# Patient Record
Sex: MALE | Race: WHITE | Employment: FULL TIME | URBAN - METROPOLITAN AREA
[De-identification: names, ages, dates, MRNs, and addresses within clinical notes are randomized per-mention and may not be internally consistent; named-entity substitution may affect disease eponyms.]

---

## 2017-07-24 PROCEDURE — 81003 URINALYSIS AUTO W/O SCOPE: CPT | Performed by: FAMILY MEDICINE

## 2018-05-14 PROBLEM — F41.1 GENERALIZED ANXIETY DISORDER: Status: ACTIVE | Noted: 2018-05-14

## 2020-02-05 ENCOUNTER — HOSPITAL ENCOUNTER (EMERGENCY)
Age: 55
Discharge: HOME | End: 2020-02-05
Payer: COMMERCIAL

## 2020-02-05 ENCOUNTER — APPOINTMENT (OUTPATIENT)
Dept: GENERAL RADIOLOGY | Age: 55
End: 2020-02-05
Payer: COMMERCIAL

## 2020-02-05 VITALS
BODY MASS INDEX: 36 KG/M2 | DIASTOLIC BLOOD PRESSURE: 43 MMHG | HEART RATE: 78 BPM | WEIGHT: 282.19 LBS | SYSTOLIC BLOOD PRESSURE: 143 MMHG | RESPIRATION RATE: 18 BRPM | TEMPERATURE: 98 F | OXYGEN SATURATION: 99 %

## 2020-02-05 DIAGNOSIS — R05.3 CHRONIC COUGH: ICD-10-CM

## 2020-02-05 DIAGNOSIS — R07.89 CHEST PAIN, MUSCULOSKELETAL: Primary | ICD-10-CM

## 2020-02-05 LAB
ALBUMIN SERPL-MCNC: 3.3 G/DL (ref 3.4–5)
ALBUMIN/GLOB SERPL: 0.8 {RATIO} (ref 1–2)
ALP LIVER SERPL-CCNC: 71 U/L (ref 45–117)
ALT SERPL-CCNC: 23 U/L (ref 16–61)
ANION GAP SERPL CALC-SCNC: 7 MMOL/L (ref 0–18)
AST SERPL-CCNC: 12 U/L (ref 15–37)
BASOPHILS # BLD AUTO: 0.04 X10(3) UL (ref 0–0.2)
BASOPHILS NFR BLD AUTO: 0.3 %
BILIRUB SERPL-MCNC: 0.5 MG/DL (ref 0.1–2)
BUN BLD-MCNC: 20 MG/DL (ref 7–18)
BUN/CREAT SERPL: 21.7 (ref 10–20)
CALCIUM BLD-MCNC: 8.7 MG/DL (ref 8.5–10.1)
CHLORIDE SERPL-SCNC: 102 MMOL/L (ref 98–112)
CO2 SERPL-SCNC: 27 MMOL/L (ref 21–32)
CREAT BLD-MCNC: 0.92 MG/DL (ref 0.7–1.3)
D-DIMER: <0.27 UG/ML FEU (ref ?–0.53)
DEPRECATED RDW RBC AUTO: 42.9 FL (ref 35.1–46.3)
EOSINOPHIL # BLD AUTO: 0.18 X10(3) UL (ref 0–0.7)
EOSINOPHIL NFR BLD AUTO: 1.4 %
ERYTHROCYTE [DISTWIDTH] IN BLOOD BY AUTOMATED COUNT: 13.9 % (ref 11–15)
GLOBULIN PLAS-MCNC: 4.1 G/DL (ref 2.8–4.4)
GLUCOSE BLD-MCNC: 100 MG/DL (ref 70–99)
HAV IGM SER QL: 1.9 MG/DL (ref 1.6–2.6)
HCT VFR BLD AUTO: 41.5 % (ref 39–53)
HGB BLD-MCNC: 13.1 G/DL (ref 13–17.5)
IMM GRANULOCYTES # BLD AUTO: 0.05 X10(3) UL (ref 0–1)
IMM GRANULOCYTES NFR BLD: 0.4 %
LIPASE SERPL-CCNC: 141 U/L (ref 73–393)
LYMPHOCYTES # BLD AUTO: 2.63 X10(3) UL (ref 1–4)
LYMPHOCYTES NFR BLD AUTO: 20 %
M PROTEIN MFR SERPL ELPH: 7.4 G/DL (ref 6.4–8.2)
MCH RBC QN AUTO: 26.7 PG (ref 26–34)
MCHC RBC AUTO-ENTMCNC: 31.6 G/DL (ref 31–37)
MCV RBC AUTO: 84.7 FL (ref 80–100)
MONOCYTES # BLD AUTO: 0.88 X10(3) UL (ref 0.1–1)
MONOCYTES NFR BLD AUTO: 6.7 %
NEUTROPHILS # BLD AUTO: 9.35 X10 (3) UL (ref 1.5–7.7)
NEUTROPHILS # BLD AUTO: 9.35 X10(3) UL (ref 1.5–7.7)
NEUTROPHILS NFR BLD AUTO: 71.2 %
OSMOLALITY SERPL CALC.SUM OF ELEC: 285 MOSM/KG (ref 275–295)
PLATELET # BLD AUTO: 454 10(3)UL (ref 150–450)
POTASSIUM SERPL-SCNC: 3.6 MMOL/L (ref 3.5–5.1)
RBC # BLD AUTO: 4.9 X10(6)UL (ref 4.3–5.7)
SODIUM SERPL-SCNC: 136 MMOL/L (ref 136–145)
TROPONIN I SERPL-MCNC: <0.045 NG/ML (ref ?–0.04)
WBC # BLD AUTO: 13.1 X10(3) UL (ref 4–11)

## 2020-02-05 PROCEDURE — 83690 ASSAY OF LIPASE: CPT | Performed by: EMERGENCY MEDICINE

## 2020-02-05 PROCEDURE — 83735 ASSAY OF MAGNESIUM: CPT | Performed by: EMERGENCY MEDICINE

## 2020-02-05 PROCEDURE — 96374 THER/PROPH/DIAG INJ IV PUSH: CPT

## 2020-02-05 PROCEDURE — 93005 ELECTROCARDIOGRAM TRACING: CPT

## 2020-02-05 PROCEDURE — 85379 FIBRIN DEGRADATION QUANT: CPT | Performed by: EMERGENCY MEDICINE

## 2020-02-05 PROCEDURE — 85025 COMPLETE CBC W/AUTO DIFF WBC: CPT | Performed by: EMERGENCY MEDICINE

## 2020-02-05 PROCEDURE — 84484 ASSAY OF TROPONIN QUANT: CPT | Performed by: EMERGENCY MEDICINE

## 2020-02-05 PROCEDURE — 80053 COMPREHEN METABOLIC PANEL: CPT | Performed by: EMERGENCY MEDICINE

## 2020-02-05 PROCEDURE — 99285 EMERGENCY DEPT VISIT HI MDM: CPT

## 2020-02-05 PROCEDURE — 96361 HYDRATE IV INFUSION ADD-ON: CPT

## 2020-02-05 PROCEDURE — 93010 ELECTROCARDIOGRAM REPORT: CPT

## 2020-02-05 PROCEDURE — 71101 X-RAY EXAM UNILAT RIBS/CHEST: CPT

## 2020-02-05 RX ORDER — KETOROLAC TROMETHAMINE 30 MG/ML
30 INJECTION, SOLUTION INTRAMUSCULAR; INTRAVENOUS ONCE
Status: COMPLETED | OUTPATIENT
Start: 2020-02-05 | End: 2020-02-05

## 2020-02-05 RX ORDER — BENZONATATE 100 MG/1
100 CAPSULE ORAL 3 TIMES DAILY PRN
Qty: 30 CAPSULE | Refills: 0 | Status: SHIPPED | OUTPATIENT
Start: 2020-02-05 | End: 2020-03-06

## 2020-02-05 NOTE — ED INITIAL ASSESSMENT (HPI)
Recent bronchitis with cough. States he has right lower rib pain.  States he is concerned he may have broken rib from cough

## 2020-02-06 LAB
ATRIAL RATE: 86 BPM
P AXIS: 87 DEGREES
P-R INTERVAL: 212 MS
Q-T INTERVAL: 400 MS
QRS DURATION: 104 MS
QTC CALCULATION (BEZET): 478 MS
R AXIS: -58 DEGREES
T AXIS: 72 DEGREES
VENTRICULAR RATE: 86 BPM

## 2020-02-06 NOTE — ED PROVIDER NOTES
Patient Seen in: [facility] In [place]      History   Patient presents with:  Trauma    Stated Complaint: right rib pain recent bronchitis    HPI    The patient is a 79-year-old male who is battling upper respiratory infection for abo Used    Alcohol use: No      Alcohol/week: 0.0 standard drinks    Drug use: No             Review of Systems    Positive for stated complaint: right rib pain recent bronchitis  Other systems are as noted in HPI. Constitutional and vital signs reviewed. asymmetry, tenderness or cords. Skin: No masses or nodules or abnormalities.   Psych: Normal interaction, cooperative with exam       ED Course     Labs Reviewed   COMP METABOLIC PANEL (14) - Abnormal; Notable for the following components:       Result Bree no evidence of acute ischemia. On arrival of the patient an EKG was obtained. The patient was placed on continuous pulse oximetry and cardiac telemetry. Blood was obtained and peripheral IV access was established. Chest x-ray was obtained. condition.               Disposition and Plan     Clinical Impression:  Chest pain, musculoskeletal  (primary encounter diagnosis)  Chronic cough    Disposition:  Discharge  2/5/2020  8:10 pm    Follow-up:  Cora Huynh, [address]

## 2021-08-06 NOTE — ED PROVIDER NOTES
Patient Seen in: [facility] In [place]      History   Patient presents with:   Anxiety/Panic attack  Eval-P    Stated Complaint: needs psych meds refilled until he can see his dr    MIRTA/Subjective:   MIRTA    Patient is a 27-year-old gen All other systems reviewed and negative except as noted above.     Physical Exam     ED Triage Vitals [08/06/21 1250]   /55   Pulse 63   Resp 20   Temp (!) 96 °F (35.6 °C)   Temp src Temporal   SpO2 99 %   O2 Device None (Room air)       Current:B medications    LORazepam (ATIVAN) 1 MG Oral Tab  Take 1 tablet (1 mg total) by mouth every 4 (four) hours as needed for Anxiety.   Qty: 10 tablet Refills: 0

## 2021-08-06 NOTE — ED INITIAL ASSESSMENT (HPI)
has been in constant panic attack for last several days. States meds are not working now. Went to [facility] and was sent here for \"med check\" denies SI but  is having \"negative thoughts\" but denies SI or any plans.  Patient is anxious, cannot sit

## 2022-05-12 NOTE — LETTER
81 y/o male with APS on coumadin, hx PE, MAC pneumonia, HLD with spontaneous C3-C6 epidural hematoma compressing the spinal cord    S/p  C3-C6 laminectomy and fusion for evacuation of epidural hematoma 4/28     Stepped down to NPU    -- CTH 4/29 no acute intracranial process   -- Post op MRI C spine satisfactory decompression with continued cord signal edema   --postop XR C spine hardware in good position  -- Acute encephalopathy:    --CTH 5/3 ordered per medicine team for some confusion: without evidence of acute hemorrhage.    --Hypernatremia: Na WNL. continue to monitor daily  --Agitation:    -Minimize use of restraints    -Scheduled 25 mg Seroquel qhs. Monitor QTc. Daily EKG. PRN Seroquel. Avoid depakote for elevated LFTs   -MRI brain w/wo contrast: no acute infarct or hemorrhage.    -Agitation most likely 2/2 delirium and steroids, steroid taper in to be completed 5/13, agitation and sleep schedule improving   -- Bilateral hydroureteronephrosis: Catheter in place. Keep in place until Urology follow up, 2 week follow up with Urology scheduled   -- C-collar to be worn when OOB  -- HV drains removed 4/30  -- History of antiphospholipid syndrome and PE: hold warfarin. Heme/onc signed off.    -Plan for outpatient follow up 5/27  -- SLP recommending regular diet with nectar thick liquids.  -- Dex taper to off. PPI while on steroids  -- Constipation: Bowel movement s/p enema on 5/3  -- Activity: PT/OT recs for SNF  -- DVT prophylaxis: TEDs/SCDs/SQH. - BLE US 5/11/22 negative for DVT  --Concern for hematoma vs abscess. Final path returned as blood clot. Daptomycin discontinued per ID recs.    - Specimen sent for cultures 5/6. CTM.    Dispo: Denied IPR, pending SNF   Date: 10/14/2023    Patient Name: Mariposa Merida          To Whom it may concern: The above patient was seen at the [facility] for treatment of a medical condition. This patient has multiple health concerns currently that require close monitoring. I recommend he not leave the country at this time, to remain close to healthcare providers.       Sincerely,    Niraj Gonzales MD

## 2022-07-23 ENCOUNTER — HOSPITAL ENCOUNTER (EMERGENCY)
Age: 57
Discharge: HOME | End: 2022-07-23
Attending: EMERGENCY MEDICINE
Payer: COMMERCIAL

## 2022-07-23 ENCOUNTER — APPOINTMENT (OUTPATIENT)
Dept: CT IMAGING | Age: 57
End: 2022-07-23
Attending: EMERGENCY MEDICINE
Payer: COMMERCIAL

## 2022-07-23 VITALS
SYSTOLIC BLOOD PRESSURE: 151 MMHG | DIASTOLIC BLOOD PRESSURE: 70 MMHG | HEART RATE: 88 BPM | TEMPERATURE: 97 F | OXYGEN SATURATION: 99 % | BODY MASS INDEX: 28.35 KG/M2 | RESPIRATION RATE: 18 BRPM | HEIGHT: 75 IN | WEIGHT: 228 LBS

## 2022-07-23 DIAGNOSIS — R10.30 LOWER ABDOMINAL PAIN: Primary | ICD-10-CM

## 2022-07-23 LAB
ALBUMIN SERPL-MCNC: 3.9 G/DL (ref 3.4–5)
ALBUMIN/GLOB SERPL: 1.1 {RATIO} (ref 1–2)
ALP LIVER SERPL-CCNC: 74 U/L
ALT SERPL-CCNC: 30 U/L
ANION GAP SERPL CALC-SCNC: 5 MMOL/L (ref 0–18)
AST SERPL-CCNC: 13 U/L (ref 15–37)
BASOPHILS # BLD AUTO: 0.04 X10(3) UL (ref 0–0.2)
BASOPHILS NFR BLD AUTO: 0.5 %
BILIRUB SERPL-MCNC: 1.2 MG/DL (ref 0.1–2)
BILIRUB UR QL STRIP.AUTO: NEGATIVE
BUN BLD-MCNC: 17 MG/DL (ref 7–18)
CALCIUM BLD-MCNC: 9.6 MG/DL (ref 8.5–10.1)
CHLORIDE SERPL-SCNC: 101 MMOL/L (ref 98–112)
CLARITY UR REFRACT.AUTO: CLEAR
CO2 SERPL-SCNC: 28 MMOL/L (ref 21–32)
COLOR UR AUTO: YELLOW
CREAT BLD-MCNC: 1.05 MG/DL
EOSINOPHIL # BLD AUTO: 0.21 X10(3) UL (ref 0–0.7)
EOSINOPHIL NFR BLD AUTO: 2.5 %
ERYTHROCYTE [DISTWIDTH] IN BLOOD BY AUTOMATED COUNT: 12.7 %
GLOBULIN PLAS-MCNC: 3.6 G/DL (ref 2.8–4.4)
GLUCOSE BLD-MCNC: 111 MG/DL (ref 70–99)
GLUCOSE UR STRIP.AUTO-MCNC: NEGATIVE MG/DL
HCT VFR BLD AUTO: 44.4 %
HGB BLD-MCNC: 14.7 G/DL
IMM GRANULOCYTES # BLD AUTO: 0.02 X10(3) UL (ref 0–1)
IMM GRANULOCYTES NFR BLD: 0.2 %
KETONES UR STRIP.AUTO-MCNC: NEGATIVE MG/DL
LEUKOCYTE ESTERASE UR QL STRIP.AUTO: NEGATIVE
LIPASE SERPL-CCNC: 342 U/L (ref 73–393)
LYMPHOCYTES # BLD AUTO: 2.42 X10(3) UL (ref 1–4)
LYMPHOCYTES NFR BLD AUTO: 28.5 %
MCH RBC QN AUTO: 28.8 PG (ref 26–34)
MCHC RBC AUTO-ENTMCNC: 33.1 G/DL (ref 31–37)
MCV RBC AUTO: 86.9 FL
MONOCYTES # BLD AUTO: 0.66 X10(3) UL (ref 0.1–1)
MONOCYTES NFR BLD AUTO: 7.8 %
NEUTROPHILS # BLD AUTO: 5.15 X10 (3) UL (ref 1.5–7.7)
NEUTROPHILS # BLD AUTO: 5.15 X10(3) UL (ref 1.5–7.7)
NEUTROPHILS NFR BLD AUTO: 60.5 %
NITRITE UR QL STRIP.AUTO: NEGATIVE
OSMOLALITY SERPL CALC.SUM OF ELEC: 280 MOSM/KG (ref 275–295)
PH UR STRIP.AUTO: 6.5 [PH] (ref 5–8)
PLATELET # BLD AUTO: 373 10(3)UL (ref 150–450)
POTASSIUM SERPL-SCNC: 3.5 MMOL/L (ref 3.5–5.1)
PROT SERPL-MCNC: 7.5 G/DL (ref 6.4–8.2)
PROT UR STRIP.AUTO-MCNC: NEGATIVE MG/DL
RBC # BLD AUTO: 5.11 X10(6)UL
RBC UR QL AUTO: NEGATIVE
SODIUM SERPL-SCNC: 134 MMOL/L (ref 136–145)
SP GR UR STRIP.AUTO: 1.02 (ref 1–1.03)
UROBILINOGEN UR STRIP.AUTO-MCNC: 2 MG/DL
WBC # BLD AUTO: 8.5 X10(3) UL (ref 4–11)

## 2022-07-23 PROCEDURE — 85025 COMPLETE CBC W/AUTO DIFF WBC: CPT | Performed by: EMERGENCY MEDICINE

## 2022-07-23 PROCEDURE — 99284 EMERGENCY DEPT VISIT MOD MDM: CPT

## 2022-07-23 PROCEDURE — 83690 ASSAY OF LIPASE: CPT | Performed by: EMERGENCY MEDICINE

## 2022-07-23 PROCEDURE — 80053 COMPREHEN METABOLIC PANEL: CPT | Performed by: EMERGENCY MEDICINE

## 2022-07-23 PROCEDURE — 96361 HYDRATE IV INFUSION ADD-ON: CPT

## 2022-07-23 PROCEDURE — 96375 TX/PRO/DX INJ NEW DRUG ADDON: CPT

## 2022-07-23 PROCEDURE — 96374 THER/PROPH/DIAG INJ IV PUSH: CPT

## 2022-07-23 PROCEDURE — 74177 CT ABD & PELVIS W/CONTRAST: CPT | Performed by: EMERGENCY MEDICINE

## 2022-07-23 PROCEDURE — 81003 URINALYSIS AUTO W/O SCOPE: CPT

## 2022-07-23 PROCEDURE — 81003 URINALYSIS AUTO W/O SCOPE: CPT | Performed by: EMERGENCY MEDICINE

## 2022-07-23 RX ORDER — ONDANSETRON 2 MG/ML
4 INJECTION INTRAMUSCULAR; INTRAVENOUS
Status: DISCONTINUED | OUTPATIENT
Start: 2022-07-23 | End: 2022-07-23

## 2022-07-23 RX ORDER — ONDANSETRON 8 MG/1
8 TABLET, ORALLY DISINTEGRATING ORAL EVERY 6 HOURS PRN
Qty: 10 TABLET | Refills: 0 | Status: SHIPPED | OUTPATIENT
Start: 2022-07-23

## 2022-07-23 RX ORDER — KETOROLAC TROMETHAMINE 30 MG/ML
30 INJECTION, SOLUTION INTRAMUSCULAR; INTRAVENOUS ONCE
Status: COMPLETED | OUTPATIENT
Start: 2022-07-23 | End: 2022-07-23

## 2023-01-01 ENCOUNTER — HOSPITAL ENCOUNTER (EMERGENCY)
Age: 58
Discharge: HOME | End: 2023-01-01
Attending: EMERGENCY MEDICINE
Payer: COMMERCIAL

## 2023-01-01 ENCOUNTER — APPOINTMENT (OUTPATIENT)
Dept: CT IMAGING | Age: 58
End: 2023-01-01
Attending: EMERGENCY MEDICINE
Payer: COMMERCIAL

## 2023-01-01 VITALS
TEMPERATURE: 97 F | RESPIRATION RATE: 14 BRPM | HEART RATE: 77 BPM | DIASTOLIC BLOOD PRESSURE: 63 MMHG | OXYGEN SATURATION: 99 % | SYSTOLIC BLOOD PRESSURE: 142 MMHG

## 2023-01-01 DIAGNOSIS — R10.10 UPPER ABDOMINAL PAIN: Primary | ICD-10-CM

## 2023-01-01 LAB
ALBUMIN SERPL-MCNC: 3.6 G/DL (ref 3.4–5)
ALBUMIN/GLOB SERPL: 1 {RATIO} (ref 1–2)
ALP LIVER SERPL-CCNC: 63 U/L
ALT SERPL-CCNC: 32 U/L
ANION GAP SERPL CALC-SCNC: 5 MMOL/L (ref 0–18)
AST SERPL-CCNC: 13 U/L (ref 15–37)
BASOPHILS # BLD AUTO: 0.03 X10(3) UL (ref 0–0.2)
BASOPHILS NFR BLD AUTO: 0.3 %
BILIRUB SERPL-MCNC: 0.6 MG/DL (ref 0.1–2)
BILIRUB UR QL STRIP.AUTO: NEGATIVE
BUN BLD-MCNC: 17 MG/DL (ref 7–18)
CALCIUM BLD-MCNC: 9.3 MG/DL (ref 8.5–10.1)
CHLORIDE SERPL-SCNC: 105 MMOL/L (ref 98–112)
CLARITY UR REFRACT.AUTO: CLEAR
CO2 SERPL-SCNC: 29 MMOL/L (ref 21–32)
COLOR UR AUTO: YELLOW
CREAT BLD-MCNC: 0.9 MG/DL
EOSINOPHIL # BLD AUTO: 0.07 X10(3) UL (ref 0–0.7)
EOSINOPHIL NFR BLD AUTO: 0.7 %
ERYTHROCYTE [DISTWIDTH] IN BLOOD BY AUTOMATED COUNT: 13.1 %
GFR SERPLBLD BASED ON 1.73 SQ M-ARVRAT: 100 ML/MIN/1.73M2 (ref 60–?)
GLOBULIN PLAS-MCNC: 3.5 G/DL (ref 2.8–4.4)
GLUCOSE BLD-MCNC: 123 MG/DL (ref 70–99)
GLUCOSE UR STRIP.AUTO-MCNC: NEGATIVE MG/DL
HCT VFR BLD AUTO: 42.1 %
HGB BLD-MCNC: 14 G/DL
IMM GRANULOCYTES # BLD AUTO: 0.03 X10(3) UL (ref 0–1)
IMM GRANULOCYTES NFR BLD: 0.3 %
KETONES UR STRIP.AUTO-MCNC: NEGATIVE MG/DL
LEUKOCYTE ESTERASE UR QL STRIP.AUTO: NEGATIVE
LIPASE SERPL-CCNC: 177 U/L (ref 73–393)
LYMPHOCYTES # BLD AUTO: 1.62 X10(3) UL (ref 1–4)
LYMPHOCYTES NFR BLD AUTO: 16.8 %
MCH RBC QN AUTO: 28.7 PG (ref 26–34)
MCHC RBC AUTO-ENTMCNC: 33.3 G/DL (ref 31–37)
MCV RBC AUTO: 86.3 FL
MONOCYTES # BLD AUTO: 0.41 X10(3) UL (ref 0.1–1)
MONOCYTES NFR BLD AUTO: 4.2 %
NEUTROPHILS # BLD AUTO: 7.5 X10 (3) UL (ref 1.5–7.7)
NEUTROPHILS # BLD AUTO: 7.5 X10(3) UL (ref 1.5–7.7)
NEUTROPHILS NFR BLD AUTO: 77.7 %
NITRITE UR QL STRIP.AUTO: NEGATIVE
OSMOLALITY SERPL CALC.SUM OF ELEC: 291 MOSM/KG (ref 275–295)
PH UR STRIP.AUTO: 8.5 [PH] (ref 5–8)
PLATELET # BLD AUTO: 395 10(3)UL (ref 150–450)
POTASSIUM SERPL-SCNC: 3.8 MMOL/L (ref 3.5–5.1)
PROT SERPL-MCNC: 7.1 G/DL (ref 6.4–8.2)
PROT UR STRIP.AUTO-MCNC: NEGATIVE MG/DL
RBC # BLD AUTO: 4.88 X10(6)UL
RBC UR QL AUTO: NEGATIVE
SODIUM SERPL-SCNC: 139 MMOL/L (ref 136–145)
SP GR UR STRIP.AUTO: 1.02 (ref 1–1.03)
UROBILINOGEN UR STRIP.AUTO-MCNC: 0.2 MG/DL
WBC # BLD AUTO: 9.7 X10(3) UL (ref 4–11)

## 2023-01-01 PROCEDURE — 85025 COMPLETE CBC W/AUTO DIFF WBC: CPT

## 2023-01-01 PROCEDURE — 85025 COMPLETE CBC W/AUTO DIFF WBC: CPT | Performed by: EMERGENCY MEDICINE

## 2023-01-01 PROCEDURE — 74176 CT ABD & PELVIS W/O CONTRAST: CPT | Performed by: EMERGENCY MEDICINE

## 2023-01-01 PROCEDURE — 96374 THER/PROPH/DIAG INJ IV PUSH: CPT

## 2023-01-01 PROCEDURE — 96375 TX/PRO/DX INJ NEW DRUG ADDON: CPT

## 2023-01-01 PROCEDURE — C9113 INJ PANTOPRAZOLE SODIUM, VIA: HCPCS | Performed by: EMERGENCY MEDICINE

## 2023-01-01 PROCEDURE — 96361 HYDRATE IV INFUSION ADD-ON: CPT

## 2023-01-01 PROCEDURE — 80053 COMPREHEN METABOLIC PANEL: CPT | Performed by: EMERGENCY MEDICINE

## 2023-01-01 PROCEDURE — 99284 EMERGENCY DEPT VISIT MOD MDM: CPT

## 2023-01-01 PROCEDURE — 83690 ASSAY OF LIPASE: CPT

## 2023-01-01 PROCEDURE — 83690 ASSAY OF LIPASE: CPT | Performed by: EMERGENCY MEDICINE

## 2023-01-01 PROCEDURE — 80053 COMPREHEN METABOLIC PANEL: CPT

## 2023-01-01 PROCEDURE — 81003 URINALYSIS AUTO W/O SCOPE: CPT | Performed by: EMERGENCY MEDICINE

## 2023-01-01 RX ORDER — KETOROLAC TROMETHAMINE 15 MG/ML
15 INJECTION, SOLUTION INTRAMUSCULAR; INTRAVENOUS ONCE
Status: COMPLETED | OUTPATIENT
Start: 2023-01-01 | End: 2023-01-01

## 2023-01-01 RX ORDER — ONDANSETRON 2 MG/ML
4 INJECTION INTRAMUSCULAR; INTRAVENOUS ONCE
Status: COMPLETED | OUTPATIENT
Start: 2023-01-01 | End: 2023-01-01

## 2023-01-01 RX ORDER — SODIUM CHLORIDE 9 MG/ML
INJECTION, SOLUTION INTRAVENOUS CONTINUOUS
Status: DISCONTINUED | OUTPATIENT
Start: 2023-01-01 | End: 2023-01-01

## 2023-01-01 RX ORDER — HYDROMORPHONE HYDROCHLORIDE 1 MG/ML
1 INJECTION, SOLUTION INTRAMUSCULAR; INTRAVENOUS; SUBCUTANEOUS EVERY 30 MIN PRN
Status: DISCONTINUED | OUTPATIENT
Start: 2023-01-01 | End: 2023-01-01

## 2023-01-01 RX ORDER — PANTOPRAZOLE SODIUM 40 MG/1
40 TABLET, DELAYED RELEASE ORAL DAILY
Qty: 30 TABLET | Refills: 0 | Status: SHIPPED | OUTPATIENT
Start: 2023-01-01 | End: 2023-01-31

## 2023-01-01 NOTE — ED INITIAL ASSESSMENT (HPI)
Woke up at 0230 with abdominal pain that he thought was gas pains, took gas ex without relief , took pepto and no relief.  Nausea but no vomiting

## 2023-02-09 ENCOUNTER — APPOINTMENT (OUTPATIENT)
Dept: ULTRASOUND IMAGING | Age: 58
End: 2023-02-09
Attending: EMERGENCY MEDICINE
Payer: COMMERCIAL

## 2023-02-09 ENCOUNTER — HOSPITAL ENCOUNTER (EMERGENCY)
Age: 58
Discharge: HOME | End: 2023-02-10
Attending: EMERGENCY MEDICINE
Payer: COMMERCIAL

## 2023-02-09 VITALS
SYSTOLIC BLOOD PRESSURE: 118 MMHG | WEIGHT: 250 LBS | HEART RATE: 74 BPM | HEIGHT: 75 IN | DIASTOLIC BLOOD PRESSURE: 72 MMHG | OXYGEN SATURATION: 96 % | BODY MASS INDEX: 31.08 KG/M2 | TEMPERATURE: 98 F | RESPIRATION RATE: 20 BRPM

## 2023-02-09 DIAGNOSIS — K80.20 GALLSTONES: ICD-10-CM

## 2023-02-09 DIAGNOSIS — R10.13 EPIGASTRIC PAIN: Primary | ICD-10-CM

## 2023-02-09 LAB
ALBUMIN SERPL-MCNC: 3.4 G/DL (ref 3.4–5)
ALBUMIN/GLOB SERPL: 0.9 {RATIO} (ref 1–2)
ALP LIVER SERPL-CCNC: 61 U/L
ALT SERPL-CCNC: 27 U/L
ANION GAP SERPL CALC-SCNC: 5 MMOL/L (ref 0–18)
AST SERPL-CCNC: 12 U/L (ref 15–37)
BASOPHILS # BLD AUTO: 0.06 X10(3) UL (ref 0–0.2)
BASOPHILS NFR BLD AUTO: 0.6 %
BILIRUB SERPL-MCNC: 0.4 MG/DL (ref 0.1–2)
BILIRUB UR QL STRIP.AUTO: NEGATIVE
BUN BLD-MCNC: 18 MG/DL (ref 7–18)
CALCIUM BLD-MCNC: 8.9 MG/DL (ref 8.5–10.1)
CHLORIDE SERPL-SCNC: 100 MMOL/L (ref 98–112)
CLARITY UR REFRACT.AUTO: CLEAR
CO2 SERPL-SCNC: 31 MMOL/L (ref 21–32)
COLOR UR AUTO: YELLOW
CREAT BLD-MCNC: 0.9 MG/DL
EOSINOPHIL # BLD AUTO: 0.26 X10(3) UL (ref 0–0.7)
EOSINOPHIL NFR BLD AUTO: 2.7 %
ERYTHROCYTE [DISTWIDTH] IN BLOOD BY AUTOMATED COUNT: 13.3 %
GFR SERPLBLD BASED ON 1.73 SQ M-ARVRAT: 100 ML/MIN/1.73M2 (ref 60–?)
GLOBULIN PLAS-MCNC: 3.6 G/DL (ref 2.8–4.4)
GLUCOSE BLD-MCNC: 121 MG/DL (ref 70–99)
GLUCOSE UR STRIP.AUTO-MCNC: NEGATIVE MG/DL
HCT VFR BLD AUTO: 42.9 %
HGB BLD-MCNC: 14.2 G/DL
IMM GRANULOCYTES # BLD AUTO: 0.03 X10(3) UL (ref 0–1)
IMM GRANULOCYTES NFR BLD: 0.3 %
KETONES UR STRIP.AUTO-MCNC: NEGATIVE MG/DL
LEUKOCYTE ESTERASE UR QL STRIP.AUTO: NEGATIVE
LIPASE SERPL-CCNC: 253 U/L (ref 73–393)
LIPASE SERPL-CCNC: 65 U/L (ref 13–75)
LYMPHOCYTES # BLD AUTO: 2.6 X10(3) UL (ref 1–4)
LYMPHOCYTES NFR BLD AUTO: 26.6 %
MCH RBC QN AUTO: 28.4 PG (ref 26–34)
MCHC RBC AUTO-ENTMCNC: 33.1 G/DL (ref 31–37)
MCV RBC AUTO: 85.8 FL
MONOCYTES # BLD AUTO: 0.86 X10(3) UL (ref 0.1–1)
MONOCYTES NFR BLD AUTO: 8.8 %
NEUTROPHILS # BLD AUTO: 5.96 X10 (3) UL (ref 1.5–7.7)
NEUTROPHILS # BLD AUTO: 5.96 X10(3) UL (ref 1.5–7.7)
NEUTROPHILS NFR BLD AUTO: 61 %
NITRITE UR QL STRIP.AUTO: NEGATIVE
OSMOLALITY SERPL CALC.SUM OF ELEC: 285 MOSM/KG (ref 275–295)
PH UR STRIP.AUTO: 7.5 [PH] (ref 5–8)
PLATELET # BLD AUTO: 360 10(3)UL (ref 150–450)
POTASSIUM SERPL-SCNC: 3.8 MMOL/L (ref 3.5–5.1)
PROT SERPL-MCNC: 7 G/DL (ref 6.4–8.2)
PROT UR STRIP.AUTO-MCNC: NEGATIVE MG/DL
RBC # BLD AUTO: 5 X10(6)UL
RBC UR QL AUTO: NEGATIVE
SODIUM SERPL-SCNC: 136 MMOL/L (ref 136–145)
SP GR UR STRIP.AUTO: 1.02 (ref 1–1.03)
UROBILINOGEN UR STRIP.AUTO-MCNC: 1 MG/DL
WBC # BLD AUTO: 9.8 X10(3) UL (ref 4–11)

## 2023-02-09 PROCEDURE — 99285 EMERGENCY DEPT VISIT HI MDM: CPT

## 2023-02-09 PROCEDURE — 76700 US EXAM ABDOM COMPLETE: CPT | Performed by: EMERGENCY MEDICINE

## 2023-02-09 PROCEDURE — 83690 ASSAY OF LIPASE: CPT | Performed by: EMERGENCY MEDICINE

## 2023-02-09 PROCEDURE — 81003 URINALYSIS AUTO W/O SCOPE: CPT | Performed by: EMERGENCY MEDICINE

## 2023-02-09 PROCEDURE — C9113 INJ PANTOPRAZOLE SODIUM, VIA: HCPCS | Performed by: EMERGENCY MEDICINE

## 2023-02-09 PROCEDURE — 85025 COMPLETE CBC W/AUTO DIFF WBC: CPT | Performed by: EMERGENCY MEDICINE

## 2023-02-09 PROCEDURE — 96375 TX/PRO/DX INJ NEW DRUG ADDON: CPT

## 2023-02-09 PROCEDURE — 96376 TX/PRO/DX INJ SAME DRUG ADON: CPT

## 2023-02-09 PROCEDURE — 96361 HYDRATE IV INFUSION ADD-ON: CPT

## 2023-02-09 PROCEDURE — 96374 THER/PROPH/DIAG INJ IV PUSH: CPT

## 2023-02-09 PROCEDURE — 80053 COMPREHEN METABOLIC PANEL: CPT | Performed by: EMERGENCY MEDICINE

## 2023-02-09 RX ORDER — MAGNESIUM HYDROXIDE/ALUMINUM HYDROXICE/SIMETHICONE 120; 1200; 1200 MG/30ML; MG/30ML; MG/30ML
30 SUSPENSION ORAL ONCE
Status: COMPLETED | OUTPATIENT
Start: 2023-02-09 | End: 2023-02-09

## 2023-02-09 RX ORDER — ONDANSETRON 2 MG/ML
4 INJECTION INTRAMUSCULAR; INTRAVENOUS ONCE
Status: COMPLETED | OUTPATIENT
Start: 2023-02-09 | End: 2023-02-09

## 2023-02-09 RX ORDER — HYDROMORPHONE HYDROCHLORIDE 1 MG/ML
1 INJECTION, SOLUTION INTRAMUSCULAR; INTRAVENOUS; SUBCUTANEOUS EVERY 30 MIN PRN
Status: COMPLETED | OUTPATIENT
Start: 2023-02-09 | End: 2023-02-09

## 2023-02-09 RX ORDER — LIDOCAINE HYDROCHLORIDE 20 MG/ML
10 SOLUTION OROPHARYNGEAL ONCE
Status: COMPLETED | OUTPATIENT
Start: 2023-02-09 | End: 2023-02-09

## 2023-02-09 RX ORDER — PANTOPRAZOLE SODIUM 40 MG/1
40 TABLET, DELAYED RELEASE ORAL DAILY
Qty: 30 TABLET | Refills: 0 | Status: SHIPPED | OUTPATIENT
Start: 2023-02-09 | End: 2023-03-11

## 2023-02-10 NOTE — ED QUICK NOTES
Patient departed ER in stable condition. Patient verbalized understanding to all discharge instructions.

## 2023-02-10 NOTE — ED INITIAL ASSESSMENT (HPI)
PT to the ED for evaluation of upper abdominal pain that goes from left to right. Denies n/v/d. Pt reports he was here on the 1st for the same. Treated with protonix and all symptoms had resolved. Pain returned 2 nights ago and got worse tonight.

## 2023-02-11 ENCOUNTER — HOSPITAL ENCOUNTER (EMERGENCY)
Facility: HOSPITAL | Age: 58
Discharge: LEFT BEFORE DISCHARGE | End: 2023-02-12
Payer: COMMERCIAL

## 2023-02-11 VITALS
SYSTOLIC BLOOD PRESSURE: 127 MMHG | RESPIRATION RATE: 16 BRPM | HEART RATE: 76 BPM | TEMPERATURE: 98 F | DIASTOLIC BLOOD PRESSURE: 80 MMHG | OXYGEN SATURATION: 99 %

## 2023-02-11 LAB
BILIRUB UR QL STRIP.AUTO: NEGATIVE
CLARITY UR REFRACT.AUTO: CLEAR
COLOR UR AUTO: YELLOW
GLUCOSE UR STRIP.AUTO-MCNC: NEGATIVE MG/DL
KETONES UR STRIP.AUTO-MCNC: NEGATIVE MG/DL
LEUKOCYTE ESTERASE UR QL STRIP.AUTO: NEGATIVE
NITRITE UR QL STRIP.AUTO: NEGATIVE
PH UR STRIP.AUTO: 6 [PH] (ref 5–8)
PROT UR STRIP.AUTO-MCNC: NEGATIVE MG/DL
RBC UR QL AUTO: NEGATIVE
SP GR UR STRIP.AUTO: 1.02 (ref 1–1.03)
UROBILINOGEN UR STRIP.AUTO-MCNC: 2 MG/DL

## 2023-02-11 PROCEDURE — 81003 URINALYSIS AUTO W/O SCOPE: CPT

## 2023-02-12 ENCOUNTER — APPOINTMENT (OUTPATIENT)
Dept: GENERAL RADIOLOGY | Facility: HOSPITAL | Age: 58
End: 2023-02-12
Attending: SURGERY
Payer: COMMERCIAL

## 2023-02-12 ENCOUNTER — HOSPITAL ENCOUNTER (INPATIENT)
Facility: HOSPITAL | Age: 58
LOS: 1 days | Discharge: HOME | End: 2023-02-13
Attending: EMERGENCY MEDICINE | Admitting: HOSPITALIST
Payer: COMMERCIAL

## 2023-02-12 ENCOUNTER — ANESTHESIA EVENT (OUTPATIENT)
Dept: SURGERY | Facility: HOSPITAL | Age: 58
End: 2023-02-12
Payer: COMMERCIAL

## 2023-02-12 ENCOUNTER — APPOINTMENT (OUTPATIENT)
Dept: ULTRASOUND IMAGING | Age: 58
End: 2023-02-12
Attending: EMERGENCY MEDICINE
Payer: COMMERCIAL

## 2023-02-12 ENCOUNTER — ANESTHESIA (OUTPATIENT)
Dept: SURGERY | Facility: HOSPITAL | Age: 58
End: 2023-02-12
Payer: COMMERCIAL

## 2023-02-12 DIAGNOSIS — R10.9 ABDOMINAL PAIN OF UNKNOWN ETIOLOGY: Primary | ICD-10-CM

## 2023-02-12 DIAGNOSIS — K81.0 ACUTE CHOLECYSTITIS: ICD-10-CM

## 2023-02-12 LAB
ALBUMIN SERPL-MCNC: 3.2 G/DL (ref 3.4–5)
ALBUMIN/GLOB SERPL: 0.9 {RATIO} (ref 1–2)
ALP LIVER SERPL-CCNC: 60 U/L
ALT SERPL-CCNC: 25 U/L
ANION GAP SERPL CALC-SCNC: 2 MMOL/L (ref 0–18)
AST SERPL-CCNC: 12 U/L (ref 15–37)
BASOPHILS # BLD AUTO: 0.05 X10(3) UL (ref 0–0.2)
BASOPHILS NFR BLD AUTO: 0.4 %
BILIRUB SERPL-MCNC: 0.4 MG/DL (ref 0.1–2)
BUN BLD-MCNC: 23 MG/DL (ref 7–18)
CALCIUM BLD-MCNC: 8.8 MG/DL (ref 8.5–10.1)
CHLORIDE SERPL-SCNC: 102 MMOL/L (ref 98–112)
CO2 SERPL-SCNC: 31 MMOL/L (ref 21–32)
CREAT BLD-MCNC: 0.94 MG/DL
EOSINOPHIL # BLD AUTO: 0.15 X10(3) UL (ref 0–0.7)
EOSINOPHIL NFR BLD AUTO: 1.2 %
ERYTHROCYTE [DISTWIDTH] IN BLOOD BY AUTOMATED COUNT: 13.2 %
GFR SERPLBLD BASED ON 1.73 SQ M-ARVRAT: 95 ML/MIN/1.73M2 (ref 60–?)
GLOBULIN PLAS-MCNC: 3.7 G/DL (ref 2.8–4.4)
GLUCOSE BLD-MCNC: 111 MG/DL (ref 70–99)
HCT VFR BLD AUTO: 40.3 %
HGB BLD-MCNC: 13.5 G/DL
IMM GRANULOCYTES # BLD AUTO: 0.03 X10(3) UL (ref 0–1)
IMM GRANULOCYTES NFR BLD: 0.2 %
LIPASE SERPL-CCNC: 207 U/L (ref 73–393)
LIPASE SERPL-CCNC: 54 U/L (ref 13–75)
LYMPHOCYTES # BLD AUTO: 1.59 X10(3) UL (ref 1–4)
LYMPHOCYTES NFR BLD AUTO: 12.5 %
MCH RBC QN AUTO: 29.1 PG (ref 26–34)
MCHC RBC AUTO-ENTMCNC: 33.5 G/DL (ref 31–37)
MCV RBC AUTO: 86.9 FL
MONOCYTES # BLD AUTO: 0.84 X10(3) UL (ref 0.1–1)
MONOCYTES NFR BLD AUTO: 6.6 %
NEUTROPHILS # BLD AUTO: 10.08 X10 (3) UL (ref 1.5–7.7)
NEUTROPHILS # BLD AUTO: 10.08 X10(3) UL (ref 1.5–7.7)
NEUTROPHILS NFR BLD AUTO: 79.1 %
OSMOLALITY SERPL CALC.SUM OF ELEC: 284 MOSM/KG (ref 275–295)
PLATELET # BLD AUTO: 349 10(3)UL (ref 150–450)
POTASSIUM SERPL-SCNC: 4.2 MMOL/L (ref 3.5–5.1)
PROT SERPL-MCNC: 6.9 G/DL (ref 6.4–8.2)
RBC # BLD AUTO: 4.64 X10(6)UL
SARS-COV-2 RNA RESP QL NAA+PROBE: NOT DETECTED
SODIUM SERPL-SCNC: 135 MMOL/L (ref 136–145)
TROPONIN I HIGH SENSITIVITY: 7 NG/L
WBC # BLD AUTO: 12.7 X10(3) UL (ref 4–11)

## 2023-02-12 PROCEDURE — 0FT44ZZ RESECTION OF GALLBLADDER, PERCUTANEOUS ENDOSCOPIC APPROACH: ICD-10-PCS | Performed by: SURGERY

## 2023-02-12 PROCEDURE — 76705 ECHO EXAM OF ABDOMEN: CPT | Performed by: EMERGENCY MEDICINE

## 2023-02-12 PROCEDURE — 3008F BODY MASS INDEX DOCD: CPT | Performed by: SURGERY

## 2023-02-12 PROCEDURE — BF101ZZ FLUOROSCOPY OF BILE DUCTS USING LOW OSMOLAR CONTRAST: ICD-10-PCS | Performed by: SURGERY

## 2023-02-12 PROCEDURE — 3078F DIAST BP <80 MM HG: CPT | Performed by: SURGERY

## 2023-02-12 PROCEDURE — 3075F SYST BP GE 130 - 139MM HG: CPT | Performed by: SURGERY

## 2023-02-12 PROCEDURE — 99223 1ST HOSP IP/OBS HIGH 75: CPT | Performed by: HOSPITALIST

## 2023-02-12 PROCEDURE — 99253 IP/OBS CNSLTJ NEW/EST LOW 45: CPT | Performed by: SURGERY

## 2023-02-12 PROCEDURE — 74300 X-RAY BILE DUCTS/PANCREAS: CPT | Performed by: SURGERY

## 2023-02-12 RX ORDER — LABETALOL HYDROCHLORIDE 5 MG/ML
5 INJECTION, SOLUTION INTRAVENOUS EVERY 5 MIN PRN
Status: DISCONTINUED | OUTPATIENT
Start: 2023-02-12 | End: 2023-02-12 | Stop reason: HOSPADM

## 2023-02-12 RX ORDER — NEOSTIGMINE METHYLSULFATE 1 MG/ML
INJECTION, SOLUTION INTRAVENOUS AS NEEDED
Status: DISCONTINUED | OUTPATIENT
Start: 2023-02-12 | End: 2023-02-12 | Stop reason: SURG

## 2023-02-12 RX ORDER — CLONAZEPAM 1 MG/1
0.5 TABLET ORAL 4 TIMES DAILY PRN
Status: DISCONTINUED | OUTPATIENT
Start: 2023-02-12 | End: 2023-02-13

## 2023-02-12 RX ORDER — HYDROMORPHONE HYDROCHLORIDE 1 MG/ML
0.2 INJECTION, SOLUTION INTRAMUSCULAR; INTRAVENOUS; SUBCUTANEOUS EVERY 2 HOUR PRN
Status: DISCONTINUED | OUTPATIENT
Start: 2023-02-12 | End: 2023-02-13

## 2023-02-12 RX ORDER — SODIUM CHLORIDE, SODIUM LACTATE, POTASSIUM CHLORIDE, CALCIUM CHLORIDE 600; 310; 30; 20 MG/100ML; MG/100ML; MG/100ML; MG/100ML
INJECTION, SOLUTION INTRAVENOUS CONTINUOUS
Status: DISCONTINUED | OUTPATIENT
Start: 2023-02-12 | End: 2023-02-12 | Stop reason: HOSPADM

## 2023-02-12 RX ORDER — ONDANSETRON 2 MG/ML
4 INJECTION INTRAMUSCULAR; INTRAVENOUS EVERY 6 HOURS PRN
Status: DISCONTINUED | OUTPATIENT
Start: 2023-02-12 | End: 2023-02-13

## 2023-02-12 RX ORDER — HYDROCODONE BITARTRATE AND ACETAMINOPHEN 5; 325 MG/1; MG/1
1 TABLET ORAL EVERY 6 HOURS PRN
Qty: 20 TABLET | Refills: 0 | Status: SHIPPED | OUTPATIENT
Start: 2023-02-12

## 2023-02-12 RX ORDER — HYDROCHLOROTHIAZIDE 25 MG/1
25 TABLET ORAL DAILY
Status: DISCONTINUED | OUTPATIENT
Start: 2023-02-12 | End: 2023-02-12

## 2023-02-12 RX ORDER — LIDOCAINE HYDROCHLORIDE 10 MG/ML
INJECTION, SOLUTION EPIDURAL; INFILTRATION; INTRACAUDAL; PERINEURAL AS NEEDED
Status: DISCONTINUED | OUTPATIENT
Start: 2023-02-12 | End: 2023-02-12 | Stop reason: SURG

## 2023-02-12 RX ORDER — ONDANSETRON 2 MG/ML
4 INJECTION INTRAMUSCULAR; INTRAVENOUS EVERY 6 HOURS PRN
Status: DISCONTINUED | OUTPATIENT
Start: 2023-02-12 | End: 2023-02-12 | Stop reason: HOSPADM

## 2023-02-12 RX ORDER — CLONAZEPAM 1 MG/1
0.5 TABLET ORAL 3 TIMES DAILY PRN
Status: DISCONTINUED | OUTPATIENT
Start: 2023-02-12 | End: 2023-02-12

## 2023-02-12 RX ORDER — HYDROCODONE BITARTRATE AND ACETAMINOPHEN 5; 325 MG/1; MG/1
1 TABLET ORAL ONCE AS NEEDED
Status: DISCONTINUED | OUTPATIENT
Start: 2023-02-12 | End: 2023-02-12 | Stop reason: HOSPADM

## 2023-02-12 RX ORDER — ALBUTEROL SULFATE 90 UG/1
2 AEROSOL, METERED RESPIRATORY (INHALATION) EVERY 4 HOURS PRN
Status: DISCONTINUED | OUTPATIENT
Start: 2023-02-12 | End: 2023-02-13

## 2023-02-12 RX ORDER — ONDANSETRON 2 MG/ML
INJECTION INTRAMUSCULAR; INTRAVENOUS
Status: COMPLETED
Start: 2023-02-12 | End: 2023-02-12

## 2023-02-12 RX ORDER — ONDANSETRON 2 MG/ML
4 INJECTION INTRAMUSCULAR; INTRAVENOUS EVERY 6 HOURS PRN
Status: DISCONTINUED | OUTPATIENT
Start: 2023-02-12 | End: 2023-02-12

## 2023-02-12 RX ORDER — KETOROLAC TROMETHAMINE 15 MG/ML
15 INJECTION, SOLUTION INTRAMUSCULAR; INTRAVENOUS ONCE
Status: COMPLETED | OUTPATIENT
Start: 2023-02-12 | End: 2023-02-12

## 2023-02-12 RX ORDER — FAMOTIDINE 10 MG/ML
20 INJECTION, SOLUTION INTRAVENOUS DAILY
Status: DISCONTINUED | OUTPATIENT
Start: 2023-02-12 | End: 2023-02-13

## 2023-02-12 RX ORDER — OLANZAPINE 2.5 MG/1
2.5 TABLET ORAL NIGHTLY
Status: DISCONTINUED | OUTPATIENT
Start: 2023-02-12 | End: 2023-02-13

## 2023-02-12 RX ORDER — ONDANSETRON 2 MG/ML
INJECTION INTRAMUSCULAR; INTRAVENOUS AS NEEDED
Status: DISCONTINUED | OUTPATIENT
Start: 2023-02-12 | End: 2023-02-12 | Stop reason: SURG

## 2023-02-12 RX ORDER — ENOXAPARIN SODIUM 100 MG/ML
40 INJECTION SUBCUTANEOUS DAILY
Status: DISCONTINUED | OUTPATIENT
Start: 2023-02-13 | End: 2023-02-13

## 2023-02-12 RX ORDER — LISINOPRIL AND HYDROCHLOROTHIAZIDE 20; 12.5 MG/1; MG/1
2 TABLET ORAL DAILY
Status: DISCONTINUED | OUTPATIENT
Start: 2023-02-12 | End: 2023-02-12 | Stop reason: SDUPTHER

## 2023-02-12 RX ORDER — KETOROLAC TROMETHAMINE 30 MG/ML
INJECTION, SOLUTION INTRAMUSCULAR; INTRAVENOUS AS NEEDED
Status: DISCONTINUED | OUTPATIENT
Start: 2023-02-12 | End: 2023-02-12 | Stop reason: SURG

## 2023-02-12 RX ORDER — SENNOSIDES 8.6 MG
17.2 TABLET ORAL NIGHTLY PRN
Status: DISCONTINUED | OUTPATIENT
Start: 2023-02-12 | End: 2023-02-12

## 2023-02-12 RX ORDER — ACETAMINOPHEN 325 MG/1
650 TABLET ORAL ONCE
Status: DISCONTINUED | OUTPATIENT
Start: 2023-02-12 | End: 2023-02-12 | Stop reason: HOSPADM

## 2023-02-12 RX ORDER — GLYCOPYRROLATE 0.2 MG/ML
INJECTION, SOLUTION INTRAMUSCULAR; INTRAVENOUS AS NEEDED
Status: DISCONTINUED | OUTPATIENT
Start: 2023-02-12 | End: 2023-02-12 | Stop reason: SURG

## 2023-02-12 RX ORDER — OXYCODONE HYDROCHLORIDE 5 MG/1
5 TABLET ORAL EVERY 4 HOURS PRN
Status: DISCONTINUED | OUTPATIENT
Start: 2023-02-12 | End: 2023-02-13

## 2023-02-12 RX ORDER — BUPIVACAINE HYDROCHLORIDE AND EPINEPHRINE 5; 5 MG/ML; UG/ML
INJECTION, SOLUTION EPIDURAL; INTRACAUDAL; PERINEURAL AS NEEDED
Status: DISCONTINUED | OUTPATIENT
Start: 2023-02-12 | End: 2023-02-12 | Stop reason: HOSPADM

## 2023-02-12 RX ORDER — HYDROMORPHONE HYDROCHLORIDE 1 MG/ML
0.4 INJECTION, SOLUTION INTRAMUSCULAR; INTRAVENOUS; SUBCUTANEOUS EVERY 5 MIN PRN
Status: DISCONTINUED | OUTPATIENT
Start: 2023-02-12 | End: 2023-02-12 | Stop reason: HOSPADM

## 2023-02-12 RX ORDER — DEXAMETHASONE SODIUM PHOSPHATE 4 MG/ML
VIAL (ML) INJECTION AS NEEDED
Status: DISCONTINUED | OUTPATIENT
Start: 2023-02-12 | End: 2023-02-12 | Stop reason: SURG

## 2023-02-12 RX ORDER — POLYETHYLENE GLYCOL 3350 17 G/17G
17 POWDER, FOR SOLUTION ORAL DAILY PRN
Status: DISCONTINUED | OUTPATIENT
Start: 2023-02-12 | End: 2023-02-12

## 2023-02-12 RX ORDER — HEPARIN SODIUM 5000 [USP'U]/ML
5000 INJECTION, SOLUTION INTRAVENOUS; SUBCUTANEOUS EVERY 12 HOURS SCHEDULED
Status: DISCONTINUED | OUTPATIENT
Start: 2023-02-12 | End: 2023-02-12

## 2023-02-12 RX ORDER — MORPHINE SULFATE 2 MG/ML
1 INJECTION, SOLUTION INTRAMUSCULAR; INTRAVENOUS EVERY 2 HOUR PRN
Status: DISCONTINUED | OUTPATIENT
Start: 2023-02-12 | End: 2023-02-12

## 2023-02-12 RX ORDER — MORPHINE SULFATE 4 MG/ML
4 INJECTION, SOLUTION INTRAMUSCULAR; INTRAVENOUS EVERY 30 MIN PRN
Status: DISCONTINUED | OUTPATIENT
Start: 2023-02-12 | End: 2023-02-12

## 2023-02-12 RX ORDER — EPHEDRINE SULFATE 50 MG/ML
INJECTION INTRAVENOUS AS NEEDED
Status: DISCONTINUED | OUTPATIENT
Start: 2023-02-12 | End: 2023-02-12 | Stop reason: SURG

## 2023-02-12 RX ORDER — SODIUM CHLORIDE, SODIUM LACTATE, POTASSIUM CHLORIDE, CALCIUM CHLORIDE 600; 310; 30; 20 MG/100ML; MG/100ML; MG/100ML; MG/100ML
INJECTION, SOLUTION INTRAVENOUS CONTINUOUS
Status: DISCONTINUED | OUTPATIENT
Start: 2023-02-12 | End: 2023-02-13

## 2023-02-12 RX ORDER — PROCHLORPERAZINE EDISYLATE 5 MG/ML
5 INJECTION INTRAMUSCULAR; INTRAVENOUS EVERY 8 HOURS PRN
Status: DISCONTINUED | OUTPATIENT
Start: 2023-02-12 | End: 2023-02-12

## 2023-02-12 RX ORDER — NALOXONE HYDROCHLORIDE 0.4 MG/ML
80 INJECTION, SOLUTION INTRAMUSCULAR; INTRAVENOUS; SUBCUTANEOUS AS NEEDED
Status: DISCONTINUED | OUTPATIENT
Start: 2023-02-12 | End: 2023-02-12 | Stop reason: HOSPADM

## 2023-02-12 RX ORDER — PROCHLORPERAZINE EDISYLATE 5 MG/ML
5 INJECTION INTRAMUSCULAR; INTRAVENOUS EVERY 8 HOURS PRN
Status: DISCONTINUED | OUTPATIENT
Start: 2023-02-12 | End: 2023-02-12 | Stop reason: HOSPADM

## 2023-02-12 RX ORDER — PROCHLORPERAZINE EDISYLATE 5 MG/ML
5 INJECTION INTRAMUSCULAR; INTRAVENOUS EVERY 8 HOURS PRN
Status: DISCONTINUED | OUTPATIENT
Start: 2023-02-12 | End: 2023-02-13

## 2023-02-12 RX ORDER — HYDROCODONE BITARTRATE AND ACETAMINOPHEN 5; 325 MG/1; MG/1
2 TABLET ORAL ONCE AS NEEDED
Status: DISCONTINUED | OUTPATIENT
Start: 2023-02-12 | End: 2023-02-12 | Stop reason: HOSPADM

## 2023-02-12 RX ORDER — HYDROMORPHONE HYDROCHLORIDE 1 MG/ML
0.6 INJECTION, SOLUTION INTRAMUSCULAR; INTRAVENOUS; SUBCUTANEOUS EVERY 5 MIN PRN
Status: DISCONTINUED | OUTPATIENT
Start: 2023-02-12 | End: 2023-02-12 | Stop reason: HOSPADM

## 2023-02-12 RX ORDER — LISINOPRIL 20 MG/1
40 TABLET ORAL DAILY
Status: DISCONTINUED | OUTPATIENT
Start: 2023-02-12 | End: 2023-02-13

## 2023-02-12 RX ORDER — ACETAMINOPHEN 500 MG
1000 TABLET ORAL EVERY 8 HOURS SCHEDULED
Status: DISCONTINUED | OUTPATIENT
Start: 2023-02-12 | End: 2023-02-13

## 2023-02-12 RX ORDER — OLANZAPINE 2.5 MG/1
2.5 TABLET ORAL
Status: DISCONTINUED | OUTPATIENT
Start: 2023-02-12 | End: 2023-02-12

## 2023-02-12 RX ORDER — BISACODYL 10 MG
10 SUPPOSITORY, RECTAL RECTAL
Status: DISCONTINUED | OUTPATIENT
Start: 2023-02-12 | End: 2023-02-12

## 2023-02-12 RX ORDER — HYDROXYZINE HYDROCHLORIDE 25 MG/1
50 TABLET, FILM COATED ORAL 3 TIMES DAILY PRN
Status: DISCONTINUED | OUTPATIENT
Start: 2023-02-12 | End: 2023-02-13

## 2023-02-12 RX ORDER — GABAPENTIN 600 MG/1
600 TABLET ORAL 3 TIMES DAILY
Status: DISCONTINUED | OUTPATIENT
Start: 2023-02-12 | End: 2023-02-13

## 2023-02-12 RX ORDER — ONDANSETRON 2 MG/ML
4 INJECTION INTRAMUSCULAR; INTRAVENOUS ONCE
Status: COMPLETED | OUTPATIENT
Start: 2023-02-12 | End: 2023-02-12

## 2023-02-12 RX ORDER — PANTOPRAZOLE SODIUM 40 MG/1
40 TABLET, DELAYED RELEASE ORAL
Status: DISCONTINUED | OUTPATIENT
Start: 2023-02-12 | End: 2023-02-12

## 2023-02-12 RX ORDER — FAMOTIDINE 20 MG/1
20 TABLET, FILM COATED ORAL DAILY
Status: DISCONTINUED | OUTPATIENT
Start: 2023-02-12 | End: 2023-02-13

## 2023-02-12 RX ORDER — ROCURONIUM BROMIDE 10 MG/ML
INJECTION, SOLUTION INTRAVENOUS AS NEEDED
Status: DISCONTINUED | OUTPATIENT
Start: 2023-02-12 | End: 2023-02-12 | Stop reason: SURG

## 2023-02-12 RX ORDER — SODIUM PHOSPHATE, DIBASIC AND SODIUM PHOSPHATE, MONOBASIC 7; 19 G/133ML; G/133ML
1 ENEMA RECTAL ONCE AS NEEDED
Status: DISCONTINUED | OUTPATIENT
Start: 2023-02-12 | End: 2023-02-12

## 2023-02-12 RX ORDER — MORPHINE SULFATE 2 MG/ML
2 INJECTION, SOLUTION INTRAMUSCULAR; INTRAVENOUS EVERY 2 HOUR PRN
Status: DISCONTINUED | OUTPATIENT
Start: 2023-02-12 | End: 2023-02-12

## 2023-02-12 RX ORDER — MELATONIN
3 NIGHTLY PRN
Status: DISCONTINUED | OUTPATIENT
Start: 2023-02-12 | End: 2023-02-12

## 2023-02-12 RX ORDER — MORPHINE SULFATE 4 MG/ML
4 INJECTION, SOLUTION INTRAMUSCULAR; INTRAVENOUS EVERY 2 HOUR PRN
Status: DISCONTINUED | OUTPATIENT
Start: 2023-02-12 | End: 2023-02-12

## 2023-02-12 RX ORDER — ESCITALOPRAM OXALATE 10 MG/1
10 TABLET ORAL EVERY MORNING
Status: DISCONTINUED | OUTPATIENT
Start: 2023-02-12 | End: 2023-02-12

## 2023-02-12 RX ORDER — OXYCODONE HYDROCHLORIDE 5 MG/1
2.5 TABLET ORAL EVERY 4 HOURS PRN
Status: DISCONTINUED | OUTPATIENT
Start: 2023-02-12 | End: 2023-02-13

## 2023-02-12 RX ORDER — MELATONIN
3 NIGHTLY PRN
Status: DISCONTINUED | OUTPATIENT
Start: 2023-02-12 | End: 2023-02-13

## 2023-02-12 RX ORDER — HYDROMORPHONE HYDROCHLORIDE 1 MG/ML
0.2 INJECTION, SOLUTION INTRAMUSCULAR; INTRAVENOUS; SUBCUTANEOUS EVERY 5 MIN PRN
Status: DISCONTINUED | OUTPATIENT
Start: 2023-02-12 | End: 2023-02-12 | Stop reason: HOSPADM

## 2023-02-12 RX ORDER — SODIUM CHLORIDE 9 MG/ML
INJECTION, SOLUTION INTRAVENOUS CONTINUOUS
Status: DISCONTINUED | OUTPATIENT
Start: 2023-02-12 | End: 2023-02-12

## 2023-02-12 RX ORDER — ACETAMINOPHEN 500 MG
1000 TABLET ORAL ONCE AS NEEDED
Status: DISCONTINUED | OUTPATIENT
Start: 2023-02-12 | End: 2023-02-12 | Stop reason: HOSPADM

## 2023-02-12 RX ORDER — ACETAMINOPHEN 500 MG
500 TABLET ORAL EVERY 4 HOURS PRN
Status: DISCONTINUED | OUTPATIENT
Start: 2023-02-12 | End: 2023-02-12

## 2023-02-12 RX ORDER — HYDROMORPHONE HYDROCHLORIDE 1 MG/ML
0.4 INJECTION, SOLUTION INTRAMUSCULAR; INTRAVENOUS; SUBCUTANEOUS EVERY 2 HOUR PRN
Status: DISCONTINUED | OUTPATIENT
Start: 2023-02-12 | End: 2023-02-13

## 2023-02-12 RX ADMIN — EPHEDRINE SULFATE 10 MG: 50 INJECTION INTRAVENOUS at 17:11:00

## 2023-02-12 RX ADMIN — LIDOCAINE HYDROCHLORIDE 50 MG: 10 INJECTION, SOLUTION EPIDURAL; INFILTRATION; INTRACAUDAL; PERINEURAL at 16:53:00

## 2023-02-12 RX ADMIN — GLYCOPYRROLATE 0.8 MG: 0.2 INJECTION, SOLUTION INTRAMUSCULAR; INTRAVENOUS at 17:53:00

## 2023-02-12 RX ADMIN — SODIUM CHLORIDE: 9 INJECTION, SOLUTION INTRAVENOUS at 18:15:00

## 2023-02-12 RX ADMIN — NEOSTIGMINE METHYLSULFATE 4 MG: 1 INJECTION, SOLUTION INTRAVENOUS at 17:53:00

## 2023-02-12 RX ADMIN — DEXAMETHASONE SODIUM PHOSPHATE 8 MG: 4 MG/ML VIAL (ML) INJECTION at 17:05:00

## 2023-02-12 RX ADMIN — KETOROLAC TROMETHAMINE 30 MG: 30 INJECTION, SOLUTION INTRAMUSCULAR; INTRAVENOUS at 17:05:00

## 2023-02-12 RX ADMIN — ONDANSETRON 4 MG: 2 INJECTION INTRAMUSCULAR; INTRAVENOUS at 17:05:00

## 2023-02-12 RX ADMIN — ROCURONIUM BROMIDE 50 MG: 10 INJECTION, SOLUTION INTRAVENOUS at 16:53:00

## 2023-02-12 NOTE — ANESTHESIA PROCEDURE NOTES
Airway  Date/Time: 2/12/2023 4:53 PM  Urgency: elective    Airway not difficult    General Information and Staff    Patient location during procedure: OR  Anesthesiologist: Dorene Vora DO  Performed: anesthesiologist     Indications and Patient Condition  Indications for airway management: anesthesia  Spontaneous ventilation: present  Sedation level: deep  Preoxygenated: yes  Patient position: sniffing  Mask difficulty assessment: 1 - vent by mask    Final Airway Details  Final airway type: endotracheal airway      Successful airway: ETT  Cuffed: yes   Successful intubation technique: direct laryngoscopy  Endotracheal tube insertion site: oral  Blade: GlideScope  Blade size: #4  ETT size (mm): 7.5    Cormack-Lehane Classification: grade I - full view of glottis  Placement verified by: chest auscultation and capnometry   Measured from: lips  ETT to lips (cm): 23  Number of attempts at approach: 1  Ventilation between attempts: none  Number of other approaches attempted: 0

## 2023-02-12 NOTE — ED INITIAL ASSESSMENT (HPI)
Patient to the ER c/o mid/right upper quadrant pain. Patient reports pain began around 1600. No n/v/d  Patient also has had dark stool but has been taking Pepto.  No dizziness

## 2023-02-12 NOTE — PLAN OF CARE
Pt c/o of mild abd pain at the moment. Abd soft and tender,active bs,reports flatus. Voiding. Pre op teaching done,consent obtained. Poc updated, pt verbalized understanding. Problem: RISK FOR INFECTION - ADULT  Goal: Absence of fever/infection during anticipated neutropenic period  Description: INTERVENTIONS  - Monitor WBC  - Administer growth factors as ordered  - Implement neutropenic guidelines  Outcome: Progressing     Problem: SAFETY ADULT - FALL  Goal: Free from fall injury  Description: INTERVENTIONS:  - Assess pt frequently for physical needs  - Identify cognitive and physical deficits and behaviors that affect risk of falls.   - [place] fall precautions as indicated by assessment.  - Educate pt/family on patient safety including physical limitations  - Instruct pt to call for assistance with activity based on assessment  - Modify environment to reduce risk of injury  - Provide assistive devices as appropriate  - Consider OT/PT consult to assist with strengthening/mobility  - Encourage toileting schedule  Outcome: Progressing     Problem: DISCHARGE PLANNING  Goal: Discharge to home or other facility with appropriate resources  Description: INTERVENTIONS:  - Identify barriers to discharge w/pt and caregiver  - Include patient/family/discharge partner in discharge planning  - Arrange for needed discharge resources and transportation as appropriate  - Identify discharge learning needs (meds, wound care, etc)  - Arrange for interpreters to assist at discharge as needed  - Consider post-discharge preferences of patient/family/discharge partner  - Complete POLST form as appropriate  - Assess patient's ability to be responsible for managing their own health  - Refer to Case Management Department for coordinating discharge planning if the patient needs post-hospital services based on physician/LIP order or complex needs related to functional status, cognitive ability or social support system  Outcome: Progressing

## 2023-02-12 NOTE — PROGRESS NOTES
NURSING ADMISSION NOTE      Patient admitted via ambulance. Oriented to room. Safety precautions initiated. Bed in low position. Call light in reach.

## 2023-02-12 NOTE — ED INITIAL ASSESSMENT (HPI)
Return visit to ER for upper abd pain since 4pm. He was seen here two days ago and had an ultrasound for similar pain.

## 2023-02-13 VITALS
HEIGHT: 75 IN | RESPIRATION RATE: 16 BRPM | TEMPERATURE: 98 F | WEIGHT: 245 LBS | BODY MASS INDEX: 30.46 KG/M2 | SYSTOLIC BLOOD PRESSURE: 138 MMHG | DIASTOLIC BLOOD PRESSURE: 64 MMHG | HEART RATE: 69 BPM | OXYGEN SATURATION: 96 %

## 2023-02-13 PROCEDURE — 99239 HOSP IP/OBS DSCHRG MGMT >30: CPT | Performed by: HOSPITALIST

## 2023-02-13 RX ORDER — ESCITALOPRAM OXALATE 10 MG/1
10 TABLET ORAL EVERY MORNING
Status: DISCONTINUED | OUTPATIENT
Start: 2023-02-13 | End: 2023-02-13

## 2023-02-13 RX ORDER — CLONAZEPAM 1 MG/1
0.5 TABLET ORAL 3 TIMES DAILY PRN
Status: DISCONTINUED | OUTPATIENT
Start: 2023-02-13 | End: 2023-02-13

## 2023-02-13 NOTE — PROGRESS NOTES
NURSING DISCHARGE NOTE    Discharged Home via Ambulatory. Accompanied by Family member  Belongings Taken by patient/family. VSS, tolerating diet, voiding adequately, pain controlled, tolerating ambulation. Discharge education provided. Reviewed medications and follow up appts. All questions answered and concerns addressed, pt verbalized understanding. IV removed. Pt dc in stable condition.  Patient refused wheelchair and left unit at 1337

## 2023-02-13 NOTE — OPERATIVE REPORT
[facility]  Op Note    Artem Chen Location: OR   [facility] [id number] MRN [id number]   Admission Date 2/12/2023 Operation Date 2/12/2023   Attending Physician Yaakov Marino DO Operating Physician Nadeem Ramachandran MD   DATE OF OPERATION:  2/12/2023   PREOPERATIVE DIAGNOSIS: Acute cholecystitis with cholelithiasis  POSTOPERATIVE DIAGNOSIS: Acute cholecystitis with cholelithiasis  PROCEDURE PERFORMED: Laparoscopic cholecystectomy with intraoperative cholangiogram.   SURGEON:  Nadeem Ramachandran MD  ASSISTANT: Javier Joseph MD and Miguel Angel Gupta PA-C (Their assistance was essential to the performance and conduct of this case, especially in the performance of the cholangiogram and the careful dissection needed in and around the triangle of Calot and gallbladder hilum.)  ANESTHESIA: General.   SPECIMEN: Gallbladder to pathology. BLOOD LOSS:  10 cc   COMPLICATIONS: None. INDICATIONS FOR PROCEDURE: The patient is a 81-year-old female who presented to the emergency room with persistent recurrent postprandial right upper quadrant abdominal pain. Ultrasound revealed gallstones with gallbladder wall thickening. He had persistent pain, so he was offered urgent laparoscopic cholecystectomy intraoperative cholangiogram.  The risks, benefits, and alternatives were discussed in detail with the patient. Risks include but are not limited to bleeding, wound infection, right shoulder pain, injury to common bile duct, injury to the liver and injury to other intraabdominal contents. The patient was agreeable to proceed with the operation. FINDINGS: The gallbladder was distended and edematous. Cholangiography was normal, with good flow into the duodenum and no filling defects toward the liver. OPERATIVE TECHNIQUE: After informed consent was obtained, the patient was taken to the operating room and placed in the supine position. General anesthesia was induced.  The abdomen was then prepped and draped in the usual sterile fashion. The umbilicus was inverted, and a curvilinear incision was made superior to it with an 11-blade scalpel. The Veress needle was passed into the abdomen, and pneumoperitoneum was instituted without difficulty. The 11 mm trocar was passed through this incision site. The abdomen was inspected and found to be grossly normal. Under direct vision, a 5 mm subxiphoid port and two right-sided lateral 5 mm ports were placed. The patient was placed head-up, right side up. The gallbladder was grasped and retracted cranially. Dissection commenced around the cystic duct and cystic artery, which were isolated. Dissection was kept above the line of Rouviere. The critical view was obtained, demonstrating 2 structures directly entering the gallbladder. One clip was placed towards the gallbladder on the cystic duct, and the duct was partially transected. The cystic duct was cannulated. A cholangiogram was obtained showing good flow in the duodenum with no filling defects and good flow towards the liver. The cholangiocath was withdrawn, and three clips were placed opposite the partial transection. The duct was then completely transected. The cystic artery was isolated, one clip was placed towards the gallbladder, two away, and the artery was transected. The gallbladder was then dissected free from the liver bed using cautery. The gallbladder was then withdrawn through the umbilical port site after being placed in an Endo Catch bag. Inspection of the liver bed revealed some bleeding that was easily controlled with cautery. The abdomen was copiously irrigated with normal saline. Once hemostasis was assured and the irrigant returned as clear and colorless, Surgicel was placed over the liver bed as a precaution. The right-sided ports were withdrawn under direct vision. The pneumoperitoneum was aspirated, and the remaining ports were withdrawn.  The fascia at the umbilical port site was reapproximated with Maxon suture, and the skin at all four incisions was reapproximated with subcuticular Vicryl suture. 0.5% Marcaine with epinephrine was injected around the incisions to help with postoperative pain control. Steri-Strips and Mastisol were placed across the incisions. The patient tolerated the procedure well, was extubated in the OR, and went to the PACU in good condition.     Armando Dubose MD

## 2023-02-13 NOTE — PLAN OF CARE
Pt VSS, Aox4. Tolerates clear liquid diet, no nausea, will advance diet for the morning. Pt on room air; denies МАРИНА/SOB/Lightheadedness. NELLIE; 91-94% spo2 while asleep. Tele continued; NSR. Saline locked. Up ad james, steady gait. Voiding w/o difficulty. Denies pain at this time. Lap sites remain covered with gauze and tegaderm. Fall & safety precautions in place. POC discussed. CARE PLAN:   Problem: RISK FOR INFECTION - ADULT  Goal: Absence of fever/infection during anticipated neutropenic period  Description: INTERVENTIONS  - Monitor WBC  - Administer growth factors as ordered  - Implement neutropenic guidelines  Outcome: Progressing     Problem: SAFETY ADULT - FALL  Goal: Free from fall injury  Description: INTERVENTIONS:  - Assess pt frequently for physical needs  - Identify cognitive and physical deficits and behaviors that affect risk of falls.   - [place] fall precautions as indicated by assessment.  - Educate pt/family on patient safety including physical limitations  - Instruct pt to call for assistance with activity based on assessment  - Modify environment to reduce risk of injury  - Provide assistive devices as appropriate  - Consider OT/PT consult to assist with strengthening/mobility  - Encourage toileting schedule  Outcome: Progressing     Problem: DISCHARGE PLANNING  Goal: Discharge to home or other facility with appropriate resources  Description: INTERVENTIONS:  - Identify barriers to discharge w/pt and caregiver  - Include patient/family/discharge partner in discharge planning  - Arrange for needed discharge resources and transportation as appropriate  - Identify discharge learning needs (meds, wound care, etc)  - Arrange for interpreters to assist at discharge as needed  - Consider post-discharge preferences of patient/family/discharge partner  - Complete POLST form as appropriate  - Assess patient's ability to be responsible for managing their own health  - Refer to Case Management Department for coordinating discharge planning if the patient needs post-hospital services based on physician/LIP order or complex needs related to functional status, cognitive ability or social support system  Outcome: Progressing

## 2023-02-28 ENCOUNTER — OFFICE VISIT (OUTPATIENT)
Facility: LOCATION | Age: 58
End: 2023-02-28

## 2023-02-28 ENCOUNTER — TELEPHONE (OUTPATIENT)
Facility: LOCATION | Age: 58
End: 2023-02-28

## 2023-02-28 VITALS — HEART RATE: 94 BPM | TEMPERATURE: 97 F

## 2023-02-28 DIAGNOSIS — Z90.49 STATUS POST LAPAROSCOPIC CHOLECYSTECTOMY: Primary | ICD-10-CM

## 2023-02-28 DIAGNOSIS — Z98.890 POST-OPERATIVE STATE: ICD-10-CM

## 2023-02-28 PROCEDURE — 99024 POSTOP FOLLOW-UP VISIT: CPT

## 2023-02-28 NOTE — TELEPHONE ENCOUNTER
Pt was seen for PO this morning with Xiomara  Was wondering does he need to continue the medication he was given for the procedure I believe he said the Pantoprazole? ? And can he take a bath?   #[phone]

## 2023-03-02 ENCOUNTER — HOSPITAL ENCOUNTER (EMERGENCY)
Age: 58
Discharge: HOME | End: 2023-03-02
Attending: EMERGENCY MEDICINE
Payer: COMMERCIAL

## 2023-03-02 ENCOUNTER — TELEPHONE (OUTPATIENT)
Facility: LOCATION | Age: 58
End: 2023-03-02

## 2023-03-02 ENCOUNTER — APPOINTMENT (OUTPATIENT)
Dept: CT IMAGING | Age: 58
End: 2023-03-02
Attending: EMERGENCY MEDICINE
Payer: COMMERCIAL

## 2023-03-02 VITALS
TEMPERATURE: 99 F | WEIGHT: 247 LBS | OXYGEN SATURATION: 99 % | BODY MASS INDEX: 31 KG/M2 | DIASTOLIC BLOOD PRESSURE: 61 MMHG | SYSTOLIC BLOOD PRESSURE: 118 MMHG | RESPIRATION RATE: 16 BRPM | HEART RATE: 76 BPM

## 2023-03-02 DIAGNOSIS — K57.32 SIGMOID DIVERTICULITIS: Primary | ICD-10-CM

## 2023-03-02 LAB
ALBUMIN SERPL-MCNC: 2.8 G/DL (ref 3.4–5)
ALBUMIN/GLOB SERPL: 0.7 {RATIO} (ref 1–2)
ALP LIVER SERPL-CCNC: 68 U/L
ALT SERPL-CCNC: 24 U/L
ANION GAP SERPL CALC-SCNC: 7 MMOL/L (ref 0–18)
AST SERPL-CCNC: 11 U/L (ref 15–37)
BASOPHILS # BLD AUTO: 0.04 X10(3) UL (ref 0–0.2)
BASOPHILS NFR BLD AUTO: 0.3 %
BILIRUB SERPL-MCNC: 0.5 MG/DL (ref 0.1–2)
BILIRUB UR QL STRIP.AUTO: NEGATIVE
BUN BLD-MCNC: 17 MG/DL (ref 7–18)
CALCIUM BLD-MCNC: 8.4 MG/DL (ref 8.5–10.1)
CHLORIDE SERPL-SCNC: 100 MMOL/L (ref 98–112)
CLARITY UR REFRACT.AUTO: CLEAR
CO2 SERPL-SCNC: 27 MMOL/L (ref 21–32)
COLOR UR AUTO: YELLOW
CREAT BLD-MCNC: 0.88 MG/DL
EOSINOPHIL # BLD AUTO: 0.23 X10(3) UL (ref 0–0.7)
EOSINOPHIL NFR BLD AUTO: 1.9 %
ERYTHROCYTE [DISTWIDTH] IN BLOOD BY AUTOMATED COUNT: 12.7 %
GFR SERPLBLD BASED ON 1.73 SQ M-ARVRAT: 101 ML/MIN/1.73M2 (ref 60–?)
GLOBULIN PLAS-MCNC: 4 G/DL (ref 2.8–4.4)
GLUCOSE BLD-MCNC: 105 MG/DL (ref 70–99)
GLUCOSE UR STRIP.AUTO-MCNC: NEGATIVE MG/DL
HCT VFR BLD AUTO: 37.8 %
HGB BLD-MCNC: 12.4 G/DL
IMM GRANULOCYTES # BLD AUTO: 0.04 X10(3) UL (ref 0–1)
IMM GRANULOCYTES NFR BLD: 0.3 %
KETONES UR STRIP.AUTO-MCNC: NEGATIVE MG/DL
LEUKOCYTE ESTERASE UR QL STRIP.AUTO: NEGATIVE
LIPASE SERPL-CCNC: 160 U/L (ref 73–393)
LIPASE SERPL-CCNC: 45 U/L (ref 13–75)
LYMPHOCYTES # BLD AUTO: 2.23 X10(3) UL (ref 1–4)
LYMPHOCYTES NFR BLD AUTO: 18.6 %
MCH RBC QN AUTO: 28 PG (ref 26–34)
MCHC RBC AUTO-ENTMCNC: 32.8 G/DL (ref 31–37)
MCV RBC AUTO: 85.3 FL
MONOCYTES # BLD AUTO: 0.97 X10(3) UL (ref 0.1–1)
MONOCYTES NFR BLD AUTO: 8.1 %
NEUTROPHILS # BLD AUTO: 8.51 X10 (3) UL (ref 1.5–7.7)
NEUTROPHILS # BLD AUTO: 8.51 X10(3) UL (ref 1.5–7.7)
NEUTROPHILS NFR BLD AUTO: 70.8 %
NITRITE UR QL STRIP.AUTO: NEGATIVE
OSMOLALITY SERPL CALC.SUM OF ELEC: 280 MOSM/KG (ref 275–295)
PH UR STRIP.AUTO: 7 [PH] (ref 5–8)
PLATELET # BLD AUTO: 507 10(3)UL (ref 150–450)
POTASSIUM SERPL-SCNC: 3.5 MMOL/L (ref 3.5–5.1)
PROT SERPL-MCNC: 6.8 G/DL (ref 6.4–8.2)
PROT UR STRIP.AUTO-MCNC: NEGATIVE MG/DL
RBC # BLD AUTO: 4.43 X10(6)UL
RBC UR QL AUTO: NEGATIVE
SODIUM SERPL-SCNC: 134 MMOL/L (ref 136–145)
SP GR UR STRIP.AUTO: 1.02 (ref 1–1.03)
UROBILINOGEN UR STRIP.AUTO-MCNC: 0.2 MG/DL
WBC # BLD AUTO: 12 X10(3) UL (ref 4–11)

## 2023-03-02 PROCEDURE — 96361 HYDRATE IV INFUSION ADD-ON: CPT

## 2023-03-02 PROCEDURE — 99284 EMERGENCY DEPT VISIT MOD MDM: CPT

## 2023-03-02 PROCEDURE — 81003 URINALYSIS AUTO W/O SCOPE: CPT | Performed by: EMERGENCY MEDICINE

## 2023-03-02 PROCEDURE — 74177 CT ABD & PELVIS W/CONTRAST: CPT | Performed by: EMERGENCY MEDICINE

## 2023-03-02 PROCEDURE — 99285 EMERGENCY DEPT VISIT HI MDM: CPT

## 2023-03-02 PROCEDURE — 83690 ASSAY OF LIPASE: CPT | Performed by: EMERGENCY MEDICINE

## 2023-03-02 PROCEDURE — 96374 THER/PROPH/DIAG INJ IV PUSH: CPT

## 2023-03-02 PROCEDURE — 85025 COMPLETE CBC W/AUTO DIFF WBC: CPT | Performed by: EMERGENCY MEDICINE

## 2023-03-02 PROCEDURE — 80053 COMPREHEN METABOLIC PANEL: CPT | Performed by: EMERGENCY MEDICINE

## 2023-03-02 RX ORDER — LEVOFLOXACIN 500 MG/1
500 TABLET, FILM COATED ORAL DAILY
Qty: 10 TABLET | Refills: 0 | Status: SHIPPED | OUTPATIENT
Start: 2023-03-02 | End: 2023-03-12

## 2023-03-02 RX ORDER — LEVOFLOXACIN 500 MG/1
500 TABLET, FILM COATED ORAL ONCE
Status: COMPLETED | OUTPATIENT
Start: 2023-03-02 | End: 2023-03-02

## 2023-03-02 RX ORDER — METRONIDAZOLE 500 MG/1
500 TABLET ORAL 3 TIMES DAILY
Qty: 30 TABLET | Refills: 0 | Status: SHIPPED | OUTPATIENT
Start: 2023-03-02 | End: 2023-03-12

## 2023-03-02 RX ORDER — KETOROLAC TROMETHAMINE 15 MG/ML
15 INJECTION, SOLUTION INTRAMUSCULAR; INTRAVENOUS ONCE
Status: COMPLETED | OUTPATIENT
Start: 2023-03-02 | End: 2023-03-02

## 2023-03-02 RX ORDER — METRONIDAZOLE 500 MG/1
500 TABLET ORAL ONCE
Status: COMPLETED | OUTPATIENT
Start: 2023-03-02 | End: 2023-03-02

## 2023-03-02 RX ORDER — HYDROCODONE BITARTRATE AND ACETAMINOPHEN 5; 325 MG/1; MG/1
1 TABLET ORAL EVERY 6 HOURS PRN
Qty: 6 TABLET | Refills: 0 | Status: SHIPPED | OUTPATIENT
Start: 2023-03-02

## 2023-03-02 NOTE — TELEPHONE ENCOUNTER
Pt states that when he jars the area like driving over train tracks, it's a jarring pain 7/10. Has cleaned himself out the past couple of days, and doesn't think that it's constipation. Says that there's a spot to the right of the belly button incision that increased in pain as well.      Please advise  Best callback number is [phone]

## 2023-03-03 NOTE — ED NOTES
Patient returned to the ED this morning. [facility] pharmacy had flagged the flagyl and the levaquinn as being an interaction with his meds of lexapro and lansipine. Spoke with Dr. Sudeep Whiteside regarding the possible interactions and he changed the RX to Augmentin 875mg 1 tablet BID for 10 days. RX called in to [address] [place] [address] in [place]. Patient instructed to continue clear liquids, return if worse and follow up with PCP next week.

## 2023-03-03 NOTE — DISCHARGE INSTRUCTIONS
Advance from full liquid diet to a soft diet  Ibuprofen 400 mg 3 times a day with food for pain for 3 to 5 days  Norco for severe pain  Return if worse, increased pain, fever or vomiting  Recheck with your surgeon, call the office tomorrow for follow-up appointment

## 2023-03-05 ENCOUNTER — APPOINTMENT (OUTPATIENT)
Dept: CT IMAGING | Facility: HOSPITAL | Age: 58
End: 2023-03-05
Attending: EMERGENCY MEDICINE
Payer: COMMERCIAL

## 2023-03-05 ENCOUNTER — HOSPITAL ENCOUNTER (EMERGENCY)
Facility: HOSPITAL | Age: 58
Discharge: HOME | End: 2023-03-05
Attending: EMERGENCY MEDICINE
Payer: COMMERCIAL

## 2023-03-05 VITALS
OXYGEN SATURATION: 98 % | BODY MASS INDEX: 30.7 KG/M2 | WEIGHT: 246.94 LBS | TEMPERATURE: 98 F | DIASTOLIC BLOOD PRESSURE: 76 MMHG | SYSTOLIC BLOOD PRESSURE: 124 MMHG | RESPIRATION RATE: 20 BRPM | HEIGHT: 75 IN | HEART RATE: 88 BPM

## 2023-03-05 DIAGNOSIS — K57.92 ACUTE DIVERTICULITIS: Primary | ICD-10-CM

## 2023-03-05 LAB
ALBUMIN SERPL-MCNC: 3 G/DL (ref 3.4–5)
ALBUMIN/GLOB SERPL: 0.8 {RATIO} (ref 1–2)
ALP LIVER SERPL-CCNC: 73 U/L
ALT SERPL-CCNC: 25 U/L
ANION GAP SERPL CALC-SCNC: 2 MMOL/L (ref 0–18)
AST SERPL-CCNC: 19 U/L (ref 15–37)
BASOPHILS # BLD AUTO: 0.04 X10(3) UL (ref 0–0.2)
BASOPHILS NFR BLD AUTO: 0.4 %
BILIRUB SERPL-MCNC: 0.9 MG/DL (ref 0.1–2)
BILIRUB UR QL STRIP.AUTO: NEGATIVE
BUN BLD-MCNC: 10 MG/DL (ref 7–18)
CALCIUM BLD-MCNC: 9.1 MG/DL (ref 8.5–10.1)
CHLORIDE SERPL-SCNC: 100 MMOL/L (ref 98–112)
CLARITY UR REFRACT.AUTO: CLEAR
CO2 SERPL-SCNC: 31 MMOL/L (ref 21–32)
COLOR UR AUTO: YELLOW
CREAT BLD-MCNC: 0.81 MG/DL
EOSINOPHIL # BLD AUTO: 0.2 X10(3) UL (ref 0–0.7)
EOSINOPHIL NFR BLD AUTO: 2 %
ERYTHROCYTE [DISTWIDTH] IN BLOOD BY AUTOMATED COUNT: 12.3 %
GFR SERPLBLD BASED ON 1.73 SQ M-ARVRAT: 103 ML/MIN/1.73M2 (ref 60–?)
GLOBULIN PLAS-MCNC: 3.9 G/DL (ref 2.8–4.4)
GLUCOSE BLD-MCNC: 102 MG/DL (ref 70–99)
GLUCOSE UR STRIP.AUTO-MCNC: NEGATIVE MG/DL
HCT VFR BLD AUTO: 39.3 %
HGB BLD-MCNC: 13.3 G/DL
IMM GRANULOCYTES # BLD AUTO: 0.04 X10(3) UL (ref 0–1)
IMM GRANULOCYTES NFR BLD: 0.4 %
LEUKOCYTE ESTERASE UR QL STRIP.AUTO: NEGATIVE
LIPASE SERPL-CCNC: 109 U/L (ref 73–393)
LIPASE SERPL-CCNC: 28 U/L (ref 13–75)
LYMPHOCYTES # BLD AUTO: 1.43 X10(3) UL (ref 1–4)
LYMPHOCYTES NFR BLD AUTO: 14.6 %
MCH RBC QN AUTO: 28.6 PG (ref 26–34)
MCHC RBC AUTO-ENTMCNC: 33.8 G/DL (ref 31–37)
MCV RBC AUTO: 84.5 FL
MONOCYTES # BLD AUTO: 0.79 X10(3) UL (ref 0.1–1)
MONOCYTES NFR BLD AUTO: 8 %
NEUTROPHILS # BLD AUTO: 7.32 X10 (3) UL (ref 1.5–7.7)
NEUTROPHILS # BLD AUTO: 7.32 X10(3) UL (ref 1.5–7.7)
NEUTROPHILS NFR BLD AUTO: 74.6 %
NITRITE UR QL STRIP.AUTO: NEGATIVE
OSMOLALITY SERPL CALC.SUM OF ELEC: 275 MOSM/KG (ref 275–295)
PH UR STRIP.AUTO: 7 [PH] (ref 5–8)
PLATELET # BLD AUTO: 539 10(3)UL (ref 150–450)
POTASSIUM SERPL-SCNC: 4.1 MMOL/L (ref 3.5–5.1)
PROT SERPL-MCNC: 6.9 G/DL (ref 6.4–8.2)
PROT UR STRIP.AUTO-MCNC: NEGATIVE MG/DL
RBC # BLD AUTO: 4.65 X10(6)UL
RBC UR QL AUTO: NEGATIVE
SODIUM SERPL-SCNC: 133 MMOL/L (ref 136–145)
SP GR UR STRIP.AUTO: 1.01 (ref 1–1.03)
UROBILINOGEN UR STRIP.AUTO-MCNC: <2 MG/DL
WBC # BLD AUTO: 9.8 X10(3) UL (ref 4–11)

## 2023-03-05 PROCEDURE — 99285 EMERGENCY DEPT VISIT HI MDM: CPT

## 2023-03-05 PROCEDURE — 99284 EMERGENCY DEPT VISIT MOD MDM: CPT

## 2023-03-05 PROCEDURE — 96374 THER/PROPH/DIAG INJ IV PUSH: CPT

## 2023-03-05 PROCEDURE — 96375 TX/PRO/DX INJ NEW DRUG ADDON: CPT

## 2023-03-05 PROCEDURE — 96361 HYDRATE IV INFUSION ADD-ON: CPT

## 2023-03-05 PROCEDURE — 83690 ASSAY OF LIPASE: CPT | Performed by: EMERGENCY MEDICINE

## 2023-03-05 PROCEDURE — 85025 COMPLETE CBC W/AUTO DIFF WBC: CPT | Performed by: EMERGENCY MEDICINE

## 2023-03-05 PROCEDURE — 80053 COMPREHEN METABOLIC PANEL: CPT | Performed by: EMERGENCY MEDICINE

## 2023-03-05 PROCEDURE — 81003 URINALYSIS AUTO W/O SCOPE: CPT | Performed by: EMERGENCY MEDICINE

## 2023-03-05 PROCEDURE — 74177 CT ABD & PELVIS W/CONTRAST: CPT | Performed by: EMERGENCY MEDICINE

## 2023-03-05 RX ORDER — SODIUM CHLORIDE 9 MG/ML
125 INJECTION, SOLUTION INTRAVENOUS CONTINUOUS
Status: DISCONTINUED | OUTPATIENT
Start: 2023-03-05 | End: 2023-03-05

## 2023-03-05 RX ORDER — ONDANSETRON 2 MG/ML
4 INJECTION INTRAMUSCULAR; INTRAVENOUS ONCE
Status: COMPLETED | OUTPATIENT
Start: 2023-03-05 | End: 2023-03-05

## 2023-03-05 RX ORDER — HYDROMORPHONE HYDROCHLORIDE 1 MG/ML
0.5 INJECTION, SOLUTION INTRAMUSCULAR; INTRAVENOUS; SUBCUTANEOUS EVERY 30 MIN PRN
Status: DISCONTINUED | OUTPATIENT
Start: 2023-03-05 | End: 2023-03-05

## 2023-03-05 RX ORDER — HYDROCODONE BITARTRATE AND ACETAMINOPHEN 5; 325 MG/1; MG/1
1-2 TABLET ORAL EVERY 6 HOURS PRN
Qty: 10 TABLET | Refills: 0 | Status: SHIPPED | OUTPATIENT
Start: 2023-03-05 | End: 2023-03-12

## 2023-03-05 NOTE — ED INITIAL ASSESSMENT (HPI)
abd pain since morning, has been on liquid diet diverticulitis since Friday. Pain umbilical lower abd half of norco about an hour ago and now feels better. Denies vomiting, nausea d/t pain.

## 2023-04-14 ENCOUNTER — LAB ENCOUNTER (OUTPATIENT)
Dept: LAB | Facility: HOSPITAL | Age: 58
End: 2023-04-14
Attending: Other
Payer: COMMERCIAL

## 2023-04-14 DIAGNOSIS — F41.0 PANIC DISORDER (EPISODIC PAROXYSMAL ANXIETY): ICD-10-CM

## 2023-04-14 LAB — TSI SER-ACNC: 1.42 MIU/ML (ref 0.36–3.74)

## 2023-04-14 PROCEDURE — 36415 COLL VENOUS BLD VENIPUNCTURE: CPT

## 2023-04-14 PROCEDURE — 84443 ASSAY THYROID STIM HORMONE: CPT

## 2023-06-21 ENCOUNTER — APPOINTMENT (OUTPATIENT)
Dept: GENERAL RADIOLOGY | Facility: HOSPITAL | Age: 58
End: 2023-06-21
Payer: COMMERCIAL

## 2023-06-21 ENCOUNTER — HOSPITAL ENCOUNTER (EMERGENCY)
Facility: HOSPITAL | Age: 58
Discharge: HOME | End: 2023-06-21
Attending: STUDENT IN AN ORGANIZED HEALTH CARE EDUCATION/TRAINING PROGRAM
Payer: COMMERCIAL

## 2023-06-21 ENCOUNTER — APPOINTMENT (OUTPATIENT)
Dept: GENERAL RADIOLOGY | Facility: HOSPITAL | Age: 58
End: 2023-06-21
Attending: STUDENT IN AN ORGANIZED HEALTH CARE EDUCATION/TRAINING PROGRAM
Payer: COMMERCIAL

## 2023-06-21 VITALS
DIASTOLIC BLOOD PRESSURE: 64 MMHG | RESPIRATION RATE: 18 BRPM | OXYGEN SATURATION: 94 % | SYSTOLIC BLOOD PRESSURE: 107 MMHG | HEART RATE: 92 BPM | WEIGHT: 250 LBS | BODY MASS INDEX: 31 KG/M2 | TEMPERATURE: 99 F

## 2023-06-21 DIAGNOSIS — J18.9 COMMUNITY ACQUIRED PNEUMONIA OF LEFT LOWER LOBE OF LUNG: Primary | ICD-10-CM

## 2023-06-21 LAB
ALBUMIN SERPL-MCNC: 2.7 G/DL (ref 3.4–5)
ALBUMIN/GLOB SERPL: 0.6 {RATIO} (ref 1–2)
ALP LIVER SERPL-CCNC: 106 U/L
ALT SERPL-CCNC: 31 U/L
ANION GAP SERPL CALC-SCNC: 9 MMOL/L (ref 0–18)
AST SERPL-CCNC: 21 U/L (ref 15–37)
BASOPHILS # BLD AUTO: 0.01 X10(3) UL (ref 0–0.2)
BASOPHILS NFR BLD AUTO: 0.1 %
BILIRUB SERPL-MCNC: 0.9 MG/DL (ref 0.1–2)
BUN BLD-MCNC: 28 MG/DL (ref 7–18)
CALCIUM BLD-MCNC: 8.9 MG/DL (ref 8.5–10.1)
CHLORIDE SERPL-SCNC: 101 MMOL/L (ref 98–112)
CO2 SERPL-SCNC: 23 MMOL/L (ref 21–32)
CREAT BLD-MCNC: 1.14 MG/DL
EOSINOPHIL # BLD AUTO: 0.01 X10(3) UL (ref 0–0.7)
EOSINOPHIL NFR BLD AUTO: 0.1 %
ERYTHROCYTE [DISTWIDTH] IN BLOOD BY AUTOMATED COUNT: 15 %
GFR SERPLBLD BASED ON 1.73 SQ M-ARVRAT: 75 ML/MIN/1.73M2 (ref 60–?)
GLOBULIN PLAS-MCNC: 4.6 G/DL (ref 2.8–4.4)
GLUCOSE BLD-MCNC: 110 MG/DL (ref 70–99)
HCT VFR BLD AUTO: 38.7 %
HGB BLD-MCNC: 13.1 G/DL
IMM GRANULOCYTES # BLD AUTO: 0.07 X10(3) UL (ref 0–1)
IMM GRANULOCYTES NFR BLD: 0.5 %
LYMPHOCYTES # BLD AUTO: 0.56 X10(3) UL (ref 1–4)
LYMPHOCYTES NFR BLD AUTO: 3.9 %
MCH RBC QN AUTO: 28.1 PG (ref 26–34)
MCHC RBC AUTO-ENTMCNC: 33.9 G/DL (ref 31–37)
MCV RBC AUTO: 83 FL
MONOCYTES # BLD AUTO: 1.22 X10(3) UL (ref 0.1–1)
MONOCYTES NFR BLD AUTO: 8.5 %
NEUTROPHILS # BLD AUTO: 12.56 X10 (3) UL (ref 1.5–7.7)
NEUTROPHILS # BLD AUTO: 12.56 X10(3) UL (ref 1.5–7.7)
NEUTROPHILS NFR BLD AUTO: 86.9 %
OSMOLALITY SERPL CALC.SUM OF ELEC: 282 MOSM/KG (ref 275–295)
PLATELET # BLD AUTO: 334 10(3)UL (ref 150–450)
POTASSIUM SERPL-SCNC: 3.7 MMOL/L (ref 3.5–5.1)
PROT SERPL-MCNC: 7.3 G/DL (ref 6.4–8.2)
RBC # BLD AUTO: 4.66 X10(6)UL
SODIUM SERPL-SCNC: 133 MMOL/L (ref 136–145)
WBC # BLD AUTO: 14.4 X10(3) UL (ref 4–11)

## 2023-06-21 PROCEDURE — 96365 THER/PROPH/DIAG IV INF INIT: CPT

## 2023-06-21 PROCEDURE — 87430 STREP A AG IA: CPT | Performed by: STUDENT IN AN ORGANIZED HEALTH CARE EDUCATION/TRAINING PROGRAM

## 2023-06-21 PROCEDURE — 99284 EMERGENCY DEPT VISIT MOD MDM: CPT

## 2023-06-21 PROCEDURE — 87430 STREP A AG IA: CPT

## 2023-06-21 PROCEDURE — 85025 COMPLETE CBC W/AUTO DIFF WBC: CPT | Performed by: STUDENT IN AN ORGANIZED HEALTH CARE EDUCATION/TRAINING PROGRAM

## 2023-06-21 PROCEDURE — 99285 EMERGENCY DEPT VISIT HI MDM: CPT

## 2023-06-21 PROCEDURE — 80053 COMPREHEN METABOLIC PANEL: CPT | Performed by: STUDENT IN AN ORGANIZED HEALTH CARE EDUCATION/TRAINING PROGRAM

## 2023-06-21 PROCEDURE — 96375 TX/PRO/DX INJ NEW DRUG ADDON: CPT

## 2023-06-21 PROCEDURE — 71045 X-RAY EXAM CHEST 1 VIEW: CPT | Performed by: STUDENT IN AN ORGANIZED HEALTH CARE EDUCATION/TRAINING PROGRAM

## 2023-06-21 RX ORDER — KETOROLAC TROMETHAMINE 15 MG/ML
15 INJECTION, SOLUTION INTRAMUSCULAR; INTRAVENOUS ONCE
Status: COMPLETED | OUTPATIENT
Start: 2023-06-21 | End: 2023-06-21

## 2023-06-21 RX ORDER — ACETAMINOPHEN 500 MG
1000 TABLET ORAL ONCE
Status: DISCONTINUED | OUTPATIENT
Start: 2023-06-21 | End: 2023-06-21

## 2023-06-21 RX ORDER — AZITHROMYCIN 250 MG/1
TABLET, FILM COATED ORAL
Qty: 6 TABLET | Refills: 0 | Status: SHIPPED | OUTPATIENT
Start: 2023-06-21 | End: 2023-06-26

## 2023-06-21 NOTE — ED INITIAL ASSESSMENT (HPI)
A/O x 4. Patient presents with fever. Patient states that he was seen at immediate care and diagnosed with sinusitis and placed on augmentin. Patient reports Tmax 103. Last took tylenol  approx 1 hour ago.   Took motrin 200mg last night

## 2023-06-23 ENCOUNTER — HOSPITAL ENCOUNTER (INPATIENT)
Facility: HOSPITAL | Age: 58
LOS: 3 days | Discharge: HOME | End: 2023-06-26
Attending: EMERGENCY MEDICINE | Admitting: HOSPITALIST
Payer: COMMERCIAL

## 2023-06-23 ENCOUNTER — APPOINTMENT (OUTPATIENT)
Dept: CT IMAGING | Facility: HOSPITAL | Age: 58
End: 2023-06-23
Attending: EMERGENCY MEDICINE
Payer: COMMERCIAL

## 2023-06-23 ENCOUNTER — APPOINTMENT (OUTPATIENT)
Dept: GENERAL RADIOLOGY | Facility: HOSPITAL | Age: 58
End: 2023-06-23
Payer: COMMERCIAL

## 2023-06-23 DIAGNOSIS — J18.9 COMMUNITY ACQUIRED PNEUMONIA OF LEFT LUNG, UNSPECIFIED PART OF LUNG: Primary | ICD-10-CM

## 2023-06-23 PROBLEM — N17.9 ACUTE KIDNEY INJURY (HCC): Status: ACTIVE | Noted: 2023-06-23

## 2023-06-23 PROBLEM — D72.829 LEUKOCYTOSIS: Status: ACTIVE | Noted: 2023-06-23

## 2023-06-23 PROBLEM — N17.9 ACUTE KIDNEY INJURY: Status: ACTIVE | Noted: 2023-06-23

## 2023-06-23 PROBLEM — R73.9 HYPERGLYCEMIA: Status: ACTIVE | Noted: 2023-06-23

## 2023-06-23 PROBLEM — E87.6 HYPOKALEMIA: Status: ACTIVE | Noted: 2023-06-23

## 2023-06-23 PROBLEM — R79.89 AZOTEMIA: Status: ACTIVE | Noted: 2023-06-23

## 2023-06-23 LAB
ALBUMIN SERPL-MCNC: 2.5 G/DL (ref 3.4–5)
ALBUMIN/GLOB SERPL: 0.5 {RATIO} (ref 1–2)
ALP LIVER SERPL-CCNC: 158 U/L
ALT SERPL-CCNC: 44 U/L
ANION GAP SERPL CALC-SCNC: 5 MMOL/L (ref 0–18)
AST SERPL-CCNC: 39 U/L (ref 15–37)
BASOPHILS # BLD AUTO: 0.04 X10(3) UL (ref 0–0.2)
BASOPHILS NFR BLD AUTO: 0.2 %
BILIRUB SERPL-MCNC: 0.5 MG/DL (ref 0.1–2)
BUN BLD-MCNC: 35 MG/DL (ref 7–18)
CALCIUM BLD-MCNC: 8.9 MG/DL (ref 8.5–10.1)
CHLORIDE SERPL-SCNC: 105 MMOL/L (ref 98–112)
CO2 SERPL-SCNC: 28 MMOL/L (ref 21–32)
CREAT BLD-MCNC: 1.32 MG/DL
D DIMER PPP FEU-MCNC: 2.44 UG/ML FEU (ref ?–0.56)
EOSINOPHIL # BLD AUTO: 0.08 X10(3) UL (ref 0–0.7)
EOSINOPHIL NFR BLD AUTO: 0.5 %
ERYTHROCYTE [DISTWIDTH] IN BLOOD BY AUTOMATED COUNT: 15.9 %
GFR SERPLBLD BASED ON 1.73 SQ M-ARVRAT: 63 ML/MIN/1.73M2 (ref 60–?)
GLOBULIN PLAS-MCNC: 4.6 G/DL (ref 2.8–4.4)
GLUCOSE BLD-MCNC: 116 MG/DL (ref 70–99)
HCT VFR BLD AUTO: 40 %
HGB BLD-MCNC: 12.9 G/DL
IMM GRANULOCYTES # BLD AUTO: 0.23 X10(3) UL (ref 0–1)
IMM GRANULOCYTES NFR BLD: 1.4 %
LYMPHOCYTES # BLD AUTO: 1.53 X10(3) UL (ref 1–4)
LYMPHOCYTES NFR BLD AUTO: 9.3 %
MCH RBC QN AUTO: 28.2 PG (ref 26–34)
MCHC RBC AUTO-ENTMCNC: 32.3 G/DL (ref 31–37)
MCV RBC AUTO: 87.3 FL
MONOCYTES # BLD AUTO: 0.71 X10(3) UL (ref 0.1–1)
MONOCYTES NFR BLD AUTO: 4.3 %
NEUTROPHILS # BLD AUTO: 13.92 X10 (3) UL (ref 1.5–7.7)
NEUTROPHILS # BLD AUTO: 13.92 X10(3) UL (ref 1.5–7.7)
NEUTROPHILS NFR BLD AUTO: 84.3 %
OSMOLALITY SERPL CALC.SUM OF ELEC: 295 MOSM/KG (ref 275–295)
PLATELET # BLD AUTO: 414 10(3)UL (ref 150–450)
POTASSIUM SERPL-SCNC: 3.5 MMOL/L (ref 3.5–5.1)
PROT SERPL-MCNC: 7.1 G/DL (ref 6.4–8.2)
RBC # BLD AUTO: 4.58 X10(6)UL
SARS-COV-2 RNA RESP QL NAA+PROBE: NOT DETECTED
SODIUM SERPL-SCNC: 138 MMOL/L (ref 136–145)
TROPONIN I HIGH SENSITIVITY: 5 NG/L
WBC # BLD AUTO: 16.5 X10(3) UL (ref 4–11)

## 2023-06-23 PROCEDURE — 99223 1ST HOSP IP/OBS HIGH 75: CPT | Performed by: HOSPITALIST

## 2023-06-23 PROCEDURE — 71275 CT ANGIOGRAPHY CHEST: CPT | Performed by: EMERGENCY MEDICINE

## 2023-06-23 PROCEDURE — 71045 X-RAY EXAM CHEST 1 VIEW: CPT

## 2023-06-23 RX ORDER — ENOXAPARIN SODIUM 100 MG/ML
40 INJECTION SUBCUTANEOUS DAILY
Status: DISCONTINUED | OUTPATIENT
Start: 2023-06-24 | End: 2023-06-26

## 2023-06-23 RX ORDER — SODIUM CHLORIDE 9 MG/ML
INJECTION, SOLUTION INTRAVENOUS CONTINUOUS
Status: DISCONTINUED | OUTPATIENT
Start: 2023-06-23 | End: 2023-06-24

## 2023-06-23 RX ORDER — BENZONATATE 200 MG/1
200 CAPSULE ORAL 3 TIMES DAILY PRN
Status: DISCONTINUED | OUTPATIENT
Start: 2023-06-23 | End: 2023-06-26

## 2023-06-23 RX ORDER — POLYETHYLENE GLYCOL 3350 17 G/17G
17 POWDER, FOR SOLUTION ORAL DAILY PRN
Status: DISCONTINUED | OUTPATIENT
Start: 2023-06-23 | End: 2023-06-26

## 2023-06-23 RX ORDER — BISACODYL 10 MG
10 SUPPOSITORY, RECTAL RECTAL
Status: DISCONTINUED | OUTPATIENT
Start: 2023-06-23 | End: 2023-06-26

## 2023-06-23 RX ORDER — SENNOSIDES 8.6 MG
17.2 TABLET ORAL NIGHTLY PRN
Status: DISCONTINUED | OUTPATIENT
Start: 2023-06-23 | End: 2023-06-26

## 2023-06-23 RX ORDER — BENZONATATE 100 MG/1
100 CAPSULE ORAL 3 TIMES DAILY PRN
Status: DISCONTINUED | OUTPATIENT
Start: 2023-06-23 | End: 2023-06-23

## 2023-06-23 RX ORDER — MELATONIN
3 NIGHTLY PRN
Status: DISCONTINUED | OUTPATIENT
Start: 2023-06-23 | End: 2023-06-26

## 2023-06-23 RX ORDER — ECHINACEA PURPUREA EXTRACT 125 MG
1 TABLET ORAL
Status: DISCONTINUED | OUTPATIENT
Start: 2023-06-23 | End: 2023-06-26

## 2023-06-23 RX ORDER — ONDANSETRON 2 MG/ML
8 INJECTION INTRAMUSCULAR; INTRAVENOUS EVERY 6 HOURS PRN
Status: DISCONTINUED | OUTPATIENT
Start: 2023-06-23 | End: 2023-06-26

## 2023-06-23 RX ORDER — ACETAMINOPHEN 500 MG
500 TABLET ORAL EVERY 4 HOURS PRN
Status: DISCONTINUED | OUTPATIENT
Start: 2023-06-23 | End: 2023-06-24

## 2023-06-24 ENCOUNTER — APPOINTMENT (OUTPATIENT)
Dept: GENERAL RADIOLOGY | Facility: HOSPITAL | Age: 58
End: 2023-06-24
Attending: HOSPITALIST
Payer: COMMERCIAL

## 2023-06-24 PROBLEM — A41.9 SEPSIS (HCC): Status: ACTIVE | Noted: 2023-06-24

## 2023-06-24 LAB
ANION GAP SERPL CALC-SCNC: 6 MMOL/L (ref 0–18)
BUN BLD-MCNC: 21 MG/DL (ref 7–18)
C DIFF TOX B STL QL: NEGATIVE
CALCIUM BLD-MCNC: 8.7 MG/DL (ref 8.5–10.1)
CHLORIDE SERPL-SCNC: 103 MMOL/L (ref 98–112)
CO2 SERPL-SCNC: 26 MMOL/L (ref 21–32)
CREAT BLD-MCNC: 0.89 MG/DL
ERYTHROCYTE [DISTWIDTH] IN BLOOD BY AUTOMATED COUNT: 15.9 %
GFR SERPLBLD BASED ON 1.73 SQ M-ARVRAT: 101 ML/MIN/1.73M2 (ref 60–?)
GLUCOSE BLD-MCNC: 104 MG/DL (ref 70–99)
HCT VFR BLD AUTO: 36 %
HGB BLD-MCNC: 11.6 G/DL
LACTATE SERPL-SCNC: 1.1 MMOL/L (ref 0.4–2)
MAGNESIUM SERPL-MCNC: 1.9 MG/DL (ref 1.6–2.6)
MCH RBC QN AUTO: 27.8 PG (ref 26–34)
MCHC RBC AUTO-ENTMCNC: 32.2 G/DL (ref 31–37)
MCV RBC AUTO: 86.3 FL
OSMOLALITY SERPL CALC.SUM OF ELEC: 283 MOSM/KG (ref 275–295)
PHOSPHATE SERPL-MCNC: 2.5 MG/DL (ref 2.5–4.9)
PLATELET # BLD AUTO: 407 10(3)UL (ref 150–450)
POTASSIUM SERPL-SCNC: 3.7 MMOL/L (ref 3.5–5.1)
POTASSIUM SERPL-SCNC: 3.7 MMOL/L (ref 3.5–5.1)
RBC # BLD AUTO: 4.17 X10(6)UL
SODIUM SERPL-SCNC: 135 MMOL/L (ref 136–145)
WBC # BLD AUTO: 12.4 X10(3) UL (ref 4–11)

## 2023-06-24 PROCEDURE — 99232 SBSQ HOSP IP/OBS MODERATE 35: CPT | Performed by: HOSPITALIST

## 2023-06-24 PROCEDURE — 70355 PANORAMIC X-RAY OF JAWS: CPT | Performed by: HOSPITALIST

## 2023-06-24 RX ORDER — HYDROCODONE BITARTRATE AND ACETAMINOPHEN 5; 325 MG/1; MG/1
1 TABLET ORAL EVERY 4 HOURS PRN
Status: DISCONTINUED | OUTPATIENT
Start: 2023-06-24 | End: 2023-06-26

## 2023-06-24 RX ORDER — IBUPROFEN 600 MG/1
600 TABLET ORAL EVERY 6 HOURS PRN
Status: DISCONTINUED | OUTPATIENT
Start: 2023-06-24 | End: 2023-06-26

## 2023-06-24 RX ORDER — HYDROCHLOROTHIAZIDE 25 MG/1
25 TABLET ORAL DAILY
Status: DISCONTINUED | OUTPATIENT
Start: 2023-06-24 | End: 2023-06-24

## 2023-06-24 RX ORDER — OLANZAPINE 2.5 MG/1
2.5 TABLET ORAL NIGHTLY
Status: DISCONTINUED | OUTPATIENT
Start: 2023-06-24 | End: 2023-06-26

## 2023-06-24 RX ORDER — CLONAZEPAM 0.5 MG/1
0.5 TABLET ORAL 4 TIMES DAILY
Status: DISCONTINUED | OUTPATIENT
Start: 2023-06-24 | End: 2023-06-24

## 2023-06-24 RX ORDER — ALBUTEROL SULFATE 90 UG/1
2 AEROSOL, METERED RESPIRATORY (INHALATION) EVERY 4 HOURS PRN
Status: DISCONTINUED | OUTPATIENT
Start: 2023-06-24 | End: 2023-06-26

## 2023-06-24 RX ORDER — LISINOPRIL AND HYDROCHLOROTHIAZIDE 20; 12.5 MG/1; MG/1
2 TABLET ORAL DAILY
Status: DISCONTINUED | OUTPATIENT
Start: 2023-06-24 | End: 2023-06-24 | Stop reason: RX

## 2023-06-24 RX ORDER — HYDROXYZINE HYDROCHLORIDE 25 MG/1
50 TABLET, FILM COATED ORAL 3 TIMES DAILY PRN
Status: DISCONTINUED | OUTPATIENT
Start: 2023-06-24 | End: 2023-06-26

## 2023-06-24 RX ORDER — ACETAMINOPHEN 500 MG
1000 TABLET ORAL EVERY 6 HOURS PRN
Status: DISCONTINUED | OUTPATIENT
Start: 2023-06-24 | End: 2023-06-26

## 2023-06-24 RX ORDER — CLONAZEPAM 0.5 MG/1
0.5 TABLET ORAL 4 TIMES DAILY
Status: DISCONTINUED | OUTPATIENT
Start: 2023-06-24 | End: 2023-06-26

## 2023-06-24 RX ORDER — IBUPROFEN 400 MG/1
400 TABLET ORAL EVERY 6 HOURS PRN
Status: DISCONTINUED | OUTPATIENT
Start: 2023-06-24 | End: 2023-06-26

## 2023-06-24 RX ORDER — POTASSIUM CHLORIDE 1.5 G/1.77G
40 POWDER, FOR SOLUTION ORAL EVERY 4 HOURS
Status: COMPLETED | OUTPATIENT
Start: 2023-06-24 | End: 2023-06-24

## 2023-06-24 RX ORDER — CLONAZEPAM 1 MG/1
1 TABLET ORAL 3 TIMES DAILY
Status: DISCONTINUED | OUTPATIENT
Start: 2023-06-24 | End: 2023-06-24

## 2023-06-24 RX ORDER — ESCITALOPRAM OXALATE 5 MG/1
7.5 TABLET ORAL DAILY
Status: DISCONTINUED | OUTPATIENT
Start: 2023-06-24 | End: 2023-06-24

## 2023-06-24 RX ORDER — HYDROXYZINE HYDROCHLORIDE 25 MG/1
50 TABLET, FILM COATED ORAL 3 TIMES DAILY PRN
Status: DISCONTINUED | OUTPATIENT
Start: 2023-06-24 | End: 2023-06-24

## 2023-06-24 RX ORDER — LISINOPRIL 40 MG/1
40 TABLET ORAL DAILY
Status: DISCONTINUED | OUTPATIENT
Start: 2023-06-24 | End: 2023-06-24

## 2023-06-24 RX ORDER — CLONAZEPAM 0.5 MG/1
0.5 TABLET ORAL ONCE
Status: DISCONTINUED | OUTPATIENT
Start: 2023-06-24 | End: 2023-06-24

## 2023-06-24 RX ORDER — GABAPENTIN 600 MG/1
600 TABLET ORAL 3 TIMES DAILY
Status: DISCONTINUED | OUTPATIENT
Start: 2023-06-24 | End: 2023-06-26

## 2023-06-25 ENCOUNTER — APPOINTMENT (OUTPATIENT)
Dept: ULTRASOUND IMAGING | Facility: HOSPITAL | Age: 58
End: 2023-06-25
Attending: HOSPITALIST
Payer: COMMERCIAL

## 2023-06-25 LAB
INR BLD: 1.23 (ref 0.85–1.16)
PHOSPHATE SERPL-MCNC: 3.3 MG/DL (ref 2.5–4.9)
POTASSIUM SERPL-SCNC: 3.7 MMOL/L (ref 3.5–5.1)
PROTHROMBIN TIME: 15.5 SECONDS (ref 11.6–14.8)

## 2023-06-25 PROCEDURE — 93970 EXTREMITY STUDY: CPT | Performed by: HOSPITALIST

## 2023-06-25 PROCEDURE — 99232 SBSQ HOSP IP/OBS MODERATE 35: CPT | Performed by: HOSPITALIST

## 2023-06-25 RX ORDER — POTASSIUM CHLORIDE 20 MEQ/1
40 TABLET, EXTENDED RELEASE ORAL ONCE
Status: COMPLETED | OUTPATIENT
Start: 2023-06-25 | End: 2023-06-25

## 2023-06-25 RX ORDER — GUAIFENESIN 600 MG/1
1200 TABLET, EXTENDED RELEASE ORAL 2 TIMES DAILY PRN
Status: DISCONTINUED | OUTPATIENT
Start: 2023-06-25 | End: 2023-06-26

## 2023-06-25 RX ORDER — DOXYCYCLINE HYCLATE 100 MG/1
100 CAPSULE ORAL EVERY 12 HOURS SCHEDULED
Status: DISCONTINUED | OUTPATIENT
Start: 2023-06-25 | End: 2023-06-26

## 2023-06-26 ENCOUNTER — APPOINTMENT (OUTPATIENT)
Dept: GENERAL RADIOLOGY | Facility: HOSPITAL | Age: 58
End: 2023-06-26
Attending: HOSPITALIST
Payer: COMMERCIAL

## 2023-06-26 VITALS
WEIGHT: 256.38 LBS | DIASTOLIC BLOOD PRESSURE: 70 MMHG | SYSTOLIC BLOOD PRESSURE: 126 MMHG | TEMPERATURE: 98 F | OXYGEN SATURATION: 93 % | RESPIRATION RATE: 18 BRPM | HEART RATE: 84 BPM | BODY MASS INDEX: 32 KG/M2

## 2023-06-26 LAB
ATRIAL RATE: 75 BPM
P AXIS: 93 DEGREES
P-R INTERVAL: 212 MS
POTASSIUM SERPL-SCNC: 4 MMOL/L (ref 3.5–5.1)
Q-T INTERVAL: 404 MS
QRS DURATION: 136 MS
QTC CALCULATION (BEZET): 451 MS
R AXIS: 221 DEGREES
T AXIS: 98 DEGREES
VENTRICULAR RATE: 75 BPM

## 2023-06-26 PROCEDURE — 99239 HOSP IP/OBS DSCHRG MGMT >30: CPT | Performed by: HOSPITALIST

## 2023-06-26 PROCEDURE — 71046 X-RAY EXAM CHEST 2 VIEWS: CPT | Performed by: HOSPITALIST

## 2023-06-26 RX ORDER — AMOXICILLIN AND CLAVULANATE POTASSIUM 875; 125 MG/1; MG/1
1 TABLET, FILM COATED ORAL 2 TIMES DAILY
Qty: 20 TABLET | Refills: 0 | Status: SHIPPED | OUTPATIENT
Start: 2023-06-26 | End: 2023-07-06

## 2023-06-26 RX ORDER — DOXYCYCLINE HYCLATE 100 MG/1
100 CAPSULE ORAL 2 TIMES DAILY
Qty: 20 CAPSULE | Refills: 0 | Status: SHIPPED | OUTPATIENT
Start: 2023-06-26 | End: 2023-07-06

## 2023-06-28 ENCOUNTER — OFFICE VISIT (OUTPATIENT)
Dept: INTERNAL MEDICINE CLINIC | Facility: CLINIC | Age: 58
End: 2023-06-28
Payer: COMMERCIAL

## 2023-06-28 VITALS
SYSTOLIC BLOOD PRESSURE: 138 MMHG | HEART RATE: 86 BPM | HEIGHT: 75 IN | BODY MASS INDEX: 30.46 KG/M2 | OXYGEN SATURATION: 93 % | RESPIRATION RATE: 18 BRPM | WEIGHT: 245 LBS | TEMPERATURE: 97 F | DIASTOLIC BLOOD PRESSURE: 90 MMHG

## 2023-06-28 DIAGNOSIS — K08.89 TOOTH PAIN: ICD-10-CM

## 2023-06-28 DIAGNOSIS — J18.9 COMMUNITY ACQUIRED PNEUMONIA OF LEFT LUNG, UNSPECIFIED PART OF LUNG: ICD-10-CM

## 2023-06-28 DIAGNOSIS — R79.89 ELEVATED LFTS: ICD-10-CM

## 2023-06-28 DIAGNOSIS — N17.9 ACUTE KIDNEY INJURY (HCC): ICD-10-CM

## 2023-06-28 DIAGNOSIS — I10 ESSENTIAL HYPERTENSION: ICD-10-CM

## 2023-06-28 DIAGNOSIS — A41.9 SEPSIS, DUE TO UNSPECIFIED ORGANISM, UNSPECIFIED WHETHER ACUTE ORGAN DYSFUNCTION PRESENT (HCC): Primary | ICD-10-CM

## 2023-06-28 PROCEDURE — 3080F DIAST BP >= 90 MM HG: CPT | Performed by: NURSE PRACTITIONER

## 2023-06-28 PROCEDURE — 3008F BODY MASS INDEX DOCD: CPT | Performed by: NURSE PRACTITIONER

## 2023-06-28 PROCEDURE — 3075F SYST BP GE 130 - 139MM HG: CPT | Performed by: NURSE PRACTITIONER

## 2023-06-28 PROCEDURE — 99495 TRANSJ CARE MGMT MOD F2F 14D: CPT | Performed by: NURSE PRACTITIONER

## 2023-06-28 RX ORDER — MULTIVIT-MIN/IRON FUM/FOLIC AC 7.5 MG-4
1 TABLET ORAL DAILY
COMMUNITY

## 2023-06-28 RX ORDER — LISINOPRIL AND HYDROCHLOROTHIAZIDE 20; 12.5 MG/1; MG/1
2 TABLET ORAL DAILY
Qty: 60 TABLET | Refills: 0 | COMMUNITY
Start: 2023-06-28

## 2023-06-28 RX ORDER — HYDROXYZINE HYDROCHLORIDE 25 MG/1
25 TABLET, FILM COATED ORAL
COMMUNITY
Start: 2022-10-12 | End: 2023-06-28 | Stop reason: CLARIF

## 2023-06-28 RX ORDER — LEVOCETIRIZINE DIHYDROCHLORIDE 5 MG/1
5 TABLET, FILM COATED ORAL DAILY PRN
COMMUNITY

## 2023-06-28 RX ORDER — CLONAZEPAM 1 MG/1
0.5 TABLET ORAL 4 TIMES DAILY
COMMUNITY

## 2023-06-28 RX ORDER — HYDROXYZINE 50 MG/1
50 TABLET, FILM COATED ORAL
COMMUNITY
Start: 2022-09-15 | End: 2023-06-28 | Stop reason: CLARIF

## 2023-06-30 PROBLEM — R79.89 ELEVATED LFTS: Status: ACTIVE | Noted: 2023-06-30

## 2023-07-12 ENCOUNTER — OFFICE VISIT (OUTPATIENT)
Facility: CLINIC | Age: 58
End: 2023-07-12
Payer: COMMERCIAL

## 2023-07-12 VITALS
BODY MASS INDEX: 30.46 KG/M2 | HEART RATE: 68 BPM | HEIGHT: 75 IN | DIASTOLIC BLOOD PRESSURE: 72 MMHG | RESPIRATION RATE: 16 BRPM | WEIGHT: 245 LBS | OXYGEN SATURATION: 97 % | SYSTOLIC BLOOD PRESSURE: 108 MMHG

## 2023-07-12 DIAGNOSIS — R59.0 THORACIC LYMPHADENOPATHY: ICD-10-CM

## 2023-07-12 DIAGNOSIS — J18.9 PNEUMONIA OF LEFT LOWER LOBE DUE TO INFECTIOUS ORGANISM: Primary | ICD-10-CM

## 2023-07-12 DIAGNOSIS — R91.8 MULTIPLE PULMONARY NODULES: ICD-10-CM

## 2023-07-12 DIAGNOSIS — Z72.0 TOBACCO ABUSE: ICD-10-CM

## 2023-07-12 PROCEDURE — 3074F SYST BP LT 130 MM HG: CPT | Performed by: INTERNAL MEDICINE

## 2023-07-12 PROCEDURE — 3078F DIAST BP <80 MM HG: CPT | Performed by: INTERNAL MEDICINE

## 2023-07-12 PROCEDURE — 3008F BODY MASS INDEX DOCD: CPT | Performed by: INTERNAL MEDICINE

## 2023-07-12 PROCEDURE — 99204 OFFICE O/P NEW MOD 45 MIN: CPT | Performed by: INTERNAL MEDICINE

## 2023-07-12 NOTE — PATIENT INSTRUCTIONS
Please obtain a chest xray in the end of July   Please obtain a CT chest scan in late September/ October 2023  Call with questions/concerns

## 2023-07-18 ENCOUNTER — OFFICE VISIT (OUTPATIENT)
Facility: CLINIC | Age: 58
End: 2023-07-18
Payer: COMMERCIAL

## 2023-07-18 VITALS
SYSTOLIC BLOOD PRESSURE: 122 MMHG | HEART RATE: 70 BPM | DIASTOLIC BLOOD PRESSURE: 72 MMHG | WEIGHT: 243 LBS | HEIGHT: 74 IN | OXYGEN SATURATION: 97 % | RESPIRATION RATE: 16 BRPM | BODY MASS INDEX: 31.18 KG/M2

## 2023-07-18 DIAGNOSIS — G47.19 DAYTIME HYPERSOMNOLENCE: ICD-10-CM

## 2023-07-18 DIAGNOSIS — J18.9 PNEUMONIA OF LEFT LOWER LOBE DUE TO INFECTIOUS ORGANISM: Primary | ICD-10-CM

## 2023-07-18 DIAGNOSIS — G47.33 OSA (OBSTRUCTIVE SLEEP APNEA): ICD-10-CM

## 2023-08-07 ENCOUNTER — HOSPITAL ENCOUNTER (OUTPATIENT)
Dept: GENERAL RADIOLOGY | Age: 58
Discharge: HOME | End: 2023-08-07
Attending: INTERNAL MEDICINE
Payer: COMMERCIAL

## 2023-08-07 DIAGNOSIS — J18.9 PNEUMONIA OF LEFT LOWER LOBE DUE TO INFECTIOUS ORGANISM: ICD-10-CM

## 2023-08-07 PROCEDURE — 71046 X-RAY EXAM CHEST 2 VIEWS: CPT | Performed by: INTERNAL MEDICINE

## 2023-08-15 ENCOUNTER — TELEMEDICINE (OUTPATIENT)
Facility: CLINIC | Age: 58
End: 2023-08-15
Payer: COMMERCIAL

## 2023-08-15 DIAGNOSIS — R91.8 MULTIPLE PULMONARY NODULES: ICD-10-CM

## 2023-08-15 DIAGNOSIS — J18.9 PNEUMONIA OF LEFT LOWER LOBE DUE TO INFECTIOUS ORGANISM: Primary | ICD-10-CM

## 2023-08-15 DIAGNOSIS — R59.0 THORACIC LYMPHADENOPATHY: ICD-10-CM

## 2023-08-15 DIAGNOSIS — Z72.0 TOBACCO ABUSE: ICD-10-CM

## 2023-08-15 PROCEDURE — 99213 OFFICE O/P EST LOW 20 MIN: CPT | Performed by: INTERNAL MEDICINE

## 2023-08-15 NOTE — PROGRESS NOTES
[facility] General Pulmonary Progress Note    History of Present Illness:  Winston Noel is a 65year old male former smoker (quit 2000, 10 pack years) with significant PMH of NELLIE, HTN who presents today for follow up of pneumonia. He denies acute issues today, feels well, had some anxiety last week with associated dyspnea, now improved. No dyspnea or cough. No pain    July 2023 previously   The patient reports having developed fatigue, fevers leading to his evaluation in ER at [facility] in late June. He was diagnosed with pneumonia and treated inpatient with empiric abx and completed PO doxy/augmentin. He feels much better since his hospitalization, still with fatigue and mild dyspnea, but not limiting. No cough, phlegm. No fevers. No pain    No hx of lung disease, asthma, bronchitis, COPD. Works in office setting. Not aware of any significant exposures    Past Medical History:   Past Medical History:   Diagnosis Date    Allergic rhinitis     Anxiety     Back pain     Back problem     Depression     ED (erectile dysfunction)     High blood pressure     Pneumonia due to organism     Shortness of breath     Sleep apnea 2012    Unspecified essential hypertension     Visual impairment         Past Surgical History:   Past Surgical History:   Procedure Laterality Date    CHOLECYSTECTOMY  3/12/2023    COLONOSCOPY N/A 03/22/2016    [facility]. Hyperplastic polyp only.   Repeat 2026    DENTAL SURGERY PROCEDURE      [place] teeth removed    INCISION/DRAIN ABSCESS EXTRA  1996    KNEE ARTHROSCOPY Right     LAPAROSCOPIC CHOLECYSTECTOMY  02/12/2023    MYRINGOPLASTY      OTHER SURGICAL HISTORY      R knee arthroscope    SKIN TISSUE PROCEDURE UNLISTED      TONSILLECTOMY           Family Medical History:   Family History   Problem Relation Age of Onset    Hypertension Mother     Hypertension Father     Cancer Father 61        Prostate    Stroke Maternal Grandmother     Dementia Paternal Grandmother     Diabetes Paternal Grandfather     Other (Diabetes, type 2) Paternal Grandfather         Social History: Social History    Socioeconomic History      Marital status:       Spouse name: Not on file      Number of children: Not on file      Years of education: Not on file      Highest education level: Not on file    Occupational History      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[employer]    Tobacco Use      Smoking status: Former        Packs/day: 0.50        Years: 19.00        Pack years: 9.5        Types: Cigarettes        Quit date: 10/3/2000        Years since quittin.8        Passive exposure: Never      Smokeless tobacco: Never    Vaping Use      Vaping Use: Never used    Substance and Sexual Activity      Alcohol use: No      Drug use: No      Sexual activity: Yes        Partners: Female    Other Topics      Concerns:         Service: Not Asked        Blood Transfusions: Not Asked        Caffeine Concern: Yes          coffee in AM/ soda         Occupational Exposure: Not Asked        Hobby Hazards: Not Asked        Sleep Concern: Not Asked        Stress Concern: Not Asked        Weight Concern: Not Asked        Special Diet: Not Asked        Back Care: Not Asked        Exercise: Yes          X1-2 times per week         Bike Helmet: Not Asked        Seat Belt: Not Asked        Self-Exams: Not Asked    Social History Narrative      Not on file    Social Determinants of Health  Financial Resource Strain: Not on file  Food Insecurity: Not on file  Transportation Needs: Not on file  Physical Activity: Not on file  Stress: Not on file  Social Connections: Not on file  Housing Stability: Not on file     Medications:   Current Outpatient Medications   Medication Sig Dispense Refill    propranolol 10 MG Oral Tab Take 1 tablet (10 mg total) by mouth 2 (two) times daily as needed. 60 tablet 0    sertraline (ZOLOFT) 100 MG Oral Tab Take 1 tablet (100 mg total) by mouth every morning.  30 tablet 1    gabapentin 600 MG Oral Tab Take 1 tablet (600 mg total) by mouth 3 (three) times daily. 90 tablet 1    clonazePAM (KLONOPIN) 1 MG Oral Tab Take 1 tablet (1 mg total) by mouth 3 (three) times daily as needed for Anxiety. 90 tablet 1    clonazePAM 1 MG Oral Tab Take 0.5 mg by mouth 4 (four) times daily. Multiple Vitamins-Minerals (MULTI-VITAMIN/MINERALS) Oral Tab Take 1 tablet by mouth daily. levocetirizine 5 MG Oral Tab Take 1 tablet (5 mg total) by mouth daily as needed for Allergies. lisinopril-hydroCHLOROthiazide 20-12.5 MG Oral Tab Take 2 tablets by mouth daily. 60 tablet 0    OLANZAPINE 2.5 MG Oral Tab Take 1 tablet by mouth nightly. 90 tablet 0    hydrOXYzine 50 MG Oral Tab Take 1 tablet (50 mg total) by mouth 3 (three) times daily as needed for Anxiety. Albuterol Sulfate  (90 Base) MCG/ACT Inhalation Aero Soln Inhale 2 puffs into the lungs every 4 (four) hours as needed for Wheezing or Shortness of Breath. 1 each 3    Tadalafil 20 MG Oral Tab Take 1 tablet (20 mg total) by mouth daily as needed for Erectile Dysfunction. 8 tablet 5       Review of Systems: Review of Systems   Constitutional: Negative. HENT: Negative. Respiratory: Negative. All other systems reviewed and are negative. Physical Exam:  There were no vitals taken for this visit. Constitutional: alert, cooperative. No acute distress. HEENT: Head NC/AT. Results:  Personally reviewed    WBC: 12.4, done on 6/24/2023. HGB: 11.6, done on 6/24/2023. PLT: 407, done on 6/24/2023. Glucose: 104, done on 6/24/2023. Cr: 0.89, done on 6/24/2023. GFR(AA): 92, done on 7/23/2022. GFR (non-AA): 80, done on 7/23/2022. CA: 8.7, done on 6/24/2023. Na: 135, done on 6/24/2023. K: 4, done on 6/26/2023. Cl: 103, done on 6/24/2023. CO2: 26, done on 6/24/2023. Last ALB was 2.5% done on 6/23/2023.      XR CHEST PA + LAT CHEST (CPT=71046)    Result Date: 8/7/2023  CONCLUSION:  Resolution of left lower lobe pneumonia and effusion compared to the radiograph dated 6/26/2023. LOCATION:  [id number]   Dictated by (CST): Clovis Saldaña MD on 8/07/2023 at 10:33 AM     Finalized by (CST): Clovis Saldaña MD on 8/07/2023 at 10:34 AM       XR CHEST PA + LAT CHEST (CPT=71046)    Result Date: 6/26/2023  CONCLUSION:  1. Small left pleural effusion with left lower lobe atelectasis/consolidation corresponds to recent CTA findings performed 6/23/2023. Recommend follow-up to ensure resolution and to exclude an underlying mass as clinically indicated. LOCATION:  MAR7   Dictated by (CST): Demetrius Velez MD on 6/26/2023 at 11:15 AM     Finalized by (CST): Demetrius Velez MD on 6/26/2023 at 11:18 AM       [facility] (DMX=61105)    Result Date: 6/25/2023  CONCLUSION:  No evidence of DVT in bilateral lower extremities. LOCATION:  [facility]   Dictated by (CST): Lola Hinson MD on 6/25/2023 at 5:01 PM     Finalized by (CST): Lola Hinson MD on 6/25/2023 at 5:03 PM       XR PANORAMIC OF JAWS (CPT=70355)    Result Date: 6/24/2023  CONCLUSION:  There are multiple dental implants demonstrated. There is no specific evidence of an abscess. General Dentistry consultation is recommended. LOCATION:  [facility]   Dictated by (CST): Akin Martin MD on 6/24/2023 at 10:32 PM     Finalized by (CST): Akin Martin MD on 6/24/2023 at 10:33 PM       CT ANGIOGRAPHY, CHEST (CPT=71275)    Result Date: 6/23/2023  CONCLUSION:  1. Negative for pulmonary embolism. 2. Extensive left lower lobe lobar pneumonia. 3. Small left pleural effusion. 4. Mild left hilar and mediastinal adenopathy. Given the lung findings these are likely reactive. 5. Details as above. Continued clinical correlation recommended. Follow-up to radiographic resolution recommended.       Dictated by (CST): Wanda Leonard MD on 6/23/2023 at 8:07 PM     Finalized by (CST): Wanda Leonard MD on 6/23/2023 at 8:15 PM       XR CHEST AP PORTABLE  (CPT=71045)    Result Date: 6/23/2023  CONCLUSION:  Left base infiltrate/pneumonia minimally improved. Radiographic follow-up to resolution recommended. LOCATION:  [facility]      Dictated by (CST): Francisca Hernandez MD on 6/23/2023 at 5:27 PM     Finalized by (CST): Francisca Hernandez MD on 6/23/2023 at 5:29 PM       XR CHEST AP PORTABLE  (CPT=71045)    Result Date: 6/21/2023  CONCLUSION:  There is left lower lobe consolidation with small left effusion consistent with pneumonia. The heart size and vascularity are normal.  There is no pneumothorax. The right lung is clear. No right-sided effusion. No pneumothorax. Old right rib fractures noted. Dictated by (CST): Shirin Poole MD on 6/21/2023 at 6:49 AM     Finalized by (CST): Shirin Poole MD on 6/21/2023 at 6:50 AM         Assessment/Plan:  #1. LLL pneumonia  Treated for community acquired pneumonia with empiric abx June 2023 [facility]  June 2023 CT chest with dense consolidation in LLL with 2mm RML nodule and subcarinal and hilar LAD  8/2023 CXR with resolution of LLL opacities/effusion. Questionable atelectasis in RLL  CXR with improvement, however it is unable to evaluate small lung nodules and thoracic LAD, plan on CT chest in late September/ October 2023 to ensure resolution    #2. Thoracic lymphadenopathy  As above    #3. Lung nodules  As above    #4. Tobacco abuse  Quit 2000, 10 pack years  He does not qualify for lung cancer screening. Kari Carrillo MD  8/15/2023    This visit is conducted using Telemedicine with live, interactive video and audio. Patient has been referred to the [facility] website at [email or web address] to review the yearly Consent to Treat document. Patient understands and accepts financial responsibility for any deductible, co-insurance and/or co-pays associated with this service.

## 2023-08-15 NOTE — PATIENT INSTRUCTIONS
Your chest xray looks good!   Plan to obtain a CT chest in late September/ October 2023 to ensure the spots and lymph nodes have improved  Call/message with concerns/questions

## 2023-09-12 ENCOUNTER — HOSPITAL ENCOUNTER (OUTPATIENT)
Facility: HOSPITAL | Age: 58
Setting detail: OBSERVATION
Discharge: HOME | End: 2023-09-13
Attending: EMERGENCY MEDICINE | Admitting: HOSPITALIST
Payer: COMMERCIAL

## 2023-09-12 ENCOUNTER — APPOINTMENT (OUTPATIENT)
Dept: GENERAL RADIOLOGY | Facility: HOSPITAL | Age: 58
End: 2023-09-12
Payer: COMMERCIAL

## 2023-09-12 ENCOUNTER — APPOINTMENT (OUTPATIENT)
Dept: CV DIAGNOSTICS | Facility: HOSPITAL | Age: 58
End: 2023-09-12
Attending: HOSPITALIST
Payer: COMMERCIAL

## 2023-09-12 DIAGNOSIS — R00.1 SYMPTOMATIC BRADYCARDIA: Primary | ICD-10-CM

## 2023-09-12 DIAGNOSIS — R42 DIZZINESS: ICD-10-CM

## 2023-09-12 PROBLEM — E87.1 HYPONATREMIA: Status: ACTIVE | Noted: 2023-09-12

## 2023-09-12 LAB
ALBUMIN SERPL-MCNC: 3.6 G/DL (ref 3.4–5)
ALBUMIN/GLOB SERPL: 0.8 {RATIO} (ref 1–2)
ALP LIVER SERPL-CCNC: 70 U/L
ALT SERPL-CCNC: 45 U/L
ANION GAP SERPL CALC-SCNC: 4 MMOL/L (ref 0–18)
AST SERPL-CCNC: 19 U/L (ref 15–37)
ATRIAL RATE: 68 BPM
BASOPHILS # BLD AUTO: 0.01 X10(3) UL (ref 0–0.2)
BASOPHILS NFR BLD AUTO: 0.1 %
BILIRUB SERPL-MCNC: 1 MG/DL (ref 0.1–2)
BUN BLD-MCNC: 15 MG/DL (ref 7–18)
CALCIUM BLD-MCNC: 9.3 MG/DL (ref 8.5–10.1)
CHLORIDE SERPL-SCNC: 101 MMOL/L (ref 98–112)
CO2 SERPL-SCNC: 26 MMOL/L (ref 21–32)
CREAT BLD-MCNC: 0.91 MG/DL
EGFRCR SERPLBLD CKD-EPI 2021: 99 ML/MIN/1.73M2 (ref 60–?)
EOSINOPHIL # BLD AUTO: 0.26 X10(3) UL (ref 0–0.7)
EOSINOPHIL NFR BLD AUTO: 2.8 %
ERYTHROCYTE [DISTWIDTH] IN BLOOD BY AUTOMATED COUNT: 13.7 %
GLOBULIN PLAS-MCNC: 4.5 G/DL (ref 2.8–4.4)
GLUCOSE BLD-MCNC: 98 MG/DL (ref 70–99)
HCT VFR BLD AUTO: 46.4 %
HGB BLD-MCNC: 15.5 G/DL
IMM GRANULOCYTES # BLD AUTO: 0.04 X10(3) UL (ref 0–1)
IMM GRANULOCYTES NFR BLD: 0.4 %
LYMPHOCYTES # BLD AUTO: 2.35 X10(3) UL (ref 1–4)
LYMPHOCYTES NFR BLD AUTO: 24.9 %
MAGNESIUM SERPL-MCNC: 2 MG/DL (ref 1.6–2.6)
MCH RBC QN AUTO: 28.7 PG (ref 26–34)
MCHC RBC AUTO-ENTMCNC: 33.4 G/DL (ref 31–37)
MCV RBC AUTO: 85.8 FL
MONOCYTES # BLD AUTO: 0.68 X10(3) UL (ref 0.1–1)
MONOCYTES NFR BLD AUTO: 7.2 %
NEUTROPHILS # BLD AUTO: 6.08 X10 (3) UL (ref 1.5–7.7)
NEUTROPHILS # BLD AUTO: 6.08 X10(3) UL (ref 1.5–7.7)
NEUTROPHILS NFR BLD AUTO: 64.6 %
OSMOLALITY SERPL CALC.SUM OF ELEC: 273 MOSM/KG (ref 275–295)
P AXIS: 78 DEGREES
P-R INTERVAL: 248 MS
PLATELET # BLD AUTO: 343 10(3)UL (ref 150–450)
POTASSIUM SERPL-SCNC: 4.1 MMOL/L (ref 3.5–5.1)
PROT SERPL-MCNC: 8.1 G/DL (ref 6.4–8.2)
Q-T INTERVAL: 400 MS
QRS DURATION: 130 MS
QTC CALCULATION (BEZET): 425 MS
R AXIS: -53 DEGREES
RBC # BLD AUTO: 5.41 X10(6)UL
SODIUM SERPL-SCNC: 131 MMOL/L (ref 136–145)
T AXIS: 84 DEGREES
TROPONIN I HIGH SENSITIVITY: 8 NG/L
TSI SER-ACNC: 3.15 MIU/ML (ref 0.36–3.74)
VENTRICULAR RATE: 68 BPM
WBC # BLD AUTO: 9.4 X10(3) UL (ref 4–11)

## 2023-09-12 PROCEDURE — 99223 1ST HOSP IP/OBS HIGH 75: CPT | Performed by: HOSPITALIST

## 2023-09-12 PROCEDURE — 93306 TTE W/DOPPLER COMPLETE: CPT | Performed by: HOSPITALIST

## 2023-09-12 PROCEDURE — 71045 X-RAY EXAM CHEST 1 VIEW: CPT | Performed by: EMERGENCY MEDICINE

## 2023-09-12 RX ORDER — SERTRALINE HYDROCHLORIDE 100 MG/1
100 TABLET, FILM COATED ORAL NIGHTLY
Status: DISCONTINUED | OUTPATIENT
Start: 2023-09-12 | End: 2023-09-13

## 2023-09-12 RX ORDER — SODIUM CHLORIDE 9 MG/ML
INJECTION, SOLUTION INTRAVENOUS CONTINUOUS
Status: DISCONTINUED | OUTPATIENT
Start: 2023-09-12 | End: 2023-09-13

## 2023-09-12 RX ORDER — ALBUTEROL SULFATE 90 UG/1
2 AEROSOL, METERED RESPIRATORY (INHALATION) EVERY 4 HOURS PRN
Status: DISCONTINUED | OUTPATIENT
Start: 2023-09-12 | End: 2023-09-13

## 2023-09-12 RX ORDER — CLONAZEPAM 0.5 MG/1
0.25 TABLET ORAL
Status: DISCONTINUED | OUTPATIENT
Start: 2023-09-12 | End: 2023-09-13

## 2023-09-12 RX ORDER — PROCHLORPERAZINE EDISYLATE 5 MG/ML
5 INJECTION INTRAMUSCULAR; INTRAVENOUS EVERY 8 HOURS PRN
Status: DISCONTINUED | OUTPATIENT
Start: 2023-09-12 | End: 2023-09-13

## 2023-09-12 RX ORDER — SERTRALINE HYDROCHLORIDE 100 MG/1
100 TABLET, FILM COATED ORAL NIGHTLY
COMMUNITY
End: 2023-09-14

## 2023-09-12 RX ORDER — ACETAMINOPHEN 500 MG
500 TABLET ORAL EVERY 4 HOURS PRN
Status: DISCONTINUED | OUTPATIENT
Start: 2023-09-12 | End: 2023-09-13

## 2023-09-12 RX ORDER — BISACODYL 10 MG
10 SUPPOSITORY, RECTAL RECTAL
Status: DISCONTINUED | OUTPATIENT
Start: 2023-09-12 | End: 2023-09-13

## 2023-09-12 RX ORDER — POLYETHYLENE GLYCOL 3350 17 G/17G
17 POWDER, FOR SOLUTION ORAL DAILY PRN
Status: DISCONTINUED | OUTPATIENT
Start: 2023-09-12 | End: 2023-09-13

## 2023-09-12 RX ORDER — CLONAZEPAM 0.5 MG/1
1 TABLET ORAL 3 TIMES DAILY PRN
Status: DISCONTINUED | OUTPATIENT
Start: 2023-09-12 | End: 2023-09-13

## 2023-09-12 RX ORDER — GABAPENTIN 600 MG/1
600 TABLET ORAL 3 TIMES DAILY
Status: DISCONTINUED | OUTPATIENT
Start: 2023-09-12 | End: 2023-09-13

## 2023-09-12 RX ORDER — ONDANSETRON 2 MG/ML
4 INJECTION INTRAMUSCULAR; INTRAVENOUS EVERY 6 HOURS PRN
Status: DISCONTINUED | OUTPATIENT
Start: 2023-09-12 | End: 2023-09-13

## 2023-09-12 RX ORDER — ONDANSETRON 2 MG/ML
4 INJECTION INTRAMUSCULAR; INTRAVENOUS EVERY 4 HOURS PRN
Status: ACTIVE | OUTPATIENT
Start: 2023-09-12 | End: 2023-09-12

## 2023-09-12 RX ORDER — ENEMA 19; 7 G/133ML; G/133ML
1 ENEMA RECTAL ONCE AS NEEDED
Status: DISCONTINUED | OUTPATIENT
Start: 2023-09-12 | End: 2023-09-13

## 2023-09-12 RX ORDER — SENNOSIDES 8.6 MG
17.2 TABLET ORAL NIGHTLY PRN
Status: DISCONTINUED | OUTPATIENT
Start: 2023-09-12 | End: 2023-09-13

## 2023-09-12 RX ORDER — ENOXAPARIN SODIUM 100 MG/ML
40 INJECTION SUBCUTANEOUS DAILY
Status: DISCONTINUED | OUTPATIENT
Start: 2023-09-12 | End: 2023-09-13

## 2023-09-12 RX ORDER — OLANZAPINE 2.5 MG/1
2.5 TABLET ORAL NIGHTLY
Status: DISCONTINUED | OUTPATIENT
Start: 2023-09-12 | End: 2023-09-13

## 2023-09-13 VITALS
HEART RATE: 63 BPM | DIASTOLIC BLOOD PRESSURE: 74 MMHG | OXYGEN SATURATION: 95 % | SYSTOLIC BLOOD PRESSURE: 126 MMHG | TEMPERATURE: 98 F | BODY MASS INDEX: 30.21 KG/M2 | HEIGHT: 75 IN | RESPIRATION RATE: 19 BRPM | WEIGHT: 243 LBS

## 2023-09-13 LAB
ANION GAP SERPL CALC-SCNC: 7 MMOL/L (ref 0–18)
BUN BLD-MCNC: 15 MG/DL (ref 7–18)
CALCIUM BLD-MCNC: 8.9 MG/DL (ref 8.5–10.1)
CHLORIDE SERPL-SCNC: 104 MMOL/L (ref 98–112)
CO2 SERPL-SCNC: 27 MMOL/L (ref 21–32)
CREAT BLD-MCNC: 0.95 MG/DL
EGFRCR SERPLBLD CKD-EPI 2021: 94 ML/MIN/1.73M2 (ref 60–?)
GLUCOSE BLD-MCNC: 97 MG/DL (ref 70–99)
OSMOLALITY SERPL CALC.SUM OF ELEC: 287 MOSM/KG (ref 275–295)
POTASSIUM SERPL-SCNC: 3.7 MMOL/L (ref 3.5–5.1)
SODIUM SERPL-SCNC: 138 MMOL/L (ref 136–145)

## 2023-09-13 PROCEDURE — 99239 HOSP IP/OBS DSCHRG MGMT >30: CPT | Performed by: HOSPITALIST

## 2023-09-13 RX ORDER — POTASSIUM CHLORIDE 20 MEQ/1
40 TABLET, EXTENDED RELEASE ORAL ONCE
Status: COMPLETED | OUTPATIENT
Start: 2023-09-13 | End: 2023-09-13

## 2023-09-13 RX ORDER — LISINOPRIL 40 MG/1
40 TABLET ORAL DAILY
Status: DISCONTINUED | OUTPATIENT
Start: 2023-09-13 | End: 2023-09-13

## 2023-09-13 RX ORDER — LISINOPRIL 20 MG/1
20 TABLET ORAL DAILY
Qty: 30 TABLET | Refills: 1 | Status: SHIPPED | OUTPATIENT
Start: 2023-09-13

## 2023-09-20 ENCOUNTER — TELEPHONE (OUTPATIENT)
Facility: CLINIC | Age: 58
End: 2023-09-20

## 2023-09-20 NOTE — TELEPHONE ENCOUNTER
Pt called asking to remove one of the CT orders from his chart that he was told by central scheduling, he already schedule CT on October 11 f/u appt with lauren video on 10/17

## 2023-09-20 NOTE — TELEPHONE ENCOUNTER
Per [facility] APRN, pt to keep appt for CT chest w/contrast.  Okay to cancel CT Chest LD. Pt notified. Yes

## 2023-09-27 ENCOUNTER — OFFICE VISIT (OUTPATIENT)
Dept: SLEEP CENTER | Age: 58
End: 2023-09-27
Attending: Other
Payer: COMMERCIAL

## 2023-09-27 DIAGNOSIS — G47.33 OSA (OBSTRUCTIVE SLEEP APNEA): ICD-10-CM

## 2023-09-27 PROCEDURE — 95810 POLYSOM 6/> YRS 4/> PARAM: CPT

## 2023-09-28 ENCOUNTER — APPOINTMENT (OUTPATIENT)
Dept: GENERAL RADIOLOGY | Facility: HOSPITAL | Age: 58
End: 2023-09-28
Attending: EMERGENCY MEDICINE
Payer: COMMERCIAL

## 2023-09-28 ENCOUNTER — HOSPITAL ENCOUNTER (INPATIENT)
Facility: HOSPITAL | Age: 58
LOS: 1 days | Discharge: HOME | End: 2023-10-02
Attending: EMERGENCY MEDICINE | Admitting: INTERNAL MEDICINE
Payer: COMMERCIAL

## 2023-09-28 DIAGNOSIS — R00.1 SLOW HEART RATE: ICD-10-CM

## 2023-09-28 DIAGNOSIS — R00.2 PALPITATIONS: Primary | ICD-10-CM

## 2023-09-28 DIAGNOSIS — I49.3 PVC (PREMATURE VENTRICULAR CONTRACTION): ICD-10-CM

## 2023-09-28 PROBLEM — F31.9 BIPOLAR 1 DISORDER (HCC): Status: ACTIVE | Noted: 2023-09-28

## 2023-09-28 LAB
ALBUMIN SERPL-MCNC: 3.4 G/DL (ref 3.4–5)
ALBUMIN/GLOB SERPL: 0.8 {RATIO} (ref 1–2)
ALP LIVER SERPL-CCNC: 70 U/L
ALT SERPL-CCNC: 33 U/L
ANION GAP SERPL CALC-SCNC: 3 MMOL/L (ref 0–18)
APTT PPP: 24.9 SECONDS (ref 23.3–35.6)
AST SERPL-CCNC: 24 U/L (ref 15–37)
ATRIAL RATE: 74 BPM
BASOPHILS # BLD AUTO: 0.01 X10(3) UL (ref 0–0.2)
BASOPHILS NFR BLD AUTO: 0.1 %
BILIRUB SERPL-MCNC: 0.6 MG/DL (ref 0.1–2)
BUN BLD-MCNC: 12 MG/DL (ref 7–18)
CALCIUM BLD-MCNC: 9.2 MG/DL (ref 8.5–10.1)
CHLORIDE SERPL-SCNC: 108 MMOL/L (ref 98–112)
CO2 SERPL-SCNC: 28 MMOL/L (ref 21–32)
CREAT BLD-MCNC: 0.83 MG/DL
EGFRCR SERPLBLD CKD-EPI 2021: 102 ML/MIN/1.73M2 (ref 60–?)
EOSINOPHIL # BLD AUTO: 0.17 X10(3) UL (ref 0–0.7)
EOSINOPHIL NFR BLD AUTO: 2.3 %
ERYTHROCYTE [DISTWIDTH] IN BLOOD BY AUTOMATED COUNT: 13.7 %
GLOBULIN PLAS-MCNC: 4.1 G/DL (ref 2.8–4.4)
GLUCOSE BLD-MCNC: 102 MG/DL (ref 70–99)
HCT VFR BLD AUTO: 43.2 %
HGB BLD-MCNC: 14.3 G/DL
IMM GRANULOCYTES # BLD AUTO: 0.02 X10(3) UL (ref 0–1)
IMM GRANULOCYTES NFR BLD: 0.3 %
INR BLD: 1.01 (ref 0.85–1.16)
LYMPHOCYTES # BLD AUTO: 2.02 X10(3) UL (ref 1–4)
LYMPHOCYTES NFR BLD AUTO: 26.9 %
MAGNESIUM SERPL-MCNC: 2 MG/DL (ref 1.6–2.6)
MCH RBC QN AUTO: 29 PG (ref 26–34)
MCHC RBC AUTO-ENTMCNC: 33.1 G/DL (ref 31–37)
MCV RBC AUTO: 87.6 FL
MONOCYTES # BLD AUTO: 0.6 X10(3) UL (ref 0.1–1)
MONOCYTES NFR BLD AUTO: 8 %
NEUTROPHILS # BLD AUTO: 4.69 X10 (3) UL (ref 1.5–7.7)
NEUTROPHILS # BLD AUTO: 4.69 X10(3) UL (ref 1.5–7.7)
NEUTROPHILS NFR BLD AUTO: 62.4 %
OSMOLALITY SERPL CALC.SUM OF ELEC: 288 MOSM/KG (ref 275–295)
P AXIS: 87 DEGREES
P-R INTERVAL: 248 MS
PLATELET # BLD AUTO: 317 10(3)UL (ref 150–450)
POTASSIUM SERPL-SCNC: 4.1 MMOL/L (ref 3.5–5.1)
PROT SERPL-MCNC: 7.5 G/DL (ref 6.4–8.2)
PROTHROMBIN TIME: 13.3 SECONDS (ref 11.6–14.8)
Q-T INTERVAL: 386 MS
QRS DURATION: 126 MS
QTC CALCULATION (BEZET): 428 MS
R AXIS: -55 DEGREES
RBC # BLD AUTO: 4.93 X10(6)UL
SODIUM SERPL-SCNC: 139 MMOL/L (ref 136–145)
T AXIS: 86 DEGREES
TROPONIN I HIGH SENSITIVITY: 8 NG/L
VENTRICULAR RATE: 74 BPM
WBC # BLD AUTO: 7.5 X10(3) UL (ref 4–11)

## 2023-09-28 PROCEDURE — 71045 X-RAY EXAM CHEST 1 VIEW: CPT | Performed by: EMERGENCY MEDICINE

## 2023-09-28 PROCEDURE — 99223 1ST HOSP IP/OBS HIGH 75: CPT | Performed by: INTERNAL MEDICINE

## 2023-09-28 RX ORDER — ENOXAPARIN SODIUM 100 MG/ML
40 INJECTION SUBCUTANEOUS DAILY
Status: DISCONTINUED | OUTPATIENT
Start: 2023-09-29 | End: 2023-10-02

## 2023-09-28 RX ORDER — SODIUM CHLORIDE 9 MG/ML
INJECTION, SOLUTION INTRAVENOUS CONTINUOUS
Status: ACTIVE | OUTPATIENT
Start: 2023-09-28 | End: 2023-09-28

## 2023-09-28 RX ORDER — PROCHLORPERAZINE EDISYLATE 5 MG/ML
5 INJECTION INTRAMUSCULAR; INTRAVENOUS EVERY 8 HOURS PRN
Status: DISCONTINUED | OUTPATIENT
Start: 2023-09-28 | End: 2023-10-02

## 2023-09-28 RX ORDER — OLANZAPINE 2.5 MG/1
2.5 TABLET ORAL NIGHTLY
Status: DISCONTINUED | OUTPATIENT
Start: 2023-09-29 | End: 2023-10-02

## 2023-09-28 RX ORDER — ONDANSETRON 2 MG/ML
4 INJECTION INTRAMUSCULAR; INTRAVENOUS EVERY 6 HOURS PRN
Status: DISCONTINUED | OUTPATIENT
Start: 2023-09-28 | End: 2023-10-02

## 2023-09-28 RX ORDER — MELATONIN
3 NIGHTLY PRN
Status: DISCONTINUED | OUTPATIENT
Start: 2023-09-28 | End: 2023-10-02

## 2023-09-28 RX ORDER — LISINOPRIL 20 MG/1
20 TABLET ORAL DAILY
Status: DISCONTINUED | OUTPATIENT
Start: 2023-09-29 | End: 2023-10-02

## 2023-09-28 RX ORDER — POLYETHYLENE GLYCOL 3350 17 G/17G
17 POWDER, FOR SOLUTION ORAL DAILY PRN
Status: DISCONTINUED | OUTPATIENT
Start: 2023-09-28 | End: 2023-10-02

## 2023-09-28 RX ORDER — BISACODYL 10 MG
10 SUPPOSITORY, RECTAL RECTAL
Status: DISCONTINUED | OUTPATIENT
Start: 2023-09-28 | End: 2023-10-02

## 2023-09-28 RX ORDER — SENNOSIDES 8.6 MG
17.2 TABLET ORAL NIGHTLY PRN
Status: DISCONTINUED | OUTPATIENT
Start: 2023-09-28 | End: 2023-10-02

## 2023-09-28 RX ORDER — ACETAMINOPHEN 500 MG
500 TABLET ORAL EVERY 4 HOURS PRN
Status: DISCONTINUED | OUTPATIENT
Start: 2023-09-28 | End: 2023-10-02

## 2023-09-28 RX ORDER — ENEMA 19; 7 G/133ML; G/133ML
1 ENEMA RECTAL ONCE AS NEEDED
Status: DISCONTINUED | OUTPATIENT
Start: 2023-09-28 | End: 2023-10-02

## 2023-09-28 RX ORDER — GABAPENTIN 600 MG/1
600 TABLET ORAL 3 TIMES DAILY
Status: DISCONTINUED | OUTPATIENT
Start: 2023-09-29 | End: 2023-10-02

## 2023-09-28 RX ORDER — ALBUTEROL SULFATE 90 UG/1
2 AEROSOL, METERED RESPIRATORY (INHALATION) EVERY 4 HOURS PRN
Status: DISCONTINUED | OUTPATIENT
Start: 2023-09-28 | End: 2023-10-02

## 2023-09-28 NOTE — ED QUICK NOTES
Called [facility] Monitor techs and spoke with Vincenzo Albarran RN asking if she can get us records on patients monitor he's been wearing. She states she will look into it and call with results.

## 2023-09-28 NOTE — ED QUICK NOTES
Lab called to inform this RN that CBC and PTT are clotted and orders being placed for redraw. Will collect labs.

## 2023-09-28 NOTE — ED QUICK NOTES
Patient asking if he can eat something. ED MD informed and states that it is fine if patient eats. Patient informed that we are waiting for cardiology to come down and see the patient. Verbalized understanding.

## 2023-09-28 NOTE — ED INITIAL ASSESSMENT (HPI)
Reports walk man heart rate has been \"jumping around\". Reports 38bmp at home today. Here 10 days ago for symptomatic bradycardia.

## 2023-09-28 NOTE — ED QUICK NOTES
Spoke with Byron Sargent RN from [address] who states that she reviewed the patients monitor and it only had one suspicious run of 2nd degree heart block that was very brief. States nothing severe is showing on the monitor. Provided fax information for her to send us that information. Waiting for fax at this time.

## 2023-09-29 PROCEDURE — 99232 SBSQ HOSP IP/OBS MODERATE 35: CPT | Performed by: HOSPITALIST

## 2023-09-29 RX ORDER — CLONAZEPAM 0.5 MG/1
0.25 TABLET ORAL
Status: DISCONTINUED | OUTPATIENT
Start: 2023-09-29 | End: 2023-10-02

## 2023-09-29 RX ORDER — CLONAZEPAM 0.5 MG/1
0.25 TABLET ORAL 3 TIMES DAILY
Status: DISCONTINUED | OUTPATIENT
Start: 2023-09-29 | End: 2023-09-29

## 2023-09-29 NOTE — PLAN OF CARE
Pt AxO4  RA  NSR/SB  -event monitor in place  No pain at this time  1x assist/standby    Monitor HR/BP level  Pt informed of POC, all questions answered

## 2023-09-29 NOTE — RESPIRATORY THERAPY NOTE
Cpap settings from 11/04/2016    Progress Notes  Fernando Aggarwal (Technician)  PULMONARY DISEASES  Patient arrived in clinic for CPAP set-up with mask fitting. Patient set-up on [employer] Airsense 10 CPAP machine with humidaire humidifier, climateline air tubing and data card. CPAP set up without incident. Settings are as follows:  CPAP + 9 cwp, EPR 2  , climateline humdity, ramp 5/auto. Patient fitted with: Jeffory Fore FX nasal pillows (medium), which is the mask the patient used in the sleep lab and was very comfortable with. Patient informed of [employer] re-supply program regarding disposal CPAP supplies. Patient has distilled water for humidifier. Patient given verbal instructions as well as handouts regarding proper care, cleaning and maintenance of all equipment delivered. Patient instructed on follow-up procedure as well as [facility]'s on-call procedure. Patient has no questions at this time. Patient reminded of importance of timely follow-up with Doctor or NP to ensure proper compliance.

## 2023-09-29 NOTE — ED QUICK NOTES
Orders for admission, patient is aware of plan and ready to go upstairs. Any questions, please call ED RN Rahel Le at extension [phone].      Patient Covid vaccination status: Fully vaccinated     COVID Test Ordered in ED: None    COVID Suspicion at Admission: N/A    Running Infusions:  None    Mental Status/LOC at time of transport: AAOx4    Other pertinent information:   CIWA score: N/A   NIH score:  N/A

## 2023-09-29 NOTE — PLAN OF CARE
Admission. Patient wearing CPAP overnight. Some episodes of 2nd degree type II noted overnight. Patient resting. Updated on plan. Cardiology to see this am.  Care ongoing. Unknown

## 2023-09-29 NOTE — PLAN OF CARE
[facility] Patient Status:  Emergency    1965 MRN [id number]   Location [address] Attending Gina Lieberman MD   [facility] Day # 0 PCP Breanna Pressley     Cardiology Nocturnal APN Note    Page Received: ED MD 8841    Briefly: (Documentation from chart review)     Samuel Hall is a 64 yo M with PMH/PSH of symptomatic bradycardia, HTN, hypoNa (resolved), NELLIE recently admitted - presents to the ED c/o palpitations. Patient currently wearing MCT since  with no recorded events. In the ED, EKG SR w/ 1st degree block and PVCs, K 4.1, Mg 2, Na 139, troponin 8, Hgb 14.3. Cardiology consulted for persistent palpitations. Primary Cardiologist Patient has not been seen OP; has f/u appt with APN [facility] 10/12     Vital Signs:       2023     7:30 PM 2023     8:30 PM   Vitals History   /72    Pulse 57 60   Resp 17 18   SpO2 96 % 96 %        Labs:   Lab Results   Component Value Date    WBC 7.5 2023    HGB 14.3 2023    HCT 43.2 2023    .0 2023    CREATSERUM 0.83 2023    BUN 12 2023     2023    K 4.1 2023     2023    CO2 28.0 2023     2023    CA 9.2 2023    ALB 3.4 2023    ALKPHO 70 2023    BILT 0.6 2023    TP 7.5 2023    AST 24 2023    ALT 33 2023    PTT 24.9 2023    INR 1.01 2023    MG 2.0 2023    TROPHS 8 2023       Diagnostics:   XR CHEST AP/PA (1 VIEW) (CPT=71045)    Result Date: 2023  CONCLUSION:  No consolidation is identified. LOCATION:  [id number]      Dictated by (CST): Brandon Palomares MD on 2023 at 5:26 PM     Finalized by (CST): Brandon Palomares MD on 2023 at 5:26 PM       XR CHEST AP PORTABLE  (CPT=71045)    Result Date: 2023  CONCLUSION:  Shaylee Broaden lower lung field opacity may be artifactual.  Recommend PA and lateral chest radiographs.   Developing consolidation cannot be excluded. A battery pack overlying the left upper chest is noted. LOCATION:  [id number]      Dictated by (CST): Apolinar Nugent MD on 9/28/2023 at 4:04 PM     Finalized by (CST): Apolinar Nugent MD on 9/28/2023 at 4:05 PM        Allergies:  No Known Allergies    Medications:  No current facility-administered medications for this encounter.     Assessment:   #Palpitations with PVCs on telemetry; no events recorded on MCT worn since 9/18; propanolol stopped on prior admit d/t symptomatic bradycardia   #HTN on lisinopril  #NELLIE no on CPAP    Plan:  - Continue home medications  - If patient has apneic episodes overnight, please notify primary to order CPAP if possibly contributing to palpitations   - Continuous telemetry   - Continue to monitor overnight  - Formal Cardiology consult to follow in [place], [address]  9/28/2023  9:22 PM

## 2023-09-30 PROCEDURE — 99232 SBSQ HOSP IP/OBS MODERATE 35: CPT | Performed by: HOSPITALIST

## 2023-09-30 NOTE — PLAN OF CARE
Assumed patient care at 0730. Vital signs stable. Bradycardia and rhythm improved today from overnight. Patient ambulating in the halls. Awaiting cardiology recommendations. Problem: CARDIOVASCULAR - ADULT  Goal: Maintains optimal cardiac output and hemodynamic stability  Description: INTERVENTIONS:  - Monitor vital signs, rhythm, and trends  - Monitor for bleeding, hypotension and signs of decreased cardiac output  - Evaluate effectiveness of vasoactive medications to optimize hemodynamic stability  - Monitor arterial and/or venous puncture sites for bleeding and/or hematoma  - Assess quality of pulses, skin color and temperature  - Assess for signs of decreased coronary artery perfusion - ex.  Angina  - Evaluate fluid balance, assess for edema, trend weights  Outcome: Progressing  Goal: Absence of cardiac arrhythmias or at baseline  Description: INTERVENTIONS:  - Continuous cardiac monitoring, monitor vital signs, obtain 12 lead EKG if indicated  - Evaluate effectiveness of antiarrhythmic and heart rate control medications as ordered  - Initiate emergency measures for life threatening arrhythmias  - Monitor electrolytes and administer replacement therapy as ordered  Outcome: Progressing

## 2023-09-30 NOTE — PLAN OF CARE
Assumed care. CPAP used overnight. Some 2:1 AVB noted overnight with sleep. Plan for EP cardiology to see. Possible discharge home later today.

## 2023-10-01 LAB — TSI SER-ACNC: 1.85 MIU/ML (ref 0.36–3.74)

## 2023-10-01 PROCEDURE — 99232 SBSQ HOSP IP/OBS MODERATE 35: CPT | Performed by: HOSPITALIST

## 2023-10-01 RX ORDER — CHLORHEXIDINE GLUCONATE 4 G/100ML
30 SOLUTION TOPICAL
Status: COMPLETED | OUTPATIENT
Start: 2023-10-02 | End: 2023-10-02

## 2023-10-01 RX ORDER — SODIUM CHLORIDE 9 MG/ML
INJECTION, SOLUTION INTRAVENOUS
Status: COMPLETED | OUTPATIENT
Start: 2023-10-02 | End: 2023-10-02

## 2023-10-01 RX ORDER — CEFAZOLIN SODIUM/WATER 2 G/20 ML
2 SYRINGE (ML) INTRAVENOUS
Status: ACTIVE | OUTPATIENT
Start: 2023-10-01 | End: 2023-10-02

## 2023-10-01 NOTE — PLAN OF CARE
Assumed care. Still awaiting EP to see. Still with some 2:1 AVB overnight. Patient using CPAP. Patient understanding of plan.

## 2023-10-01 NOTE — PLAN OF CARE
Assumed patient care at 0730. Vital signs stable. Patient alert and oriented x 4. Patient denies pain. Plans for PPM tomorrow. Patient declining to sign consent until he is able to speak with Dr. Yonny Yousif tomorrow. Informed him of tonight's prep for procedure and patient agrees. NPO after midnight. Problem: CARDIOVASCULAR - ADULT  Goal: Maintains optimal cardiac output and hemodynamic stability  Description: INTERVENTIONS:  - Monitor vital signs, rhythm, and trends  - Monitor for bleeding, hypotension and signs of decreased cardiac output  - Evaluate effectiveness of vasoactive medications to optimize hemodynamic stability  - Monitor arterial and/or venous puncture sites for bleeding and/or hematoma  - Assess quality of pulses, skin color and temperature  - Assess for signs of decreased coronary artery perfusion - ex.  Angina  - Evaluate fluid balance, assess for edema, trend weights  Outcome: Progressing  Goal: Absence of cardiac arrhythmias or at baseline  Description: INTERVENTIONS:  - Continuous cardiac monitoring, monitor vital signs, obtain 12 lead EKG if indicated  - Evaluate effectiveness of antiarrhythmic and heart rate control medications as ordered  - Initiate emergency measures for life threatening arrhythmias  - Monitor electrolytes and administer replacement therapy as ordered  Outcome: Progressing

## 2023-10-02 ENCOUNTER — APPOINTMENT (OUTPATIENT)
Dept: CV DIAGNOSTICS | Facility: HOSPITAL | Age: 58
End: 2023-10-02
Attending: NURSE PRACTITIONER
Payer: COMMERCIAL

## 2023-10-02 VITALS
RESPIRATION RATE: 18 BRPM | DIASTOLIC BLOOD PRESSURE: 62 MMHG | HEIGHT: 74 IN | HEART RATE: 77 BPM | BODY MASS INDEX: 32.08 KG/M2 | WEIGHT: 250 LBS | SYSTOLIC BLOOD PRESSURE: 112 MMHG | OXYGEN SATURATION: 98 % | TEMPERATURE: 98 F

## 2023-10-02 LAB
ANION GAP SERPL CALC-SCNC: 3 MMOL/L (ref 0–18)
BASOPHILS # BLD AUTO: 0.01 X10(3) UL (ref 0–0.2)
BASOPHILS NFR BLD AUTO: 0.1 %
BUN BLD-MCNC: 15 MG/DL (ref 7–18)
CALCIUM BLD-MCNC: 9.3 MG/DL (ref 8.5–10.1)
CHLORIDE SERPL-SCNC: 104 MMOL/L (ref 98–112)
CO2 SERPL-SCNC: 30 MMOL/L (ref 21–32)
CREAT BLD-MCNC: 0.93 MG/DL
EGFRCR SERPLBLD CKD-EPI 2021: 96 ML/MIN/1.73M2 (ref 60–?)
EOSINOPHIL # BLD AUTO: 0.18 X10(3) UL (ref 0–0.7)
EOSINOPHIL NFR BLD AUTO: 2.3 %
ERYTHROCYTE [DISTWIDTH] IN BLOOD BY AUTOMATED COUNT: 13.6 %
GLUCOSE BLD-MCNC: 91 MG/DL (ref 70–99)
HCT VFR BLD AUTO: 45.6 %
HGB BLD-MCNC: 15.2 G/DL
IMM GRANULOCYTES # BLD AUTO: 0.02 X10(3) UL (ref 0–1)
IMM GRANULOCYTES NFR BLD: 0.3 %
LYMPHOCYTES # BLD AUTO: 2.12 X10(3) UL (ref 1–4)
LYMPHOCYTES NFR BLD AUTO: 26.9 %
MAGNESIUM SERPL-MCNC: 2.1 MG/DL (ref 1.6–2.6)
MCH RBC QN AUTO: 29 PG (ref 26–34)
MCHC RBC AUTO-ENTMCNC: 33.3 G/DL (ref 31–37)
MCV RBC AUTO: 87 FL
MONOCYTES # BLD AUTO: 0.65 X10(3) UL (ref 0.1–1)
MONOCYTES NFR BLD AUTO: 8.2 %
NEUTROPHILS # BLD AUTO: 4.9 X10 (3) UL (ref 1.5–7.7)
NEUTROPHILS # BLD AUTO: 4.9 X10(3) UL (ref 1.5–7.7)
NEUTROPHILS NFR BLD AUTO: 62.2 %
OSMOLALITY SERPL CALC.SUM OF ELEC: 284 MOSM/KG (ref 275–295)
PLATELET # BLD AUTO: 313 10(3)UL (ref 150–450)
POTASSIUM SERPL-SCNC: 4.2 MMOL/L (ref 3.5–5.1)
RBC # BLD AUTO: 5.24 X10(6)UL
SODIUM SERPL-SCNC: 137 MMOL/L (ref 136–145)
WBC # BLD AUTO: 7.9 X10(3) UL (ref 4–11)

## 2023-10-02 PROCEDURE — 93018 CV STRESS TEST I&R ONLY: CPT | Performed by: NURSE PRACTITIONER

## 2023-10-02 PROCEDURE — 99239 HOSP IP/OBS DSCHRG MGMT >30: CPT | Performed by: HOSPITALIST

## 2023-10-02 PROCEDURE — 93017 CV STRESS TEST TRACING ONLY: CPT | Performed by: NURSE PRACTITIONER

## 2023-10-02 RX ORDER — CLONAZEPAM 0.5 MG/1
0.25 TABLET ORAL DAILY PRN
Status: DISCONTINUED | OUTPATIENT
Start: 2023-10-02 | End: 2023-10-02

## 2023-10-02 RX ORDER — SERTRALINE HYDROCHLORIDE 100 MG/1
100 TABLET, FILM COATED ORAL DAILY
Qty: 30 TABLET | Refills: 0 | Status: SHIPPED | OUTPATIENT
Start: 2023-10-02 | End: 2023-11-01

## 2023-10-02 RX ORDER — SERTRALINE HYDROCHLORIDE 100 MG/1
100 TABLET, FILM COATED ORAL DAILY
Status: DISCONTINUED | OUTPATIENT
Start: 2023-10-02 | End: 2023-10-02

## 2023-10-02 NOTE — PLAN OF CARE
Assumed patient care at approximately 1930. Patient alert and orientated x4  Family at bedside  Plan of care reviewed with patient  VSS. Afebrile. Remains on room air. BP within parameters. HR is NSR on the monitor  Neuro checks completed as ordered- see flowsheets  Denies pain overnight  No complaints of nausea this shift  Tolerating diet  IV fluids/abx infused without incident- see MAR  Up to washroom with SB assist  All safety precautions maintained      Problem: CARDIOVASCULAR - ADULT  Goal: Maintains optimal cardiac output and hemodynamic stability  Description: INTERVENTIONS:  - Monitor vital signs, rhythm, and trends  - Monitor for bleeding, hypotension and signs of decreased cardiac output  - Evaluate effectiveness of vasoactive medications to optimize hemodynamic stability  - Monitor arterial and/or venous puncture sites for bleeding and/or hematoma  - Assess quality of pulses, skin color and temperature  - Assess for signs of decreased coronary artery perfusion - ex.  Angina  - Evaluate fluid balance, assess for edema, trend weights  10/2/2023 0441 by Usman Knight RN  Outcome: Progressing  10/2/2023 0440 by Usman Knight RN  Outcome: Progressing     Problem: CARDIOVASCULAR - ADULT  Goal: Absence of cardiac arrhythmias or at baseline  Description: INTERVENTIONS:  - Continuous cardiac monitoring, monitor vital signs, obtain 12 lead EKG if indicated  - Evaluate effectiveness of antiarrhythmic and heart rate control medications as ordered  - Initiate emergency measures for life threatening arrhythmias  - Monitor electrolytes and administer replacement therapy as ordered  10/2/2023 0441 by Usman Knight RN  Outcome: Progressing  10/2/2023 0440 by Usman Knight RN  Outcome: Progressing     Problem: RESPIRATORY - ADULT  Goal: Achieves optimal ventilation and oxygenation  Description: INTERVENTIONS:  - Assess for changes in respiratory status  - Assess for changes in mentation and behavior  - Position to facilitate oxygenation and minimize respiratory effort  - Oxygen supplementation based on oxygen saturation or ABGs  - Provide Smoking Cessation handout, if applicable  - Encourage broncho-pulmonary hygiene including cough, deep breathe, Incentive Spirometry  - Assess the need for suctioning and perform as needed  - Assess and instruct to report SOB or any respiratory difficulty  - Respiratory Therapy support as indicated  - Manage/alleviate anxiety  - Monitor for signs/symptoms of CO2 retention  10/2/2023 0441 by Trey Oneal RN  Outcome: Progressing  10/2/2023 0440 by Trey Oneal, RN  Outcome: Progressing

## 2023-10-02 NOTE — PROGRESS NOTES
Pt completed GXT. Pt c/o lightheadedness before, during, and after exercise. Pt walked for 8:00 on Amuro protocol achieving >90% APMHR.

## 2023-10-02 NOTE — PLAN OF CARE
NURSING DISCHARGE NOTE    Discharged Home via Wheelchair. Accompanied by RN  Belongings Taken by patient/family. Assumed care at 0730  Alert and oriented x4  RA. NSR on tele. VSS  Denies any pain  Up ad james  Stress test done today. Klonopin dose adjusted. Given per order. Started on verapamil. Education given. Pt updated on POC  Safety precautions in place    Medically cleared for discharge. IV removed. Tele removed. Discharge education given to pt at bedside. All questions answered. Verbalized understanding. Pt to follow-up outpatient about sleep apnea.

## 2023-10-03 ENCOUNTER — TELEPHONE (OUTPATIENT)
Facility: CLINIC | Age: 58
End: 2023-10-03

## 2023-10-03 ENCOUNTER — MED REC SCAN ONLY (OUTPATIENT)
Facility: CLINIC | Age: 58
End: 2023-10-03

## 2023-10-03 ENCOUNTER — SLEEP STUDY (OUTPATIENT)
Facility: CLINIC | Age: 58
End: 2023-10-03
Payer: COMMERCIAL

## 2023-10-03 DIAGNOSIS — I10 ESSENTIAL HYPERTENSION: ICD-10-CM

## 2023-10-03 DIAGNOSIS — G47.33 OBSTRUCTIVE SLEEP APNEA: Primary | ICD-10-CM

## 2023-10-03 DIAGNOSIS — G47.33 OSA (OBSTRUCTIVE SLEEP APNEA): Primary | ICD-10-CM

## 2023-10-03 PROCEDURE — 95810 POLYSOM 6/> YRS 4/> PARAM: CPT | Performed by: INTERNAL MEDICINE

## 2023-10-03 NOTE — DISCHARGE SUMMARY
[facility] HOSPITALIST  DISCHARGE SUMMARY     Leslie Aguilar Patient Status:  Inpatient    1965 MRN [id number]   [facility] 7NE-A Attending No att. providers found   Hosp Day # 1 PCP Brandon Crenshaw     Date of Admission: 2023  Date of Discharge:  10/2/2023     Discharge Disposition: Home or Self Care    Discharge Diagnosis:    #Symptomatic bradycardia  #Essential HTN   #NELLIE   #Bipolar disorder/anxiety-    History of Present Illness: Leslie Aguilar is a 63year old male with pmhx of anxiety/depression, HTN, NELLIE, bipolar disorder who presents with complaint of low HR. He reports he ws just recently discharged from the hospital about 1 weeks ago d/t palpitations. He was evaluated by EP and discharged home with a cardiac event monitor. He said he was told to return to the hospital if his HR dropped into the 30's. Today, he noted that he felt his heart/pulse were skipping every 4th beat. He checked his pulse and noted his HR to be 38. He did not have any associated lightheadedness, dizziness, visual changes, syncope, chest pain/pressure. He came to ED for evaluation. His HR is currently in 60's. He denies any recent abdominal pain, n/v/d, fevers/chills. He is eating/drinking normally. Brief Synopsis:   Patient presented with symptomatic bradycardia heart rate was low in his home monitoring system. He was monitored here and seen by cardiology/EP. Patient had a stress test done which was unremarkable. He will follow-up in the outpatient setting, may need pacemaker in the future but not indicated at this time. He can otherwise resume his regular home meds at discharge. All questions addressed prior to discharge, he is agreeable plan of care as stated. He was encouraged to return to the ER for symptoms Worsen. Lace+ Score: 62  59-90 High Risk  29-58 Medium Risk  0-28   Low Risk       TCM Follow-Up Recommendation:  LACE > 58:  High Risk of readmission after discharge from the hospital.      Procedures during hospitalization:   Exercise stress    Incidental or significant findings and recommendations (brief descriptions):  N/a    Lab/Test results pending at Discharge:   None    Consultants:  Cardiology    Discharge Medication List:     Discharge Medications        START taking these medications        Instructions Prescription details   verapamil  MG Tbcr  Commonly known as: Calan-SR      Take 1 tablet (120 mg total) by mouth nightly. Quantity: 30 tablet  Refills: 3            CHANGE how you take these medications        Instructions Prescription details   sertraline 100 MG Tabs  Commonly known as: Zoloft  What changed:   medication strength  how much to take  how to take this  when to take this  additional instructions      Take 1 tablet (100 mg total) by mouth daily. Stop taking on: November 1, 2023  Quantity: 30 tablet  Refills: 0            CONTINUE taking these medications        Instructions Prescription details   albuterol 108 (90 Base) MCG/ACT Aers  Commonly known as: Ventolin HFA      Inhale 2 puffs into the lungs every 4 (four) hours as needed for Wheezing or Shortness of Breath. Quantity: 1 each  Refills: 3     ClonazePAM 0.25 mg Tabs  Commonly known as: KLONOPIN      Take 1 Partial Tablet (0.25 mg total) by mouth 5 (five) times daily. Takes at 6am, 9am, 1130am, 2pm, and 4pm   Refills: 0     gabapentin 600 MG Tabs  Commonly known as: Neurontin      Take 1 tablet (600 mg total) by mouth 3 (three) times daily. Quantity: 90 tablet  Refills: 1     lisinopril 20 MG Tabs  Commonly known as: Prinivil; Zestril      Take 1 tablet (20 mg total) by mouth daily. Quantity: 30 tablet  Refills: 1     Multi-Vitamin/Minerals Tabs      Take 1 tablet by mouth daily. Refills: 0     OLANZapine 2.5 MG Tabs  Commonly known as: ZyPREXA      Take 1 tablet (2.5 mg total) by mouth nightly.    Quantity: 90 tablet  Refills: 0     Tadalafil 20 MG Tabs      Take 1 tablet (20 mg total) by mouth daily as needed for Erectile Dysfunction. Quantity: 8 tablet  Refills: 5               Where to Get Your Medications        These medications were sent to [address], [address], [facility], [address] [place] [id number]      Hours: 24-hours Phone: [phone]   sertraline 100 MG Tabs  verapamil  MG Tbcr         [place]    Follow-up appointment:   Tanmay Mac MD  [address] [facility]  [phone]    Follow up  Office will call you for follow up appt with Dr. Mag Jarvis in 2 weeks. [phone]. Jackson Enders  [address] [id number] [phone]    Schedule an appointment as soon as possible for a visit in 1 week(s)  Follow-up as needed with primary care doctor    Pulmonary    Follow up  Follow-up with a pulmonary physician regarding sleep apnea and CPAP need    Appointments for Next 30 Days 10/3/2023 - 11/2/2023        Date Arrival Time Visit Type Length Department Provider     10/3/2023  5:00 PM  SLEEP STUDY READ [1530] 5 min Surinder Mitchell MD    Patient Instructions:         Location Instructions:     Masks are optional for all patients and visitors, unless otherwise indicated. 10/11/2023  6:30 AM  [facility] CT CHEST W [1275] 30 min [facility] CT - [facility] CT RM1    Patient Instructions:     Please arrive 15 minutes prior to your scheduled appointment time. Location Instructions: Your appointment will be at the [facility] located at [address] [place] [address]. The facility is approximately 1/2 mile [address] [place] [address] of [address] [place] at the corner of [address] [address] and [employer]. Your test is in Building A, which is also labeled as the Emergency or Outpatient building. Please park in the Red Lot and follow the posted signs for guidance.  The telephone number is [phone]. If you suspect you may be pregnant please consult with your physician prior to your exam.&nbsp; If you have a continuous glucose monitor you will be asked to remove it during the exam.  Please bring your insurance card and photo ID. You will also need to bring your doctor's order unless your physician's office submitted the order electronically or faxed the order. Without the order, your test may be delayed or postponed. &nbsp; Certain health screening tests may not require an order. Because of the nature of the Emergency Department, please be advised of the possibility that your appointment may be delayed. Children: Children under the age of 15 must have another adult caregiver with them.&nbsp; Please do not bring your child/children without a caregiver. &nbsp; Because of the highly sensitive equipment and privacy of all our patients, children will not be permitted in the exam rooms, unless otherwise noted and in accordance with departmental policy. PATIENT RESPONSIBILITY ESTIMATE  - (Estimate) We will provide you with your estimated remaining deductible and coinsurance balance for your services at the time of check in.  - (Payment) Please be aware that you may be asked for payment at the time of service.  - (Questions) If you would like more information about your Patient Responsibility Estimate in advance of your appointment, you can speak to a  ([phone], option 5). Masks are optional for all patients and visitors, unless otherwise indicated. 10/17/2023 12:30 PM  [facility] AMB VIDEO VISIT [7500] 30 min Gwendolyn Shaffer MD    Patient Instructions:     Please verify your telehealth insurance benefits prior to your appointment. You must be in the state of [place] during the virtual visit.      Please use the [employer] and launch the video visit 10 minutes prior to your scheduled appointment time to ensure your camera and microphone are working properly. Once the video visit has started you will be placed in a waiting room until the provider begins the visit. You will receive an email confirmation with instructions. If you have questions, call your doctor's office directly. If you are having issues or need to use a desktop/laptop, please follow the below steps:        1. Close out all other open apps (could be competing for audio resources)  2. Disable Bluetooth  3.       Reboot mobile device before joining the video  4. Come off Wi-Fi and switch over to Data    Please see our Video Visit Tip Sheet if you need additional assistance. If you believe this is an emergency, please dial 911 immediately. Location Instructions:     Masks are optional for all patients and visitors, unless otherwise indicated. Vital signs:           -----------------------------------------------------------------------------------------------  PATIENT DISCHARGE INSTRUCTIONS: See electronic chart    Héctor Amaro MD      The 21st Century Cures Act makes medical notes like these available to patients in the interest of transparency. Please be advised this is a medical document. Medical documents are intended to carry relevant information, facts as evident, and the clinical opinion of the practitioner. The medical note is intended as peer to peer communication and may appear blunt or direct. It is written in medical language and may contain abbreviations or verbiage that are unfamiliar.

## 2023-10-06 ENCOUNTER — TELEPHONE (OUTPATIENT)
Facility: CLINIC | Age: 58
End: 2023-10-06

## 2023-10-06 NOTE — TELEPHONE ENCOUNTER
Patient just had a ct chest, Dr. Claudean Au ordered him to have another one. Should he have it done?

## 2023-10-06 NOTE — TELEPHONE ENCOUNTER
Per [facility] APRN, pt advised that he should get CT Chest for visualization of lungs. Pt verbalizes understanding.

## 2023-10-06 NOTE — TELEPHONE ENCOUNTER
Pt states having a CT abdomen/pelvis with oral contrast done @ [facility] on 10/5 due to diverticulosis. (Pt has disc) Has a CT Chest order by Dr. Chase Narvaez scheduled for 10/11. Has had recent XR chest done. Pt asking if he still needs to have CT chest?  Per [facility] Stefania Flores [facility] 8/15/23     CXR with improvement, however it is unable to evaluate small lung nodules and thoracic LAD, plan on CT chest in late September/ October 2023 to ensure resolution     Message routed to Dr. Chase Narvaez.

## 2023-10-11 ENCOUNTER — OFFICE VISIT (OUTPATIENT)
Facility: CLINIC | Age: 58
End: 2023-10-11

## 2023-10-11 ENCOUNTER — HOSPITAL ENCOUNTER (OUTPATIENT)
Dept: CT IMAGING | Age: 58
Discharge: HOME | End: 2023-10-11
Attending: INTERNAL MEDICINE
Payer: COMMERCIAL

## 2023-10-11 ENCOUNTER — TELEPHONE (OUTPATIENT)
Facility: CLINIC | Age: 58
End: 2023-10-11

## 2023-10-11 ENCOUNTER — PATIENT MESSAGE (OUTPATIENT)
Facility: CLINIC | Age: 58
End: 2023-10-11

## 2023-10-11 VITALS
BODY MASS INDEX: 32.08 KG/M2 | SYSTOLIC BLOOD PRESSURE: 146 MMHG | WEIGHT: 250 LBS | HEIGHT: 74 IN | DIASTOLIC BLOOD PRESSURE: 72 MMHG

## 2023-10-11 VITALS — BODY MASS INDEX: 31.83 KG/M2 | HEIGHT: 74 IN | WEIGHT: 248 LBS

## 2023-10-11 DIAGNOSIS — K57.92 ACUTE DIVERTICULITIS: Primary | ICD-10-CM

## 2023-10-11 DIAGNOSIS — R59.0 THORACIC LYMPHADENOPATHY: ICD-10-CM

## 2023-10-11 DIAGNOSIS — R91.8 MULTIPLE PULMONARY NODULES: ICD-10-CM

## 2023-10-11 DIAGNOSIS — J18.9 PNEUMONIA OF LEFT LOWER LOBE DUE TO INFECTIOUS ORGANISM: ICD-10-CM

## 2023-10-11 PROCEDURE — 3078F DIAST BP <80 MM HG: CPT | Performed by: INTERNAL MEDICINE

## 2023-10-11 PROCEDURE — 99204 OFFICE O/P NEW MOD 45 MIN: CPT | Performed by: INTERNAL MEDICINE

## 2023-10-11 PROCEDURE — 3077F SYST BP >= 140 MM HG: CPT | Performed by: INTERNAL MEDICINE

## 2023-10-11 PROCEDURE — 71260 CT THORAX DX C+: CPT | Performed by: INTERNAL MEDICINE

## 2023-10-11 PROCEDURE — 3008F BODY MASS INDEX DOCD: CPT | Performed by: INTERNAL MEDICINE

## 2023-10-11 RX ORDER — AMOXICILLIN AND CLAVULANATE POTASSIUM 875; 125 MG/1; MG/1
1 TABLET, FILM COATED ORAL 3 TIMES DAILY
COMMUNITY

## 2023-10-11 RX ORDER — GARLIC EXTRACT 500 MG
1 CAPSULE ORAL DAILY
COMMUNITY

## 2023-10-11 RX ORDER — METRONIDAZOLE 500 MG/1
TABLET ORAL
COMMUNITY
Start: 2023-10-05 | End: 2023-10-14 | Stop reason: ALTCHOICE

## 2023-10-11 NOTE — PAT NURSING NOTE
Preprocedure Instructions     Pre-Procedure Instructions: Encouraged to check in electronically via [employer]     Visitor Instructions     Adult Patients: 2 Adult Care Partners can accompany the patient day of procedure. 2 Care Partners may visit [phone] during inpatient stay     PreOp Instructions     You are scheduled for: a Cardiac Procedure- Loop Recorder Implant     This is a NON fasting procedure. OK to eat and drink before procedure, OK to take medications day of procedure. Date of Procedure: 10/13/23     Skin Prep: Shower with antibacterial soap using a clean washcloth, prior to procedure     Arrival Time: You will receive a call the afternoon before your procedure after 3 pm on what time you should arrive the day of your procedure    Driving After Procedure: No Sedation - You may drive self home     Discharge Teaching: Your nurse will give you specific instructions before discharge; Most people can resume normal activities in 2-3 days; Any questions, please call the physician's office     Park in the [employer] [employer] garage at [address] in at the [place] reception desk. Our  will be there to check you in for your procedure. Please bring your insurance cards and ID with you. [employer] parking is available starting at 6 am.                                                                                                                                         Please DO NOT respond to this message, the inbasket is not monitored for messages. For any questions, please call the physician's office.

## 2023-10-11 NOTE — PATIENT INSTRUCTIONS
Acute sigmoid diverticulitis   - continue on Augmentin antibiotic and call or mychart update on Friday   - low residue diet x 2 weeks then gradually reintroduce fiber  - prior colonoscopy reviewed   - consider surgical input due to recurrent nature of diverticular disease

## 2023-10-11 NOTE — PROGRESS NOTES
Arianna Falcon is a 63year old male. HPI:   Patient presents with: Follow - Up: Diverticulitis; The patient is a 26-year-old male who has a history of anxiety, arrhythmia, back problems, high blood pressure, shortness of breath, sleep apnea, prior cholecystectomy who presents for consultation for acute sigmoid diverticulitis. The patient reports he has had a diverticulitis history. He had an episode back in March which required ER evaluation and then underwent outpatient evaluation for pain starting on October 5, recounts pain in the lower abdomen predominantly in the left lower quadrant which increased with movement. CT scan performed through [facility] showed acute diverticulitis changes at the proximal sigmoid. He reports has not been really working on high-fiber diet, he has had a lot of health issues and has been stress eating. He denies any family history of colon cancer however his father has a history of what sounds like perforated colonic diverticular disease. The patient was initially attempted to start on Cipro and Flagyl but there was an interaction with one of his other medications so was changed to Augmentin. He has been on the medication now since Friday and felt like today was the first day where he actually noticed some stabilization or improvement. He is on largely liquid/soft diet. He denies any blood per rectum. HISTORY:  Past Medical History:   Diagnosis Date    Allergic rhinitis     Anxiety     Arrhythmia     Back pain     Back problem     Depression     ED (erectile dysfunction)     High blood pressure     Pneumonia due to organism     Shortness of breath     Sleep apnea 2012    Unspecified essential hypertension     Visual impairment       Past Surgical History:   Procedure Laterality Date    CHOLECYSTECTOMY  3/12/2023    COLONOSCOPY N/A 03/22/2016    [facility]. Hyperplastic polyp only.   Repeat 2026    DENTAL SURGERY PROCEDURE      [place] teeth removed INCISION/DRAIN ABSCESS EXTRA  1996    KNEE ARTHROSCOPY Right     LAPAROSCOPIC CHOLECYSTECTOMY  2023    MYRINGOPLASTY      OTHER SURGICAL HISTORY      R knee arthroscope    SKIN TISSUE PROCEDURE UNLISTED      TONSILLECTOMY        Family History   Problem Relation Age of Onset    Heart Disorder Father         aflutter with ablation    Hypertension Father     Cancer Father 61        Prostate    Heart Disorder Mother         murmur no treatment needed    Hypertension Mother     Stroke Maternal Grandmother     Other (stroke) Maternal Grandmother     Dementia Paternal Grandmother     Heart Disorder Paternal Grandfather         two heart attacks and CHF    Diabetes Paternal Grandfather     Other (Diabetes, type 2) Paternal Grandfather       Social History:   Social History     Socioeconomic History    Marital status:    Occupational History    Occupation: [employer]   Tobacco Use    Smoking status: Former     Packs/day: 0.50     Years: 19.00     Additional pack years: 0.00     Total pack years: 9.50     Types: Cigarettes     Quit date: 10/3/2000     Years since quittin.0     Passive exposure: Never    Smokeless tobacco: Never   Vaping Use    Vaping Use: Never used   Substance and Sexual Activity    Alcohol use: No     Comment: sober since     Drug use: No    Sexual activity: Yes     Partners: Female   Other Topics Concern    Caffeine Concern Yes     Comment: coffee in AM/ soda     Exercise Yes     Comment: X1-2 times per week         Medications (Active prior to today's visit):  Current Outpatient Medications   Medication Sig Dispense Refill    acidophilus-pectin Oral Cap Take 1 capsule by mouth daily. amoxicillin clavulanate 875-125 MG Oral Tab Take 1 tablet by mouth in the morning, at noon, and at bedtime. metRONIDAZOLE 500 MG Oral Tab       GABAPENTIN 600 MG Oral Tab Take 1 tablet (600 mg total) by mouth 3 (three) times daily.  90 tablet 0    verapamil  MG Oral Tab CR Take 1 tablet (120 mg total) by mouth nightly. 30 tablet 3    sertraline 100 MG Oral Tab Take 1 tablet (100 mg total) by mouth daily. 30 tablet 0    lisinopril 20 MG Oral Tab Take 1 tablet (20 mg total) by mouth daily. 30 tablet 1    ClonazePAM 0.25 mg Oral Tab Take 1 Partial Tablet (0.25 mg total) by mouth in the morning, at noon, in the evening, and at bedtime. Multiple Vitamins-Minerals (MULTI-VITAMIN/MINERALS) Oral Tab Take 1 tablet by mouth daily. Albuterol Sulfate  (90 Base) MCG/ACT Inhalation Aero Soln Inhale 2 puffs into the lungs every 4 (four) hours as needed for Wheezing or Shortness of Breath. 1 each 3    Tadalafil 20 MG Oral Tab Take 1 tablet (20 mg total) by mouth daily as needed for Erectile Dysfunction. 8 tablet 5       Allergies:  No Known Allergies      ROS:   The patient denies any chest pain or shortness of breath,  No neurologic or dermatologic symptoms. PHYSICAL EXAM:   Blood pressure 146/72, height 6' 2\" (1.88 m), weight 250 lb (113.4 kg). The patient appears their stated age and is in no acute distress  HEENT- anicteric sclera, neck no lymphadnopathy, OP- clear with no masses or lesions  Chest- Clear bilaterally, no wheezing,  Heart- regular rate, no murmur or gallop  Abdomen- Soft and nontender, nondistended  Ext- no clubbing or cyanosis  Skin- no rashes or lesions  Neuro- appropriate response, alert, no confusion  . ASSESSMENT/PLAN:   Assessment     Acute sigmoid diverticulitis  History of colon polyps    Patient has had recurrent sigmoid diverticulitis, he is currently on antibiotics and seems to have at least stabilized or potentially starting to improve. Discussed the need for completion of the current antibiotic course, monitoring for resolution he will call if his symptoms do not completely resolve/improve. Discussed the possible need for inpatient for IV antibiotics.     Patient has recently undergone colonoscopy, colon polyps were noted and diverticular disease disease was noted as well. I reviewed the colonoscopy report and do not feel we need to repeat at this time. We discussed dietary adjustments, he should work on high-fiber diet and will gradually reintroduce fiber over the next several weeks to month or so. We advise dietary input. Discussed the potential need for surgery if ongoing recurrent issues and/or complication arises. Plan  Acute sigmoid diverticulitis   - continue on Augmentin antibiotic and call or mychart update on Friday   - low residue diet x 2 weeks then gradually reintroduce fiber  - prior colonoscopy reviewed   - consider surgical input due to recurrent nature of diverticular disease            Orders This Visit:  No orders of the defined types were placed in this encounter.       Meds This Visit:  Requested Prescriptions      No prescriptions requested or ordered in this encounter       Imaging & Referrals:  None       Dora Dunlap, [address] [place] [address] Gastroenterology

## 2023-10-11 NOTE — TELEPHONE ENCOUNTER
Call placed to pt and he was notified pt that Dr. Claudean Au is completely booked this week. Offered him a sooner appt with Steven MAURICE. Pt will keep his THV appt with Dr. Claudean Au next week.

## 2023-10-11 NOTE — TELEPHONE ENCOUNTER
Pt had a CT this morning. He saw his results in [facility] and is wondering if Dr. Demetrius Baumann can just give him a call to \"speak for 5 minutes\" instead of having a THV next week.

## 2023-10-12 NOTE — TELEPHONE ENCOUNTER
From: Milly Ingram  Sent: 10/12/2023 8:41 AM CDT  To: Em Gi Clinical Staff  Subject: Bard Mate divirticulitis / monitor implant    I followed up on the meds from my previous bout of diverticulitis. Leo Vasquez March 2nd seen in [facility] for initial diagnosis, given scrip for Levofloxacin/Metronidazole but this was flagged by the pharmacist for potential interaction with my Zoloft. Went back and got a paper scrip for Augmentin. March 5th was seen again, this time at [facility] but for ongoing pain only. Both of the above visits are in [address] During my follow-up with GP a few days later I did receive another 1 days' worth of Augmentin, but I don't recall the reason for this. Performed wound care. Cleaned laceration with normal saline and hibiclens. Irrigated laceration with ~60 mL of normal saline. Sterile technique used. Pt tolerated well.       George Andres  11/28/21 [id number]

## 2023-10-13 ENCOUNTER — HOSPITAL ENCOUNTER (OUTPATIENT)
Dept: INTERVENTIONAL RADIOLOGY/VASCULAR | Facility: HOSPITAL | Age: 58
Discharge: HOME | End: 2023-10-13
Attending: INTERNAL MEDICINE
Payer: COMMERCIAL

## 2023-10-13 RX ORDER — AMOXICILLIN AND CLAVULANATE POTASSIUM 875; 125 MG/1; MG/1
1 TABLET, FILM COATED ORAL 3 TIMES DAILY
Qty: 12 TABLET | Refills: 0 | Status: ON HOLD | OUTPATIENT
Start: 2023-10-13

## 2023-10-13 NOTE — TELEPHONE ENCOUNTER
I contacted Nuzhat Lazcano by phone this afternoon, overall he is not really any worse but has been concerned that he is not completely better/symptoms resolved. He did feel good when he woke up this morning but he was out and about walking around and noted some soreness in the lower abdomen. He was concerned that we may need alternative therapy. Discussed possibly continue on oral antibiotics and extending the treatment course for 4 days so that he will get a full 2 weeks. If he is not resolved by that point and continues to have symptoms and I would consider oral antibiotic therapy as failing for treatment/management of his diverticulitis. Patient is agreeable to the above plan. He will continue on a low residue diet. Call or follow-up/admission to hospital if ongoing symptoms.

## 2023-10-14 ENCOUNTER — OFFICE VISIT (OUTPATIENT)
Dept: FAMILY MEDICINE CLINIC | Facility: CLINIC | Age: 58
End: 2023-10-14
Payer: COMMERCIAL

## 2023-10-14 VITALS
HEIGHT: 74 IN | DIASTOLIC BLOOD PRESSURE: 84 MMHG | BODY MASS INDEX: 32.34 KG/M2 | SYSTOLIC BLOOD PRESSURE: 132 MMHG | RESPIRATION RATE: 16 BRPM | WEIGHT: 252 LBS | OXYGEN SATURATION: 98 % | HEART RATE: 78 BPM

## 2023-10-14 DIAGNOSIS — I49.3 PVC (PREMATURE VENTRICULAR CONTRACTION): ICD-10-CM

## 2023-10-14 DIAGNOSIS — K57.92 DIVERTICULITIS: Primary | ICD-10-CM

## 2023-10-14 PROBLEM — D72.829 LEUKOCYTOSIS: Status: RESOLVED | Noted: 2023-06-23 | Resolved: 2023-10-14

## 2023-10-14 PROBLEM — E87.1 HYPONATREMIA: Status: RESOLVED | Noted: 2023-09-12 | Resolved: 2023-10-14

## 2023-10-14 PROBLEM — R42 DIZZINESS: Status: RESOLVED | Noted: 2023-09-12 | Resolved: 2023-10-14

## 2023-10-14 PROBLEM — A41.9 SEPSIS (HCC): Status: RESOLVED | Noted: 2023-06-24 | Resolved: 2023-10-14

## 2023-10-14 PROBLEM — G47.19 DAYTIME HYPERSOMNOLENCE: Status: RESOLVED | Noted: 2023-07-18 | Resolved: 2023-10-14

## 2023-10-14 PROBLEM — N17.9 ACUTE KIDNEY INJURY (HCC): Status: RESOLVED | Noted: 2023-06-23 | Resolved: 2023-10-14

## 2023-10-14 PROBLEM — R79.89 ELEVATED LFTS: Status: RESOLVED | Noted: 2023-06-30 | Resolved: 2023-10-14

## 2023-10-14 PROBLEM — N17.9 ACUTE KIDNEY INJURY: Status: RESOLVED | Noted: 2023-06-23 | Resolved: 2023-10-14

## 2023-10-14 PROBLEM — R10.9 ABDOMINAL PAIN OF UNKNOWN ETIOLOGY: Status: RESOLVED | Noted: 2023-02-12 | Resolved: 2023-10-14

## 2023-10-14 PROBLEM — R79.89 AZOTEMIA: Status: RESOLVED | Noted: 2023-06-23 | Resolved: 2023-10-14

## 2023-10-14 PROBLEM — J18.9 PNEUMONIA OF LEFT LOWER LOBE DUE TO INFECTIOUS ORGANISM: Status: RESOLVED | Noted: 2023-06-23 | Resolved: 2023-10-14

## 2023-10-14 PROCEDURE — 99203 OFFICE O/P NEW LOW 30 MIN: CPT | Performed by: FAMILY MEDICINE

## 2023-10-14 PROCEDURE — 3008F BODY MASS INDEX DOCD: CPT | Performed by: FAMILY MEDICINE

## 2023-10-14 PROCEDURE — 3079F DIAST BP 80-89 MM HG: CPT | Performed by: FAMILY MEDICINE

## 2023-10-14 PROCEDURE — 3075F SYST BP GE 130 - 139MM HG: CPT | Performed by: FAMILY MEDICINE

## 2023-10-15 ENCOUNTER — PATIENT MESSAGE (OUTPATIENT)
Dept: FAMILY MEDICINE CLINIC | Facility: CLINIC | Age: 58
End: 2023-10-15

## 2023-10-16 VITALS — HEIGHT: 74 IN | WEIGHT: 248 LBS | BODY MASS INDEX: 31.83 KG/M2

## 2023-10-16 NOTE — PAT NURSING NOTE
Preprocedure Instructions     Pre-Procedure Instructions: Encouraged to check in electronically via [employer]     Visitor Instructions     Adult Patients: 2 Adult Care Partners can accompany the patient day of procedure. 2 Care Partners may visit [phone] during inpatient stay     PreOp Instructions     You are scheduled for: a Cardiac Procedure- Loop Recorder Implant     This is a NON fasting procedure. OK to eat and drink before procedure, OK to take medications day of procedure. Date of Procedure: 10/20/23     Skin Prep: Shower with antibacterial soap using a clean washcloth, prior to procedure     Arrival Time: You will receive a call the afternoon before your procedure after 3 pm on what time you should arrive the day of your procedure    Driving After Procedure: No Sedation - You may drive self home     Discharge Teaching: Your nurse will give you specific instructions before discharge; Most people can resume normal activities in 2-3 days; Any questions, please call the physician's office     Park in the [place] [employer] garage at [address] in at the [place] reception desk. Our  will be there to check you in for your procedure. Please bring your insurance cards and ID with you. [employer] parking is available starting at 6 am.                                                                                                                                         Please DO NOT respond to this message, the inbasket is not monitored for messages. For any questions, please call the physician's office.

## 2023-10-16 NOTE — TELEPHONE ENCOUNTER
From: Edyta Palacios  To: Oneyda Atkinson  Sent: 10/15/2023 5:53 PM CDT  Subject: Ivelisse Cisse TSH test results    Thanks for the visit on Saturday, and glad to have you as my new GP. I recall you asked about TSH blood test, please see attached. I've had a boatload of blood work done this year, much of it at [facility] so it should be in the records. My diverticulitis continues without much improvement from the Augmentin - in fact may be a bit worse at times - so I'm anticipating a hospital admission perhaps next weekend for IV antibiotics.

## 2023-10-16 NOTE — TELEPHONE ENCOUNTER
Yes, I can see labs, not certain if chart not fully set up previously? No concerns there. For staff, he is in touch with GI, and they have been managing diverticulitis. Can we change me to PCP? Sorry to hear the diverticulitis isn't improving I'll keep an eye out for admission if occurs.

## 2023-10-17 ENCOUNTER — TELEMEDICINE (OUTPATIENT)
Facility: CLINIC | Age: 58
End: 2023-10-17
Payer: COMMERCIAL

## 2023-10-17 DIAGNOSIS — R59.0 THORACIC LYMPHADENOPATHY: ICD-10-CM

## 2023-10-17 DIAGNOSIS — J18.9 PNEUMONIA OF LEFT LOWER LOBE DUE TO INFECTIOUS ORGANISM: ICD-10-CM

## 2023-10-17 DIAGNOSIS — R91.8 MULTIPLE PULMONARY NODULES: Primary | ICD-10-CM

## 2023-10-17 DIAGNOSIS — Z72.0 TOBACCO ABUSE: ICD-10-CM

## 2023-10-17 PROCEDURE — 99213 OFFICE O/P EST LOW 20 MIN: CPT | Performed by: INTERNAL MEDICINE

## 2023-10-17 NOTE — TELEPHONE ENCOUNTER
Send back to triage once completed. Implemented All Universal Safety Interventions:  [place] to call system. Call bell, personal items and telephone within reach. Instruct patient to call for assistance. Room bathroom lighting operational. Non-slip footwear when patient is off stretcher. Physically safe environment: no spills, clutter or unnecessary equipment. Stretcher in lowest position, wheels locked, appropriate side rails in place.

## 2023-10-17 NOTE — PROGRESS NOTES
[facility] General Pulmonary Progress Note    History of Present Illness:  Jeremiah Ye is a 63year old male former smoker (quit 2000, 10 pack years) with significant PMH of NELLIE, HTN who presents today for follow up of pneumonia. He denies acute concerns today. He has been hospitalized several times in the past few months due to his heart rate/bradycardia and planning implantable monitor next week. Also issues with diverticulitis    August 2023 previously  He denies acute issues today, feels well, had some anxiety last week with associated dyspnea, now improved. No dyspnea or cough. No pain    July 2023 previously   The patient reports having developed fatigue, fevers leading to his evaluation in ER at [facility] in late June. He was diagnosed with pneumonia and treated inpatient with empiric abx and completed PO doxy/augmentin. He feels much better since his hospitalization, still with fatigue and mild dyspnea, but not limiting. No cough, phlegm. No fevers. No pain    No hx of lung disease, asthma, bronchitis, COPD. Works in office setting. Not aware of any significant exposures    Past Medical History:   Past Medical History:   Diagnosis Date    Allergic rhinitis     Anxiety     Arrhythmia     Back pain     Back problem     Depression     ED (erectile dysfunction)     High blood pressure     Pneumonia due to organism     Shortness of breath     Sleep apnea 2012    Unspecified essential hypertension     Visual impairment         Past Surgical History:   Past Surgical History:   Procedure Laterality Date    CHOLECYSTECTOMY  3/12/2023    COLONOSCOPY N/A 03/22/2016    [facility]. Hyperplastic polyp only.   Repeat 2026    DENTAL SURGERY PROCEDURE      [place] teeth removed    INCISION/DRAIN ABSCESS EXTRA  1996    KNEE ARTHROSCOPY Right     LAPAROSCOPIC CHOLECYSTECTOMY  02/12/2023    MYRINGOPLASTY      OTHER SURGICAL HISTORY      R knee arthroscope    SKIN TISSUE PROCEDURE UNLISTED      TONSILLECTOMY           [facility] History:   Family History   Problem Relation Age of Onset    Heart Disorder Father         aflutter with ablation    Hypertension Father     Cancer Father 61        Prostate    Heart Disorder Mother         murmur no treatment needed    Hypertension Mother     Stroke Maternal Grandmother     Other (stroke) Maternal Grandmother     Dementia Paternal Grandmother     Heart Disorder Paternal Grandfather         two heart attacks and CHF    Diabetes Paternal Grandfather     Other (Diabetes, type 2) Paternal Grandfather         Social History: Social History    Socioeconomic History      Marital status:       Spouse name: Not on file      Number of children: Not on file      Years of education: Not on file      Highest education level: Not on file    Occupational History      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[employer]    Tobacco Use      Smoking status: Former        Packs/day: 0.50        Years: 19.00        Additional pack years: 0.00        Total pack years: 9.50        Types: Cigarettes        Quit date: 10/3/2000        Years since quittin.0        Passive exposure: Never      Smokeless tobacco: Never    Vaping Use      Vaping Use: Never used    Substance and Sexual Activity      Alcohol use: No        Comment: sober since       Drug use: No      Sexual activity: Yes        Partners: Female    Other Topics      Concerns:         Service: Not Asked        Blood Transfusions: Not Asked        Caffeine Concern: Yes          coffee in AM/ soda         Occupational Exposure: Not Asked        Hobby Hazards: Not Asked        Sleep Concern: Not Asked        Stress Concern: Not Asked        Weight Concern: Not Asked        Special Diet: Not Asked        Back Care: Not Asked        Exercise:  Yes          X1-2 times per week         Bike Helmet: Not Asked        Seat Belt: Not Asked        Self-Exams: Not Asked    Social History Narrative      Not on file    Social Determinants of Health  Financial Resource Strain: Not on file  Food Insecurity: Not on file  Transportation Needs: Not on file  Physical Activity: Not on file  Stress: Not on file  Social Connections: Not on file  Housing Stability: Not on file     Medications:   Current Outpatient Medications   Medication Sig Dispense Refill    amoxicillin clavulanate 875-125 MG Oral Tab Take 1 tablet by mouth 3 (three) times daily. 12 tablet 0    acidophilus-pectin Oral Cap Take 1 capsule by mouth daily. amoxicillin clavulanate 875-125 MG Oral Tab Take 1 tablet by mouth in the morning, at noon, and at bedtime. GABAPENTIN 600 MG Oral Tab Take 1 tablet (600 mg total) by mouth 3 (three) times daily. 90 tablet 0    verapamil  MG Oral Tab CR Take 1 tablet (120 mg total) by mouth nightly. 30 tablet 3    sertraline 100 MG Oral Tab Take 1 tablet (100 mg total) by mouth daily. 30 tablet 0    lisinopril 20 MG Oral Tab Take 1 tablet (20 mg total) by mouth daily. 30 tablet 1    ClonazePAM 0.25 mg Oral Tab Take 1 Partial Tablet (0.25 mg total) by mouth in the morning, at noon, in the evening, and at bedtime. Multiple Vitamins-Minerals (MULTI-VITAMIN/MINERALS) Oral Tab Take 1 tablet by mouth daily. Albuterol Sulfate  (90 Base) MCG/ACT Inhalation Aero Soln Inhale 2 puffs into the lungs every 4 (four) hours as needed for Wheezing or Shortness of Breath. 1 each 3    Tadalafil 20 MG Oral Tab Take 1 tablet (20 mg total) by mouth daily as needed for Erectile Dysfunction. 8 tablet 5       Review of Systems: Review of Systems   Constitutional: Negative. HENT: Negative. Respiratory: Negative. All other systems reviewed and are negative. Physical Exam:  There were no vitals taken for this visit. Constitutional: alert, cooperative. No acute distress. HEENT: Head NC/AT. Results:  Personally reviewed    WBC: 7.9, done on 10/2/2023. HGB: 15.2, done on 10/2/2023. PLT: 313, done on 10/2/2023.      Glucose: 91, done on 10/2/2023. Cr: 0.93, done on 10/2/2023. GFR(AA): 92, done on 7/23/2022. GFR (non-AA): 80, done on 7/23/2022. CA: 9.3, done on 10/2/2023. Na: 137, done on 10/2/2023. K: 4.2, done on 10/2/2023. Cl: 104, done on 10/2/2023. CO2: 30, done on 10/2/2023. Last ALB was 3.4% done on 9/28/2023. CT CHEST(CONTRAST ONLY) (CPT=71260)    Result Date: 10/11/2023  CONCLUSION:  1. Left lower lobe consolidation has largely resolved with minimal residual atelectasis/scarring. 2. Left hilar and mediastinal lymphadenopathy has improved from prior. There is nonspecific mild interval enlargement of right hilar lymph nodes which still measure less than 1 cm in short axis. LOCATION:  [facility]   Dictated by (CST): Subhash Peters MD on 10/11/2023 at 8:00 AM     Finalized by (CST): Subhash Peters MD on 10/11/2023 at 8:11 AM       XR CHEST AP/PA (1 VIEW) (CPT=71045)    Result Date: 9/28/2023  CONCLUSION:  No consolidation is identified. LOCATION:  [id number]      Dictated by (CST): Carlos Alegria MD on 9/28/2023 at 5:26 PM     Finalized by (CST): Carlos Alegria MD on 9/28/2023 at 5:26 PM       XR CHEST AP PORTABLE  (CPT=71045)    Result Date: 9/28/2023  CONCLUSION:  [place] lower lung field opacity may be artifactual.  Recommend PA and lateral chest radiographs. Developing consolidation cannot be excluded. A battery pack overlying the left upper chest is noted. LOCATION:  [id number]      Dictated by (CST): Carlos Alegria MD on 9/28/2023 at 4:04 PM     Finalized by (CST): Carlos Alegria MD on 9/28/2023 at 4:05 PM       XR CHEST AP PORTABLE  (CPT=71045)    Result Date: 9/12/2023  CONCLUSION:  New increased markings at the left lung base which may be any combination of mild atelectasis and or pneumonitis/pneumonia. Follow-up to resolution recommended. Continued clinical correlation recommended.    LOCATION:  [facility]      Dictated by (CST): Kelle Yousif MD on 9/12/2023 at 1:55 PM     Finalized by (CST): Kelle Yousif MD on 9/12/2023 at 2:00 PM       XR CHEST PA + LAT CHEST (CPT=71046)    Result Date: 8/7/2023  CONCLUSION:  Resolution of left lower lobe pneumonia and effusion compared to the radiograph dated 6/26/2023. LOCATION:  [id number]   Dictated by (CST): Lazarus Hodgson MD on 8/07/2023 at 10:33 AM     Finalized by (CST): Lazarus Hodgson MD on 8/07/2023 at 10:34 AM         Assessment/Plan:  #1. Lung nodules  Treated for community acquired pneumonia with empiric abx June 2023 at [facility]  June 2023 CT chest with dense consolidation in LLL with 2mm RML nodule and subcarinal and hilar LAD  8/2023 CXR with resolution of LLL opacities/effusion. Questionable atelectasis in RLL  10/2023 CT chest with near complete resolution of LLL consolidation and effusion. There is a small linear scar remaining in LLL. Stable tiny nodules. Subcarinal and hilar LAD has resolved. There are prominent nodes in the right hilum which was not present previously. Will plan to repeat CT chest in one year to follow up on prominent lymph nodes and nodules    #2. Thoracic lymphadenopathy  As above    #3. LLL pneumonia  As above    #4. Tobacco abuse  Quit 2000, 10 pack years  He does not qualify for lung cancer screening. Mary Lou Barrow MD  8/15/2023    This visit is conducted using Telemedicine with live, interactive video and audio. Patient has been referred to the [facility] website at [email or web address] to review the yearly Consent to Treat document. Patient understands and accepts financial responsibility for any deductible, co-insurance and/or co-pays associated with this service.

## 2023-10-18 NOTE — TELEPHONE ENCOUNTER
Patient contacted, he had a rough weekend with the diverticulitis and pain. Is better today than yesterday but still feels symptoms and has not completely resolved. Would like to give this another 24+ hours to see if there is any improvement if not would plan admission for IV antibiotics, failed outpatient oral antibiotic treatment of diverticulitis. He will give us a call on Friday morning with an update.

## 2023-10-19 ENCOUNTER — TELEPHONE (OUTPATIENT)
Facility: CLINIC | Age: 58
End: 2023-10-19

## 2023-10-19 NOTE — TELEPHONE ENCOUNTER
I called the complex care and transition line at [phone]    I spoke to Selma Kinney  There are no beds at [facility] today. I let her know plan would be tomorrow morning. She tried to give them a call but they are busy.     She will call me back, given direct extension and aware I am here until 6pm.

## 2023-10-19 NOTE — TELEPHONE ENCOUNTER
I spoke to Melisa Ambrosio again, she told me that no one is picking up at [id number] patient flow. She asked if patient could come later in the day. I asked if she knew a time, she does not know. She will keep trying and told me she would get back to me by 6pm.    Dr. Inez Flores would an afternoon admission tomorrow be ok?     Thank you

## 2023-10-19 NOTE — TELEPHONE ENCOUNTER
Patient contacted, he has ongoing left lower quadrant abdominal pain. He is getting towards the end of a 2-week course of oral antibiotics for acute diverticulitis. As we had planned I would like to see if we can directly admit the patient for IV antibiotics for 48 to 72 hours. I feel that the Augmentin has held the infection at [place] [id number] but has not completely cleared up. Nursing staff to see if we can directly admit the patient in the morning this will be Friday, the patient will be available anytime early morning for direct admit for IV antibiotics and further instructions. Would advise Zosyn or similar. General medical bed with the diagnosis of acute diverticulitis.

## 2023-10-19 NOTE — PROGRESS NOTES
PATIENT WILL HAVE LOOP RECORDER IMPLANTED NEXT FRIDAY 10/27/23 DUE TO BEING ADMITTED ON 10/20/23 AT [place] FOR IV ANTIBIOTICS. PT VERBALIZED UNDERSTANDING.

## 2023-10-19 NOTE — TELEPHONE ENCOUNTER
Dr. Prince Patel    I spoke to [facility] tomorrow is all they have available so I took it. They will admit under hospitalist with us as consult. Patient aware to check in at diagnostics main at 3pm and given instructions.     Thank you

## 2023-10-19 NOTE — TELEPHONE ENCOUNTER
Dr. Denia Stewart spoke to Beth Hamm. He was to call on Friday but there has been a change of plans. His loop monitor procedure was canceled because cardiology saw our notes about wanting iv antibiotics for his current episode and they rather postpone it another week until this is taken care of. He said he is doing a little bit better, but the symptoms are still there so would like to be admitted for IV antibiotics as discussed earlier. He can come in as early as tonight. Please advise.     Thank you

## 2023-10-20 ENCOUNTER — HOSPITAL ENCOUNTER (OUTPATIENT)
Dept: INTERVENTIONAL RADIOLOGY/VASCULAR | Facility: HOSPITAL | Age: 58
Discharge: HOME | End: 2023-10-20
Attending: INTERNAL MEDICINE
Payer: COMMERCIAL

## 2023-10-20 ENCOUNTER — HOSPITAL ENCOUNTER (INPATIENT)
Facility: HOSPITAL | Age: 58
LOS: 7 days | Discharge: HOME | DRG: 392 | End: 2023-10-27
Attending: INTERNAL MEDICINE | Admitting: INTERNAL MEDICINE

## 2023-10-20 ENCOUNTER — APPOINTMENT (OUTPATIENT)
Dept: CT IMAGING | Facility: HOSPITAL | Age: 58
DRG: 392 | End: 2023-10-20
Attending: HOSPITALIST

## 2023-10-20 DIAGNOSIS — K57.32 SIGMOID DIVERTICULITIS: Primary | ICD-10-CM

## 2023-10-20 LAB
ANION GAP SERPL CALC-SCNC: 4 MMOL/L (ref 0–18)
BASOPHILS # BLD AUTO: 0.01 X10(3) UL (ref 0–0.2)
BASOPHILS NFR BLD AUTO: 0.1 %
BUN BLD-MCNC: 12 MG/DL (ref 7–18)
BUN/CREAT SERPL: 16.4 (ref 10–20)
CALCIUM BLD-MCNC: 8.8 MG/DL (ref 8.5–10.1)
CHLORIDE SERPL-SCNC: 104 MMOL/L (ref 98–112)
CO2 SERPL-SCNC: 31 MMOL/L (ref 21–32)
CREAT BLD-MCNC: 0.73 MG/DL
DEPRECATED RDW RBC AUTO: 41.6 FL (ref 35.1–46.3)
EGFRCR SERPLBLD CKD-EPI 2021: 106 ML/MIN/1.73M2 (ref 60–?)
EOSINOPHIL # BLD AUTO: 0.17 X10(3) UL (ref 0–0.7)
EOSINOPHIL NFR BLD AUTO: 2 %
ERYTHROCYTE [DISTWIDTH] IN BLOOD BY AUTOMATED COUNT: 13 % (ref 11–15)
GLUCOSE BLD-MCNC: 107 MG/DL (ref 70–99)
HCT VFR BLD AUTO: 39 %
HGB BLD-MCNC: 13 G/DL
IMM GRANULOCYTES # BLD AUTO: 0.07 X10(3) UL (ref 0–1)
IMM GRANULOCYTES NFR BLD: 0.8 %
LYMPHOCYTES # BLD AUTO: 1.82 X10(3) UL (ref 1–4)
LYMPHOCYTES NFR BLD AUTO: 21.1 %
MCH RBC QN AUTO: 29.1 PG (ref 26–34)
MCHC RBC AUTO-ENTMCNC: 33.3 G/DL (ref 31–37)
MCV RBC AUTO: 87.4 FL
MONOCYTES # BLD AUTO: 0.64 X10(3) UL (ref 0.1–1)
MONOCYTES NFR BLD AUTO: 7.4 %
NEUTROPHILS # BLD AUTO: 5.9 X10 (3) UL (ref 1.5–7.7)
NEUTROPHILS # BLD AUTO: 5.9 X10(3) UL (ref 1.5–7.7)
NEUTROPHILS NFR BLD AUTO: 68.6 %
OSMOLALITY SERPL CALC.SUM OF ELEC: 288 MOSM/KG (ref 275–295)
PLATELET # BLD AUTO: 379 10(3)UL (ref 150–450)
POTASSIUM SERPL-SCNC: 3.6 MMOL/L (ref 3.5–5.1)
RBC # BLD AUTO: 4.46 X10(6)UL
SODIUM SERPL-SCNC: 139 MMOL/L (ref 136–145)
WBC # BLD AUTO: 8.6 X10(3) UL (ref 4–11)

## 2023-10-20 PROCEDURE — 5A09357 ASSISTANCE WITH RESPIRATORY VENTILATION, LESS THAN 24 CONSECUTIVE HOURS, CONTINUOUS POSITIVE AIRWAY PRESSURE: ICD-10-PCS | Performed by: HOSPITALIST

## 2023-10-20 PROCEDURE — 99253 IP/OBS CNSLTJ NEW/EST LOW 45: CPT | Performed by: NURSE PRACTITIONER

## 2023-10-20 PROCEDURE — 74177 CT ABD & PELVIS W/CONTRAST: CPT | Performed by: HOSPITALIST

## 2023-10-20 PROCEDURE — 99222 1ST HOSP IP/OBS MODERATE 55: CPT | Performed by: HOSPITALIST

## 2023-10-20 RX ORDER — ALBUTEROL SULFATE 90 UG/1
2 AEROSOL, METERED RESPIRATORY (INHALATION) EVERY 4 HOURS PRN
Status: DISCONTINUED | OUTPATIENT
Start: 2023-10-20 | End: 2023-10-27

## 2023-10-20 RX ORDER — ACETAMINOPHEN 325 MG/1
650 TABLET ORAL EVERY 4 HOURS PRN
Status: DISCONTINUED | OUTPATIENT
Start: 2023-10-20 | End: 2023-10-27

## 2023-10-20 RX ORDER — ACETAMINOPHEN 500 MG
500 TABLET ORAL EVERY 4 HOURS PRN
Status: DISCONTINUED | OUTPATIENT
Start: 2023-10-20 | End: 2023-10-27

## 2023-10-20 RX ORDER — ONDANSETRON 2 MG/ML
4 INJECTION INTRAMUSCULAR; INTRAVENOUS EVERY 6 HOURS PRN
Status: DISCONTINUED | OUTPATIENT
Start: 2023-10-20 | End: 2023-10-27

## 2023-10-20 RX ORDER — PROCHLORPERAZINE EDISYLATE 5 MG/ML
5 INJECTION INTRAMUSCULAR; INTRAVENOUS EVERY 8 HOURS PRN
Status: DISCONTINUED | OUTPATIENT
Start: 2023-10-20 | End: 2023-10-27

## 2023-10-20 RX ORDER — TEMAZEPAM 15 MG/1
15 CAPSULE ORAL NIGHTLY PRN
Status: DISCONTINUED | OUTPATIENT
Start: 2023-10-20 | End: 2023-10-27

## 2023-10-20 RX ORDER — GABAPENTIN 600 MG/1
600 TABLET ORAL 3 TIMES DAILY
Status: DISCONTINUED | OUTPATIENT
Start: 2023-10-20 | End: 2023-10-27

## 2023-10-20 RX ORDER — CLONAZEPAM 0.5 MG/1
0.25 TABLET ORAL 4 TIMES DAILY PRN
Status: DISCONTINUED | OUTPATIENT
Start: 2023-10-20 | End: 2023-10-27

## 2023-10-20 RX ORDER — LISINOPRIL 20 MG/1
20 TABLET ORAL DAILY
Status: DISCONTINUED | OUTPATIENT
Start: 2023-10-21 | End: 2023-10-27

## 2023-10-20 RX ORDER — HYDROCODONE BITARTRATE AND ACETAMINOPHEN 5; 325 MG/1; MG/1
1 TABLET ORAL EVERY 4 HOURS PRN
Status: DISCONTINUED | OUTPATIENT
Start: 2023-10-20 | End: 2023-10-27

## 2023-10-20 RX ORDER — SODIUM CHLORIDE 9 MG/ML
INJECTION, SOLUTION INTRAVENOUS CONTINUOUS
Status: DISCONTINUED | OUTPATIENT
Start: 2023-10-20 | End: 2023-10-27

## 2023-10-20 RX ORDER — MORPHINE SULFATE 2 MG/ML
2 INJECTION, SOLUTION INTRAMUSCULAR; INTRAVENOUS EVERY 2 HOUR PRN
Status: DISCONTINUED | OUTPATIENT
Start: 2023-10-20 | End: 2023-10-27

## 2023-10-20 RX ORDER — MORPHINE SULFATE 4 MG/ML
4 INJECTION, SOLUTION INTRAMUSCULAR; INTRAVENOUS EVERY 2 HOUR PRN
Status: DISCONTINUED | OUTPATIENT
Start: 2023-10-20 | End: 2023-10-27

## 2023-10-20 RX ORDER — MORPHINE SULFATE 2 MG/ML
1 INJECTION, SOLUTION INTRAMUSCULAR; INTRAVENOUS EVERY 2 HOUR PRN
Status: DISCONTINUED | OUTPATIENT
Start: 2023-10-20 | End: 2023-10-27

## 2023-10-20 RX ORDER — SERTRALINE HYDROCHLORIDE 100 MG/1
100 TABLET, FILM COATED ORAL DAILY
Status: DISCONTINUED | OUTPATIENT
Start: 2023-10-21 | End: 2023-10-27

## 2023-10-20 RX ORDER — ENOXAPARIN SODIUM 100 MG/ML
40 INJECTION SUBCUTANEOUS DAILY
Status: DISCONTINUED | OUTPATIENT
Start: 2023-10-20 | End: 2023-10-27

## 2023-10-20 RX ORDER — HYDROCODONE BITARTRATE AND ACETAMINOPHEN 5; 325 MG/1; MG/1
2 TABLET ORAL EVERY 4 HOURS PRN
Status: DISCONTINUED | OUTPATIENT
Start: 2023-10-20 | End: 2023-10-27

## 2023-10-20 NOTE — H&P
[facility]    PATIENT'S NAME: Sofia BARGER   ATTENDING PHYSICIAN: Derek Hargrove MD   PATIENT ACCOUNT#:   [id number]    LOCATION:  [address] [place] [address] RECORD #:   [id number]       YOB: 1965  ADMISSION DATE:       10/20/2023    HISTORY AND PHYSICAL EXAMINATION    CHIEF COMPLAINT:  Sigmoid diverticulitis, failed outpatient treatment. HISTORY OF PRESENT ILLNESS:  The patient is a 31-year-old  male who started having abdominal pain around 2-1/2 weeks ago. He had a CT done at a different facility on October 5, which showed uncomplicated proximal sigmoid diverticulitis. Patient was started on oral Augmentin course with some improvement initially and then later on started developing recurrent pain in lower abdomen, radiating to bilateral groin areas. He was communicating with his gastroenterologist throughout, Dr. Christian Swann, and today he was sent to the hospital as a direct admit for IV antibiotic treatment. PAST MEDICAL HISTORY:  Hypertension, generalized osteoarthritis, degenerative joint disease of lumbar spine, major depressive disorder, diverticulosis and 1 episode of diverticulitis a few months ago, anxiety, obstructive sleep apnea, obesity. He had chronic first-degree heart block. Recently, he had palpitations and Holter monitoring showing 9% burden of premature ventricular contractions and very small burden of atrial fibrillation. He was supposed to get a loop monitor today, which was cancelled because of his current diverticulitis. PAST SURGICAL HISTORY:  Laparoscopic cholecystectomy, right knee arthroscopic procedure, tonsillectomy. He had a colonoscopy in 2016 which showed diverticulosis and internal hemorrhoids, and polypectomy at that time. MEDICATIONS:  Please see medication reconciliation list.    ALLERGIES:  No known drug allergies. FAMILY HISTORY:  Mother had heart disease and hypertension.   Father had atrial flutter, prostate cancer, and hypertension. SOCIAL HISTORY:  Ex-tobacco user. No current tobacco, alcohol, or drug use. Lives with his family. Independent in his basic activities of daily living. REVIEW OF SYSTEMS:  Patient describes intermittent to constant pain in left lower quadrant area, radiating to the left and right groin area. Intermittent constipation. No fever or chills. Other 12-point review of systems negative. PHYSICAL EXAMINATION:    GENERAL:  Alert. Oriented to time, place, and person. No acute distress. VITAL SIGNS:  Temperature 98.6, pulse 65, respiratory rate 18, blood pressure 126/67, pulse oximetry 98% on room air. HEENT:  Atraumatic. Oropharynx clear. Moist mucous membranes. Normal hard and soft palate. Eyes:  Anicteric sclerae. Pupils equal, round, reactive. NECK:  Supple. No lymphadenopathy. Trachea midline. Full range of motion. LUNGS:  Clear to auscultation bilaterally. Normal respiratory effort. HEART:  Regular rate and rhythm. S1 and S2 auscultated. No murmur. ABDOMEN:  Soft, nondistended. Tenderness noted in left lower quadrant area, superficial with palpation. Mild rebound tenderness. No guarding. Positive bowel sounds. EXTREMITIES:  No peripheral edema, clubbing, or cyanosis. NEUROLOGIC:  Motor and sensory intact. ASSESSMENT AND PLAN:    1. Persistent abdominal pain with underlying sigmoid diverticulitis, failed outpatient treatment. 2.   Hypertension. Patient will be admitted to general medical floor. Considering his recent diagnosis with frequent PVCs and intermittent left bundle branch block, will avoid Flagyl. Start him on IV Zosyn. IV fluids. Clear liquid diet. Pain and nausea control. Repeat CT scan of the abdomen and obtain gastroenterology consult. Further recommendations to follow.     Dictated By Fredi Saleh MD  d: 10/20/2023 14:17:05  t: 10/20/2023 14:41:14  Job [id number]  [place]/

## 2023-10-20 NOTE — PLAN OF CARE
Patient admitted directly with c/o left lower abdominal pain after almost completing a 2 week course of oral antibiotics. Started on IV Zosyn and IV fluids. Pt to have CT scan of abd this afternoon. Patient is self care, call light within reach and bed in low position. Problem: Patient Centered Care  Goal: Patient preferences are identified and integrated in the patient's plan of care  Description: Interventions:  - What would you like us to know as we care for you?  Home with wife  - Provide timely, complete, and accurate information to patient/family  - Incorporate patient and family knowledge, values, beliefs, and cultural backgrounds into the planning and delivery of care  - Encourage patient/family to participate in care and decision-making at the level they choose  - Honor patient and family perspectives and choices  Outcome: Progressing     Problem: Patient/Family Goals  Goal: Patient/Family Long Term Goal  Description: Patient's Long Term Goal: discharge    Interventions:  - monitor labs and vitals  -medications as prescribed  -discharge planning  -update pt on plan of care  - See additional Care Plan goals for specific interventions  Outcome: Progressing  Goal: Patient/Family Short Term Goal  Description: Patient's Short Term Goal: abdominal pain resolved    Interventions:   - GI consult  -IV antibiotics and pain meds as prescribed  - See additional Care Plan goals for specific interventions  Outcome: Progressing     Problem: PAIN - ADULT  Goal: Verbalizes/displays adequate comfort level or patient's stated pain goal  Description: INTERVENTIONS:  - Encourage pt to monitor pain and request assistance  - Assess pain using appropriate pain scale  - Administer analgesics based on type and severity of pain and evaluate response  - Implement non-pharmacological measures as appropriate and evaluate response  - Consider cultural and social influences on pain and pain management  - Manage/alleviate anxiety  - Utilize distraction and/or relaxation techniques  - Monitor for opioid side effects  - Notify MD/LIP if interventions unsuccessful or patient reports new pain  - Anticipate increased pain with activity and pre-medicate as appropriate  Outcome: Progressing     Problem: CARDIOVASCULAR - ADULT  Goal: Maintains optimal cardiac output and hemodynamic stability  Description: INTERVENTIONS:  - Monitor vital signs, rhythm, and trends  - Monitor for bleeding, hypotension and signs of decreased cardiac output  - Evaluate effectiveness of vasoactive medications to optimize hemodynamic stability  - Monitor arterial and/or venous puncture sites for bleeding and/or hematoma  - Assess quality of pulses, skin color and temperature  - Assess for signs of decreased coronary artery perfusion - ex.  Angina  - Evaluate fluid balance, assess for edema, trend weights  Outcome: Progressing  Goal: Absence of cardiac arrhythmias or at baseline  Description: INTERVENTIONS:  - Continuous cardiac monitoring, monitor vital signs, obtain 12 lead EKG if indicated  - Evaluate effectiveness of antiarrhythmic and heart rate control medications as ordered  - Initiate emergency measures for life threatening arrhythmias  - Monitor electrolytes and administer replacement therapy as ordered  Outcome: Progressing     Problem: GASTROINTESTINAL - ADULT  Goal: Minimal or absence of nausea and vomiting  Description: INTERVENTIONS:  - Maintain adequate hydration with IV or PO as ordered and tolerated  - Nasogastric tube to low intermittent suction as ordered  - Evaluate effectiveness of ordered antiemetic medications  - Provide nonpharmacologic comfort measures as appropriate  - Advance diet as tolerated, if ordered  - Obtain nutritional consult as needed  - Evaluate fluid balance  Outcome: Progressing  Goal: Maintains or returns to baseline bowel function  Description: INTERVENTIONS:  - Assess bowel function  - Maintain adequate hydration with IV or PO as ordered and tolerated  - Evaluate effectiveness of GI medications  - Encourage mobilization and activity  - Obtain nutritional consult as needed  - Establish a toileting routine/schedule  - Consider collaborating with pharmacy to review patient's medication profile  Outcome: Progressing

## 2023-10-21 LAB
ANION GAP SERPL CALC-SCNC: 3 MMOL/L (ref 0–18)
BASOPHILS # BLD AUTO: 0 X10(3) UL (ref 0–0.2)
BASOPHILS NFR BLD AUTO: 0 %
BUN BLD-MCNC: 8 MG/DL (ref 7–18)
BUN/CREAT SERPL: 9.8 (ref 10–20)
CALCIUM BLD-MCNC: 8.8 MG/DL (ref 8.5–10.1)
CHLORIDE SERPL-SCNC: 107 MMOL/L (ref 98–112)
CO2 SERPL-SCNC: 30 MMOL/L (ref 21–32)
CREAT BLD-MCNC: 0.82 MG/DL
DEPRECATED RDW RBC AUTO: 40.4 FL (ref 35.1–46.3)
EGFRCR SERPLBLD CKD-EPI 2021: 102 ML/MIN/1.73M2 (ref 60–?)
EOSINOPHIL # BLD AUTO: 0.13 X10(3) UL (ref 0–0.7)
EOSINOPHIL NFR BLD AUTO: 1.5 %
ERYTHROCYTE [DISTWIDTH] IN BLOOD BY AUTOMATED COUNT: 13.1 % (ref 11–15)
GLUCOSE BLD-MCNC: 89 MG/DL (ref 70–99)
HCT VFR BLD AUTO: 39 %
HGB BLD-MCNC: 13.3 G/DL
IMM GRANULOCYTES # BLD AUTO: 0.03 X10(3) UL (ref 0–1)
IMM GRANULOCYTES NFR BLD: 0.4 %
LYMPHOCYTES # BLD AUTO: 1.86 X10(3) UL (ref 1–4)
LYMPHOCYTES NFR BLD AUTO: 21.8 %
MCH RBC QN AUTO: 29.1 PG (ref 26–34)
MCHC RBC AUTO-ENTMCNC: 34.1 G/DL (ref 31–37)
MCV RBC AUTO: 85.3 FL
MONOCYTES # BLD AUTO: 0.56 X10(3) UL (ref 0.1–1)
MONOCYTES NFR BLD AUTO: 6.6 %
NEUTROPHILS # BLD AUTO: 5.95 X10 (3) UL (ref 1.5–7.7)
NEUTROPHILS # BLD AUTO: 5.95 X10(3) UL (ref 1.5–7.7)
NEUTROPHILS NFR BLD AUTO: 69.7 %
OSMOLALITY SERPL CALC.SUM OF ELEC: 288 MOSM/KG (ref 275–295)
PLATELET # BLD AUTO: 373 10(3)UL (ref 150–450)
POTASSIUM SERPL-SCNC: 3.8 MMOL/L (ref 3.5–5.1)
RBC # BLD AUTO: 4.57 X10(6)UL
SODIUM SERPL-SCNC: 140 MMOL/L (ref 136–145)
WBC # BLD AUTO: 8.5 X10(3) UL (ref 4–11)

## 2023-10-21 PROCEDURE — 99233 SBSQ HOSP IP/OBS HIGH 50: CPT | Performed by: INTERNAL MEDICINE

## 2023-10-21 PROCEDURE — 99232 SBSQ HOSP IP/OBS MODERATE 35: CPT | Performed by: INTERNAL MEDICINE

## 2023-10-21 NOTE — PLAN OF CARE
Pt is mainly self-care, A&O x 4, calls appropriately, no acute changes per shift, slept overnight, fall precautions in place. Adhered to CPAP overnight. Requires PRN Klonopin at 04:30, 06:00, 09:30, 13:00, to keep up with home dosing. Asymptomatic bradycardia. Problem: Patient Centered Care  Goal: Patient preferences are identified and integrated in the patient's plan of care  Description: Interventions:  - What would you like us to know as we care for you?  Home with wife  - Provide timely, complete, and accurate information to patient/family  - Incorporate patient and family knowledge, values, beliefs, and cultural backgrounds into the planning and delivery of care  - Encourage patient/family to participate in care and decision-making at the level they choose  - Honor patient and family perspectives and choices  Outcome: Progressing     Problem: Patient/Family Goals  Goal: Patient/Family Long Term Goal  Description: Patient's Long Term Goal: discharge    Interventions:  - monitor labs and vitals  -medications as prescribed  -discharge planning  -update pt on plan of care  - See additional Care Plan goals for specific interventions  Outcome: Progressing  Goal: Patient/Family Short Term Goal  Description: Patient's Short Term Goal: abdominal pain resolved    Interventions:   - GI consult  -IV antibiotics and pain meds as prescribed  - See additional Care Plan goals for specific interventions  Outcome: Progressing     Problem: PAIN - ADULT  Goal: Verbalizes/displays adequate comfort level or patient's stated pain goal  Description: INTERVENTIONS:  - Encourage pt to monitor pain and request assistance  - Assess pain using appropriate pain scale  - Administer analgesics based on type and severity of pain and evaluate response  - Implement non-pharmacological measures as appropriate and evaluate response  - Consider cultural and social influences on pain and pain management  - Manage/alleviate anxiety  - Utilize distraction and/or relaxation techniques  - Monitor for opioid side effects  - Notify MD/LIP if interventions unsuccessful or patient reports new pain  - Anticipate increased pain with activity and pre-medicate as appropriate  Outcome: Progressing     Problem: GASTROINTESTINAL - ADULT  Goal: Minimal or absence of nausea and vomiting  Description: INTERVENTIONS:  - Maintain adequate hydration with IV or PO as ordered and tolerated  - Nasogastric tube to low intermittent suction as ordered  - Evaluate effectiveness of ordered antiemetic medications  - Provide nonpharmacologic comfort measures as appropriate  - Advance diet as tolerated, if ordered  - Obtain nutritional consult as needed  - Evaluate fluid balance  Outcome: Progressing  Goal: Maintains or returns to baseline bowel function  Description: INTERVENTIONS:  - Assess bowel function  - Maintain adequate hydration with IV or PO as ordered and tolerated  - Evaluate effectiveness of GI medications  - Encourage mobilization and activity  - Obtain nutritional consult as needed  - Establish a toileting routine/schedule  - Consider collaborating with pharmacy to review patient's medication profile  Outcome: Progressing

## 2023-10-21 NOTE — PLAN OF CARE
Pt aox4, resting in bed, up & ambulatory. Pt receiving iv abx  Problem: Patient Centered Care  Goal: Patient preferences are identified and integrated in the patient's plan of care  Description: Interventions:  - What would you like us to know as we care for you?  Home with wife  - Provide timely, complete, and accurate information to patient/family  - Incorporate patient and family knowledge, values, beliefs, and cultural backgrounds into the planning and delivery of care  - Encourage patient/family to participate in care and decision-making at the level they choose  - Honor patient and family perspectives and choices  Outcome: Progressing     Problem: Patient/Family Goals  Goal: Patient/Family Long Term Goal  Description: Patient's Long Term Goal: discharge    Interventions:  - monitor labs and vitals  -medications as prescribed  -discharge planning  -update pt on plan of care  - See additional Care Plan goals for specific interventions  Outcome: Progressing  Goal: Patient/Family Short Term Goal  Description: Patient's Short Term Goal: abdominal pain resolved    Interventions:   - GI consult  -IV antibiotics and pain meds as prescribed  - See additional Care Plan goals for specific interventions  Outcome: Progressing     Problem: PAIN - ADULT  Goal: Verbalizes/displays adequate comfort level or patient's stated pain goal  Description: INTERVENTIONS:  - Encourage pt to monitor pain and request assistance  - Assess pain using appropriate pain scale  - Administer analgesics based on type and severity of pain and evaluate response  - Implement non-pharmacological measures as appropriate and evaluate response  - Consider cultural and social influences on pain and pain management  - Manage/alleviate anxiety  - Utilize distraction and/or relaxation techniques  - Monitor for opioid side effects  - Notify MD/LIP if interventions unsuccessful or patient reports new pain  - Anticipate increased pain with activity and pre-medicate as appropriate  Outcome: Progressing     Problem: CARDIOVASCULAR - ADULT  Goal: Maintains optimal cardiac output and hemodynamic stability  Description: INTERVENTIONS:  - Monitor vital signs, rhythm, and trends  - Monitor for bleeding, hypotension and signs of decreased cardiac output  - Evaluate effectiveness of vasoactive medications to optimize hemodynamic stability  - Monitor arterial and/or venous puncture sites for bleeding and/or hematoma  - Assess quality of pulses, skin color and temperature  - Assess for signs of decreased coronary artery perfusion - ex.  Angina  - Evaluate fluid balance, assess for edema, trend weights  Outcome: Progressing  Goal: Absence of cardiac arrhythmias or at baseline  Description: INTERVENTIONS:  - Continuous cardiac monitoring, monitor vital signs, obtain 12 lead EKG if indicated  - Evaluate effectiveness of antiarrhythmic and heart rate control medications as ordered  - Initiate emergency measures for life threatening arrhythmias  - Monitor electrolytes and administer replacement therapy as ordered  Outcome: Progressing     Problem: GASTROINTESTINAL - ADULT  Goal: Minimal or absence of nausea and vomiting  Description: INTERVENTIONS:  - Maintain adequate hydration with IV or PO as ordered and tolerated  - Nasogastric tube to low intermittent suction as ordered  - Evaluate effectiveness of ordered antiemetic medications  - Provide nonpharmacologic comfort measures as appropriate  - Advance diet as tolerated, if ordered  - Obtain nutritional consult as needed  - Evaluate fluid balance  Outcome: Progressing  Goal: Maintains or returns to baseline bowel function  Description: INTERVENTIONS:  - Assess bowel function  - Maintain adequate hydration with IV or PO as ordered and tolerated  - Evaluate effectiveness of GI medications  - Encourage mobilization and activity  - Obtain nutritional consult as needed  - Establish a toileting routine/schedule  - Consider collaborating with pharmacy to review patient's medication profile  Outcome: Progressing

## 2023-10-22 ENCOUNTER — TELEPHONE (OUTPATIENT)
Facility: CLINIC | Age: 58
End: 2023-10-22

## 2023-10-22 LAB
ALBUMIN SERPL-MCNC: 2.8 G/DL (ref 3.4–5)
ALBUMIN/GLOB SERPL: 0.8 {RATIO} (ref 1–2)
ALP LIVER SERPL-CCNC: 56 U/L
ALT SERPL-CCNC: 13 U/L
ANION GAP SERPL CALC-SCNC: 5 MMOL/L (ref 0–18)
AST SERPL-CCNC: 9 U/L (ref 15–37)
BASOPHILS # BLD AUTO: 0 X10(3) UL (ref 0–0.2)
BASOPHILS NFR BLD AUTO: 0 %
BILIRUB SERPL-MCNC: 0.8 MG/DL (ref 0.1–2)
BUN BLD-MCNC: 7 MG/DL (ref 7–18)
BUN/CREAT SERPL: 8.9 (ref 10–20)
C DIFF TOX B STL QL: NEGATIVE
CALCIUM BLD-MCNC: 8.4 MG/DL (ref 8.5–10.1)
CHLORIDE SERPL-SCNC: 108 MMOL/L (ref 98–112)
CO2 SERPL-SCNC: 27 MMOL/L (ref 21–32)
CREAT BLD-MCNC: 0.79 MG/DL
DEPRECATED RDW RBC AUTO: 41.7 FL (ref 35.1–46.3)
EGFRCR SERPLBLD CKD-EPI 2021: 104 ML/MIN/1.73M2 (ref 60–?)
EOSINOPHIL # BLD AUTO: 0.12 X10(3) UL (ref 0–0.7)
EOSINOPHIL NFR BLD AUTO: 1.7 %
ERYTHROCYTE [DISTWIDTH] IN BLOOD BY AUTOMATED COUNT: 13 % (ref 11–15)
GLOBULIN PLAS-MCNC: 3.3 G/DL (ref 2.8–4.4)
GLUCOSE BLD-MCNC: 83 MG/DL (ref 70–99)
HCT VFR BLD AUTO: 37.3 %
HGB BLD-MCNC: 12.3 G/DL
IMM GRANULOCYTES # BLD AUTO: 0.02 X10(3) UL (ref 0–1)
IMM GRANULOCYTES NFR BLD: 0.3 %
LYMPHOCYTES # BLD AUTO: 1.79 X10(3) UL (ref 1–4)
LYMPHOCYTES NFR BLD AUTO: 25.6 %
MCH RBC QN AUTO: 28.8 PG (ref 26–34)
MCHC RBC AUTO-ENTMCNC: 33 G/DL (ref 31–37)
MCV RBC AUTO: 87.4 FL
MONOCYTES # BLD AUTO: 0.5 X10(3) UL (ref 0.1–1)
MONOCYTES NFR BLD AUTO: 7.2 %
NEUTROPHILS # BLD AUTO: 4.55 X10 (3) UL (ref 1.5–7.7)
NEUTROPHILS # BLD AUTO: 4.55 X10(3) UL (ref 1.5–7.7)
NEUTROPHILS NFR BLD AUTO: 65.2 %
OSMOLALITY SERPL CALC.SUM OF ELEC: 287 MOSM/KG (ref 275–295)
PLATELET # BLD AUTO: 344 10(3)UL (ref 150–450)
POTASSIUM SERPL-SCNC: 3.9 MMOL/L (ref 3.5–5.1)
PROT SERPL-MCNC: 6.1 G/DL (ref 6.4–8.2)
RBC # BLD AUTO: 4.27 X10(6)UL
SODIUM SERPL-SCNC: 140 MMOL/L (ref 136–145)
WBC # BLD AUTO: 7 X10(3) UL (ref 4–11)

## 2023-10-22 PROCEDURE — 99232 SBSQ HOSP IP/OBS MODERATE 35: CPT | Performed by: INTERNAL MEDICINE

## 2023-10-22 PROCEDURE — 99233 SBSQ HOSP IP/OBS HIGH 50: CPT | Performed by: INTERNAL MEDICINE

## 2023-10-22 NOTE — PLAN OF CARE
Problem: Patient Centered Care  Goal: Patient preferences are identified and integrated in the patient's plan of care  Description: Interventions:  - What would you like us to know as we care for you?  Home with wife  - Provide timely, complete, and accurate information to patient/family  - Incorporate patient and family knowledge, values, beliefs, and cultural backgrounds into the planning and delivery of care  - Encourage patient/family to participate in care and decision-making at the level they choose  - Honor patient and family perspectives and choices  Outcome: Progressing     Problem: Patient/Family Goals  Goal: Patient/Family Long Term Goal  Description: Patient's Long Term Goal: discharge    Interventions:  - monitor labs and vitals  -medications as prescribed  -discharge planning  -update pt on plan of care  - See additional Care Plan goals for specific interventions  Outcome: Progressing  Goal: Patient/Family Short Term Goal  Description: Patient's Short Term Goal: abdominal pain resolved    Interventions:   - GI consult  -IV antibiotics and pain meds as prescribed  - See additional Care Plan goals for specific interventions  Outcome: Progressing     Problem: PAIN - ADULT  Goal: Verbalizes/displays adequate comfort level or patient's stated pain goal  Description: INTERVENTIONS:  - Encourage pt to monitor pain and request assistance  - Assess pain using appropriate pain scale  - Administer analgesics based on type and severity of pain and evaluate response  - Implement non-pharmacological measures as appropriate and evaluate response  - Consider cultural and social influences on pain and pain management  - Manage/alleviate anxiety  - Utilize distraction and/or relaxation techniques  - Monitor for opioid side effects  - Notify MD/LIP if interventions unsuccessful or patient reports new pain  - Anticipate increased pain with activity and pre-medicate as appropriate  Outcome: Progressing     Problem: CARDIOVASCULAR - ADULT  Goal: Maintains optimal cardiac output and hemodynamic stability  Description: INTERVENTIONS:  - Monitor vital signs, rhythm, and trends  - Monitor for bleeding, hypotension and signs of decreased cardiac output  - Evaluate effectiveness of vasoactive medications to optimize hemodynamic stability  - Monitor arterial and/or venous puncture sites for bleeding and/or hematoma  - Assess quality of pulses, skin color and temperature  - Assess for signs of decreased coronary artery perfusion - ex.  Angina  - Evaluate fluid balance, assess for edema, trend weights  Outcome: Progressing  Goal: Absence of cardiac arrhythmias or at baseline  Description: INTERVENTIONS:  - Continuous cardiac monitoring, monitor vital signs, obtain 12 lead EKG if indicated  - Evaluate effectiveness of antiarrhythmic and heart rate control medications as ordered  - Initiate emergency measures for life threatening arrhythmias  - Monitor electrolytes and administer replacement therapy as ordered  Outcome: Progressing     Problem: GASTROINTESTINAL - ADULT  Goal: Minimal or absence of nausea and vomiting  Description: INTERVENTIONS:  - Maintain adequate hydration with IV or PO as ordered and tolerated  - Nasogastric tube to low intermittent suction as ordered  - Evaluate effectiveness of ordered antiemetic medications  - Provide nonpharmacologic comfort measures as appropriate  - Advance diet as tolerated, if ordered  - Obtain nutritional consult as needed  - Evaluate fluid balance  Outcome: Progressing  Goal: Maintains or returns to baseline bowel function  Description: INTERVENTIONS:  - Assess bowel function  - Maintain adequate hydration with IV or PO as ordered and tolerated  - Evaluate effectiveness of GI medications  - Encourage mobilization and activity  - Obtain nutritional consult as needed  - Establish a toileting routine/schedule  - Consider collaborating with pharmacy to review patient's medication profile  Outcome: Progressing   IV abx and IVF. Clear liquid diet. GI on con. No acute events overnight.

## 2023-10-22 NOTE — PLAN OF CARE
Problem: PAIN - ADULT  Goal: Verbalizes/displays adequate comfort level or patient's stated pain goal  Description: INTERVENTIONS:  - Encourage pt to monitor pain and request assistance  - Assess pain using appropriate pain scale  - Administer analgesics based on type and severity of pain and evaluate response  - Implement non-pharmacological measures as appropriate and evaluate response  - Consider cultural and social influences on pain and pain management  - Manage/alleviate anxiety  - Utilize distraction and/or relaxation techniques  - Monitor for opioid side effects  - Notify MD/LIP if interventions unsuccessful or patient reports new pain  - Anticipate increased pain with activity and pre-medicate as appropriate  Outcome: Progressing     Problem: CARDIOVASCULAR - ADULT  Goal: Maintains optimal cardiac output and hemodynamic stability  Description: INTERVENTIONS:  - Monitor vital signs, rhythm, and trends  - Monitor for bleeding, hypotension and signs of decreased cardiac output  - Evaluate effectiveness of vasoactive medications to optimize hemodynamic stability  - Monitor arterial and/or venous puncture sites for bleeding and/or hematoma  - Assess quality of pulses, skin color and temperature  - Assess for signs of decreased coronary artery perfusion - ex.  Angina  - Evaluate fluid balance, assess for edema, trend weights  Outcome: Progressing  Goal: Absence of cardiac arrhythmias or at baseline  Description: INTERVENTIONS:  - Continuous cardiac monitoring, monitor vital signs, obtain 12 lead EKG if indicated  - Evaluate effectiveness of antiarrhythmic and heart rate control medications as ordered  - Initiate emergency measures for life threatening arrhythmias  - Monitor electrolytes and administer replacement therapy as ordered  Outcome: Progressing     Problem: GASTROINTESTINAL - ADULT  Goal: Minimal or absence of nausea and vomiting  Description: INTERVENTIONS:  - Maintain adequate hydration with IV or PO as ordered and tolerated  - Nasogastric tube to low intermittent suction as ordered  - Evaluate effectiveness of ordered antiemetic medications  - Provide nonpharmacologic comfort measures as appropriate  - Advance diet as tolerated, if ordered  - Obtain nutritional consult as needed  - Evaluate fluid balance  Outcome: Progressing     No acute events this shift. Patient tolerating clear liquid diet. Safety precautions in place, frequent nursing rounding completed, call light within reach. All questions answered and needs met.

## 2023-10-23 ENCOUNTER — APPOINTMENT (OUTPATIENT)
Dept: CT IMAGING | Facility: HOSPITAL | Age: 58
DRG: 392 | End: 2023-10-23
Attending: PHYSICIAN ASSISTANT

## 2023-10-23 LAB
ALBUMIN SERPL-MCNC: 2.7 G/DL (ref 3.4–5)
ALBUMIN/GLOB SERPL: 0.7 {RATIO} (ref 1–2)
ALP LIVER SERPL-CCNC: 57 U/L
ALT SERPL-CCNC: 14 U/L
ANION GAP SERPL CALC-SCNC: 4 MMOL/L (ref 0–18)
AST SERPL-CCNC: 13 U/L (ref 15–37)
BASOPHILS # BLD AUTO: 0.01 X10(3) UL (ref 0–0.2)
BASOPHILS NFR BLD AUTO: 0.1 %
BILIRUB SERPL-MCNC: 0.8 MG/DL (ref 0.1–2)
BUN BLD-MCNC: 6 MG/DL (ref 7–18)
BUN/CREAT SERPL: 8 (ref 10–20)
CALCIUM BLD-MCNC: 8.4 MG/DL (ref 8.5–10.1)
CHLORIDE SERPL-SCNC: 109 MMOL/L (ref 98–112)
CO2 SERPL-SCNC: 27 MMOL/L (ref 21–32)
CREAT BLD-MCNC: 0.75 MG/DL
DEPRECATED RDW RBC AUTO: 42.4 FL (ref 35.1–46.3)
EGFRCR SERPLBLD CKD-EPI 2021: 105 ML/MIN/1.73M2 (ref 60–?)
EOSINOPHIL # BLD AUTO: 0.12 X10(3) UL (ref 0–0.7)
EOSINOPHIL NFR BLD AUTO: 1.7 %
ERYTHROCYTE [DISTWIDTH] IN BLOOD BY AUTOMATED COUNT: 13.1 % (ref 11–15)
GLOBULIN PLAS-MCNC: 3.7 G/DL (ref 2.8–4.4)
GLUCOSE BLD-MCNC: 81 MG/DL (ref 70–99)
HCT VFR BLD AUTO: 38.2 %
HGB BLD-MCNC: 12.4 G/DL
IMM GRANULOCYTES # BLD AUTO: 0.01 X10(3) UL (ref 0–1)
IMM GRANULOCYTES NFR BLD: 0.1 %
LYMPHOCYTES # BLD AUTO: 1.63 X10(3) UL (ref 1–4)
LYMPHOCYTES NFR BLD AUTO: 23 %
MCH RBC QN AUTO: 28.8 PG (ref 26–34)
MCHC RBC AUTO-ENTMCNC: 32.5 G/DL (ref 31–37)
MCV RBC AUTO: 88.8 FL
MONOCYTES # BLD AUTO: 0.43 X10(3) UL (ref 0.1–1)
MONOCYTES NFR BLD AUTO: 6.1 %
NEUTROPHILS # BLD AUTO: 4.9 X10 (3) UL (ref 1.5–7.7)
NEUTROPHILS # BLD AUTO: 4.9 X10(3) UL (ref 1.5–7.7)
NEUTROPHILS NFR BLD AUTO: 69 %
OSMOLALITY SERPL CALC.SUM OF ELEC: 287 MOSM/KG (ref 275–295)
PLATELET # BLD AUTO: 373 10(3)UL (ref 150–450)
POTASSIUM SERPL-SCNC: 3.6 MMOL/L (ref 3.5–5.1)
PROT SERPL-MCNC: 6.4 G/DL (ref 6.4–8.2)
RBC # BLD AUTO: 4.3 X10(6)UL
SODIUM SERPL-SCNC: 140 MMOL/L (ref 136–145)
WBC # BLD AUTO: 7.1 X10(3) UL (ref 4–11)

## 2023-10-23 PROCEDURE — 74177 CT ABD & PELVIS W/CONTRAST: CPT | Performed by: PHYSICIAN ASSISTANT

## 2023-10-23 PROCEDURE — 99233 SBSQ HOSP IP/OBS HIGH 50: CPT | Performed by: INTERNAL MEDICINE

## 2023-10-23 PROCEDURE — 99232 SBSQ HOSP IP/OBS MODERATE 35: CPT | Performed by: PHYSICIAN ASSISTANT

## 2023-10-23 RX ORDER — IOHEXOL 350 MG/ML
80 INJECTION, SOLUTION INTRAVENOUS
Status: COMPLETED | OUTPATIENT
Start: 2023-10-23 | End: 2023-10-23

## 2023-10-23 NOTE — PLAN OF CARE
Problem: Patient Centered Care  Goal: Patient preferences are identified and integrated in the patient's plan of care  Description: Interventions:  - What would you like us to know as we care for you?  Home with wife  - Provide timely, complete, and accurate information to patient/family  - Incorporate patient and family knowledge, values, beliefs, and cultural backgrounds into the planning and delivery of care  - Encourage patient/family to participate in care and decision-making at the level they choose  - Honor patient and family perspectives and choices  Outcome: Progressing     Problem: Patient/Family Goals  Goal: Patient/Family Long Term Goal  Description: Patient's Long Term Goal: discharge    Interventions:  - monitor labs and vitals  -medications as prescribed  -discharge planning  -update pt on plan of care  - See additional Care Plan goals for specific interventions  Outcome: Progressing  Goal: Patient/Family Short Term Goal  Description: Patient's Short Term Goal: abdominal pain resolved    Interventions:   - GI consult  -IV antibiotics and pain meds as prescribed  - See additional Care Plan goals for specific interventions  Outcome: Progressing     Problem: PAIN - ADULT  Goal: Verbalizes/displays adequate comfort level or patient's stated pain goal  Description: INTERVENTIONS:  - Encourage pt to monitor pain and request assistance  - Assess pain using appropriate pain scale  - Administer analgesics based on type and severity of pain and evaluate response  - Implement non-pharmacological measures as appropriate and evaluate response  - Consider cultural and social influences on pain and pain management  - Manage/alleviate anxiety  - Utilize distraction and/or relaxation techniques  - Monitor for opioid side effects  - Notify MD/LIP if interventions unsuccessful or patient reports new pain  - Anticipate increased pain with activity and pre-medicate as appropriate  Outcome: Progressing     Problem: CARDIOVASCULAR - ADULT  Goal: Maintains optimal cardiac output and hemodynamic stability  Description: INTERVENTIONS:  - Monitor vital signs, rhythm, and trends  - Monitor for bleeding, hypotension and signs of decreased cardiac output  - Evaluate effectiveness of vasoactive medications to optimize hemodynamic stability  - Monitor arterial and/or venous puncture sites for bleeding and/or hematoma  - Assess quality of pulses, skin color and temperature  - Assess for signs of decreased coronary artery perfusion - ex.  Angina  - Evaluate fluid balance, assess for edema, trend weights  Outcome: Progressing  Goal: Absence of cardiac arrhythmias or at baseline  Description: INTERVENTIONS:  - Continuous cardiac monitoring, monitor vital signs, obtain 12 lead EKG if indicated  - Evaluate effectiveness of antiarrhythmic and heart rate control medications as ordered  - Initiate emergency measures for life threatening arrhythmias  - Monitor electrolytes and administer replacement therapy as ordered  Outcome: Progressing     Problem: GASTROINTESTINAL - ADULT  Goal: Minimal or absence of nausea and vomiting  Description: INTERVENTIONS:  - Maintain adequate hydration with IV or PO as ordered and tolerated  - Nasogastric tube to low intermittent suction as ordered  - Evaluate effectiveness of ordered antiemetic medications  - Provide nonpharmacologic comfort measures as appropriate  - Advance diet as tolerated, if ordered  - Obtain nutritional consult as needed  - Evaluate fluid balance  Outcome: Progressing  Goal: Maintains or returns to baseline bowel function  Description: INTERVENTIONS:  - Assess bowel function  - Maintain adequate hydration with IV or PO as ordered and tolerated  - Evaluate effectiveness of GI medications  - Encourage mobilization and activity  - Obtain nutritional consult as needed  - Establish a toileting routine/schedule  - Consider collaborating with pharmacy to review patient's medication profile  Outcome: Progressing

## 2023-10-23 NOTE — PLAN OF CARE
Problem: Patient Centered Care  Goal: Patient preferences are identified and integrated in the patient's plan of care  Description: Interventions:  - What would you like us to know as we care for you? Home with wife  - Provide timely, complete, and accurate information to patient/family  - Incorporate patient and family knowledge, values, beliefs, and cultural backgrounds into the planning and delivery of care  - Encourage patient/family to participate in care and decision-making at the level they choose  - Honor patient and family perspectives and choices  Outcome: Progressing     Problem: PAIN - ADULT  Goal: Verbalizes/displays adequate comfort level or patient's stated pain goal  Description: INTERVENTIONS:  - Encourage pt to monitor pain and request assistance  - Assess pain using appropriate pain scale  - Administer analgesics based on type and severity of pain and evaluate response  - Implement non-pharmacological measures as appropriate and evaluate response  - Consider cultural and social influences on pain and pain management  - Manage/alleviate anxiety  - Utilize distraction and/or relaxation techniques  - Monitor for opioid side effects  - Notify MD/LIP if interventions unsuccessful or patient reports new pain  - Anticipate increased pain with activity and pre-medicate as appropriate  Outcome: Progressing     Problem: CARDIOVASCULAR - ADULT  Goal: Maintains optimal cardiac output and hemodynamic stability  Description: INTERVENTIONS:  - Monitor vital signs, rhythm, and trends  - Monitor for bleeding, hypotension and signs of decreased cardiac output  - Evaluate effectiveness of vasoactive medications to optimize hemodynamic stability  - Monitor arterial and/or venous puncture sites for bleeding and/or hematoma  - Assess quality of pulses, skin color and temperature  - Assess for signs of decreased coronary artery perfusion - ex.  Angina  - Evaluate fluid balance, assess for edema, trend weights  Outcome: Progressing  Goal: Absence of cardiac arrhythmias or at baseline  Description: INTERVENTIONS:  - Continuous cardiac monitoring, monitor vital signs, obtain 12 lead EKG if indicated  - Evaluate effectiveness of antiarrhythmic and heart rate control medications as ordered  - Initiate emergency measures for life threatening arrhythmias  - Monitor electrolytes and administer replacement therapy as ordered  Outcome: Progressing     Problem: GASTROINTESTINAL - ADULT  Goal: Minimal or absence of nausea and vomiting  Description: INTERVENTIONS:  - Maintain adequate hydration with IV or PO as ordered and tolerated  - Nasogastric tube to low intermittent suction as ordered  - Evaluate effectiveness of ordered antiemetic medications  - Provide nonpharmacologic comfort measures as appropriate  - Advance diet as tolerated, if ordered  - Obtain nutritional consult as needed  - Evaluate fluid balance  Outcome: Progressing  Goal: Maintains or returns to baseline bowel function  Description: INTERVENTIONS:  - Assess bowel function  - Maintain adequate hydration with IV or PO as ordered and tolerated  - Evaluate effectiveness of GI medications  - Encourage mobilization and activity  - Obtain nutritional consult as needed  - Establish a toileting routine/schedule  - Consider collaborating with pharmacy to review patient's medication profile  Outcome: Progressing

## 2023-10-24 LAB
ALBUMIN SERPL-MCNC: 2.9 G/DL (ref 3.4–5)
ALBUMIN/GLOB SERPL: 0.8 {RATIO} (ref 1–2)
ALP LIVER SERPL-CCNC: 59 U/L
ALT SERPL-CCNC: 20 U/L
ANION GAP SERPL CALC-SCNC: 7 MMOL/L (ref 0–18)
AST SERPL-CCNC: 18 U/L (ref 15–37)
BASOPHILS # BLD AUTO: 0 X10(3) UL (ref 0–0.2)
BASOPHILS NFR BLD AUTO: 0 %
BILIRUB SERPL-MCNC: 0.6 MG/DL (ref 0.1–2)
BUN BLD-MCNC: 5 MG/DL (ref 7–18)
BUN/CREAT SERPL: 6.8 (ref 10–20)
CALCIUM BLD-MCNC: 8.7 MG/DL (ref 8.5–10.1)
CHLORIDE SERPL-SCNC: 109 MMOL/L (ref 98–112)
CO2 SERPL-SCNC: 27 MMOL/L (ref 21–32)
CREAT BLD-MCNC: 0.74 MG/DL
DEPRECATED RDW RBC AUTO: 41.2 FL (ref 35.1–46.3)
EGFRCR SERPLBLD CKD-EPI 2021: 106 ML/MIN/1.73M2 (ref 60–?)
EOSINOPHIL # BLD AUTO: 0.14 X10(3) UL (ref 0–0.7)
EOSINOPHIL NFR BLD AUTO: 1.9 %
ERYTHROCYTE [DISTWIDTH] IN BLOOD BY AUTOMATED COUNT: 12.9 % (ref 11–15)
GLOBULIN PLAS-MCNC: 3.7 G/DL (ref 2.8–4.4)
GLUCOSE BLD-MCNC: 87 MG/DL (ref 70–99)
HCT VFR BLD AUTO: 37.2 %
HGB BLD-MCNC: 12.1 G/DL
IMM GRANULOCYTES # BLD AUTO: 0.02 X10(3) UL (ref 0–1)
IMM GRANULOCYTES NFR BLD: 0.3 %
LYMPHOCYTES # BLD AUTO: 1.47 X10(3) UL (ref 1–4)
LYMPHOCYTES NFR BLD AUTO: 20.4 %
MCH RBC QN AUTO: 28.3 PG (ref 26–34)
MCHC RBC AUTO-ENTMCNC: 32.5 G/DL (ref 31–37)
MCV RBC AUTO: 87.1 FL
MONOCYTES # BLD AUTO: 0.53 X10(3) UL (ref 0.1–1)
MONOCYTES NFR BLD AUTO: 7.3 %
NEUTROPHILS # BLD AUTO: 5.06 X10 (3) UL (ref 1.5–7.7)
NEUTROPHILS # BLD AUTO: 5.06 X10(3) UL (ref 1.5–7.7)
NEUTROPHILS NFR BLD AUTO: 70.1 %
OSMOLALITY SERPL CALC.SUM OF ELEC: 293 MOSM/KG (ref 275–295)
PLATELET # BLD AUTO: 367 10(3)UL (ref 150–450)
POTASSIUM SERPL-SCNC: 3.6 MMOL/L (ref 3.5–5.1)
PROT SERPL-MCNC: 6.6 G/DL (ref 6.4–8.2)
RBC # BLD AUTO: 4.27 X10(6)UL
SODIUM SERPL-SCNC: 143 MMOL/L (ref 136–145)
WBC # BLD AUTO: 7.2 X10(3) UL (ref 4–11)

## 2023-10-24 PROCEDURE — 99233 SBSQ HOSP IP/OBS HIGH 50: CPT | Performed by: INTERNAL MEDICINE

## 2023-10-24 PROCEDURE — 99232 SBSQ HOSP IP/OBS MODERATE 35: CPT | Performed by: INTERNAL MEDICINE

## 2023-10-24 RX ORDER — CLONAZEPAM 0.5 MG/1
0.25 TABLET ORAL NIGHTLY
Status: DISCONTINUED | OUTPATIENT
Start: 2023-10-24 | End: 2023-10-24

## 2023-10-24 RX ORDER — CLONAZEPAM 0.5 MG/1
0.25 TABLET ORAL NIGHTLY PRN
Status: DISCONTINUED | OUTPATIENT
Start: 2023-10-24 | End: 2023-10-27

## 2023-10-24 NOTE — PLAN OF CARE
Problem: Patient Centered Care  Goal: Patient preferences are identified and integrated in the patient's plan of care  Description: Interventions:  - What would you like us to know as we care for you?  Home with wife  - Provide timely, complete, and accurate information to patient/family  - Incorporate patient and family knowledge, values, beliefs, and cultural backgrounds into the planning and delivery of care  - Encourage patient/family to participate in care and decision-making at the level they choose  - Honor patient and family perspectives and choices  Outcome: Progressing     Problem: Patient/Family Goals  Goal: Patient/Family Long Term Goal  Description: Patient's Long Term Goal: discharge    Interventions:  - monitor labs and vitals  -medications as prescribed  -discharge planning  -update pt on plan of care  - See additional Care Plan goals for specific interventions  Outcome: Progressing  Goal: Patient/Family Short Term Goal  Description: Patient's Short Term Goal: abdominal pain resolved    Interventions:   - GI consult  -IV antibiotics and pain meds as prescribed  - See additional Care Plan goals for specific interventions  Outcome: Progressing     Problem: PAIN - ADULT  Goal: Verbalizes/displays adequate comfort level or patient's stated pain goal  Description: INTERVENTIONS:  - Encourage pt to monitor pain and request assistance  - Assess pain using appropriate pain scale  - Administer analgesics based on type and severity of pain and evaluate response  - Implement non-pharmacological measures as appropriate and evaluate response  - Consider cultural and social influences on pain and pain management  - Manage/alleviate anxiety  - Utilize distraction and/or relaxation techniques  - Monitor for opioid side effects  - Notify MD/LIP if interventions unsuccessful or patient reports new pain  - Anticipate increased pain with activity and pre-medicate as appropriate  Outcome: Progressing     Problem: CARDIOVASCULAR - ADULT  Goal: Maintains optimal cardiac output and hemodynamic stability  Description: INTERVENTIONS:  - Monitor vital signs, rhythm, and trends  - Monitor for bleeding, hypotension and signs of decreased cardiac output  - Evaluate effectiveness of vasoactive medications to optimize hemodynamic stability  - Monitor arterial and/or venous puncture sites for bleeding and/or hematoma  - Assess quality of pulses, skin color and temperature  - Assess for signs of decreased coronary artery perfusion - ex.  Angina  - Evaluate fluid balance, assess for edema, trend weights  Outcome: Progressing  Goal: Absence of cardiac arrhythmias or at baseline  Description: INTERVENTIONS:  - Continuous cardiac monitoring, monitor vital signs, obtain 12 lead EKG if indicated  - Evaluate effectiveness of antiarrhythmic and heart rate control medications as ordered  - Initiate emergency measures for life threatening arrhythmias  - Monitor electrolytes and administer replacement therapy as ordered  Outcome: Progressing     Problem: GASTROINTESTINAL - ADULT  Goal: Minimal or absence of nausea and vomiting  Description: INTERVENTIONS:  - Maintain adequate hydration with IV or PO as ordered and tolerated  - Nasogastric tube to low intermittent suction as ordered  - Evaluate effectiveness of ordered antiemetic medications  - Provide nonpharmacologic comfort measures as appropriate  - Advance diet as tolerated, if ordered  - Obtain nutritional consult as needed  - Evaluate fluid balance  Outcome: Progressing  Goal: Maintains or returns to baseline bowel function  Description: INTERVENTIONS:  - Assess bowel function  - Maintain adequate hydration with IV or PO as ordered and tolerated  - Evaluate effectiveness of GI medications  - Encourage mobilization and activity  - Obtain nutritional consult as needed  - Establish a toileting routine/schedule  - Consider collaborating with pharmacy to review patient's medication profile  Outcome: Progressing     Patient is presently resting in the bed. Alert x 4. Vital signs taken and stable. Patient is medicated as needed for pain or discomfort. Tolerates diet well. Intravenous fluids infusing and tolerated. Patient receives intravenous antibiotics per doctor orders. Patient is self care and independent. Call light within reach at all times.

## 2023-10-25 LAB
ALBUMIN SERPL-MCNC: 2.7 G/DL (ref 3.4–5)
ALBUMIN/GLOB SERPL: 0.8 {RATIO} (ref 1–2)
ALP LIVER SERPL-CCNC: 55 U/L
ALT SERPL-CCNC: 22 U/L
ANION GAP SERPL CALC-SCNC: 5 MMOL/L (ref 0–18)
AST SERPL-CCNC: 17 U/L (ref 15–37)
BASOPHILS # BLD AUTO: 0 X10(3) UL (ref 0–0.2)
BASOPHILS NFR BLD AUTO: 0 %
BILIRUB SERPL-MCNC: 0.4 MG/DL (ref 0.1–2)
BILIRUB UR QL: NEGATIVE
BUN BLD-MCNC: 4 MG/DL (ref 7–18)
BUN/CREAT SERPL: 5.3 (ref 10–20)
CALCIUM BLD-MCNC: 8.4 MG/DL (ref 8.5–10.1)
CHLORIDE SERPL-SCNC: 111 MMOL/L (ref 98–112)
CLARITY UR: CLEAR
CO2 SERPL-SCNC: 29 MMOL/L (ref 21–32)
COLOR UR: COLORLESS
CREAT BLD-MCNC: 0.75 MG/DL
DEPRECATED RDW RBC AUTO: 41.1 FL (ref 35.1–46.3)
EGFRCR SERPLBLD CKD-EPI 2021: 105 ML/MIN/1.73M2 (ref 60–?)
EOSINOPHIL # BLD AUTO: 0.15 X10(3) UL (ref 0–0.7)
EOSINOPHIL NFR BLD AUTO: 2.3 %
ERYTHROCYTE [DISTWIDTH] IN BLOOD BY AUTOMATED COUNT: 13 % (ref 11–15)
GLOBULIN PLAS-MCNC: 3.6 G/DL (ref 2.8–4.4)
GLUCOSE BLD-MCNC: 86 MG/DL (ref 70–99)
GLUCOSE UR-MCNC: NORMAL MG/DL
HCT VFR BLD AUTO: 35.9 %
HGB BLD-MCNC: 11.8 G/DL
HGB UR QL STRIP.AUTO: NEGATIVE
IMM GRANULOCYTES # BLD AUTO: 0.02 X10(3) UL (ref 0–1)
IMM GRANULOCYTES NFR BLD: 0.3 %
KETONES UR-MCNC: NEGATIVE MG/DL
LEUKOCYTE ESTERASE UR QL STRIP.AUTO: NEGATIVE
LYMPHOCYTES # BLD AUTO: 1.76 X10(3) UL (ref 1–4)
LYMPHOCYTES NFR BLD AUTO: 26.7 %
MCH RBC QN AUTO: 28.6 PG (ref 26–34)
MCHC RBC AUTO-ENTMCNC: 32.9 G/DL (ref 31–37)
MCV RBC AUTO: 86.9 FL
MONOCYTES # BLD AUTO: 0.47 X10(3) UL (ref 0.1–1)
MONOCYTES NFR BLD AUTO: 7.1 %
NEUTROPHILS # BLD AUTO: 4.2 X10 (3) UL (ref 1.5–7.7)
NEUTROPHILS # BLD AUTO: 4.2 X10(3) UL (ref 1.5–7.7)
NEUTROPHILS NFR BLD AUTO: 63.6 %
NITRITE UR QL STRIP.AUTO: NEGATIVE
OSMOLALITY SERPL CALC.SUM OF ELEC: 296 MOSM/KG (ref 275–295)
PH UR: 7 [PH] (ref 5–8)
PLATELET # BLD AUTO: 360 10(3)UL (ref 150–450)
POTASSIUM SERPL-SCNC: 3.5 MMOL/L (ref 3.5–5.1)
PROT SERPL-MCNC: 6.3 G/DL (ref 6.4–8.2)
PROT UR-MCNC: NEGATIVE MG/DL
RBC # BLD AUTO: 4.13 X10(6)UL
SODIUM SERPL-SCNC: 145 MMOL/L (ref 136–145)
SP GR UR STRIP: 1.01 (ref 1–1.03)
UROBILINOGEN UR STRIP-ACNC: NORMAL
WBC # BLD AUTO: 6.6 X10(3) UL (ref 4–11)

## 2023-10-25 PROCEDURE — 99233 SBSQ HOSP IP/OBS HIGH 50: CPT

## 2023-10-25 PROCEDURE — 99233 SBSQ HOSP IP/OBS HIGH 50: CPT | Performed by: HOSPITALIST

## 2023-10-25 NOTE — PLAN OF CARE
Patient complained of pain. Pain analgesics given. Vitals are stable at this moment in time. Call light with in reach. Bed locked in lowest position. Problem: Patient Centered Care  Goal: Patient preferences are identified and integrated in the patient's plan of care  Description: Interventions:  - What would you like us to know as we care for you?  Home with wife  - Provide timely, complete, and accurate information to patient/family  - Incorporate patient and family knowledge, values, beliefs, and cultural backgrounds into the planning and delivery of care  - Encourage patient/family to participate in care and decision-making at the level they choose  - Honor patient and family perspectives and choices  Outcome: Progressing     Problem: Patient/Family Goals  Goal: Patient/Family Long Term Goal  Description: Patient's Long Term Goal: discharge    Interventions:  - monitor labs and vitals  -medications as prescribed  -discharge planning  -update pt on plan of care  - See additional Care Plan goals for specific interventions  Outcome: Progressing  Goal: Patient/Family Short Term Goal  Description: Patient's Short Term Goal: abdominal pain resolved    Interventions:   - GI consult  -IV antibiotics and pain meds as prescribed  - See additional Care Plan goals for specific interventions  Outcome: Progressing     Problem: PAIN - ADULT  Goal: Verbalizes/displays adequate comfort level or patient's stated pain goal  Description: INTERVENTIONS:  - Encourage pt to monitor pain and request assistance  - Assess pain using appropriate pain scale  - Administer analgesics based on type and severity of pain and evaluate response  - Implement non-pharmacological measures as appropriate and evaluate response  - Consider cultural and social influences on pain and pain management  - Manage/alleviate anxiety  - Utilize distraction and/or relaxation techniques  - Monitor for opioid side effects  - Notify MD/LIP if interventions unsuccessful or patient reports new pain  - Anticipate increased pain with activity and pre-medicate as appropriate  Outcome: Progressing     Problem: CARDIOVASCULAR - ADULT  Goal: Maintains optimal cardiac output and hemodynamic stability  Description: INTERVENTIONS:  - Monitor vital signs, rhythm, and trends  - Monitor for bleeding, hypotension and signs of decreased cardiac output  - Evaluate effectiveness of vasoactive medications to optimize hemodynamic stability  - Monitor arterial and/or venous puncture sites for bleeding and/or hematoma  - Assess quality of pulses, skin color and temperature  - Assess for signs of decreased coronary artery perfusion - ex.  Angina  - Evaluate fluid balance, assess for edema, trend weights  Outcome: Progressing  Goal: Absence of cardiac arrhythmias or at baseline  Description: INTERVENTIONS:  - Continuous cardiac monitoring, monitor vital signs, obtain 12 lead EKG if indicated  - Evaluate effectiveness of antiarrhythmic and heart rate control medications as ordered  - Initiate emergency measures for life threatening arrhythmias  - Monitor electrolytes and administer replacement therapy as ordered  Outcome: Progressing     Problem: GASTROINTESTINAL - ADULT  Goal: Minimal or absence of nausea and vomiting  Description: INTERVENTIONS:  - Maintain adequate hydration with IV or PO as ordered and tolerated  - Nasogastric tube to low intermittent suction as ordered  - Evaluate effectiveness of ordered antiemetic medications  - Provide nonpharmacologic comfort measures as appropriate  - Advance diet as tolerated, if ordered  - Obtain nutritional consult as needed  - Evaluate fluid balance  Outcome: Progressing  Goal: Maintains or returns to baseline bowel function  Description: INTERVENTIONS:  - Assess bowel function  - Maintain adequate hydration with IV or PO as ordered and tolerated  - Evaluate effectiveness of GI medications  - Encourage mobilization and activity  - Obtain nutritional consult as needed  - Establish a toileting routine/schedule  - Consider collaborating with pharmacy to review patient's medication profile  Outcome: Progressing

## 2023-10-25 NOTE — PLAN OF CARE
Pt ambulating frequently, pt working on laptop in room. Pain managed, Klonopin given per pt request as ordered. Frequent rounding by staff. Problem: Patient Centered Care  Goal: Patient preferences are identified and integrated in the patient's plan of care  Description: Interventions:  - What would you like us to know as we care for you?  Home with wife  - Provide timely, complete, and accurate information to patient/family  - Incorporate patient and family knowledge, values, beliefs, and cultural backgrounds into the planning and delivery of care  - Encourage patient/family to participate in care and decision-making at the level they choose  - Honor patient and family perspectives and choices  Outcome: Progressing     Problem: Patient/Family Goals  Goal: Patient/Family Long Term Goal  Description: Patient's Long Term Goal: discharge    Interventions:  - monitor labs and vitals  -medications as prescribed  -discharge planning  -update pt on plan of care  - See additional Care Plan goals for specific interventions  Outcome: Progressing  Goal: Patient/Family Short Term Goal  Description: Patient's Short Term Goal: abdominal pain resolved    Interventions:   - GI consult  -IV antibiotics and pain meds as prescribed  - See additional Care Plan goals for specific interventions  Outcome: Progressing     Problem: PAIN - ADULT  Goal: Verbalizes/displays adequate comfort level or patient's stated pain goal  Description: INTERVENTIONS:  - Encourage pt to monitor pain and request assistance  - Assess pain using appropriate pain scale  - Administer analgesics based on type and severity of pain and evaluate response  - Implement non-pharmacological measures as appropriate and evaluate response  - Consider cultural and social influences on pain and pain management  - Manage/alleviate anxiety  - Utilize distraction and/or relaxation techniques  - Monitor for opioid side effects  - Notify MD/LIP if interventions unsuccessful or patient reports new pain  - Anticipate increased pain with activity and pre-medicate as appropriate  Outcome: Progressing     Problem: CARDIOVASCULAR - ADULT  Goal: Maintains optimal cardiac output and hemodynamic stability  Description: INTERVENTIONS:  - Monitor vital signs, rhythm, and trends  - Monitor for bleeding, hypotension and signs of decreased cardiac output  - Evaluate effectiveness of vasoactive medications to optimize hemodynamic stability  - Monitor arterial and/or venous puncture sites for bleeding and/or hematoma  - Assess quality of pulses, skin color and temperature  - Assess for signs of decreased coronary artery perfusion - ex.  Angina  - Evaluate fluid balance, assess for edema, trend weights  Outcome: Progressing  Goal: Absence of cardiac arrhythmias or at baseline  Description: INTERVENTIONS:  - Continuous cardiac monitoring, monitor vital signs, obtain 12 lead EKG if indicated  - Evaluate effectiveness of antiarrhythmic and heart rate control medications as ordered  - Initiate emergency measures for life threatening arrhythmias  - Monitor electrolytes and administer replacement therapy as ordered  Outcome: Progressing     Problem: GASTROINTESTINAL - ADULT  Goal: Minimal or absence of nausea and vomiting  Description: INTERVENTIONS:  - Maintain adequate hydration with IV or PO as ordered and tolerated  - Nasogastric tube to low intermittent suction as ordered  - Evaluate effectiveness of ordered antiemetic medications  - Provide nonpharmacologic comfort measures as appropriate  - Advance diet as tolerated, if ordered  - Obtain nutritional consult as needed  - Evaluate fluid balance  Outcome: Progressing  Goal: Maintains or returns to baseline bowel function  Description: INTERVENTIONS:  - Assess bowel function  - Maintain adequate hydration with IV or PO as ordered and tolerated  - Evaluate effectiveness of GI medications  - Encourage mobilization and activity  - Obtain nutritional consult as needed  - Establish a toileting routine/schedule  - Consider collaborating with pharmacy to review patient's medication profile  Outcome: Progressing

## 2023-10-25 NOTE — CM/SW NOTE
[facility]  All modes of IV Abx would be covered if needed. (Daily office, in home, teach and train)  Packet sent in [facility] to [facility] worker/ to remain available for support and/or discharge planning.      Nikos Guzmán MBA MSN, RN CTL/  [id number]

## 2023-10-26 ENCOUNTER — APPOINTMENT (OUTPATIENT)
Dept: PICC SERVICES | Facility: HOSPITAL | Age: 58
DRG: 392 | End: 2023-10-26
Attending: INTERNAL MEDICINE

## 2023-10-26 ENCOUNTER — TELEPHONE (OUTPATIENT)
Facility: CLINIC | Age: 58
End: 2023-10-26

## 2023-10-26 LAB
ANION GAP SERPL CALC-SCNC: 4 MMOL/L (ref 0–18)
BUN BLD-MCNC: 6 MG/DL (ref 7–18)
BUN/CREAT SERPL: 7.6 (ref 10–20)
CALCIUM BLD-MCNC: 8.7 MG/DL (ref 8.5–10.1)
CHLORIDE SERPL-SCNC: 109 MMOL/L (ref 98–112)
CO2 SERPL-SCNC: 31 MMOL/L (ref 21–32)
CREAT BLD-MCNC: 0.79 MG/DL
DEPRECATED RDW RBC AUTO: 41.5 FL (ref 35.1–46.3)
EGFRCR SERPLBLD CKD-EPI 2021: 104 ML/MIN/1.73M2 (ref 60–?)
ERYTHROCYTE [DISTWIDTH] IN BLOOD BY AUTOMATED COUNT: 12.9 % (ref 11–15)
GLUCOSE BLD-MCNC: 92 MG/DL (ref 70–99)
HCT VFR BLD AUTO: 36.3 %
HGB BLD-MCNC: 11.8 G/DL
MCH RBC QN AUTO: 28.4 PG (ref 26–34)
MCHC RBC AUTO-ENTMCNC: 32.5 G/DL (ref 31–37)
MCV RBC AUTO: 87.5 FL
OSMOLALITY SERPL CALC.SUM OF ELEC: 295 MOSM/KG (ref 275–295)
PLATELET # BLD AUTO: 369 10(3)UL (ref 150–450)
POTASSIUM SERPL-SCNC: 4.1 MMOL/L (ref 3.5–5.1)
RBC # BLD AUTO: 4.15 X10(6)UL
SODIUM SERPL-SCNC: 144 MMOL/L (ref 136–145)
WBC # BLD AUTO: 6.9 X10(3) UL (ref 4–11)

## 2023-10-26 PROCEDURE — 99233 SBSQ HOSP IP/OBS HIGH 50: CPT | Performed by: NURSE PRACTITIONER

## 2023-10-26 PROCEDURE — 99233 SBSQ HOSP IP/OBS HIGH 50: CPT | Performed by: HOSPITALIST

## 2023-10-26 PROCEDURE — 05HF33Z INSERTION OF INFUSION DEVICE INTO LEFT CEPHALIC VEIN, PERCUTANEOUS APPROACH: ICD-10-PCS | Performed by: INTERNAL MEDICINE

## 2023-10-26 RX ORDER — ERTAPENEM 1 G/1
1 INJECTION, POWDER, LYOPHILIZED, FOR SOLUTION INTRAMUSCULAR; INTRAVENOUS DAILY
Status: SHIPPED | COMMUNITY
Start: 2023-10-26

## 2023-10-26 NOTE — TELEPHONE ENCOUNTER
Called patient, name/ verified, stated he maybe discharged tomorrow. Discussed with patient dc clinic appointment per Maury Conner below. Pt prefers to see Dr. Emeterio Morillo. Offered available appointment. Patient confirmed and accepted appointment. Date, time, office location and contact number verified. Instructed patient to arrive 15 minutes before appt time. Patient verbalized understanding. Your appointments       2023  1:20 PM CST Follow Up Visit with Keo Mckeon MD [address] [facility] [place],[address], [address] [facility],[address], [facility] ([facility])     No further action required, TE closed.

## 2023-10-26 NOTE — DISCHARGE INSTRUCTIONS
Home infusion services for IV antibiotics will be provided by:    [facility]/I infusion  [address]  [place], [place]  Phone: [phone]  Fax: [phone]    Your teach and train for home IV antibiotics is scheduled on Saturday 10/28 at [address] will also be required to visit office every 7 days for your PICC line dressing change and weekly lab draws. (1) Follow up with primary care doctor for appointment in 1 week. (2) Follow up with GI and General Surgeon doctor as directed for follow up appointment. (3) Please take all medications as directed by the doctor. (4) Notify doctor immediately for fever, chills, vomiting, or inability to tolerate meals. (5) Please keep midline dry and avoid from getting wet.

## 2023-10-26 NOTE — CM/SW NOTE
10/26/23 1700   CM/SW Referral Data   Referral Source    Reason for Referral Discharge planning;Readmission   Informant Patient   Readmission Assessment   Factors that patient feels contributed to this readmission Acute/Chronic Clinical Presentation   Pt's living situation prior to admission? Home with family   Pt's level of independence at discharge? No assist/independent (minimal)   Pt. received education on diagnoses at time of discharge? Yes   Did you know who and how to call someone if you felt worse? Yes   Did any new symptoms or issues develop after you were discharged? Yes   ----Post D/C symptoms: Symptoms/issue related to previous hospitalization No   Did you understand your discharge instructions? Yes   Were medications taken as indicated on discharge instructions? Yes   Did you have a follow-up appointment scheduled at time of discharge? Yes   ----F/U appt: Did you go to that appointment? Yes   ---- F/U appt: How many days after discharge was follow-up appointment? 0-14 days   Medical Hx   Does patient have an established PCP? Yes  (Mynor Fermin)   Patient Info   Patient's Current Mental Status at Time of Assessment Alert;Oriented   Patient's [address]   Patient lives with Spouse/Significant other   Patient Status Prior to Admission   Independent with ADLs and Mobility Yes   Discharge Needs   Anticipated D/C needs Infusion care     Pt discussed during nursing rounds. Dx diverticulitis on IV abx. Home w/spouse, independent and works full-time at baseline. LT IV abx at dc per ID. Pt is not eligible for  benefits since he is not home bound. Pt agreeable to [facility] at [facility] office in [facility] on 10/28 at 11am, and agreeable revisit office q 7 days for PICC line care and labs. JERSON Loomis at [facility] confirmed orders have been received, 10/28 appt at 11am, and that [facility] will provide medicationsfor patient on that day.     Plan: Home w/spouse with [facility] for home IV abx pending medical clearance. / to remain available for support and/or discharge planning.      PARTH Irby    [phone]

## 2023-10-26 NOTE — TELEPHONE ENCOUNTER
Patient will need either DC clinic follow up or f/u with Dr Nash Montague    Discharged home with midline and IV abx for minimum 2 weeks per ID  -continue low fiber diet  no plans for endoscopic evaluation given recent colonoscopy  -message sent to clinical staff to arrange for follow up with Dr Nash Montague or in [facility] in 2-3 weeks, consider interval CT.

## 2023-10-26 NOTE — PLAN OF CARE
Problem: Patient Centered Care  Goal: Patient preferences are identified and integrated in the patient's plan of care  Description: Interventions:  - What would you like us to know as we care for you?  Home with wife  - Provide timely, complete, and accurate information to patient/family  - Incorporate patient and family knowledge, values, beliefs, and cultural backgrounds into the planning and delivery of care  - Encourage patient/family to participate in care and decision-making at the level they choose  - Honor patient and family perspectives and choices  Outcome: Progressing     Problem: Patient/Family Goals  Goal: Patient/Family Long Term Goal  Description: Patient's Long Term Goal: discharge    Interventions:  - monitor labs and vitals  -medications as prescribed  -discharge planning  -update pt on plan of care  - See additional Care Plan goals for specific interventions  Outcome: Progressing  Goal: Patient/Family Short Term Goal  Description: Patient's Short Term Goal: abdominal pain resolved    Interventions:   - GI consult  -IV antibiotics and pain meds as prescribed  - See additional Care Plan goals for specific interventions  Outcome: Progressing     Problem: PAIN - ADULT  Goal: Verbalizes/displays adequate comfort level or patient's stated pain goal  Description: INTERVENTIONS:  - Encourage pt to monitor pain and request assistance  - Assess pain using appropriate pain scale  - Administer analgesics based on type and severity of pain and evaluate response  - Implement non-pharmacological measures as appropriate and evaluate response  - Consider cultural and social influences on pain and pain management  - Manage/alleviate anxiety  - Utilize distraction and/or relaxation techniques  - Monitor for opioid side effects  - Notify MD/LIP if interventions unsuccessful or patient reports new pain  - Anticipate increased pain with activity and pre-medicate as appropriate  Outcome: Progressing     Problem: CARDIOVASCULAR - ADULT  Goal: Maintains optimal cardiac output and hemodynamic stability  Description: INTERVENTIONS:  - Monitor vital signs, rhythm, and trends  - Monitor for bleeding, hypotension and signs of decreased cardiac output  - Evaluate effectiveness of vasoactive medications to optimize hemodynamic stability  - Monitor arterial and/or venous puncture sites for bleeding and/or hematoma  - Assess quality of pulses, skin color and temperature  - Assess for signs of decreased coronary artery perfusion - ex.  Angina  - Evaluate fluid balance, assess for edema, trend weights  Outcome: Progressing  Goal: Absence of cardiac arrhythmias or at baseline  Description: INTERVENTIONS:  - Continuous cardiac monitoring, monitor vital signs, obtain 12 lead EKG if indicated  - Evaluate effectiveness of antiarrhythmic and heart rate control medications as ordered  - Initiate emergency measures for life threatening arrhythmias  - Monitor electrolytes and administer replacement therapy as ordered  Outcome: Progressing     Problem: GASTROINTESTINAL - ADULT  Goal: Minimal or absence of nausea and vomiting  Description: INTERVENTIONS:  - Maintain adequate hydration with IV or PO as ordered and tolerated  - Nasogastric tube to low intermittent suction as ordered  - Evaluate effectiveness of ordered antiemetic medications  - Provide nonpharmacologic comfort measures as appropriate  - Advance diet as tolerated, if ordered  - Obtain nutritional consult as needed  - Evaluate fluid balance  Outcome: Progressing  Goal: Maintains or returns to baseline bowel function  Description: INTERVENTIONS:  - Assess bowel function  - Maintain adequate hydration with IV or PO as ordered and tolerated  - Evaluate effectiveness of GI medications  - Encourage mobilization and activity  - Obtain nutritional consult as needed  - Establish a toileting routine/schedule  - Consider collaborating with pharmacy to review patient's medication profile  Outcome: Progressing     Vital signs stable at this time. No acute changes noted at this moment. Patient reporting abdominal pain overnight, pain medication administered as needed. Fall precautions in place- bed locked in lowest position. Call light within reach. Frequent rounding by nursing staff.

## 2023-10-27 ENCOUNTER — HOSPITAL ENCOUNTER (OUTPATIENT)
Dept: INTERVENTIONAL RADIOLOGY/VASCULAR | Facility: HOSPITAL | Age: 58
Discharge: HOME | End: 2023-10-27
Attending: INTERNAL MEDICINE

## 2023-10-27 ENCOUNTER — PATIENT MESSAGE (OUTPATIENT)
Dept: FAMILY MEDICINE CLINIC | Facility: CLINIC | Age: 58
End: 2023-10-27

## 2023-10-27 ENCOUNTER — TELEPHONE (OUTPATIENT)
Dept: FAMILY MEDICINE CLINIC | Facility: CLINIC | Age: 58
End: 2023-10-27

## 2023-10-27 VITALS
WEIGHT: 248 LBS | SYSTOLIC BLOOD PRESSURE: 144 MMHG | RESPIRATION RATE: 18 BRPM | HEIGHT: 74.02 IN | OXYGEN SATURATION: 97 % | TEMPERATURE: 97 F | DIASTOLIC BLOOD PRESSURE: 79 MMHG | BODY MASS INDEX: 31.83 KG/M2 | HEART RATE: 56 BPM

## 2023-10-27 DIAGNOSIS — R10.9 ABDOMINAL PAIN, UNSPECIFIED ABDOMINAL LOCATION: Primary | ICD-10-CM

## 2023-10-27 LAB
ANION GAP SERPL CALC-SCNC: 4 MMOL/L (ref 0–18)
BUN BLD-MCNC: 7 MG/DL (ref 7–18)
BUN/CREAT SERPL: 10.1 (ref 10–20)
CALCIUM BLD-MCNC: 8.5 MG/DL (ref 8.5–10.1)
CHLORIDE SERPL-SCNC: 110 MMOL/L (ref 98–112)
CO2 SERPL-SCNC: 31 MMOL/L (ref 21–32)
CREAT BLD-MCNC: 0.69 MG/DL
DEPRECATED RDW RBC AUTO: 41.7 FL (ref 35.1–46.3)
EGFRCR SERPLBLD CKD-EPI 2021: 108 ML/MIN/1.73M2 (ref 60–?)
ERYTHROCYTE [DISTWIDTH] IN BLOOD BY AUTOMATED COUNT: 13 % (ref 11–15)
GLUCOSE BLD-MCNC: 92 MG/DL (ref 70–99)
HCT VFR BLD AUTO: 37 %
HGB BLD-MCNC: 12 G/DL
MCH RBC QN AUTO: 28.3 PG (ref 26–34)
MCHC RBC AUTO-ENTMCNC: 32.4 G/DL (ref 31–37)
MCV RBC AUTO: 87.3 FL
OSMOLALITY SERPL CALC.SUM OF ELEC: 298 MOSM/KG (ref 275–295)
PLATELET # BLD AUTO: 357 10(3)UL (ref 150–450)
POTASSIUM SERPL-SCNC: 3.6 MMOL/L (ref 3.5–5.1)
RBC # BLD AUTO: 4.24 X10(6)UL
SODIUM SERPL-SCNC: 145 MMOL/L (ref 136–145)
WBC # BLD AUTO: 6.6 X10(3) UL (ref 4–11)

## 2023-10-27 PROCEDURE — 99232 SBSQ HOSP IP/OBS MODERATE 35: CPT | Performed by: HOSPITALIST

## 2023-10-27 RX ORDER — HYDROCODONE BITARTRATE AND ACETAMINOPHEN 5; 325 MG/1; MG/1
1 TABLET ORAL EVERY 6 HOURS PRN
Qty: 16 TABLET | Refills: 0 | Status: SHIPPED | OUTPATIENT
Start: 2023-10-27

## 2023-10-27 NOTE — TELEPHONE ENCOUNTER
Pt calling and was discharged from [address] [place] [address] for diverticulitis.    Pt states they did not give him any pain medication     He wants to know if Dr. Mayra Gutiérrez will call something in     Please advise

## 2023-10-27 NOTE — DISCHARGE SUMMARY
[facility]    Discharge Summary    Jocy Cano Patient Status:  Inpatient    1965 MRN [id number]   Location [facility] 5SW/SE Attending Spencer King MD   [facility] Day # 7 PCP Chloé Arriola MD     Date of Admission: 10/20/2023   Date of Discharge: 10/27/2023    Hospital Discharge Diagnoses: Acute Diverticulitis    Lace+ Score: 64  59-90 High Risk  29-58 Medium Risk  0-28   Low Risk. TCM Follow-Up Recommendation:  LACE > 58: High Risk of readmission after discharge from the hospital.        Admitting Diagnosis: Diverticulitis [K57.92]    Disposition: Home    Discharge Diagnosis: . Active Problems:    Sigmoid diverticulitis      Hospital Course:   Reason for Admission:   Per Dr. Kvng Menendez    The patient is a 52-year-old  male who started having abdominal pain around 2-1/2 weeks ago. He had a CT done at a different facility on , which showed uncomplicated proximal sigmoid diverticulitis. Patient was started on oral Augmentin course with some improvement initially and then later on started developing recurrent pain in lower abdomen, radiating to bilateral groin areas. He was communicating with his gastroenterologist throughout, Dr. Chiara Camarena, and today he was sent to the hospital as a direct admit for IV antibiotic treatment. Discharge Physical Exam:   Physical Exam:    General: No acute distress. Respiratory: Clear to auscultation bilaterally. No wheezes. No rhonchi. Cardiovascular: S1, S2. Regular rate and rhythm. No murmurs, rubs or gallops. Abdomen: Soft, nontender, nondistended. Positive bowel sounds. No rebound or guarding. Neurologic: No focal neurological deficits. Musculoskeletal: Moves all extremities.     Hospital Course:     Abdominal pain 2/2 sigmoid diverticulitis  CT abdomen/pelvis reviewed - abscess seen  Gsx on consult - no surgical intervention at this time - Continue IV Abx  C Diff negative  GI on consult - trial of low fiber diet.   ID consult for IV antibiotics   Will need midline and 2 weeks of IV invanz  Social work on consult   Discharge home today   Will receive one dose of invanz then discharge   Will need to follow up with General Surgery as an outpatient for sigmoid resection      HTN  BP well controlled  Continue Lisinopril, verapamil     3 Dysuira- UTI ruled out      4 Major depressive disorder  Continue home meds        Quality:  DVT Prophylaxis: Heparin  CODE status: Full    Complications: none    Consultants         Provider   Role Specialty     Carolina Lilly MD  Consulting Physician Lilo Pena, Danny Bunch MD  Consulting Physician INFECTIOUS DISEASES                Discharge Plan:   Discharge Condition: Stable    Current Discharge Medication List    Home Meds - Unchanged    ertapenem 1 g Injection Recon Soln  Inject 1 g into the vein daily. acidophilus-pectin Oral Cap  Take 1 capsule by mouth 3 (three) times a week. GABAPENTIN 600 MG Oral Tab  Take 1 tablet (600 mg total) by mouth 3 (three) times daily. verapamil  MG Oral Tab CR  Take 1 tablet (120 mg total) by mouth nightly. sertraline 100 MG Oral Tab  Take 1 tablet (100 mg total) by mouth daily. lisinopril 20 MG Oral Tab  Take 1 tablet (20 mg total) by mouth daily. ClonazePAM 0.25 mg Oral Tab  Take 1 Partial Tablet (0.25 mg total) by mouth in the morning, at noon, in the evening, and at bedtime. Multiple Vitamins-Minerals (MULTI-VITAMIN/MINERALS) Oral Tab  Take 1 tablet by mouth daily. Albuterol Sulfate  (90 Base) MCG/ACT Inhalation Aero Soln  Inhale 2 puffs into the lungs every 4 (four) hours as needed for Wheezing or Shortness of Breath. Tadalafil 20 MG Oral Tab  Take 1 tablet (20 mg total) by mouth daily as needed for Erectile Dysfunction.               Discharge Diet: As tolerated    Discharge Activity: As tolerated       Discharge Medications        CONTINUE taking these medications        Instructions Prescription details   acidophilus-pectin Caps  Commonly known as: Probiotic      Take 1 capsule by mouth 3 (three) times a week. Refills: 0     albuterol 108 (90 Base) MCG/ACT Aers  Commonly known as: Ventolin HFA      Inhale 2 puffs into the lungs every 4 (four) hours as needed for Wheezing or Shortness of Breath. Quantity: 1 each  Refills: 3     ClonazePAM 0.25 mg Tabs  Commonly known as: KLONOPIN      Take 1 Partial Tablet (0.25 mg total) by mouth in the morning, at noon, in the evening, and at bedtime. Refills: 0     ertapenem 1 g Solr  Commonly known as: Invanz      Inject 1 g into the vein daily. Refills: 0     gabapentin 600 MG Tabs  Commonly known as: Neurontin      Take 1 tablet (600 mg total) by mouth 3 (three) times daily. Quantity: 90 tablet  Refills: 0     lisinopril 20 MG Tabs  Commonly known as: Prinivil; Zestril      Take 1 tablet (20 mg total) by mouth daily. Quantity: 30 tablet  Refills: 1     Multi-Vitamin/Minerals Tabs      Take 1 tablet by mouth daily. Refills: 0     sertraline 100 MG Tabs  Commonly known as: Zoloft      Take 1 tablet (100 mg total) by mouth daily. Stop taking on: November 1, 2023  Quantity: 30 tablet  Refills: 0     Tadalafil 20 MG Tabs      Take 1 tablet (20 mg total) by mouth daily as needed for Erectile Dysfunction. Quantity: 8 tablet  Refills: 5     verapamil  MG Tbcr  Commonly known as: Calan-SR      Take 1 tablet (120 mg total) by mouth nightly. Quantity: 30 tablet  Refills: 3            STOP taking these medications      amoxicillin clavulanate 875-125 MG Tabs  Commonly known as: Augmentin                 Follow up: Randall Messer MD. Go to.     Specialty: GASTROENTEROLOGY  Why: clinical staff to arrange for follow up with Dr Arnaldo Wallis or in clinic in 2-3 weeks, consider interval CT, Follow-up  Contact information:  [address]  [phone]                             Follow up Labs and imaging: Other Discharge Instructions:         Home infusion services for IV antibiotics will be provided by:    [facility] infusion  [address]  [place], [address]  Phone: [phone]  Fax: [phone]    Your teach and train for home IV antibiotics is scheduled on Saturday 10/28 at [address] will also be required to visit office every 7 days for your PICC line dressing change and weekly lab draws.         Time spent:  > 30 minutes    Alka Christensen MD  10/27/2023

## 2023-10-27 NOTE — CM/SW NOTE
DC for today. Called and notified Reanna Molina who confirmed appointment tomorrow 10/28 at 11am at [address] [facility].        PLAN:  [address], [facility], [address] [facility]  Saturday, 10/28 at [facility], [place], [facility]    [id number]

## 2023-10-27 NOTE — PLAN OF CARE
Discharge instructions given to patient. Patient was instructed to follow up with doctors for appointment. Patient instructed on midline care and avoiding from getting it wet. Patient verbalized understanding of discharge instructions. Patient will be transported by wheelchair and discharge home.

## 2023-10-27 NOTE — PLAN OF CARE
Problem: Patient Centered Care  Goal: Patient preferences are identified and integrated in the patient's plan of care  Description: Interventions:  - What would you like us to know as we care for you?  Home with wife  - Provide timely, complete, and accurate information to patient/family  - Incorporate patient and family knowledge, values, beliefs, and cultural backgrounds into the planning and delivery of care  - Encourage patient/family to participate in care and decision-making at the level they choose  - Honor patient and family perspectives and choices  Outcome: Progressing     Problem: Patient/Family Goals  Goal: Patient/Family Long Term Goal  Description: Patient's Long Term Goal: discharge    Interventions:  - monitor labs and vitals  -medications as prescribed  -discharge planning  -update pt on plan of care  - See additional Care Plan goals for specific interventions  Outcome: Progressing  Goal: Patient/Family Short Term Goal  Description: Patient's Short Term Goal: abdominal pain resolved    Interventions:   - GI consult  -IV antibiotics and pain meds as prescribed  - See additional Care Plan goals for specific interventions  Outcome: Progressing     Problem: PAIN - ADULT  Goal: Verbalizes/displays adequate comfort level or patient's stated pain goal  Description: INTERVENTIONS:  - Encourage pt to monitor pain and request assistance  - Assess pain using appropriate pain scale  - Administer analgesics based on type and severity of pain and evaluate response  - Implement non-pharmacological measures as appropriate and evaluate response  - Consider cultural and social influences on pain and pain management  - Manage/alleviate anxiety  - Utilize distraction and/or relaxation techniques  - Monitor for opioid side effects  - Notify MD/LIP if interventions unsuccessful or patient reports new pain  - Anticipate increased pain with activity and pre-medicate as appropriate  Outcome: Progressing     Problem: CARDIOVASCULAR - ADULT  Goal: Maintains optimal cardiac output and hemodynamic stability  Description: INTERVENTIONS:  - Monitor vital signs, rhythm, and trends  - Monitor for bleeding, hypotension and signs of decreased cardiac output  - Evaluate effectiveness of vasoactive medications to optimize hemodynamic stability  - Monitor arterial and/or venous puncture sites for bleeding and/or hematoma  - Assess quality of pulses, skin color and temperature  - Assess for signs of decreased coronary artery perfusion - ex.  Angina  - Evaluate fluid balance, assess for edema, trend weights  Outcome: Progressing  Goal: Absence of cardiac arrhythmias or at baseline  Description: INTERVENTIONS:  - Continuous cardiac monitoring, monitor vital signs, obtain 12 lead EKG if indicated  - Evaluate effectiveness of antiarrhythmic and heart rate control medications as ordered  - Initiate emergency measures for life threatening arrhythmias  - Monitor electrolytes and administer replacement therapy as ordered  Outcome: Progressing     Problem: GASTROINTESTINAL - ADULT  Goal: Minimal or absence of nausea and vomiting  Description: INTERVENTIONS:  - Maintain adequate hydration with IV or PO as ordered and tolerated  - Nasogastric tube to low intermittent suction as ordered  - Evaluate effectiveness of ordered antiemetic medications  - Provide nonpharmacologic comfort measures as appropriate  - Advance diet as tolerated, if ordered  - Obtain nutritional consult as needed  - Evaluate fluid balance  Outcome: Progressing  Goal: Maintains or returns to baseline bowel function  Description: INTERVENTIONS:  - Assess bowel function  - Maintain adequate hydration with IV or PO as ordered and tolerated  - Evaluate effectiveness of GI medications  - Encourage mobilization and activity  - Obtain nutritional consult as needed  - Establish a toileting routine/schedule  - Consider collaborating with pharmacy to review patient's medication profile  Outcome: Progressing     Vital signs stable at this time. No acute changes noted at this moment. Patient reporting abdominal pain overnight, pain medication administered and heating packs applied with noted improvement. Fall precautions in place, bed locked in lowest position. Call light within reach. Frequent rounding by nursing staff.

## 2023-10-27 NOTE — TELEPHONE ENCOUNTER
Pt was discharged from hospital with Diverticulitis. Pt states nothing prescribed with any pain medication. Pt states taking extra strength Tylenol during the day. But at end of day pain  increases to lower abdomen. Pt wondering if pain medication can be called in. Pt has appt 11/7/23 with Dr Fredis Fermin.     Pt wants script sent to [facility] [place] [address]

## 2023-10-27 NOTE — PLAN OF CARE
Problem: Patient Centered Care  Goal: Patient preferences are identified and integrated in the patient's plan of care  Description: Interventions:  - What would you like us to know as we care for you?  Home with wife  - Provide timely, complete, and accurate information to patient/family  - Incorporate patient and family knowledge, values, beliefs, and cultural backgrounds into the planning and delivery of care  - Encourage patient/family to participate in care and decision-making at the level they choose  - Honor patient and family perspectives and choices  Outcome: Progressing     Problem: Patient/Family Goals  Goal: Patient/Family Long Term Goal  Description: Patient's Long Term Goal: discharge    Interventions:  - monitor labs and vitals  -medications as prescribed  -discharge planning  -update pt on plan of care  - See additional Care Plan goals for specific interventions  Outcome: Progressing  Goal: Patient/Family Short Term Goal  Description: Patient's Short Term Goal: abdominal pain resolved    Interventions:   - GI consult  -IV antibiotics and pain meds as prescribed  - See additional Care Plan goals for specific interventions  Outcome: Progressing     Problem: PAIN - ADULT  Goal: Verbalizes/displays adequate comfort level or patient's stated pain goal  Description: INTERVENTIONS:  - Encourage pt to monitor pain and request assistance  - Assess pain using appropriate pain scale  - Administer analgesics based on type and severity of pain and evaluate response  - Implement non-pharmacological measures as appropriate and evaluate response  - Consider cultural and social influences on pain and pain management  - Manage/alleviate anxiety  - Utilize distraction and/or relaxation techniques  - Monitor for opioid side effects  - Notify MD/LIP if interventions unsuccessful or patient reports new pain  - Anticipate increased pain with activity and pre-medicate as appropriate  Outcome: Progressing     Problem: CARDIOVASCULAR - ADULT  Goal: Maintains optimal cardiac output and hemodynamic stability  Description: INTERVENTIONS:  - Monitor vital signs, rhythm, and trends  - Monitor for bleeding, hypotension and signs of decreased cardiac output  - Evaluate effectiveness of vasoactive medications to optimize hemodynamic stability  - Monitor arterial and/or venous puncture sites for bleeding and/or hematoma  - Assess quality of pulses, skin color and temperature  - Assess for signs of decreased coronary artery perfusion - ex.  Angina  - Evaluate fluid balance, assess for edema, trend weights  Outcome: Progressing  Goal: Absence of cardiac arrhythmias or at baseline  Description: INTERVENTIONS:  - Continuous cardiac monitoring, monitor vital signs, obtain 12 lead EKG if indicated  - Evaluate effectiveness of antiarrhythmic and heart rate control medications as ordered  - Initiate emergency measures for life threatening arrhythmias  - Monitor electrolytes and administer replacement therapy as ordered  Outcome: Progressing     Problem: GASTROINTESTINAL - ADULT  Goal: Minimal or absence of nausea and vomiting  Description: INTERVENTIONS:  - Maintain adequate hydration with IV or PO as ordered and tolerated  - Nasogastric tube to low intermittent suction as ordered  - Evaluate effectiveness of ordered antiemetic medications  - Provide nonpharmacologic comfort measures as appropriate  - Advance diet as tolerated, if ordered  - Obtain nutritional consult as needed  - Evaluate fluid balance  Outcome: Progressing  Goal: Maintains or returns to baseline bowel function  Description: INTERVENTIONS:  - Assess bowel function  - Maintain adequate hydration with IV or PO as ordered and tolerated  - Evaluate effectiveness of GI medications  - Encourage mobilization and activity  - Obtain nutritional consult as needed  - Establish a toileting routine/schedule  - Consider collaborating with pharmacy to review patient's medication profile  Outcome: Progressing     Patient is presently resting in the bed. Alert x 4. Vital signs taken and stable. Patient tolerates diet. Self care and independent. Patient has a midline in the left upper arm and will be discharge home for iv antibiotics.  has arranged for iv antibiotics upon discharge. No complaints of pain or discomfort at current time.

## 2023-10-30 ENCOUNTER — PATIENT OUTREACH (OUTPATIENT)
Dept: CASE MANAGEMENT | Age: 58
End: 2023-10-30

## 2023-10-30 DIAGNOSIS — Z02.9 ENCOUNTERS FOR UNSPECIFIED ADMINISTRATIVE PURPOSE: Primary | ICD-10-CM

## 2023-10-30 DIAGNOSIS — K57.32 SIGMOID DIVERTICULITIS: ICD-10-CM

## 2023-10-30 NOTE — TELEPHONE ENCOUNTER
From: Ariel Cox  To: Stefanie Byrne  Sent: 10/27/2023 4:28 PM CDT  Subject: Pain medication, diverticulitis    Massiel Rice,    I sent in a short course of medication, so let me know how you're doing early next week. Be as sparing as you can, as we don't want to constipate you, which can worsen symptoms. Can be a bit of a double edged sword in this situation, but reasonable to have some on hand so you can rest.    Feel better quick!     Ariel Cox MD

## 2023-10-31 ENCOUNTER — TELEPHONE (OUTPATIENT)
Dept: FAMILY MEDICINE CLINIC | Facility: CLINIC | Age: 58
End: 2023-10-31

## 2023-10-31 NOTE — TELEPHONE ENCOUNTER
HYDROcodone-acetaminophen (NORCO) 5-325 MG Oral Tab       NO AUTHORIZATION NEEDED      FAX IN [place] ACCORDION FILE

## 2023-11-03 ENCOUNTER — OFFICE VISIT (OUTPATIENT)
Dept: FAMILY MEDICINE CLINIC | Facility: CLINIC | Age: 58
End: 2023-11-03
Payer: COMMERCIAL

## 2023-11-03 VITALS
HEIGHT: 74.02 IN | BODY MASS INDEX: 31.44 KG/M2 | OXYGEN SATURATION: 98 % | RESPIRATION RATE: 16 BRPM | SYSTOLIC BLOOD PRESSURE: 122 MMHG | DIASTOLIC BLOOD PRESSURE: 66 MMHG | WEIGHT: 245 LBS | HEART RATE: 96 BPM

## 2023-11-03 DIAGNOSIS — Z09 HOSPITAL DISCHARGE FOLLOW-UP: Primary | ICD-10-CM

## 2023-11-03 DIAGNOSIS — K57.32 SIGMOID DIVERTICULITIS: ICD-10-CM

## 2023-11-07 ENCOUNTER — HOSPITAL ENCOUNTER (OUTPATIENT)
Dept: CT IMAGING | Age: 58
Discharge: HOME | End: 2023-11-07
Attending: PHYSICIAN ASSISTANT
Payer: COMMERCIAL

## 2023-11-07 DIAGNOSIS — K57.32 SIGMOID DIVERTICULITIS: ICD-10-CM

## 2023-11-07 PROCEDURE — 74177 CT ABD & PELVIS W/CONTRAST: CPT | Performed by: PHYSICIAN ASSISTANT

## 2023-11-07 RX ORDER — IOHEXOL 350 MG/ML
100 INJECTION, SOLUTION INTRAVENOUS
Status: COMPLETED | OUTPATIENT
Start: 2023-11-07 | End: 2023-11-07

## 2023-11-07 RX ADMIN — IOHEXOL 100 ML: 350 INJECTION, SOLUTION INTRAVENOUS at 07:45:00

## 2023-11-08 ENCOUNTER — OFFICE VISIT (OUTPATIENT)
Facility: CLINIC | Age: 58
End: 2023-11-08
Payer: COMMERCIAL

## 2023-11-08 VITALS
WEIGHT: 245 LBS | DIASTOLIC BLOOD PRESSURE: 79 MMHG | BODY MASS INDEX: 31.44 KG/M2 | HEART RATE: 64 BPM | SYSTOLIC BLOOD PRESSURE: 137 MMHG | HEIGHT: 74.02 IN

## 2023-11-08 DIAGNOSIS — K57.92 ACUTE DIVERTICULITIS: Primary | ICD-10-CM

## 2023-11-08 PROCEDURE — 99214 OFFICE O/P EST MOD 30 MIN: CPT | Performed by: INTERNAL MEDICINE

## 2023-11-08 PROCEDURE — 3008F BODY MASS INDEX DOCD: CPT | Performed by: INTERNAL MEDICINE

## 2023-11-08 PROCEDURE — 3075F SYST BP GE 130 - 139MM HG: CPT | Performed by: INTERNAL MEDICINE

## 2023-11-08 PROCEDURE — 3078F DIAST BP <80 MM HG: CPT | Performed by: INTERNAL MEDICINE

## 2023-11-08 NOTE — PATIENT INSTRUCTIONS
Acute diverticulitis   - complete IV antibiotics /input from ID regarding any need for prolonging ABX  - see dietician  - see surgeon     Call me update in 2 - 3 weeks

## 2023-11-09 ENCOUNTER — ORDER TRANSCRIPTION (OUTPATIENT)
Dept: ADMINISTRATIVE | Facility: HOSPITAL | Age: 58
End: 2023-11-09

## 2023-11-09 DIAGNOSIS — K57.92 ACUTE DIVERTICULITIS: Primary | ICD-10-CM

## 2023-11-09 NOTE — PROGRESS NOTES
Jocy Cano is a 63year old male. HPI:     Chief Complaint   Patient presents with    Follow - Up     ER follow up; CT scan results; abdominal tenderness      The patient is a 64-year-old male who has a history of allergic rhinitis, arrhythmia, back problems, blood pressure, sleep apnea who follows up in the office after admission for diverticulitis. She was treated as an outpatient with oral antibiotics and subsequently admitted for IV antibiotics/Zosyn. Be discharged on IV ertapenem and is following up with ID service. Follow-up scan CAT scan as per below; Impression   CONCLUSION:    1. There is focal wall thickening of a segment of the small bowel within the upper pelvis which may represent focal enteritis or possibly small bowel diverticulitis. No walled-off fluid collection is identified to suggest abscess. 2. There is colonic diverticulosis with considerable improvement in stranding surrounding the sigmoid portion of the colon. Minimal residual stranding is noted. 3. Stable right lobe hepatic hemangioma. Patient does report improvement, he does note if he pushes on his abdomen some discomfort. HISTORY:  Past Medical History:   Diagnosis Date    Allergic rhinitis     Anxiety     Arrhythmia     Back pain     Back problem     Depression     ED (erectile dysfunction)     High blood pressure     Pneumonia due to organism     Shortness of breath     Sleep apnea 2012    Unspecified essential hypertension     Visual impairment       Past Surgical History:   Procedure Laterality Date    CHOLECYSTECTOMY  3/12/2023    COLONOSCOPY N/A 03/22/2016    [facility]. Hyperplastic polyp only.   Repeat 2026    DENTAL SURGERY PROCEDURE      [place] teeth removed    INCISION/DRAIN ABSCESS EXTRA  1996    KNEE ARTHROSCOPY Right     LAPAROSCOPIC CHOLECYSTECTOMY  02/12/2023    MYRINGOPLASTY      OTHER SURGICAL HISTORY      R knee arthroscope    SKIN TISSUE PROCEDURE UNLISTED      TONSILLECTOMY        Family History   Problem Relation Age of Onset    Heart Disorder Father         aflutter with ablation    Hypertension Father     Cancer Father 61        Prostate    Heart Disorder Mother         murmur no treatment needed    Hypertension Mother     Stroke Maternal Grandmother     Other (stroke) Maternal Grandmother     Dementia Paternal Grandmother     Heart Disorder Paternal Grandfather         two heart attacks and CHF    Diabetes Paternal Grandfather     Other (Diabetes, type 2) Paternal Grandfather       Social History:   Social History     Socioeconomic History    Marital status:    Occupational History    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[employer]   Tobacco Use    Smoking status: Former     Packs/day: 0.50     Years: 19.00     Additional pack years: 0.00     Total pack years: 9.50     Types: Cigarettes     Quit date: 10/3/2000     Years since quittin.1     Passive exposure: Never    Smokeless tobacco: Never   Vaping Use    Vaping Use: Never used   Substance and Sexual Activity    Alcohol use: No     Comment: sober since     Drug use: No    Sexual activity: Yes     Partners: Female   Other Topics Concern    Caffeine Concern Yes     Comment: coffee in AM/ soda     Exercise Yes     Comment: X1-2 times per week      Social Determinants of Health     Financial Resource Strain: Low Risk  (10/30/2023)    Financial Resource Strain     Difficulty of Paying Living Expenses: Not hard at all     Med Affordability: No   Food Insecurity: No Food Insecurity (10/20/2023)    Food Insecurity     Food Insecurity: Never true   Transportation Needs: No Transportation Needs (10/20/2023)    Transportation Needs     Lack of Transportation: No   Housing Stability: Low Risk  (10/20/2023)    Housing Stability     Housing Instability: No        Medications (Active prior to today's visit):  Current Outpatient Medications   Medication Sig Dispense Refill    gabapentin 600 MG Oral Tab Take 1 tablet (600 mg total) by mouth 3 (three) times daily. 90 tablet 0    sertraline 100 MG Oral Tab Take 1 tablet (100 mg total) by mouth daily. 30 tablet 0    HYDROcodone-acetaminophen (NORCO) 5-325 MG Oral Tab Take 1 tablet by mouth every 6 (six) hours as needed for Pain. 16 tablet 0    ertapenem 1 g Injection Recon Soln Inject 1 g into the vein daily. acidophilus-pectin Oral Cap Take 1 capsule by mouth 3 (three) times a week. verapamil  MG Oral Tab CR Take 1 tablet (120 mg total) by mouth nightly. 30 tablet 3    lisinopril 20 MG Oral Tab Take 1 tablet (20 mg total) by mouth daily. 30 tablet 1    ClonazePAM 0.25 mg Oral Tab Take 1 Partial Tablet (0.25 mg total) by mouth in the morning, at noon, in the evening, and at bedtime. Multiple Vitamins-Minerals (MULTI-VITAMIN/MINERALS) Oral Tab Take 1 tablet by mouth daily. Albuterol Sulfate  (90 Base) MCG/ACT Inhalation Aero Soln Inhale 2 puffs into the lungs every 4 (four) hours as needed for Wheezing or Shortness of Breath. 1 each 3    Tadalafil 20 MG Oral Tab Take 1 tablet (20 mg total) by mouth daily as needed for Erectile Dysfunction. 8 tablet 5       Allergies: Allergies   Allergen Reactions    Seasonal OTHER (SEE COMMENTS)     congestion         ROS:   The patient denies any chest pain or shortness of breath,  No neurologic or dermatologic symptoms. PHYSICAL EXAM:   Blood pressure 137/79, pulse 64, height 6' 2.02\" (1.88 m), weight 245 lb (111.1 kg). The patient appears their stated age and is in no acute distress  HEENT- anicteric sclera, neck no lymphadnopathy, OP- clear with no masses or lesions  Chest- Clear bilaterally, no wheezing,  Heart- regular rate, no murmur or gallop  Abdomen- Soft and nontender, nondistended  Ext- no clubbing or cyanosis  Skin- no rashes or lesions  Neuro- appropriate response, alert, no confusion  . ASSESSMENT/PLAN:   Assessment   Encounter Diagnosis   Name Primary?     Acute diverticulitis Yes     The patient has a history of acute diverticulitis requiring hospitalization and prolonged antibiotics. He is following up with ID and will see her surgeon for input. Discussed dietary adjustments, I will have him see dietitian and referral placed. The patient's last colonoscopy was in 2016, would advise follow-up colonoscopy once acute episode resolved. Timing to depend on how he does and potential timing of surgery. Plan  Acute diverticulitis   - complete IV antibiotics /input from ID regarding any need for prolonging ABX  - see dietician  - see surgeon     Call me update in 2 - 3 weeks          Orders This Visit:  No orders of the defined types were placed in this encounter.       Meds This Visit:  Requested Prescriptions      No prescriptions requested or ordered in this encounter       Imaging & Referrals:  Carol Murguia, DIET (INTERNAL)       Stephanie Cary MD  [facility], [facility] Gastroenterology

## 2023-11-16 ENCOUNTER — HOSPITAL ENCOUNTER (OUTPATIENT)
Dept: CT IMAGING | Facility: HOSPITAL | Age: 58
Discharge: HOME | End: 2023-11-16
Attending: INTERNAL MEDICINE
Payer: COMMERCIAL

## 2023-11-16 DIAGNOSIS — K57.20 DIVERTICULITIS OF LARGE INTESTINE WITH PERFORATION WITHOUT BLEEDING: Primary | ICD-10-CM

## 2023-11-16 DIAGNOSIS — K57.20 DIVERTICULITIS OF LARGE INTESTINE WITH PERFORATION WITHOUT BLEEDING: ICD-10-CM

## 2023-11-16 PROCEDURE — 74177 CT ABD & PELVIS W/CONTRAST: CPT | Performed by: INTERNAL MEDICINE

## 2023-11-17 NOTE — PAT NURSING NOTE
Per PAT encounter/MyChart message sent to pt:    Preprocedure Instructions      Visitor Instructions     Adult Patients:  You can have a  come with you on the day of your procedure. PreOp Instructions     You are scheduled for: a Cardiac Procedure     Date of Procedure: 11/20/23 Monday     Diet Instructions: You may eat as you normally would though we suggest a light breakfast.     Medications: There are no restrictions to your medications; take them as you normally would. Skin Prep: Shower with antibacterial soap using a clean washcloth, prior to procedure. Do not shave the area or place lotion/ointment/powder on after you wash/dry your chest.     Arrival Time: You will need to arrive at 12:30 pm.     Driving After Procedure: No Sedation - You may drive self home     Discharge Teaching: Your nurse will give you specific instructions before discharge; Most people can resume normal activities in 2-3 days; Any questions, please call the physician's office      parking is available starting at 6 am or park in the [facility]. Check in at the [place] reception desk. Our  will be there to check you in for your procedure. Please bring your insurance cards and ID with you. Please DO NOT respond to this message, the inbasket is not monitored for messages. For any questions, please call the physician's office.

## 2023-11-20 ENCOUNTER — HOSPITAL ENCOUNTER (OUTPATIENT)
Dept: INTERVENTIONAL RADIOLOGY/VASCULAR | Facility: HOSPITAL | Age: 58
Discharge: HOME | End: 2023-11-20
Attending: INTERNAL MEDICINE | Admitting: INTERNAL MEDICINE
Payer: COMMERCIAL

## 2023-11-20 VITALS
WEIGHT: 240 LBS | HEIGHT: 74 IN | RESPIRATION RATE: 11 BRPM | HEART RATE: 58 BPM | BODY MASS INDEX: 30.8 KG/M2 | SYSTOLIC BLOOD PRESSURE: 134 MMHG | TEMPERATURE: 97 F | DIASTOLIC BLOOD PRESSURE: 75 MMHG | OXYGEN SATURATION: 98 %

## 2023-11-20 DIAGNOSIS — R00.1 BRADYCARDIA: ICD-10-CM

## 2023-11-20 PROCEDURE — 33285 INSJ SUBQ CAR RHYTHM MNTR: CPT | Performed by: NURSE PRACTITIONER

## 2023-11-20 PROCEDURE — 0JH632Z INSERTION OF MONITORING DEVICE INTO CHEST SUBCUTANEOUS TISSUE AND FASCIA, PERCUTANEOUS APPROACH: ICD-10-PCS | Performed by: NURSE PRACTITIONER

## 2023-11-20 RX ORDER — CHLORHEXIDINE GLUCONATE 4 G/100ML
30 SOLUTION TOPICAL
Status: DISCONTINUED | OUTPATIENT
Start: 2023-11-20 | End: 2023-11-20

## 2023-11-20 RX ORDER — LIDOCAINE HYDROCHLORIDE AND EPINEPHRINE BITARTRATE 20; .01 MG/ML; MG/ML
INJECTION, SOLUTION SUBCUTANEOUS
Status: COMPLETED
Start: 2023-11-20 | End: 2023-11-20

## 2023-11-20 NOTE — PROCEDURES
Procedure- LINQ device implant. - FREEDOM Casiano, Cristian Enrique MD    Indication- Arrhythmia evaluation,bradycardia, near syncope. Complication- none    Methods- The patient was prepped and draped in a sterile manner. Local anesthesia was infiltrated into the 4th parasternal intercostal space. A small puncture incision was made. The device tunnel tool was inserted to make a subcutaneous pocket. The device was deployed into the subcutaneous tissue. Steri-strips were applied along with a sterile dressing. Conclusions:  1. Status post successful LINQ device implant. # [id number]  2. Follow-up in the S device clinic in 1-2 weeks.      Marck Reynolds, APRN  11/20/2023

## 2023-11-20 NOTE — PLAN OF CARE
Patient had Linq insertion today with ROWAN Osman. Patient tolerated procedure well. Mepilex dressing in place, CDI. [employer] rep @ bedside and gave instructions and education to patient. Discharge instructions reviewed. Patient discharged to [facility] lobby with belongings. Patient is own .

## 2023-11-20 NOTE — TREATMENT PLAN
History and physical updated. No changes in condition. Dx: Near syncope, symptomatic bradycardia. Consent signed. Pt agrees to [facility] implant.    uLciana DONAHUE

## 2023-11-29 ENCOUNTER — OFFICE VISIT (OUTPATIENT)
Dept: SURGERY | Facility: CLINIC | Age: 58
End: 2023-11-29

## 2023-11-29 VITALS — WEIGHT: 240 LBS | HEIGHT: 74 IN | BODY MASS INDEX: 30.8 KG/M2

## 2023-11-29 DIAGNOSIS — K63.89 EPIPLOIC APPENDAGITIS: ICD-10-CM

## 2023-11-29 DIAGNOSIS — K57.92 DIVERTICULITIS: Primary | ICD-10-CM

## 2023-11-29 PROCEDURE — 99204 OFFICE O/P NEW MOD 45 MIN: CPT | Performed by: SURGERY

## 2023-11-29 PROCEDURE — 3008F BODY MASS INDEX DOCD: CPT | Performed by: SURGERY

## 2023-11-29 NOTE — H&P
Chief complaint:   Chief Complaint   Patient presents with    Diverticulitis     Referred by Dr. Rajesh Gaston for diverticulitis. HPI: Lissa Cain presents for consultation for recurrent sigmoid diverticulitis. He has had at least three episodes of diverticulitis treated with antibiotics. He also has descending colon appendagitis. He has some LLQ pain. No fevers, no chills. His bowel movements are irregular, sometimes 2-3 per day and sometimes every other day. He underwent colonoscopy, he is s/p laparoscopic cholecystectomy. Past medical history:   Past Medical History:   Diagnosis Date    Allergic rhinitis     Anxiety     Arrhythmia     Back pain     Back problem     Depression     ED (erectile dysfunction)     High blood pressure     Pneumonia due to organism     Shortness of breath     Sleep apnea 2012    Unspecified essential hypertension     Visual impairment        Past surgical history:   Past Surgical History:   Procedure Laterality Date    CHOLECYSTECTOMY  3/12/2023    COLONOSCOPY N/A 03/22/2016    [facility]. Hyperplastic polyp only. Repeat 2026    DENTAL SURGERY PROCEDURE      [place] teeth removed    INCISION/DRAIN ABSCESS EXTRA  1996    KNEE ARTHROSCOPY Right     LAPAROSCOPIC CHOLECYSTECTOMY  02/12/2023    MYRINGOPLASTY      OTHER SURGICAL HISTORY      R knee arthroscope    SKIN TISSUE PROCEDURE UNLISTED      TONSILLECTOMY         Allergies: Allergies   Allergen Reactions    Seasonal OTHER (SEE COMMENTS)     congestion       Medications:   Current Outpatient Medications   Medication Sig Dispense Refill    sertraline 100 MG Oral Tab Take 1 tablet (100 mg total) by mouth daily. 30 tablet 0    gabapentin 600 MG Oral Tab Take 1 tablet (600 mg total) by mouth 3 (three) times daily. 90 tablet 0    clonazePAM (KLONOPIN) 0.5 MG Oral Tab 1/2 tablet four times daily 60 tablet 0    HYDROcodone-acetaminophen (NORCO) 5-325 MG Oral Tab Take 1 tablet by mouth every 6 (six) hours as needed for Pain.  16 tablet 0 acidophilus-pectin Oral Cap Take 1 capsule by mouth 3 (three) times a week. verapamil  MG Oral Tab CR Take 1 tablet (120 mg total) by mouth nightly. 30 tablet 3    lisinopril 20 MG Oral Tab Take 1 tablet (20 mg total) by mouth daily. 30 tablet 1    ClonazePAM 0.25 mg Oral Tab Take 1 Partial Tablet (0.25 mg total) by mouth in the morning, at noon, in the evening, and at bedtime. Multiple Vitamins-Minerals (MULTI-VITAMIN/MINERALS) Oral Tab Take 1 tablet by mouth daily. Albuterol Sulfate  (90 Base) MCG/ACT Inhalation Aero Soln Inhale 2 puffs into the lungs every 4 (four) hours as needed for Wheezing or Shortness of Breath. 1 each 3    Tadalafil 20 MG Oral Tab Take 1 tablet (20 mg total) by mouth daily as needed for Erectile Dysfunction.  8 tablet 5       Social history:   Social History     Socioeconomic History    Marital status:    Occupational History    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[employer]   Tobacco Use    Smoking status: Former     Packs/day: 0.50     Years: 19.00     Additional pack years: 0.00     Total pack years: 9.50     Types: Cigarettes     Quit date: 10/3/2000     Years since quittin.1     Passive exposure: Never    Smokeless tobacco: Never   Vaping Use    Vaping Use: Never used   Substance and Sexual Activity    Alcohol use: No     Comment: sober since     Drug use: No    Sexual activity: Yes     Partners: Female        Family history:  Family History   Problem Relation Age of Onset    Heart Disorder Father         aflutter with ablation    Hypertension Father     Cancer Father 61        Prostate    Heart Disorder Mother         murmur no treatment needed    Hypertension Mother     Stroke Maternal Grandmother     Other (stroke) Maternal Grandmother     Dementia Paternal Grandmother     Heart Disorder Paternal Grandfather         two heart attacks and CHF    Diabetes Paternal Grandfather     Other (Diabetes, type 2) Paternal Grandfather         Review of Systems:   GENERAL: feels generally well  SKIN: no ulcerated or worrisome skin lesions  EYES:denies blurred vision or double vision  HEENT: denies new nasal congestion, sinus pain or ST  LUNGS: denies shortness of breath with exertion  CARDIOVASCULAR: denies chest pain on exertion  GI: no hematemesis, no BRBPR, no worsening heartburn  : no dysuria, no blood in urine, no difficulty urinating  MUSCULOSKELETAL: no new musculoskeletal complaints  NEURO: no persistent, recurrent  headaches  PSYCHE:no depression or anxiety  HEMATOLOGIC: no hx of blood dyscrasia  ENDOCRINE: no new endocrine problems  ALL/ASTHMA: no new hx of severe allergy or asthma  BACK: normal, no spinal deformity, no CVA tenderness    Physical examination:     Constitutional: appears well hydrated alert and responsive no acute distress noted  HEENT wnl, anicteric, PERRL, normocephalic, atraumatic  Neck supple, norm ROM, no JVD  L CTA B  H Reg rate  Abd soft, some tenderness in the LLQ, no rebound tenderness, ND, no masses, no hernias, no HSM. Extr no c/c/e  Skin intact, no jaundice, no rashes, no lesions  Neuro grossly intact, no focal deficits, no tremors  Back no deformity, no CVA tnd. Assessment and plan:  Diagnoses and all orders for this visit:    Diverticulitis    Epiploic appendagitis       We discussed the ddx, treatment options, diet. We have discussed the surgical approaches, risks, benefits, alternatives, the possibility of colostomy creation or diversion, and expected recovery. All of the patient's questions have been answered to his satisfaction.       Suze Purvis MD  11/29/2023  5:30 PM

## 2023-12-05 ENCOUNTER — OFFICE VISIT (OUTPATIENT)
Dept: NUTRITION | Facility: HOSPITAL | Age: 58
End: 2023-12-05
Attending: INTERNAL MEDICINE
Payer: COMMERCIAL

## 2023-12-05 PROBLEM — K57.92 ACUTE DIVERTICULITIS: Status: ACTIVE | Noted: 2023-12-05

## 2023-12-05 PROCEDURE — 97802 MEDICAL NUTRITION INDIV IN: CPT

## 2023-12-05 NOTE — PROGRESS NOTES
ADULT INITIAL OUTPATIENT NUTRITION CONSULTATION    Nutrition Assessment    Medical Diagnosis: Diverticulitis    PMH: anxiety    Client Hx: 63year old male    Meds: noted    Labs: noted    ANTHROPOMETRICS:  Ht: 6'2\"  Wt: 245#    BMI: 31.4    Current Diet: soft, low fiber    Food/Beverage Intake:   BREAKFAST: eggs, english muffin or wheat toast and butter  LUNCH: rice and canned chicken or tuna, caesar wrap  DINNER: frozen meal or soup, or rice, fries  SNACKS: chips, banana, sweets, cheese, soft candies  BEVERAGES: water, gatorade, rarely 7up    Meal pattern: 2-3 meals/d, 2 snacks/d    Number of meals/week eaten at restaurants: 4x/wk    Alcohol Intake: none    Diet Quality: fair    Nutrient intake (based on diet record): ~6012-4047 cals/d    Estimated nutrition needs: 1900 cals/d (for wt loss)    Physical Activity:  minimal  GOAL: 150+ min/wk, walking    Sleep: 7-8 hrs/d, uses CPAP  Stress/anxiety: high, has therapist and psych    Nutrition Diagnosis: Food and Nutrition related knowledge deficit related to lack of previous diet education by RD as evidence by pt need for education regarding advancement of diverticulitis diet, increased fiber. Nutrition Intervention/Education: Comprehensive nutrition education and evaluation provided for advancement of diverticulitis diet, increased fiber. . Pt reports feeling well-finally. Few bouts of diverticulitis in March then most recently in October, discharged from hospital 10/27/23. Had IV antibiotics and at home for 3 weeks after. Still tenderness to sigmoid area, following with GI in January. Taking citrucel few times/wk. No c/o N/V/C/D at this time. Discussed gradually adding fiber back to diet while avoiding nuts, seeds, and popcorn, per MD. Herminia Smalls avoiding high fat, high sugar and fried foods. Continue with adequate hydration and increasing activity. Written materials were issued and explained, suggested logging fiber in MFP job.  Examples of foods to choose were provided. All questions answered today. Pt has set goals to follow recommendations set today, to track intake using phone job, MFP, and to contact RD with further questions or concerns. Monitoring/Evaluation:  Pt to follow with MD. RD available prn. Time spent with patient: 45 minutes    Thank you for the referral,  Ilene Ramirez RD, LDN  Tyler [email or web address]. [email or web address]  P: [phone]

## 2023-12-11 ENCOUNTER — TELEPHONE (OUTPATIENT)
Facility: CLINIC | Age: 58
End: 2023-12-11

## 2023-12-11 DIAGNOSIS — Z87.19 HISTORY OF COLONIC DIVERTICULITIS: ICD-10-CM

## 2023-12-11 DIAGNOSIS — R10.9 ABDOMINAL PAIN, UNSPECIFIED ABDOMINAL LOCATION: Primary | ICD-10-CM

## 2023-12-12 ENCOUNTER — HOSPITAL ENCOUNTER (OUTPATIENT)
Dept: CT IMAGING | Age: 58
Discharge: HOME | End: 2023-12-12
Attending: INTERNAL MEDICINE
Payer: COMMERCIAL

## 2023-12-12 ENCOUNTER — TELEMEDICINE (OUTPATIENT)
Facility: CLINIC | Age: 58
End: 2023-12-12
Payer: COMMERCIAL

## 2023-12-12 DIAGNOSIS — F31.9 BIPOLAR 1 DISORDER (HCC): ICD-10-CM

## 2023-12-12 DIAGNOSIS — G47.33 OSA (OBSTRUCTIVE SLEEP APNEA): Primary | ICD-10-CM

## 2023-12-12 DIAGNOSIS — Z87.19 HISTORY OF COLONIC DIVERTICULITIS: ICD-10-CM

## 2023-12-12 DIAGNOSIS — G47.19 DAYTIME HYPERSOMNOLENCE: ICD-10-CM

## 2023-12-12 DIAGNOSIS — R10.9 ABDOMINAL PAIN, UNSPECIFIED ABDOMINAL LOCATION: ICD-10-CM

## 2023-12-12 PROCEDURE — 99214 OFFICE O/P EST MOD 30 MIN: CPT | Performed by: INTERNAL MEDICINE

## 2023-12-12 PROCEDURE — 82565 ASSAY OF CREATININE: CPT

## 2023-12-12 PROCEDURE — 74177 CT ABD & PELVIS W/CONTRAST: CPT | Performed by: INTERNAL MEDICINE

## 2023-12-12 RX ORDER — IOHEXOL 350 MG/ML
100 INJECTION, SOLUTION INTRAVENOUS
Status: COMPLETED | OUTPATIENT
Start: 2023-12-12 | End: 2023-12-12

## 2023-12-12 RX ADMIN — IOHEXOL 100 ML: 350 INJECTION, SOLUTION INTRAVENOUS at 18:51:00

## 2023-12-12 NOTE — TELEPHONE ENCOUNTER
Patient contacted and message from Dr. Inez Flores given. Patient states he does not need number for Central Scheduling. No further questions at this time.

## 2023-12-12 NOTE — TELEPHONE ENCOUNTER
I spoke with Candi Angeles. He states since Friday he is experiencing a\"growing smile shaped discomfort under his belly button\" and chills. Pain level 0 to 1 when he is still,2 to 3 when moving around and  level 4 when he is driving over railroad tracts of bumps. States feels like his previous diverticulitis symptoms back in March and October. Denies nausea,vomiting,blood in stool or fever. Taking Tylenol for discomfort with relief. Patient asking if he should have his CT scan done sooner than January. Please advise. Thank you.

## 2023-12-12 NOTE — PROGRESS NOTES
Etienne Antu  10/13/2023 - 2023  : 1965  Age: 63 years  [address], [address]  Phone: [phone]  Compliance Report  Usage 10/13/2023 - 2023  Usage days 59/60 days (98%)  >= 4 hours 59 days (98%)  < 4 hours 0 days (0%)  Usage hours 431 hours 27 minutes  Average usage (total days) 7 hours 11 minutes  Average usage (days used) 7 hours 19 minutes  Median usage (days used) 7 hours 20 minutes  Total used hours (value since last reset - 2023) 431 hours  AirSense 11 AutoSet  Serial number [id number]  Mode AutoSet  Min Pressure 5 cmH2O  Max Pressure 15 cmH2O  EPR Fulltime  EPR level 2  Response Standard  Therapy  Pressure - cmH2O Median: 9.1 95th percentile: 14.0 Maximum: 14.7  Leaks - L/min Median: 0.0 95th percentile: 10.0 Maximum: 111.1  Events per hour AI: 3.1 HI: 1.2 AHI: 4.3  Apnea Index Central: 0.4 Obstructive: 2.3 Unknown: 0.3  RERA Index 1.2  Cheyne-Andrea respiration (average duration per night) 0 minutes (0%)

## 2023-12-12 NOTE — PROGRESS NOTES
Pulmonary/Critical Care/Sleep Medicine   Telemedicine Follow up Note     This visit is conducted using Telemedicine with live, interactive video and audio. Patient has been referred to the [facility] website at [email or web address] to review the yearly Consent to Treat document. Patient understands and accepts financial responsibility for any deductible, co-insurance and/or co-pays associated with this service. PCP: Juarez Craig MD   Phone: [phone]   Fax: [phone]       HPI   I had the pleasure of seeing Da Barragan who is a pleasant 63year old male who presents for follow up of obstructive Sleep apnea after last visit on 7/10/2023. Since last visit the patient underwent a sleep study on 9/27/2023 and was started on auto titrating CPAP machine at 5 to 15 cm water pressure. The patient states that he is feeling remarkably well. He denies any daytime sleepiness or fatigue    He drinks no caffeine beverages      He has pets one dog that does not sleep in bed. Hx of tobacco use: He  reports that he quit smoking about 23 years ago. His smoking use included cigarettes. He has a 9.50 pack-year smoking history. He has never been exposed to tobacco smoke. He has never used smokeless tobacco.    Past Medical History:   Diagnosis Date    Allergic rhinitis     Anxiety     Arrhythmia     Back pain     Back problem     Depression     ED (erectile dysfunction)     High blood pressure     Pneumonia due to organism     Shortness of breath     Sleep apnea 2012    Unspecified essential hypertension     Visual impairment       Past Surgical History:   Procedure Laterality Date    CHOLECYSTECTOMY  3/12/2023    COLONOSCOPY N/A 03/22/2016    [facility]. Hyperplastic polyp only.   Repeat 2026    DENTAL SURGERY PROCEDURE      [place] teeth removed    INCISION/DRAIN ABSCESS EXTRA  1996    KNEE ARTHROSCOPY Right     LAPAROSCOPIC CHOLECYSTECTOMY  02/12/2023    MYRINGOPLASTY      OTHER SURGICAL HISTORY      R knee arthroscope    SKIN TISSUE PROCEDURE UNLISTED      TONSILLECTOMY       Allergies   Allergen Reactions    Seasonal OTHER (SEE COMMENTS)     congestion     Current Outpatient Medications   Medication Sig Dispense Refill    sertraline 100 MG Oral Tab Take 1 tablet (100 mg total) by mouth daily. 30 tablet 0    gabapentin 600 MG Oral Tab Take 1 tablet (600 mg total) by mouth 3 (three) times daily. 90 tablet 0    clonazePAM (KLONOPIN) 0.5 MG Oral Tab 1/2 tablet four times daily 60 tablet 0    HYDROcodone-acetaminophen (NORCO) 5-325 MG Oral Tab Take 1 tablet by mouth every 6 (six) hours as needed for Pain. 16 tablet 0    acidophilus-pectin Oral Cap Take 1 capsule by mouth 3 (three) times a week. verapamil  MG Oral Tab CR Take 1 tablet (120 mg total) by mouth nightly. 30 tablet 3    lisinopril 20 MG Oral Tab Take 1 tablet (20 mg total) by mouth daily. 30 tablet 1    ClonazePAM 0.25 mg Oral Tab Take 1 Partial Tablet (0.25 mg total) by mouth in the morning, at noon, in the evening, and at bedtime. Multiple Vitamins-Minerals (MULTI-VITAMIN/MINERALS) Oral Tab Take 1 tablet by mouth daily. Albuterol Sulfate  (90 Base) MCG/ACT Inhalation Aero Soln Inhale 2 puffs into the lungs every 4 (four) hours as needed for Wheezing or Shortness of Breath. 1 each 3    Tadalafil 20 MG Oral Tab Take 1 tablet (20 mg total) by mouth daily as needed for Erectile Dysfunction.  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[employer]   Tobacco Use    Smoking status: Former     Packs/day: 0.50     Years: 19.00     Additional pack years: 0.00     Total pack years: 9.50     Types: Cigarettes     Quit date: 10/3/2000     Years since quittin.2     Passive exposure: Never    Smokeless tobacco: Never   Vaping Use    Vaping Use: Never used   Substance and Sexual Activity    Alcohol use: No     Comment: sober since     Drug use: No    Sexual activity: Yes     Partners: Female   Other Topics Concern    Caffeine Concern Yes     Comment: coffee in AM/ soda     Exercise Yes     Comment: X1-2 times per week      Social Determinants of Health     Financial Resource Strain: Low Risk  (10/30/2023)    Financial Resource Strain     Difficulty of Paying Living Expenses: Not hard at all     Med Affordability: No   Food Insecurity: No Food Insecurity (10/20/2023)    Food Insecurity     Food Insecurity: Never true   Transportation Needs: No Transportation Needs (10/20/2023)    Transportation Needs     Lack of Transportation: No   Housing Stability: Low Risk  (10/20/2023)    Housing Stability     Housing Instability: No      Immunization History   Administered Date(s) Administered    >=3 YRS TRI  MULTIDOSE VIAL (05642) FLU CLINIC 12/09/2014    Covid-19 Vaccine Moderna 100 mcg/0.5 ml 04/08/2021, 05/06/2021, 11/16/2021    Covid-19 Vaccine Moderna 50 Mcg/0.25 Ml 04/04/2022    Covid-19 Vaccine Moderna Bivalent 50mcg/0.5mL 12+ years 09/14/2022    FLU VAC QIV SPLIT 3 YRS AND OLDER (70439) 09/30/2016, 09/06/2018, 11/03/2020, 11/10/2021    FLUZONE 6 months and older PFS 0.5 ml (48480) 10/14/2022, 09/19/2023    HEP A 07/10/2008, 03/26/2009    Pfizer Covid-19 Vaccine 30mcg/0.3ml 12yrs+ (5395-1807) 09/26/2023    Pneumococcal (Prevnar 13) 09/06/2018    Pneumovax 23 08/19/2019    TDAP 12/09/2014    Typhoid, Oral 05/14/2018, 05/15/2018    Zoster Vaccine Recombinant Adjuvanted (Shingrix) 08/19/2019, 07/13/2020      Family History   Problem Relation Age of Onset    Heart Disorder Father         aflutter with ablation    Hypertension Father     Cancer Father 61        Prostate    Heart Disorder Mother         murmur no treatment needed    Hypertension Mother     Stroke Maternal Grandmother     Other (stroke) Maternal Grandmother     Dementia Paternal Grandmother     Heart Disorder Paternal Grandfather         two heart attacks and CHF    Diabetes Paternal Grandfather Other (Diabetes, type 2) Paternal Grandfather         Review of Systems   Constitutional:  Positive for fatigue. Negative for diaphoresis, fever and unexpected weight change. HENT:  Positive for congestion (more in spring and fall). Negative for mouth sores, nosebleeds, postnasal drip, rhinorrhea, sore throat and trouble swallowing. Eyes:  Negative for visual disturbance. Respiratory:  Negative for apnea, cough, choking, chest tightness, shortness of breath and wheezing. Cardiovascular:  Negative for chest pain, palpitations and leg swelling. Gastrointestinal:  Negative for abdominal pain, constipation, diarrhea, nausea and vomiting. Genitourinary:  Negative for difficulty urinating. Musculoskeletal:  Negative for arthralgias, back pain, gait problem and myalgias. Neurological:  Negative for dizziness, weakness and headaches. Psychiatric/Behavioral:  Positive for sleep disturbance. There were no vitals filed for this visit. There is no height or weight on file to calculate BMI. The patient was unable to turn his videocamera on  Physical Exam  Psychiatric:         Judgment: Judgment normal.             Study 9/27/2023 type: Diagnostic   Procedure: The Polysomnogram was performed with a sleep technologist in attendance at the [facility]. The following parameters were monitored: central, occipital and temporal EEG, EOG, submentalis EMG, nasal flow, nasal pressure, respiratory inductance plethysmography and oxygen saturation via continuous pulse oximetry. Sleep stages, periodic limb movements and EEG arousals were scored in accordance with the American Academy of Sleep Medicine Manual for the Scoring of Sleep and Associated Events. Apnea Hypopnea Index was calculated using the recommended definition of hypopnea for scoring respiratory events. Data acquisition, collection and scoring have been validated and clinically correlated.    Sleep History: Kristie Guzman is a 65 year old Male referred by Dr. Tdo Oscar for the evaluation of suspected sleep disordered breathing. Past Medical History is pertinent for rhinitis, anxiety, back pain, depression, high blood pressure, pneumonia. At the time of the study, the patient was prescribed the following medications: clonazepam, multivitamin HCTZ Zoloft, gabapentin. The patient was studied from 09:45:52 PM to 05:37:27 AM, with a total recording time of 471.6, total sleep time of 350.0 and a sleep efficiency of 74.2%. Wake after sleep onset was 98.0 minutes. The percentage of total sleep time by sleep stage is as follows: Stage 1 1.6%, Stage 2 81.9%, Stage 3 8.3% and Stage REM 8.3%. Respiratory Analysis: The Apnea-Hypopnea Index (AHI) was 8.4 events per hour. REM related AHI was 10.3 events per hour. Supine related AHI was 45.0. Lateral related AHI was 7.5. The Central Apnea Index was 0. The oxygen saturation gwen was 80.0% and patient spent 47.7% with saturations less than 88%. Snoring Profile: Snoring was moderate. Cardiac Profile: Electrocardiogram demonstrated normal sinus rhythm. EEG Profile: Based on the limited recording montage, there were no significant EEG abnormalities noted. There were 77 arousals, with a total arousal index of 13.2. Periodic Limb Movements: PLM index of 1.4. PLM arousal index of 0.5. IMPRESSIONS: This study demonstrates mild obstructive sleep apnea. Diagnosis: Obstructive Sleep Apnea G47.33   RECOMMENDATIONS:   1. It is recommended to proceed with CPAP titration. 2. The patient should avoid alcohol and sedative medications, as these may worsen severity of symptoms. 3. The patient should avoid drowsy driving. 4. Patient to follow up with a sleep specialist in the pulmonary clinic. Thank you for allowing me to participate in this patient's care. Please do not hesitate to contact me with any additional questions.    Dictated by: Dr. Fam Hernandez   Electronically signed by Tod Oscar MD (Electronic Signature)   Board Certified in EMERY [place] [employer]:  Personally Reviewed in epic      Imaging:  Personally Reviewed in epic        Impression:  Obstructive sleep apnea syndrome diagnosed over 10 years ago the patient used CPAP machine for some period of time but after losing about 60 pounds of weight stopped using the machine. Polysomnogram performed on 9/27/2023 showed Apnea-Hypopnea Index (AHI) was 8.4 events per hour. REM related AHI was 10.3 events per hour. Supine related AHI was 45.0. Lateral related AHI was 7.5. The Central Apnea Index was 0. The oxygen saturation gwen was 80.0% and patient spent 47.7% with saturations less than 88%. :  The patient has been started on auto CPAP at 5 to 15 cm water pressure. Download data on 12/11/2023 for 60 days shows adequate AHI and compliance. Recent left lower lobe community-acquired pneumonia 6/26/2023   Allergic rhinitis  Anxiety disorder  Depression  Hypertension  Obesity, class I                                Plan:    Continue auto CPAP at 5 to 15 cm water pressure as patient is using and benefiting from CPAP machine  Advised patient regarding drowsy driving and to avoid sedatives or alcoholic beverages that can worsen sleep apnea        Follow-up: 6 months with nurse practitioner 1  year with me    Thank you for allowing me to participate in your patient care. BANDAR Paula MD, FACP, FCCP, FAASM - Pulmonary/Critical care/Sleep Medicine  Please contact our office if you have any questions or concerns at [phone]

## 2023-12-14 ENCOUNTER — PATIENT MESSAGE (OUTPATIENT)
Facility: CLINIC | Age: 58
End: 2023-12-14

## 2023-12-14 LAB
CREAT BLD-MCNC: 0.9 MG/DL
EGFRCR SERPLBLD CKD-EPI 2021: 100 ML/MIN/1.73M2 (ref 60–?)

## 2023-12-15 NOTE — TELEPHONE ENCOUNTER
From: Modesto Jones  To: Patricia Falcon  Sent: 12/14/2023 10:16 PM CST  Subject: Mistakenly posted blood test results     The attached appeared in my test results tonight. I have not had any blood draws recently, surely belongs to someone else.

## 2023-12-18 ENCOUNTER — OFFICE VISIT (OUTPATIENT)
Dept: FAMILY MEDICINE CLINIC | Facility: CLINIC | Age: 58
End: 2023-12-18
Payer: COMMERCIAL

## 2023-12-18 VITALS
RESPIRATION RATE: 16 BRPM | DIASTOLIC BLOOD PRESSURE: 62 MMHG | SYSTOLIC BLOOD PRESSURE: 130 MMHG | HEIGHT: 74 IN | WEIGHT: 250 LBS | BODY MASS INDEX: 32.08 KG/M2 | OXYGEN SATURATION: 98 % | HEART RATE: 68 BPM

## 2023-12-18 DIAGNOSIS — I10 ESSENTIAL HYPERTENSION: Primary | ICD-10-CM

## 2023-12-18 PROCEDURE — 3075F SYST BP GE 130 - 139MM HG: CPT | Performed by: FAMILY MEDICINE

## 2023-12-18 PROCEDURE — 99213 OFFICE O/P EST LOW 20 MIN: CPT | Performed by: FAMILY MEDICINE

## 2023-12-18 PROCEDURE — 3008F BODY MASS INDEX DOCD: CPT | Performed by: FAMILY MEDICINE

## 2023-12-18 PROCEDURE — 3078F DIAST BP <80 MM HG: CPT | Performed by: FAMILY MEDICINE

## 2023-12-18 RX ORDER — LISINOPRIL 20 MG/1
20 TABLET ORAL DAILY
Qty: 90 TABLET | Refills: 3 | Status: SHIPPED | OUTPATIENT
Start: 2023-12-18

## 2023-12-20 ENCOUNTER — OFFICE VISIT (OUTPATIENT)
Dept: SURGERY | Facility: CLINIC | Age: 58
End: 2023-12-20

## 2023-12-20 DIAGNOSIS — K57.92 DIVERTICULITIS: Primary | ICD-10-CM

## 2023-12-20 PROCEDURE — 99214 OFFICE O/P EST MOD 30 MIN: CPT | Performed by: SURGERY

## 2023-12-20 NOTE — H&P
Chief complaint:   Chief Complaint   Patient presents with    Diverticulitis     Pt here to discuss CT scan and see what next steps are. HPI: KEIRA presents for follow up after consultation for sigmoid diverticulitis. He has had two episodes of diverticulitis, 8 months apart,  treated with antibiotics. He also has had descending colon appendagitis which has resolved. He feels well presently, no pain. He has had some migratory abd pain in the recent months. Sometimes he has LLQ or RLQ discomfort, sometimes he experiences midabdominal pain. No fevers, no chills. His bowel movements are irregular, sometimes 2-3 per day and sometimes every other day. He underwent colonoscopy, he is s/p laparoscopic cholecystectomy. His father had diverticulitis with complications and KEIRA is interested in proceeding with colon resection. CT 12/12/2023  FINDINGS:    LIVER:  Normal liver morphology with hemangioma of the posterior segment right hepatic lobe. BILIARY:  Cholecystectomy. Normal caliber of the bile ducts. PANCREAS:  No lesion, fluid collection, ductal dilatation, or atrophy. SPLEEN:  No enlargement or focal lesion. KIDNEYS:  No mass, obstruction, or calcification. ADRENALS:  No mass or enlargement. AORTA/VASCULAR:  No aneurysm or dissection. RETROPERITONEUM:  No mass or adenopathy. BOWEL/MESENTERY:  No evidence of bowel obstruction. Normal appendix. Prominent submucosal fat deposition noted throughout the colon, a chronic finding which may be seen with inflammatory bowel disease. No imaging features of acute colitis. Sigmoid  diverticulosis without evidence of acute diverticulitis. Chronic scarring of the pericolonic fat about the distal sigmoid. Focus of epiploic appendagitis along the descending colon noted on 11/16/2023 has resolved in the interim. ABDOMINAL WALL:  No mass or hernia. URINARY BLADDER:  Bladder wall accentuated by incomplete distention.   PELVIC NODES: No adenopathy. PELVIC ORGANS:  No visible mass. Pelvic organs appropriate for patient age. BONES:  No bony lesion or fracture. Mild disc and endplate degenerative change of the lumbosacral junction. LUNG BASES:  No visible pulmonary or pleural disease. OTHER:  Negative. Impression   CONCLUSION:       No acute process of the abdomen or pelvis. Resolution of epiploic appendagitis along the descending colon as seen on recent CT of 11/16/2023. Past medical history:   Past Medical History:   Diagnosis Date    Allergic rhinitis     Anxiety     Arrhythmia     Back pain     Back problem     Depression     ED (erectile dysfunction)     High blood pressure     Pneumonia due to organism     Shortness of breath     Sleep apnea 2012    Unspecified essential hypertension     Visual impairment        Past surgical history:   Past Surgical History:   Procedure Laterality Date    CHOLECYSTECTOMY  3/12/2023    COLONOSCOPY N/A 03/22/2016    [facility]. Hyperplastic polyp only. Repeat 2026    DENTAL SURGERY PROCEDURE      [place] teeth removed    INCISION/DRAIN ABSCESS EXTRA  1996    KNEE ARTHROSCOPY Right     LAPAROSCOPIC CHOLECYSTECTOMY  02/12/2023    MYRINGOPLASTY      OTHER SURGICAL HISTORY      R knee arthroscope    SKIN TISSUE PROCEDURE UNLISTED      TONSILLECTOMY         Allergies: Allergies   Allergen Reactions    Seasonal OTHER (SEE COMMENTS)     congestion       Medications:   Current Outpatient Medications   Medication Sig Dispense Refill    lisinopril 20 MG Oral Tab Take 1 tablet (20 mg total) by mouth daily. 90 tablet 3    sertraline 100 MG Oral Tab Take 1 tablet (100 mg total) by mouth daily. 30 tablet 0    gabapentin 600 MG Oral Tab Take 1 tablet (600 mg total) by mouth 3 (three) times daily. 90 tablet 0    clonazePAM (KLONOPIN) 0.5 MG Oral Tab 1/2 tablet four times daily 60 tablet 0    acidophilus-pectin Oral Cap Take 1 capsule by mouth 3 (three) times a week.       verapamil ER 120 MG Oral Tab CR Take 1 tablet (120 mg total) by mouth nightly. 30 tablet 3    Multiple Vitamins-Minerals (MULTI-VITAMIN/MINERALS) Oral Tab Take 1 tablet by mouth daily. Albuterol Sulfate  (90 Base) MCG/ACT Inhalation Aero Soln Inhale 2 puffs into the lungs every 4 (four) hours as needed for Wheezing or Shortness of Breath. 1 each 3    Tadalafil 20 MG Oral Tab Take 1 tablet (20 mg total) by mouth daily as needed for Erectile Dysfunction.  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     Drug use: No    Sexual activity: Yes     Partners: Female        Family history:  Family History   Problem Relation Age of Onset    Heart Disorder Father         aflutter with ablation    Hypertension Father     Cancer Father 61        Prostate    Heart Disorder Mother         murmur no treatment needed    Hypertension Mother     Stroke Maternal Grandmother     Other (stroke) Maternal Grandmother     Dementia Paternal Grandmother     Heart Disorder Paternal Grandfather         two heart attacks and CHF    Diabetes Paternal Grandfather     Other (Diabetes, type 2) Paternal Grandfather         Review of Systems:   GENERAL: feels generally well  SKIN: no ulcerated or worrisome skin lesions  EYES:denies blurred vision or double vision  HEENT: denies new nasal congestion, sinus pain or ST  LUNGS: denies shortness of breath with exertion  CARDIOVASCULAR: denies chest pain on exertion  GI: no hematemesis, no BRBPR, no worsening heartburn  : no dysuria, no blood in urine, no difficulty urinating  MUSCULOSKELETAL: no new musculoskeletal complaints  NEURO: no persistent, recurrent  headaches  PSYCHE:no depression or anxiety  HEMATOLOGIC: no hx of blood dyscrasia  ENDOCRINE: no new endocrine problems  ALL/ASTHMA: no new hx of severe allergy or asthma  BACK: normal, no spinal deformity, no CVA tenderness    Physical examination:   From previous visit. Constitutional: appears well hydrated alert and responsive no acute distress noted  HEENT wnl, anicteric, PERRL, normocephalic, atraumatic  Neck supple, norm ROM, no JVD  L CTA B  H Reg rate  Abd soft, NT, no rebound tenderness, ND, no masses, no hernias, no HSM. Extr no c/c/e  Skin intact, no jaundice, no rashes, no lesions  Neuro grossly intact, no focal deficits, no tremors  Back no deformity, no CVA tnd. Assessment and plan:  Diagnoses and all orders for this visit:    Diverticulitis       We discussed the ddx, treatment options, diet. We have discussed observation, which the patient is reluctant to consider. I have recommended he see a colorectal surgeon for consideration of surgery. We've discussed the surgical approaches, risks, benefits, alternatives, the possibility of colostomy creation or diversion, and expected recovery. All of the patient's questions have been answered to his satisfaction.       Lamar Pegueor MD

## 2023-12-21 ENCOUNTER — OFFICE VISIT (OUTPATIENT)
Dept: FAMILY MEDICINE CLINIC | Facility: CLINIC | Age: 58
End: 2023-12-21
Payer: COMMERCIAL

## 2023-12-21 VITALS
HEART RATE: 74 BPM | WEIGHT: 250 LBS | TEMPERATURE: 98 F | BODY MASS INDEX: 32.08 KG/M2 | DIASTOLIC BLOOD PRESSURE: 78 MMHG | SYSTOLIC BLOOD PRESSURE: 132 MMHG | HEIGHT: 74 IN | OXYGEN SATURATION: 96 % | RESPIRATION RATE: 18 BRPM

## 2023-12-21 DIAGNOSIS — R68.89 FLU-LIKE SYMPTOMS: Primary | ICD-10-CM

## 2023-12-21 PROCEDURE — 3075F SYST BP GE 130 - 139MM HG: CPT | Performed by: NURSE PRACTITIONER

## 2023-12-21 PROCEDURE — 3008F BODY MASS INDEX DOCD: CPT | Performed by: NURSE PRACTITIONER

## 2023-12-21 PROCEDURE — 87637 SARSCOV2&INF A&B&RSV AMP PRB: CPT | Performed by: NURSE PRACTITIONER

## 2023-12-21 PROCEDURE — 3078F DIAST BP <80 MM HG: CPT | Performed by: NURSE PRACTITIONER

## 2023-12-21 PROCEDURE — 99213 OFFICE O/P EST LOW 20 MIN: CPT | Performed by: NURSE PRACTITIONER

## 2023-12-21 RX ORDER — OSELTAMIVIR PHOSPHATE 75 MG/1
75 CAPSULE ORAL 2 TIMES DAILY
Qty: 10 CAPSULE | Refills: 0 | Status: SHIPPED | OUTPATIENT
Start: 2023-12-21 | End: 2023-12-26

## 2023-12-22 LAB
FLUAV + FLUBV RNA SPEC NAA+PROBE: NOT DETECTED
FLUAV + FLUBV RNA SPEC NAA+PROBE: NOT DETECTED
RSV RNA SPEC NAA+PROBE: NOT DETECTED
SARS-COV-2 RNA RESP QL NAA+PROBE: NOT DETECTED

## 2023-12-28 ENCOUNTER — TELEPHONE (OUTPATIENT)
Facility: CLINIC | Age: 58
End: 2023-12-28

## 2024-01-03 ENCOUNTER — MED REC SCAN ONLY (OUTPATIENT)
Facility: CLINIC | Age: 59
End: 2024-01-03

## 2024-01-05 ENCOUNTER — TELEPHONE (OUTPATIENT)
Facility: CLINIC | Age: 59
End: 2024-01-05

## 2024-01-05 NOTE — TELEPHONE ENCOUNTER
Dr. Falcon    Consult note with Dr. Tam Carrasco with colorectal surgery received and placed on your desk for review.    Thank you

## 2024-01-15 ENCOUNTER — OFFICE VISIT (OUTPATIENT)
Dept: FAMILY MEDICINE CLINIC | Facility: CLINIC | Age: 59
End: 2024-01-15
Payer: COMMERCIAL

## 2024-01-15 VITALS
OXYGEN SATURATION: 98 % | RESPIRATION RATE: 16 BRPM | HEART RATE: 61 BPM | WEIGHT: 254 LBS | DIASTOLIC BLOOD PRESSURE: 78 MMHG | HEIGHT: 74 IN | SYSTOLIC BLOOD PRESSURE: 112 MMHG | BODY MASS INDEX: 32.6 KG/M2

## 2024-01-15 DIAGNOSIS — K59.00 CONSTIPATION, UNSPECIFIED CONSTIPATION TYPE: Primary | ICD-10-CM

## 2024-01-15 PROCEDURE — 99213 OFFICE O/P EST LOW 20 MIN: CPT | Performed by: FAMILY MEDICINE

## 2024-01-15 PROCEDURE — 3008F BODY MASS INDEX DOCD: CPT | Performed by: FAMILY MEDICINE

## 2024-01-15 PROCEDURE — 3074F SYST BP LT 130 MM HG: CPT | Performed by: FAMILY MEDICINE

## 2024-01-15 PROCEDURE — 3078F DIAST BP <80 MM HG: CPT | Performed by: FAMILY MEDICINE

## 2024-01-15 NOTE — PROGRESS NOTES
[facility] Progress Note    SUBJECTIVE: Don Cabral 58 year old male is here today for   Chief Complaint   Patient presents with    Follow - Up     After seeing cardiologist and gastroenterologist         Followed up with surgeons to discuss his episodes of diverticulitis. Saw Dr. Gant and Dr. Carrasco.  Was told he was on the fence for whether surgery made sense. Not definitively recommended. Has repeated discomfort when stool passes through the area, presumed dueto scar tissue.    Having irregular stools, despite high fiber, high fluid intake, but still dealing with constipation.    Uses citrucel. Alternating with stool softener which is best set up currently. When a larger harder stools more uncomfortable. Rarely rises to what he considers pain. But not to the point where he'd consider going to be seen for it.    Saw cardiology, and no changes recommended.  He is finding PVCs are occurring at the same time of day. Follows in 6 months    Saw ferny Strauss, continue CPAP. Did adjust because had some episodes of apnea, and doing better now. 3 month check    Was told by cardiology to keep on top of sleep panea, keep up verapamil, and to work on losing weight.    Sees GI next month.      He has seen some higher blood pressures, but looking good today.    Sertraline reduced, to see if helps restless legs, and tremor is down.    Mood is doing ok, bu not as good as 100 mg.    Feels well balanced.    Plans to keep medications stable, while dealing with fallout of medical health.    He wonders about trying to reduce clonazepam in the future.     He has been trying to exercise regularly      PMH  Past Medical History:   Diagnosis Date    Allergic rhinitis     Anxiety     Arrhythmia     Back pain     Back problem     Depression     ED (erectile dysfunction)     High blood pressure     Pneumonia due to organism     Shortness of breath     Sleep apnea 2012    Unspecified essential hypertension     Visual  impairment         PSH  Past Surgical History:   Procedure Laterality Date    CHOLECYSTECTOMY  3/12/2023    COLONOSCOPY N/A 03/22/2016    [facility].  Hyperplastic polyp only.  Repeat 2026    DENTAL SURGERY PROCEDURE      [place] teeth removed    INCISION/DRAIN ABSCESS EXTRA  1996    KNEE ARTHROSCOPY Right     LAPAROSCOPIC CHOLECYSTECTOMY  02/12/2023    MYRINGOPLASTY      OTHER SURGICAL HISTORY      R knee arthroscope    SKIN TISSUE PROCEDURE UNLISTED      TONSILLECTOMY          Social Hx:  See HPI    ROS  See HPI    OBJECTIVE:  /78   Pulse 61   Resp 16   Ht 6' 2\" (1.88 m)   Wt 254 lb (115.2 kg)   SpO2 98%   BMI 32.61 kg/m²     Exam        Labs:          Meds:   Current Outpatient Medications   Medication Sig Dispense Refill    clonazePAM (KLONOPIN) 0.5 MG Oral Tab 1/2 tablet four times daily 60 tablet 0    gabapentin 600 MG Oral Tab Take 1 tablet (600 mg total) by mouth 3 (three) times daily. 90 tablet 0    sertraline (ZOLOFT) 50 MG Oral Tab 1.5 tablets daily 45 tablet 1    lisinopril 20 MG Oral Tab Take 1 tablet (20 mg total) by mouth daily. 90 tablet 3    acidophilus-pectin Oral Cap Take 1 capsule by mouth 3 (three) times a week.      verapamil  MG Oral Tab CR Take 1 tablet (120 mg total) by mouth nightly. 30 tablet 3    Multiple Vitamins-Minerals (MULTI-VITAMIN/MINERALS) Oral Tab Take 1 tablet by mouth daily.      Albuterol Sulfate  (90 Base) MCG/ACT Inhalation Aero Soln Inhale 2 puffs into the lungs every 4 (four) hours as needed for Wheezing or Shortness of Breath. 1 each 3    Tadalafil 20 MG Oral Tab Take 1 tablet (20 mg total) by mouth daily as needed for Erectile Dysfunction. 8 tablet 5         Assessment/Plan  Don was seen today for follow - up.    Diagnoses and all orders for this visit:    Constipation, unspecified constipation type         Discussed strategies for his constipation with goal to avoid, to avoid getting repeated diverticulitis. Has GI follow up    Also reviewed his  visits with specialists, leaning away from surgery. Cardiac rhythm doing well.         Total Time spent with patient and coordinating care:  15 minutes.    Follow up: as needed, and for well check      Stephen Christianson MD

## 2024-01-22 ENCOUNTER — TELEPHONE (OUTPATIENT)
Facility: CLINIC | Age: 59
End: 2024-01-22

## 2024-01-22 NOTE — TELEPHONE ENCOUNTER
Informed pt that Dr Zaidi reviewed the data and wants patient to be on same settings.   Instructed pt to call our office if he's got symptoms of NELLIE even after using therapy I.e. snoring, daytime sleepiness, etc.   Pt understood instructions.

## 2024-01-22 NOTE — TELEPHONE ENCOUNTER
Usage 12/23/2023 - 01/21/2024  Usage days 30/30 days (100%)  >= 4 hours 30 days (100%)  < 4 hours 0 days (0%)  Usage hours 227 hours 16 minutes  Average usage (total days) 7 hours 35 minutes  Average usage (days used) 7 hours 35 minutes  Median usage (days used) 7 hours 27 minutes  Total used hours (value since last reset - 01/21/2024) 741 hours  AirSense 11 AutoSet  Serial number [id number]  Mode AutoSet  Min Pressure 9 cmH2O  Max Pressure 20 cmH2O  EPR Fulltime  EPR level 2  Response Standard  Therapy  Pressure - cmH2O Median: 10.9 95th percentile: 17.5 Maximum: 18.7  Leaks - L/min Median: 0.0 95th percentile: 9.5 Maximum: 93.4  Events per hour AI: 3.3 HI: 1.2 AHI: 4.5  Apnea Index Central: 0.4 Obstructive: 2.5 Unknown: 0.3  RERA Index 1.0  Cheyne-Andrea respiration (average duration per night) 0 minutes (0%  FULL FACE MASK

## 2024-01-27 ENCOUNTER — HOSPITAL ENCOUNTER (EMERGENCY)
Age: 59
Discharge: HOME | End: 2024-01-27
Attending: EMERGENCY MEDICINE
Payer: COMMERCIAL

## 2024-01-27 ENCOUNTER — APPOINTMENT (OUTPATIENT)
Dept: CT IMAGING | Age: 59
End: 2024-01-27
Attending: EMERGENCY MEDICINE
Payer: COMMERCIAL

## 2024-01-27 VITALS
HEART RATE: 64 BPM | TEMPERATURE: 98 F | WEIGHT: 250 LBS | DIASTOLIC BLOOD PRESSURE: 65 MMHG | SYSTOLIC BLOOD PRESSURE: 133 MMHG | OXYGEN SATURATION: 98 % | RESPIRATION RATE: 16 BRPM | BODY MASS INDEX: 32.08 KG/M2 | HEIGHT: 74 IN

## 2024-01-27 DIAGNOSIS — R10.33 PERIUMBILICAL ABDOMINAL PAIN: Primary | ICD-10-CM

## 2024-01-27 LAB
ALBUMIN SERPL-MCNC: 3.6 G/DL (ref 3.4–5)
ALBUMIN/GLOB SERPL: 0.9 {RATIO} (ref 1–2)
ALP LIVER SERPL-CCNC: 74 U/L
ANION GAP SERPL CALC-SCNC: 5 MMOL/L (ref 0–18)
AST SERPL-CCNC: 26 U/L (ref 15–37)
BASOPHILS # BLD AUTO: 0.01 X10(3) UL (ref 0–0.2)
BASOPHILS NFR BLD AUTO: 0.1 %
BILIRUB SERPL-MCNC: 1 MG/DL (ref 0.1–2)
BILIRUB UR QL STRIP.AUTO: NEGATIVE
BUN BLD-MCNC: 15 MG/DL (ref 9–23)
CALCIUM BLD-MCNC: 9.2 MG/DL (ref 8.5–10.1)
CHLORIDE SERPL-SCNC: 104 MMOL/L (ref 98–112)
CLARITY UR REFRACT.AUTO: CLEAR
CO2 SERPL-SCNC: 29 MMOL/L (ref 21–32)
COLOR UR AUTO: YELLOW
CREAT BLD-MCNC: 0.83 MG/DL
EGFRCR SERPLBLD CKD-EPI 2021: 102 ML/MIN/1.73M2 (ref 60–?)
EOSINOPHIL # BLD AUTO: 0.24 X10(3) UL (ref 0–0.7)
EOSINOPHIL NFR BLD AUTO: 2.9 %
ERYTHROCYTE [DISTWIDTH] IN BLOOD BY AUTOMATED COUNT: 14.6 %
GLOBULIN PLAS-MCNC: 4 G/DL (ref 2.8–4.4)
GLUCOSE BLD-MCNC: 112 MG/DL (ref 70–99)
GLUCOSE UR STRIP.AUTO-MCNC: NEGATIVE MG/DL
HCT VFR BLD AUTO: 45.5 %
HGB BLD-MCNC: 15.1 G/DL
IMM GRANULOCYTES # BLD AUTO: 0.03 X10(3) UL (ref 0–1)
IMM GRANULOCYTES NFR BLD: 0.4 %
KETONES UR STRIP.AUTO-MCNC: NEGATIVE MG/DL
LEUKOCYTE ESTERASE UR QL STRIP.AUTO: NEGATIVE
LIPASE SERPL-CCNC: 46 U/L (ref 13–75)
LYMPHOCYTES # BLD AUTO: 2.24 X10(3) UL (ref 1–4)
LYMPHOCYTES NFR BLD AUTO: 26.6 %
MCH RBC QN AUTO: 28.6 PG (ref 26–34)
MCHC RBC AUTO-ENTMCNC: 33.2 G/DL (ref 31–37)
MCV RBC AUTO: 86.2 FL
MONOCYTES # BLD AUTO: 0.53 X10(3) UL (ref 0.1–1)
MONOCYTES NFR BLD AUTO: 6.3 %
NEUTROPHILS # BLD AUTO: 5.37 X10 (3) UL (ref 1.5–7.7)
NEUTROPHILS # BLD AUTO: 5.37 X10(3) UL (ref 1.5–7.7)
NEUTROPHILS NFR BLD AUTO: 63.7 %
NITRITE UR QL STRIP.AUTO: NEGATIVE
OSMOLALITY SERPL CALC.SUM OF ELEC: 288 MOSM/KG (ref 275–295)
PH UR STRIP.AUTO: 6.5 [PH] (ref 5–8)
PLATELET # BLD AUTO: 333 10(3)UL (ref 150–450)
POTASSIUM SERPL-SCNC: 4.1 MMOL/L (ref 3.5–5.1)
PROT SERPL-MCNC: 7.6 G/DL (ref 6.4–8.2)
PROT UR STRIP.AUTO-MCNC: NEGATIVE MG/DL
RBC # BLD AUTO: 5.28 X10(6)UL
RBC UR QL AUTO: NEGATIVE
SODIUM SERPL-SCNC: 138 MMOL/L (ref 136–145)
SP GR UR STRIP.AUTO: 1.01 (ref 1–1.03)
UROBILINOGEN UR STRIP.AUTO-MCNC: 0.2 MG/DL
WBC # BLD AUTO: 8.4 X10(3) UL (ref 4–11)

## 2024-01-27 PROCEDURE — 81003 URINALYSIS AUTO W/O SCOPE: CPT | Performed by: EMERGENCY MEDICINE

## 2024-01-27 PROCEDURE — 74177 CT ABD & PELVIS W/CONTRAST: CPT | Performed by: EMERGENCY MEDICINE

## 2024-01-27 PROCEDURE — 80053 COMPREHEN METABOLIC PANEL: CPT | Performed by: EMERGENCY MEDICINE

## 2024-01-27 PROCEDURE — 85025 COMPLETE CBC W/AUTO DIFF WBC: CPT | Performed by: EMERGENCY MEDICINE

## 2024-01-27 PROCEDURE — 96361 HYDRATE IV INFUSION ADD-ON: CPT

## 2024-01-27 PROCEDURE — 96360 HYDRATION IV INFUSION INIT: CPT

## 2024-01-27 PROCEDURE — 83690 ASSAY OF LIPASE: CPT | Performed by: EMERGENCY MEDICINE

## 2024-01-27 PROCEDURE — 99284 EMERGENCY DEPT VISIT MOD MDM: CPT

## 2024-01-27 PROCEDURE — 99285 EMERGENCY DEPT VISIT HI MDM: CPT

## 2024-01-27 RX ORDER — SODIUM CHLORIDE 9 MG/ML
INJECTION, SOLUTION INTRAVENOUS CONTINUOUS
Status: DISCONTINUED | OUTPATIENT
Start: 2024-01-27 | End: 2024-01-27

## 2024-01-27 NOTE — ED PROVIDER NOTES
Patient Seen in: [facility] In [place]      History     Chief Complaint   Patient presents with    Abdominal Pain     Stated Complaint: abd pain    Subjective:   HPI    This is a 58-year-old male who presents to the emergency room for evaluation of abdominal pain.  Patient states that today he felt a \"lump \"just to the left of the umbilicus, states it slightly tender.  States he has had lipomas removed and thinks it may feel similar however he also has had a extensive history of diverticulitis and was told if he ever has pain in the abdomen he needs to go to the ER for CT scan.  Denies any nausea or vomiting.  No diarrhea constipation.  Denies any fevers or chills.  Denies urinary symptoms.  Denies trauma.  Denies tearing type chest or abdominal pain.    Objective:   Past Medical History:   Diagnosis Date    Allergic rhinitis     Anxiety     Arrhythmia     Back pain     Back problem     Depression     ED (erectile dysfunction)     High blood pressure     Pneumonia due to organism     Shortness of breath     Sleep apnea     Unspecified essential hypertension     Visual impairment               Past Surgical History:   Procedure Laterality Date    CHOLECYSTECTOMY  3/12/2023    COLONOSCOPY N/A 2016    [facility].  Hyperplastic polyp only.  Repeat     DENTAL SURGERY PROCEDURE      [place] teeth removed    INCISION/DRAIN ABSCESS EXTRA      KNEE ARTHROSCOPY Right     LAPAROSCOPIC CHOLECYSTECTOMY  2023    MYRINGOPLASTY      OTHER SURGICAL HISTORY      R knee arthroscope    SKIN TISSUE PROCEDURE UNLISTED      TONSILLECTOMY                  Social History     Socioeconomic History    Marital status:    Occupational History    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   Tobacco Use    Smoking status: Former     Packs/day: 0.50     Years: 19.00     Additional pack years: 0.00     Total pack years: 9.50     Types: Cigarettes     Quit date: 10/3/2000     Years since quittin.3      Passive exposure: Never    Smokeless tobacco: Never   Vaping Use    Vaping Use: Never used   Substance and Sexual Activity    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    Drug use: No    Sexual activity: Yes     Partners: Female   Other Topics Concern    Caffeine Concern Yes     Comment: coffee in AM/ soda     Exercise Yes     Comment: X1-2 times per week      Social Determinants of Health     Financial Resource Strain: Low Risk  (10/30/2023)    Financial Resource Strain     Difficulty of Paying Living Expenses: Not hard at all     Med Affordability: No   Food Insecurity: No Food Insecurity (10/20/2023)    Food Insecurity     Food Insecurity: Never true   Transportation Needs: No Transportation Needs (10/20/2023)    Transportation Needs     Lack of Transportation: No   Housing Stability: Low Risk  (10/20/2023)    Housing Stability     Housing Instability: No              Review of Systems    Positive for stated complaint: abd pain  Other systems are as noted in HPI.  Constitutional and vital signs reviewed.      All other systems reviewed and negative except as noted above.    Physical Exam     ED Triage Vitals [01/27/24 1028]   /80   Pulse 85   Resp 18   Temp 97.3 °F (36.3 °C)   Temp src Temporal   SpO2 97 %   O2 Device None (Room air)       Current:/65   Pulse 64   Temp 98 °F (36.7 °C) (Temporal)   Resp 16   Ht 188 cm (6' 2\")   Wt 113.4 kg   SpO2 98%   BMI 32.10 kg/m²         Physical Exam    GENERAL: Patient is awake, alert, well-appearing, in no acute distress.  HEENT:  no scleral icterus.  Mucous membranes are moist  HEART: Regular rate and rhythm, no murmurs.  LUNGS: Clear to auscultation bilaterally.  No Rales, no rhonchi, no wheezing, no stridor.  ABDOMEN: Soft, nondistended, small nodular area just to the left of the umbilicus which is tender, no overlying erythema or induration, bowel sounds are present, no rebound, no rigidity, no guarding.no pulsatile masses. No CVA  tenderness  EXTREMITIES: No peripheral edema    ED Course     Labs Reviewed   COMP METABOLIC PANEL (14) - Abnormal; Notable for the following components:       Result Value    Glucose 112 (*)     A/G Ratio 0.9 (*)     All other components within normal limits   LIPASE - Normal   CBC WITH DIFFERENTIAL WITH PLATELET    Narrative:     The following orders were created for panel order CBC With Differential With Platelet.  Procedure                               Abnormality         Status                     ---------                               -----------         ------                     CBC W/ DIFFERENTIAL[[id number]]                              Final result                 Please view results for these tests on the individual orders.   URINALYSIS WITH CULTURE REFLEX   CBC W/ DIFFERENTIAL                      MDM        Differential diagnosis before testing includes but not limited to diverticulitis, intra-abdominal abscess, perforated viscus, hernia, lipoma, which is a medical condition that poses a threat to life/function    Past Medical History/comorbidities-diverticulitis, depression, anxiety, hypertension      Radiographic images  I personally reviewed the radiographs and my individual interpretation shows CT no free air  I also reviewed the official reports that showed mild fatty infiltration of the colon, acute inflammatory process not identified.      Course of Events during Emergency Room Visit include IV established blood work obtained.  CBC chemistry and urinalysis unremarkable.  Lipase 46.  CT of the abdomen without acute findings.  I discussed with patient etiology symptoms unclear, suspect symptoms due to lipoma, no evidence of any acute infectious process.  May use over-the-counter ibuprofen, follow-up with his primary care physician and return to ER if any change or worsening symptoms.  Patient will plan.  Abdomen soft nonsurgical on reevaluation and was discharged good condition    Shared decision  making was utilized       Discharge  I have discussed with the patient the results of test, differential diagnosis, treatment plan, warning signs and symptoms which should prompt immediate return.  They expressed understanding of these instructions and agrees to the following plan provided.  They were given written discharge instructions and agrees to return for any concerns and voiced understanding and all questions were answered.    Note to patient: The 21st Century Cures Act makes medical notes like these available to patients in the interest of transparency. However, this is a medical document intended as peer to peer communication. It is written in medical language and may contain abbreviations or verbiage that are unfamiliar. It may appear blunt or direct. Medical documents are intended to carry relevant information, facts as evident, and the clinical opinion of the practitioner.                                            Medical Decision Making      Disposition and Plan     Clinical Impression:  1. Periumbilical abdominal pain         Disposition:  Discharge  1/27/2024  1:11 pm    Follow-up:  Stephen Christianson MD  [address] [place] [id number]  [phone]    Follow up in 2 day(s)            Medications Prescribed:  Current Discharge Medication List

## 2024-01-27 NOTE — ED INITIAL ASSESSMENT (HPI)
Pt to ed with c/o pain and \"lump\" to left periumbilical area, PT felt bump while feeding his fish today, Pain on palpitation   HX of diverticulitis  Denies n/v/d

## 2024-01-29 ENCOUNTER — OFFICE VISIT (OUTPATIENT)
Dept: FAMILY MEDICINE CLINIC | Facility: CLINIC | Age: 59
End: 2024-01-29
Payer: COMMERCIAL

## 2024-01-29 VITALS
TEMPERATURE: 98 F | OXYGEN SATURATION: 97 % | SYSTOLIC BLOOD PRESSURE: 116 MMHG | BODY MASS INDEX: 32.73 KG/M2 | WEIGHT: 255 LBS | HEART RATE: 59 BPM | HEIGHT: 74 IN | DIASTOLIC BLOOD PRESSURE: 70 MMHG | RESPIRATION RATE: 16 BRPM

## 2024-01-29 DIAGNOSIS — R22.2 MASS OF SKIN OF ABDOMEN: Primary | ICD-10-CM

## 2024-01-29 PROCEDURE — 99213 OFFICE O/P EST LOW 20 MIN: CPT | Performed by: FAMILY MEDICINE

## 2024-01-29 PROCEDURE — 3074F SYST BP LT 130 MM HG: CPT | Performed by: FAMILY MEDICINE

## 2024-01-29 PROCEDURE — 3078F DIAST BP <80 MM HG: CPT | Performed by: FAMILY MEDICINE

## 2024-01-29 PROCEDURE — 3008F BODY MASS INDEX DOCD: CPT | Performed by: FAMILY MEDICINE

## 2024-01-30 NOTE — PROGRESS NOTES
[facility] Progress Note    SUBJECTIVE: Don Cabral 58 year old male is here today for   Chief Complaint   Patient presents with    ER F/U     1/27/2024, abdominal pain, Pt is still having pain. Area is tender to the touch. He was told it may be a lipoma or hernia         Has periumbilical pain, woke up Saturday with pain just above and to the left of the belly button, very tender,    Has hx of lipomas, suggested probably of lipoma    Apnea was problematic last night with loss of mask overnight    Weight is up, and impacting it.    Over 5 events per hour. Actual obstructie events with mask are mostly controlled.    Has 6 month follow ups on cardiac monitoring.    Overall stable.      PMH  Past Medical History:   Diagnosis Date    Allergic rhinitis     Anxiety     Arrhythmia     Back pain     Back problem     Depression     ED (erectile dysfunction)     High blood pressure     Pneumonia due to organism     Shortness of breath     Sleep apnea 2012    Unspecified essential hypertension     Visual impairment         PSH  Past Surgical History:   Procedure Laterality Date    CHOLECYSTECTOMY  3/12/2023    COLONOSCOPY N/A 03/22/2016    [facility].  Hyperplastic polyp only.  Repeat 2026    DENTAL SURGERY PROCEDURE      [place] teeth removed    INCISION/DRAIN ABSCESS EXTRA  1996    KNEE ARTHROSCOPY Right     LAPAROSCOPIC CHOLECYSTECTOMY  02/12/2023    MYRINGOPLASTY      OTHER SURGICAL HISTORY      R knee arthroscope    SKIN TISSUE PROCEDURE UNLISTED      TONSILLECTOMY          Social Hx:  No changes    ROS  See HPI    OBJECTIVE:  /70   Pulse 59   Temp 98.1 °F (36.7 °C) (Oral)   Resp 16   Ht 6' 2\" (1.88 m)   Wt 255 lb (115.7 kg)   SpO2 97%   BMI 32.74 kg/m²     Exam  Abd;palpable tender lump in abdomen to the left and above umbilicus      Labs:          Meds:   Current Outpatient Medications   Medication Sig Dispense Refill    Docusate Sodium (DOCULASE OR) Take by mouth.      clonazePAM (KLONOPIN)  0.5 MG Oral Tab 1/2 tablet four times daily 60 tablet 1    gabapentin 600 MG Oral Tab Take 1 tablet (600 mg total) by mouth 3 (three) times daily. 90 tablet 1    sertraline (ZOLOFT) 50 MG Oral Tab 1.5 tablets daily 45 tablet 1    lisinopril 20 MG Oral Tab Take 1 tablet (20 mg total) by mouth daily. 90 tablet 3    acidophilus-pectin Oral Cap Take 1 capsule by mouth 3 (three) times a week.      verapamil  MG Oral Tab CR Take 1 tablet (120 mg total) by mouth nightly. 30 tablet 3    Multiple Vitamins-Minerals (MULTI-VITAMIN/MINERALS) Oral Tab Take 1 tablet by mouth daily.      Albuterol Sulfate  (90 Base) MCG/ACT Inhalation Aero Soln Inhale 2 puffs into the lungs every 4 (four) hours as needed for Wheezing or Shortness of Breath. 1 each 3    Tadalafil 20 MG Oral Tab Take 1 tablet (20 mg total) by mouth daily as needed for Erectile Dysfunction. 8 tablet 5         Assessment/Plan  Don was seen today for er f/u.    Diagnoses and all orders for this visit:    Mass of skin of abdomen  -     US ABDOMEN LIMITED (CPT=76705); Future         Will get us to further evaluate, possible lipoma, less likely hernia, not reducible and noted noted on CT scan.         Total Time spent with patient and coordinating care:  15 minutes.    Follow up: as needed      Stephen Christianson MD

## 2024-01-31 ENCOUNTER — TELEPHONE (OUTPATIENT)
Dept: FAMILY MEDICINE CLINIC | Facility: CLINIC | Age: 59
End: 2024-01-31

## 2024-01-31 ENCOUNTER — HOSPITAL ENCOUNTER (OUTPATIENT)
Dept: ULTRASOUND IMAGING | Facility: HOSPITAL | Age: 59
Discharge: HOME | End: 2024-01-31
Attending: FAMILY MEDICINE
Payer: COMMERCIAL

## 2024-01-31 DIAGNOSIS — K46.9 HERNIA: Primary | ICD-10-CM

## 2024-01-31 DIAGNOSIS — R22.2 MASS OF SKIN OF ABDOMEN: ICD-10-CM

## 2024-01-31 PROCEDURE — 76705 ECHO EXAM OF ABDOMEN: CPT | Performed by: FAMILY MEDICINE

## 2024-01-31 NOTE — TELEPHONE ENCOUNTER
So this confirms that it is a hernia, though small, instead of a cyst or similar. I would recommend getting a surgical opinion vs watching and waiting, but as it was tender I'd be inclined to see a surgeon.    Stephen Christianson MD

## 2024-01-31 NOTE — TELEPHONE ENCOUNTER
Pt called back and viewed my-chart message.Pt states Dr Valladares is out of the office until March pt states he scheduled appt with Dr Dawson.Pt requesting referral be changed. Task complete.

## 2024-01-31 NOTE — TELEPHONE ENCOUNTER
Tried calling pt.My-chart message also sent:  Me   to Don GREENWOOD      1/31/24  2:29 PM  Hi Dr Sammy Ochoa has reviewed your result and advises:  So this confirms that it is a hernia, though small, instead of a cyst or similar. I would recommend getting a surgical opinion vs watching and waiting, but as it was tender I'd be inclined to see a surgeon.     Stephen Christianson MD     We have placed a referral for you to Dr Monie Valladares Surgeon.Her # is [phone]  Let us know if you have questions.  Irina ARTHUR    This CRAZE message has not been read.

## 2024-02-06 ENCOUNTER — OFFICE VISIT (OUTPATIENT)
Facility: LOCATION | Age: 59
End: 2024-02-06
Payer: COMMERCIAL

## 2024-02-06 DIAGNOSIS — R19.09 ABDOMINAL MASS OF OTHER SITE: Primary | ICD-10-CM

## 2024-02-06 PROCEDURE — 99214 OFFICE O/P EST MOD 30 MIN: CPT | Performed by: SURGERY

## 2024-02-06 NOTE — H&P
Don Cabral is a 58 year old male  Chief Complaint   Patient presents with    New Patient     NP-Hernia US done US ABDOMEN LIMITED 1/31, umbilical hernia. Pt. Stated was bugling, small 1/2 inch. Pt. States discomfort, if touch extreme pain.        REFERRED BY    Patient presents with a history of a painful mass supraumbilical to the left of the umbilicus noted approximately 10 days ago.  Patient states that he had an ultrasound which demonstrated a possible hernia.  He states that the mass has markedly decreased in size he is unable to feel the mass at this time.  He denies heavy lifting or exertion prior to the pain.  Patient has had a difficult medical history this past year.  He had cholecystectomy 1 year ago.  He has had multiple attacks of diverticulitis.  He is he has a gastroenterologist who he sees regularly.  His current CAT scan of 12/12/2023 demonstrated a possibility of inflammatory bowel disease however the patient has undergone colonoscopy within the past year with no evidence of inflammatory bowel disease.  He has undergone 2 episodes of sigmoid diverticulitis for which she saw colorectal surgery who did not recommend resection of the colon.       .           Past Medical History:   Diagnosis Date    Allergic rhinitis     Anxiety     Arrhythmia     Back pain     Back problem     Depression     ED (erectile dysfunction)     High blood pressure     Pneumonia due to organism     Shortness of breath     Sleep apnea 2012    Unspecified essential hypertension     Visual impairment      Past Surgical History:   Procedure Laterality Date    CHOLECYSTECTOMY  3/12/2023    COLONOSCOPY N/A 03/22/2016    [facility].  Hyperplastic polyp only.  Repeat 2026    DENTAL SURGERY PROCEDURE      [place] teeth removed    INCISION/DRAIN ABSCESS EXTRA  1996    KNEE ARTHROSCOPY Right     LAPAROSCOPIC CHOLECYSTECTOMY  02/12/2023    MYRINGOPLASTY      OTHER SURGICAL HISTORY      R knee arthroscope    SKIN TISSUE PROCEDURE  UNLISTED      TONSILLECTOMY       Current Outpatient Medications on File Prior to Visit   Medication Sig Dispense Refill    Docusate Sodium (DOCULASE OR) Take by mouth.      clonazePAM (KLONOPIN) 0.5 MG Oral Tab 1/2 tablet four times daily 60 tablet 1    gabapentin 600 MG Oral Tab Take 1 tablet (600 mg total) by mouth 3 (three) times daily. 90 tablet 1    sertraline (ZOLOFT) 50 MG Oral Tab 1.5 tablets daily 45 tablet 1    lisinopril 20 MG Oral Tab Take 1 tablet (20 mg total) by mouth daily. 90 tablet 3    acidophilus-pectin Oral Cap Take 1 capsule by mouth 3 (three) times a week.      verapamil  MG Oral Tab CR Take 1 tablet (120 mg total) by mouth nightly. 30 tablet 3    Multiple Vitamins-Minerals (MULTI-VITAMIN/MINERALS) Oral Tab Take 1 tablet by mouth daily.      Albuterol Sulfate  (90 Base) MCG/ACT Inhalation Aero Soln Inhale 2 puffs into the lungs every 4 (four) hours as needed for Wheezing or Shortness of Breath. 1 each 3    Tadalafil 20 MG Oral Tab Take 1 tablet (20 mg total) by mouth daily as needed for Erectile Dysfunction. 8 tablet 5     No current facility-administered medications on file prior to visit.     @ALL@  Family History   Problem Relation Age of Onset    Heart Disorder Father         aflutter with ablation    Hypertension Father     Cancer Father 60        Prostate    Heart Disorder Mother         murmur no treatment needed    Hypertension Mother     Stroke Maternal Grandmother     Other (stroke) Maternal Grandmother     Dementia Paternal Grandmother     Heart Disorder Paternal Grandfather         two heart attacks and CHF    Diabetes Paternal Grandfather     Other (Diabetes, type 2) Paternal Grandfather      Social History     Socioeconomic History    Marital status:    Occupational History    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[employer]   Tobacco Use    Smoking status: Former     Packs/day: 0.50     Years: 19.00     Additional pack years: 0.00     Total pack years: 9.50      Types: Cigarettes     Quit date: 10/3/2000     Years since quittin.3     Passive exposure: Never    Smokeless tobacco: Never   Vaping Use    Vaping Use: Never used   Substance and Sexual Activity    Alcohol use: No     Comment: sober since     Drug use: No    Sexual activity: Yes     Partners: Female   Other Topics Concern    Caffeine Concern Yes     Comment: coffee in AM/ soda     Exercise Yes     Comment: X1-2 times per week      Social Determinants of Health     Financial Resource Strain: Low Risk  (10/30/2023)    Financial Resource Strain     Difficulty of Paying Living Expenses: Not hard at all     Med Affordability: No   Food Insecurity: No Food Insecurity (10/20/2023)    Food Insecurity     Food Insecurity: Never true   Transportation Needs: No Transportation Needs (10/20/2023)    Transportation Needs     Lack of Transportation: No   Housing Stability: Low Risk  (10/20/2023)    Housing Stability     Housing Instability: No       ROS     GENERAL HEALTH: otherwise feels well, no weight loss, no fever or chills  SKIN: denies any unusual skin rashes or jaundice  HEENT: denies nasal congestion, sinus pain or sore throat; hearing loss negative,   EYES , no diplopia or vision changes  RESPIRATORY: denies shortness of breath, wheezing or cough   CARDIOVASCULAR: denies chest pain or FONG; no palpitations   GI: denies nausea, vomiting, constipation, diarrhea; no rectal bleeding; no heartburn  GENITAL/: no dysuria, urgency or frequency, no tea colored urine  MUSCULOSKELETAL: no joint complaints upper or lower extremities  HEMATOLOGY: denies hx anemia; denies bruising or excessive bleeding  Endocrine: no weight gain or loss no hot or cold intolerance    EXAM     There were no vitals taken for this visit.  GENERAL: well developed, well nourished male, in no apparent distress.    MENTAL STATUS :Alert, oriented x 3  PSYCH: normal mood and affect  SKIN: anicteric, no rashes, no bruising  EYES: PERRLA, EOMI, sclera  anicteric,  conjunctiva without pallor  HEENT: normocephalic, atraumatic, TMs clear, nares patent, mouth moist, pharynx w/o erythema  NECK: supple, no JVD, no adenopathy, no thyromegaly  RESPIRATORY: clear to auscultation, no rales , rhonchi or wheezing  CARDIOVASCULAR: RRR, murmur negative  ABDOMEN: Attention to the supraumbilical left-sided area demonstrates no evidence of masses there is an area of mild tenderness there is no mass at this location on standing Valsalva  LYMPHATIC: no axillary , supraclavicular or inguinal lymphadenopathy  EXTREMITIES: no cyanosis, clubbing or edema, no atrophy, full ROM    STUDIES:     Admission on 01/27/2024, Discharged on 01/27/2024   Component Date Value Ref Range Status    Glucose 01/27/2024 112 (H)  70 - 99 mg/dL Final    Sodium 01/27/2024 138  136 - 145 mmol/L Final    Potassium 01/27/2024 4.1  3.5 - 5.1 mmol/L Final    Specimen slightly hemolyzed.      Chloride 01/27/2024 104  98 - 112 mmol/L Final    CO2 01/27/2024 29.0  21.0 - 32.0 mmol/L Final    Anion Gap 01/27/2024 5  0 - 18 mmol/L Final    BUN 01/27/2024 15  9 - 23 mg/dL Final    Creatinine 01/27/2024 0.83  0.70 - 1.30 mg/dL Final    Calcium, Total 01/27/2024 9.2  8.5 - 10.1 mg/dL Final    Calculated Osmolality 01/27/2024 288  275 - 295 mOsm/kg Final    eGFR-Cr 01/27/2024 102  >=60 mL/min/1.73m2 Final    AST 01/27/2024 26  15 - 37 U/L Final    Specimen slightly hemolyzed.      ALT 01/27/2024    Final    Due to  backorder we are temporarily unable to offer hospital-based ALT testing at [facility].   If urgently needed, please order ALT test code [id number].   The new order will need a new venipuncture and will be sent to [facility] for testing.   The expected turnaround time will be within 24 hours.     Alkaline Phosphatase 01/27/2024 74  45 - 117 U/L Final    Bilirubin, Total 01/27/2024 1.0  0.1 - 2.0 mg/dL Final    Total Protein 01/27/2024 7.6  6.4 - 8.2 g/dL Final    Albumin 01/27/2024 3.6  3.4 - 5.0 g/dL  Final    Globulin  01/27/2024 4.0  2.8 - 4.4 g/dL Final    A/G Ratio 01/27/2024 0.9 (L)  1.0 - 2.0 Final    Lipase 01/27/2024 46  13 - 75 U/L Final    Urine Color 01/27/2024 Yellow  Yellow Final    Clarity Urine 01/27/2024 Clear  Clear Final    Spec Gravity 01/27/2024 1.010  1.005 - 1.030 Final    Glucose Urine 01/27/2024 Negative  Negative mg/dL Final    Bilirubin Urine 01/27/2024 Negative  Negative Final    Ketones Urine 01/27/2024 Negative  Negative mg/dL Final    Blood Urine 01/27/2024 Negative  Negative Final    pH Urine 01/27/2024 6.5  5.0 - 8.0 Final    Protein Urine 01/27/2024 Negative  Negative mg/dL Final    Urobilinogen Urine 01/27/2024 0.2  <2.0 mg/dL Final    Nitrite Urine 01/27/2024 Negative  Negative Final    Leukocyte Esterase Urine 01/27/2024 Negative  Negative Final    Microscopic 01/27/2024 Microscopic not indicated   Final    WBC 01/27/2024 8.4  4.0 - 11.0 x10(3) uL Final    RBC 01/27/2024 5.28  4.30 - 5.70 x10(6)uL Final    HGB 01/27/2024 15.1  13.0 - 17.5 g/dL Final    HCT 01/27/2024 45.5  39.0 - 53.0 % Final    PLT 01/27/2024 333.0  150.0 - 450.0 10(3)uL Final    MCV 01/27/2024 86.2  80.0 - 100.0 fL Final    MCH 01/27/2024 28.6  26.0 - 34.0 pg Final    MCHC 01/27/2024 33.2  31.0 - 37.0 g/dL Final    RDW 01/27/2024 14.6  % Final    Neutrophil Absolute Prelim 01/27/2024 5.37  1.50 - 7.70 x10 (3) uL Final    Neutrophil Absolute 01/27/2024 5.37  1.50 - 7.70 x10(3) uL Final    Lymphocyte Absolute 01/27/2024 2.24  1.00 - 4.00 x10(3) uL Final    Monocyte Absolute 01/27/2024 0.53  0.10 - 1.00 x10(3) uL Final    Eosinophil Absolute 01/27/2024 0.24  0.00 - 0.70 x10(3) uL Final    Basophil Absolute 01/27/2024 0.01  0.00 - 0.20 x10(3) uL Final    Immature Granulocyte Absolute 01/27/2024 0.03  0.00 - 1.00 x10(3) uL Final    Neutrophil % 01/27/2024 63.7  % Final    Lymphocyte % 01/27/2024 26.6  % Final    Monocyte % 01/27/2024 6.3  % Final    Eosinophil % 01/27/2024 2.9  % Final    Basophil % 01/27/2024 0.1   % Final    Immature Granulocyte % 01/27/2024 0.4  % Final       ASSESSMENT   Imp: Soft tissue mass on ultrasound now resolved location of mass would be unlikely site for a hernia.  Would recommend continued observation at this time patient should return to see me should he have any recurrent or new symptoms.  Patient is in agreement        Meds & Refills for this Visit:  Requested Prescriptions      No prescriptions requested or ordered in this encounter       Imaging & Consults:  None

## 2024-02-28 ENCOUNTER — OFFICE VISIT (OUTPATIENT)
Facility: CLINIC | Age: 59
End: 2024-02-28
Payer: COMMERCIAL

## 2024-02-28 VITALS
BODY MASS INDEX: 32.84 KG/M2 | OXYGEN SATURATION: 98 % | SYSTOLIC BLOOD PRESSURE: 136 MMHG | HEIGHT: 74 IN | WEIGHT: 255.88 LBS | DIASTOLIC BLOOD PRESSURE: 72 MMHG | HEART RATE: 77 BPM

## 2024-02-28 DIAGNOSIS — Z87.19 HISTORY OF COLONIC DIVERTICULITIS: Primary | ICD-10-CM

## 2024-02-28 PROCEDURE — 99213 OFFICE O/P EST LOW 20 MIN: CPT | Performed by: INTERNAL MEDICINE

## 2024-02-28 NOTE — PROGRESS NOTES
Don Cabral is a 58 year old male.    HPI:     Chief Complaint   Patient presents with    Follow - Up     The patient is a 58-year-old male who has a history of arrhythmia, back problems, sleep apnea, hypertension who presents for follow-up for his history of acute diverticulitis.    He reports he is currently doing well, no abdominal pain.  He has seen 2 surgeons after protracted course of diverticulitis at the end of 2023.  Required hospitalization eventually after oral antibiotics failed and then IV antibiotics as an outpatient.  He currently denies any abdominal pain, change in bowel habits or other issues.  He has been working on high-fiber diet.      HISTORY:  Past Medical History:   Diagnosis Date    Allergic rhinitis     Anxiety     Arrhythmia     Back pain     Back problem     Depression     Diverticulitis     ED (erectile dysfunction)     High blood pressure     Pneumonia due to organism     Shortness of breath     Sleep apnea 2012    Unspecified essential hypertension     Visual impairment       Past Surgical History:   Procedure Laterality Date    CHOLECYSTECTOMY  3/12/2023    COLONOSCOPY N/A 03/22/2016    [facility].  Hyperplastic polyp only.  Repeat 2026    COLONOSCOPY  05/10/2023    DENTAL SURGERY PROCEDURE      [place] teeth removed    INCISION/DRAIN ABSCESS EXTRA  1996    KNEE ARTHROSCOPY Right     LAPAROSCOPIC CHOLECYSTECTOMY  02/12/2023    MYRINGOPLASTY      OTHER SURGICAL HISTORY      R knee arthroscope    SKIN TISSUE PROCEDURE UNLISTED      TONSILLECTOMY        Family History   Problem Relation Age of Onset    Heart Disorder Father         aflutter with ablation    Hypertension Father     Cancer Father 60        Prostate    Heart Disorder Mother         murmur no treatment needed    Hypertension Mother     Stroke Maternal Grandmother     Other (stroke) Maternal Grandmother     Dementia Paternal Grandmother     Heart Disorder Paternal Grandfather         two heart attacks and CHF    Diabetes  Paternal Grandfather     Other (Diabetes, type 2) Paternal Grandfather       Social History:   Social History     Socioeconomic History    Marital status:    Occupational History    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[employer]   Tobacco Use    Smoking status: Former     Packs/day: 0.50     Years: 19.00     Additional pack years: 0.00     Total pack years: 9.50     Types: Cigarettes     Quit date: 2000     Years since quittin.6     Passive exposure: Never    Smokeless tobacco: Never   Vaping Use    Vaping Use: Never used   Substance and Sexual Activity    Alcohol use: Not Currently     Comment: Stopped alcohol 2000    Drug use: No    Sexual activity: Yes     Partners: Female   Other Topics Concern    Caffeine Concern Yes     Comment: coffee in AM/ soda     Exercise Yes     Comment: X1-2 times per week      Social Determinants of Health     Financial Resource Strain: Low Risk  (10/30/2023)    Financial Resource Strain     Difficulty of Paying Living Expenses: Not hard at all     Med Affordability: No   Food Insecurity: No Food Insecurity (10/20/2023)    Food Insecurity     Food Insecurity: Never true   Transportation Needs: No Transportation Needs (10/20/2023)    Transportation Needs     Lack of Transportation: No   Housing Stability: Low Risk  (10/20/2023)    Housing Stability     Housing Instability: No        Medications (Active prior to today's visit):  Current Outpatient Medications   Medication Sig Dispense Refill    clonazePAM (KLONOPIN) 0.5 MG Oral Tab 1/2 tablet 5 times daily 75 tablet 1    sertraline (ZOLOFT) 50 MG Oral Tab 1.5 tablets daily 45 tablet 1    gabapentin 600 MG Oral Tab Take 1 tablet (600 mg total) by mouth 3 (three) times daily. 90 tablet 1    Docusate Sodium (DOCULASE OR) Take by mouth.      lisinopril 20 MG Oral Tab Take 1 tablet (20 mg total) by mouth daily. 90 tablet 3    acidophilus-pectin Oral Cap Take 1 capsule by mouth 3 (three) times a week.      verapamil  MG  Oral Tab CR Take 1 tablet (120 mg total) by mouth nightly. 30 tablet 3    Multiple Vitamins-Minerals (MULTI-VITAMIN/MINERALS) Oral Tab Take 1 tablet by mouth daily.      Albuterol Sulfate  (90 Base) MCG/ACT Inhalation Aero Soln Inhale 2 puffs into the lungs every 4 (four) hours as needed for Wheezing or Shortness of Breath. 1 each 3    Tadalafil 20 MG Oral Tab Take 1 tablet (20 mg total) by mouth daily as needed for Erectile Dysfunction. 8 tablet 5       Allergies:  Allergies   Allergen Reactions    Seasonal OTHER (SEE COMMENTS)     congestion         ROS:   The patient denies any chest pain or shortness of breath,  No neurologic or dermatologic symptoms.     PHYSICAL EXAM:   Blood pressure 136/72, pulse 77, height 6' 2\" (1.88 m), weight 255 lb 14.4 oz (116.1 kg), SpO2 98%.    The patient appears their stated age and is in no acute distress  HEENT- anicteric sclera, neck no lymphadnopathy, OP- clear with no masses or lesions  Chest- Clear bilaterally, no wheezing,  Heart- regular rate, no murmur or gallop  Abdomen- Soft and nontender, nondistended  Ext- no clubbing or cyanosis  Skin- no rashes or lesions  Neuro- appropriate response, alert, no confusion  .  ASSESSMENT/PLAN:   Assessment     Acute diverticulitis    The patient is doing well at this time.  Discussed risks of recurrence of diverticulitis, discussed the need for possible surgery in the future and its role.  He has seen surgeons x 2.    Plan  History of diverticulitis   - fiber diet/target 20- 25 g fiber per day and ok to use fiber supplement such as Metamucil  - call with symptoms  - discussed role of surgery      Orders This Visit:  No orders of the defined types were placed in this encounter.      Meds This Visit:  Requested Prescriptions      No prescriptions requested or ordered in this encounter       Imaging & Referrals:  None       Moses Falcon MD  [facility]

## 2024-03-13 ENCOUNTER — TELEPHONE (OUTPATIENT)
Facility: CLINIC | Age: 59
End: 2024-03-13

## 2024-03-13 ENCOUNTER — TELEPHONE (OUTPATIENT)
Dept: FAMILY MEDICINE CLINIC | Facility: CLINIC | Age: 59
End: 2024-03-13

## 2024-03-13 RX ORDER — AMOXICILLIN AND CLAVULANATE POTASSIUM 875; 125 MG/1; MG/1
1 TABLET, FILM COATED ORAL 2 TIMES DAILY
Qty: 20 TABLET | Refills: 0 | Status: SHIPPED | OUTPATIENT
Start: 2024-03-13

## 2024-03-13 NOTE — TELEPHONE ENCOUNTER
Spoke with pt he states he had 2 episodes of diverticulitis last year.  Pt states he is experiencing pain and discomfort to lower left abdomen x 8 days.Pt denies any other Sx at present.Instructed pt to go to ER for evaluation.Pt states he would like us to order a CT scan for him.  I instructed we can't order CT scan without visit.Pt became upset and states he will contact his gastroenterologist.

## 2024-03-13 NOTE — TELEPHONE ENCOUNTER
The patient was contacted, he has symptoms of abdominal pain in the left lower quadrant again, it is not as bad as it has been but it has centered now over the sigmoid colon region consistent with his prior episodes.  Discussed her options here, he is amenable to a trial of oral antibiotics, this worked once in the past and failed once.  Give it about a week and if not improving would advise CT scan and possible IV antibiotics in the hospital.    Augmentin sent to pharmacy    Clear liquid diet x 2 days

## 2024-03-13 NOTE — TELEPHONE ENCOUNTER
Patient has had Diverticulitis before.  He believes it is acting up again and is requesting a scan.    Please advise.

## 2024-03-13 NOTE — TELEPHONE ENCOUNTER
Dr. Falcon    I spoke to Don.  Reports history of diverticulitis with hospitalization at one point.  He recently saw you in office.  He has been having low grade LLQ pain abdominal pain (reports in sigmoid/rectal area)-does not feel it is severe enough to warrant ER visit.  Rates it a pain that peaks at 4/10 but usually about a 2/10.  Pain started about 8 days ago.    Pain is constant/persistent.  Denies any fevers, blood in stool, nausea, or vomiting.    Please advise on next steps  Advised if fever/chills severe symptoms to present to ER    Thank you

## 2024-03-19 ENCOUNTER — PATIENT MESSAGE (OUTPATIENT)
Facility: CLINIC | Age: 59
End: 2024-03-19

## 2024-03-19 DIAGNOSIS — Z87.19 HISTORY OF DIVERTICULITIS: ICD-10-CM

## 2024-03-19 DIAGNOSIS — R10.30 LOWER ABDOMINAL PAIN: Primary | ICD-10-CM

## 2024-03-19 NOTE — TELEPHONE ENCOUNTER
From: Don Cabral  To: Moses Falcon  Sent: 3/19/2024 9:44 AM CDT  Subject: PACHECO Cabral diverticulitis / Augmentin progress    Hi - started off well, discomfort/light pain had subsided for a couple of days but in the last 24 hrs. has returned and amplified. On Day 5 of the Augmentin and I t’s changed from a dull ache style discomfort to more of intermittent sharper pain. Located right over sigmoid area. The Managing with Tylenol right now. We should probably start planning for a scan and possible IV meds? Happy to discuss.

## 2024-03-19 NOTE — TELEPHONE ENCOUNTER
Dr. Falcon,    Please see patient's message below. I spoke to him, states he is half way through the course of meds. First 2 days were better but now a bit worse. Currently managing pain with tylenol. No fever.    Please advise on next steps (scan/IV).     He is aware to go to ED if worsening/severe symptoms in the mean time.

## 2024-03-20 ENCOUNTER — HOSPITAL ENCOUNTER (OUTPATIENT)
Dept: CT IMAGING | Age: 59
Discharge: HOME | End: 2024-03-20
Attending: INTERNAL MEDICINE
Payer: COMMERCIAL

## 2024-03-20 DIAGNOSIS — R10.30 LOWER ABDOMINAL PAIN: ICD-10-CM

## 2024-03-20 DIAGNOSIS — Z87.19 HISTORY OF DIVERTICULITIS: ICD-10-CM

## 2024-03-20 PROCEDURE — 74177 CT ABD & PELVIS W/CONTRAST: CPT | Performed by: INTERNAL MEDICINE

## 2024-03-20 NOTE — TELEPHONE ENCOUNTER
Dr. Falcon    I spoke to Don.  He is going to call to schedule CT aware since ordered STAT will get in within 24 hours.  He told me that his symptoms are not ER worthy but they are not improving, actually getting worse and following similar trajectory as last time when he had to do the direct admission.    He did read your MyChart with plan to do CT and go from there.    Thank you

## 2024-03-21 NOTE — TELEPHONE ENCOUNTER
The patient contacted, he is feeling somewhat better, not completely resolved but he will complete his course of antibiotics.  Discussed CAT scan was not conclusive for diverticulitis or epiploic appendagitis.    Discussed completion of antibiotics, to call if ongoing symptoms or issues.  He has had to be admitted for outpatient antibiotics/oral failure.    He is interested in seeing another surgeon, advised academic referral and we will set this up for Alexander.

## 2024-03-21 NOTE — TELEPHONE ENCOUNTER
Dr. Falcon    Patient contacted.  Reports that symptoms started to improve mid day yesterday.  He feels better today.  He has very low grade pain he rates a 1/10 and some faint nausea which he isn't concerned about.  He is on day 7 of his medication.    Thank you

## 2024-03-21 NOTE — TELEPHONE ENCOUNTER
Please call and see how he is doing, CT shows some thicking of the colon but not clear if diverticulits or possible epiploic appendigitis

## 2024-03-27 ENCOUNTER — HOSPITAL ENCOUNTER (EMERGENCY)
Facility: HOSPITAL | Age: 59
Discharge: HOME | End: 2024-03-27
Attending: STUDENT IN AN ORGANIZED HEALTH CARE EDUCATION/TRAINING PROGRAM
Payer: COMMERCIAL

## 2024-03-27 ENCOUNTER — APPOINTMENT (OUTPATIENT)
Dept: CT IMAGING | Facility: HOSPITAL | Age: 59
End: 2024-03-27
Attending: STUDENT IN AN ORGANIZED HEALTH CARE EDUCATION/TRAINING PROGRAM
Payer: COMMERCIAL

## 2024-03-27 ENCOUNTER — PATIENT MESSAGE (OUTPATIENT)
Facility: CLINIC | Age: 59
End: 2024-03-27

## 2024-03-27 ENCOUNTER — TELEPHONE (OUTPATIENT)
Facility: CLINIC | Age: 59
End: 2024-03-27

## 2024-03-27 VITALS
WEIGHT: 255 LBS | SYSTOLIC BLOOD PRESSURE: 132 MMHG | BODY MASS INDEX: 31.71 KG/M2 | OXYGEN SATURATION: 100 % | RESPIRATION RATE: 18 BRPM | HEART RATE: 56 BPM | DIASTOLIC BLOOD PRESSURE: 69 MMHG | TEMPERATURE: 98 F | HEIGHT: 75 IN

## 2024-03-27 DIAGNOSIS — K63.89 EPIPLOIC APPENDAGITIS: Primary | ICD-10-CM

## 2024-03-27 LAB
ALBUMIN SERPL-MCNC: 3.4 G/DL (ref 3.4–5)
ALBUMIN/GLOB SERPL: 1 {RATIO} (ref 1–2)
ALP LIVER SERPL-CCNC: 65 U/L
ALT SERPL-CCNC: 27 U/L
ANION GAP SERPL CALC-SCNC: 2 MMOL/L (ref 0–18)
AST SERPL-CCNC: 15 U/L (ref 15–37)
BASOPHILS # BLD AUTO: 0.01 X10(3) UL (ref 0–0.2)
BASOPHILS NFR BLD AUTO: 0.1 %
BILIRUB SERPL-MCNC: 0.5 MG/DL (ref 0.1–2)
BILIRUB UR QL STRIP.AUTO: NEGATIVE
BUN BLD-MCNC: 15 MG/DL (ref 9–23)
CALCIUM BLD-MCNC: 9.7 MG/DL (ref 8.5–10.1)
CHLORIDE SERPL-SCNC: 107 MMOL/L (ref 98–112)
CLARITY UR REFRACT.AUTO: CLEAR
CO2 SERPL-SCNC: 31 MMOL/L (ref 21–32)
COLOR UR AUTO: COLORLESS
CREAT BLD-MCNC: 0.82 MG/DL
EGFRCR SERPLBLD CKD-EPI 2021: 102 ML/MIN/1.73M2 (ref 60–?)
EOSINOPHIL # BLD AUTO: 0.2 X10(3) UL (ref 0–0.7)
EOSINOPHIL NFR BLD AUTO: 2.3 %
ERYTHROCYTE [DISTWIDTH] IN BLOOD BY AUTOMATED COUNT: 13.3 %
GLOBULIN PLAS-MCNC: 3.3 G/DL (ref 2.8–4.4)
GLUCOSE BLD-MCNC: 100 MG/DL (ref 70–99)
GLUCOSE UR STRIP.AUTO-MCNC: NORMAL MG/DL
HCT VFR BLD AUTO: 42.8 %
HGB BLD-MCNC: 13.8 G/DL
IMM GRANULOCYTES # BLD AUTO: 0.04 X10(3) UL (ref 0–1)
IMM GRANULOCYTES NFR BLD: 0.5 %
KETONES UR STRIP.AUTO-MCNC: NEGATIVE MG/DL
LEUKOCYTE ESTERASE UR QL STRIP.AUTO: NEGATIVE
LIPASE SERPL-CCNC: 48 U/L (ref 13–75)
LYMPHOCYTES # BLD AUTO: 2.3 X10(3) UL (ref 1–4)
LYMPHOCYTES NFR BLD AUTO: 26 %
MCH RBC QN AUTO: 28.6 PG (ref 26–34)
MCHC RBC AUTO-ENTMCNC: 32.2 G/DL (ref 31–37)
MCV RBC AUTO: 88.8 FL
MONOCYTES # BLD AUTO: 0.77 X10(3) UL (ref 0.1–1)
MONOCYTES NFR BLD AUTO: 8.7 %
NEUTROPHILS # BLD AUTO: 5.52 X10 (3) UL (ref 1.5–7.7)
NEUTROPHILS # BLD AUTO: 5.52 X10(3) UL (ref 1.5–7.7)
NEUTROPHILS NFR BLD AUTO: 62.4 %
NITRITE UR QL STRIP.AUTO: NEGATIVE
OSMOLALITY SERPL CALC.SUM OF ELEC: 291 MOSM/KG (ref 275–295)
PH UR STRIP.AUTO: 6.5 [PH] (ref 5–8)
PLATELET # BLD AUTO: 304 10(3)UL (ref 150–450)
POTASSIUM SERPL-SCNC: 4.2 MMOL/L (ref 3.5–5.1)
PROT SERPL-MCNC: 6.7 G/DL (ref 6.4–8.2)
PROT UR STRIP.AUTO-MCNC: NEGATIVE MG/DL
RBC # BLD AUTO: 4.82 X10(6)UL
RBC UR QL AUTO: NEGATIVE
SODIUM SERPL-SCNC: 140 MMOL/L (ref 136–145)
SP GR UR STRIP.AUTO: <1.005 (ref 1–1.03)
UROBILINOGEN UR STRIP.AUTO-MCNC: NORMAL MG/DL
WBC # BLD AUTO: 8.8 X10(3) UL (ref 4–11)

## 2024-03-27 PROCEDURE — 83690 ASSAY OF LIPASE: CPT | Performed by: STUDENT IN AN ORGANIZED HEALTH CARE EDUCATION/TRAINING PROGRAM

## 2024-03-27 PROCEDURE — 85025 COMPLETE CBC W/AUTO DIFF WBC: CPT | Performed by: STUDENT IN AN ORGANIZED HEALTH CARE EDUCATION/TRAINING PROGRAM

## 2024-03-27 PROCEDURE — 96361 HYDRATE IV INFUSION ADD-ON: CPT

## 2024-03-27 PROCEDURE — 80053 COMPREHEN METABOLIC PANEL: CPT | Performed by: STUDENT IN AN ORGANIZED HEALTH CARE EDUCATION/TRAINING PROGRAM

## 2024-03-27 PROCEDURE — 96374 THER/PROPH/DIAG INJ IV PUSH: CPT

## 2024-03-27 PROCEDURE — 83690 ASSAY OF LIPASE: CPT

## 2024-03-27 PROCEDURE — 80053 COMPREHEN METABOLIC PANEL: CPT

## 2024-03-27 PROCEDURE — 99284 EMERGENCY DEPT VISIT MOD MDM: CPT

## 2024-03-27 PROCEDURE — 81003 URINALYSIS AUTO W/O SCOPE: CPT | Performed by: STUDENT IN AN ORGANIZED HEALTH CARE EDUCATION/TRAINING PROGRAM

## 2024-03-27 PROCEDURE — 85025 COMPLETE CBC W/AUTO DIFF WBC: CPT

## 2024-03-27 PROCEDURE — 74177 CT ABD & PELVIS W/CONTRAST: CPT | Performed by: STUDENT IN AN ORGANIZED HEALTH CARE EDUCATION/TRAINING PROGRAM

## 2024-03-27 PROCEDURE — 99285 EMERGENCY DEPT VISIT HI MDM: CPT

## 2024-03-27 RX ORDER — HYDROCODONE BITARTRATE AND ACETAMINOPHEN 5; 325 MG/1; MG/1
2 TABLET ORAL ONCE
Status: COMPLETED | OUTPATIENT
Start: 2024-03-27 | End: 2024-03-27

## 2024-03-27 RX ORDER — KETOROLAC TROMETHAMINE 15 MG/ML
15 INJECTION, SOLUTION INTRAMUSCULAR; INTRAVENOUS ONCE
Status: COMPLETED | OUTPATIENT
Start: 2024-03-27 | End: 2024-03-27

## 2024-03-27 RX ORDER — IBUPROFEN 600 MG/1
600 TABLET ORAL EVERY 8 HOURS PRN
Qty: 30 TABLET | Refills: 0 | Status: SHIPPED | OUTPATIENT
Start: 2024-03-27 | End: 2024-04-03

## 2024-03-27 RX ORDER — HYDROCODONE BITARTRATE AND ACETAMINOPHEN 5; 325 MG/1; MG/1
1-2 TABLET ORAL EVERY 6 HOURS PRN
Qty: 15 TABLET | Refills: 0 | Status: SHIPPED | OUTPATIENT
Start: 2024-03-27 | End: 2024-04-01

## 2024-03-27 NOTE — TELEPHONE ENCOUNTER
From: Don Cabral  To: Moses Falcon  Sent: 3/27/2024 11:02 AM CDT  Subject: diverticulitis update (post Augmentin)    Hi Dr. Falcon - finished my 10-day course of meds and the area around the sigmoid still feels quite tender. Not severe pain like my last two bouts but not gone either. Last scan showed possible beginnings of reinfection, my concern is we seem to be following the same arc as before where Augmentin held it at bay but did not resolve. What do you recommend? Rescan, or do a short course of inpatient I/V abx or go straight to outpatient I/V abx? Pain is currently manageable with Tylenol.

## 2024-03-27 NOTE — TELEPHONE ENCOUNTER
I called the patient to make sure he got the message to go to the ER.    He was already at the [facility] ER when I called.    I told him that I would send Dr. Falcon a message (RADHA Falcon) so he could see hospital/ER workup when he returns.    Thank you

## 2024-03-27 NOTE — TELEPHONE ENCOUNTER
Don ARMIJO  Gi Clinical Staff (supporting Moses Falcon MD)2 hours ago (11:17 AM)     SESAR  I should add that I have an appt. with Dr. Moore on April 4 to discuss surgery per your recommendation        Don Ruelas [facility] Clinical Staff (supporting Moses Falcon MD)3 hours ago (11:02 AM)     SESAR  Hi Dr. Falcon - finished my 10-day course of meds and the area around the sigmoid still feels quite tender. Not severe pain like my last two bouts but not gone either. Last scan showed possible beginnings of reinfection, my concern is we seem to be following the same arc as before where Augmentin held it at bay but did not resolve. What do you recommend? Rescan, or do a short course of inpatient I/V abx or go straight to outpatient I/V abx? Pain is currently manageable with Tylenol.

## 2024-03-27 NOTE — TELEPHONE ENCOUNTER
Sunitha (Dr. Falcon out of office until next Tuesday and patient aware of this)    He finished Augmentin on Saturday and since it has been slowly intensifying.  The area over the sigmoid is more of a constant low grade pain 3-4/10 instead of the dull ache it was before.  \"Same arc as before when in hospital for IV medications.\"  He had scan last week.  He is managing pain with tylenol.  He has had no fevers or blood in stool, just some air with bowel movements.  He has been having regular bowel movements.  Asking about IV medications/if needs admission etc.  He states he is restricted from using certain antibiotics due to potential arrhythmia risk.  He told me last time he was on IV zosyn inpatient and Ertapenem IV outpatient.    I told him if severe pain, fever or chills to present to ER asap.    Please advise.    Thank you

## 2024-03-27 NOTE — ED PROVIDER NOTES
History     Chief Complaint   Patient presents with    Abdomen/Flank Pain       HPI    58 year old male presents with a couple weeks of left lower abdominal cramping pain, feeling similar to his prior episodes of diverticulitis.  He completed a 10-day course of Augmentin prescribed at his gastroenterologist which slightly helped the first few days but has since stopped helping.  For the past couple days his pain has become more constant and prominent.  She no fevers, vomiting, diarrhea or bloody stools.          Past Medical History:   Diagnosis Date    Allergic rhinitis     Anxiety     Arrhythmia     Back pain     Back problem     Depression     Diverticulitis     ED (erectile dysfunction)     High blood pressure     Pneumonia due to organism     Shortness of breath     Sleep apnea     Unspecified essential hypertension     Visual impairment        Past Surgical History:   Procedure Laterality Date    CHOLECYSTECTOMY  3/12/2023    COLONOSCOPY N/A 2016    [facility].  Hyperplastic polyp only.  Repeat     COLONOSCOPY  05/10/2023    DENTAL SURGERY PROCEDURE      [place] teeth removed    INCISION/DRAIN ABSCESS EXTRA      KNEE ARTHROSCOPY Right     LAPAROSCOPIC CHOLECYSTECTOMY  2023    MYRINGOPLASTY      OTHER SURGICAL HISTORY      R knee arthroscope    SKIN TISSUE PROCEDURE UNLISTED      TONSILLECTOMY         Social History     Socioeconomic History    Marital status:    Occupational History    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[employer]   Tobacco Use    Smoking status: Former     Packs/day: 0.50     Years: 19.00     Additional pack years: 0.00     Total pack years: 9.50     Types: Cigarettes     Quit date: 2000     Years since quittin.7     Passive exposure: Never    Smokeless tobacco: Never   Vaping Use    Vaping Use: Never used   Substance and Sexual Activity    Alcohol use: Not Currently     Comment: Stopped alcohol 2000    Drug use: No    Sexual activity: Yes     Partners:  Female   Other Topics Concern    Caffeine Concern Yes     Comment: coffee in AM/ soda     Exercise Yes     Comment: X1-2 times per week      Social Determinants of Health     Financial Resource Strain: Low Risk  (10/30/2023)    Financial Resource Strain     Difficulty of Paying Living Expenses: Not hard at all     Med Affordability: No   Food Insecurity: No Food Insecurity (10/20/2023)    Food Insecurity     Food Insecurity: Never true   Transportation Needs: No Transportation Needs (10/20/2023)    Transportation Needs     Lack of Transportation: No   Housing Stability: Low Risk  (10/20/2023)    Housing Stability     Housing Instability: No                   Physical Exam     ED Triage Vitals [03/27/24 1654]   /79   Pulse 69   Resp 16   Temp 98.1 °F (36.7 °C)   Temp src Temporal   SpO2 96 %   O2 Device None (Room air)       Physical Exam  Constitutional:       General: He is not in acute distress.  HENT:      Head: Normocephalic and atraumatic.   Eyes:      Extraocular Movements: Extraocular movements intact.   Cardiovascular:      Rate and Rhythm: Normal rate.      Pulses: Normal pulses.   Pulmonary:      Effort: Pulmonary effort is normal. No respiratory distress.   Abdominal:      General: Abdomen is flat. There is no distension.      Tenderness: There is abdominal tenderness (L mid lateral abd). There is no guarding.   Musculoskeletal:         General: No swelling or deformity.      Cervical back: Normal range of motion.   Skin:     General: Skin is warm.   Neurological:      General: No focal deficit present.      Mental Status: He is alert.              ED Course     Labs Reviewed   COMP METABOLIC PANEL (14) - Abnormal; Notable for the following components:       Result Value    Glucose 100 (*)     All other components within normal limits   URINALYSIS, ROUTINE - Abnormal; Notable for the following components:    Urine Color Colorless (*)     Spec Gravity <1.005 (*)     All other components within normal  limits   LIPASE - Normal   CBC WITH DIFFERENTIAL WITH PLATELET    Narrative:     The following orders were created for panel order CBC With Differential With Platelet.  Procedure                               Abnormality         Status                     ---------                               -----------         ------                     CBC W/ DIFFERENTIAL[[id number]]                              Final result                 Please view results for these tests on the individual orders.   RAINBOW DRAW BLUE   CBC W/ DIFFERENTIAL     CT ABDOMEN+PELVIS(CONTRAST ONLY)(CPT=74177)    Result Date: 3/27/2024  CONCLUSION:  1. Worsening inflammatory change involving the proximal aspect of the descending colon over an approximately 5 cm segment.  This has the appearance of worsening epiploic appendagitis.  There are no diverticula in this region to suggest diverticulitis.  No obstruction. 2. Moderate uncomplicated sigmoid diverticulosis.    LOCATION:  [facility]   Dictated by (CST): Don Saldaña MD on 3/27/2024 at 7:55 PM     Finalized by (CST): Don Saldaña MD on 3/27/2024 at 8:08 PM            MDM     Vitals:    03/27/24 1654 03/27/24 1845 03/27/24 2030   BP: 151/79 132/69    Pulse: 69 59 56   Resp: 16 15 18   Temp: 98.1 °F (36.7 °C)     TempSrc: Temporal     SpO2: 96% 97% 100%   Weight: 115.7 kg     Height: 190.5 cm (6' 3\")         Persistent left-sided abdominal pain in the setting of possible diverticulitis versus epiploic appendagitis.  Will repeat CT to evaluate for progression, abscess, other complication    I reviewed the outpatient CT from 3/20 showing very subtle diverticulitis changes and possible epiploic appendage torsion.  Will check labs and repeat CT.  ED Course as of 03/27/24 2148  ------------------------------------------------------------  Time: 03/27 2028  Comment: My interpretation of CT with inflammatory changes in the left abdomen.  Radiology is noting epiploic appendagitis.  No evidence of  diverticulitis.  ------------------------------------------------------------  Time: 03/27 2029  Comment: Labs without leukocytosis, reassuring hemoglobin and renal function.  LFTs and lipase normal  ------------------------------------------------------------  Time: 03/27 2040  Comment: Discussed all findings.  Expectant management, prescription for pain control.  Patient is feeling well to be discharged.  Vitals remain stable.  Ambulating without difficulty.  Given written and verbal instructions regarding this condition and strict return precautions.   Will follow up in clinic.   Patient verbalizes understanding of instructions and follow up plan, as well as indications to return to the emergency department.           Disposition and Plan     Clinical Impression:  1. Epiploic appendagitis        Disposition:  Discharge    Follow-up:  surgery as scheuled    Follow up        Medications Prescribed:  Discharge Medication List as of 3/27/2024  8:46 PM        START taking these medications    Details   ibuprofen 600 MG Oral Tab Take 1 tablet (600 mg total) by mouth every 8 (eight) hours as needed for Pain or Fever., Normal, Disp-30 tablet, R-0      HYDROcodone-acetaminophen 5-325 MG Oral Tab Take 1-2 tablets by mouth every 6 (six) hours as needed for Pain., Normal, Disp-15 tablet, R-0

## 2024-03-27 NOTE — TELEPHONE ENCOUNTER
Called & left voicemail for the patient. I agree with your recommendations. I think ED evaluation is appropriate given failure of PO home therapy and hx of needing IV abx in the past.     Sunitha Greene APRN

## 2024-03-27 NOTE — ED INITIAL ASSESSMENT (HPI)
To the ED via walk-in with c/o lower abd pain since 21 days ago. Patient states he went 8 days and was given Augmentin which he has completed. Patient states his symptoms have gotten worse. Rec'd his antibx from his GI MD. Hx of same, hx of admissions for same. No fevers or blood in stool since episode started.

## 2024-03-28 NOTE — DISCHARGE INSTRUCTIONS
Your CT shows Epiploic appendagitis. This likely began with a torsion, twisting of this fat-containing pouch off of your intestines.  It is a benign and self-limiting condition. Complete resolution without surgical intervention usually occurs between 3 to 14 days.

## 2024-04-02 ENCOUNTER — HOSPITAL ENCOUNTER (INPATIENT)
Facility: HOSPITAL | Age: 59
LOS: 21 days | Discharge: HOME | End: 2024-04-23
Attending: EMERGENCY MEDICINE | Admitting: HOSPITALIST
Payer: COMMERCIAL

## 2024-04-02 ENCOUNTER — TELEPHONE (OUTPATIENT)
Facility: CLINIC | Age: 59
End: 2024-04-02

## 2024-04-02 ENCOUNTER — APPOINTMENT (OUTPATIENT)
Dept: CT IMAGING | Facility: HOSPITAL | Age: 59
End: 2024-04-02
Attending: EMERGENCY MEDICINE
Payer: COMMERCIAL

## 2024-04-02 ENCOUNTER — PATIENT MESSAGE (OUTPATIENT)
Facility: CLINIC | Age: 59
End: 2024-04-02

## 2024-04-02 DIAGNOSIS — K57.32 SIGMOID DIVERTICULITIS: Primary | ICD-10-CM

## 2024-04-02 DIAGNOSIS — R10.9 INTRACTABLE ABDOMINAL PAIN: ICD-10-CM

## 2024-04-02 LAB
ALBUMIN SERPL-MCNC: 4.1 G/DL (ref 3.2–4.8)
ALP LIVER SERPL-CCNC: 62 U/L
ALT SERPL-CCNC: 24 U/L
ANION GAP SERPL CALC-SCNC: 6 MMOL/L (ref 0–18)
AST SERPL-CCNC: 19 U/L (ref ?–34)
BASOPHILS # BLD AUTO: 0.01 X10(3) UL (ref 0–0.2)
BASOPHILS NFR BLD AUTO: 0.1 %
BILIRUB DIRECT SERPL-MCNC: 0.2 MG/DL (ref ?–0.3)
BILIRUB SERPL-MCNC: 0.8 MG/DL (ref 0.3–1.2)
BILIRUB UR QL: NEGATIVE
BUN BLD-MCNC: 14 MG/DL (ref 9–23)
BUN/CREAT SERPL: 17.5 (ref 10–20)
CALCIUM BLD-MCNC: 9.3 MG/DL (ref 8.7–10.4)
CHLORIDE SERPL-SCNC: 107 MMOL/L (ref 98–112)
CLARITY UR: CLEAR
CO2 SERPL-SCNC: 30 MMOL/L (ref 21–32)
COLOR UR: YELLOW
CREAT BLD-MCNC: 0.8 MG/DL
DEPRECATED RDW RBC AUTO: 43.9 FL (ref 35.1–46.3)
EGFRCR SERPLBLD CKD-EPI 2021: 103 ML/MIN/1.73M2 (ref 60–?)
EOSINOPHIL # BLD AUTO: 0.13 X10(3) UL (ref 0–0.7)
EOSINOPHIL NFR BLD AUTO: 1.5 %
ERYTHROCYTE [DISTWIDTH] IN BLOOD BY AUTOMATED COUNT: 13.6 % (ref 11–15)
GLUCOSE BLD-MCNC: 97 MG/DL (ref 70–99)
GLUCOSE UR-MCNC: NORMAL MG/DL
HCT VFR BLD AUTO: 40.4 %
HGB BLD-MCNC: 12.9 G/DL
HGB UR QL STRIP.AUTO: NEGATIVE
IMM GRANULOCYTES # BLD AUTO: 0.02 X10(3) UL (ref 0–1)
IMM GRANULOCYTES NFR BLD: 0.2 %
KETONES UR-MCNC: NEGATIVE MG/DL
LACTATE SERPL-SCNC: 1.3 MMOL/L (ref 0.5–2)
LEUKOCYTE ESTERASE UR QL STRIP.AUTO: NEGATIVE
LIPASE SERPL-CCNC: 37 U/L (ref 13–75)
LYMPHOCYTES # BLD AUTO: 1.81 X10(3) UL (ref 1–4)
LYMPHOCYTES NFR BLD AUTO: 20.3 %
MCH RBC QN AUTO: 28.1 PG (ref 26–34)
MCHC RBC AUTO-ENTMCNC: 31.9 G/DL (ref 31–37)
MCV RBC AUTO: 88 FL
MONOCYTES # BLD AUTO: 0.91 X10(3) UL (ref 0.1–1)
MONOCYTES NFR BLD AUTO: 10.2 %
NEUTROPHILS # BLD AUTO: 6.04 X10 (3) UL (ref 1.5–7.7)
NEUTROPHILS # BLD AUTO: 6.04 X10(3) UL (ref 1.5–7.7)
NEUTROPHILS NFR BLD AUTO: 67.7 %
NITRITE UR QL STRIP.AUTO: NEGATIVE
OSMOLALITY SERPL CALC.SUM OF ELEC: 296 MOSM/KG (ref 275–295)
PH UR: 6.5 [PH] (ref 5–8)
PLATELET # BLD AUTO: 328 10(3)UL (ref 150–450)
POTASSIUM SERPL-SCNC: 4.2 MMOL/L (ref 3.5–5.1)
PROT SERPL-MCNC: 6.7 G/DL (ref 5.7–8.2)
PROT UR-MCNC: NEGATIVE MG/DL
RBC # BLD AUTO: 4.59 X10(6)UL
SODIUM SERPL-SCNC: 143 MMOL/L (ref 136–145)
SP GR UR STRIP: 1.02 (ref 1–1.03)
UROBILINOGEN UR STRIP-ACNC: 6
WBC # BLD AUTO: 8.9 X10(3) UL (ref 4–11)

## 2024-04-02 PROCEDURE — 74177 CT ABD & PELVIS W/CONTRAST: CPT | Performed by: EMERGENCY MEDICINE

## 2024-04-02 PROCEDURE — 99223 1ST HOSP IP/OBS HIGH 75: CPT | Performed by: HOSPITALIST

## 2024-04-02 PROCEDURE — 99255 IP/OBS CONSLTJ NEW/EST HI 80: CPT | Performed by: INTERNAL MEDICINE

## 2024-04-02 RX ORDER — CLONAZEPAM 0.25 MG/1
0.5 TABLET, ORALLY DISINTEGRATING ORAL ONCE
Status: COMPLETED | OUTPATIENT
Start: 2024-04-02 | End: 2024-04-02

## 2024-04-02 RX ORDER — ALBUTEROL SULFATE 90 UG/1
2 AEROSOL, METERED RESPIRATORY (INHALATION) EVERY 4 HOURS PRN
Status: DISCONTINUED | OUTPATIENT
Start: 2024-04-02 | End: 2024-04-23

## 2024-04-02 RX ORDER — LISINOPRIL 20 MG/1
20 TABLET ORAL DAILY
Status: DISCONTINUED | OUTPATIENT
Start: 2024-04-03 | End: 2024-04-23

## 2024-04-02 RX ORDER — MORPHINE SULFATE 2 MG/ML
1 INJECTION, SOLUTION INTRAMUSCULAR; INTRAVENOUS EVERY 2 HOUR PRN
Status: DISCONTINUED | OUTPATIENT
Start: 2024-04-02 | End: 2024-04-08

## 2024-04-02 RX ORDER — SODIUM CHLORIDE 9 MG/ML
INJECTION, SOLUTION INTRAVENOUS CONTINUOUS
Status: DISCONTINUED | OUTPATIENT
Start: 2024-04-02 | End: 2024-04-05

## 2024-04-02 RX ORDER — ENOXAPARIN SODIUM 100 MG/ML
40 INJECTION SUBCUTANEOUS DAILY
Status: DISCONTINUED | OUTPATIENT
Start: 2024-04-02 | End: 2024-04-11

## 2024-04-02 RX ORDER — MORPHINE SULFATE 2 MG/ML
2 INJECTION, SOLUTION INTRAMUSCULAR; INTRAVENOUS EVERY 2 HOUR PRN
Status: DISCONTINUED | OUTPATIENT
Start: 2024-04-02 | End: 2024-04-08

## 2024-04-02 RX ORDER — OLANZAPINE 2.5 MG/1
2.5 TABLET, FILM COATED ORAL NIGHTLY
Status: DISCONTINUED | OUTPATIENT
Start: 2024-04-02 | End: 2024-04-19

## 2024-04-02 RX ORDER — MORPHINE SULFATE 4 MG/ML
4 INJECTION, SOLUTION INTRAMUSCULAR; INTRAVENOUS EVERY 2 HOUR PRN
Status: DISCONTINUED | OUTPATIENT
Start: 2024-04-02 | End: 2024-04-08

## 2024-04-02 RX ORDER — METRONIDAZOLE 500 MG/100ML
500 INJECTION, SOLUTION INTRAVENOUS ONCE
Status: DISCONTINUED | OUTPATIENT
Start: 2024-04-02 | End: 2024-04-02

## 2024-04-02 RX ORDER — PROCHLORPERAZINE EDISYLATE 5 MG/ML
5 INJECTION INTRAMUSCULAR; INTRAVENOUS EVERY 8 HOURS PRN
Status: DISCONTINUED | OUTPATIENT
Start: 2024-04-02 | End: 2024-04-21

## 2024-04-02 RX ORDER — GABAPENTIN 600 MG/1
600 TABLET ORAL ONCE
Status: COMPLETED | OUTPATIENT
Start: 2024-04-02 | End: 2024-04-02

## 2024-04-02 RX ORDER — GABAPENTIN 600 MG/1
600 TABLET ORAL 3 TIMES DAILY
Status: DISCONTINUED | OUTPATIENT
Start: 2024-04-02 | End: 2024-04-02

## 2024-04-02 RX ORDER — ONDANSETRON 2 MG/ML
4 INJECTION INTRAMUSCULAR; INTRAVENOUS EVERY 6 HOURS PRN
Status: DISCONTINUED | OUTPATIENT
Start: 2024-04-02 | End: 2024-04-16

## 2024-04-02 RX ORDER — GABAPENTIN 600 MG/1
600 TABLET ORAL 3 TIMES DAILY
Status: DISCONTINUED | OUTPATIENT
Start: 2024-04-02 | End: 2024-04-09

## 2024-04-02 RX ORDER — TEMAZEPAM 15 MG/1
15 CAPSULE ORAL NIGHTLY PRN
Status: DISCONTINUED | OUTPATIENT
Start: 2024-04-02 | End: 2024-04-23

## 2024-04-02 RX ORDER — CLONAZEPAM 0.5 MG/1
0.5 TABLET ORAL 4 TIMES DAILY PRN
Status: DISCONTINUED | OUTPATIENT
Start: 2024-04-02 | End: 2024-04-08

## 2024-04-02 RX ORDER — ACETAMINOPHEN 500 MG
500 TABLET ORAL EVERY 4 HOURS PRN
Status: DISCONTINUED | OUTPATIENT
Start: 2024-04-02 | End: 2024-04-20

## 2024-04-02 RX ORDER — LISINOPRIL 20 MG/1
20 TABLET ORAL DAILY
Status: DISCONTINUED | OUTPATIENT
Start: 2024-04-03 | End: 2024-04-02

## 2024-04-02 RX ORDER — MORPHINE SULFATE 4 MG/ML
4 INJECTION, SOLUTION INTRAMUSCULAR; INTRAVENOUS ONCE
Status: COMPLETED | OUTPATIENT
Start: 2024-04-02 | End: 2024-04-02

## 2024-04-02 NOTE — TELEPHONE ENCOUNTER
From: Don Cabral  To: Moses Falcon  Sent: 4/2/2024 6:28 AM CDT  Subject: Urgent - M Misha condition / headed to ER    On Sunitha’s recommendation I went to the ER ([facility]) last Thursday, scan revealed “worsening epiploic appendagitis” not diverticulitis - was given some pain pills and sent home. Unfortunately my condition seems to have worsened considerably since then and my whole left abdominal area is finger-sensitive to pressure and I am in nearly constant pain. Steady 5/10 but when I coughed this morning nearly fainted from the pain so 9/10. Pain also is going lower (towards the sigmoid) than it was before and occasionall radiating horizontally across my lower abdomen. Concerned at not only the severity of the pain (cuts right through the 600mg IBX and Norco 5s) but also potential blowup of diverticulitis. Will be heading to the [place] ER midday or call me if  wants to talk about direct admission. Thanks.

## 2024-04-02 NOTE — CONSULTS
[facility]  part of [facility]    Report of Consultation    Don Cabral Patient Status:  Emergency    1965 MRN [id number]   Location [facility] EMERGENCY DEPARTMENT Attending Mala Mccall MD   Hosp Day # 0 PCP Stephen Christianson MD     Date of Admission:  2024  Date of Consult:  2024  Reason for Consultation:   <principal problem not specified>    History of Present Illness:   Patient is a 58 year old male who was admitted to the hospital for <principal problem not specified>:    Don is a 59yo male with past medical history pertinent for HTN and previous episodes of diverticulitis who presented to the ED with worsening abdominal pain. He has had three episodes of diverticulitis in the past year. This most recent episode started around a month ago, and completed a course of outpatient PO abx. He reports mild improvement in symptoms, but the pain has been consistent since then. He was recently seen in the ED on 3/27, and CT showed diverticulosis, but no diverticulitis. He presents today after pain has continued to worsen. He reports an episode of chills, but denies fever, nausea, vomiting, and blood in stool. CT A/P consistent with acute sigmoid diverticulitis with microperforation. Pt has an appointment set up with a general surgeon later this week to discuss elective sigmoid resection.     Past Medical History  Past Medical History:   Diagnosis Date    Allergic rhinitis     Anxiety     Arrhythmia     Back pain     Back problem     Depression     Diverticulitis     ED (erectile dysfunction)     High blood pressure     Pneumonia due to organism     Shortness of breath     Sleep apnea     Unspecified essential hypertension     Visual impairment        Past Surgical History  Past Surgical History:   Procedure Laterality Date    CHOLECYSTECTOMY  3/12/2023    COLONOSCOPY N/A 2016    [facility].  Hyperplastic polyp only.  Repeat     COLONOSCOPY  05/10/2023     DENTAL SURGERY PROCEDURE      [place] teeth removed    INCISION/DRAIN ABSCESS EXTRA      KNEE ARTHROSCOPY Right     LAPAROSCOPIC CHOLECYSTECTOMY  2023    MYRINGOPLASTY      OTHER SURGICAL HISTORY      R knee arthroscope    SKIN TISSUE PROCEDURE UNLISTED      TONSILLECTOMY         Family History  Family History   Problem Relation Age of Onset    Heart Disorder Father         aflutter with ablation    Hypertension Father     Cancer Father 60        Prostate    Heart Disorder Mother         murmur no treatment needed    Hypertension Mother     Stroke Maternal Grandmother     Other (stroke) Maternal Grandmother     Dementia Paternal Grandmother     Heart Disorder Paternal Grandfather         two heart attacks and CHF    Diabetes Paternal Grandfather     Other (Diabetes, type 2) Paternal Grandfather        Social History  Social History     Socioeconomic History    Marital status:    Occupational History    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[employer]   Tobacco Use    Smoking status: Former     Packs/day: 0.50     Years: 19.00     Additional pack years: 0.00     Total pack years: 9.50     Types: Cigarettes     Quit date: 2000     Years since quittin.7     Passive exposure: Never    Smokeless tobacco: Never   Vaping Use    Vaping Use: Never used   Substance and Sexual Activity    Alcohol use: Not Currently     Comment: Stopped alcohol 2000    Drug use: No    Sexual activity: Yes     Partners: Female   Other Topics Concern    Caffeine Concern Yes     Comment: coffee in AM/ soda     Exercise Yes     Comment: X1-2 times per week      Social Determinants of Health     Financial Resource Strain: Low Risk  (10/30/2023)    Financial Resource Strain     Difficulty of Paying Living Expenses: Not hard at all     Med Affordability: No   Food Insecurity: No Food Insecurity (10/20/2023)    Food Insecurity     Food Insecurity: Never true   Transportation Needs: No Transportation Needs (10/20/2023)     Transportation Needs     Lack of Transportation: No   Housing Stability: Low Risk  (10/20/2023)    Housing Stability     Housing Instability: No           Current Medications:  Current Facility-Administered Medications   Medication Dose Route Frequency    piperacillin-tazobactam (Zosyn) 4.5 g in dextrose 5% 100 mL IVPB-ADDV  4.5 g Intravenous Once     (Not in a hospital admission)      Allergies  Allergies   Allergen Reactions    Seasonal OTHER (SEE COMMENTS)     congestion       Review of Systems:   Review of Systems   Constitutional:  Negative for chills, diaphoresis and fever.   Respiratory:  Negative for shortness of breath.    Cardiovascular:  Negative for chest pain.   Gastrointestinal:  Positive for abdominal pain. Negative for nausea and vomiting.   Neurological:  Negative for weakness.        Physical Exam:   Vital Signs:  Blood pressure 127/54, pulse 67, temperature 97.8 °F (36.6 °C), temperature source Oral, resp. rate 20, SpO2 94%.     Physical Exam  Vitals reviewed.   Constitutional:       General: He is not in acute distress.     Appearance: Normal appearance.   HENT:      Head: Normocephalic and atraumatic.      Right Ear: External ear normal.      Left Ear: External ear normal.      Nose: Nose normal.   Cardiovascular:      Rate and Rhythm: Normal rate and regular rhythm.   Pulmonary:      Effort: Pulmonary effort is normal. No respiratory distress.   Abdominal:      Palpations: Abdomen is soft.      Tenderness: There is abdominal tenderness (LLQ and suprapubic tenderness). There is no guarding or rebound.   Neurological:      Mental Status: He is alert and oriented to person, place, and time.   Psychiatric:         Mood and Affect: Mood normal.         Behavior: Behavior normal.         Thought Content: Thought content normal.         Judgment: Judgment normal.         Results:     Laboratory Data:  Lab Results   Component Value Date    WBC 8.9 04/02/2024    HGB 12.9 (L) 04/02/2024    HCT 40.4  04/02/2024    .0 04/02/2024    CREATSERUM 0.80 04/02/2024    BUN 14 04/02/2024     04/02/2024    K 4.2 04/02/2024     04/02/2024    CO2 30.0 04/02/2024    GLU 97 04/02/2024    CA 9.3 04/02/2024    ALB 4.1 04/02/2024    ALKPHO 62 04/02/2024    TP 6.7 04/02/2024    AST 19 04/02/2024    ALT 24 04/02/2024    PTT 24.9 09/28/2023    INR 1.01 09/28/2023    PTP 13.3 09/28/2023    TSH 1.850 09/28/2023    LIP 37 04/02/2024    PSA 2.53 08/02/2021    DDIMER 2.44 (H) 06/23/2023    MG 2.1 10/02/2023    PHOS 3.3 06/25/2023    TROP <0.045 02/05/2020         Imaging:  CT ABDOMEN+PELVIS(CONTRAST ONLY)(CPT=74177)    Result Date: 4/2/2024  CONCLUSION:   Acute sigmoid diverticulitis.  Trace suspected focus of associated localized pneumoperitoneum/micro perforation.  No evidence of a drainable associated fluid collection.  Post treatment colonoscopy advised if not already recently performed.  Progressed inflammatory change adjacent to the hepatic flexure and proximal sigmoid colon.  Interval development of mild circumferential wall thickening splenic flexure and proximal sigmoid colon which may reflect a nonspecific colitis.  No evidence of pneumatosis or associated drainable fluid collection.  There is otherwise no evidence of pneumoperitoneum except for the aforementioned suspected small focus adjacent to the sigmoid colon.  Post treatment colonoscopy advised if not already recently performed.  Trace ascites.  Borderline circumferential urinary bladder wall thickening which may relate to lack of distension.  If there is concern for cystitis, correlation with urinalysis can be performed.     Dictated by (CST): Get Siu MD on 4/02/2024 at 3:34 PM     Finalized by (CST): Get Siu MD on 4/02/2024 at 3:52 PM              Impression:     Intractable abdominal pain      Recommendations:  Pt seen. Labs, imaging, and exam consistent with acute sigmoid diverticulitis with microperforation. Plan non-operative  management at this time, including clear liquid diet, IV abx, and pain control. Repeat imaging pending clinical course. GI following. Will continue to follow and monitor progress.    Thank you for allowing me to participate in the care of your patient.    Stephen Nunez PA-C  4/2/2024    Addendum:    Pt seen and examined.  I agree with Stephen Nunez'sISELA note.  3rd episode of uncomplicated sigmoid diverticulitis.  Continue non operative management.  F/u in clinic to discuss the option for elective sigmoidectomy.    Rick Novoa MD

## 2024-04-02 NOTE — ED QUICK NOTES
Orders for admission, patient is aware of plan and ready to go upstairs. Any questions, please call ED RN Linda at extension [phone].     Patient Covid vaccination status: Fully vaccinated     COVID Test Ordered in ED: None    COVID Suspicion at Admission: N/A    Running Infusions:  None    Mental Status/LOC at time of transport: AOx4    Other pertinent information:   CIWA score: N/A   NIH score:  N/A

## 2024-04-02 NOTE — TELEPHONE ENCOUNTER
Patient contacted, verified and message from Dr. Falcon given.  Patient states he is on his way to the [place] ER right now.  No further questions at this time.

## 2024-04-02 NOTE — TELEPHONE ENCOUNTER
I have reviewed the chart, I would advise [facility] ER re-evaluation - I will call to inform ER that he is coming over.

## 2024-04-02 NOTE — TELEPHONE ENCOUNTER
Please see [employer] message below.    I spoke with Don.  He states he continues to have constant LLQ pain x 1 month but getting worse. (Pain level 5 at rest and 9 when he coughs)  Denies nausea,vomiting,diarrhea,constipation,blood in stool or fever.  Taking Ibuprofen and Norco with some relief.  Patient asking if he should go to the [place] ER. Went to [facility] last Thursday.  Please advise on next step. Thank you.

## 2024-04-02 NOTE — H&P
[facility]    PATIENT'S NAME: CHAYITO MEDEIROS   ATTENDING PHYSICIAN: Mala Mccall MD   PATIENT ACCOUNT#:   [id number]    LOCATION:  [address]  MEDICAL RECORD #:   [id number]       YOB: 1965  ADMISSION DATE:       04/02/2024    HISTORY AND PHYSICAL EXAMINATION    CHIEF COMPLAINT:  Recurrent sigmoid diverticulitis with microperforation.    HISTORY OF PRESENT ILLNESS:  The patient is a 58-year-old  male who was seen at [facility] on March 27 for abdominal pain.  CT scan of the abdomen at that time showed short segment descending colon thickening with findings consistent with possible epiploic appendagitis.  The patient continued to have persistent worsening pain in the left lower quadrant area, came in today for evaluation.  CBC and chemistry were unremarkable.  CT scan of the abdomen showed acute sigmoid diverticulitis with trace suspected focus of associated localized pneumoperitoneum microperforation, no evidence of drainable associated fluid collection, progressed inflammatory changes adjacent to the hepatic flexure and proximal sigmoid colon, interval development of mild circumferential wall thickening splenic flexure and proximal sigmoid colon which may reflect nonspecific colitis.  The patient was started on IV Zosyn, and he will be admitted to the hospital for further management.    PAST MEDICAL HISTORY:  Diverticulosis and recurrent diverticulitis.  He had an appointment with general surgeon for future sigmoidectomy.  Hypertension; generalized osteoarthritis; degenerative joint disease of lumbar spine; major depressive disorder; palpitations with Holter monitor findings, burden of premature ventricular tachycardia and small burden of atrial fibrillation, resolved with verapamil.    PAST SURGICAL HISTORY:  Laparoscopic cholecystectomy, right knee arthroscopic procedure, tonsillectomy, colonoscopy and polypectomy.    MEDICATIONS:  Please see medication  reconciliation list.    ALLERGIES:  No known drug allergies.     FAMILY HISTORY:  Mother had heart disease and hypertension.  Father had atrial flutter, prostate cancer, and hypertension.    SOCIAL HISTORY:  Ex-tobacco user.  No current tobacco, alcohol, or drug use.  Lives with his family.  Independent in his basic activities of daily living.    REVIEW OF SYSTEMS:  The patient reports progressive left lower quadrant abdominal pain radiating to his left lower back for the last 4 to 5 days.  No fever or chills, but he had poor appetite.  Other 12-point review of systems negative.      PHYSICAL EXAMINATION:    GENERAL:  Alert, oriented to time, place, and person, moderate distress.  VITAL SIGNS:  Temperature 97.8, pulse 68, respiratory rate 20, blood pressure 127/54, pulse ox 94% on room air.  HEENT:  Atraumatic.  Oropharynx clear.  Dry mucous membranes.  Normal hard and soft palate.  Eyes:  Anicteric sclerae.  NECK:  Supple.  No lymphadenopathy.  Trachea midline.  Full range of motion.  LUNGS:  Clear to auscultation bilaterally.  Normal respiratory effort.  HEART:  Regular rate and rhythm.  S1 and S2 auscultated.  No murmur.  ABDOMEN:  Soft, nondistended.  Tenderness noted in left lower quadrant area.  No guarding.  Mild rebound tenderness.  Hypoactive bowel sounds.  EXTREMITIES:  No peripheral edema, clubbing, or cyanosis.  NEUROLOGIC:  Motor and sensory intact.    ASSESSMENT AND PLAN:  Recurrent sigmoid diverticulitis with microperforation.  The patient will be admitted to general medical floor.  IV fluids.  Clear liquid diet.  Pain control.  IV Zosyn.  General surgery and Gastroenterology consults.  Further recommendations to follow.    Dictated By Elaine Ramey MD  d: 04/02/2024 16:35:27  t: 04/02/2024 16:49:01  Job [id number]  FB/

## 2024-04-02 NOTE — ED INITIAL ASSESSMENT (HPI)
Pt to ED via triage c/o left sided abd pain since 3/5, seen last week dx with appendagitis and now pain has worsened. Denies n/v/d. sent for diverticulitis r/o

## 2024-04-02 NOTE — ED PROVIDER NOTES
Patient Seen in: [facility] Emergency Department    History     Chief Complaint   Patient presents with    Abdomen/Flank Pain       HPI    58-year-old male sent by Dr. Falcon given patient has had worsening pain over the last 3 weeks and has had 2 CT scans demonstrating worsening epiploic appendagitis, patient also reports nausea and significant pain despite taking Norco and ibuprofen.  Earlier during this symptom course, patient was on a 10-day course of Augmentin without any improvement    History reviewed.   Past Medical History:   Diagnosis Date    Allergic rhinitis     Anxiety     Arrhythmia     Back pain     Back problem     Depression     Diverticulitis     ED (erectile dysfunction)     High blood pressure     Pneumonia due to organism     Shortness of breath     Sleep apnea 2012    Unspecified essential hypertension     Visual impairment        History reviewed.   Past Surgical History:   Procedure Laterality Date    CHOLECYSTECTOMY  3/12/2023    COLONOSCOPY N/A 03/22/2016    [facility].  Hyperplastic polyp only.  Repeat 2026    COLONOSCOPY  05/10/2023    DENTAL SURGERY PROCEDURE      [place] teeth removed    INCISION/DRAIN ABSCESS EXTRA  1996    KNEE ARTHROSCOPY Right     LAPAROSCOPIC CHOLECYSTECTOMY  02/12/2023    MYRINGOPLASTY      OTHER SURGICAL HISTORY      R knee arthroscope    SKIN TISSUE PROCEDURE UNLISTED      TONSILLECTOMY           Medications :  (Not in a hospital admission)       Family History   Problem Relation Age of Onset    Heart Disorder Father         aflutter with ablation    Hypertension Father     Cancer Father 60        Prostate    Heart Disorder Mother         murmur no treatment needed    Hypertension Mother     Stroke Maternal Grandmother     Other (stroke) Maternal Grandmother     Dementia Paternal Grandmother     Heart Disorder Paternal Grandfather         two heart attacks and CHF    Diabetes Paternal Grandfather     Other (Diabetes, type 2) Paternal Grandfather         Smoking Status:   Social History     Socioeconomic History    Marital status:    Occupational History    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[employer]   Tobacco Use    Smoking status: Former     Packs/day: 0.50     Years: 19.00     Additional pack years: 0.00     Total pack years: 9.50     Types: Cigarettes     Quit date: 2000     Years since quittin.7     Passive exposure: Never    Smokeless tobacco: Never   Vaping Use    Vaping Use: Never used   Substance and Sexual Activity    Alcohol use: Not Currently     Comment: Stopped alcohol 2000    Drug use: No    Sexual activity: Yes     Partners: Female   Other Topics Concern    Caffeine Concern Yes     Comment: coffee in AM/ soda     Exercise Yes     Comment: X1-2 times per week        Constitutional and vital signs reviewed.      Social History and Family History elements reviewed from today, pertinent positives to the presenting problem noted.    Physical Exam     ED Triage Vitals [24 1254]   /76   Pulse 75   Resp 20   Temp 97.8 °F (36.6 °C)   Temp src Oral   SpO2 98 %   O2 Device None (Room air)       All measures to prevent infection transmission during my interaction with the patient were taken. The patient was already wearing a droplet mask on my arrival to the room. Personal protective equipment was worn throughout the duration of the exam.  Handwashing was performed prior to and after the exam.  Stethoscope and any equipment used during my examination was cleaned with super sani-cloth germicidal wipes following the exam.     Physical Exam    General: No acute distress  Head: Normocephalic and atraumatic.  Mouth/Throat/Ears/Nose: Oropharynx is clear   Eyes: Conjunctivae and EOM are normal.   Neck: Normal range of motion. Supple.   Cardiovascular: Normal rate, regular rhythm, normal heart sounds.  Respiratory/Chest: Clear and equal bilaterally. Exhibits no tenderness.  Gastrointestinal: Soft, left upper and left lower quadrant  and left flank tenderness , non-distended. Bowel sounds are normal.   Musculoskeletal:No swelling or deformity.   Neurological: Alert and appropriate. No focal deficits.   Skin: Skin is warm and dry. No pallor.  Psychiatric: Has a normal mood and affect.      ED Course        Labs Reviewed   BASIC METABOLIC PANEL (8) - Abnormal; Notable for the following components:       Result Value    Calculated Osmolality 296 (*)     All other components within normal limits   URINALYSIS, ROUTINE - Abnormal; Notable for the following components:    Urobilinogen Urine 6 (*)     All other components within normal limits   CBC W/ DIFFERENTIAL - Abnormal; Notable for the following components:    HGB 12.9 (*)     All other components within normal limits   HEPATIC FUNCTION PANEL (7) - Normal   LIPASE - Normal   CBC WITH DIFFERENTIAL WITH PLATELET    Narrative:     The following orders were created for panel order CBC With Differential With Platelet.                  Procedure                               Abnormality         Status                                     ---------                               -----------         ------                                     CBC W/ DIFFERENTIAL[[id number]]          Abnormal            Final result                                                 Please view results for these tests on the individual orders.   LACTIC ACID, PLASMA   BLOOD CULTURE   BLOOD CULTURE       As Interpreted by me    Imaging Results Available and Reviewed while in ED: CT ABDOMEN+PELVIS(CONTRAST ONLY)(CPT=74177)    Result Date: 4/2/2024  CONCLUSION:   Acute sigmoid diverticulitis.  Trace suspected focus of associated localized pneumoperitoneum/micro perforation.  No evidence of a drainable associated fluid collection.  Post treatment colonoscopy advised if not already recently performed.  Progressed inflammatory change adjacent to the hepatic flexure and proximal sigmoid colon.  Interval development of mild  circumferential wall thickening splenic flexure and proximal sigmoid colon which may reflect a nonspecific colitis.  No evidence of pneumatosis or associated drainable fluid collection.  There is otherwise no evidence of pneumoperitoneum except for the aforementioned suspected small focus adjacent to the sigmoid colon.  Post treatment colonoscopy advised if not already recently performed.  Trace ascites.  Borderline circumferential urinary bladder wall thickening which may relate to lack of distension.  If there is concern for cystitis, correlation with urinalysis can be performed.     Dictated by (CST): Get Siu MD on 4/02/2024 at 3:34 PM     Finalized by (CST): Get Siu MD on 4/02/2024 at 3:52 PM         ED Medications Administered:   Medications   piperacillin-tazobactam (Zosyn) 4.5 g in dextrose 5% 100 mL IVPB-ADDV (has no administration in time range)   morphINE PF 4 MG/ML injection 4 mg (4 mg Intravenous Given 4/2/24 1348)   iopamidol 76% (ISOVUE-370) injection for power injector (80 mL Intravenous Given 4/2/24 1515)   gabapentin (Neurontin) tab 600 mg (600 mg Oral Given 4/2/24 1600)   clonazePAM (KLONOPIN) disintegrating tab 0.5 mg (0.5 mg Oral Given 4/2/24 1600)         MDM     Vitals:    04/02/24 1254 04/02/24 1445   BP: 137/76 127/54   Pulse: 75 67   Resp: 20    Temp: 97.8 °F (36.6 °C)    TempSrc: Oral    SpO2: 98% 94%     *I personally reviewed and interpreted all ED vitals.    Pulse Ox: 94%, Room air, Normal     Monitor Interpretation:   normal sinus rhythm as interpreted by me.  The cardiac monitor was ordered given concern for electrolyte derangements.      Medical Decision Making      Differential Diagnosis/ Diagnostic Considerations: Epiploic appendagitis, intra-abdominal abscess, diverticulitis    Complicating Factors: The patient already has does not have any pertinent problems on file. to contribute to the complexity of this ED evaluation.    I reviewed prior chart records  including: Note from March 27, 2024 as well as the telephone communication notes prompting referral to the emergency department.  Patient is here with worsening left-sided abdominal pain, intractable requiring admission.  Labs reviewed and unremarkable for acute findings on my interpretation.      CT demonstrates acute sigmoid diverticulitis with microperforation, agree with report.  I updated Dr. Falcon, Dr. Brooks and Dr. Novoa. Admitted to and discussed with .  Antibiotics ordered    Admitted in stable condition.  Patient is comfortable with the plan.         Disposition and Plan     Clinical Impression:  1. Sigmoid diverticulitis        Disposition:  Admit    Follow-up:  No follow-up provider specified.    Medications Prescribed:  Current Discharge Medication List          Hospital Problems       Present on Admission  Date Reviewed: 3/22/2024            ICD-10-CM Noted POA    Intractable abdominal pain R10.9 4/2/2024 Unknown

## 2024-04-02 NOTE — TELEPHONE ENCOUNTER
From: Don Cabral  To: Moses Falcon  Sent: 4/2/2024  6:28 AM CDT  Subject: Urgent - M Misha condition / headed to ER    On Sunitha’s recommendation I went to the ER ([facility]) last Thursday, scan revealed “worsening epiploic appendagitis” not diverticulitis - was given some pain pills and sent home. Unfortunately my condition seems to have worsened considerably since then and my whole left abdominal area is finger-sensitive to pressure and I am in nearly constant pain. Steady 5/10 but when I coughed this morning nearly fainted from the pain so 9/10. Pain also is going lower (towards the sigmoid) than it was before and occasionall radiating horizontally across my lower abdomen. Concerned at not only the severity of the pain (cuts right through the 600mg IBX and Norco 5s) but also potential blowup of diverticulitis. Will be heading to the [facility] ER midday or call me if  wants to talk about direct admission. Thanks.

## 2024-04-02 NOTE — CONSULTS
Is this a shared or split note between Advanced Practice Provider and Physician? Yes       [facility]   Gastroenterology Consultation Note    Don Cabral  Patient Status:    Emergency  Date of Admission:         4/2/2024, Hospital day #0  Attending:   Mala Mccall MD  PCP:     Stephen Christianson MD    Reason for Consultation:  Abdominal pain     History of Present Illness:  Don Cabral is a 58 year old male w/ PMHx of arrhythmia, back problems, sleep apnea, hypertension, diverticulitis flares who presented to ER for abdominal pain. GI consulted for diverticulitis flare.     He notes his abdominal pain first began on 3/5/24. He was started on Augmentin 10 day course but continued to have pain even with completion of antibiotics.  He was seen in ER on 3/27 Ct a/p without acute diverticulitis he was discharged home. Notes pain worsened today and was sent back to ER per his primary GI. CT with acute diverticulitis and microperforation. WBC within normal. Hemoglobin 12.9.     Denies fevers, chills, body aches. Denies dysuria, CP, SOB and dizziness.     Pertinent Hospitalizations:  -10/2023: Diverticulitis, needed IV abx and advised for surgery follow up for possible sigmoid resection    Pertinent Family Hx:  - No known history of esophageal, gastric or colon cancers  - No known IBD  - No known liver pathologies    Endoscopy Hx:  - 5/10/2023 colonoscopy at [facility]       History:  Past Medical History:   Diagnosis Date    Allergic rhinitis     Anxiety     Arrhythmia     Back pain     Back problem     Depression     Diverticulitis     ED (erectile dysfunction)     High blood pressure     Pneumonia due to organism     Shortness of breath     Sleep apnea 2012    Unspecified essential hypertension     Visual impairment      Past Surgical History:   Procedure Laterality Date    CHOLECYSTECTOMY  3/12/2023    COLONOSCOPY N/A 03/22/2016    [facility].  Hyperplastic polyp only.  Repeat 2026     COLONOSCOPY  05/10/2023    DENTAL SURGERY PROCEDURE      [place] teeth removed    INCISION/DRAIN ABSCESS EXTRA  1996    KNEE ARTHROSCOPY Right     LAPAROSCOPIC CHOLECYSTECTOMY  02/12/2023    MYRINGOPLASTY      OTHER SURGICAL HISTORY      R knee arthroscope    SKIN TISSUE PROCEDURE UNLISTED      TONSILLECTOMY       Family History   Problem Relation Age of Onset    Heart Disorder Father         aflutter with ablation    Hypertension Father     Cancer Father 60        Prostate    Heart Disorder Mother         murmur no treatment needed    Hypertension Mother     Stroke Maternal Grandmother     Other (stroke) Maternal Grandmother     Dementia Paternal Grandmother     Heart Disorder Paternal Grandfather         two heart attacks and CHF    Diabetes Paternal Grandfather     Other (Diabetes, type 2) Paternal Grandfather       reports that he quit smoking about 23 years ago. His smoking use included cigarettes. He has a 9.5 pack-year smoking history. He has never been exposed to tobacco smoke. He has never used smokeless tobacco. He reports that he does not currently use alcohol. He reports that he does not use drugs.    Allergies:  Allergies   Allergen Reactions    Seasonal OTHER (SEE COMMENTS)     congestion       Medications:    Current Facility-Administered Medications:     gabapentin (Neurontin) tab 600 mg, 600 mg, Oral, Once    clonazePAM (KLONOPIN) disintegrating tab 0.5 mg, 0.5 mg, Oral, Once    cefTRIAXone (Rocephin) 1 g in D5W 100 mL IVPB-ADD, 1 g, Intravenous, Once    metRONIDAZOLE in sodium chloride 0.79% (Flagyl) 5 mg/mL IVPB premix 500 mg, 500 mg, Intravenous, Once    Review of Systems:   CONSTITUTIONAL:  negative for fevers, chills, unintentional weight loss   EYES:  negative for diplopia or change in vision   RESPIRATORY:  negative for severe shortness of breath  CARDIOVASCULAR:  negative for crushing sub-sternal chest pain  GASTROINTESTINAL:  see HPI  GENITOURINARY:  negative for dysuria or gross  hematuria  INTEGUMENT/BREAST:  SKIN:  negative for jaundice or new rash   ALLERGIC/IMMUNOLOGIC:  negative for hay fever   ENDOCRINE:  negative for cold intolerance and heat intolerance  MUSCULOSKELETAL:  negative for joint effusion/severe erythema  NEURO: negative for new loss of consciousness or dizziness   BEHAVIOR/PSYCH:  negative for psychotic behavior    Physical Exam:    Blood pressure 127/54, pulse 67, temperature 97.8 °F (36.6 °C), temperature source Oral, resp. rate 20, SpO2 94%. There is no height or weight on file to calculate BMI.    Gen: awake, alert patient, NAD  HEENT: EOMI, the sclera appears anicteric, oropharynx clear, mucus membranes appear moist  CV: RRR  Lung: no conversational dyspnea   Abdomen: lower abdominal tenderness, no rebound or guarding. ND abdomen with NABS appreciated   Back: No CVA tenderness   Skin: dry, warm, no jaundice  Ext: no LE edema is evident  Neuro: Alert and interactive  Psych: calm, cooperative    Laboratory Data:  Lab Results   Component Value Date    WBC 8.9 04/02/2024    HGB 12.9 04/02/2024    HCT 40.4 04/02/2024    .0 04/02/2024    CREATSERUM 0.80 04/02/2024    BUN 14 04/02/2024     04/02/2024    K 4.2 04/02/2024     04/02/2024    CO2 30.0 04/02/2024    GLU 97 04/02/2024    CA 9.3 04/02/2024    ALB 4.1 04/02/2024    ALKPHO 62 04/02/2024    BILT 0.8 04/02/2024    TP 6.7 04/02/2024    AST 19 04/02/2024    ALT 24 04/02/2024    LIP 37 04/02/2024       Imaging:  CT ABDOMEN+PELVIS(CONTRAST ONLY)(CPT=74177)    Result Date: 4/2/2024  CONCLUSION:   Acute sigmoid diverticulitis.  Trace suspected focus of associated localized pneumoperitoneum/micro perforation.  No evidence of a drainable associated fluid collection.  Post treatment colonoscopy advised if not already recently performed.  Progressed inflammatory change adjacent to the hepatic flexure and proximal sigmoid colon.  Interval development of mild circumferential wall thickening splenic flexure and  proximal sigmoid colon which may reflect a nonspecific colitis.  No evidence of pneumatosis or associated drainable fluid collection.  There is otherwise no evidence of pneumoperitoneum except for the aforementioned suspected small focus adjacent to the sigmoid colon.  Post treatment colonoscopy advised if not already recently performed.  Trace ascites.  Borderline circumferential urinary bladder wall thickening which may relate to lack of distension.  If there is concern for cystitis, correlation with urinalysis can be performed.     Dictated by (CST): Get Siu MD on 4/02/2024 at 3:34 PM     Finalized by (CST): Get Siu MD on 4/02/2024 at 3:52 PM           Assessment & Plan   Don Cabral is a 58 year old male w/ PMHx of arrhythmia, back problems, sleep apnea, hypertension, diverticulitis flares who presented to ER for abdominal pain. GI consulted for diverticulitis flare.     He notes his abdominal pain first began on 3/5/24. He was started on Augmentin 10 day course but continued to have pain even with completion of antibiotics.  He was seen in ER on 3/27 Ct a/p without acute diverticulitis he was discharged home. Notes pain worsened today and was sent back to ER per his primary GI. CT with acute diverticulitis and microperforation. WBC within normal. Hemoglobin 12.9.     Recommend:  -clear liquid diet   -IV abx  -surgery on consult      Thank you for the opportunity to participate in the care of this patient.    Case discussed with Shannan Bear PA-C  [facility]  4/2/2024

## 2024-04-03 LAB
ANION GAP SERPL CALC-SCNC: 8 MMOL/L (ref 0–18)
BASOPHILS # BLD AUTO: 0 X10(3) UL (ref 0–0.2)
BASOPHILS NFR BLD AUTO: 0 %
BUN BLD-MCNC: 12 MG/DL (ref 9–23)
BUN/CREAT SERPL: 14.1 (ref 10–20)
CALCIUM BLD-MCNC: 9.1 MG/DL (ref 8.7–10.4)
CHLORIDE SERPL-SCNC: 104 MMOL/L (ref 98–112)
CO2 SERPL-SCNC: 28 MMOL/L (ref 21–32)
CREAT BLD-MCNC: 0.85 MG/DL
DEPRECATED RDW RBC AUTO: 43.7 FL (ref 35.1–46.3)
EGFRCR SERPLBLD CKD-EPI 2021: 101 ML/MIN/1.73M2 (ref 60–?)
EOSINOPHIL # BLD AUTO: 0.13 X10(3) UL (ref 0–0.7)
EOSINOPHIL NFR BLD AUTO: 1.6 %
ERYTHROCYTE [DISTWIDTH] IN BLOOD BY AUTOMATED COUNT: 13.5 % (ref 11–15)
GLUCOSE BLD-MCNC: 85 MG/DL (ref 70–99)
HCT VFR BLD AUTO: 41.7 %
HGB BLD-MCNC: 13.3 G/DL
IMM GRANULOCYTES # BLD AUTO: 0.03 X10(3) UL (ref 0–1)
IMM GRANULOCYTES NFR BLD: 0.4 %
LYMPHOCYTES # BLD AUTO: 1.54 X10(3) UL (ref 1–4)
LYMPHOCYTES NFR BLD AUTO: 18.9 %
MCH RBC QN AUTO: 28 PG (ref 26–34)
MCHC RBC AUTO-ENTMCNC: 31.9 G/DL (ref 31–37)
MCV RBC AUTO: 87.8 FL
MONOCYTES # BLD AUTO: 0.72 X10(3) UL (ref 0.1–1)
MONOCYTES NFR BLD AUTO: 8.8 %
NEUTROPHILS # BLD AUTO: 5.72 X10 (3) UL (ref 1.5–7.7)
NEUTROPHILS # BLD AUTO: 5.72 X10(3) UL (ref 1.5–7.7)
NEUTROPHILS NFR BLD AUTO: 70.3 %
OSMOLALITY SERPL CALC.SUM OF ELEC: 289 MOSM/KG (ref 275–295)
PLATELET # BLD AUTO: 312 10(3)UL (ref 150–450)
POTASSIUM SERPL-SCNC: 3.8 MMOL/L (ref 3.5–5.1)
RBC # BLD AUTO: 4.75 X10(6)UL
SODIUM SERPL-SCNC: 140 MMOL/L (ref 136–145)
WBC # BLD AUTO: 8.1 X10(3) UL (ref 4–11)

## 2024-04-03 PROCEDURE — 99233 SBSQ HOSP IP/OBS HIGH 50: CPT | Performed by: HOSPITALIST

## 2024-04-03 PROCEDURE — 99233 SBSQ HOSP IP/OBS HIGH 50: CPT | Performed by: INTERNAL MEDICINE

## 2024-04-03 PROCEDURE — 99254 IP/OBS CNSLTJ NEW/EST MOD 60: CPT | Performed by: SURGERY

## 2024-04-03 RX ORDER — HYDROCODONE BITARTRATE AND ACETAMINOPHEN 5; 325 MG/1; MG/1
1 TABLET ORAL EVERY 6 HOURS PRN
Status: DISCONTINUED | OUTPATIENT
Start: 2024-04-03 | End: 2024-04-06

## 2024-04-03 NOTE — PLAN OF CARE
Patient A/Ox4. Room air. IVF  antibiotics infused. Clear liquid diet. CPAP for a night. Call light within a reach.   Problem: Patient Centered Care  Goal: Patient preferences are identified and integrated in the patient's plan of care  Description: Interventions:  - Provide timely, complete, and accurate information to patient/family  - Incorporate patient and family knowledge, values, beliefs, and cultural backgrounds into the planning and delivery of care  - Encourage patient/family to participate in care and decision-making at the level they choose  - Honor patient and family perspectives and choices  Outcome: Progressing

## 2024-04-03 NOTE — PROGRESS NOTES
[facility]  part of [facility]    Progress Note    Don Cabral Patient Status:  Inpatient    1965 MRN [id number]   Location [facility] 4W/SW/SE Attending Kunal Peraza MD   Hosp Day # 1 PCP Stephen Christianson MD       Subjective:   Don Cabral is stable, still with LLQ pain.    Review of Systems:   10 point ROS completed and was negative, except for pertinent positive and negatives stated in subjective.    Objective:     Vitals:    24 1756 24 1758 24 0550   BP: 144/59  121/56 117/71   BP Location: Left arm  Left arm Right arm   Pulse: 65  67 64   Resp: 18  18 18   Temp: 97.8 °F (36.6 °C)   98.1 °F (36.7 °C)   TempSrc: Oral   Oral   SpO2: 99%  94% 96%   Weight:  264 lb 6.4 oz (119.9 kg)       Patient Weight for the past 72 hrs:   Weight   24 264 lb 6.4 oz (119.9 kg)       Intake/Output Summary (Last 24 hours) at 4/3/2024 0953  Last data filed at 4/3/2024 0608  Gross per 24 hour   Intake 1266.67 ml   Output 2 ml   Net 1264.67 ml       GENERAL:  The patient appeared to be in no distress   HEENT:  Head was atraumatic and normocephalic.    CHEST:  Symmetrical movement on inspiration  LUNGS:  No audible wheezing  ABDOMEN: Non-distended  MUSCULOSKELETAL:  There was no deformity.    EXTREMITIES: There was no edema  NEUROLOGICAL:  There was no focal deficit.    PSYCHIATRIC: Calm and cooperative     Current Scheduled Inpatient Meds:      enoxaparin  40 mg Subcutaneous Daily    piperacillin-tazobactam  4.5 g Intravenous Q8H    OLANZapine  2.5 mg Oral Nightly    verapamil ER  120 mg Oral Nightly    gabapentin  600 mg Oral TID    sertraline  75 mg Oral Daily    lisinopril  20 mg Oral Daily       Current PRN Inpatient Meds:      acetaminophen, ondansetron, prochlorperazine, temazepam, morphINE **OR** morphINE **OR** morphINE, albuterol, clonazePAM    Results:     Lab Results   Component Value Date    WBC 8.1 2024    HGB 13.3 2024     HCT 41.7 04/03/2024    .0 04/03/2024    CREATSERUM 0.85 04/03/2024    BUN 12 04/03/2024     04/03/2024    K 3.8 04/03/2024     04/03/2024    CO2 28.0 04/03/2024    GLU 85 04/03/2024    CA 9.1 04/03/2024    ALB 4.1 04/02/2024    ALKPHO 62 04/02/2024    BILT 0.8 04/02/2024    TP 6.7 04/02/2024    AST 19 04/02/2024    ALT 24 04/02/2024    PTT 24.9 09/28/2023    INR 1.01 09/28/2023    TSH 1.850 09/28/2023    LIP 37 04/02/2024    PSA 2.53 08/02/2021    DDIMER 2.44 (H) 06/23/2023    MG 2.1 10/02/2023    PHOS 3.3 06/25/2023    TROP <0.045 02/05/2020       Recent Labs   Lab 03/27/24  1758 04/02/24  1340 04/03/24  0723   RBC 4.82 4.59 4.75   HGB 13.8 12.9* 13.3   HCT 42.8 40.4 41.7   MCV 88.8 88.0 87.8   MCH 28.6 28.1 28.0   MCHC 32.2 31.9 31.9   RDW 13.3 13.6 13.5   NEPRELIM 5.52 6.04 5.72   WBC 8.8 8.9 8.1   .0 328.0 312.0     Recent Labs   Lab 03/27/24  1758 04/02/24  1340 04/03/24  0723   * 97 85   BUN 15 14 12   CREATSERUM 0.82 0.80 0.85   CA 9.7 9.3 9.1   ALB 3.4 4.1  --     143 140   K 4.2 4.2 3.8    107 104   CO2 31.0 30.0 28.0   ALKPHO 65 62  --    AST 15 19  --    ALT 27 24  --    BILT 0.5 0.8  --    TP 6.7 6.7  --      Lab Results   Component Value Date    INR 1.01 09/28/2023    INR 1.23 (H) 06/23/2023       Culture:  No results found for this visit on 04/02/24.    Imaging/EKG:   CT ABDOMEN+PELVIS(CONTRAST ONLY)(CPT=74177)    Result Date: 4/2/2024  CONCLUSION:   Acute sigmoid diverticulitis.  Trace suspected focus of associated localized pneumoperitoneum/micro perforation.  No evidence of a drainable associated fluid collection.  Post treatment colonoscopy advised if not already recently performed.  Progressed inflammatory change adjacent to the hepatic flexure and proximal sigmoid colon.  Interval development of mild circumferential wall thickening splenic flexure and proximal sigmoid colon which may reflect a nonspecific colitis.  No evidence of pneumatosis or  associated drainable fluid collection.  There is otherwise no evidence of pneumoperitoneum except for the aforementioned suspected small focus adjacent to the sigmoid colon.  Post treatment colonoscopy advised if not already recently performed.  Trace ascites.  Borderline circumferential urinary bladder wall thickening which may relate to lack of distension.  If there is concern for cystitis, correlation with urinalysis can be performed.     Dictated by (CST): Get Siu MD on 4/02/2024 at 3:34 PM     Finalized by (CST): Get Siu MD on 4/02/2024 at 3:52 PM             Assessment and Plan:   The patient is a 58-year-old male with hx of diverticulitis, HTN and depression, who left lower quadrant pain.    # Sigmoid diverticulitis  # Intractable abdominal pain  - history of recurrent diverticulitis, for which patient has had an appointment with general surgeon for future sigmoidectomy.  - CT showed acute sigmoid diverticulitis.   - patient is afebrile with normal vitals and WBC  - Zosyn (4/2- )  - CLD, IVF, pain control  - GI ans Surgery consulted    # Hypertension  # Major depressive disorder   # Intermittent premature ventricular tachycardia   - stable chronic conditions  - continue home meds    Diet: CLD  PT/OT: deferred  DVT ppx: Lovenox  Code status: Full  Dispo: per Surgery    MDM: high Kunal Peraza MD, PhD  Message via Secure Chat  [facility] Hospitalist

## 2024-04-03 NOTE — PROGRESS NOTES
[facility]     Gastroenterology Progress Note    Don Cabral Patient Status:  Inpatient    1965 MRN [id number]   Location [facility] 4W/SW/SE Attending Kunal Peraza MD   Hosp Day # 1 PCP Stephen Christianson MD       Subjective:   Pain feels worse on left side today. No nausea or emesis. No fever. No bowel movement.     Objective:   Blood pressure 117/71, pulse 64, temperature 98.1 °F (36.7 °C), temperature source Oral, resp. rate 18, weight 264 lb 6.4 oz (119.9 kg), SpO2 96%. Body mass index is 33.05 kg/m².    General: awake, alert and oriented, no acute distress  HEENT: moist mucus membranes  PULM: no conversational dyspnea  CARDIOVASCULAR: regular rate and rhythm, the extremities are warm and well perfused  GI: soft, moderate llq tenderness, non-distended, + BS, no rebound/guarding   EXTREMITIES: no edema, moving all extremities  SKIN: no visible rash  NEURO: appropriate and interactive    Assessment and Plan:   58M with recurrent sigmoid diverticulitis who presents with LLQ abd pain. Started last month, failed outpatient abx, interval CT in ER here shows acute sigmoid diverticulitis with microperforation, no fluid collection.     Continue supportive measures including NPO except small amounts of clears, IV abx, surgery following, low threshold for interval CT if clinically worsening. Last colonoscopy 2023. If he responds to non-operative measures this episode, advise elective segmental resection as outpatient.     Shannan Brooks MD  [facility] Gastroenterology      Results:     Lab Results   Component Value Date    WBC 8.1 2024    HGB 13.3 2024    HCT 41.7 2024    .0 2024    CREATSERUM 0.85 2024    BUN 12 2024     2024    K 3.8 2024     2024    CO2 28.0 2024    GLU 85 2024    CA 9.1 2024    ALB 4.1 2024    ALKPHO 62 2024    BILT 0.8 2024    TP 6.7 2024    AST  19 04/02/2024    ALT 24 04/02/2024    PTT 24.9 09/28/2023    INR 1.01 09/28/2023    TSH 1.850 09/28/2023    LIP 37 04/02/2024    PSA 2.53 08/02/2021    DDIMER 2.44 (H) 06/23/2023    MG 2.1 10/02/2023    PHOS 3.3 06/25/2023    TROP <0.045 02/05/2020       CT ABDOMEN+PELVIS(CONTRAST ONLY)(CPT=74177)    Result Date: 4/2/2024  CONCLUSION:   Acute sigmoid diverticulitis.  Trace suspected focus of associated localized pneumoperitoneum/micro perforation.  No evidence of a drainable associated fluid collection.  Post treatment colonoscopy advised if not already recently performed.  Progressed inflammatory change adjacent to the hepatic flexure and proximal sigmoid colon.  Interval development of mild circumferential wall thickening splenic flexure and proximal sigmoid colon which may reflect a nonspecific colitis.  No evidence of pneumatosis or associated drainable fluid collection.  There is otherwise no evidence of pneumoperitoneum except for the aforementioned suspected small focus adjacent to the sigmoid colon.  Post treatment colonoscopy advised if not already recently performed.  Trace ascites.  Borderline circumferential urinary bladder wall thickening which may relate to lack of distension.  If there is concern for cystitis, correlation with urinalysis can be performed.     Dictated by (CST): Get Siu MD on 4/02/2024 at 3:34 PM     Finalized by (CST): Get Siu MD on 4/02/2024 at 3:52 PM

## 2024-04-03 NOTE — PROGRESS NOTES
[facility]  part of [facility]    Progress Note    Don Cabral Patient Status:  Inpatient    1965 MRN [id number]   Location [facility] 4W/SW/SE Attending Kunal Peraza MD   Hosp Day # 1 PCP Stephen Christianson MD       Subjective:   Pt reports pain worsened overnight. No fever or chills, no nausea or vomiting. Tolerating clear liquids.    Objective:   Vital Signs:  Blood pressure 117/71, pulse 64, temperature 98.1 °F (36.7 °C), temperature source Oral, resp. rate 18, weight 264 lb 6.4 oz (119.9 kg), SpO2 96%.    Physical Exam     General: No acute distress. Alert and oriented x 3.  HEENT: Moist mucous membranes. Anicteric.   Neck: Supple, No JVD.   Respiratory: Nonlabored resp.  Cardiovascular: Regular rate.   Abdomen: Soft, LLQ tenderness, no rebound tnd, no guarding, nondistended.  No masses. Normal bowel sounds.    Neurologic: No focal neurological deficits.  Musculoskeletal: Full range of motion of all extremities. No calf tenderness. No swelling noted.  Integument: No lesions. No erythema.  Psychiatric: Appropriate mood and affect.         Assessment and Plan:     Sigmoid diverticulitis      Intractable abdominal pain    Continue IV abx and pain control. Clears as tolerated, will continue to follow and monitor progress. May need serial imaging if pain continues to worsen.    Results:     Lab Results   Component Value Date    WBC 8.1 2024    HGB 13.3 2024    HCT 41.7 2024    .0 2024    CREATSERUM 0.85 2024    BUN 12 2024     2024    K 3.8 2024     2024    CO2 28.0 2024    GLU 85 2024    CA 9.1 2024    ALB 4.1 2024    ALKPHO 62 2024    BILT 0.8 2024    TP 6.7 2024    AST 19 2024    ALT 24 2024    PTT 24.9 2023    INR 1.01 2023    TSH 1.850 2023    LIP 37 2024    PSA 2.53 2021    DDIMER 2.44 (H) 2023    MG 2.1  10/02/2023    PHOS 3.3 06/25/2023    TROP <0.045 02/05/2020       CT ABDOMEN+PELVIS(CONTRAST ONLY)(CPT=74177)    Result Date: 4/2/2024  CONCLUSION:   Acute sigmoid diverticulitis.  Trace suspected focus of associated localized pneumoperitoneum/micro perforation.  No evidence of a drainable associated fluid collection.  Post treatment colonoscopy advised if not already recently performed.  Progressed inflammatory change adjacent to the hepatic flexure and proximal sigmoid colon.  Interval development of mild circumferential wall thickening splenic flexure and proximal sigmoid colon which may reflect a nonspecific colitis.  No evidence of pneumatosis or associated drainable fluid collection.  There is otherwise no evidence of pneumoperitoneum except for the aforementioned suspected small focus adjacent to the sigmoid colon.  Post treatment colonoscopy advised if not already recently performed.  Trace ascites.  Borderline circumferential urinary bladder wall thickening which may relate to lack of distension.  If there is concern for cystitis, correlation with urinalysis can be performed.     Dictated by (CST): Get Siu MD on 4/02/2024 at 3:34 PM     Finalized by (CST): Get Siu MD on 4/02/2024 at 3:52 PM                       Stephen Nunez PA-C  4/3/2024

## 2024-04-03 NOTE — PAYOR COMM NOTE
--------------  ADMISSION REVIEW     Payor: YOMAIRAEDGARDO MORRISON  Subscriber #:  [id number]  Authorization Number: [id number]    Admit date: 4/2/24  Admit time:  5:55 PM       REVIEW DOCUMENTATION:      Patient Seen in: [facility] Emergency Department    History     Chief Complaint   Patient presents with    Abdomen/Flank Pain       HPI    58-year-old male sent by Dr. Falcon given patient has had worsening pain over the last 3 weeks and has had 2 CT scans demonstrating worsening epiploic appendagitis, patient also reports nausea and significant pain despite taking Norco and ibuprofen.  Earlier during this symptom course, patient was on a 10-day course of Augmentin without any improvement        Physical Exam     ED Triage Vitals [04/02/24 1254]   /76   Pulse 75   Resp 20   Temp 97.8 °F (36.6 °C)   Temp src Oral   SpO2 98 %   O2 Device None (Room air)     ED Course        Labs Reviewed   BASIC METABOLIC PANEL (8) - Abnormal; Notable for the following components:       Result Value    Calculated Osmolality 296 (*)     All other components within normal limits   URINALYSIS, ROUTINE - Abnormal; Notable for the following components:    Urobilinogen Urine 6 (*)     All other components within normal limits   CBC W/ DIFFERENTIAL - Abnormal; Notable for the following components:    HGB 12.9 (*)      As Interpreted by me    Imaging Results Available and Reviewed while in ED: CT ABDOMEN+PELVIS(CONTRAST ONLY)(CPT=74177)    Result Date: 4/2/2024  CONCLUSION:   Acute sigmoid diverticulitis.  Trace suspected focus of associated localized pneumoperitoneum/micro perforation.  No evidence of a drainable associated fluid collection.  Post treatment colonoscopy advised if not already recently performed.  Progressed inflammatory change adjacent to the hepatic flexure and proximal sigmoid colon.  Interval development of mild circumferential wall thickening splenic flexure and proximal sigmoid colon which may reflect a nonspecific  colitis.  No evidence of pneumatosis or associated drainable fluid collection.  There is otherwise no evidence of pneumoperitoneum except for the aforementioned suspected small focus adjacent to the sigmoid colon.  Post treatment colonoscopy advised if not already recently performed.  Trace ascites.  Borderline circumferential urinary bladder wall thickening which may relate to lack of distension.  If there is concern for cystitis, correlation with urinalysis can be performed.     Dictated by (CST): Get Siu MD on 4/02/2024 at 3:34 PM     Finalized by (CST): Get Siu MD on 4/02/2024 at 3:52 PM         ED Medications Administered:   Medications   piperacillin-tazobactam (Zosyn) 4.5 g in dextrose 5% 100 mL IVPB-ADDV (has no administration in time range)   morphINE PF 4 MG/ML injection 4 mg (4 mg Intravenous Given 4/2/24 1348)   iopamidol 76% (ISOVUE-370) injection for power injector (80 mL Intravenous Given 4/2/24 1515)   gabapentin (Neurontin) tab 600 mg (600 mg Oral Given 4/2/24 1600)   clonazePAM (KLONOPIN) disintegrating tab 0.5 mg (0.5 mg Oral Given 4/2/24 1600)         Medical Decision Making     reviewed prior chart records including: Note from March 27, 2024 as well as the telephone communication notes prompting referral to the emergency department.  Patient is here with worsening left-sided abdominal pain, intractable requiring admission.  Labs reviewed and unremarkable for acute findings on my interpretation.      CT demonstrates acute sigmoid diverticulitis with microperforation, agree with report.  I updated Dr. Falcon, Dr. Brooks and Dr. Novoa. Admitted to and discussed with .  Antibiotics ordered    Admitted in stable condition.  Patient is comfortable with the plan.         Disposition and Plan     Clinical Impression:  1. Sigmoid diverticulitis                        HISTORY AND PHYSICAL EXAMINATION    CHIEF COMPLAINT:  Recurrent sigmoid diverticulitis with  microperforation.    HISTORY OF PRESENT ILLNESS:  The patient is a 58-year-old  male who was seen at [facility] on March 27 for abdominal pain.  CT scan of the abdomen at that time showed short segment descending colon thickening with findings consistent with possible epiploic appendagitis.  The patient continued to have persistent worsening pain in the left lower quadrant area, came in today for evaluation.  CBC and chemistry were unremarkable.  CT scan of the abdomen showed acute sigmoid diverticulitis with trace suspected focus of associated localized pneumoperitoneum microperforation, no evidence of drainable associated fluid collection, progressed inflammatory changes adjacent to the hepatic flexure and proximal sigmoid colon, interval development of mild circumferential wall thickening splenic flexure and proximal sigmoid colon which may reflect nonspecific colitis.  The patient was started on IV Zosyn, and he will be admitted to the hospital for further management.    PAST MEDICAL HISTORY:  Diverticulosis and recurrent diverticulitis.  He had an appointment with general surgeon for future sigmoidectomy.  Hypertension; generalized osteoarthritis; degenerative joint disease of lumbar spine; major depressive disorder; palpitations with Holter monitor findings, burden of premature ventricular tachycardia and small burden of atrial fibrillation, resolved with verapamil.    PAST SURGICAL HISTORY:  Laparoscopic cholecystectomy, right knee arthroscopic procedure, tonsillectomy, colonoscopy and polypectomy.    MEDICATIONS:  Please see medication reconciliation list.    ALLERGIES:  No known drug allergies.     FAMILY HISTORY:  Mother had heart disease and hypertension.  Father had atrial flutter, prostate cancer, and hypertension.    SOCIAL HISTORY:  Ex-tobacco user.  No current tobacco, alcohol, or drug use.  Lives with his family.  Independent in his basic activities of daily living.    REVIEW OF  SYSTEMS:  The patient reports progressive left lower quadrant abdominal pain radiating to his left lower back for the last 4 to 5 days.  No fever or chills, but he had poor appetite.  Other 12-point review of systems negative.      PHYSICAL EXAMINATION:    GENERAL:  Alert, oriented to time, place, and person, moderate distress.  VITAL SIGNS:  Temperature 97.8, pulse 68, respiratory rate 20, blood pressure 127/54, pulse ox 94% on room air.  HEENT:  Atraumatic.  Oropharynx clear.  Dry mucous membranes.  Normal hard and soft palate.  Eyes:  Anicteric sclerae.  NECK:  Supple.  No lymphadenopathy.  Trachea midline.  Full range of motion.  LUNGS:  Clear to auscultation bilaterally.  Normal respiratory effort.  HEART:  Regular rate and rhythm.  S1 and S2 auscultated.  No murmur.  ABDOMEN:  Soft, nondistended.  Tenderness noted in left lower quadrant area.  No guarding.  Mild rebound tenderness.  Hypoactive bowel sounds.  EXTREMITIES:  No peripheral edema, clubbing, or cyanosis.  NEUROLOGIC:  Motor and sensory intact.    ASSESSMENT AND PLAN:  Recurrent sigmoid diverticulitis with microperforation.  The patient will be admitted to general medical floor.  IV fluids.  Clear liquid diet.  Pain control.  IV Zosyn.  General surgery and Gastroenterology consults.  Further recommendations to follow.    Dictated By Elaine Ramey MD  d: 04/02/2024 16:35:27        GI      Reason for Consultation:  Abdominal pain      History of Present Illness:  Don Cabral is a 58 year old male w/ PMHx of arrhythmia, back problems, sleep apnea, hypertension, diverticulitis flares who presented to ER for abdominal pain. GI consulted for diverticulitis flare.      He notes his abdominal pain first began on 3/5/24. He was started on Augmentin 10 day course but continued to have pain even with completion of antibiotics.  He was seen in ER on 3/27 Ct a/p without acute diverticulitis he was discharged home. Notes pain worsened today and was sent  back to ER per his primary GI. CT with acute diverticulitis and microperforation. WBC within normal. Hemoglobin 12.9.      Denies fevers, chills, body aches. Denies dysuria, CP, SOB and dizziness.      Pertinent Hospitalizations:  -10/2023: Diverticulitis, needed IV abx and advised for surgery follow up for possible sigmoid resection  Assessment & Plan   Don Cabral is a 58 year old male w/ PMHx of arrhythmia, back problems, sleep apnea, hypertension, diverticulitis flares who presented to ER for abdominal pain. GI consulted for diverticulitis flare.      He notes his abdominal pain first began on 3/5/24. He was started on Augmentin 10 day course but continued to have pain even with completion of antibiotics.  He was seen in ER on 3/27 Ct a/p without acute diverticulitis he was discharged home. Notes pain worsened today and was sent back to ER per his primary GI. CT with acute diverticulitis and microperforation. WBC within normal. Hemoglobin 12.9.      Recommend:  -clear liquid diet   -IV abx  -surgery on consult        Thank you for the opportunity to participate in the care of this patient.     Case discussed with Shannan Bear PA-C      4/3/ surgery     Sigmoid diverticulitis       Intractable abdominal pain     Continue IV abx and pain control. Clears as tolerated, will continue to follow and monitor progress. May need serial imaging if pain continues to worsen.         04/03/24 0523 04/03/24 0540   Pain Assessment: Ongoing & Relief (q4h & relief)   Pain Assessment Tool  --  0-10   0-10 Scale 6 (moderate pain) 4 (moderate pain)   Pain Intervention(s) Medication  --            Latest Reference Range & Units 04/02/24 13:40 04/03/24 07:23   Glucose 70 - 99 mg/dL 97 85   Sodium 136 - 145 mmol/L 143 140   Potassium 3.5 - 5.1 mmol/L 4.2 3.8   Chloride 98 - 112 mmol/L 107 104   Carbon Dioxide, Total 21.0 - 32.0 mmol/L 30.0 28.0   BUN 9 - 23 mg/dL 14 12   CREATININE 0.70 - 1.30 mg/dL 0.80  0.85      Latest Reference Range & Units 04/02/24 13:40 04/03/24 07:23   WBC 4.0 - 11.0 x10(3) uL 8.9 8.1   Hemoglobin 13.0 - 17.5 g/dL 12.9 (L) 13.3   Hematocrit 39.0 - 53.0 % 40.4 41.7   Platelet Count 150.0 - 450.0 10(3)uL 328.0 312.0   (L): Data is abnormally low    MEDICATIONS ADMINISTERED IN LAST 1 DAY:  acetaminophen (Tylenol Extra Strength) tab 500 mg       Date Action Dose Route User    4/3/2024 0950 Given 500 mg Oral Sonia Osullivan RN          clonazePAM (KLONOPIN) disintegrating tab 0.5 mg       Date Action Dose Route User    4/2/2024 1600 Given 0.5 mg Oral Linda Hernández RN          clonazePAM (KlonoPIN) tab 0.5 mg       Date Action Dose Route User    4/3/2024 1124 Given 0.5 mg Oral Nadira Caruso RN    4/3/2024 0606 Given 0.5 mg Oral Rylee Padilla RN          enoxaparin (Lovenox) 40 MG/0.4ML SUBQ injection 40 mg       Date Action Dose Route User    4/2/2024 2043 Given 40 mg Subcutaneous (Left Lower Abdomen) Rylee Padilla RN          gabapentin (Neurontin) tab 600 mg       Date Action Dose Route User    4/2/2024 1600 Given 600 mg Oral Linda Hernández RN          gabapentin (Neurontin) tab 600 mg       Date Action Dose Route User    4/3/2024 1124 Given 600 mg Oral Nadira Caruso RN    4/3/2024 0523 Given 600 mg Oral Rylee Padilla RN    4/2/2024 2041 Given 600 mg Oral Rylee Padilla RN          iopamidol 76% (ISOVUE-370) injection for power injector       Date Action Dose Route User    4/2/2024 1515 Given 80 mL Intravenous Zuleyma Galvan          lisinopril (Prinivil; Zestril) tab 20 mg       Date Action Dose Route User    4/3/2024 0523 Given 20 mg Oral Rylee Padilla RN          morphINE PF 2 MG/ML injection 2 mg       Date Action Dose Route User    4/3/2024 0942 Given 2 mg Intravenous Sonia Osullivan RN    4/3/2024 0523 Given 2 mg Intravenous Rylee Padilla RN    4/2/2024 2036 Given 2 mg Intravenous Rylee Padilla RN          morphINE PF 4 MG/ML injection 4 mg       Date Action Dose Route User    4/2/2024  1348 Given 4 mg Intravenous Linda Hernández, JERSON          morphINE PF 4 MG/ML injection 4 mg       Date Action Dose Route User    4/2/2024 1741 Given 4 mg Intravenous Linda Hernández RN          OLANZapine (ZyPREXA) tab 2.5 mg       Date Action Dose Route User    4/2/2024 2041 Given 2.5 mg Oral Rylee Padilla RN          piperacillin-tazobactam (Zosyn) 4.5 g in dextrose 5% 100 mL IVPB-ADDV       Date Action Dose Route User    4/2/2024 1741 New Bag 4.5 g Intravenous Linda Hernández RN          piperacillin-tazobactam (Zosyn) 4.5 g in dextrose 5% 100 mL IVPB-ADDV       Date Action Dose Route User    4/3/2024 0525 New Bag 4.5 g Intravenous Rylee Padilla RN    4/2/2024 2100 New Bag 4.5 g Intravenous Rylee Padilla RN          sodium chloride 0.9% infusion       Date Action Dose Route User    4/2/2024 1845 New Bag (none) Intravenous Paula Chin RN          verapamil ER (Calan-SR) tab 120 mg       Date Action Dose Route User    4/2/2024 2041 Given 120 mg Oral Rylee Padilla RN          sertraline (Zoloft) tab 75 mg       Date Action Dose Route User    4/3/2024 0523 Given 75 mg Oral Rylee Padilla RN            Vitals (last day)       Date/Time Temp Pulse Resp BP SpO2 Weight O2 Device O2 Flow Rate (L/min) [facility]    04/03/24 0550 98.1 °F (36.7 °C) 64 18 117/71 96 % -- None (Room air) -- JH    04/02/24 2047 -- 67 18 121/56 94 % -- None (Room air) -- IL    04/02/24 1758 -- -- -- -- -- 264 lb 6.4 oz -- -- CW    04/02/24 1756 97.8 °F (36.6 °C) 65 18 144/59 99 % -- None (Room air) -- KW    04/02/24 1745 -- 67 -- 131/71 96 % -- -- -- CC    04/02/24 1445 -- 67 -- 127/54 94 % -- -- -- CC    04/02/24 1254 97.8 °F (36.6 °C) 75 20 137/76 98 % -- None (Room air) -- SK

## 2024-04-04 LAB
ANION GAP SERPL CALC-SCNC: 5 MMOL/L (ref 0–18)
BUN BLD-MCNC: 11 MG/DL (ref 9–23)
CALCIUM BLD-MCNC: 8.9 MG/DL (ref 8.7–10.4)
CHLORIDE SERPL-SCNC: 107 MMOL/L (ref 98–112)
CO2 SERPL-SCNC: 24 MMOL/L (ref 21–32)
CREAT BLD-MCNC: 0.82 MG/DL
DEPRECATED RDW RBC AUTO: 42.9 FL (ref 35.1–46.3)
EGFRCR SERPLBLD CKD-EPI 2021: 102 ML/MIN/1.73M2 (ref 60–?)
ERYTHROCYTE [DISTWIDTH] IN BLOOD BY AUTOMATED COUNT: 13.2 % (ref 11–15)
GLUCOSE BLD-MCNC: 85 MG/DL (ref 70–99)
HCT VFR BLD AUTO: 39.1 %
HGB BLD-MCNC: 13.1 G/DL
MCH RBC QN AUTO: 29.6 PG (ref 26–34)
MCHC RBC AUTO-ENTMCNC: 33.5 G/DL (ref 31–37)
MCV RBC AUTO: 88.3 FL
PLATELET # BLD AUTO: 285 10(3)UL (ref 150–450)
POTASSIUM SERPL-SCNC: 3.8 MMOL/L (ref 3.5–5.1)
RBC # BLD AUTO: 4.43 X10(6)UL
SODIUM SERPL-SCNC: 136 MMOL/L (ref 136–145)
WBC # BLD AUTO: 7 X10(3) UL (ref 4–11)

## 2024-04-04 PROCEDURE — 99233 SBSQ HOSP IP/OBS HIGH 50: CPT | Performed by: HOSPITALIST

## 2024-04-04 PROCEDURE — 99232 SBSQ HOSP IP/OBS MODERATE 35: CPT | Performed by: SURGERY

## 2024-04-04 PROCEDURE — 99233 SBSQ HOSP IP/OBS HIGH 50: CPT | Performed by: INTERNAL MEDICINE

## 2024-04-04 RX ORDER — ACETAMINOPHEN 325 MG/1
650 TABLET ORAL EVERY 6 HOURS PRN
Status: DISCONTINUED | OUTPATIENT
Start: 2024-04-04 | End: 2024-04-20

## 2024-04-04 NOTE — PLAN OF CARE
Tolerating clear/full liquids, IVF, norco, klonopin prn, zosyn,  voiding freely, + stool, consult ID for abx at discharge, up ambulating in hallway.  Patient updated on plan of care.    Problem: Patient Centered Care  Goal: Patient preferences are identified and integrated in the patient's plan of care  Description: Interventions:  - What would you like us to know as we care for you? -  - Provide timely, complete, and accurate information to patient/family  - Incorporate patient and family knowledge, values, beliefs, and cultural backgrounds into the planning and delivery of care  - Encourage patient/family to participate in care and decision-making at the level they choose  - Honor patient and family perspectives and choices  Outcome: Progressing     Problem: Patient/Family Goals  Goal: Patient/Family Long Term Goal  Description: Patient's Long Term Goal: -    Interventions:  - -  - See additional Care Plan goals for specific interventions  Outcome: Progressing  Goal: Patient/Family Short Term Goal  Description: Patient's Short Term Goal: -    Interventions:   - -  - See additional Care Plan goals for specific interventions  Outcome: Progressing     Problem: PAIN - ADULT  Goal: Verbalizes/displays adequate comfort level or patient's stated pain goal  Description: INTERVENTIONS:  - Encourage pt to monitor pain and request assistance  - Assess pain using appropriate pain scale  - Administer analgesics based on type and severity of pain and evaluate response  - Implement non-pharmacological measures as appropriate and evaluate response  - Consider cultural and social influences on pain and pain management  - Manage/alleviate anxiety  - Utilize distraction and/or relaxation techniques  - Monitor for opioid side effects  - Notify MD/LIP if interventions unsuccessful or patient reports new pain  - Anticipate increased pain with activity and pre-medicate as appropriate  Outcome: Progressing     Problem: RISK FOR INFECTION  - ADULT  Goal: Absence of fever/infection during anticipated neutropenic period  Description: INTERVENTIONS  - Monitor WBC  - Administer growth factors as ordered  - Implement neutropenic guidelines  Outcome: Progressing     Problem: DISCHARGE PLANNING  Goal: Discharge to home or other facility with appropriate resources  Description: INTERVENTIONS:  - Identify barriers to discharge w/pt and caregiver  - Include patient/family/discharge partner in discharge planning  - Arrange for needed discharge resources and transportation as appropriate  - Identify discharge learning needs (meds, wound care, etc)  - Arrange for interpreters to assist at discharge as needed  - Consider post-discharge preferences of patient/family/discharge partner  - Complete POLST form as appropriate  - Assess patient's ability to be responsible for managing their own health  - Refer to Case Management Department for coordinating discharge planning if the patient needs post-hospital services based on physician/LIP order or complex needs related to functional status, cognitive ability or social support system  Outcome: Progressing     Problem: GASTROINTESTINAL - ADULT  Goal: Maintains or returns to baseline bowel function  Description: INTERVENTIONS:  - Assess bowel function  - Maintain adequate hydration with IV or PO as ordered and tolerated  - Evaluate effectiveness of GI medications  - Encourage mobilization and activity  - Obtain nutritional consult as needed  - Establish a toileting routine/schedule  - Consider collaborating with pharmacy to review patient's medication profile  Outcome: Progressing

## 2024-04-04 NOTE — PROGRESS NOTES
[facility]     Gastroenterology Progress Note    Don Cabral Patient Status:  Inpatient    1965 MRN [id number]   Location [facility] 4W/SW/SE Attending Kunal Peraza MD   Hosp Day # 2 PCP Stephen Christianson MD       Subjective:   Pain improved today. Does not hurt to move or cough now. No nausea or emesis. No bowel movement.   Reduced opiate requirements.   Objective:   Blood pressure 133/73, pulse 71, temperature 97.8 °F (36.6 °C), temperature source Oral, resp. rate 18, weight 264 lb 6.4 oz (119.9 kg), SpO2 94%. Body mass index is 33.05 kg/m².    General: awake, alert and oriented, no acute distress  HEENT: moist mucus membranes  PULM: no conversational dyspnea  CARDIOVASCULAR: regular rate and rhythm, the extremities are warm and well perfused  GI: soft, mild llq tenderness, non-distended, + BS, no rebound/guarding   EXTREMITIES: no edema, moving all extremities  SKIN: no visible rash  NEURO: appropriate and interactive    Assessment and Plan:   58M with recurrent sigmoid diverticulitis who presents with LLQ abd pain. Started last month, failed outpatient abx, interval CT in ER here shows acute sigmoid diverticulitis with microperforation, no fluid collection.     Continue supportive measures including IV abx, surgery following, low threshold for interval CT if clinically worsening. Diet advancement as per surgery. Last colonoscopy 2023. If he responds to non-operative measures this episode, advise elective segmental resection as outpatient.     Shannan Brooks MD  [facility] Gastroenterology      Results:     Lab Results   Component Value Date    WBC 7.0 2024    HGB 13.1 2024    HCT 39.1 2024    .0 2024    CREATSERUM 0.82 2024    BUN 11 2024     2024    K 3.8 2024     2024    CO2 24.0 2024    GLU 85 2024    CA 8.9 2024    ALB 4.1 2024    ALKPHO 62 2024    BILT 0.8  04/02/2024    TP 6.7 04/02/2024    AST 19 04/02/2024    ALT 24 04/02/2024    PTT 24.9 09/28/2023    INR 1.01 09/28/2023    TSH 1.850 09/28/2023    LIP 37 04/02/2024    PSA 2.53 08/02/2021    DDIMER 2.44 (H) 06/23/2023    MG 2.1 10/02/2023    PHOS 3.3 06/25/2023    TROP <0.045 02/05/2020       CT ABDOMEN+PELVIS(CONTRAST ONLY)(CPT=74177)    Result Date: 4/2/2024  CONCLUSION:   Acute sigmoid diverticulitis.  Trace suspected focus of associated localized pneumoperitoneum/micro perforation.  No evidence of a drainable associated fluid collection.  Post treatment colonoscopy advised if not already recently performed.  Progressed inflammatory change adjacent to the hepatic flexure and proximal sigmoid colon.  Interval development of mild circumferential wall thickening splenic flexure and proximal sigmoid colon which may reflect a nonspecific colitis.  No evidence of pneumatosis or associated drainable fluid collection.  There is otherwise no evidence of pneumoperitoneum except for the aforementioned suspected small focus adjacent to the sigmoid colon.  Post treatment colonoscopy advised if not already recently performed.  Trace ascites.  Borderline circumferential urinary bladder wall thickening which may relate to lack of distension.  If there is concern for cystitis, correlation with urinalysis can be performed.     Dictated by (CST): Get Siu MD on 4/02/2024 at 3:34 PM     Finalized by (CST): Get Siu MD on 4/02/2024 at 3:52 PM

## 2024-04-04 NOTE — PROGRESS NOTES
[facility]  part of formerly [facility]    Progress Note    Don Cabral Patient Status:  Inpatient    1965 MRN [id number]   Location [facility] 4W/SW/SE Attending Tamica Hardy MD   Hosp Day # 2 PCP Stephen Christianson MD       Subjective:   Pain improved from yesterday. No nausea or vomiting, no bms. No fever or chills.    Objective:   Vital Signs:  Blood pressure 133/73, pulse 71, temperature 97.8 °F (36.6 °C), temperature source Oral, resp. rate 18, weight 264 lb 6.4 oz (119.9 kg), SpO2 94%.    Physical Exam     General: No acute distress. Alert and oriented x 3.  HEENT: Moist mucous membranes. Anicteric.   Neck: Supple, No JVD.   Respiratory: Nonlabored resp.  Cardiovascular: Regular rate.   Abdomen: Soft, LLQ tenderness, no rebound tnd, no guarding, nondistended.  No masses. Normal bowel sounds.    Neurologic: No focal neurological deficits.  Musculoskeletal: Full range of motion of all extremities. No calf tenderness. No swelling noted.  Integument: No lesions. No erythema.  Psychiatric: Appropriate mood and affect.         Assessment and Plan:     Sigmoid diverticulitis      Intractable abdominal pain    Continue non-operative management, including IV abx. Okay to advance to full liquids. Encouraged ambulation, will continue to follow and monitor progress.    Results:     Lab Results   Component Value Date    WBC 7.0 2024    HGB 13.1 2024    HCT 39.1 2024    .0 2024    CREATSERUM 0.82 2024    BUN 11 2024     2024    K 3.8 2024     2024    CO2 24.0 2024    GLU 85 2024    CA 8.9 2024    ALB 4.1 2024    ALKPHO 62 2024    BILT 0.8 2024    TP 6.7 2024    AST 19 2024    ALT 24 2024    PTT 24.9 2023    INR 1.01 2023    TSH 1.850 2023    LIP 37 2024    PSA 2.53 2021    DDIMER 2.44 (H) 2023    MG 2.1 10/02/2023    PHOS 3.3  06/25/2023    TROP <0.045 02/05/2020       CT ABDOMEN+PELVIS(CONTRAST ONLY)(CPT=74177)    Result Date: 4/2/2024  CONCLUSION:   Acute sigmoid diverticulitis.  Trace suspected focus of associated localized pneumoperitoneum/micro perforation.  No evidence of a drainable associated fluid collection.  Post treatment colonoscopy advised if not already recently performed.  Progressed inflammatory change adjacent to the hepatic flexure and proximal sigmoid colon.  Interval development of mild circumferential wall thickening splenic flexure and proximal sigmoid colon which may reflect a nonspecific colitis.  No evidence of pneumatosis or associated drainable fluid collection.  There is otherwise no evidence of pneumoperitoneum except for the aforementioned suspected small focus adjacent to the sigmoid colon.  Post treatment colonoscopy advised if not already recently performed.  Trace ascites.  Borderline circumferential urinary bladder wall thickening which may relate to lack of distension.  If there is concern for cystitis, correlation with urinalysis can be performed.     Dictated by (CST): Get Siu MD on 4/02/2024 at 3:34 PM     Finalized by (CST): Get Siu MD on 4/02/2024 at 3:52 PM                       Stephen Nunez PA-C  4/4/2024    Addendum:    Pt seen and examined.  I agree with Stephen Nunez'sISELA note.    Rick Novoa MD

## 2024-04-04 NOTE — PLAN OF CARE
Plan of care reviewed with Don. IV antibiotics and IVF continued. Remains on clear liquids. Pain best controlled with prn norco. Tylenol given for breakthrough pain. Patient ambulating independently. Denies nausea. Plan ongoing. Safety measures in place and call light within reach.

## 2024-04-04 NOTE — PROGRESS NOTES
[facility]  part of [facility]    Progress Note    Don Cabral Patient Status:  Inpatient    1965 MRN [id number]   Location [facility] 4W/SW/SE Attending Tamica Hardy MD   Hosp Day # 2 PCP Stephen Christianson MD     Chief Complaint:   Chief Complaint   Patient presents with    Abdomen/Flank Pain   L sided abd pain     Subjective:   Don Cabral is doing a bit better. Abd pain is slightly better today. No N/V no F/C. Tolerated clear liquids      Objective:   Objective:    Blood pressure 108/55, pulse 57, temperature 97.8 °F (36.6 °C), temperature source Oral, resp. rate 18, weight 264 lb 6.4 oz (119.9 kg), SpO2 97%.    Physical Exam:    General: No acute distress. Obese   Respiratory: Clear to auscultation bilaterally. No wheezes. No rhonchi.  Cardiovascular: S1, S2. Regular rate and rhythm. No murmurs, rubs or gallops.   Abdomen: Soft, LLQ tender, nondistended.  Positive bowel sounds. No rebound or guarding.  Neurologic: No focal neurological deficits.   Musculoskeletal: Moves all extremities.  Extremities: No edema.      Results:   Results:    Labs:  Recent Labs   Lab 24  1340 24  0723 24  0655   WBC 8.9 8.1 7.0   HGB 12.9* 13.3 13.1   MCV 88.0 87.8 88.3   .0 312.0 285.0       Recent Labs   Lab 24  1340 24  0723 24  0655 24  0914   GLU 97 85 85  --    BUN 14 12  --  11   CREATSERUM 0.80 0.85 0.82  --    CA 9.3 9.1 8.9  --    ALB 4.1  --   --   --     140 136  --    K 4.2 3.8  --  3.8    104 107  --    CO2 30.0 28.0 24.0  --    ALKPHO 62  --   --   --    AST 19  --   --   --    ALT 24  --   --   --    BILT 0.8  --   --   --    TP 6.7  --   --   --        Estimated Creatinine Clearance: 118.8 mL/min (based on SCr of 0.82 mg/dL).    No results for input(s): \"PTP\", \"INR\" in the last 168 hours.         Culture:  Hospital Encounter on 24   1. Blood Culture     Status: None (Preliminary result)    Collection  Time: 04/02/24  5:18 PM    Specimen: Blood,peripheral   Result Value Ref Range    Blood Culture Result No Growth 1 Day N/A       Cardiac  No results for input(s): \"TROP\", \"PBNP\" in the last 168 hours.      Imaging: Imaging data reviewed in Epic.  No results found.    Medications:    enoxaparin  40 mg Subcutaneous Daily    piperacillin-tazobactam  4.5 g Intravenous Q8H    OLANZapine  2.5 mg Oral Nightly    verapamil ER  120 mg Oral Nightly    gabapentin  600 mg Oral TID    sertraline  75 mg Oral Daily    lisinopril  20 mg Oral Daily         Assessment and Plan:   Assessment & Plan:      Sigmoid diverticulitis  LLQ abdominal pain   - failed outpt PO abx.   - history of recurrent diverticulitis, for which patient has had an appointment with general surgeon for future sigmoidectomy.  - CT showed acute sigmoid diverticulitis with microperforation, no fluid collection.   - Last colonoscopy 05/2023.   - diet per surgery - full liquids.   - Pain control.   - IVF  - cont non operative management.   - IV zosyn day #2  - will eventually benefit from future sigmoidectomy   - Afebrile. Normal WBC   - GI and general surgery on consult.   - ID consult. May need IV abx on discharge.      Hypertension  Major depressive disorder   Intermittent premature ventricular tachycardia   - stable chronic conditions  - continue home meds    >55min spent, >50% spent counseling and coordinating care in the form of educating pt/family and d/w consultants and staff. Most of the time spent discussing the above plan.        Plan of care discussed with patient or family at bedside.    Tamica Hardy MD  Hospitalist          Supplementary Documentation:     Quality:  DVT Prophylaxis: lovenox   CODE status: Full  Dispo: per clinical course           Estimated date of discharge: TBD  Discharge is dependent on: clinical stability  At this point Mr. Cabral is expected to be discharge to: home

## 2024-04-04 NOTE — PLAN OF CARE
Patient is alert and oriented. Room air. CPAP worn overnight. Refused tele, notified TEE CASTILLO to d/c per Dr. Syed. +belch/+flatus/-BM. Tolerating clears. Denies nausea. Pain managed with prn norco. Up independently. IVF and abx continued. Call light and personal belongings within reach. Safety precautions in place.     Problem: Patient Centered Care  Goal: Patient preferences are identified and integrated in the patient's plan of care  Description: Interventions:  - Provide timely, complete, and accurate information to patient/family  - Incorporate patient and family knowledge, values, beliefs, and cultural backgrounds into the planning and delivery of care  - Encourage patient/family to participate in care and decision-making at the level they choose  - Honor patient and family perspectives and choices  Outcome: Progressing     Problem: PAIN - ADULT  Goal: Verbalizes/displays adequate comfort level or patient's stated pain goal  Description: INTERVENTIONS:  - Encourage pt to monitor pain and request assistance  - Assess pain using appropriate pain scale  - Administer analgesics based on type and severity of pain and evaluate response  - Implement non-pharmacological measures as appropriate and evaluate response  - Consider cultural and social influences on pain and pain management  - Manage/alleviate anxiety  - Utilize distraction and/or relaxation techniques  - Monitor for opioid side effects  - Notify MD/LIP if interventions unsuccessful or patient reports new pain  - Anticipate increased pain with activity and pre-medicate as appropriate  Outcome: Progressing     Problem: RISK FOR INFECTION - ADULT  Goal: Absence of fever/infection during anticipated neutropenic period  Description: INTERVENTIONS  - Monitor WBC  - Administer growth factors as ordered  - Implement neutropenic guidelines  Outcome: Progressing     Problem: DISCHARGE PLANNING  Goal: Discharge to home or other facility with appropriate  resources  Description: INTERVENTIONS:  - Identify barriers to discharge w/pt and caregiver  - Include patient/family/discharge partner in discharge planning  - Arrange for needed discharge resources and transportation as appropriate  - Identify discharge learning needs (meds, wound care, etc)  - Arrange for interpreters to assist at discharge as needed  - Consider post-discharge preferences of patient/family/discharge partner  - Complete POLST form as appropriate  - Assess patient's ability to be responsible for managing their own health  - Refer to Case Management Department for coordinating discharge planning if the patient needs post-hospital services based on physician/LIP order or complex needs related to functional status, cognitive ability or social support system  Outcome: Progressing     Problem: GASTROINTESTINAL - ADULT  Goal: Maintains or returns to baseline bowel function  Description: INTERVENTIONS:  - Assess bowel function  - Maintain adequate hydration with IV or PO as ordered and tolerated  - Evaluate effectiveness of GI medications  - Encourage mobilization and activity  - Obtain nutritional consult as needed  - Establish a toileting routine/schedule  - Consider collaborating with pharmacy to review patient's medication profile  Outcome: Progressing

## 2024-04-05 ENCOUNTER — APPOINTMENT (OUTPATIENT)
Dept: PICC SERVICES | Facility: HOSPITAL | Age: 59
End: 2024-04-05
Attending: INTERNAL MEDICINE
Payer: COMMERCIAL

## 2024-04-05 LAB
ANION GAP SERPL CALC-SCNC: 4 MMOL/L (ref 0–18)
BUN BLD-MCNC: 7 MG/DL (ref 9–23)
BUN/CREAT SERPL: 9 (ref 10–20)
CALCIUM BLD-MCNC: 9.2 MG/DL (ref 8.7–10.4)
CHLORIDE SERPL-SCNC: 108 MMOL/L (ref 98–112)
CO2 SERPL-SCNC: 25 MMOL/L (ref 21–32)
CREAT BLD-MCNC: 0.78 MG/DL
DEPRECATED RDW RBC AUTO: 41.8 FL (ref 35.1–46.3)
EGFRCR SERPLBLD CKD-EPI 2021: 104 ML/MIN/1.73M2 (ref 60–?)
ERYTHROCYTE [DISTWIDTH] IN BLOOD BY AUTOMATED COUNT: 13.1 % (ref 11–15)
GLUCOSE BLD-MCNC: 79 MG/DL (ref 70–99)
HCT VFR BLD AUTO: 39.6 %
HGB BLD-MCNC: 12.8 G/DL
MCH RBC QN AUTO: 28.1 PG (ref 26–34)
MCHC RBC AUTO-ENTMCNC: 32.3 G/DL (ref 31–37)
MCV RBC AUTO: 86.8 FL
OSMOLALITY SERPL CALC.SUM OF ELEC: 281 MOSM/KG (ref 275–295)
PLATELET # BLD AUTO: 322 10(3)UL (ref 150–450)
POTASSIUM SERPL-SCNC: 4 MMOL/L (ref 3.5–5.1)
RBC # BLD AUTO: 4.56 X10(6)UL
SODIUM SERPL-SCNC: 137 MMOL/L (ref 136–145)
WBC # BLD AUTO: 7.9 X10(3) UL (ref 4–11)

## 2024-04-05 PROCEDURE — 99233 SBSQ HOSP IP/OBS HIGH 50: CPT | Performed by: INTERNAL MEDICINE

## 2024-04-05 PROCEDURE — 99233 SBSQ HOSP IP/OBS HIGH 50: CPT | Performed by: HOSPITALIST

## 2024-04-05 PROCEDURE — 99232 SBSQ HOSP IP/OBS MODERATE 35: CPT | Performed by: SURGERY

## 2024-04-05 NOTE — PROGRESS NOTES
[facility]  part of [facility]    Progress Note    Don Cabral Patient Status:  Inpatient    1965 MRN [id number]   Location [facility] 4W/SW/SE Attending Tamica Hardy MD   Hosp Day # 3 PCP Stephen Christianson MD     Chief Complaint:   Chief Complaint   Patient presents with    Abdomen/Flank Pain   L sided abd pain     Subjective:   Don Cabral is doing better. Feels the pain is minimally better in his abd. No F/C no N/V     Objective:   Objective:    Blood pressure 132/60, pulse 56, temperature 98.2 °F (36.8 °C), temperature source Oral, resp. rate 18, weight 264 lb 6.4 oz (119.9 kg), SpO2 99%.    Physical Exam:    General: No acute distress. Obese   Respiratory: Clear to auscultation bilaterally. No wheezes. No rhonchi.  Cardiovascular: S1, S2. Regular rate and rhythm. No murmurs, rubs or gallops.   Abdomen: Soft, LLQ tender, nondistended.  Positive bowel sounds. No rebound or guarding.  Neurologic: No focal neurological deficits.   Musculoskeletal: Moves all extremities.  Extremities: No edema.      Results:   Results:    Labs:  Recent Labs   Lab 24  1340 24  0723 24  0655 24  0633   WBC 8.9 8.1 7.0 7.9   HGB 12.9* 13.3 13.1 12.8*   MCV 88.0 87.8 88.3 86.8   .0 312.0 285.0 322.0       Recent Labs   Lab 24  1340 24  0723 24  0655 24  0914 24  0633   GLU 97 85 85  --  79   BUN 14 12  --  11 7*   CREATSERUM 0.80 0.85 0.82  --  0.78   CA 9.3 9.1 8.9  --  9.2   ALB 4.1  --   --   --   --     140 136  --  137   K 4.2 3.8  --  3.8 4.0    104 107  --  108   CO2 30.0 28.0 24.0  --  25.0   ALKPHO 62  --   --   --   --    AST 19  --   --   --   --    ALT 24  --   --   --   --    BILT 0.8  --   --   --   --    TP 6.7  --   --   --   --        Estimated Creatinine Clearance: 124.9 mL/min (based on SCr of 0.78 mg/dL).    No results for input(s): \"PTP\", \"INR\" in the last 168 hours.         Culture:  Hospital  Encounter on 04/02/24   1. Blood Culture     Status: None (Preliminary result)    Collection Time: 04/02/24  5:18 PM    Specimen: Blood,peripheral   Result Value Ref Range    Blood Culture Result No Growth 2 Days N/A       Cardiac  No results for input(s): \"TROP\", \"PBNP\" in the last 168 hours.      Imaging: Imaging data reviewed in Epic.  No results found.    Medications:    enoxaparin  40 mg Subcutaneous Daily    piperacillin-tazobactam  4.5 g Intravenous Q8H    OLANZapine  2.5 mg Oral Nightly    verapamil ER  120 mg Oral Nightly    gabapentin  600 mg Oral TID    sertraline  75 mg Oral Daily    lisinopril  20 mg Oral Daily         Assessment and Plan:   Assessment & Plan:      Sigmoid diverticulitis  LLQ abdominal pain   - failed outpt PO abx.   - history of recurrent diverticulitis, for which patient has had an appointment with general surgeon for future sigmoidectomy.  - CT showed acute sigmoid diverticulitis with microperforation, no fluid collection.   - Last colonoscopy 05/2023.   - soft diet.   - Pain control.   - IVF  - cont non operative management.   - IV zosyn day #3  - mid line placed 4/5   - Plans for Invanz x 2 weeks on discharge.   - will eventually benefit from future sigmoidectomy   - Afebrile. Normal WBC   - GI, ID and general surgery on consult.      Hypertension  Major depressive disorder   Intermittent premature ventricular tachycardia   - stable chronic conditions  - continue home meds    Prob home tomorrow     >55min spent, >50% spent counseling and coordinating care in the form of educating pt/family and d/w consultants and staff. Most of the time spent discussing the above plan.        Plan of care discussed with patient or family at bedside.    Tamica Hardy MD  Hospitalist          Supplementary Documentation:     Quality:  DVT Prophylaxis: lovenox   CODE status: Full  Dispo: per clinical course           Estimated date of discharge: TBD  Discharge is dependent on: clinical stability  At  this point Mr. Cabral is expected to be discharge to: home

## 2024-04-05 NOTE — PLAN OF CARE
Patient is A&ox4. RA. Tolerating full liquid diet. IVF infusing. Cpap @ night. Refused Tele, MD aware. Voiding freely. Lovenox for dvt prophylaxis. PRN Norco for pain management.  Zosyn continued. Call light within reach, frequent rounding. Safety measures in place. Plan for home when medically clear.       Problem: Patient Centered Care  Goal: Patient preferences are identified and integrated in the patient's plan of care  Description: Interventions:  - What would you like us to know as we care for you?  - Provide timely, complete, and accurate information to patient/family  - Incorporate patient and family knowledge, values, beliefs, and cultural backgrounds into the planning and delivery of care  - Encourage patient/family to participate in care and decision-making at the level they choose  - Honor patient and family perspectives and choices  Outcome: Progressing     Problem: Patient/Family Goals  Goal: Patient/Family Long Term Goal  Description: Patient's Long Term Goal:     Interventions:  -   - See additional Care Plan goals for specific interventions  Outcome: Progressing  Goal: Patient/Family Short Term Goal  Description: Patient's Short Term Goal:     Interventions:   -  - See additional Care Plan goals for specific interventions  Outcome: Progressing     Problem: PAIN - ADULT  Goal: Verbalizes/displays adequate comfort level or patient's stated pain goal  Description: INTERVENTIONS:  - Encourage pt to monitor pain and request assistance  - Assess pain using appropriate pain scale  - Administer analgesics based on type and severity of pain and evaluate response  - Implement non-pharmacological measures as appropriate and evaluate response  - Consider cultural and social influences on pain and pain management  - Manage/alleviate anxiety  - Utilize distraction and/or relaxation techniques  - Monitor for opioid side effects  - Notify MD/LIP if interventions unsuccessful or patient reports new pain  -  Anticipate increased pain with activity and pre-medicate as appropriate  Outcome: Progressing     Problem: RISK FOR INFECTION - ADULT  Goal: Absence of fever/infection during anticipated neutropenic period  Description: INTERVENTIONS  - Monitor WBC  - Administer growth factors as ordered  - Implement neutropenic guidelines  Outcome: Progressing     Problem: DISCHARGE PLANNING  Goal: Discharge to home or other facility with appropriate resources  Description: INTERVENTIONS:  - Identify barriers to discharge w/pt and caregiver  - Include patient/family/discharge partner in discharge planning  - Arrange for needed discharge resources and transportation as appropriate  - Identify discharge learning needs (meds, wound care, etc)  - Arrange for interpreters to assist at discharge as needed  - Consider post-discharge preferences of patient/family/discharge partner  - Complete POLST form as appropriate  - Assess patient's ability to be responsible for managing their own health  - Refer to Case Management Department for coordinating discharge planning if the patient needs post-hospital services based on physician/LIP order or complex needs related to functional status, cognitive ability or social support system  Outcome: Progressing     Problem: GASTROINTESTINAL - ADULT  Goal: Maintains or returns to baseline bowel function  Description: INTERVENTIONS:  - Assess bowel function  - Maintain adequate hydration with IV or PO as ordered and tolerated  - Evaluate effectiveness of GI medications  - Encourage mobilization and activity  - Obtain nutritional consult as needed  - Establish a toileting routine/schedule  - Consider collaborating with pharmacy to review patient's medication profile  Outcome: Progressing

## 2024-04-05 NOTE — PROGRESS NOTES
[facility]  part of [facility]    Progress Note    Don Cabral Patient Status:  Inpatient    1965 MRN [id number]   Location [facility] 4W/SW/SE Attending Tamica Hardy MD   Hosp Day # 3 PCP Stephen Christianson MD       Subjective:   Pain slowly improving, no fever, chills, nausea, or vomiting.    Objective:   Vital Signs:  Blood pressure 121/68, pulse 70, temperature 98 °F (36.7 °C), temperature source Oral, resp. rate 18, weight 264 lb 6.4 oz (119.9 kg), SpO2 95%.    Physical Exam     General: No acute distress. Alert and oriented x 3.  HEENT: Moist mucous membranes. Anicteric.   Neck: Supple, No JVD.   Respiratory: Nonlabored resp.  Cardiovascular: Regular rate.   Abdomen: Soft, LLQ tenderness, no rebound tnd, no guarding, nondistended.  No masses. Normal bowel sounds.    Neurologic: No focal neurological deficits.  Musculoskeletal: Full range of motion of all extremities. No calf tenderness. No swelling noted.  Integument: No lesions. No erythema.  Psychiatric: Appropriate mood and affect.         Assessment and Plan:     Sigmoid diverticulitis      Intractable abdominal pain    Advance to soft diet, continue ambulating as able. ID consulted, appreciate antibiotic recs. Continue supportive care at this time. Will continue to follow and monitor progress.     Results:     Lab Results   Component Value Date    WBC 7.9 2024    HGB 12.8 (L) 2024    HCT 39.6 2024    .0 2024    CREATSERUM 0.78 2024    BUN 7 (L) 2024     2024    K 4.0 2024     2024    CO2 25.0 2024    GLU 79 2024    CA 9.2 2024    ALB 4.1 2024    ALKPHO 62 2024    BILT 0.8 2024    TP 6.7 2024    AST 19 2024    ALT 24 2024    PTT 24.9 2023    INR 1.01 2023    TSH 1.850 2023    LIP 37 2024    PSA 2.53 2021    DDIMER 2.44 (H) 2023    MG 2.1 10/02/2023     PHOS 3.3 06/25/2023    TROP <0.045 02/05/2020       No results found.                Stephen Nunez PA-C  4/5/2024    Addendum:    Pt seen and examined.  I agree with Stephen Nunez'sISELA note.  Dr. Falcon covering this weekend.    Rick Novoa MD

## 2024-04-05 NOTE — CM/SW NOTE
STANTON received call from Jaimie from [facility].    Per Jaimie this pt will need a few weeks of IV abx (Confirmed by Dr. Capps).  Final orders not confirmed.    Pt appropriate for T&T per Jaimie.    STANTON sent clinicals in [facility] to [facility].    [facility] weekend nurse is [phone]    Pt will need central line prior to DC.    SW to watch for final ID recommendations.    PLAN: Likely DC w/ [facility] in office IV abx    / to remain available for support and/or discharge planning.     Kaylan Jones MSW, LSW [id number]

## 2024-04-05 NOTE — CONSULTS
[facility]  part of [facility]    Report of [facility] ID Consultation    Don Cabral Patient Status:  Inpatient    1965 MRN [id number]   Location [facility] 4W/SW/SE Attending Tamica Hardy MD   Hosp Day # 3 PCP Stephen Christianson MD     Date of Admission:  2024  Date of Consult:  2024  Reason for Consultation:   Recurrent diverticulitis     Antibiotics:  zosyn -    Impression:    # Acute sigmoid diverticulitis with microperforation, no drainable abscess seen on CT  # Abdominal pain  # Recurrent sigmoid diverticulitis    Recommend:    - continue zosyn  - place midline  - likely 10-14 days iv abx, likely invanz 1gm q24  - will need sigmoid resection eventually  - surgery following  - monitor for improvement  - d/w patient  - d/w staff  - will follow    Thank you for allowing me to participate in the care of your patient.    History of Present Illness:   Patient is a 58 year old male with h/o recurrent diverticulitis, cardiac arrhythmia, depression, NELLIE.  Presents with recurrent diverticulitis.  CT showed cute sigmoid diverticulitis.  Trace suspected focus of associated localized pneumoperitoneum/micro perforation.  No evidence of a drainable associated fluid collection.  Progressed inflammatory change adjacent to the hepatic flexure and proximal sigmoid colon.  Interval development of mild circumferential wall thickening splenic flexure and proximal sigmoid colon which may reflect a nonspecific colitis.  No evidence of pneumatosis or associated drainable fluid collection.     Started on zosyn.  Still with abdominal pain but slowly improved.      Past Medical History  Past Medical History:   Diagnosis Date    Allergic rhinitis     Anxiety     Arrhythmia     T1 AV block, LBBB    Back pain     Back problem     Depression     Diverticulitis     ED (erectile dysfunction)     High blood pressure     Pneumonia due to organism     Shortness of breath     Sleep apnea      Unspecified essential hypertension     Visual impairment        Past Surgical History  Past Surgical History:   Procedure Laterality Date    CHOLECYSTECTOMY  3/12/2023    COLONOSCOPY N/A 03/22/2016    Maida.  Hyperplastic polyp only.  Repeat 2026    COLONOSCOPY  05/10/2023    DENTAL SURGERY PROCEDURE      [place] teeth removed    INCISION/DRAIN ABSCESS EXTRA  1996    KNEE ARTHROSCOPY Right     LAPAROSCOPIC CHOLECYSTECTOMY  02/12/2023    MYRINGOPLASTY      OTHER SURGICAL HISTORY      R knee arthroscope    SKIN TISSUE PROCEDURE UNLISTED      TONSILLECTOMY         Family History  Family History   Problem Relation Age of Onset    Heart Disorder Father         aflutter with ablation    Hypertension Father     Cancer Father 60        Prostate    Heart Disorder Mother         murmur no treatment needed    Hypertension Mother     Stroke Maternal Grandmother     Other (stroke) Maternal Grandmother     Dementia Paternal Grandmother     Heart Disorder Paternal Grandfather         two heart attacks and CHF    Diabetes Paternal Grandfather     Other (Diabetes, type 2) Paternal Grandfather        Social History  Pediatric History   Patient Parents    Not on file     Other Topics Concern     Service Not Asked    Blood Transfusions Not Asked    Caffeine Concern Yes     Comment: coffee in AM/ soda     Occupational Exposure Not Asked    Hobby Hazards Not Asked    Sleep Concern Not Asked    Stress Concern Not Asked    Weight Concern Not Asked    Special Diet Not Asked    Back Care Not Asked    Exercise Yes     Comment: X1-2 times per week     Bike Helmet Not Asked    Seat Belt Not Asked    Self-Exams Not Asked   Social History Narrative    Not on file           Current Medications:  Current Facility-Administered Medications   Medication Dose Route Frequency    acetaminophen (Tylenol) tab 650 mg  650 mg Oral Q6H PRN    HYDROcodone-acetaminophen (Norco) 5-325 MG per tab 1 tablet  1 tablet Oral Q6H PRN    sodium chloride 0.9%  infusion   Intravenous Continuous    enoxaparin (Lovenox) 40 MG/0.4ML SUBQ injection 40 mg  40 mg Subcutaneous Daily    acetaminophen (Tylenol Extra Strength) tab 500 mg  500 mg Oral Q4H PRN    ondansetron (Zofran) 4 MG/2ML injection 4 mg  4 mg Intravenous Q6H PRN    prochlorperazine (Compazine) 10 MG/2ML injection 5 mg  5 mg Intravenous Q8H PRN    temazepam (Restoril) cap 15 mg  15 mg Oral Nightly PRN    morphINE PF 2 MG/ML injection 1 mg  1 mg Intravenous Q2H PRN    Or    morphINE PF 2 MG/ML injection 2 mg  2 mg Intravenous Q2H PRN    Or    morphINE PF 4 MG/ML injection 4 mg  4 mg Intravenous Q2H PRN    piperacillin-tazobactam (Zosyn) 4.5 g in dextrose 5% 100 mL IVPB-ADDV  4.5 g Intravenous Q8H    albuterol (Ventolin HFA) 108 (90 Base) MCG/ACT inhaler 2 puff  2 puff Inhalation Q4H PRN    clonazePAM (KlonoPIN) tab 0.5 mg  0.5 mg Oral QID PRN    OLANZapine (ZyPREXA) tab 2.5 mg  2.5 mg Oral Nightly    verapamil ER (Calan-SR) tab 120 mg  120 mg Oral Nightly    gabapentin (Neurontin) tab 600 mg  600 mg Oral TID    sertraline (Zoloft) tab 75 mg  75 mg Oral Daily    lisinopril (Prinivil; Zestril) tab 20 mg  20 mg Oral Daily     Medications Prior to Admission   Medication Sig    sertraline 50 MG Oral Tab TAKE ONE AND ONE-HALF TABLETS BY MOUTH DAILY    clonazePAM (KLONOPIN) 0.5 MG Oral Tab 1/2 tablet 5 times daily (Patient taking differently: Take 1 tablet (0.5 mg total) by mouth 4 (four) times daily as needed for Anxiety. 1/2 tablet 5 times daily)    OLANZapine (ZYPREXA) 2.5 MG Oral Tab Take 1 tablet (2.5 mg total) by mouth nightly.    gabapentin 600 MG Oral Tab Take 1 tablet (600 mg total) by mouth 3 (three) times daily.    Docusate Sodium (DOCULASE OR) Take by mouth.    lisinopril 20 MG Oral Tab Take 1 tablet (20 mg total) by mouth daily.    acidophilus-pectin Oral Cap Take 1 capsule by mouth 3 (three) times a week.    verapamil  MG Oral Tab CR Take 1 tablet (120 mg total) by mouth nightly.    Albuterol Sulfate   (90 Base) MCG/ACT Inhalation Aero Soln Inhale 2 puffs into the lungs every 4 (four) hours as needed for Wheezing or Shortness of Breath.    [] HYDROcodone-acetaminophen 5-325 MG Oral Tab Take 1-2 tablets by mouth every 6 (six) hours as needed for Pain.    Multiple Vitamins-Minerals (MULTI-VITAMIN/MINERALS) Oral Tab Take 1 tablet by mouth daily.    Tadalafil 20 MG Oral Tab Take 1 tablet (20 mg total) by mouth daily as needed for Erectile Dysfunction.       Allergies  Allergies   Allergen Reactions    Seasonal OTHER (SEE COMMENTS)     congestion       Review of Systems:     REVIEW OF SYSTEMS:   As noted in HPI    Physical Exam:   Blood pressure 132/60, pulse 56, temperature 98.2 °F (36.8 °C), temperature source Oral, resp. rate 18, weight 264 lb 6.4 oz (119.9 kg), SpO2 99%.    Awake, alert, NAD  No focal neurologic deficits  HEENT normal   Neck supple   No rash  No labored breathing   Abdomen soft   Extremities examined        Results:     Laboratory Data:  Lab Results   Component Value Date    WBC 7.9 2024    HGB 12.8 (L) 2024    HCT 39.6 2024    .0 2024    CREATSERUM 0.78 2024    BUN 7 (L) 2024     2024    K 4.0 2024     2024    CO2 25.0 2024    GLU 79 2024    CA 9.2 2024    ALB 4.1 2024    ALKPHO 62 2024    TP 6.7 2024    AST 19 2024    ALT 24 2024    PTT 24.9 2023    INR 1.01 2023    PTP 13.3 2023    TSH 1.850 2023    LIP 37 2024    PSA 2.53 2021    DDIMER 2.44 (H) 2023    MG 2.1 10/02/2023    PHOS 3.3 2023    TROP <0.045 2020         Imaging:  Reviewed      Problem List:     Sigmoid diverticulitis        Intractable abdominal pain            Dillon Capps,   2024

## 2024-04-05 NOTE — PROGRESS NOTES
[facility]     Gastroenterology Progress Note    Don Cabral Patient Status:  Inpatient    1965 MRN [id number]   Location [facility] 4W/SW/SE Attending Kunal Peraza MD   Hosp Day # 3 PCP Stephen Christianson MD       Subjective:   Pain continues to improve but slowly. No nausea or emesis. Having few episodes of diarrhea, no blood. No fever or chills. Tried softs earlier today.   Objective:   Blood pressure 132/60, pulse 56, temperature 98.2 °F (36.8 °C), temperature source Oral, resp. rate 18, weight 264 lb 6.4 oz (119.9 kg), SpO2 99%. Body mass index is 33.05 kg/m².    General: awake, alert and oriented, no acute distress  HEENT: moist mucus membranes  PULM: no conversational dyspnea  CARDIOVASCULAR: regular rate and rhythm, the extremities are warm and well perfused  GI: soft, mild llq tenderness, non-distended, + BS, no rebound/guarding   EXTREMITIES: no edema, moving all extremities  SKIN: no visible rash  NEURO: appropriate and interactive    Assessment and Plan:   58M with recurrent sigmoid diverticulitis who presents with LLQ abd pain. Started last month, failed outpatient abx, interval CT in ER here shows acute sigmoid diverticulitis with microperforation, no fluid collection.     Continue supportive measures including IV abx, surgery following, low threshold for interval CT if clinically worsening. Diet advancement as per surgery. Last colonoscopy 2023. If he responds to non-operative measures this episode, advise elective segmental resection as outpatient.     GI will be available as needed, please call with questions.     Shannan Brooks MD  [facility] Gastroenterology      Results:     Lab Results   Component Value Date    WBC 7.9 2024    HGB 12.8 (L) 2024    HCT 39.6 2024    .0 2024    CREATSERUM 0.78 2024    BUN 7 (L) 2024     2024    K 4.0 2024     2024    CO2 25.0 2024    GLU 79  04/05/2024    CA 9.2 04/05/2024    ALB 4.1 04/02/2024    ALKPHO 62 04/02/2024    BILT 0.8 04/02/2024    TP 6.7 04/02/2024    AST 19 04/02/2024    ALT 24 04/02/2024    PTT 24.9 09/28/2023    INR 1.01 09/28/2023    TSH 1.850 09/28/2023    LIP 37 04/02/2024    PSA 2.53 08/02/2021    DDIMER 2.44 (H) 06/23/2023    MG 2.1 10/02/2023    PHOS 3.3 06/25/2023    TROP <0.045 02/05/2020       No results found.

## 2024-04-06 ENCOUNTER — APPOINTMENT (OUTPATIENT)
Dept: CT IMAGING | Facility: HOSPITAL | Age: 59
End: 2024-04-06
Attending: INTERNAL MEDICINE
Payer: COMMERCIAL

## 2024-04-06 LAB
ANION GAP SERPL CALC-SCNC: 7 MMOL/L (ref 0–18)
BUN BLD-MCNC: 8 MG/DL (ref 9–23)
BUN/CREAT SERPL: 9 (ref 10–20)
CALCIUM BLD-MCNC: 9.2 MG/DL (ref 8.7–10.4)
CHLORIDE SERPL-SCNC: 104 MMOL/L (ref 98–112)
CO2 SERPL-SCNC: 29 MMOL/L (ref 21–32)
CREAT BLD-MCNC: 0.89 MG/DL
DEPRECATED RDW RBC AUTO: 40.8 FL (ref 35.1–46.3)
EGFRCR SERPLBLD CKD-EPI 2021: 100 ML/MIN/1.73M2 (ref 60–?)
ERYTHROCYTE [DISTWIDTH] IN BLOOD BY AUTOMATED COUNT: 13 % (ref 11–15)
GLUCOSE BLD-MCNC: 91 MG/DL (ref 70–99)
HCT VFR BLD AUTO: 39.2 %
HGB BLD-MCNC: 13.3 G/DL
MCH RBC QN AUTO: 29.4 PG (ref 26–34)
MCHC RBC AUTO-ENTMCNC: 33.9 G/DL (ref 31–37)
MCV RBC AUTO: 86.5 FL
OSMOLALITY SERPL CALC.SUM OF ELEC: 288 MOSM/KG (ref 275–295)
PLATELET # BLD AUTO: 349 10(3)UL (ref 150–450)
POTASSIUM SERPL-SCNC: 3.7 MMOL/L (ref 3.5–5.1)
RBC # BLD AUTO: 4.53 X10(6)UL
SODIUM SERPL-SCNC: 140 MMOL/L (ref 136–145)
WBC # BLD AUTO: 8.5 X10(3) UL (ref 4–11)

## 2024-04-06 PROCEDURE — 99233 SBSQ HOSP IP/OBS HIGH 50: CPT | Performed by: HOSPITALIST

## 2024-04-06 PROCEDURE — 99233 SBSQ HOSP IP/OBS HIGH 50: CPT | Performed by: SURGERY

## 2024-04-06 PROCEDURE — 74176 CT ABD & PELVIS W/O CONTRAST: CPT | Performed by: INTERNAL MEDICINE

## 2024-04-06 PROCEDURE — 99233 SBSQ HOSP IP/OBS HIGH 50: CPT | Performed by: INTERNAL MEDICINE

## 2024-04-06 RX ORDER — SIMETHICONE 80 MG
80 TABLET,CHEWABLE ORAL 4 TIMES DAILY PRN
Status: DISCONTINUED | OUTPATIENT
Start: 2024-04-06 | End: 2024-04-23

## 2024-04-06 RX ORDER — DOCUSATE SODIUM 100 MG/1
100 CAPSULE, LIQUID FILLED ORAL DAILY
Status: DISCONTINUED | OUTPATIENT
Start: 2024-04-06 | End: 2024-04-09

## 2024-04-06 RX ORDER — HYDROCODONE BITARTRATE AND ACETAMINOPHEN 5; 325 MG/1; MG/1
1 TABLET ORAL EVERY 4 HOURS PRN
Status: DISCONTINUED | OUTPATIENT
Start: 2024-04-06 | End: 2024-04-20

## 2024-04-06 RX ORDER — HYDROCODONE BITARTRATE AND ACETAMINOPHEN 5; 325 MG/1; MG/1
2 TABLET ORAL EVERY 4 HOURS PRN
Status: DISCONTINUED | OUTPATIENT
Start: 2024-04-06 | End: 2024-04-20

## 2024-04-06 NOTE — PLAN OF CARE
Discussed plan of care for day, including all given medications and their indications.  Worsening abdominal pain noted today -- Intermittent oral Tylenol and oral Norco given in alteration.  Increased dose Norco added with increased pain relief.  Repeat CT of abdomen completed -- Worsening diverticulitis noted.  Diet decreased from low fiber/soft to clear liquids.  Diet decreased to clear liquids and IV Zosyn maintained.  Deep breathing exercises and increased ambulation encouraged as tolerated.  Comfort measures encouraged to promote restful environment.     Problem: Patient Centered Care  Goal: Patient preferences are identified and integrated in the patient's plan of care  Description: Interventions:  - What would you like us to know as we care for you? This is my third time having diverticulitis  - Provide timely, complete, and accurate information to patient/family  - Incorporate patient and family knowledge, values, beliefs, and cultural backgrounds into the planning and delivery of care  - Encourage patient/family to participate in care and decision-making at the level they choose  - Honor patient and family perspectives and choices  Outcome: Progressing     Problem: Patient/Family Goals  Goal: Patient/Family Long Term Goal  Description: Patient's Long Term Goal: Discharge home    Interventions:  - Manage abdominal pain  - Tolerate adequate oral intake without complication  - Determine appropriate antibiotic plan  - See additional Care Plan goals for specific interventions  Outcome: Progressing  Goal: Patient/Family Short Term Goal  Description: Patient's Short Term Goal: Manage abdominal pain    Interventions:   - Transition from IV to oral pain medications  - Bowel rest with slow diet tolerance  - Eat small frequent light meals  - See additional Care Plan goals for specific interventions  Outcome: Progressing     Problem: PAIN - ADULT  Goal: Verbalizes/displays adequate comfort level or patient's stated  pain goal  Description: INTERVENTIONS:  - Encourage pt to monitor pain and request assistance  - Assess pain using appropriate pain scale  - Administer analgesics based on type and severity of pain and evaluate response  - Implement non-pharmacological measures as appropriate and evaluate response  - Consider cultural and social influences on pain and pain management  - Manage/alleviate anxiety  - Utilize distraction and/or relaxation techniques  - Monitor for opioid side effects  - Notify MD/LIP if interventions unsuccessful or patient reports new pain  - Anticipate increased pain with activity and pre-medicate as appropriate  Outcome: Progressing     Problem: RISK FOR INFECTION - ADULT  Goal: Absence of fever/infection during anticipated neutropenic period  Description: INTERVENTIONS  - Monitor WBC  - Administer growth factors as ordered  - Implement neutropenic guidelines  Outcome: Progressing     Problem: DISCHARGE PLANNING  Goal: Discharge to home or other facility with appropriate resources  Description: INTERVENTIONS:  - Identify barriers to discharge w/pt and caregiver  - Include patient/family/discharge partner in discharge planning  - Arrange for needed discharge resources and transportation as appropriate  - Identify discharge learning needs (meds, wound care, etc)  - Arrange for interpreters to assist at discharge as needed  - Consider post-discharge preferences of patient/family/discharge partner  - Complete POLST form as appropriate  - Assess patient's ability to be responsible for managing their own health  - Refer to Case Management Department for coordinating discharge planning if the patient needs post-hospital services based on physician/LIP order or complex needs related to functional status, cognitive ability or social support system  Outcome: Progressing     Problem: GASTROINTESTINAL - ADULT  Goal: Maintains or returns to baseline bowel function  Description: INTERVENTIONS:  - Assess bowel  function  - Maintain adequate hydration with IV or PO as ordered and tolerated  - Evaluate effectiveness of GI medications  - Encourage mobilization and activity  - Obtain nutritional consult as needed  - Establish a toileting routine/schedule  - Consider collaborating with pharmacy to review patient's medication profile  Outcome: Progressing  Goal: Minimal or absence of nausea and vomiting  Description: INTERVENTIONS:  - Maintain adequate hydration with IV or PO as ordered and tolerated  - Nasogastric tube to low intermittent suction as ordered  - Evaluate effectiveness of ordered antiemetic medications  - Provide nonpharmacologic comfort measures as appropriate  - Advance diet as tolerated, if ordered  - Obtain nutritional consult as needed  - Evaluate fluid balance  Outcome: Progressing  Goal: Maintains adequate nutritional intake (undernourished)  Description: INTERVENTIONS:  - Monitor percentage of each meal consumed  - Identify factors contributing to decreased intake, treat as appropriate  - Assist with meals as needed  - Monitor I&O, WT and lab values  - Obtain nutritional consult as needed  - Optimize oral hygiene and moisture  - Encourage food from home; allow for food preferences  - Enhance eating environment  Outcome: Progressing     All efforts maintained to promote infection prevention during my assessment and care for this patient.  Stethoscope cleansed and hand hygiene strictly maintained.

## 2024-04-06 NOTE — PROGRESS NOTES
[facility]  part of [facility]    Progress Note    Don Cabral Patient Status:  Inpatient    1965 MRN [id number]   Location [facility] 4W/SW/SE Attending Tamica Hardy MD   Hosp Day # 4 PCP Stephen Christianson MD     Assessment and Plan:     Recurrent sigmoid diverticulitis.  Complains of worsening pain this morning.  Afebrile normal white count.  Etiology unclear.  Will add Gas-X.  -Plan is home IV antibiotics.  Will follow-up with Dr. Novoa for elective resection    Subjective:   Complains of worsening pain today not sure why    Objective:   Patient Vitals for the past 24 hrs:   BP Temp Temp src Pulse Resp SpO2   24 0544 140/71 98.6 °F (37 °C) Oral 70 16 99 %   24 2120 119/64 98.6 °F (37 °C) Oral 73 16 95 %   24 1122 132/60 98.2 °F (36.8 °C) Oral 56 18 99 %       Intake/Output                   24 0700 - 24 0659 24 0700 - 24 0659 24 0700 - 24 0659       Intake    I.V.  800  --  --    Volume (mL) (sodium chloride 0.9% infusion) 800 -- --    IV PIGGYBACK  --  100  --    Volume (mL) (piperacillin-tazobactam (Zosyn) 4.5 g in dextrose 5% 100 mL IVPB-ADDV) -- 100 --    Total Intake 800 100 --       Output    Urine  1  --  --    Urine 1 -- --    Urine Occurrence 1 x 4 x --    Stool  --  --  --    Stool Count Calculated for I/O 2 x 3 x --    Total Output 1 -- --       Net I/O     799 100 --            Exam: Abdomen is slightly distended, generally soft, tender left flank    Results:     Lab Results   Component Value Date    WBC 8.5 2024    HGB 13.3 2024    HCT 39.2 2024    .0 2024    CREATSERUM 0.89 2024    BUN 8 (L) 2024     2024    K 3.7 2024     2024    CO2 29.0 2024    GLU 91 2024    CA 9.2 2024    ALB 4.1 2024    ALKPHO 62 2024    BILT 0.8 2024    TP 6.7 2024    AST 19 2024    ALT 24 2024    PTT 24.9  09/28/2023    INR 1.01 09/28/2023    TSH 1.850 09/28/2023    LIP 37 04/02/2024    PSA 2.53 08/02/2021    DDIMER 2.44 (H) 06/23/2023    MG 2.1 10/02/2023    PHOS 3.3 06/25/2023    TROP <0.045 02/05/2020       No results found.            Vinnie Falcon MD  4/6/2024

## 2024-04-06 NOTE — PROGRESS NOTES
[facility]  part of [facility]    Progress Note    Don Cabral Patient Status:  Inpatient    1965 MRN [id number]   Location [facility] 4W/SW/SE Attending Tamica Hardy MD   Hosp Day # 4 PCP Stephen Christianson MD     Chief Complaint:   Chief Complaint   Patient presents with    Abdomen/Flank Pain   L sided abd pain     Subjective:   Don Cabral had worsening abd pain today in LLQ. No N/V no F/C. Feels 99.1 is higher temp than his normal. Also tells me his WBC has been increasing on his mychart the last 3 days   Objective:   Objective:    Blood pressure 110/46, pulse 74, temperature 99.1 °F (37.3 °C), temperature source Oral, resp. rate 17, weight 264 lb 6.4 oz (119.9 kg), SpO2 96%.    Physical Exam:    General: No acute distress. Obese   Respiratory: Clear to auscultation bilaterally. No wheezes. No rhonchi.  Cardiovascular: S1, S2. Regular rate and rhythm. No murmurs, rubs or gallops.   Abdomen: Soft, LLQ tender, nondistended.  Positive bowel sounds. No rebound or guarding.  Neurologic: No focal neurological deficits.   Musculoskeletal: Moves all extremities.  Extremities: No edema.      Results:   Results:    Labs:  Recent Labs   Lab 24  1340 24  0723 24  0655 24  0633 24  0554   WBC 8.9 8.1 7.0 7.9 8.5   HGB 12.9* 13.3 13.1 12.8* 13.3   MCV 88.0 87.8 88.3 86.8 86.5   .0 312.0 285.0 322.0 349.0       Recent Labs   Lab 24  1340 24  0723 24  0655 24  0914 24  0633 24  0554   GLU 97   < > 85  --  79 91   BUN 14   < >  --  11 7* 8*   CREATSERUM 0.80   < > 0.82  --  0.78 0.89   CA 9.3   < > 8.9  --  9.2 9.2   ALB 4.1  --   --   --   --   --       < > 136  --  137 140   K 4.2   < >  --  3.8 4.0 3.7      < > 107  --  108 104   CO2 30.0   < > 24.0  --  25.0 29.0   ALKPHO 62  --   --   --   --   --    AST 19  --   --   --   --   --    ALT 24  --   --   --   --   --    BILT 0.8  --   --   --    --   --    TP 6.7  --   --   --   --   --     < > = values in this interval not displayed.       Estimated Creatinine Clearance: 109.4 mL/min (based on SCr of 0.89 mg/dL).    No results for input(s): \"PTP\", \"INR\" in the last 168 hours.         Culture:  Hospital Encounter on 04/02/24   1. Blood Culture     Status: None (Preliminary result)    Collection Time: 04/02/24  5:18 PM    Specimen: Blood,peripheral   Result Value Ref Range    Blood Culture Result No Growth 3 Days N/A       Cardiac  No results for input(s): \"TROP\", \"PBNP\" in the last 168 hours.      Imaging: Imaging data reviewed in [facility].  CT ABDOMEN+PELVIS(CPT=74176)    Result Date: 4/6/2024  CONCLUSION:   Acute sigmoid diverticulitis has progressed since 4/2/2024.  Colitis involving the splenic flexure and proximal descending colon has also progressed.  Extensive inflammation within the left upper quadrant with thickening of the left lateral conal fascia has also progressed.  Ill-defined free fluid within the right lateral conal fascia and right lower pelvis has progressed.  No abscess or drainable collection seen at this point.  If not recently performed, following resolution of patient's symptoms a colonoscopy should be performed to exclude an underlying mass.  No pneumoperitoneum.  Multiple other incidental findings as described in the body of the report which are unchanged.     Dictated by (CST): Daniel River MD on 4/06/2024 at 1:13 PM     Finalized by (CST): Daniel River MD on 4/06/2024 at 1:18 PM           Medications:    enoxaparin  40 mg Subcutaneous Daily    piperacillin-tazobactam  4.5 g Intravenous Q8H    OLANZapine  2.5 mg Oral Nightly    verapamil ER  120 mg Oral Nightly    gabapentin  600 mg Oral TID    sertraline  75 mg Oral Daily    lisinopril  20 mg Oral Daily         Assessment and Plan:   Assessment & Plan:      Sigmoid diverticulitis  LLQ abdominal pain   - failed outpt PO abx.   - history of recurrent diverticulitis, for which patient  has had an appointment with general surgeon for future sigmoidectomy.  - CT showed acute sigmoid diverticulitis with microperforation, no fluid collection.   - Last colonoscopy 05/2023.   - diet per surgery   - Pain control.   - cont non operative management.   - IV zosyn day #4  - mid line placed 4/5   - Plans for Invanz x 2 weeks on discharge.   - will eventually benefit from future sigmoidectomy   - Afebrile. Normal WBC which is reassuring  - GI, ID and general surgery on consult.   - rpt CT abd pelvis today as pain has worsened      Hypertension  Major depressive disorder   Intermittent premature ventricular tachycardia   - stable chronic conditions  - continue home meds        >55min spent, >50% spent counseling and coordinating care in the form of educating pt/family and d/w consultants and staff. Most of the time spent discussing the above plan.        Plan of care discussed with patient or family at bedside.    Tamica Hardy MD  Hospitalist          Supplementary Documentation:     Quality:  DVT Prophylaxis: lovenox   CODE status: Full  Dispo: per clinical course           Estimated date of discharge: TBD  Discharge is dependent on: clinical stability  At this point Mr. Cabral is expected to be discharge to: home

## 2024-04-06 NOTE — PROGRESS NOTES
[facility]     Gastroenterology Progress Note    Don Cabral Patient Status:  Inpatient    1965 MRN [id number]   Location [facility] 4W/SW/SE Attending Kunal Peraza MD   Hosp Day # 4 PCP Stephen Christianson MD       Subjective:   Worsening abd pain today, low grade fever. No emesis.   Objective:   Blood pressure 110/46, pulse 74, temperature 99.1 °F (37.3 °C), temperature source Oral, resp. rate 17, weight 264 lb 6.4 oz (119.9 kg), SpO2 96%. Body mass index is 33.05 kg/m².    General: awake, alert and oriented, no acute distress  HEENT: moist mucus membranes  PULM: no conversational dyspnea  CARDIOVASCULAR: regular rate and rhythm, the extremities are warm and well perfused  GI: soft, moderate llq tenderness, non-distended, + BS, no rebound/guarding   EXTREMITIES: no edema, moving all extremities  SKIN: no visible rash  NEURO: appropriate and interactive    Assessment and Plan:   58M with recurrent sigmoid diverticulitis, last colonoscopy 2023, who presents with LLQ abd pain. Started last month, failed outpatient abx, interval CT in ER here shows acute sigmoid diverticulitis with microperforation, no fluid collection.     Now worsening abd pain today prompted interval CT showing progression of inflammatory changes, no abscess or free air.    Downgrade to clears  Continue IV abx  Surgery following during admission should this episode be refractory to non-operative measures  If he responds to non-operative measures this episode, advise elective segmental resection as outpatient.       Shannan Brooks MD  [facility] Gastroenterology      Results:     Lab Results   Component Value Date    WBC 8.5 2024    HGB 13.3 2024    HCT 39.2 2024    .0 2024    CREATSERUM 0.89 2024    BUN 8 (L) 2024     2024    K 3.7 2024     2024    CO2 29.0 2024    GLU 91 2024    CA 9.2 2024    ALB 4.1 2024     ALKPHO 62 04/02/2024    BILT 0.8 04/02/2024    TP 6.7 04/02/2024    AST 19 04/02/2024    ALT 24 04/02/2024    PTT 24.9 09/28/2023    INR 1.01 09/28/2023    TSH 1.850 09/28/2023    LIP 37 04/02/2024    PSA 2.53 08/02/2021    DDIMER 2.44 (H) 06/23/2023    MG 2.1 10/02/2023    PHOS 3.3 06/25/2023    TROP <0.045 02/05/2020       CT ABDOMEN+PELVIS(CPT=74176)    Result Date: 4/6/2024  CONCLUSION:   Acute sigmoid diverticulitis has progressed since 4/2/2024.  Colitis involving the splenic flexure and proximal descending colon has also progressed.  Extensive inflammation within the left upper quadrant with thickening of the left lateral conal fascia has also progressed.  Ill-defined free fluid within the right lateral conal fascia and right lower pelvis has progressed.  No abscess or drainable collection seen at this point.  If not recently performed, following resolution of patient's symptoms a colonoscopy should be performed to exclude an underlying mass.  No pneumoperitoneum.  Multiple other incidental findings as described in the body of the report which are unchanged.     Dictated by (CST): Daniel River MD on 4/06/2024 at 1:13 PM     Finalized by (CST): Daniel River MD on 4/06/2024 at 1:18 PM

## 2024-04-06 NOTE — PLAN OF CARE
Pt is alert and oriented x4. Tolerating low fiber soft diet. PRN [place] for pain management. Right arm precautions in place due to midline. Zosyn for IV abx. Voiding freely, up independently. Call light within reach. Fall precautions maintained.    Problem: Patient Centered Care  Goal: Patient preferences are identified and integrated in the patient's plan of care  Description: Interventions:  - What would you like us to know as we care for you?   - Provide timely, complete, and accurate information to patient/family  - Incorporate patient and family knowledge, values, beliefs, and cultural backgrounds into the planning and delivery of care  - Encourage patient/family to participate in care and decision-making at the level they choose  - Honor patient and family perspectives and choices  Outcome: Progressing     Problem: Patient/Family Goals  Goal: Patient/Family Long Term Goal  Description: Patient's Long Term Goal:     Interventions:  -   - See additional Care Plan goals for specific interventions  Outcome: Progressing  Goal: Patient/Family Short Term Goal  Description: Patient's Short Term Goal:     Interventions:   -   - See additional Care Plan goals for specific interventions  Outcome: Progressing     Problem: PAIN - ADULT  Goal: Verbalizes/displays adequate comfort level or patient's stated pain goal  Description: INTERVENTIONS:  - Encourage pt to monitor pain and request assistance  - Assess pain using appropriate pain scale  - Administer analgesics based on type and severity of pain and evaluate response  - Implement non-pharmacological measures as appropriate and evaluate response  - Consider cultural and social influences on pain and pain management  - Manage/alleviate anxiety  - Utilize distraction and/or relaxation techniques  - Monitor for opioid side effects  - Notify MD/LIP if interventions unsuccessful or patient reports new pain  - Anticipate increased pain with activity and pre-medicate as  appropriate  Outcome: Progressing     Problem: RISK FOR INFECTION - ADULT  Goal: Absence of fever/infection during anticipated neutropenic period  Description: INTERVENTIONS  - Monitor WBC  - Administer growth factors as ordered  - Implement neutropenic guidelines  Outcome: Progressing     Problem: DISCHARGE PLANNING  Goal: Discharge to home or other facility with appropriate resources  Description: INTERVENTIONS:  - Identify barriers to discharge w/pt and caregiver  - Include patient/family/discharge partner in discharge planning  - Arrange for needed discharge resources and transportation as appropriate  - Identify discharge learning needs (meds, wound care, etc)  - Arrange for interpreters to assist at discharge as needed  - Consider post-discharge preferences of patient/family/discharge partner  - Complete POLST form as appropriate  - Assess patient's ability to be responsible for managing their own health  - Refer to Case Management Department for coordinating discharge planning if the patient needs post-hospital services based on physician/LIP order or complex needs related to functional status, cognitive ability or social support system  Outcome: Progressing     Problem: GASTROINTESTINAL - ADULT  Goal: Maintains or returns to baseline bowel function  Description: INTERVENTIONS:  - Assess bowel function  - Maintain adequate hydration with IV or PO as ordered and tolerated  - Evaluate effectiveness of GI medications  - Encourage mobilization and activity  - Obtain nutritional consult as needed  - Establish a toileting routine/schedule  - Consider collaborating with pharmacy to review patient's medication profile  Outcome: Progressing

## 2024-04-06 NOTE — PLAN OF CARE
Tolerating low fiber/soft diet, prn tylenol, norco, klonopin, voiding freely, + BM, up self care, ambulating in hallway, midline placed today for long term abx, plan to  discharge home over weekend.   Patient updated on care plan.    Problem: Patient Centered Care  Goal: Patient preferences are identified and integrated in the patient's plan of care  Description: Interventions:  - What would you like us to know as we care for you? -  - Provide timely, complete, and accurate information to patient/family  - Incorporate patient and family knowledge, values, beliefs, and cultural backgrounds into the planning and delivery of care  - Encourage patient/family to participate in care and decision-making at the level they choose  - Honor patient and family perspectives and choices  Outcome: Progressing     Problem: Patient/Family Goals  Goal: Patient/Family Long Term Goal  Description: Patient's Long Term Goal: -    Interventions:  - -  - See additional Care Plan goals for specific interventions  Outcome: Progressing  Goal: Patient/Family Short Term Goal  Description: Patient's Short Term Goal: -    Interventions:   - -  - See additional Care Plan goals for specific interventions  Outcome: Progressing     Problem: PAIN - ADULT  Goal: Verbalizes/displays adequate comfort level or patient's stated pain goal  Description: INTERVENTIONS:  - Encourage pt to monitor pain and request assistance  - Assess pain using appropriate pain scale  - Administer analgesics based on type and severity of pain and evaluate response  - Implement non-pharmacological measures as appropriate and evaluate response  - Consider cultural and social influences on pain and pain management  - Manage/alleviate anxiety  - Utilize distraction and/or relaxation techniques  - Monitor for opioid side effects  - Notify MD/LIP if interventions unsuccessful or patient reports new pain  - Anticipate increased pain with activity and pre-medicate as  appropriate  Outcome: Progressing     Problem: RISK FOR INFECTION - ADULT  Goal: Absence of fever/infection during anticipated neutropenic period  Description: INTERVENTIONS  - Monitor WBC  - Administer growth factors as ordered  - Implement neutropenic guidelines  Outcome: Progressing     Problem: DISCHARGE PLANNING  Goal: Discharge to home or other facility with appropriate resources  Description: INTERVENTIONS:  - Identify barriers to discharge w/pt and caregiver  - Include patient/family/discharge partner in discharge planning  - Arrange for needed discharge resources and transportation as appropriate  - Identify discharge learning needs (meds, wound care, etc)  - Arrange for interpreters to assist at discharge as needed  - Consider post-discharge preferences of patient/family/discharge partner  - Complete POLST form as appropriate  - Assess patient's ability to be responsible for managing their own health  - Refer to Case Management Department for coordinating discharge planning if the patient needs post-hospital services based on physician/LIP order or complex needs related to functional status, cognitive ability or social support system  Outcome: Progressing     Problem: GASTROINTESTINAL - ADULT  Goal: Maintains or returns to baseline bowel function  Description: INTERVENTIONS:  - Assess bowel function  - Maintain adequate hydration with IV or PO as ordered and tolerated  - Evaluate effectiveness of GI medications  - Encourage mobilization and activity  - Obtain nutritional consult as needed  - Establish a toileting routine/schedule  - Consider collaborating with pharmacy to review patient's medication profile  Outcome: Progressing

## 2024-04-07 LAB
ANION GAP SERPL CALC-SCNC: 5 MMOL/L (ref 0–18)
BUN BLD-MCNC: 10 MG/DL (ref 9–23)
BUN/CREAT SERPL: 11.8 (ref 10–20)
CALCIUM BLD-MCNC: 9.3 MG/DL (ref 8.7–10.4)
CHLORIDE SERPL-SCNC: 103 MMOL/L (ref 98–112)
CO2 SERPL-SCNC: 29 MMOL/L (ref 21–32)
CREAT BLD-MCNC: 0.85 MG/DL
DEPRECATED RDW RBC AUTO: 41.1 FL (ref 35.1–46.3)
EGFRCR SERPLBLD CKD-EPI 2021: 101 ML/MIN/1.73M2 (ref 60–?)
ERYTHROCYTE [DISTWIDTH] IN BLOOD BY AUTOMATED COUNT: 13.2 % (ref 11–15)
GLUCOSE BLD-MCNC: 95 MG/DL (ref 70–99)
HCT VFR BLD AUTO: 39.9 %
HGB BLD-MCNC: 13.6 G/DL
MCH RBC QN AUTO: 29.2 PG (ref 26–34)
MCHC RBC AUTO-ENTMCNC: 34.1 G/DL (ref 31–37)
MCV RBC AUTO: 85.8 FL
OSMOLALITY SERPL CALC.SUM OF ELEC: 283 MOSM/KG (ref 275–295)
PLATELET # BLD AUTO: 370 10(3)UL (ref 150–450)
POTASSIUM SERPL-SCNC: 4 MMOL/L (ref 3.5–5.1)
RBC # BLD AUTO: 4.65 X10(6)UL
SODIUM SERPL-SCNC: 137 MMOL/L (ref 136–145)
WBC # BLD AUTO: 9.3 X10(3) UL (ref 4–11)

## 2024-04-07 PROCEDURE — 99233 SBSQ HOSP IP/OBS HIGH 50: CPT | Performed by: SURGERY

## 2024-04-07 PROCEDURE — 99233 SBSQ HOSP IP/OBS HIGH 50: CPT | Performed by: INTERNAL MEDICINE

## 2024-04-07 PROCEDURE — 99233 SBSQ HOSP IP/OBS HIGH 50: CPT | Performed by: HOSPITALIST

## 2024-04-07 RX ORDER — MAGNESIUM HYDROXIDE/ALUMINUM HYDROXICE/SIMETHICONE 120; 1200; 1200 MG/30ML; MG/30ML; MG/30ML
30 SUSPENSION ORAL 4 TIMES DAILY PRN
Status: DISCONTINUED | OUTPATIENT
Start: 2024-04-07 | End: 2024-04-23

## 2024-04-07 NOTE — PROGRESS NOTES
[facility]  part of [facility]    Progress Note    Don Cabral Patient Status:  Inpatient    1965 MRN [id number]   Location [facility] 4W/SW/SE Attending Tamica Hardy MD   Hosp Day # 5 PCP Stephen Christianson MD     Chief Complaint:   Chief Complaint   Patient presents with    Abdomen/Flank Pain   L sided abd pain     Subjective:   Don Cabral is doing well. Pain is getting better today. Tolerating clears. No N/V.   Objective:   Objective:    Blood pressure 127/51, pulse 64, temperature 98 °F (36.7 °C), resp. rate 17, weight 264 lb 6.4 oz (119.9 kg), SpO2 95%.    Physical Exam:    General: No acute distress. Obese   Respiratory: Clear to auscultation bilaterally. No wheezes. No rhonchi.  Cardiovascular: S1, S2. Regular rate and rhythm. No murmurs, rubs or gallops.   Abdomen: Soft, LLQ tender, nondistended.  Positive bowel sounds. No rebound or guarding.  Neurologic: No focal neurological deficits.   Musculoskeletal: Moves all extremities.  Extremities: No edema.      Results:   Results:    Labs:  Recent Labs   Lab 24  0723 24  0655 24  0633 24  0554 24  0603   WBC 8.1 7.0 7.9 8.5 9.3   HGB 13.3 13.1 12.8* 13.3 13.6   MCV 87.8 88.3 86.8 86.5 85.8   .0 285.0 322.0 349.0 370.0       Recent Labs   Lab 24  1340 24  0723 24  0633 24  0554 24  0603   GLU 97   < > 79 91 95   BUN 14   < > 7* 8* 10   CREATSERUM 0.80   < > 0.78 0.89 0.85   CA 9.3   < > 9.2 9.2 9.3   ALB 4.1  --   --   --   --       < > 137 140 137   K 4.2   < > 4.0 3.7 4.0      < > 108 104 103   CO2 30.0   < > 25.0 29.0 29.0   ALKPHO 62  --   --   --   --    AST 19  --   --   --   --    ALT 24  --   --   --   --    BILT 0.8  --   --   --   --    TP 6.7  --   --   --   --     < > = values in this interval not displayed.       Estimated Creatinine Clearance: 114.6 mL/min (based on SCr of 0.85 mg/dL).    No results for input(s): \"PTP\",  \"INR\" in the last 168 hours.         Culture:  Hospital Encounter on 04/02/24   1. Blood Culture     Status: None (Preliminary result)    Collection Time: 04/02/24  5:18 PM    Specimen: Blood,peripheral   Result Value Ref Range    Blood Culture Result No Growth 4 Days N/A       Cardiac  No results for input(s): \"TROP\", \"PBNP\" in the last 168 hours.      Imaging: Imaging data reviewed in [facility].  CT ABDOMEN+PELVIS(CPT=74176)    Result Date: 4/6/2024  CONCLUSION:   Acute sigmoid diverticulitis has progressed since 4/2/2024.  Colitis involving the splenic flexure and proximal descending colon has also progressed.  Extensive inflammation within the left upper quadrant with thickening of the left lateral conal fascia has also progressed.  Ill-defined free fluid within the right lateral conal fascia and right lower pelvis has progressed.  No abscess or drainable collection seen at this point.  If not recently performed, following resolution of patient's symptoms a colonoscopy should be performed to exclude an underlying mass.  No pneumoperitoneum.  Multiple other incidental findings as described in the body of the report which are unchanged.     Dictated by (CST): Daniel River MD on 4/06/2024 at 1:13 PM     Finalized by (CST): Daniel River MD on 4/06/2024 at 1:18 PM           Medications:    docusate sodium  100 mg Oral Daily    enoxaparin  40 mg Subcutaneous Daily    piperacillin-tazobactam  4.5 g Intravenous Q8H    OLANZapine  2.5 mg Oral Nightly    verapamil ER  120 mg Oral Nightly    gabapentin  600 mg Oral TID    sertraline  75 mg Oral Daily    lisinopril  20 mg Oral Daily         Assessment and Plan:   Assessment & Plan:      Sigmoid diverticulitis  LLQ abdominal pain   - failed outpt PO abx.   - history of recurrent diverticulitis, for which patient has had an appointment with general surgeon for future sigmoidectomy.  - CT showed acute sigmoid diverticulitis with microperforation, no fluid collection.   - Last  colonoscopy 05/2023.   - Pain control.   - cont non operative management.   - IV zosyn day #5  - mid line placed 4/5   - Plans for Invanz x 2 weeks on discharge.   - will eventually benefit from future sigmoidectomy   - Afebrile. Normal WBC which is reassuring  - GI, ID and general surgery on consult.   - rpt CT shows some worsening diet downgraded to clears.      Hypertension  Major depressive disorder   Intermittent premature ventricular tachycardia   - stable chronic conditions  - continue home meds        >55min spent, >50% spent counseling and coordinating care in the form of educating pt/family and d/w consultants and staff. Most of the time spent discussing the above plan.        Plan of care discussed with patient or family at bedside.    Tamica Hardy MD  Hospitalist          Supplementary Documentation:     Quality:  DVT Prophylaxis: lovenox   CODE status: Full  Dispo: per clinical course           Estimated date of discharge: TBD  Discharge is dependent on: clinical stability  At this point Mr. Cabral is expected to be discharge to: home

## 2024-04-07 NOTE — PROGRESS NOTES
[facility]  part of [facility]    Progress Note    Don Cabral Patient Status:  Inpatient    1965 MRN [id number]   Location [facility] 4W/SW/SE Attending Tamica Hardy MD   Hosp Day # 5 PCP Stephen Christianson MD     Assessment and Plan:     Acute diverticulitis.  Pain worse yesterday.  Repeat CT shows progression of his colitis.  WBC count still normal.  -Continue IV antibiotics.  Hopefully will not require acute surgery which will result in colostomy.  -Add Ensure    Subjective:   Less pain today    Objective:   Patient Vitals for the past 24 hrs:   BP Temp Temp src Pulse Resp SpO2   24 0557 127/51 98 °F (36.7 °C) -- 64 17 95 %   24 2100 133/64 98 °F (36.7 °C) Oral 73 18 93 %   24 1146 110/46 99.1 °F (37.3 °C) Oral 74 17 96 %       Intake/Output                   24 0700 - 24 0659 24 0700 - 24 0659 24 0700 - 24 0659       Intake    P.O.  --  200  --    P.O. -- 200 --    I.V.  --  100  --    I.V. -- 100 --    IV PIGGYBACK  100  100  --    Volume (mL) (piperacillin-tazobactam (Zosyn) 4.5 g in dextrose 5% 100 mL IVPB-ADDV) 100 100 --    Total Intake 100 400 --       Output    Urine  --  --  --    Urine Occurrence 4 x -- --    Stool  --  --  --    Stool Count Calculated for I/O 3 x -- --    Total Output -- -- --       Net I/O     100 400 --            Exam: Abdomen is soft, minimally distended tenderness improved    Results:     Lab Results   Component Value Date    WBC 9.3 2024    HGB 13.6 2024    HCT 39.9 2024    .0 2024    CREATSERUM 0.85 2024    BUN 10 2024     2024    K 4.0 2024     2024    CO2 29.0 2024    GLU 95 2024    CA 9.3 2024    ALB 4.1 2024    ALKPHO 62 2024    BILT 0.8 2024    TP 6.7 2024    AST 19 2024    ALT 24 2024    PTT 24.9 2023    INR 1.01 2023    TSH 1.850  09/28/2023    LIP 37 04/02/2024    PSA 2.53 08/02/2021    DDIMER 2.44 (H) 06/23/2023    MG 2.1 10/02/2023    PHOS 3.3 06/25/2023    TROP <0.045 02/05/2020       CT ABDOMEN+PELVIS(CPT=74176)    Result Date: 4/6/2024  CONCLUSION:   Acute sigmoid diverticulitis has progressed since 4/2/2024.  Colitis involving the splenic flexure and proximal descending colon has also progressed.  Extensive inflammation within the left upper quadrant with thickening of the left lateral conal fascia has also progressed.  Ill-defined free fluid within the right lateral conal fascia and right lower pelvis has progressed.  No abscess or drainable collection seen at this point.  If not recently performed, following resolution of patient's symptoms a colonoscopy should be performed to exclude an underlying mass.  No pneumoperitoneum.  Multiple other incidental findings as described in the body of the report which are unchanged.     Dictated by (CST): Daniel River MD on 4/06/2024 at 1:13 PM     Finalized by (CST): Daniel River MD on 4/06/2024 at 1:18 PM                   Vinnie Falcon MD  4/7/2024

## 2024-04-07 NOTE — PROGRESS NOTES
[facility]     Gastroenterology Progress Note    Don Cabral Patient Status:  Inpatient    1965 MRN [id number]   Location [facility] 4W/SW/SE Attending Kunal Peraza MD   Hosp Day # 5 PCP Stephen Christianson MD       Subjective:   Abd pain is stable today, taking 2 Norco's helps. No nausea or emesis. No fever.   Objective:   Blood pressure 133/59, pulse 72, temperature 98.6 °F (37 °C), temperature source Oral, resp. rate 17, weight 264 lb 6.4 oz (119.9 kg), SpO2 95%. Body mass index is 33.05 kg/m².    General: awake, alert and oriented, no acute distress  HEENT: moist mucus membranes  PULM: no conversational dyspnea  CARDIOVASCULAR: regular rate and rhythm, the extremities are warm and well perfused  GI: soft, moderate left sided tenderness, non-distended, + BS, no rebound/guarding   EXTREMITIES: no edema, moving all extremities  SKIN: no visible rash  NEURO: appropriate and interactive    Assessment and Plan:   58M with recurrent sigmoid diverticulitis, last colonoscopy 2023, who presents with LLQ abd pain. Started last month, failed outpatient abx, interval CT in ER here shows acute sigmoid diverticulitis with microperforation, no fluid collection.     Interval CT showing progression of inflammatory changes, no abscess or free air.    Continue clears for now  Continue IV abx  Surgery following during admission should this episode be refractory to non-operative measures  If he responds to non-operative measures this episode, advise elective segmental resection as outpatient.       Shannan Brooks MD  [facility] Gastroenterology      Results:     Lab Results   Component Value Date    WBC 9.3 2024    HGB 13.6 2024    HCT 39.9 2024    .0 2024    CREATSERUM 0.85 2024    BUN 10 2024     2024    K 4.0 2024     2024    CO2 29.0 2024    GLU 95 2024    CA 9.3 2024    ALB 4.1 2024    ALKPHO  62 04/02/2024    BILT 0.8 04/02/2024    TP 6.7 04/02/2024    AST 19 04/02/2024    ALT 24 04/02/2024    PTT 24.9 09/28/2023    INR 1.01 09/28/2023    TSH 1.850 09/28/2023    LIP 37 04/02/2024    PSA 2.53 08/02/2021    DDIMER 2.44 (H) 06/23/2023    MG 2.1 10/02/2023    PHOS 3.3 06/25/2023    TROP <0.045 02/05/2020       CT ABDOMEN+PELVIS(CPT=74176)    Result Date: 4/6/2024  CONCLUSION:   Acute sigmoid diverticulitis has progressed since 4/2/2024.  Colitis involving the splenic flexure and proximal descending colon has also progressed.  Extensive inflammation within the left upper quadrant with thickening of the left lateral conal fascia has also progressed.  Ill-defined free fluid within the right lateral conal fascia and right lower pelvis has progressed.  No abscess or drainable collection seen at this point.  If not recently performed, following resolution of patient's symptoms a colonoscopy should be performed to exclude an underlying mass.  No pneumoperitoneum.  Multiple other incidental findings as described in the body of the report which are unchanged.     Dictated by (CST): Daniel River MD on 4/06/2024 at 1:13 PM     Finalized by (CST): Daniel River MD on 4/06/2024 at 1:18 PM

## 2024-04-07 NOTE — PROGRESS NOTES
INFECTIOUS DISEASE PROGRESS NOTE    Don Cabral Patient Status:  Inpatient    1965 MRN [id number]   Location [facility] 4W/SW/SE Attending Tamica Hardy MD   Hosp Day # 4 PCP Stephen Christianson MD     SUBJECTIVE  ROS done. States he has more pain today. Had repeat CT    ASSESSMENT/PLAN:    Antibiotics:  zosyn /-     Impression:     # Acute sigmoid diverticulitis with microperforation, no drainable abscess seen on CT  - repeat CT stable.   # Abdominal pain  # Recurrent sigmoid diverticulitis     Recommend:     - continue zosyn. Anticipate discharge home on IV invanz x 2 weeks course  - will need sigmoid resection eventually  - surgery following  - monitor for improvement  - d/w patient  - d/w staff  - will follow     Thank you for allowing me to participate in the care of your patient.     History of Present Illness:   Patient is a 58 year old male with h/o recurrent diverticulitis, cardiac arrhythmia, depression, NELLIE.  Presents with recurrent diverticulitis.  CT showed cute sigmoid diverticulitis.  Trace suspected focus of associated localized pneumoperitoneum/micro perforation.  No evidence of a drainable associated fluid collection.  Progressed inflammatory change adjacent to the hepatic flexure and proximal sigmoid colon.  Interval development of mild circumferential wall thickening splenic flexure and proximal sigmoid colon which may reflect a nonspecific colitis.  No evidence of pneumatosis or associated drainable fluid collection.     Started on zosyn.  Still with abdominal pain but slowly improved.      OBJECTIVE  /46 (BP Location: Left arm)   Pulse 74   Temp 99.1 °F (37.3 °C) (Oral)   Resp 17   Wt 264 lb 6.4 oz (119.9 kg)   SpO2 96%   BMI 33.05 kg/m²     General: No acute distress. Alert.  HEENT: EOMI   Abdomen: Soft, nontender, nondistended.   Musculoskeletal: No edema  Integument: No rashes    Labs:     Recent Labs   Lab 24  1340 24  0723 24  0655  04/05/24  0633 04/06/24  0554   WBC 8.9 8.1 7.0 7.9 8.5   HGB 12.9* 13.3 13.1 12.8* 13.3   .0 312.0 285.0 322.0 349.0       Recent Labs   Lab 04/02/24  1340 04/03/24  0723 04/04/24  0655 04/04/24  0914 04/05/24  0633 04/06/24  0554    140 136  --  137 140   K 4.2 3.8  --  3.8 4.0 3.7    104 107  --  108 104   CO2 30.0 28.0 24.0  --  25.0 29.0   BUN 14 12  --  11 7* 8*   CREATSERUM 0.80 0.85 0.82  --  0.78 0.89       Recent Labs   Lab 04/02/24  1340   ALT 24   AST 19       Microbiology: Reviewed    Imaging: Reviewed       MD [facility]  [phone]

## 2024-04-07 NOTE — PLAN OF CARE
Problem: Patient Centered Care  Goal: Patient preferences are identified and integrated in the patient's plan of care  Description: Interventions:  - What would you like us to know as we care for you? This is my third time having diverticulitis  - Provide timely, complete, and accurate information to patient/family  - Incorporate patient and family knowledge, values, beliefs, and cultural backgrounds into the planning and delivery of care  - Encourage patient/family to participate in care and decision-making at the level they choose  - Honor patient and family perspectives and choices  4/7/2024 0707 by Bonnie Freire RN  Outcome: Progressing  4/7/2024 0131 by Bonnie Freire RN  Outcome: Progressing     Problem: Patient/Family Goals  Goal: Patient/Family Long Term Goal  Description: Patient's Long Term Goal: Discharge home    Interventions:  - Manage abdominal pain  - Tolerate adequate oral intake without complication  - Determine appropriate antibiotic plan  - See additional Care Plan goals for specific interventions  4/7/2024 0707 by Bonnie Freire RN  Outcome: Not Progressing  4/7/2024 0131 by Bonnie Freire RN  Outcome: Not Progressing  Goal: Patient/Family Short Term Goal  Description: Patient's Short Term Goal: Manage abdominal pain    Interventions:   - Transition from IV to oral pain medications  - Bowel rest with slow diet tolerance  - Eat small frequent light meals  - See additional Care Plan goals for specific interventions  4/7/2024 0707 by Bonnie Freire RN  Outcome: Progressing  4/7/2024 0131 by Bonnie Freire RN  Outcome: Progressing     Problem: PAIN - ADULT  Goal: Verbalizes/displays adequate comfort level or patient's stated pain goal  Description: INTERVENTIONS:  - Encourage pt to monitor pain and request assistance  - Assess pain using appropriate pain scale  - Administer analgesics based on type and severity of pain and evaluate response  - Implement non-pharmacological measures as appropriate and  evaluate response  - Consider cultural and social influences on pain and pain management  - Manage/alleviate anxiety  - Utilize distraction and/or relaxation techniques  - Monitor for opioid side effects  - Notify MD/LIP if interventions unsuccessful or patient reports new pain  - Anticipate increased pain with activity and pre-medicate as appropriate  4/7/2024 0707 by Bonnie Freire RN  Outcome: Progressing  4/7/2024 0131 by Bonnie Freire RN  Outcome: Progressing     Problem: RISK FOR INFECTION - ADULT  Goal: Absence of fever/infection during anticipated neutropenic period  Description: INTERVENTIONS  - Monitor WBC  - Administer growth factors as ordered  - Implement neutropenic guidelines  4/7/2024 0707 by Bonnie Freire RN  Outcome: Progressing  4/7/2024 0131 by Bonnie Freire RN  Outcome: Progressing     Problem: DISCHARGE PLANNING  Goal: Discharge to home or other facility with appropriate resources  Description: INTERVENTIONS:  - Identify barriers to discharge w/pt and caregiver  - Include patient/family/discharge partner in discharge planning  - Arrange for needed discharge resources and transportation as appropriate  - Identify discharge learning needs (meds, wound care, etc)  - Arrange for interpreters to assist at discharge as needed  - Consider post-discharge preferences of patient/family/discharge partner  - Complete POLST form as appropriate  - Assess patient's ability to be responsible for managing their own health  - Refer to Case Management Department for coordinating discharge planning if the patient needs post-hospital services based on physician/LIP order or complex needs related to functional status, cognitive ability or social support system  4/7/2024 0707 by Bonnie Freire RN  Outcome: Not Progressing  4/7/2024 0131 by Bonnie Freire RN  Outcome: Progressing     Problem: GASTROINTESTINAL - ADULT  Goal: Maintains or returns to baseline bowel function  Description: INTERVENTIONS:  - Assess bowel  function  - Maintain adequate hydration with IV or PO as ordered and tolerated  - Evaluate effectiveness of GI medications  - Encourage mobilization and activity  - Obtain nutritional consult as needed  - Establish a toileting routine/schedule  - Consider collaborating with pharmacy to review patient's medication profile  4/7/2024 0707 by Bonnie Freire RN  Outcome: Progressing  4/7/2024 0131 by Bonnie Freire RN  Outcome: Progressing  Goal: Minimal or absence of nausea and vomiting  Description: INTERVENTIONS:  - Maintain adequate hydration with IV or PO as ordered and tolerated  - Nasogastric tube to low intermittent suction as ordered  - Evaluate effectiveness of ordered antiemetic medications  - Provide nonpharmacologic comfort measures as appropriate  - Advance diet as tolerated, if ordered  - Obtain nutritional consult as needed  - Evaluate fluid balance  4/7/2024 0707 by Bonnie Freire RN  Outcome: Progressing  4/7/2024 0131 by Bonnie Freire RN  Outcome: Progressing  Goal: Maintains adequate nutritional intake (undernourished)  Description: INTERVENTIONS:  - Monitor percentage of each meal consumed  - Identify factors contributing to decreased intake, treat as appropriate  - Assist with meals as needed  - Monitor I&O, WT and lab values  - Obtain nutritional consult as needed  - Optimize oral hygiene and moisture  - Encourage food from home; allow for food preferences  - Enhance eating environment  4/7/2024 0707 by Bonnie Freire RN  Outcome: Progressing  4/7/2024 0131 by Bonnie Freire RN  Outcome: Progressing

## 2024-04-07 NOTE — PLAN OF CARE
Discussed plan of care for day, including all given medications and their indications.  Persistent abdominal pain noted -- very mild improvement from 4/6.  Pain managed with oral Norco and intermittent oral Tylenol in between.  Deep breathing exercises and increased mobility encouraged as tolerated -- Ambulating intermittently in hallway.  Tolerating clear liquids.  Comfort measures encouraged to promote restful environment.     Problem: Patient Centered Care  Goal: Patient preferences are identified and integrated in the patient's plan of care  Description: Interventions:  - What would you like us to know as we care for you? This is my third time having diverticulitis  - Provide timely, complete, and accurate information to patient/family  - Incorporate patient and family knowledge, values, beliefs, and cultural backgrounds into the planning and delivery of care  - Encourage patient/family to participate in care and decision-making at the level they choose  - Honor patient and family perspectives and choices  Outcome: Progressing     Problem: Patient/Family Goals  Goal: Patient/Family Long Term Goal  Description: Patient's Long Term Goal: Discharge home    Interventions:  - Manage abdominal pain  - Tolerate adequate oral intake without complication  - Determine appropriate antibiotic plan  - See additional Care Plan goals for specific interventions  Outcome: Progressing  Goal: Patient/Family Short Term Goal  Description: Patient's Short Term Goal: Manage abdominal pain    Interventions:   - Transition from IV to oral pain medications  - Bowel rest with slow diet tolerance  - Eat small frequent light meals  - See additional Care Plan goals for specific interventions  Outcome: Progressing     Problem: PAIN - ADULT  Goal: Verbalizes/displays adequate comfort level or patient's stated pain goal  Description: INTERVENTIONS:  - Encourage pt to monitor pain and request assistance  - Assess pain using appropriate pain  scale  - Administer analgesics based on type and severity of pain and evaluate response  - Implement non-pharmacological measures as appropriate and evaluate response  - Consider cultural and social influences on pain and pain management  - Manage/alleviate anxiety  - Utilize distraction and/or relaxation techniques  - Monitor for opioid side effects  - Notify MD/LIP if interventions unsuccessful or patient reports new pain  - Anticipate increased pain with activity and pre-medicate as appropriate  Outcome: Progressing     Problem: RISK FOR INFECTION - ADULT  Goal: Absence of fever/infection during anticipated neutropenic period  Description: INTERVENTIONS  - Monitor WBC  - Administer growth factors as ordered  - Implement neutropenic guidelines  Outcome: Progressing     Problem: DISCHARGE PLANNING  Goal: Discharge to home or other facility with appropriate resources  Description: INTERVENTIONS:  - Identify barriers to discharge w/pt and caregiver  - Include patient/family/discharge partner in discharge planning  - Arrange for needed discharge resources and transportation as appropriate  - Identify discharge learning needs (meds, wound care, etc)  - Arrange for interpreters to assist at discharge as needed  - Consider post-discharge preferences of patient/family/discharge partner  - Complete POLST form as appropriate  - Assess patient's ability to be responsible for managing their own health  - Refer to Case Management Department for coordinating discharge planning if the patient needs post-hospital services based on physician/LIP order or complex needs related to functional status, cognitive ability or social support system  Outcome: Progressing     Problem: GASTROINTESTINAL - ADULT  Goal: Maintains or returns to baseline bowel function  Description: INTERVENTIONS:  - Assess bowel function  - Maintain adequate hydration with IV or PO as ordered and tolerated  - Evaluate effectiveness of GI medications  - Encourage  mobilization and activity  - Obtain nutritional consult as needed  - Establish a toileting routine/schedule  - Consider collaborating with pharmacy to review patient's medication profile  Outcome: Progressing  Goal: Minimal or absence of nausea and vomiting  Description: INTERVENTIONS:  - Maintain adequate hydration with IV or PO as ordered and tolerated  - Nasogastric tube to low intermittent suction as ordered  - Evaluate effectiveness of ordered antiemetic medications  - Provide nonpharmacologic comfort measures as appropriate  - Advance diet as tolerated, if ordered  - Obtain nutritional consult as needed  - Evaluate fluid balance  Outcome: Progressing  Goal: Maintains adequate nutritional intake (undernourished)  Description: INTERVENTIONS:  - Monitor percentage of each meal consumed  - Identify factors contributing to decreased intake, treat as appropriate  - Assist with meals as needed  - Monitor I&O, WT and lab values  - Obtain nutritional consult as needed  - Optimize oral hygiene and moisture  - Encourage food from home; allow for food preferences  - Enhance eating environment  Outcome: Progressing     All efforts maintained to promote infection prevention during my assessment and care for this patient.  Stethoscope cleansed and hand hygiene strictly maintained.

## 2024-04-07 NOTE — DIETARY NOTE
ADULT NUTRITION INITIAL ASSESSMENT      RECOMMENDATIONS TO MD: Advance diet as medically safe    Pt is at moderate nutrition risk.  Pt does not meet malnutrition criteria.       ADMITTING DIAGNOSIS:  Sigmoid diverticulitis [K57.32]     PERTINENT PAST MEDICAL HISTORY:  has a past medical history of Allergic rhinitis, Anxiety, Arrhythmia, Back pain, Back problem, Depression, Diverticulitis, ED (erectile dysfunction), High blood pressure, Pneumonia due to organism, Shortness of breath, Sleep apnea (2012), Unspecified essential hypertension, and Visual impairment.    has a past surgical history that includes myringoplasty; knee arthroscopy (Right); skin tissue procedure unlisted; incision/drain abscess extra (1996); dental surgery procedure; colonoscopy (N/A, 03/22/2016); other surgical history; tonsillectomy; laparoscopic cholecystectomy (02/12/2023); cholecystectomy (3/12/2023); and colonoscopy (05/10/2023).     PATIENT STATUS: Initial 04/07/24: Patient (pt) identified at Nutrition risk due to NPO/cl liq x 4-5 days after the screening process.  Diagnosis & PMH above.  Medical findings:  Sigmoid diverticulitis with microperforation. Interval CT showing progress of inflammatory changes--progressive colitis. Non-operative management. May eventually benefit from Sigmoidectomy per MD.   Upon visit, pt laying in bed, not in distress, abd pain is better today. Reports seeing RD at [facility]. Taking % cl liq, lauerl well. Had one day of Soft/low fiber diet on 4/5 but retracted back to cl liq. Diet hx/eval:    recent decreased po d/t the onset of acute GI illness. Otherwise, Adequate nutrition intake based on reported 2 light meals at bfast and dinner and main or full meal at lunch. Pt reports utilizing light dinner d/t sleep apnea. Wt eval:    no recent significant wt loss per pt, but did states recent wt gain from 250# to current wt of 264#. Nutrition findings:    Pt is well nourished. MD wanted Ensure Oral  Nutrition Supplement ( ONS ) and ordered Ensure Clear QID. Pt prefers them in apple flavor. Agree with current diet and ONS. Anticipate diet advancement, however, if unable to advance diet beyond cl liq in the next 5 days, consider Nutrition support.     FOOD/NUTRITION INTAKE ANALYSIS:    Current Appetite:  good appetite PTA  Current Intake:  % of cl liq.   Current Intake Meeting Needs: No, but oral nutrition supplements (ONS) to maximize  Percent Meals Eaten (last 6 days)       Date/Time Percent Meals Eaten (%)    04/03/24 0900 100 %    04/03/24 1500 100 %    04/05/24 1043 100 %    04/05/24 1903 100 %    04/06/24 1027 90 %           Food Allergies: No Known Food Allergies (NKFA)  Cultural/Ethnic/Yarsani Preferences: Not Obtained    GASTROINTESTINAL: +BM loose Brown medium stool x 1 on 4/5  Denies N/V post prandial of  cl liq.     MEDICATIONS: reviewed colace, antibiotics , others noted.     LABS: reviewed   Last A1c value was 5.5% done 8/11/2020.    Recent Labs     04/05/24  0633 04/06/24  0554 04/07/24  0603   GLU 79 91 95   BUN 7* 8* 10   CREATSERUM 0.78 0.89 0.85   CA 9.2 9.2 9.3    140 137   K 4.0 3.7 4.0    104 103   CO2 25.0 29.0 29.0   OSMOCALC 281 288 283       NUTRITION RELATED PHYSICAL FINDINGS:  - Nutrition Focused Physical Exam (NFPE): well nourished   - Fluid Accumulation: none  see RN documentation for details  - Skin Integrity: intact see RN documentation for details    ANTHROPOMETRICS:  HT:  6'3\"  WT: 119.9 kg (264 lb 6.4 oz)   BMI: Body mass index is 33.05 kg/m².  BMI CLASSIFICATION: 30-34.9 kg/m2 - obesity class I  IBW: 196 lbs        135% IBW  Usual Body Wt: 250 lbs per pt      106% UBW  WEIGHT HISTORY:    Patient Weight(s) for the past 336 hrs:   Weight   04/02/24 1758 119.9 kg (264 lb 6.4 oz)     Wt Readings from Last 10 Encounters:   04/02/24 119.9 kg (264 lb 6.4 oz)   03/27/24 115.7 kg (255 lb)   02/28/24 116.1 kg (255 lb 14.4 oz)   01/29/24 115.7 kg (255 lb)   01/27/24  113.4 kg (250 lb)   01/15/24 115.2 kg (254 lb)   12/21/23 113.4 kg (250 lb)   12/18/23 113.4 kg (250 lb)   11/29/23 108.9 kg (240 lb)   11/17/23 108.9 kg (240 lb)       NUTRITION DIAGNOSIS/PROBLEM:    Inadequate oral intake related to Decreased ability to consume sufficient energy d/t sigmoid diverticulitis with microperforation as evidenced by npo/cl liq x 4-5 days.     NUTRITION INTERVENTION:     NUTRITION PRESCRIPTION:   Estimated Nutrition needs: Dosing wt of 120 kg --wt taken on 4/2 .  Calories: 8383-8044 calories/day (MSJ 2114 kcal x 1.2 AF or 19-21 calories per kg Dosing wt)  Protein: 116-134 g protein/day (1.3-1.5 g protein/kg  Ideal body wt (IBW))    - Diet:       Procedures    Clear liquid diet Is Patient on Accuchecks? No; Misc Restriction: Low Fiber/Soft   - Meals and snacks: Encouraged small frequent meals and Encouraged increased PO intake  - Medical Food Supplements- Ensure Clear (240 calories/ 8 g protein each) QID Apple--added by RD,  Rational/Benefits discussed.  - Vitamin and mineral supplements: none  - Feeding assistance: meal set up  - Nutrition education: diet education before discharge and assess education needs   - Coordination of nutrition care: collaboration with other providers  - Discharge and transfer of nutrition care to new setting or provider: monitor plans      MONITOR AND EVALUATE/NUTRITION GOALS:  - Food and Nutrient Intake:    Monitor: adequacy of PO intake and adequacy of supplement intake  - Food and Nutrient Administration:    Monitor: need for temporary nutrition support if unable to advance diet beyond cl liq   - Anthropometric Measurement:    Monitor weight  - Nutrition Goals:    long term gradual wt loss, PO and supplement greater than 75% of needs, diet beyond clears in 48hrs, promote healing, and improved GI status      RD will follow up.    Rebekah Juarez, RD, LDN, [facility]  Clinical Dietitian  [phone]

## 2024-04-07 NOTE — PLAN OF CARE
Problem: Patient Centered Care  Goal: Patient preferences are identified and integrated in the patient's plan of care  Description: Interventions:  - What would you like us to know as we care for you? This is my third time having diverticulitis  - Provide timely, complete, and accurate information to patient/family  - Incorporate patient and family knowledge, values, beliefs, and cultural backgrounds into the planning and delivery of care  - Encourage patient/family to participate in care and decision-making at the level they choose  - Honor patient and family perspectives and choices  Outcome: Progressing     Problem: Patient/Family Goals  Goal: Patient/Family Long Term Goal  Description: Patient's Long Term Goal: Discharge home    Interventions:  - Manage abdominal pain  - Tolerate adequate oral intake without complication  - Determine appropriate antibiotic plan  - See additional Care Plan goals for specific interventions  Outcome: Not Progressing  Goal: Patient/Family Short Term Goal  Description: Patient's Short Term Goal: Manage abdominal pain    Interventions:   - Transition from IV to oral pain medications  - Bowel rest with slow diet tolerance  - Eat small frequent light meals  - See additional Care Plan goals for specific interventions  Outcome: Progressing     Problem: PAIN - ADULT  Goal: Verbalizes/displays adequate comfort level or patient's stated pain goal  Description: INTERVENTIONS:  - Encourage pt to monitor pain and request assistance  - Assess pain using appropriate pain scale  - Administer analgesics based on type and severity of pain and evaluate response  - Implement non-pharmacological measures as appropriate and evaluate response  - Consider cultural and social influences on pain and pain management  - Manage/alleviate anxiety  - Utilize distraction and/or relaxation techniques  - Monitor for opioid side effects  - Notify MD/LIP if interventions unsuccessful or patient reports new pain  -  Anticipate increased pain with activity and pre-medicate as appropriate  Outcome: Progressing     Problem: RISK FOR INFECTION - ADULT  Goal: Absence of fever/infection during anticipated neutropenic period  Description: INTERVENTIONS  - Monitor WBC  - Administer growth factors as ordered  - Implement neutropenic guidelines  Outcome: Progressing     Problem: DISCHARGE PLANNING  Goal: Discharge to home or other facility with appropriate resources  Description: INTERVENTIONS:  - Identify barriers to discharge w/pt and caregiver  - Include patient/family/discharge partner in discharge planning  - Arrange for needed discharge resources and transportation as appropriate  - Identify discharge learning needs (meds, wound care, etc)  - Arrange for interpreters to assist at discharge as needed  - Consider post-discharge preferences of patient/family/discharge partner  - Complete POLST form as appropriate  - Assess patient's ability to be responsible for managing their own health  - Refer to Case Management Department for coordinating discharge planning if the patient needs post-hospital services based on physician/LIP order or complex needs related to functional status, cognitive ability or social support system  Outcome: Progressing     Problem: GASTROINTESTINAL - ADULT  Goal: Maintains or returns to baseline bowel function  Description: INTERVENTIONS:  - Assess bowel function  - Maintain adequate hydration with IV or PO as ordered and tolerated  - Evaluate effectiveness of GI medications  - Encourage mobilization and activity  - Obtain nutritional consult as needed  - Establish a toileting routine/schedule  - Consider collaborating with pharmacy to review patient's medication profile  Outcome: Progressing  Goal: Minimal or absence of nausea and vomiting  Description: INTERVENTIONS:  - Maintain adequate hydration with IV or PO as ordered and tolerated  - Nasogastric tube to low intermittent suction as ordered  - Evaluate  effectiveness of ordered antiemetic medications  - Provide nonpharmacologic comfort measures as appropriate  - Advance diet as tolerated, if ordered  - Obtain nutritional consult as needed  - Evaluate fluid balance  Outcome: Progressing  Goal: Maintains adequate nutritional intake (undernourished)  Description: INTERVENTIONS:  - Monitor percentage of each meal consumed  - Identify factors contributing to decreased intake, treat as appropriate  - Assist with meals as needed  - Monitor I&O, WT and lab values  - Obtain nutritional consult as needed  - Optimize oral hygiene and moisture  - Encourage food from home; allow for food preferences  - Enhance eating environment  Outcome: Progressing

## 2024-04-08 ENCOUNTER — APPOINTMENT (OUTPATIENT)
Dept: GENERAL RADIOLOGY | Facility: HOSPITAL | Age: 59
End: 2024-04-08
Attending: STUDENT IN AN ORGANIZED HEALTH CARE EDUCATION/TRAINING PROGRAM
Payer: COMMERCIAL

## 2024-04-08 ENCOUNTER — APPOINTMENT (OUTPATIENT)
Dept: PICC SERVICES | Facility: HOSPITAL | Age: 59
End: 2024-04-08
Attending: HOSPITALIST
Payer: COMMERCIAL

## 2024-04-08 LAB
ANION GAP SERPL CALC-SCNC: 5 MMOL/L (ref 0–18)
BUN BLD-MCNC: 11 MG/DL (ref 9–23)
BUN/CREAT SERPL: 13.8 (ref 10–20)
CALCIUM BLD-MCNC: 9 MG/DL (ref 8.7–10.4)
CHLORIDE SERPL-SCNC: 100 MMOL/L (ref 98–112)
CO2 SERPL-SCNC: 31 MMOL/L (ref 21–32)
CREAT BLD-MCNC: 0.8 MG/DL
DEPRECATED RDW RBC AUTO: 40.7 FL (ref 35.1–46.3)
EGFRCR SERPLBLD CKD-EPI 2021: 103 ML/MIN/1.73M2 (ref 60–?)
ERYTHROCYTE [DISTWIDTH] IN BLOOD BY AUTOMATED COUNT: 13.1 % (ref 11–15)
GLUCOSE BLD-MCNC: 106 MG/DL (ref 70–99)
HCT VFR BLD AUTO: 41 %
HGB BLD-MCNC: 13.7 G/DL
MCH RBC QN AUTO: 28.5 PG (ref 26–34)
MCHC RBC AUTO-ENTMCNC: 33.4 G/DL (ref 31–37)
MCV RBC AUTO: 85.2 FL
OSMOLALITY SERPL CALC.SUM OF ELEC: 282 MOSM/KG (ref 275–295)
PLATELET # BLD AUTO: 415 10(3)UL (ref 150–450)
POTASSIUM SERPL-SCNC: 3.8 MMOL/L (ref 3.5–5.1)
RBC # BLD AUTO: 4.81 X10(6)UL
SODIUM SERPL-SCNC: 136 MMOL/L (ref 136–145)
WBC # BLD AUTO: 11.1 X10(3) UL (ref 4–11)

## 2024-04-08 PROCEDURE — 99232 SBSQ HOSP IP/OBS MODERATE 35: CPT | Performed by: SURGERY

## 2024-04-08 PROCEDURE — 99233 SBSQ HOSP IP/OBS HIGH 50: CPT | Performed by: INTERNAL MEDICINE

## 2024-04-08 PROCEDURE — 02HV33Z INSERTION OF INFUSION DEVICE INTO SUPERIOR VENA CAVA, PERCUTANEOUS APPROACH: ICD-10-PCS | Performed by: HOSPITALIST

## 2024-04-08 PROCEDURE — 99233 SBSQ HOSP IP/OBS HIGH 50: CPT | Performed by: HOSPITALIST

## 2024-04-08 PROCEDURE — 74018 RADEX ABDOMEN 1 VIEW: CPT | Performed by: STUDENT IN AN ORGANIZED HEALTH CARE EDUCATION/TRAINING PROGRAM

## 2024-04-08 RX ORDER — ERTAPENEM 1 G/1
1 INJECTION, POWDER, LYOPHILIZED, FOR SOLUTION INTRAMUSCULAR; INTRAVENOUS DAILY
Qty: 14 EACH | Refills: 0 | Status: SHIPPED | OUTPATIENT
Start: 2024-04-08 | End: 2024-04-22

## 2024-04-08 RX ORDER — CLONAZEPAM 0.5 MG/1
0.5 TABLET ORAL 3 TIMES DAILY
Status: DISCONTINUED | OUTPATIENT
Start: 2024-04-09 | End: 2024-04-23

## 2024-04-08 RX ORDER — MORPHINE SULFATE 4 MG/ML
4 INJECTION, SOLUTION INTRAMUSCULAR; INTRAVENOUS EVERY 2 HOUR PRN
Status: DISCONTINUED | OUTPATIENT
Start: 2024-04-08 | End: 2024-04-16

## 2024-04-08 RX ORDER — HYDROMORPHONE HYDROCHLORIDE 1 MG/ML
0.2 INJECTION, SOLUTION INTRAMUSCULAR; INTRAVENOUS; SUBCUTANEOUS EVERY 2 HOUR PRN
Status: DISCONTINUED | OUTPATIENT
Start: 2024-04-08 | End: 2024-04-08

## 2024-04-08 RX ORDER — LIDOCAINE HYDROCHLORIDE 10 MG/ML
5 INJECTION, SOLUTION EPIDURAL; INFILTRATION; INTRACAUDAL; PERINEURAL
Status: ACTIVE | OUTPATIENT
Start: 2024-04-08 | End: 2024-04-09

## 2024-04-08 RX ORDER — HYDROMORPHONE HYDROCHLORIDE 1 MG/ML
0.4 INJECTION, SOLUTION INTRAMUSCULAR; INTRAVENOUS; SUBCUTANEOUS EVERY 2 HOUR PRN
Status: DISCONTINUED | OUTPATIENT
Start: 2024-04-08 | End: 2024-04-08

## 2024-04-08 RX ORDER — MORPHINE SULFATE 2 MG/ML
2 INJECTION, SOLUTION INTRAMUSCULAR; INTRAVENOUS EVERY 2 HOUR PRN
Status: DISCONTINUED | OUTPATIENT
Start: 2024-04-08 | End: 2024-04-16

## 2024-04-08 RX ORDER — MORPHINE SULFATE 2 MG/ML
1 INJECTION, SOLUTION INTRAMUSCULAR; INTRAVENOUS EVERY 2 HOUR PRN
Status: DISCONTINUED | OUTPATIENT
Start: 2024-04-08 | End: 2024-04-16

## 2024-04-08 RX ORDER — CLONAZEPAM 0.5 MG/1
0.5 TABLET ORAL AS NEEDED
Status: DISCONTINUED | OUTPATIENT
Start: 2024-04-08 | End: 2024-04-21

## 2024-04-08 RX ORDER — HYDROMORPHONE HYDROCHLORIDE 1 MG/ML
0.1 INJECTION, SOLUTION INTRAMUSCULAR; INTRAVENOUS; SUBCUTANEOUS EVERY 2 HOUR PRN
Status: DISCONTINUED | OUTPATIENT
Start: 2024-04-08 | End: 2024-04-08

## 2024-04-08 NOTE — PROGRESS NOTES
INFECTIOUS DISEASE PROGRESS NOTE    Don Cabral Patient Status:  Inpatient    1965 MRN [id number]   Location [facility] 4W/SW/SE Attending Tamica Hardy MD   Hosp Day # 6 PCP Stephen Christianson MD     SUBJECTIVE  ROS done. States he had significant pain overnight. On clear liquids per surgery.     ASSESSMENT/PLAN:    Antibiotics:  zosyn /-, meropenem       Impression:     # Acute sigmoid diverticulitis with microperforation, no drainable abscess seen on CT  - repeat CT stable.   - continues to have pain  # leukocytosis  # Abdominal pain, ?ileus  # Recurrent sigmoid diverticulitis     Recommend:     - given worsening leukocytosis and persistent pain, dc zosyn and start meropenem.   - diet per surgery  - monitor for improvement  -  follow fever curve, wbc. Repeat CBC in AM  - d/w patient  - d/w staff  - will follow     Thank you for allowing me to participate in the care of your patient.     History of Present Illness:   Patient is a 58 year old male with h/o recurrent diverticulitis, cardiac arrhythmia, depression, NELLIE.  Presents with recurrent diverticulitis.  CT showed cute sigmoid diverticulitis.  Trace suspected focus of associated localized pneumoperitoneum/micro perforation.  No evidence of a drainable associated fluid collection.  Progressed inflammatory change adjacent to the hepatic flexure and proximal sigmoid colon.  Interval development of mild circumferential wall thickening splenic flexure and proximal sigmoid colon which may reflect a nonspecific colitis.  No evidence of pneumatosis or associated drainable fluid collection.     Started on zosyn.  Still with abdominal pain but slowly improved.      OBJECTIVE  /50 (BP Location: Left arm)   Pulse 71   Temp 98.3 °F (36.8 °C) (Oral)   Resp 16   Wt 264 lb 6.4 oz (119.9 kg)   SpO2 92%   BMI 33.05 kg/m²     General: No acute distress. Alert.  HEENT: EOMI   Abdomen: Soft, TTP, distended.   Musculoskeletal: No  edema  Integument: No rashes    Labs:     Recent Labs   Lab 04/04/24  0655 04/05/24  0633 04/06/24  0554 04/07/24  0603 04/08/24  0452   WBC 7.0 7.9 8.5 9.3 11.1*   HGB 13.1 12.8* 13.3 13.6 13.7   .0 322.0 349.0 370.0 415.0       Recent Labs   Lab 04/04/24  0655 04/04/24  0914 04/05/24  0633 04/06/24  0554 04/07/24  0603 04/08/24  0452     --  137 140 137 136   K  --  3.8 4.0 3.7 4.0 3.8     --  108 104 103 100   CO2 24.0  --  25.0 29.0 29.0 31.0   BUN  --  11 7* 8* 10 11   CREATSERUM 0.82  --  0.78 0.89 0.85 0.80       Recent Labs   Lab 04/02/24  1340   ALT 24   AST 19       Microbiology: Reviewed    Imaging: Reviewed       Ronna Menendez MD  [facility]  [phone]

## 2024-04-08 NOTE — PROGRESS NOTES
[facility]  part of [facility]    Progress Note    Don Cabral Patient Status:  Inpatient    1965 MRN [id number]   Location [facility] 4W/SW/SE Attending Tamica Hardy MD   Hosp Day # 6 PCP Stephen Christianson MD     Assessment and Plan:       Sigmoid diverticulitis    Intractable abdominal pain  -recurrent pain episode.  Repeat ct showed worsening inflammation, small amount of free fluid in the right lower quadrant but no organized abscess or large amount of free air.      Recommend:  Clears only as tolerated.  Will need TPN.  Abx choice per ID.  Continue non operative management for now.  OR for Zaid's if clinically worse.    Subjective:   Abdominal pain.    Objective:   Patient Vitals for the past 24 hrs:   BP Temp Temp src Pulse Resp SpO2   24 1219 -- 98.3 °F (36.8 °C) Oral -- -- --   24 0829 118/50 98.6 °F (37 °C) Oral 71 16 92 %   24 0700 -- -- -- -- -- 92 %   24 0314 (!) 150/138 98.4 °F (36.9 °C) Oral 83 16 99 %   24 139/58 99.1 °F (37.3 °C) Oral 79 16 95 %       Intake/Output                   24 07 - 24 0659 24 0700 - 24 0659 24 07 - 24 0659       Intake    P.O.  200  --  --    P.O. 200 -- --    I.V.  100  40  --    I.V. 100 40 --    IV PIGGYBACK  100  200  --    Volume (mL) (piperacillin-tazobactam (Zosyn) 4.5 g in dextrose 5% 100 mL IVPB-ADDV) 100 200 --    Total Intake 400 240 --       Output    Total Output -- -- --       Net I/O     400 240 --            Exam:   /50 (BP Location: Left arm)   Pulse 71   Temp 98.3 °F (36.8 °C) (Oral)   Resp 16   Wt 264 lb 6.4 oz (119.9 kg)   SpO2 92%   BMI 33.05 kg/m²   Gen:  NAD  Abd:  distension unchanged, L abdominal discomfort to palpation, voluntary guarding.    Results:     Lab Results   Component Value Date    WBC 11.1 (H) 2024    HGB 13.7 2024    HCT 41.0 2024    .0 2024    CREATSERUM 0.80 2024     BUN 11 04/08/2024     04/08/2024    K 3.8 04/08/2024     04/08/2024    CO2 31.0 04/08/2024     (H) 04/08/2024    CA 9.0 04/08/2024    ALB 4.1 04/02/2024    ALKPHO 62 04/02/2024    BILT 0.8 04/02/2024    TP 6.7 04/02/2024    AST 19 04/02/2024    ALT 24 04/02/2024    PTT 24.9 09/28/2023    INR 1.01 09/28/2023    TSH 1.850 09/28/2023    LIP 37 04/02/2024    PSA 2.53 08/02/2021    DDIMER 2.44 (H) 06/23/2023    MG 2.1 10/02/2023    PHOS 3.3 06/25/2023    TROP <0.045 02/05/2020       XR ABDOMEN (1 VIEW) (CPT=74018)    Result Date: 4/8/2024  CONCLUSION:  1. Dilated loops of mid small bowel probably represent ileus.  Less likely involving low-grade obstruction.    Dictated by (CST): Minesh Garcia MD on 4/08/2024 at 9:31 AM     Finalized by (CST): Minesh Garcia MD on 4/08/2024 at 9:34 AM          CT ABDOMEN+PELVIS(CPT=74176)    Result Date: 4/6/2024  CONCLUSION:   Acute sigmoid diverticulitis has progressed since 4/2/2024.  Colitis involving the splenic flexure and proximal descending colon has also progressed.  Extensive inflammation within the left upper quadrant with thickening of the left lateral conal fascia has also progressed.  Ill-defined free fluid within the right lateral conal fascia and right lower pelvis has progressed.  No abscess or drainable collection seen at this point.  If not recently performed, following resolution of patient's symptoms a colonoscopy should be performed to exclude an underlying mass.  No pneumoperitoneum.  Multiple other incidental findings as described in the body of the report which are unchanged.     Dictated by (CST): Daniel River MD on 4/06/2024 at 1:13 PM     Finalized by (CST): Daniel River MD on 4/06/2024 at 1:18 PM               Rick Novoa MD  4/8/2024

## 2024-04-08 NOTE — PROGRESS NOTES
[facility]     Gastroenterology Progress Note    Don Cabral Patient Status:  Inpatient    1965 MRN [id number]   Location [facility] 4W/SW/SE Attending Kunal Peraza MD   Hosp Day # 6 PCP Stephen Christianson MD       Subjective:   Severe episode of pain overnight prompted KUB. This morning pain persists, on norco. No nausea or emesis. No bowel movement. No objective fever.   Objective:   Blood pressure 118/50, pulse 71, temperature 98.6 °F (37 °C), temperature source Oral, resp. rate 16, weight 264 lb 6.4 oz (119.9 kg), SpO2 92%. Body mass index is 33.05 kg/m².    General: awake, alert and oriented, no acute distress  HEENT: moist mucus membranes  PULM: no conversational dyspnea  CARDIOVASCULAR: regular rate and rhythm, the extremities are warm and well perfused  GI: soft, severe left sided tenderness upon palpation, non-distended, + BS, no rebound/guarding   EXTREMITIES: no edema, moving all extremities  SKIN: no visible rash  NEURO: appropriate and interactive    Assessment and Plan:   58M with recurrent sigmoid diverticulitis, last colonoscopy 2023, who presents with LLQ abd pain. Started last month, failed outpatient abx, interval CT in ER here shows acute sigmoid diverticulitis with microperforation, no fluid collection.     Interval CT showing progression of inflammatory changes, no abscess or free air.    Continue clears for now  Continue IV abx  Appears to be refractory to non-operative measures, considerations for segmental resection as per surgery      Shannan Brooks MD  [facility] Gastroenterology      Results:     Lab Results   Component Value Date    WBC 11.1 (H) 2024    HGB 13.7 2024    HCT 41.0 2024    .0 2024    CREATSERUM 0.80 2024    BUN 11 2024     2024    K 3.8 2024     2024    CO2 31.0 2024     (H) 2024    CA 9.0 2024    ALB 4.1 2024    ALKPHO 62  04/02/2024    BILT 0.8 04/02/2024    TP 6.7 04/02/2024    AST 19 04/02/2024    ALT 24 04/02/2024    PTT 24.9 09/28/2023    INR 1.01 09/28/2023    TSH 1.850 09/28/2023    LIP 37 04/02/2024    PSA 2.53 08/02/2021    DDIMER 2.44 (H) 06/23/2023    MG 2.1 10/02/2023    PHOS 3.3 06/25/2023    TROP <0.045 02/05/2020       XR ABDOMEN (1 VIEW) (CPT=74018)    Result Date: 4/8/2024  CONCLUSION:  1. Dilated loops of mid small bowel probably represent ileus.  Less likely involving low-grade obstruction.    Dictated by (CST): Minesh Garcia MD on 4/08/2024 at 9:31 AM     Finalized by (CST): Minesh Garcia MD on 4/08/2024 at 9:34 AM          CT ABDOMEN+PELVIS(CPT=74176)    Result Date: 4/6/2024  CONCLUSION:   Acute sigmoid diverticulitis has progressed since 4/2/2024.  Colitis involving the splenic flexure and proximal descending colon has also progressed.  Extensive inflammation within the left upper quadrant with thickening of the left lateral conal fascia has also progressed.  Ill-defined free fluid within the right lateral conal fascia and right lower pelvis has progressed.  No abscess or drainable collection seen at this point.  If not recently performed, following resolution of patient's symptoms a colonoscopy should be performed to exclude an underlying mass.  No pneumoperitoneum.  Multiple other incidental findings as described in the body of the report which are unchanged.     Dictated by (CST): Daniel River MD on 4/06/2024 at 1:13 PM     Finalized by (CST): Daniel River MD on 4/06/2024 at 1:18 PM

## 2024-04-08 NOTE — CM/SW NOTE
CM confirmed w/ Maria E at [facility] that pt has benefits for IOI, T&T and home inf. Plan is tentatively for Invanz daily x2 weeks    CM met with pt to review above. Pt is agreeable to in office T&T,  Pt has a hx of doing home infusions and will do them at home again.      4/9 0840  [facility] CM notified liaison Maria E of pts pref to do T&T at [place]. DC date pending.    Maria E will call pt for preferred Inf Ctr location.    Plan  Home with T&T at [facility]    / to remain available for support and/or discharge planning.     Annie Patel, RN    Ext [phone]

## 2024-04-08 NOTE — PLAN OF CARE
Patient is A&Ox4. Cpap @ night. Clear diet. AKIRA single midline. Lovenox for dvt prophylaxis. Refusing scds. Voiding freely. PRN Norco for pain management. Up by self.  Increased pain overnight, one episode of nausea, relieved by zofran. MD notified of increased pain,  Kub  order.Call light within reach, frequent rounding. Safety measures in place. Plan for IV Invanz when medically clear.      Problem: Patient Centered Care  Goal: Patient preferences are identified and integrated in the patient's plan of care  Description: Interventions:  - What would you like us to know as we care for you? This is my third time having diverticulitis  - Provide timely, complete, and accurate information to patient/family  - Incorporate patient and family knowledge, values, beliefs, and cultural backgrounds into the planning and delivery of care  - Encourage patient/family to participate in care and decision-making at the level they choose  - Honor patient and family perspectives and choices  Outcome: Progressing     Problem: Patient/Family Goals  Goal: Patient/Family Long Term Goal  Description: Patient's Long Term Goal: Discharge home    Interventions:  - Manage abdominal pain  - Tolerate adequate oral intake without complication  - Determine appropriate antibiotic plan  - See additional Care Plan goals for specific interventions  Outcome: Progressing  Goal: Patient/Family Short Term Goal  Description: Patient's Short Term Goal: Manage abdominal pain    Interventions:   - Transition from IV to oral pain medications  - Bowel rest with slow diet tolerance  - Eat small frequent light meals  - See additional Care Plan goals for specific interventions  Outcome: Progressing     Problem: PAIN - ADULT  Goal: Verbalizes/displays adequate comfort level or patient's stated pain goal  Description: INTERVENTIONS:  - Encourage pt to monitor pain and request assistance  - Assess pain using appropriate pain scale  - Administer analgesics based on  type and severity of pain and evaluate response  - Implement non-pharmacological measures as appropriate and evaluate response  - Consider cultural and social influences on pain and pain management  - Manage/alleviate anxiety  - Utilize distraction and/or relaxation techniques  - Monitor for opioid side effects  - Notify MD/LIP if interventions unsuccessful or patient reports new pain  - Anticipate increased pain with activity and pre-medicate as appropriate  Outcome: Progressing     Problem: RISK FOR INFECTION - ADULT  Goal: Absence of fever/infection during anticipated neutropenic period  Description: INTERVENTIONS  - Monitor WBC  - Administer growth factors as ordered  - Implement neutropenic guidelines  Outcome: Progressing     Problem: DISCHARGE PLANNING  Goal: Discharge to home or other facility with appropriate resources  Description: INTERVENTIONS:  - Identify barriers to discharge w/pt and caregiver  - Include patient/family/discharge partner in discharge planning  - Arrange for needed discharge resources and transportation as appropriate  - Identify discharge learning needs (meds, wound care, etc)  - Arrange for interpreters to assist at discharge as needed  - Consider post-discharge preferences of patient/family/discharge partner  - Complete POLST form as appropriate  - Assess patient's ability to be responsible for managing their own health  - Refer to Case Management Department for coordinating discharge planning if the patient needs post-hospital services based on physician/LIP order or complex needs related to functional status, cognitive ability or social support system  Outcome: Progressing     Problem: GASTROINTESTINAL - ADULT  Goal: Maintains or returns to baseline bowel function  Description: INTERVENTIONS:  - Assess bowel function  - Maintain adequate hydration with IV or PO as ordered and tolerated  - Evaluate effectiveness of GI medications  - Encourage mobilization and activity  - Obtain  nutritional consult as needed  - Establish a toileting routine/schedule  - Consider collaborating with pharmacy to review patient's medication profile  Outcome: Progressing  Goal: Minimal or absence of nausea and vomiting  Description: INTERVENTIONS:  - Maintain adequate hydration with IV or PO as ordered and tolerated  - Nasogastric tube to low intermittent suction as ordered  - Evaluate effectiveness of ordered antiemetic medications  - Provide nonpharmacologic comfort measures as appropriate  - Advance diet as tolerated, if ordered  - Obtain nutritional consult as needed  - Evaluate fluid balance  Outcome: Progressing  Goal: Maintains adequate nutritional intake (undernourished)  Description: INTERVENTIONS:  - Monitor percentage of each meal consumed  - Identify factors contributing to decreased intake, treat as appropriate  - Assist with meals as needed  - Monitor I&O, WT and lab values  - Obtain nutritional consult as needed  - Optimize oral hygiene and moisture  - Encourage food from home; allow for food preferences  - Enhance eating environment  Outcome: Progressing

## 2024-04-08 NOTE — PLAN OF CARE
Don is from home with spouse. Alert and oriented. R arm precautions. Room air, CPAP at night. Lovenox for DVT prophylaxis. Voiding up to bathroom independently. TPN to begin tomorrow. Sips and ice chips for now. Dilaudid for pain relief. PICC placed. IV abx infusing. Plan for home with OP ABX pending clearance. Call light within reach.     Problem: Patient Centered Care  Goal: Patient preferences are identified and integrated in the patient's plan of care  Description: Interventions:  - What would you like us to know as we care for you? This is my third time having diverticulitis  - Provide timely, complete, and accurate information to patient/family  - Incorporate patient and family knowledge, values, beliefs, and cultural backgrounds into the planning and delivery of care  - Encourage patient/family to participate in care and decision-making at the level they choose  - Honor patient and family perspectives and choices  Outcome: Progressing     Problem: Patient/Family Goals  Goal: Patient/Family Long Term Goal  Description: Patient's Long Term Goal: Discharge home    Interventions:  - Manage abdominal pain  - Tolerate adequate oral intake without complication  - Determine appropriate antibiotic plan  - See additional Care Plan goals for specific interventions  Outcome: Progressing  Goal: Patient/Family Short Term Goal  Description: Patient's Short Term Goal: Manage abdominal pain    Interventions:   - Transition from IV to oral pain medications  - Bowel rest with slow diet tolerance  - Eat small frequent light meals  - See additional Care Plan goals for specific interventions  Outcome: Progressing     Problem: PAIN - ADULT  Goal: Verbalizes/displays adequate comfort level or patient's stated pain goal  Description: INTERVENTIONS:  - Encourage pt to monitor pain and request assistance  - Assess pain using appropriate pain scale  - Administer analgesics based on type and severity of pain and evaluate response  -  Implement non-pharmacological measures as appropriate and evaluate response  - Consider cultural and social influences on pain and pain management  - Manage/alleviate anxiety  - Utilize distraction and/or relaxation techniques  - Monitor for opioid side effects  - Notify MD/LIP if interventions unsuccessful or patient reports new pain  - Anticipate increased pain with activity and pre-medicate as appropriate  Outcome: Progressing     Problem: RISK FOR INFECTION - ADULT  Goal: Absence of fever/infection during anticipated neutropenic period  Description: INTERVENTIONS  - Monitor WBC  - Administer growth factors as ordered  - Implement neutropenic guidelines  Outcome: Progressing     Problem: DISCHARGE PLANNING  Goal: Discharge to home or other facility with appropriate resources  Description: INTERVENTIONS:  - Identify barriers to discharge w/pt and caregiver  - Include patient/family/discharge partner in discharge planning  - Arrange for needed discharge resources and transportation as appropriate  - Identify discharge learning needs (meds, wound care, etc)  - Arrange for interpreters to assist at discharge as needed  - Consider post-discharge preferences of patient/family/discharge partner  - Complete POLST form as appropriate  - Assess patient's ability to be responsible for managing their own health  - Refer to Case Management Department for coordinating discharge planning if the patient needs post-hospital services based on physician/LIP order or complex needs related to functional status, cognitive ability or social support system  Outcome: Progressing

## 2024-04-08 NOTE — PROGRESS NOTES
[facility]  part of [facility]    Progress Note    Don Cabral Patient Status:  Inpatient    1965 MRN [id number]   Location [facility] 4W/SW/SE Attending Tamica Hardy MD   Hosp Day # 6 PCP Stephen Christianson MD     Chief Complaint:   Chief Complaint   Patient presents with    Abdomen/Flank Pain   L sided abd pain     Subjective:   Don Cabral  is having worsening pain in abd feels more bloated. No F/C. Had severe L sided abd pain today 3am was in tears. No N/V. Wife at bedside.   Objective:   Objective:    Blood pressure 118/50, pulse 71, temperature 98.3 °F (36.8 °C), temperature source Oral, resp. rate 16, weight 264 lb 6.4 oz (119.9 kg), SpO2 92%.    Physical Exam:    General: No acute distress. Obese   Respiratory: Clear to auscultation bilaterally. No wheezes. No rhonchi.  Cardiovascular: S1, S2. Regular rate and rhythm. No murmurs, rubs or gallops.   Abdomen: Soft, LLQ tender, distended.  Positive bowel sounds. No rebound or guarding.  Neurologic: No focal neurological deficits.   Musculoskeletal: Moves all extremities.  Extremities: No edema.      Results:   Results:    Labs:  Recent Labs   Lab 24  0655 24  0633 24  0554 24  0603 24  0452   WBC 7.0 7.9 8.5 9.3 11.1*   HGB 13.1 12.8* 13.3 13.6 13.7   MCV 88.3 86.8 86.5 85.8 85.2   .0 322.0 349.0 370.0 415.0       Recent Labs   Lab 24  1340 24  0723 24  0554 24  0603 24  0452   GLU 97   < > 91 95 106*   BUN 14   < > 8* 10 11   CREATSERUM 0.80   < > 0.89 0.85 0.80   CA 9.3   < > 9.2 9.3 9.0   ALB 4.1  --   --   --   --       < > 140 137 136   K 4.2   < > 3.7 4.0 3.8      < > 104 103 100   CO2 30.0   < > 29.0 29.0 31.0   ALKPHO 62  --   --   --   --    AST 19  --   --   --   --    ALT 24  --   --   --   --    BILT 0.8  --   --   --   --    TP 6.7  --   --   --   --     < > = values in this interval not displayed.       Estimated  Creatinine Clearance: 121.8 mL/min (based on SCr of 0.8 mg/dL).    No results for input(s): \"PTP\", \"INR\" in the last 168 hours.         Culture:  Hospital Encounter on 04/02/24   1. Blood Culture     Status: None    Collection Time: 04/02/24  5:18 PM    Specimen: Blood,peripheral   Result Value Ref Range    Blood Culture Result No Growth 5 Days N/A       Cardiac  No results for input(s): \"TROP\", \"PBNP\" in the last 168 hours.      Imaging: Imaging data reviewed in Epic.  XR ABDOMEN (1 VIEW) (CPT=74018)    Result Date: 4/8/2024  CONCLUSION:  1. Dilated loops of mid small bowel probably represent ileus.  Less likely involving low-grade obstruction.    Dictated by (CST): Minesh Garcia MD on 4/08/2024 at 9:31 AM     Finalized by (CST): Minesh Garcia MD on 4/08/2024 at 9:34 AM           Medications:    meropenem  500 mg Intravenous Q8H    docusate sodium  100 mg Oral Daily    enoxaparin  40 mg Subcutaneous Daily    OLANZapine  2.5 mg Oral Nightly    verapamil ER  120 mg Oral Nightly    gabapentin  600 mg Oral TID    sertraline  75 mg Oral Daily    lisinopril  20 mg Oral Daily         Assessment and Plan:   Assessment & Plan:      Sigmoid diverticulitis  LLQ abdominal pain   - failed outpt PO abx.   - history of recurrent diverticulitis, for which patient has had an appointment with general surgeon for future sigmoidectomy.  - CT showed acute sigmoid diverticulitis with microperforation, no fluid collection.   - Last colonoscopy 05/2023.   - Pain control.   - cont non operative management.   - stop zosyn start meropenem.   - mid line placed 4/5   - Clear liquid diet per surgery   - start TPN   - Plans for Invanz x 2 weeks on discharge.   - will eventually benefit from future sigmoidectomy. Trying to avoid during acute episode.   - Afebrile. WBC trending up   - GI, ID and general surgery on consult.   - may need short term fu CT abd      Hypertension  Major depressive disorder   Intermittent premature ventricular  tachycardia   - stable chronic conditions  - continue home meds        >55min spent, >50% spent counseling and coordinating care in the form of educating pt/family and d/w consultants and staff. Most of the time spent discussing the above plan.        Plan of care discussed with patient or family at bedside.    Tamica Hardy MD  Hospitalist          Supplementary Documentation:     Quality:  DVT Prophylaxis: lovenox   CODE status: Full  Dispo: per clinical course           Estimated date of discharge: TBD  Discharge is dependent on: clinical stability  At this point Mr. Cabral is expected to be discharge to: home

## 2024-04-09 LAB
ANION GAP SERPL CALC-SCNC: 6 MMOL/L (ref 0–18)
BASOPHILS # BLD AUTO: 0.01 X10(3) UL (ref 0–0.2)
BASOPHILS NFR BLD AUTO: 0.1 %
BUN BLD-MCNC: 12 MG/DL (ref 9–23)
BUN/CREAT SERPL: 18.8 (ref 10–20)
CALCIUM BLD-MCNC: 8.5 MG/DL (ref 8.7–10.4)
CHLORIDE SERPL-SCNC: 100 MMOL/L (ref 98–112)
CO2 SERPL-SCNC: 29 MMOL/L (ref 21–32)
CREAT BLD-MCNC: 0.64 MG/DL
DEPRECATED RDW RBC AUTO: 39.7 FL (ref 35.1–46.3)
EGFRCR SERPLBLD CKD-EPI 2021: 110 ML/MIN/1.73M2 (ref 60–?)
EOSINOPHIL # BLD AUTO: 0.06 X10(3) UL (ref 0–0.7)
EOSINOPHIL NFR BLD AUTO: 0.6 %
ERYTHROCYTE [DISTWIDTH] IN BLOOD BY AUTOMATED COUNT: 12.9 % (ref 11–15)
GLUCOSE BLD-MCNC: 90 MG/DL (ref 70–99)
GLUCOSE BLDC GLUCOMTR-MCNC: 124 MG/DL (ref 70–99)
GLUCOSE BLDC GLUCOMTR-MCNC: 97 MG/DL (ref 70–99)
HCT VFR BLD AUTO: 37.9 %
HGB BLD-MCNC: 12.9 G/DL
IMM GRANULOCYTES # BLD AUTO: 0.03 X10(3) UL (ref 0–1)
IMM GRANULOCYTES NFR BLD: 0.3 %
LYMPHOCYTES # BLD AUTO: 1.27 X10(3) UL (ref 1–4)
LYMPHOCYTES NFR BLD AUTO: 11.9 %
MAGNESIUM SERPL-MCNC: 1.8 MG/DL (ref 1.6–2.6)
MCH RBC QN AUTO: 29 PG (ref 26–34)
MCHC RBC AUTO-ENTMCNC: 34 G/DL (ref 31–37)
MCV RBC AUTO: 85.2 FL
MONOCYTES # BLD AUTO: 0.9 X10(3) UL (ref 0.1–1)
MONOCYTES NFR BLD AUTO: 8.4 %
NEUTROPHILS # BLD AUTO: 8.41 X10 (3) UL (ref 1.5–7.7)
NEUTROPHILS # BLD AUTO: 8.41 X10(3) UL (ref 1.5–7.7)
NEUTROPHILS NFR BLD AUTO: 78.7 %
OSMOLALITY SERPL CALC.SUM OF ELEC: 279 MOSM/KG (ref 275–295)
PHOSPHATE SERPL-MCNC: 2.5 MG/DL (ref 2.4–5.1)
PLATELET # BLD AUTO: 420 10(3)UL (ref 150–450)
POTASSIUM SERPL-SCNC: 4 MMOL/L (ref 3.5–5.1)
RBC # BLD AUTO: 4.45 X10(6)UL
SODIUM SERPL-SCNC: 135 MMOL/L (ref 136–145)
WBC # BLD AUTO: 10.7 X10(3) UL (ref 4–11)

## 2024-04-09 PROCEDURE — 99232 SBSQ HOSP IP/OBS MODERATE 35: CPT | Performed by: SURGERY

## 2024-04-09 PROCEDURE — 99233 SBSQ HOSP IP/OBS HIGH 50: CPT | Performed by: HOSPITALIST

## 2024-04-09 PROCEDURE — 99233 SBSQ HOSP IP/OBS HIGH 50: CPT | Performed by: INTERNAL MEDICINE

## 2024-04-09 RX ORDER — MAGNESIUM OXIDE 400 MG/1
400 TABLET ORAL ONCE
Status: DISCONTINUED | OUTPATIENT
Start: 2024-04-09 | End: 2024-04-19

## 2024-04-09 NOTE — DIETARY NOTE
Follow up Nutrition Note         4/9/2024:  Received MD consult to initiate/manage CPN.   On Cl liq and Oral Nutrition Supplement ( ONS ) Ensure Clears QID. Tolerating, no N/V, but abd pain continues. Surgery eval noted. Labs and meds reviewed. On Merrem and Mag oxide.   Re-visited pt. Explained rationale of CPN.  Pt voiced concerns about severe venous pain when IV Potassium were infused peripherally or via midline in Oct 2023 admission.  Pt reported No known food allergies, states no allergies to eggs. Potential mild refeeding risk.   Discussed with RN to monitor CPN initiation closely for potential reactions such as flushing, rashes, chest pain, back pain. If this occurs, CPN may be stopped.       Nutrition Plan/ Intervention:     NUTRITION PRESCRIPTION:   Estimated Nutrition needs: Dosing wt of 120 kg --wt taken on 4/2 .  Calories: 0260-9757 calories/day (MSJ 2114 x 1.2 AF x 1.0 IF  or 19-21 calories per kg Dosing wt)  Protein: 116-134 g protein/day (1.3-1.5 g protein/kg  Ideal body wt (IBW))   Fluids: ~ 2650  ml /day (HSM  ml/kg--adjusted BW of 96.8 kg ( 213#) )    Diet:  Cl liq  Oral Nutrition Supplement ( ONS ) : Ensure Clears QID   - Parenteral Nutrition: RD ordered the following  CPN 2000 ml, 100 g Protein, 500 kcal dextrose & 400 kcal lipid. 1300 Total kcal .   Additives and electrolytes ordered.   Met 57% of min kcal and 86% of min Protein needs. Will Increase PN as laurel to reach full nutrition.   Discussed above orders with RN  CPN to be infused via PICC line.    Medication Rate Frequency Start End   adult 3 in 1 TPN 83.3 mL/hr Continuous TPN 4/9/2024 2100 4/10/2024 2059   Infusion Site:   [facility]     Route:   Intravenous     Volume:   2,000 mL     Admin Duration:   24 Hours     Order #:   [id number]      Dose   Amino Acids Components   amino acid infusion (Plenamine) 15 % 100 g   Dextrose Components   dextrose 70% 500 kcal   Lipids Components   fat emulsion (SMOFlipid - fish oil/plant based) 20 % 400  kcal   QS Base Components   sterile water 829.0343 mL   Electrolytes Components   potassium chloride 2 mEq/mL 35 mEq   potassium phosphate dibasic 3 mmol/mL 15 mmol   sodium chloride 4 MEQ/ mEq   calcium gluconate 10 % 10 mEq   magnesium sulfate 50 % 12 mEq   Additives Components   multivitamin adult 10 mL   thiamine 100 mg/mL 100 mg   trace minerals Cu-Mn-Se-Zn 492-30- MCG/ML 1 mL            RD will continue to follow up.       Rebekah Juarez RD, LDN, [facility]  Clinical Dietitian  [phone]  4/9/2024

## 2024-04-09 NOTE — PROGRESS NOTES
INFECTIOUS DISEASE PROGRESS NOTE    Don Cabral Patient Status:  Inpatient    1965 MRN [id number]   Location [facility] 4W/SW/SE Attending Tamica Hardy MD   Hosp Day # 7 PCP Stephen Christianson MD     SUBJECTIVE  ROS done. Still having abd pain. Surgery planning to stat TPN.     ASSESSMENT/PLAN:    Antibiotics:  zosyn 4/2-, meropenem  --      Impression:     # Acute sigmoid diverticulitis with microperforation, no drainable abscess seen on CT  - repeat CT stable.   - continues to have pain  # leukocytosis  # Abdominal pain, ?ileus  # Recurrent sigmoid diverticulitis     Recommend:     - On meropenem day 2. Plan for invanz on discharge.     -  TPN per surgery  -  monitor for improvement. May need surgery this admission if no improvement.   -  follow fever curve, wbc. Repeat CBC in AM  - d/w patient  - d/w staff  - will follow     Thank you for allowing me to participate in the care of your patient.     History of Present Illness:   Patient is a 58 year old male with h/o recurrent diverticulitis, cardiac arrhythmia, depression, NELLIE.  Presents with recurrent diverticulitis.  CT showed cute sigmoid diverticulitis.  Trace suspected focus of associated localized pneumoperitoneum/micro perforation.  No evidence of a drainable associated fluid collection.  Progressed inflammatory change adjacent to the hepatic flexure and proximal sigmoid colon.  Interval development of mild circumferential wall thickening splenic flexure and proximal sigmoid colon which may reflect a nonspecific colitis.  No evidence of pneumatosis or associated drainable fluid collection.     Started on zosyn.  Still with abdominal pain but slowly improved.      OBJECTIVE  /72 (BP Location: Left arm)   Pulse 81   Temp 98.9 °F (37.2 °C) (Oral)   Resp 18   Wt 264 lb 6.4 oz (119.9 kg)   SpO2 96%   BMI 33.05 kg/m²     General: No acute distress. Alert.  HEENT: EOMI   Abdomen: Soft, TTP, distended.   Musculoskeletal: No  edema  Integument: No rashes    Labs:     Recent Labs   Lab 04/05/24  0633 04/06/24  0554 04/07/24  0603 04/08/24 0452 04/09/24  0638   WBC 7.9 8.5 9.3 11.1* 10.7   HGB 12.8* 13.3 13.6 13.7 12.9*   .0 349.0 370.0 415.0 420.0       Recent Labs   Lab 04/05/24 0633 04/06/24  0554 04/07/24  0603 04/08/24 0452 04/09/24  0638    140 137 136 135*   K 4.0 3.7 4.0 3.8 4.0    104 103 100 100   CO2 25.0 29.0 29.0 31.0 29.0   BUN 7* 8* 10 11 12   CREATSERUM 0.78 0.89 0.85 0.80 0.64*       No results for input(s): \"ALT\", \"AST\" in the last 168 hours.    Invalid input(s): \"ALPHOS\", \"TBIL\"      Microbiology: Reviewed    Imaging: Reviewed       [facility]  [phone]

## 2024-04-09 NOTE — PLAN OF CARE
Problem: Patient Centered Care  Goal: Patient preferences are identified and integrated in the patient's plan of care  Description: Interventions:  - What would you like us to know as we care for you? This is my third time having diverticulitis  - Provide timely, complete, and accurate information to patient/family  - Incorporate patient and family knowledge, values, beliefs, and cultural backgrounds into the planning and delivery of care  - Encourage patient/family to participate in care and decision-making at the level they choose  - Honor patient and family perspectives and choices  Outcome: Progressing      Stool sample results.

## 2024-04-09 NOTE — PROGRESS NOTES
[facility]  part of [facility]    Progress Note    Don Cabral Patient Status:  Inpatient    1965 MRN [id number]   Location [facility] 4W/SW/SE Attending Rafa Pugh MD   Hosp Day # 7 PCP Stephen Christianson MD       Subjective:   Pt with continued abdominal pain, improving from yesterday. No fever or chills. Tolerating clears.    Objective:   Vital Signs:  Blood pressure 143/64, pulse 82, temperature 98.9 °F (37.2 °C), temperature source Oral, resp. rate 18, weight 264 lb 6.4 oz (119.9 kg), SpO2 96%.    Physical Exam     General: No acute distress. Alert and oriented x 3.  HEENT: Moist mucous membranes. Anicteric.   Neck: Supple, No JVD.   Respiratory: Nonlabored resp.  Cardiovascular: Regular rate.   Abdomen: Soft, left sided abdominal tenderness, no rebound tnd, no guarding, nondistended.  No masses. Normal bowel sounds.    Neurologic: No focal neurological deficits.  Musculoskeletal: Full range of motion of all extremities. No calf tenderness. No swelling noted.  Integument: No lesions. No erythema.  Psychiatric: Appropriate mood and affect.         Assessment and Plan:     Sigmoid diverticulitis      Intractable abdominal pain    Continue IV abx per ID. TPN to start today, continue clears as tolerated. Consider Zaid's procedure if clinically worsening.    Results:     Lab Results   Component Value Date    WBC 10.7 2024    HGB 12.9 (L) 2024    HCT 37.9 (L) 2024    .0 2024    CREATSERUM 0.64 (L) 2024    BUN 12 2024     (L) 2024    K 4.0 2024     2024    CO2 29.0 2024    GLU 90 2024    CA 8.5 (L) 2024    ALB 4.1 2024    ALKPHO 62 2024    BILT 0.8 2024    TP 6.7 2024    AST 19 2024    ALT 24 2024    PTT 24.9 2023    INR 1.01 2023    TSH 1.850 2023    LIP 37 2024    PSA 2.53 2021    DDIMER 2.44 (H) 2023    MG  1.8 04/09/2024    PHOS 2.5 04/09/2024    TROP <0.045 02/05/2020       XR ABDOMEN (1 VIEW) (CPT=74018)    Result Date: 4/8/2024  CONCLUSION:  1. Dilated loops of mid small bowel probably represent ileus.  Less likely involving low-grade obstruction.    Dictated by (CST): Minesh Garcia MD on 4/08/2024 at 9:31 AM     Finalized by (CST): Minesh Garcia MD on 4/08/2024 at 9:34 AM                       Stephen Nunez PA-C  4/9/2024    Addendum:    Pt seen and examined.  I agree with Stephen Nunez'sISELA note.    Rick Novoa MD

## 2024-04-09 NOTE — PROGRESS NOTES
[facility]  part of [facility]    Progress Note    Don Cabral Patient Status:  Inpatient    1965 MRN [id number]   Location [facility] 4W/SW/SE Attending Rafa Pugh MD   Hosp Day # 7 PCP Stephen Christianson MD       Subjective:     Pt notes LLQ abd pain.  Pt relays hx of heart arrhythmias and asks about tele with TPN.  We will put pt on tele.    Objective:   Blood pressure 142/72, pulse 81, temperature 98.9 °F (37.2 °C), temperature source Oral, resp. rate 18, weight 264 lb 6.4 oz (119.9 kg), SpO2 96%.    Gen:   NAD.  A and O x 3  CV:   RRR, no m/g/r  Pulm:   CTA bilat  Abd:   +bs, soft, tender over LLQ to light palpation, ND  LE:   No c/c/e  Neuro:   nonfocal    Results:     Lab Results   Component Value Date    WBC 10.7 2024    HGB 12.9 (L) 2024    HCT 37.9 (L) 2024    .0 2024    CREATSERUM 0.64 (L) 2024    BUN 12 2024     (L) 2024    K 4.0 2024     2024    CO2 29.0 2024    GLU 90 2024    CA 8.5 (L) 2024    ALB 4.1 2024    ALKPHO 62 2024    BILT 0.8 2024    TP 6.7 2024    AST 19 2024    ALT 24 2024    PTT 24.9 2023    INR 1.01 2023    TSH 1.850 2023    LIP 37 2024    PSA 2.53 2021    DDIMER 2.44 (H) 2023    MG 1.8 2024    PHOS 2.5 2024    TROP <0.045 2020       XR ABDOMEN (1 VIEW) (CPT=74018)    Result Date: 2024  CONCLUSION:  1. Dilated loops of mid small bowel probably represent ileus.  Less likely involving low-grade obstruction.    Dictated by (CST): Minesh Garcia MD on 2024 at 9:31 AM     Finalized by (CST): Minesh Garcia MD on 2024 at 9:34 AM               Assessment and Plan:     Sigmoid diverticulitis  LLQ abdominal pain   Leukocytosis resolved  3rd episode  - failed outpt PO abx.   - history of recurrent diverticulitis, for which patient has had an appointment with  general surgeon for future sigmoidectomy.  - CT showed acute sigmoid diverticulitis with microperforation, no fluid collection.   - Last colonoscopy 05/2023.   - Pain control.  - prefers PO  - cont non operative management.   - off zosyn, cont meropenem.   - mid line placed 4/5   - Clear liquid diet per surgery   - start TPN tonight  - Plans for Invanz x 2 weeks on discharge.   - will eventually benefit from future sigmoidectomy. Trying to avoid during acute episode.   - GI, ID and general surgery on consult.     Pt relays hx of PVCs, a-fib, LBBB.  Hx of intermittent premature ventricular tachycardia   - remote tele     Hypertension  - cont lisinopril, verapamil    GERD  - IV PPI bid    Major depressive disorder   Pt says he talked to his psychiatrist and and can stop neurontin.  - stop neurontin  - cont klonopin, zoloft, zyprexa    dvt proph:    lovenox    Code status:    Full     MDM:    High Rafa Carrasco MD  4/9/2024

## 2024-04-09 NOTE — PROGRESS NOTES
[facility]     Gastroenterology Progress Note    Don Cabral Patient Status:  Inpatient    1965 MRN [id number]   Location [facility] 4W/SW/SE Attending Kunal Peraza MD   Hosp Day # 7 PCP Stephen Christianson MD       Subjective:   Severe episode of pain overnight prompted KUB. This morning pain persists, on norco. No nausea or emesis. No bowel movement. No objective fever.   Objective:   Blood pressure 142/72, pulse 81, temperature 98.9 °F (37.2 °C), temperature source Oral, resp. rate 18, weight 264 lb 6.4 oz (119.9 kg), SpO2 96%. Body mass index is 33.05 kg/m².    General: awake, alert and oriented, no acute distress  HEENT: moist mucus membranes  PULM: no conversational dyspnea  CARDIOVASCULAR: regular rate and rhythm, the extremities are warm and well perfused  GI: soft, severe left sided tenderness upon palpation, non-distended, + BS, no rebound/guarding   EXTREMITIES: no edema, moving all extremities  SKIN: no visible rash  NEURO: appropriate and interactive    Assessment and Plan:   58M with recurrent sigmoid diverticulitis, last colonoscopy 2023, who presents with LLQ abd pain. Started last month, failed outpatient abx, interval CT in ER here shows acute sigmoid diverticulitis with microperforation, no fluid collection.     Interval CT showing progression of inflammatory changes, no abscess or free air.    Diet per surgery  To start TPN and complete bowel rest today  Continue IV abx  Considerations for segmental resection as per surgery    GI will be available as needed, please call with questions    Shannan Brooks MD  [facility]      Results:     Lab Results   Component Value Date    WBC 10.7 2024    HGB 12.9 (L) 2024    HCT 37.9 (L) 2024    .0 2024    CREATSERUM 0.64 (L) 2024    BUN 12 2024     (L) 2024    K 4.0 2024     2024    CO2 29.0 2024    GLU 90 2024    CA 8.5  (L) 04/09/2024    ALB 4.1 04/02/2024    ALKPHO 62 04/02/2024    BILT 0.8 04/02/2024    TP 6.7 04/02/2024    AST 19 04/02/2024    ALT 24 04/02/2024    PTT 24.9 09/28/2023    INR 1.01 09/28/2023    TSH 1.850 09/28/2023    LIP 37 04/02/2024    PSA 2.53 08/02/2021    DDIMER 2.44 (H) 06/23/2023    MG 1.8 04/09/2024    PHOS 2.5 04/09/2024    TROP <0.045 02/05/2020       XR ABDOMEN (1 VIEW) (CPT=74018)    Result Date: 4/8/2024  CONCLUSION:  1. Dilated loops of mid small bowel probably represent ileus.  Less likely involving low-grade obstruction.    Dictated by (CST): Minesh Garcia MD on 4/08/2024 at 9:31 AM     Finalized by (CST): Minesh Garcia MD on 4/08/2024 at 9:34 AM

## 2024-04-10 LAB
ALBUMIN SERPL-MCNC: 3.7 G/DL (ref 3.2–4.8)
ALBUMIN/GLOB SERPL: 1.5 {RATIO} (ref 1–2)
ALP LIVER SERPL-CCNC: 53 U/L
ALT SERPL-CCNC: 15 U/L
ANION GAP SERPL CALC-SCNC: 5 MMOL/L (ref 0–18)
AST SERPL-CCNC: 14 U/L (ref ?–34)
BASOPHILS # BLD AUTO: 0 X10(3) UL (ref 0–0.2)
BASOPHILS NFR BLD AUTO: 0 %
BILIRUB SERPL-MCNC: 0.6 MG/DL (ref 0.3–1.2)
BUN BLD-MCNC: 12 MG/DL (ref 9–23)
BUN/CREAT SERPL: 20.7 (ref 10–20)
CALCIUM BLD-MCNC: 8.5 MG/DL (ref 8.7–10.4)
CHLORIDE SERPL-SCNC: 101 MMOL/L (ref 98–112)
CO2 SERPL-SCNC: 29 MMOL/L (ref 21–32)
CREAT BLD-MCNC: 0.58 MG/DL
DEPRECATED RDW RBC AUTO: 39.9 FL (ref 35.1–46.3)
EGFRCR SERPLBLD CKD-EPI 2021: 114 ML/MIN/1.73M2 (ref 60–?)
EOSINOPHIL # BLD AUTO: 0.14 X10(3) UL (ref 0–0.7)
EOSINOPHIL NFR BLD AUTO: 1.4 %
ERYTHROCYTE [DISTWIDTH] IN BLOOD BY AUTOMATED COUNT: 13 % (ref 11–15)
GLOBULIN PLAS-MCNC: 2.5 G/DL (ref 2.8–4.4)
GLUCOSE BLD-MCNC: 118 MG/DL (ref 70–99)
GLUCOSE BLDC GLUCOMTR-MCNC: 102 MG/DL (ref 70–99)
GLUCOSE BLDC GLUCOMTR-MCNC: 111 MG/DL (ref 70–99)
GLUCOSE BLDC GLUCOMTR-MCNC: 117 MG/DL (ref 70–99)
GLUCOSE BLDC GLUCOMTR-MCNC: 126 MG/DL (ref 70–99)
GLUCOSE BLDC GLUCOMTR-MCNC: 135 MG/DL (ref 70–99)
HCT VFR BLD AUTO: 36.7 %
HGB BLD-MCNC: 12.1 G/DL
IMM GRANULOCYTES # BLD AUTO: 0.04 X10(3) UL (ref 0–1)
IMM GRANULOCYTES NFR BLD: 0.4 %
LYMPHOCYTES # BLD AUTO: 1.57 X10(3) UL (ref 1–4)
LYMPHOCYTES NFR BLD AUTO: 15.3 %
MAGNESIUM SERPL-MCNC: 2 MG/DL (ref 1.6–2.6)
MCH RBC QN AUTO: 27.8 PG (ref 26–34)
MCHC RBC AUTO-ENTMCNC: 33 G/DL (ref 31–37)
MCV RBC AUTO: 84.4 FL
MONOCYTES # BLD AUTO: 0.83 X10(3) UL (ref 0.1–1)
MONOCYTES NFR BLD AUTO: 8.1 %
NEUTROPHILS # BLD AUTO: 7.66 X10 (3) UL (ref 1.5–7.7)
NEUTROPHILS # BLD AUTO: 7.66 X10(3) UL (ref 1.5–7.7)
NEUTROPHILS NFR BLD AUTO: 74.8 %
OSMOLALITY SERPL CALC.SUM OF ELEC: 281 MOSM/KG (ref 275–295)
PHOSPHATE SERPL-MCNC: 1.7 MG/DL (ref 2.4–5.1)
PLATELET # BLD AUTO: 446 10(3)UL (ref 150–450)
POTASSIUM SERPL-SCNC: 3.6 MMOL/L (ref 3.5–5.1)
PROT SERPL-MCNC: 6.2 G/DL (ref 5.7–8.2)
RBC # BLD AUTO: 4.35 X10(6)UL
SODIUM SERPL-SCNC: 135 MMOL/L (ref 136–145)
TRIGL SERPL-MCNC: 103 MG/DL (ref 30–149)
WBC # BLD AUTO: 10.2 X10(3) UL (ref 4–11)

## 2024-04-10 PROCEDURE — 99232 SBSQ HOSP IP/OBS MODERATE 35: CPT | Performed by: SURGERY

## 2024-04-10 PROCEDURE — 99233 SBSQ HOSP IP/OBS HIGH 50: CPT | Performed by: HOSPITALIST

## 2024-04-10 NOTE — PLAN OF CARE
Patient is alert and oriented. Room air. Vital signs stable. CPAP worn overnight. Remote tele in place. Up independently. IV abx continued. TPN initiated through R PICC. No signs of reactions. Q6 accuchecks. Clear liquid diet. Patient states has intermittent nausea, declines nausea medication. Pain managed with prn norco and morphine for breakthrough pain. Call light and personal belongings within reach. Safety precautions in place.     Problem: Patient Centered Care  Goal: Patient preferences are identified and integrated in the patient's plan of care  Description: Interventions:  - What would you like us to know as we care for you? This is my third time having diverticulitis  - Provide timely, complete, and accurate information to patient/family  - Incorporate patient and family knowledge, values, beliefs, and cultural backgrounds into the planning and delivery of care  - Encourage patient/family to participate in care and decision-making at the level they choose  - Honor patient and family perspectives and choices  Outcome: Progressing     Problem: Patient/Family Goals  Goal: Patient/Family Long Term Goal  Description: Patient's Long Term Goal: Discharge home    Interventions:  - Manage abdominal pain  - Tolerate adequate oral intake without complication  - Determine appropriate antibiotic plan  - See additional Care Plan goals for specific interventions  Outcome: Progressing  Goal: Patient/Family Short Term Goal  Description: Patient's Short Term Goal: Manage abdominal pain    Interventions:   - Transition from IV to oral pain medications  - Bowel rest with slow diet tolerance  - Eat small frequent light meals  - See additional Care Plan goals for specific interventions  Outcome: Progressing     Problem: PAIN - ADULT  Goal: Verbalizes/displays adequate comfort level or patient's stated pain goal  Description: INTERVENTIONS:  - Encourage pt to monitor pain and request assistance  - Assess pain using appropriate  pain scale  - Administer analgesics based on type and severity of pain and evaluate response  - Implement non-pharmacological measures as appropriate and evaluate response  - Consider cultural and social influences on pain and pain management  - Manage/alleviate anxiety  - Utilize distraction and/or relaxation techniques  - Monitor for opioid side effects  - Notify MD/LIP if interventions unsuccessful or patient reports new pain  - Anticipate increased pain with activity and pre-medicate as appropriate  Outcome: Progressing     Problem: RISK FOR INFECTION - ADULT  Goal: Absence of fever/infection during anticipated neutropenic period  Description: INTERVENTIONS  - Monitor WBC  - Administer growth factors as ordered  - Implement neutropenic guidelines  Outcome: Progressing     Problem: DISCHARGE PLANNING  Goal: Discharge to home or other facility with appropriate resources  Description: INTERVENTIONS:  - Identify barriers to discharge w/pt and caregiver  - Include patient/family/discharge partner in discharge planning  - Arrange for needed discharge resources and transportation as appropriate  - Identify discharge learning needs (meds, wound care, etc)  - Arrange for interpreters to assist at discharge as needed  - Consider post-discharge preferences of patient/family/discharge partner  - Complete POLST form as appropriate  - Assess patient's ability to be responsible for managing their own health  - Refer to Case Management Department for coordinating discharge planning if the patient needs post-hospital services based on physician/LIP order or complex needs related to functional status, cognitive ability or social support system  Outcome: Progressing     Problem: GASTROINTESTINAL - ADULT  Goal: Maintains or returns to baseline bowel function  Description: INTERVENTIONS:  - Assess bowel function  - Maintain adequate hydration with IV or PO as ordered and tolerated  - Evaluate effectiveness of GI medications  -  Encourage mobilization and activity  - Obtain nutritional consult as needed  - Establish a toileting routine/schedule  - Consider collaborating with pharmacy to review patient's medication profile  Outcome: Progressing  Goal: Minimal or absence of nausea and vomiting  Description: INTERVENTIONS:  - Maintain adequate hydration with IV or PO as ordered and tolerated  - Nasogastric tube to low intermittent suction as ordered  - Evaluate effectiveness of ordered antiemetic medications  - Provide nonpharmacologic comfort measures as appropriate  - Advance diet as tolerated, if ordered  - Obtain nutritional consult as needed  - Evaluate fluid balance  Outcome: Progressing  Goal: Maintains adequate nutritional intake (undernourished)  Description: INTERVENTIONS:  - Monitor percentage of each meal consumed  - Identify factors contributing to decreased intake, treat as appropriate  - Assist with meals as needed  - Monitor I&O, WT and lab values  - Obtain nutritional consult as needed  - Optimize oral hygiene and moisture  - Encourage food from home; allow for food preferences  - Enhance eating environment  Outcome: Progressing

## 2024-04-10 NOTE — PROGRESS NOTES
INFECTIOUS DISEASE PROGRESS NOTE    Don Cabral Patient Status:  Inpatient    1965 MRN [id number]   Location [facility] 4W/SW/SE Attending Tamica Hardy MD   Hosp Day # 8 PCP Stephen Christianson MD     SUBJECTIVE  ROS done. On TPN. Feels about the same.     ASSESSMENT/PLAN:    Antibiotics:  zosyn -, meropenem  --      Impression:     # Acute sigmoid diverticulitis with microperforation, no drainable abscess seen on CT  - repeat CT stable.   - continues to have pain. Started on TPN 24  # leukocytosis  # Abdominal pain, ?ileus  # Recurrent sigmoid diverticulitis     Recommend:     - On meropenem day 3. Plan for invanz on discharge.     -  TPN per surgery  -  monitor for improvement. May need surgery this admission if no improvement.   -  follow fever curve, wbc.   - d/w patient  - d/w staff  - will follow     Thank you for allowing me to participate in the care of your patient.     History of Present Illness:   Patient is a 58 year old male with h/o recurrent diverticulitis, cardiac arrhythmia, depression, NELLIE.  Presents with recurrent diverticulitis.  CT showed cute sigmoid diverticulitis.  Trace suspected focus of associated localized pneumoperitoneum/micro perforation.  No evidence of a drainable associated fluid collection.  Progressed inflammatory change adjacent to the hepatic flexure and proximal sigmoid colon.  Interval development of mild circumferential wall thickening splenic flexure and proximal sigmoid colon which may reflect a nonspecific colitis.  No evidence of pneumatosis or associated drainable fluid collection.     Started on zosyn.  Still with abdominal pain but slowly improved.      OBJECTIVE  /49 (BP Location: Left arm)   Pulse 72   Temp 98.9 °F (37.2 °C) (Oral)   Resp 18   Wt 264 lb 6.4 oz (119.9 kg)   SpO2 93%   BMI 33.05 kg/m²     General: No acute distress. Alert.  HEENT: EOMI   Abdomen: Soft, TTP, distended.   Musculoskeletal: No  edema  Integument: No rashes    Labs:     Recent Labs   Lab 04/06/24  0554 04/07/24  0603 04/08/24  0452 04/09/24  0638 04/10/24  0527   WBC 8.5 9.3 11.1* 10.7 10.2   HGB 13.3 13.6 13.7 12.9* 12.1*   .0 370.0 415.0 420.0 446.0       Recent Labs   Lab 04/06/24  0554 04/07/24  0603 04/08/24  0452 04/09/24  0638 04/10/24  0527    137 136 135* 135*   K 3.7 4.0 3.8 4.0 3.6    103 100 100 101   CO2 29.0 29.0 31.0 29.0 29.0   BUN 8* 10 11 12 12   CREATSERUM 0.89 0.85 0.80 0.64* 0.58*       Recent Labs   Lab 04/10/24  0527   ALT 15   AST 14         Microbiology: Reviewed    Imaging: Reviewed       Ronna Menendez MD  [facility]  [phone]

## 2024-04-10 NOTE — PLAN OF CARE
Patient A/O x4. Room air. Clear liquid diet. Remote tele ordered by physician. CPAP at night. Room air. Ambulates independently. TPN to be started tonight at 9pm. Call light within a reach.   Problem: Patient Centered Care  Goal: Patient preferences are identified and integrated in the patient's plan of care  Description: Interventions:  - What would you like us to know as we care for you? This is my third time having diverticulitis  - Provide timely, complete, and accurate information to patient/family  - Incorporate patient and family knowledge, values, beliefs, and cultural backgrounds into the planning and delivery of care  - Encourage patient/family to participate in care and decision-making at the level they choose  - Honor patient and family perspectives and choices  Outcome: Progressing

## 2024-04-10 NOTE — PROGRESS NOTES
[facility]  Progress Note     Don Cabral  : 1965    Status: Inpatient  Day #: 8    Attending: Trevor Ribeiro MD  PCP: Stephen Christianson MD     Assessment and Plan:    Sigmoid diverticulitis  LLQ abdominal pain   Leukocytosis resolved  3rd episode  - failed outpt PO abx.   - history of recurrent diverticulitis, for which patient has had an appointment with general surgeon for future sigmoidectomy.  - CT showed acute sigmoid diverticulitis with microperforation, no fluid collection.   - Last colonoscopy 2023.   - Pain control.  - prefers PO - norco. Morphine IV for breakthrough  - cont non operative management.   - off zosyn, cont meropenem.   - mid line placed 4/5   - Clear liquid diet per surgery   - started TPN  - Plans for Invanz x 2 weeks on discharge.   - will eventually benefit from future sigmoidectomy. Trying to avoid during acute episode.   - GI, ID and general surgery on consult.      Pt relays hx of PVCs, a-fib, LBBB.  Hx of intermittent premature ventricular tachycardia   - remote tele     Hypertension  - cont lisinopril, verapamil     GERD  - IV PPI bid     Major depressive disorder   Pt says he talked to his psychiatrist and and can stop neurontin.  - stop neurontin  - cont klonopin, zoloft, zyprexa       DVT Mechanical Prophylaxis:        DVT Pharmacologic Prophylaxis   Medication    enoxaparin (Lovenox) 40 MG/0.4ML SUBQ injection 40 mg               Subjective:      Initial Chief Complaint:  abdominal pain    Abdominal pain about the same today. Norco helping. No n/v.       Objective:      Temp:  [98.2 °F (36.8 °C)-98.9 °F (37.2 °C)] 98.9 °F (37.2 °C)  Pulse:  [72-90] 72  Resp:  [18] 18  BP: (128-143)/(49-72) 128/49  SpO2:  [93 %-96 %] 93 %  General:  Alert, no distress  HEENT:  Normocephalic, atraumatic  Cardiac:  Regular rate, regular rhythm  Pulmonary:  Clear to auscultation bilaterally, respirations unlabored  Gastrointestinal:  Soft, non-tender, normal bowel  sounds  Musculoskeletal:  No joint swelling  Extremities:  No edema, no cyanosis, no clubbing  Neurologic:  nonfocal  Psychiatric:  Normal affect, calm and appropriate    Intake/Output Summary (Last 24 hours) at 4/10/2024 1223  Last data filed at 4/9/2024 2015  Gross per 24 hour   Intake 100 ml   Output --   Net 100 ml         Recent Labs   Lab 04/08/24  0452 04/09/24  0638 04/10/24  0527   WBC 11.1* 10.7 10.2   HGB 13.7 12.9* 12.1*   HCT 41.0 37.9* 36.7*   .0 420.0 446.0   RBC 4.81 4.45 4.35   MCV 85.2 85.2 84.4   MCH 28.5 29.0 27.8   MCHC 33.4 34.0 33.0   RDW 13.1 12.9 13.0   NEPRELIM  --  8.41* 7.66     Recent Labs   Lab 04/08/24  0452 04/09/24  0638 04/10/24  0527   BUN 11 12 12   CREATSERUM 0.80 0.64* 0.58*   CA 9.0 8.5* 8.5*   ALB  --   --  3.7    135* 135*   K 3.8 4.0 3.6    100 101   CO2 31.0 29.0 29.0   * 90 118*   MG  --  1.8 2.0   PHOS  --  2.5 1.7*   BILT  --   --  0.6   AST  --   --  14   ALT  --   --  15   ALKPHO  --   --  53   TP  --   --  6.2       No results found.      Medications:   sodium phosphate  15 mmol Intravenous Once    magnesium oxide  400 mg Oral Once    pantoprazole  40 mg Intravenous Q12H    meropenem  500 mg Intravenous Q8H    clonazePAM  0.5 mg Oral TID    enoxaparin  40 mg Subcutaneous Daily    OLANZapine  2.5 mg Oral Nightly    verapamil ER  120 mg Oral Nightly    sertraline  75 mg Oral Daily    lisinopril  20 mg Oral Daily      PRN Meds:   dextrose 10%    clonazePAM    morphINE **OR** morphINE **OR** morphINE    alum-mag hydroxide-simethicone    simethicone    HYDROcodone-acetaminophen **OR** HYDROcodone-acetaminophen    acetaminophen    acetaminophen    ondansetron    prochlorperazine    temazepam    albuterol    Supplementary Documentation:        MDM High. Time spent on chart/note review, review of labs/imaging, discussion with patient, physical exam, discussion with staff, consultants, coordinating care, writing progress note, discussion of plan of  care.      Trevor Ribeiro MD

## 2024-04-10 NOTE — PROGRESS NOTES
[facility]  part of [facility]    Progress Note    Don Cabral Patient Status:  Inpatient    1965 MRN [id number]   Location [facility] 4W/SW/SE Attending Trevor Ribeiro MD   Hosp Day # 8 PCP Stephen Christianson MD       Subjective:   Pt feeling about the same as yesterday. Woke up with pain, controlled with pain medicine. No fever or chills.    Objective:   Vital Signs:  Blood pressure 128/49, pulse 72, temperature 98.9 °F (37.2 °C), temperature source Oral, resp. rate 18, weight 264 lb 6.4 oz (119.9 kg), SpO2 93%.    Physical Exam     General: No acute distress. Alert and oriented x 3.  HEENT: Moist mucous membranes. Anicteric.   Neck: Supple, No JVD.   Respiratory: Nonlabored resp.  Cardiovascular: Regular rate.   Abdomen: Soft, left sided tenderness, no rebound tnd, no guarding, nondistended.  No masses. Normal bowel sounds.    Neurologic: No focal neurological deficits.  Musculoskeletal: Full range of motion of all extremities. No calf tenderness. No swelling noted.  Integument: No lesions. No erythema.  Psychiatric: Appropriate mood and affect.         Assessment and Plan:     Sigmoid diverticulitis      Intractable abdominal pain    Continue supportive management at this time, including IV abx, TPN and clear liquid diet. Plan surgical intervention if clinically worsening. Will continue to follow and monitor progress.    Results:     Lab Results   Component Value Date    WBC 10.2 04/10/2024    HGB 12.1 (L) 04/10/2024    HCT 36.7 (L) 04/10/2024    .0 04/10/2024    CREATSERUM 0.58 (L) 04/10/2024    BUN 12 04/10/2024     (L) 04/10/2024    K 3.6 04/10/2024     04/10/2024    CO2 29.0 04/10/2024     (H) 04/10/2024    CA 8.5 (L) 04/10/2024    ALB 3.7 04/10/2024    ALKPHO 53 04/10/2024    BILT 0.6 04/10/2024    TP 6.2 04/10/2024    AST 14 04/10/2024    ALT 15 04/10/2024    PTT 24.9 2023    INR 1.01 2023    TSH 1.850 2023    LIP 37 2024     PSA 2.53 08/02/2021    DDIMER 2.44 (H) 06/23/2023    MG 2.0 04/10/2024    PHOS 1.7 (L) 04/10/2024    TROP <0.045 02/05/2020       No results found.                Stephen Nunez PA-C  4/10/2024    Addendum:    Pt seen and examined.  I agree with Stephen Nunez'sISELA note.    Rick Novoa MD

## 2024-04-10 NOTE — SPIRITUAL CARE NOTE
Spiritual Care Visit Note    Patient Name: Don Cabral Date of Spiritual Care Visit: 04/10/24   : 1965 Primary Dx: Sigmoid diverticulitis     Visit Type/Summary:     - Spiritual Care: Attempted visit. Patient is unavailable. Left a Spiritual Care contact information card.    Spiritual Care support can be requested via an Epic consult. For urgent/immediate needs, please contact the On Call  at: [place]: ext [phone]    Sade Hernandez MA, MA   Chaplain Resident  [facility], Spiritual Care Department   On-Call  via EPIC consult

## 2024-04-11 ENCOUNTER — APPOINTMENT (OUTPATIENT)
Dept: CT IMAGING | Facility: HOSPITAL | Age: 59
End: 2024-04-11
Payer: COMMERCIAL

## 2024-04-11 LAB
ANION GAP SERPL CALC-SCNC: 5 MMOL/L (ref 0–18)
BASOPHILS # BLD AUTO: 0.01 X10(3) UL (ref 0–0.2)
BASOPHILS NFR BLD AUTO: 0.1 %
BUN BLD-MCNC: 14 MG/DL (ref 9–23)
BUN/CREAT SERPL: 25 (ref 10–20)
CALCIUM BLD-MCNC: 8.4 MG/DL (ref 8.7–10.4)
CHLORIDE SERPL-SCNC: 104 MMOL/L (ref 98–112)
CO2 SERPL-SCNC: 28 MMOL/L (ref 21–32)
CREAT BLD-MCNC: 0.56 MG/DL
DEPRECATED RDW RBC AUTO: 39.8 FL (ref 35.1–46.3)
EGFRCR SERPLBLD CKD-EPI 2021: 115 ML/MIN/1.73M2 (ref 60–?)
EOSINOPHIL # BLD AUTO: 0.12 X10(3) UL (ref 0–0.7)
EOSINOPHIL NFR BLD AUTO: 1.1 %
ERYTHROCYTE [DISTWIDTH] IN BLOOD BY AUTOMATED COUNT: 13.1 % (ref 11–15)
GLUCOSE BLD-MCNC: 104 MG/DL (ref 70–99)
GLUCOSE BLDC GLUCOMTR-MCNC: 105 MG/DL (ref 70–99)
GLUCOSE BLDC GLUCOMTR-MCNC: 118 MG/DL (ref 70–99)
HCT VFR BLD AUTO: 34.3 %
HGB BLD-MCNC: 12.1 G/DL
IMM GRANULOCYTES # BLD AUTO: 0.03 X10(3) UL (ref 0–1)
IMM GRANULOCYTES NFR BLD: 0.3 %
LYMPHOCYTES # BLD AUTO: 1.78 X10(3) UL (ref 1–4)
LYMPHOCYTES NFR BLD AUTO: 16.6 %
MAGNESIUM SERPL-MCNC: 2 MG/DL (ref 1.6–2.6)
MCH RBC QN AUTO: 29.6 PG (ref 26–34)
MCHC RBC AUTO-ENTMCNC: 35.3 G/DL (ref 31–37)
MCV RBC AUTO: 83.9 FL
MONOCYTES # BLD AUTO: 0.94 X10(3) UL (ref 0.1–1)
MONOCYTES NFR BLD AUTO: 8.7 %
NEUTROPHILS # BLD AUTO: 7.87 X10 (3) UL (ref 1.5–7.7)
NEUTROPHILS # BLD AUTO: 7.87 X10(3) UL (ref 1.5–7.7)
NEUTROPHILS NFR BLD AUTO: 73.2 %
OSMOLALITY SERPL CALC.SUM OF ELEC: 285 MOSM/KG (ref 275–295)
PHOSPHATE SERPL-MCNC: 2 MG/DL (ref 2.4–5.1)
PLATELET # BLD AUTO: 432 10(3)UL (ref 150–450)
POTASSIUM SERPL-SCNC: 4.1 MMOL/L (ref 3.5–5.1)
RBC # BLD AUTO: 4.09 X10(6)UL
SODIUM SERPL-SCNC: 137 MMOL/L (ref 136–145)
WBC # BLD AUTO: 10.8 X10(3) UL (ref 4–11)

## 2024-04-11 PROCEDURE — 74177 CT ABD & PELVIS W/CONTRAST: CPT

## 2024-04-11 PROCEDURE — 99233 SBSQ HOSP IP/OBS HIGH 50: CPT | Performed by: HOSPITALIST

## 2024-04-11 PROCEDURE — 99232 SBSQ HOSP IP/OBS MODERATE 35: CPT | Performed by: SURGERY

## 2024-04-11 RX ORDER — ENOXAPARIN SODIUM 100 MG/ML
0.5 INJECTION SUBCUTANEOUS DAILY
Status: DISCONTINUED | OUTPATIENT
Start: 2024-04-11 | End: 2024-04-19

## 2024-04-11 RX ORDER — KETOROLAC TROMETHAMINE 15 MG/ML
15 INJECTION, SOLUTION INTRAMUSCULAR; INTRAVENOUS ONCE
Status: COMPLETED | OUTPATIENT
Start: 2024-04-11 | End: 2024-04-11

## 2024-04-11 NOTE — PLAN OF CARE
Patient alert and oriented x4. Vss. On room air. CLD, minimal appetite. No nausea. Small BM. Voiding freely. TPN to R PICC. Antibiotics as ordered. Sodium phos repleted per protocol. PRN norco for pain control. Maalox given prior to norco due to patient reporting indigestion. Ambulating independently. Fall precautions in place. Call light within reach.     CT A/P ordered, patient en route in evening approx 1815.   Problem: Patient Centered Care  Goal: Patient preferences are identified and integrated in the patient's plan of care  Description: Interventions:  - What would you like us to know as we care for you? This is my third time having diverticulitis  - Provide timely, complete, and accurate information to patient/family  - Incorporate patient and family knowledge, values, beliefs, and cultural backgrounds into the planning and delivery of care  - Encourage patient/family to participate in care and decision-making at the level they choose  - Honor patient and family perspectives and choices  Outcome: Progressing     Problem: Patient/Family Goals  Goal: Patient/Family Long Term Goal  Description: Patient's Long Term Goal: Discharge home    Interventions:  - Manage abdominal pain  - Tolerate adequate oral intake without complication  - Determine appropriate antibiotic plan  - See additional Care Plan goals for specific interventions  Outcome: Progressing  Goal: Patient/Family Short Term Goal  Description: Patient's Short Term Goal: Manage abdominal pain    Interventions:   - Transition from IV to oral pain medications  - Bowel rest with slow diet tolerance  - Eat small frequent light meals  - See additional Care Plan goals for specific interventions  Outcome: Progressing     Problem: PAIN - ADULT  Goal: Verbalizes/displays adequate comfort level or patient's stated pain goal  Description: INTERVENTIONS:  - Encourage pt to monitor pain and request assistance  - Assess pain using appropriate pain scale  - Administer  analgesics based on type and severity of pain and evaluate response  - Implement non-pharmacological measures as appropriate and evaluate response  - Consider cultural and social influences on pain and pain management  - Manage/alleviate anxiety  - Utilize distraction and/or relaxation techniques  - Monitor for opioid side effects  - Notify MD/LIP if interventions unsuccessful or patient reports new pain  - Anticipate increased pain with activity and pre-medicate as appropriate  Outcome: Progressing     Problem: RISK FOR INFECTION - ADULT  Goal: Absence of fever/infection during anticipated neutropenic period  Description: INTERVENTIONS  - Monitor WBC  - Administer growth factors as ordered  - Implement neutropenic guidelines  Outcome: Progressing     Problem: DISCHARGE PLANNING  Goal: Discharge to home or other facility with appropriate resources  Description: INTERVENTIONS:  - Identify barriers to discharge w/pt and caregiver  - Include patient/family/discharge partner in discharge planning  - Arrange for needed discharge resources and transportation as appropriate  - Identify discharge learning needs (meds, wound care, etc)  - Arrange for interpreters to assist at discharge as needed  - Consider post-discharge preferences of patient/family/discharge partner  - Complete POLST form as appropriate  - Assess patient's ability to be responsible for managing their own health  - Refer to Case Management Department for coordinating discharge planning if the patient needs post-hospital services based on physician/LIP order or complex needs related to functional status, cognitive ability or social support system  Outcome: Progressing     Problem: GASTROINTESTINAL - ADULT  Goal: Maintains or returns to baseline bowel function  Description: INTERVENTIONS:  - Assess bowel function  - Maintain adequate hydration with IV or PO as ordered and tolerated  - Evaluate effectiveness of GI medications  - Encourage mobilization and  activity  - Obtain nutritional consult as needed  - Establish a toileting routine/schedule  - Consider collaborating with pharmacy to review patient's medication profile  Outcome: Progressing  Goal: Minimal or absence of nausea and vomiting  Description: INTERVENTIONS:  - Maintain adequate hydration with IV or PO as ordered and tolerated  - Nasogastric tube to low intermittent suction as ordered  - Evaluate effectiveness of ordered antiemetic medications  - Provide nonpharmacologic comfort measures as appropriate  - Advance diet as tolerated, if ordered  - Obtain nutritional consult as needed  - Evaluate fluid balance  Outcome: Progressing  Goal: Maintains adequate nutritional intake (undernourished)  Description: INTERVENTIONS:  - Monitor percentage of each meal consumed  - Identify factors contributing to decreased intake, treat as appropriate  - Assist with meals as needed  - Monitor I&O, WT and lab values  - Obtain nutritional consult as needed  - Optimize oral hygiene and moisture  - Encourage food from home; allow for food preferences  - Enhance eating environment  Outcome: Progressing

## 2024-04-11 NOTE — PROGRESS NOTES
[facility]  part of [facility]    Progress Note    Don Cabral Patient Status:  Inpatient    1965 MRN [id number]   Location [facility] 4W/SW/SE Attending Dionisio Dong MD   Hosp Day # 9 PCP Stephen Christianson MD       Subjective:   Feeling worse, woke up this morning with 10/10 abdominal pain. No fever or chills, tolerating clears.    Objective:   Vital Signs:  Blood pressure 124/67, pulse 78, temperature 98.9 °F (37.2 °C), temperature source Oral, resp. rate 17, weight 264 lb 6.4 oz (119.9 kg), SpO2 95%.    Physical Exam     General: No acute distress. Alert and oriented x 3.  HEENT: Moist mucous membranes. Anicteric.   Neck: Supple, No JVD.   Respiratory: Nonlabored resp.  Cardiovascular: Regular rate.   Abdomen: Soft, left sided tenderness, no rebound tnd, no guarding, nondistended.  No masses. Normal bowel sounds.    Neurologic: No focal neurological deficits.  Musculoskeletal: Full range of motion of all extremities. No calf tenderness. No swelling noted.  Integument: No lesions. No erythema.  Psychiatric: Appropriate mood and affect.         Assessment and Plan:     Sigmoid diverticulitis      Intractable abdominal pain    Continue TPN and clears until pain improves. Abx per ID. Plan for Zaid's procedure if clinically worsening. WBC 10.8. Will continue to follow and monitor closely.    Results:     Lab Results   Component Value Date    WBC 10.8 2024    HGB 12.1 (L) 2024    HCT 34.3 (L) 2024    .0 2024    CREATSERUM 0.56 (L) 2024    BUN 14 2024     2024    K 4.1 2024     2024    CO2 28.0 2024     (H) 2024    CA 8.4 (L) 2024    ALB 3.7 04/10/2024    ALKPHO 53 04/10/2024    BILT 0.6 04/10/2024    TP 6.2 04/10/2024    AST 14 04/10/2024    ALT 15 04/10/2024    PTT 24.9 2023    INR 1.01 2023    TSH 1.850 2023    LIP 37 2024    PSA 2.53 2021     DDIMER 2.44 (H) 06/23/2023    MG 2.0 04/11/2024    PHOS 2.0 (L) 04/11/2024    TROP <0.045 02/05/2020       No results found.                Stephen Nunez PA-C  4/11/2024    Addendum:    Pt seen and examined.  I agree with Stephen Nunez'sISELA note.    Rick Novoa MD

## 2024-04-11 NOTE — PROGRESS NOTES
[facility]  Progress Note     Don Cabral  : 1965    Status: Inpatient  Day #: 9    Attending: Dionisio Dong MD  PCP: Stephen Christianson MD     Assessment and Plan:    Sigmoid diverticulitis  RECURRENT   LLQ abdominal pain   Leukocytosis resolved  3rd episode  - failed outpt PO abx.   - history of recurrent diverticulitis, for which patient has had an appointment with general surgeon for future sigmoidectomy.  - CT showed acute sigmoid diverticulitis with microperforation, no fluid collection.   - Last colonoscopy 2023.   - Pain control.  - prefers PO - norco. Morphine IV for breakthrough  - off zosyn, cont meropenem.   - mid line placed 4/5   - Clear liquid diet per surgery   - started TPN  - Plans for Invanz x 2 weeks on discharge.   - will eventually benefit from future sigmoidectomy. Trying to avoid during acute episode.   - CT abd to be repeated today  - GI, ID and general surgery on consult.      Pt relays hx of PVCs, a-fib, LBBB.  Hx of intermittent premature ventricular tachycardia   - remote tele     Hypertension  - cont lisinopril, verapamil     GERD  - IV PPI bid     Major depressive disorder   Pt says he talked to his psychiatrist and and can stop neurontin.  - stop neurontin  - cont klonopin, zoloft, zyprexa       DVT Mechanical Prophylaxis:        DVT Pharmacologic Prophylaxis   Medication    enoxaparin (Lovenox) 60 MG/0.6ML SUBQ injection 60 mg               Subjective:      Initial Chief Complaint:  abdominal pain    This AM had wosening abdominal pain  No nausea or vomiting  Currently pt is comfortable  No acute distress      Objective:      Temp:  [98.4 °F (36.9 °C)-99.1 °F (37.3 °C)] 99.1 °F (37.3 °C)  Pulse:  [71-84] 71  Resp:  [17-18] 18  BP: (124-143)/(51-70) 135/70  SpO2:  [92 %-95 %] 93 %  General:  Alert, no distress  HEENT:  Normocephalic, atraumatic  Cardiac:  Regular rate, regular rhythm  Pulmonary:  Clear to auscultation bilaterally, respirations  unlabored  Gastrointestinal:  Soft, non-tender, normal bowel sounds  Musculoskeletal:  No joint swelling  Extremities:  No edema, no cyanosis, no clubbing  Neurologic:  nonfocal  Psychiatric:  Normal affect, calm and appropriate    Intake/Output Summary (Last 24 hours) at 4/11/2024 1604  Last data filed at 4/10/2024 2022  Gross per 24 hour   Intake 100 ml   Output --   Net 100 ml         Recent Labs   Lab 04/09/24 0638 04/10/24  0527 04/11/24  0611   WBC 10.7 10.2 10.8   HGB 12.9* 12.1* 12.1*   HCT 37.9* 36.7* 34.3*   .0 446.0 432.0   RBC 4.45 4.35 4.09*   MCV 85.2 84.4 83.9   MCH 29.0 27.8 29.6   MCHC 34.0 33.0 35.3   RDW 12.9 13.0 13.1   NEPRELIM 8.41* 7.66 7.87*     Recent Labs   Lab 04/09/24 0638 04/10/24  0527 04/11/24  0611   BUN 12 12 14   CREATSERUM 0.64* 0.58* 0.56*   CA 8.5* 8.5* 8.4*   ALB  --  3.7  --    * 135* 137   K 4.0 3.6 4.1    101 104   CO2 29.0 29.0 28.0   GLU 90 118* 104*   MG 1.8 2.0 2.0   PHOS 2.5 1.7* 2.0*   BILT  --  0.6  --    AST  --  14  --    ALT  --  15  --    ALKPHO  --  53  --    TP  --  6.2  --        No results found.      Medications:   enoxaparin  0.5 mg/kg Subcutaneous Daily    magnesium oxide  400 mg Oral Once    pantoprazole  40 mg Intravenous Q12H    meropenem  500 mg Intravenous Q8H    clonazePAM  0.5 mg Oral TID    OLANZapine  2.5 mg Oral Nightly    verapamil ER  120 mg Oral Nightly    sertraline  75 mg Oral Daily    lisinopril  20 mg Oral Daily      PRN Meds:   dextrose 10%    clonazePAM    morphINE **OR** morphINE **OR** morphINE    alum-mag hydroxide-simethicone    simethicone    HYDROcodone-acetaminophen **OR** HYDROcodone-acetaminophen    acetaminophen    acetaminophen    ondansetron    prochlorperazine    temazepam    albuterol    Supplementary Documentation:        MDM High. Time spent on chart/note review, review of labs/imaging, discussion with patient, physical exam, discussion with staff, consultants, coordinating care, writing progress note,  discussion of plan of care.    Dionisio Dong MD

## 2024-04-11 NOTE — PLAN OF CARE
Patient is alert and oriented. Room air. CPAP worn overnight. Remote tele in place. Up independently. IV abx and TPN continued through R PICC. Q6 accuchecks. Prn norco and morphine given for pain. Patient was having increased amount of pain this AM not managed with norco & morphine, notified MD, 1x dose of Toradol given. Call light and personal belongings within reach. Safety precautions in place.     Problem: Patient Centered Care  Goal: Patient preferences are identified and integrated in the patient's plan of care  Description: Interventions:  - What would you like us to know as we care for you? This is my third time having diverticulitis  - Provide timely, complete, and accurate information to patient/family  - Incorporate patient and family knowledge, values, beliefs, and cultural backgrounds into the planning and delivery of care  - Encourage patient/family to participate in care and decision-making at the level they choose  - Honor patient and family perspectives and choices  Outcome: Progressing     Problem: Patient/Family Goals  Goal: Patient/Family Long Term Goal  Description: Patient's Long Term Goal: Discharge home    Interventions:  - Manage abdominal pain  - Tolerate adequate oral intake without complication  - Determine appropriate antibiotic plan  - See additional Care Plan goals for specific interventions  Outcome: Progressing  Goal: Patient/Family Short Term Goal  Description: Patient's Short Term Goal: Manage abdominal pain    Interventions:   - Transition from IV to oral pain medications  - Bowel rest with slow diet tolerance  - Eat small frequent light meals  - See additional Care Plan goals for specific interventions  Outcome: Progressing     Problem: PAIN - ADULT  Goal: Verbalizes/displays adequate comfort level or patient's stated pain goal  Description: INTERVENTIONS:  - Encourage pt to monitor pain and request assistance  - Assess pain using appropriate pain scale  - Administer analgesics  based on type and severity of pain and evaluate response  - Implement non-pharmacological measures as appropriate and evaluate response  - Consider cultural and social influences on pain and pain management  - Manage/alleviate anxiety  - Utilize distraction and/or relaxation techniques  - Monitor for opioid side effects  - Notify MD/LIP if interventions unsuccessful or patient reports new pain  - Anticipate increased pain with activity and pre-medicate as appropriate  Outcome: Progressing     Problem: RISK FOR INFECTION - ADULT  Goal: Absence of fever/infection during anticipated neutropenic period  Description: INTERVENTIONS  - Monitor WBC  - Administer growth factors as ordered  - Implement neutropenic guidelines  Outcome: Progressing     Problem: DISCHARGE PLANNING  Goal: Discharge to home or other facility with appropriate resources  Description: INTERVENTIONS:  - Identify barriers to discharge w/pt and caregiver  - Include patient/family/discharge partner in discharge planning  - Arrange for needed discharge resources and transportation as appropriate  - Identify discharge learning needs (meds, wound care, etc)  - Arrange for interpreters to assist at discharge as needed  - Consider post-discharge preferences of patient/family/discharge partner  - Complete POLST form as appropriate  - Assess patient's ability to be responsible for managing their own health  - Refer to Case Management Department for coordinating discharge planning if the patient needs post-hospital services based on physician/LIP order or complex needs related to functional status, cognitive ability or social support system  Outcome: Progressing     Problem: GASTROINTESTINAL - ADULT  Goal: Maintains or returns to baseline bowel function  Description: INTERVENTIONS:  - Assess bowel function  - Maintain adequate hydration with IV or PO as ordered and tolerated  - Evaluate effectiveness of GI medications  - Encourage mobilization and activity  -  Obtain nutritional consult as needed  - Establish a toileting routine/schedule  - Consider collaborating with pharmacy to review patient's medication profile  Outcome: Progressing  Goal: Minimal or absence of nausea and vomiting  Description: INTERVENTIONS:  - Maintain adequate hydration with IV or PO as ordered and tolerated  - Nasogastric tube to low intermittent suction as ordered  - Evaluate effectiveness of ordered antiemetic medications  - Provide nonpharmacologic comfort measures as appropriate  - Advance diet as tolerated, if ordered  - Obtain nutritional consult as needed  - Evaluate fluid balance  Outcome: Progressing  Goal: Maintains adequate nutritional intake (undernourished)  Description: INTERVENTIONS:  - Monitor percentage of each meal consumed  - Identify factors contributing to decreased intake, treat as appropriate  - Assist with meals as needed  - Monitor I&O, WT and lab values  - Obtain nutritional consult as needed  - Optimize oral hygiene and moisture  - Encourage food from home; allow for food preferences  - Enhance eating environment  Outcome: Progressing

## 2024-04-11 NOTE — PLAN OF CARE
Problem: PAIN - ADULT  Goal: Verbalizes/displays adequate comfort level or patient's stated pain goal  Description: INTERVENTIONS:  - Encourage pt to monitor pain and request assistance  - Assess pain using appropriate pain scale  - Administer analgesics based on type and severity of pain and evaluate response  - Implement non-pharmacological measures as appropriate and evaluate response  - Consider cultural and social influences on pain and pain management  - Manage/alleviate anxiety  - Utilize distraction and/or relaxation techniques  - Monitor for opioid side effects  - Notify MD/LIP if interventions unsuccessful or patient reports new pain  - Anticipate increased pain with activity and pre-medicate as appropriate  Outcome: Not Progressing     Problem: RISK FOR INFECTION - ADULT  Goal: Absence of fever/infection during anticipated neutropenic period  Description: INTERVENTIONS  - Monitor WBC  - Administer growth factors as ordered  - Implement neutropenic guidelines  Outcome: Progressing     Problem: GASTROINTESTINAL - ADULT  Goal: Maintains or returns to baseline bowel function  Description: INTERVENTIONS:  - Assess bowel function  - Maintain adequate hydration with IV or PO as ordered and tolerated  - Evaluate effectiveness of GI medications  - Encourage mobilization and activity  - Obtain nutritional consult as needed  - Establish a toileting routine/schedule  - Consider collaborating with pharmacy to review patient's medication profile  Outcome: Not Progressing  Goal: Minimal or absence of nausea and vomiting  Description: INTERVENTIONS:  - Maintain adequate hydration with IV or PO as ordered and tolerated  - Nasogastric tube to low intermittent suction as ordered  - Evaluate effectiveness of ordered antiemetic medications  - Provide nonpharmacologic comfort measures as appropriate  - Advance diet as tolerated, if ordered  - Obtain nutritional consult as needed  - Evaluate fluid balance  Outcome: Not  Progressing  Goal: Maintains adequate nutritional intake (undernourished)  Description: INTERVENTIONS:  - Monitor percentage of each meal consumed  - Identify factors contributing to decreased intake, treat as appropriate  - Assist with meals as needed  - Monitor I&O, WT and lab values  - Obtain nutritional consult as needed  - Optimize oral hygiene and moisture  - Encourage food from home; allow for food preferences  - Enhance eating environment  Outcome: Not Progressing    Patient is alert and oriented, aware to call for help as needed.  Patient is currently in room air, denies shortness of breathing nor chest pain.  Patient is up ambulating in room.  Patient still is having uncontrolled pain on his abdomen and noted to still be bloated and tender to touch.  Patient still is on a TPN for nutrition.  Patient plans to go home when stable.

## 2024-04-12 ENCOUNTER — TELEPHONE (OUTPATIENT)
Facility: CLINIC | Age: 59
End: 2024-04-12

## 2024-04-12 LAB
ALBUMIN SERPL-MCNC: 3.6 G/DL (ref 3.2–4.8)
ALBUMIN/GLOB SERPL: 1.6 {RATIO} (ref 1–2)
ALP LIVER SERPL-CCNC: 52 U/L
ALT SERPL-CCNC: 18 U/L
ANION GAP SERPL CALC-SCNC: 6 MMOL/L (ref 0–18)
ANION GAP SERPL CALC-SCNC: 6 MMOL/L (ref 0–18)
AST SERPL-CCNC: 19 U/L (ref ?–34)
BASOPHILS # BLD AUTO: 0 X10(3) UL (ref 0–0.2)
BASOPHILS NFR BLD AUTO: 0 %
BILIRUB SERPL-MCNC: 0.4 MG/DL (ref 0.3–1.2)
BUN BLD-MCNC: 13 MG/DL (ref 9–23)
BUN BLD-MCNC: 13 MG/DL (ref 9–23)
BUN/CREAT SERPL: 25 (ref 10–20)
BUN/CREAT SERPL: 25 (ref 10–20)
CALCIUM BLD-MCNC: 8.4 MG/DL (ref 8.7–10.4)
CALCIUM BLD-MCNC: 8.4 MG/DL (ref 8.7–10.4)
CHLORIDE SERPL-SCNC: 104 MMOL/L (ref 98–112)
CHLORIDE SERPL-SCNC: 104 MMOL/L (ref 98–112)
CO2 SERPL-SCNC: 28 MMOL/L (ref 21–32)
CO2 SERPL-SCNC: 28 MMOL/L (ref 21–32)
CREAT BLD-MCNC: 0.52 MG/DL
CREAT BLD-MCNC: 0.52 MG/DL
DEPRECATED RDW RBC AUTO: 40.9 FL (ref 35.1–46.3)
EGFRCR SERPLBLD CKD-EPI 2021: 118 ML/MIN/1.73M2 (ref 60–?)
EGFRCR SERPLBLD CKD-EPI 2021: 118 ML/MIN/1.73M2 (ref 60–?)
EOSINOPHIL # BLD AUTO: 0.23 X10(3) UL (ref 0–0.7)
EOSINOPHIL NFR BLD AUTO: 2.1 %
ERYTHROCYTE [DISTWIDTH] IN BLOOD BY AUTOMATED COUNT: 13.1 % (ref 11–15)
GLOBULIN PLAS-MCNC: 2.3 G/DL (ref 2.8–4.4)
GLUCOSE BLD-MCNC: 100 MG/DL (ref 70–99)
GLUCOSE BLD-MCNC: 100 MG/DL (ref 70–99)
GLUCOSE BLDC GLUCOMTR-MCNC: 114 MG/DL (ref 70–99)
HCT VFR BLD AUTO: 35 %
HGB BLD-MCNC: 12.1 G/DL
IMM GRANULOCYTES # BLD AUTO: 0.05 X10(3) UL (ref 0–1)
IMM GRANULOCYTES NFR BLD: 0.5 %
LYMPHOCYTES # BLD AUTO: 1.73 X10(3) UL (ref 1–4)
LYMPHOCYTES NFR BLD AUTO: 15.8 %
MAGNESIUM SERPL-MCNC: 2.1 MG/DL (ref 1.6–2.6)
MCH RBC QN AUTO: 29.5 PG (ref 26–34)
MCHC RBC AUTO-ENTMCNC: 34.6 G/DL (ref 31–37)
MCV RBC AUTO: 85.4 FL
MONOCYTES # BLD AUTO: 1.03 X10(3) UL (ref 0.1–1)
MONOCYTES NFR BLD AUTO: 9.4 %
NEUTROPHILS # BLD AUTO: 7.93 X10 (3) UL (ref 1.5–7.7)
NEUTROPHILS # BLD AUTO: 7.93 X10(3) UL (ref 1.5–7.7)
NEUTROPHILS NFR BLD AUTO: 72.2 %
OSMOLALITY SERPL CALC.SUM OF ELEC: 286 MOSM/KG (ref 275–295)
OSMOLALITY SERPL CALC.SUM OF ELEC: 286 MOSM/KG (ref 275–295)
PHOSPHATE SERPL-MCNC: 3.4 MG/DL (ref 2.4–5.1)
PLATELET # BLD AUTO: 475 10(3)UL (ref 150–450)
POTASSIUM SERPL-SCNC: 4 MMOL/L (ref 3.5–5.1)
POTASSIUM SERPL-SCNC: 4 MMOL/L (ref 3.5–5.1)
PROT SERPL-MCNC: 5.9 G/DL (ref 5.7–8.2)
RBC # BLD AUTO: 4.1 X10(6)UL
SODIUM SERPL-SCNC: 138 MMOL/L (ref 136–145)
SODIUM SERPL-SCNC: 138 MMOL/L (ref 136–145)
WBC # BLD AUTO: 11 X10(3) UL (ref 4–11)

## 2024-04-12 PROCEDURE — 99232 SBSQ HOSP IP/OBS MODERATE 35: CPT | Performed by: SURGERY

## 2024-04-12 PROCEDURE — 99233 SBSQ HOSP IP/OBS HIGH 50: CPT | Performed by: HOSPITALIST

## 2024-04-12 NOTE — PROGRESS NOTES
[facility]  Progress Note     Don Cabral  : 1965    Status: Inpatient  Day #: 10    Attending: Dionisio Dong MD  PCP: Stephen Christianson MD     Assessment and Plan:    Sigmoid diverticulitis  RECURRENT   LLQ abdominal pain   Leukocytosis resolved  3rd episode  - failed outpt PO abx.   - history of recurrent diverticulitis, for which patient has had an appointment with general surgeon for future sigmoidectomy.  - CT showed acute sigmoid diverticulitis with microperforation, no fluid collection.   - Last colonoscopy 2023.   - Pain control.  - prefers PO - norco. Morphine IV for breakthrough  - off zosyn, cont meropenem.   - mid line placed /   - Clear liquid diet per surgery   - started TPN  - Plans for Invanz x 2 weeks on discharge.   - will eventually benefit from future sigmoidectomy. Trying to avoid during acute episode.   - CT abd from  showing increased inflammation, no abscess   - GI, ID and general surgery on consult.   - tentatively planning for \"ex laparotomy, segmental resection with end colostomy on Monday if no clinical improvement\" per surgery     Pt relays hx of PVCs, a-fib, LBBB.  Hx of intermittent premature ventricular tachycardia   - remote tele     Hypertension  - cont lisinopril, verapamil     GERD  - IV PPI bid     Major depressive disorder   Pt says he talked to his psychiatrist and and can stop neurontin.  - stop neurontin  - cont klonopin, zoloft, zyprexa       DVT Mechanical Prophylaxis:        DVT Pharmacologic Prophylaxis   Medication    enoxaparin (Lovenox) 60 MG/0.6ML SUBQ injection 60 mg               Subjective:      Initial Chief Complaint:  abdominal pain  Currently lying in bed, NAD  Abd pain controlled   No nausea or vomiting  Will trial clears today      Objective:      Temp:  [98.2 °F (36.8 °C)-99.1 °F (37.3 °C)] 98.2 °F (36.8 °C)  Pulse:  [70-81] 75  Resp:  [18] 18  BP: (133-144)/(51-71) 144/71  SpO2:  [93 %-98 %] 98 %  General:  Alert, no  distress  HEENT:  Normocephalic, atraumatic  Cardiac:  Regular rate, regular rhythm  Pulmonary:  Clear to auscultation bilaterally, respirations unlabored  Gastrointestinal:  Soft, non-tender, normal bowel sounds  Musculoskeletal:  No joint swelling  Extremities:  No edema, no cyanosis, no clubbing  Neurologic:  nonfocal  Psychiatric:  Normal affect, calm and appropriate    Intake/Output Summary (Last 24 hours) at 4/12/2024 1208  Last data filed at 4/12/2024 1051  Gross per 24 hour   Intake 1592 ml   Output --   Net 1592 ml         Recent Labs   Lab 04/10/24  0527 04/11/24  0611 04/12/24  0610   WBC 10.2 10.8 11.0   HGB 12.1* 12.1* 12.1*   HCT 36.7* 34.3* 35.0*   .0 432.0 475.0*   RBC 4.35 4.09* 4.10*   MCV 84.4 83.9 85.4   MCH 27.8 29.6 29.5   MCHC 33.0 35.3 34.6   RDW 13.0 13.1 13.1   NEPRELIM 7.66 7.87* 7.93*     Recent Labs   Lab 04/10/24  0527 04/11/24  0611 04/12/24  0610   BUN 12 14 13  13   CREATSERUM 0.58* 0.56* 0.52*  0.52*   CA 8.5* 8.4* 8.4*  8.4*   ALB 3.7  --  3.6   * 137 138  138   K 3.6 4.1 4.0  4.0    104 104  104   CO2 29.0 28.0 28.0  28.0   * 104* 100*  100*   MG 2.0 2.0 2.1   PHOS 1.7* 2.0* 3.4   BILT 0.6  --  0.4   AST 14  --  19   ALT 15  --  18   ALKPHO 53  --  52   TP 6.2  --  5.9       CT ABDOMEN+PELVIS(CONTRAST ONLY)(CPT=74177)    Result Date: 4/11/2024  CONCLUSION:   Increasing inflammation and stranding involving a broad area of mesenteric fat in the left lateral abdomen likely either mesenteric infarct or epiploic appendagitis.  No abscess    Dictated by (CST): Carmelo Babb MD on 4/11/2024 at 7:25 PM     Finalized by (CST): Carmelo Babb MD on 4/11/2024 at 7:36 PM             Medications:   enoxaparin  0.5 mg/kg Subcutaneous Daily    magnesium oxide  400 mg Oral Once    pantoprazole  40 mg Intravenous Q12H    meropenem  500 mg Intravenous Q8H    clonazePAM  0.5 mg Oral TID    OLANZapine  2.5 mg Oral Nightly    verapamil ER  120 mg Oral Nightly     sertraline  75 mg Oral Daily    lisinopril  20 mg Oral Daily      PRN Meds:   dextrose 10%    clonazePAM    morphINE **OR** morphINE **OR** morphINE    alum-mag hydroxide-simethicone    simethicone    HYDROcodone-acetaminophen **OR** HYDROcodone-acetaminophen    acetaminophen    acetaminophen    ondansetron    prochlorperazine    temazepam    albuterol    Supplementary Documentation:        [facility]. Time spent on chart/note review, review of labs/imaging, discussion with patient, physical exam, discussion with staff, consultants, coordinating care, writing progress note, discussion of plan of care.    Dionisio Dong MD

## 2024-04-12 NOTE — TELEPHONE ENCOUNTER
Patient calling states is currently admitted in ER and wants to speak to Dr/RN regards Dr wanting pt to get procedure. Please call.

## 2024-04-12 NOTE — TELEPHONE ENCOUNTER
Dr. Falcon    [facility], per protocol RN cannot triage inpatient.  Requesting to speak to you about procedure.  Dr. Brooks saw him for consult on 4/9/24.    Dr. Novoa saw patient inpatient and note mentions Zaid's procedure if worsening.    Thank you

## 2024-04-12 NOTE — PROGRESS NOTES
[facility]  part of [facility]    Progress Note    Don Cabral Patient Status:  Inpatient    1965 MRN [id number]   Location [facility] 4W/SW/SE Attending Dionisio Dong MD   Hosp Day # 10 PCP Stephen Christianson MD     Assessment and Plan:       Sigmoid diverticulitis    Intractable abdominal pain  -smoldering course despite bowel rest and IV abx.  No evidence of sepsis.  Area of inflammation is splenic flexure which is different than his first episode where the location was the sigmoid colon.  Discussed w pt at length regarding the nature of his condition and rationale for each treatment options.  Plan for ex laparotomy, segmental resection with end colostomy on Monday if no clinical improvement.      Recommend:  Continue non operative management    Subjective:   Has lots of concerns about ct reading.  Questions answered.    Objective:   Patient Vitals for the past 24 hrs:   BP Temp Temp src Pulse Resp SpO2 Weight   24 1013 -- -- -- -- -- -- 230 lb 14.4 oz (104.7 kg)   24 0556 144/71 -- -- 75 -- -- --   24 0414 133/51 98.2 °F (36.8 °C) Oral 70 18 98 % --   24 2241 135/66 98.6 °F (37 °C) Oral 81 18 94 % --   24 1223 135/70 99.1 °F (37.3 °C) Oral 71 18 93 % --       Intake/Output                   04/10/24 0700 - 24 0659 24 0700 - 24 0659 24 0700 - 24 0659       Intake    P.O.  --  400  0    P.O. -- 400 0    I.V.  --  1092  --    I.V. -- 1092 --    IV PIGGYBACK  100  100  --    Volume (mL) (meropenem (Merrem) 500 mg in sodium chloride 0.9% 100 mL IVPB-MBP) 100 100 --    Total Intake 100 1592 0       Output    Urine  --  --  --    Urine Occurrence -- 2 x --    Stool  --  --  --    Stool Count Calculated for I/O -- 1 x --    Total Output -- -- --       Net I/O     100 1592 0            Exam:   /71 (BP Location: Left arm)   Pulse 75   Temp 98.2 °F (36.8 °C) (Oral)   Resp 18   Wt 230 lb 14.4 oz (104.7 kg)    SpO2 98%   BMI 28.86 kg/m²   Gen:  NAD  Abd:  Soft, distended, left abdominal discomfort to palpation, unchanged.    Results:     Lab Results   Component Value Date    WBC 11.0 04/12/2024    HGB 12.1 (L) 04/12/2024    HCT 35.0 (L) 04/12/2024    .0 (H) 04/12/2024    CREATSERUM 0.52 (L) 04/12/2024    CREATSERUM 0.52 (L) 04/12/2024    BUN 13 04/12/2024    BUN 13 04/12/2024     04/12/2024     04/12/2024    K 4.0 04/12/2024    K 4.0 04/12/2024     04/12/2024     04/12/2024    CO2 28.0 04/12/2024    CO2 28.0 04/12/2024     (H) 04/12/2024     (H) 04/12/2024    CA 8.4 (L) 04/12/2024    CA 8.4 (L) 04/12/2024    ALB 3.6 04/12/2024    ALKPHO 52 04/12/2024    BILT 0.4 04/12/2024    TP 5.9 04/12/2024    AST 19 04/12/2024    ALT 18 04/12/2024    PTT 24.9 09/28/2023    INR 1.01 09/28/2023    TSH 1.850 09/28/2023    LIP 37 04/02/2024    PSA 2.53 08/02/2021    DDIMER 2.44 (H) 06/23/2023    MG 2.1 04/12/2024    PHOS 3.4 04/12/2024    TROP <0.045 02/05/2020       CT ABDOMEN+PELVIS(CONTRAST ONLY)(CPT=74177)    Result Date: 4/11/2024  CONCLUSION:   Increasing inflammation and stranding involving a broad area of mesenteric fat in the left lateral abdomen likely either mesenteric infarct or epiploic appendagitis.  No abscess    Dictated by (CST): Carmelo Babb MD on 4/11/2024 at 7:25 PM     Finalized by (CST): Carmelo Babb MD on 4/11/2024 at 7:36 PM               Rick Novoa MD  4/12/2024

## 2024-04-12 NOTE — TELEPHONE ENCOUNTER
The patient was contacted, discussed his condition and reviewed the chart and it appears that he is scheduled for surgery on Monday with Dr. Novoa.  All questions were answered.

## 2024-04-12 NOTE — DIETARY NOTE
ADULT NUTRITION REASSESSMENT      RECOMMENDATIONS TO MD: CPM    Pt is at high nutrition risk due to TPN.  Pt does not meet malnutrition criteria.       ADMITTING DIAGNOSIS:  Sigmoid diverticulitis [K57.32]     PERTINENT PAST MEDICAL HISTORY:  has a past medical history of Allergic rhinitis, Anxiety, Arrhythmia, Back pain, Back problem, Depression, Diverticulitis, ED (erectile dysfunction), High blood pressure, Pneumonia due to organism, Shortness of breath, Sleep apnea (2012), Unspecified essential hypertension, and Visual impairment.    has a past surgical history that includes myringoplasty; knee arthroscopy (Right); skin tissue procedure unlisted; incision/drain abscess extra (1996); dental surgery procedure; colonoscopy (N/A, 03/22/2016); other surgical history; tonsillectomy; laparoscopic cholecystectomy (02/12/2023); cholecystectomy (3/12/2023); and colonoscopy (05/10/2023).     PATIENT STATUS: Initial 04/07/24: Patient (pt) identified at Nutrition risk due to NPO/cl liq x 4-5 days after the screening process.  Diagnosis & PMH above.  Medical findings:  Sigmoid diverticulitis with microperforation. Interval CT showing progress of inflammatory changes--progressive colitis. Non-operative management. May eventually benefit from Sigmoidectomy per MD.   Upon visit, pt laying in bed, not in distress, abd pain is better today. Reports seeing RD at [facility]. Taking % cl liq, laurel well. Had one day of Soft/low fiber diet on 4/5 but retracted back to cl liq. Diet hx/eval:    recent decreased po d/t the onset of acute GI illness. Otherwise, Adequate nutrition intake based on reported 2 light meals at bfast and dinner and main or full meal at lunch. Pt reports utilizing light dinner d/t sleep apnea. Wt eval:    no recent significant wt loss per pt, but did states recent wt gain from 250# to current wt of 264#. Nutrition findings:    Pt is well nourished. MD wanted Ensure Oral Nutrition Supplement ( ONS ) and  ordered Ensure Clear QID. Pt prefers them in apple flavor. Agree with current diet and ONS. Anticipate diet advancement, however, if unable to advance diet beyond cl liq in the next 5 days, consider Nutrition support.     4/9/2024:  Received MD consult to initiate/manage CPN.   On Cl liq and Oral Nutrition Supplement ( ONS ) Ensure Clears QID. Tolerating, no N/V, but abd pain continues. Surgery eval noted. Labs and meds reviewed. On Merrem and Mag oxide.   Re-visited pt. Explained rationale of CPN.  Pt voiced concerns about severe venous pain when IV Potassium were infused peripherally or via midline in Oct 2023 admission.  Pt reported No known food allergies, states no allergies to eggs. Potential mild refeeding risk.   Discussed with RN to monitor CPN initiation closely for potential reactions such as flushing, rashes, chest pain, back pain. If this occurs, CPN may be stopped.     4/12/24: Discussed care with RN this am. TPN + NPO. Taking a few sips of water with medications per RN. Abdominal Pain reported. Per RN: Talk of Procedure. NPO at this time. Monitor medical plans.        FOOD/NUTRITION INTAKE ANALYSIS:    Current Appetite:  good appetite PTA  Current Intake:  NPO at this time.   Current Intake Meeting Needs: TPN meeting needs.     Percent Meals Eaten (last 6 days)       Date/Time Percent Meals Eaten (%)    04/06/24 1027 90 %    04/09/24 1000 60 %    04/12/24 1051 0 %           Food Allergies: No Known Food Allergies (NKFA)  Cultural/Ethnic/Nondenominational Preferences: Not Obtained    GASTROINTESTINAL: +BM 4/11.  Noted intermittent Nausea.     MEDICATIONS: reviewed Antibiotic    adult 3 in 1 TPN 95.8 mL/hr at 04/11/24 2232    dextrose 10%       LABS: reviewed - Adjustments being made in TPN as needed.   POC Glucose in Target.   Last A1c value was 5.5% done 9/22/23.    Recent Labs     04/10/24  0527 04/11/24  0611 04/12/24  0610   * 104* 100*  100*   BUN 12 14 13  13   CREATSERUM 0.58* 0.56* 0.52*   0.52*   CA 8.5* 8.4* 8.4*  8.4*   MG 2.0 2.0 2.1   * 137 138  138   K 3.6 4.1 4.0  4.0    104 104  104   CO2 29.0 28.0 28.0  28.0   PHOS 1.7* 2.0* 3.4   OSMOCALC 281 285 286  286     NUTRITION RELATED PHYSICAL FINDINGS:  - Nutrition Focused Physical Exam (NFPE): well nourished   - Fluid Accumulation: none  see RN documentation for details  - Skin Integrity: intact see RN documentation for details    ANTHROPOMETRICS:  HT:  6'3\"  WT: 104.7 kg (230 lb 14.4 oz) - Noted 34# wt decline since admission weight. Requested a repeat scale weight to assess.   BMI: Body mass index is 28.86 kg/m².  BMI CLASSIFICATION: 30-34.9 kg/m2 - obesity class I  IBW: 196 lbs        135% IBW  Usual Body Wt: 250 lbs per pt      106% UBW  WEIGHT HISTORY:    Patient Weight(s) for the past 336 hrs:   Weight   04/12/24 1013 104.7 kg (230 lb 14.4 oz)   04/02/24 1758 119.9 kg (264 lb 6.4 oz)     Wt Readings from Last 10 Encounters:   04/12/24 104.7 kg (230 lb 14.4 oz)   03/27/24 115.7 kg (255 lb)   02/28/24 116.1 kg (255 lb 14.4 oz)   01/29/24 115.7 kg (255 lb)   01/27/24 113.4 kg (250 lb)   01/15/24 115.2 kg (254 lb)   12/21/23 113.4 kg (250 lb)   12/18/23 113.4 kg (250 lb)   11/29/23 108.9 kg (240 lb)   11/17/23 108.9 kg (240 lb)     NUTRITION DIAGNOSIS/PROBLEM:    Inadequate oral intake related to Decreased ability to consume sufficient energy d/t sigmoid diverticulitis with microperforation as evidenced by npo/cl liq x 4-5 days.     Nutrition Diagnosis Progress (Unresolved).   Receiving TPN. NPO continues.     NUTRITION INTERVENTION:     NUTRITION PRESCRIPTION:   Estimated Nutrition needs: Dosing wt of 120 kg --wt taken on 4/2 .  Calories: 4753-6072 calories/day (MSJ 2114 x 1.2 AF x 1.0 IF  or 19-21 calories per kg Dosing wt)  Protein: 116-134 g protein/day (1.3-1.5 g protein/kg  Ideal body wt (IBW))                Fluids: ~ 2650  ml /day (HSM  ml/kg--adjusted BW of 96.8 kg ( 213#) )     - Diet: Patient is not receiving CLD per  RN. NPO at this time.     Receiving TPN: 2300 ml volume, 120 g protein, 1000 kcal dextrose, 600 kcal lipid.   Provides: 2080 total calories (90% estimated calorie needs) and 100% protein needs.       Procedures    Clear liquid diet Is Patient on Accuchecks? No   - Meals and snacks: NPO  - Medical Food Supplements- Ensure Clear (240 calories/ 8 g protein each) QID Apple. Rational/Benefits discussed.  4/12: Discontinued ONS per discussion with RN.     - Vitamin and mineral supplements: none Receiving in TPN.   - Feeding assistance: meal set up  - Nutrition education: diet education before discharge and assess education needs   - Coordination of nutrition care: collaboration with other providers  - Discharge and transfer of nutrition care to new setting or provider: monitor plans    MONITOR AND EVALUATE/NUTRITION GOALS:  - Food and Nutrient Intake:    Monitor: N/A. Monitor plans to resume oral diet.   - Food and Nutrient Administration:    Monitor: TPN tolerance, adequacy of TPN, and for TPN adjustment   - Anthropometric Measurement:    Monitor weight  - Nutrition Goals:    maintain wt within 5%, TPN to meet greater than 80% nutrition needs, and labs within acceptable limits.    Monitor medical plans re: possible procedure.     RD will follow up.  Kyung Campos RDN, LDN, CDE   Clinical Nutrition  Ext [phone]

## 2024-04-12 NOTE — PAYOR COMM NOTE
--------------  CONTINUED STAY REVIEW    Payor: CAILIN MORRISON  Subscriber #:  [id number]  Authorization Number: [id number]    Admit date: 4/2/24  Admit time:  5:55 PM    Admitting Physician: Elaine Ramey MD  Attending Physician:  Dionisio Dong MD  Primary Care Physician: Stephen Christianson MD    REVIEW DOCUMENTATION:    4/8 surgery     Assessment and Plan:        Sigmoid diverticulitis    Intractable abdominal pain  -recurrent pain episode.  Repeat ct showed worsening inflammation, small amount of free fluid in the right lower quadrant but no organized abscess or large amount of free air.       Recommend:  Clears only as tolerated.  Will need TPN.  Abx choice per ID.  Continue non operative management for now.  OR for Zaid's if clinically worse.       4/9 GI    58M with recurrent sigmoid diverticulitis, last colonoscopy 5/2023, who presents with LLQ abd pain. Started last month, failed outpatient abx, interval CT in ER here shows acute sigmoid diverticulitis with microperforation, no fluid collection.      Interval CT showing progression of inflammatory changes, no abscess or free air.     Diet per surgery  To start TPN and complete bowel rest today  Continue IV abx  Considerations for segmental resection as per surgery     GI will be available as needed, please call with questions     Shannan Brooks MD  [facility]          4/10/24 ID    Antibiotics:  zosyn 4/2-4/8, meropenem 4/8 --      Impression:     # Acute sigmoid diverticulitis with microperforation, no drainable abscess seen on CT  - repeat CT stable.   - continues to have pain. Started on TPN 4/9/24  # leukocytosis  # Abdominal pain, ?ileus  # Recurrent sigmoid diverticulitis     Recommend:     - On meropenem day 3. Plan for invanz on discharge.     -  TPN per surgery  -  monitor for improvement. May need surgery this admission if no improvement.   -  follow fever curve, wbc.   - d/w patient  - d/w staff  - will follow     Thank you  for allowing me to participate in the care of your patient.     History of Present Illness:   Patient is a 58 year old male with h/o recurrent diverticulitis, cardiac arrhythmia, depression, NELLIE.  Presents with recurrent diverticulitis.  CT showed cute sigmoid diverticulitis.  Trace suspected focus of associated localized pneumoperitoneum/micro perforation.  No evidence of a drainable associated fluid collection.  Progressed inflammatory change adjacent to the hepatic flexure and proximal sigmoid colon.  Interval development of mild circumferential wall thickening splenic flexure and proximal sigmoid colon which may reflect a nonspecific colitis.  No evidence of pneumatosis or associated drainable fluid collection.     Started on zosyn.  Still with abdominal pain but slowly improved.           4/11/24    Hospitalist    igmoid diverticulitis  RECURRENT   LLQ abdominal pain   Leukocytosis resolved  3rd episode  - failed outpt PO abx.   - history of recurrent diverticulitis, for which patient has had an appointment with general surgeon for future sigmoidectomy.  - CT showed acute sigmoid diverticulitis with microperforation, no fluid collection.   - Last colonoscopy 05/2023.   - Pain control.  - prefers PO - norco. Morphine IV for breakthrough  - off zosyn, cont meropenem.   - mid line placed 4/5   - Clear liquid diet per surgery   - started TPN  - Plans for Invanz x 2 weeks on discharge.   - will eventually benefit from future sigmoidectomy. Trying to avoid during acute episode.   - CT abd to be repeated today  - GI, ID and general surgery on consult.      Pt relays hx of PVCs, a-fib, LBBB.  Hx of intermittent premature ventricular tachycardia   - remote tele     Hypertension  - cont lisinopril, verapamil     GERD  - IV PPI bid     Major depressive disorder   Pt says he talked to his psychiatrist and and can stop neurontin.  - stop neurontin  - cont klonopin, zoloft, zyprexa      Subjective:  Initial Chief Complaint:   abdominal pain     This AM had wosening abdominal pain  No nausea or vomiting  Currently pt is comfortable      4/12/24       CT ABDOMEN+PELVIS(CONTRAST ONLY)(CPT=74177)     Result Date: 4/11/2024  CONCLUSION:   Increasing inflammation and stranding involving a broad area of mesenteric fat in the left lateral abdomen likely either mesenteric infarct or epiploic appendagitis.  No abscess    Dictated by (CST): Carmelo Babb MD on 4/11/2024 at 7:25 PM     Finalized by (CST): Carmelo Babb MD on 4/11/2024 at 7:36 PM                Rick Novoa MD  4/12/2024    Surgery     Sigmoid diverticulitis    Intractable abdominal pain  -smoldering course despite bowel rest and IV abx.  No evidence of sepsis.  Area of inflammation is splenic flexure which is different than his first episode where the location was the sigmoid colon.  Discussed w pt at length regarding the nature of his condition and rationale for each treatment options.  Plan for ex laparotomy, segmental resection with end colostomy on Monday if no clinical improvement.       Latest Reference Range & Units 04/10/24 05:27 04/11/24 06:11 04/12/24 06:10   Glucose 70 - 99 mg/dL  70 - 99 mg/dL 118 (H) 104 (H) 100 (H)  100 (H)   Sodium 136 - 145 mmol/L  136 - 145 mmol/L 135 (L) 137 138  138   Potassium 3.5 - 5.1 mmol/L  3.5 - 5.1 mmol/L 3.6 4.1 4.0  4.0   Chloride 98 - 112 mmol/L  98 - 112 mmol/L 101 104 104  104   Carbon Dioxide, Total 21.0 - 32.0 mmol/L  21.0 - 32.0 mmol/L 29.0 28.0 28.0  28.0   BUN 9 - 23 mg/dL  9 - 23 mg/dL 12 14 13  13   CREATININE 0.70 - 1.30 mg/dL  0.70 - 1.30 mg/dL 0.58 (L) 0.56 (L) 0.52 (L)  0.52 (L)   (H): Data is abnormally high  (L): Data is abnormally low     Latest Reference Range & Units 04/11/24 06:11 04/12/24 06:10   WBC 4.0 - 11.0 x10(3) uL 10.8 11.0   Hemoglobin 13.0 - 17.5 g/dL 12.1 (L) 12.1 (L)   Hematocrit 39.0 - 53.0 % 34.3 (L) 35.0 (L)   Platelet Count 150.0 - 450.0 10(3)uL 432.0 475.0 (H)   (L): Data is abnormally low  (H): Data is  abnormally high      MEDICATIONS ADMINISTERED IN LAST 1 DAY:  adult 3 in 1 TPN       Date Action Dose Route User    4/11/2024 2232 New Bag (none) Intravenous Gretchen Raya RN          alum-mag hydroxide-simethicone (Maalox) 200-200-20 MG/5ML oral suspension 30 mL       Date Action Dose Route User    4/12/2024 1219 Given 30 mL Oral Ramez Cano RN    4/12/2024 0419 Given 30 mL Oral Gretchen Raya RN    4/12/2024 0007 Given 30 mL Oral Goldberg, Rachel, RN    4/11/2024 1517 Given 30 mL Oral Guera Layton RN          clonazePAM (KlonoPIN) tab 0.5 mg       Date Action Dose Route User    4/12/2024 1253 Given 0.5 mg Oral Ramez Cano RN    4/12/2024 0942 Given 0.5 mg Oral Ramez Cano RN    4/12/2024 0557 Given 0.5 mg Oral Gretchen Raya RN          enoxaparin (Lovenox) 60 MG/0.6ML SUBQ injection 60 mg       Date Action Dose Route User    4/11/2024 2008 Given 60 mg Subcutaneous (Right Lower Abdomen) Gretchen Raya RN          HYDROcodone-acetaminophen (Norco) 5-325 MG per tab 2 tablet       Date Action Dose Route User    4/12/2024 1208 Given 2 tablet Oral Ramez Cano RN    4/12/2024 0814 Given 2 tablet Oral Ramez Cano RN    4/12/2024 0414 Given 2 tablet Oral Gretchen Raya RN    4/12/2024 0007 Given 2 tablet Oral Goldberg, Rachel, RN    4/11/2024 2008 Given 2 tablet Oral Gretchen Raya RN    4/11/2024 1517 Given 2 tablet Oral Guera Laytno RN          iopamidol 76% (ISOVUE-370) injection for power injector       Date Action Dose Route User    4/11/2024 1904 Given 80 mL Intravenous Chip Zapien          lisinopril (Prinivil; Zestril) tab 20 mg       Date Action Dose Route User    4/12/2024 0557 Given 20 mg Oral Raya, Gretchen, RN          meropenem (Merrem) 500 mg in sodium chloride 0.9% 100 mL IVPB-MBP       Date Action Dose Route User    4/12/2024 1253 New Bag 500 mg Intravenous Ramez Cano, JERSON    4/12/2024 0415 New Bag 500 mg Intravenous Gretchen Raya, RN    4/11/2024 2017 New  Bag 500 mg Intravenous Gretchen Raya RN          morphINE PF 2 MG/ML injection 2 mg       Date Action Dose Route User    4/12/2024 1253 Given 2 mg Intravenous Ramez Cano RN    4/12/2024 0834 Given 2 mg Intravenous Ramez Cano RN    4/12/2024 0609 Given 2 mg Intravenous Gretchen Raya RN    4/11/2024 2158 Given 2 mg Intravenous Gretchen Raya RN          morphINE PF 4 MG/ML injection 4 mg       Date Action Dose Route User    4/12/2024 0202 Given 4 mg Intravenous Gretchen Raya RN          OLANZapine (ZyPREXA) tab 2.5 mg       Date Action Dose Route User    4/11/2024 2008 Given 2.5 mg Oral Gretchen Raya RN          pantoprazole (Protonix) 40 mg in sodium chloride 0.9% PF 10 mL IV push       Date Action Dose Route User    4/12/2024 0814 Given 40 mg Intravenous Ramez Cano RN    4/11/2024 2009 Given 40 mg Intravenous Gretchen Raya RN          verapamil ER (Calan-SR) tab 120 mg       Date Action Dose Route User    4/11/2024 2008 Given 120 mg Oral Gretchen Raya RN          sertraline (Zoloft) tab 75 mg       Date Action Dose Route User    4/12/2024 0557 Given 75 mg Oral Gretchen Raya RN            Vitals (last day)       Date/Time Temp Pulse Resp BP SpO2 Weight O2 Device O2 Flow Rate (L/min) [facility]    04/12/24 1209 99.2 °F (37.3 °C) 80 17 129/73 98 % -- None (Room air) -- TF    04/12/24 1013 -- -- -- -- -- 230 lb 14.4 oz -- -- TF    04/12/24 0556 -- 75 -- 144/71 -- -- -- -- CM    04/12/24 0414 98.2 °F (36.8 °C) 70 18 133/51 98 % -- None (Room air) -- CM    04/11/24 2241 98.6 °F (37 °C) 81 18 135/66 94 % -- None (Room air) -- CM    04/11/24 1223 99.1 °F (37.3 °C) -- 18 135/70 93 % -- -- -- JR    04/11/24 1223 -- 71 -- -- -- -- -- -- DD    04/11/24 0558 -- 78 -- 124/67 -- -- -- --     04/11/24 0329 98.9 °F (37.2 °C) 84 17 143/67 95 % -- CPAP -- CMA

## 2024-04-12 NOTE — PLAN OF CARE
Patient alert and oriented. Clear liquids. Vitals stable. Morphine and norco for pain control. TPN infusing via picc. Ambulating independently. Voiding freely. Passing gas. No BM yet. Plan to monitor symptoms over the weekend and if no clinical improvement, patient will have surgery on Monday. Calls appropriately. All needs met at this time.   Problem: Patient Centered Care  Goal: Patient preferences are identified and integrated in the patient's plan of care  Description: Interventions:  - What would you like us to know as we care for you? This is my third time having diverticulitis  - Provide timely, complete, and accurate information to patient/family  - Incorporate patient and family knowledge, values, beliefs, and cultural backgrounds into the planning and delivery of care  - Encourage patient/family to participate in care and decision-making at the level they choose  - Honor patient and family perspectives and choices  Outcome: Progressing     Problem: Patient/Family Goals  Goal: Patient/Family Long Term Goal  Description: Patient's Long Term Goal: Discharge home    Interventions:  - Manage abdominal pain  - Tolerate adequate oral intake without complication  - Determine appropriate antibiotic plan  - See additional Care Plan goals for specific interventions  Outcome: Progressing  Goal: Patient/Family Short Term Goal  Description: Patient's Short Term Goal: Manage abdominal pain    Interventions:   - Transition from IV to oral pain medications  - Bowel rest with slow diet tolerance  - Eat small frequent light meals  - See additional Care Plan goals for specific interventions  Outcome: Progressing     Problem: PAIN - ADULT  Goal: Verbalizes/displays adequate comfort level or patient's stated pain goal  Description: INTERVENTIONS:  - Encourage pt to monitor pain and request assistance  - Assess pain using appropriate pain scale  - Administer analgesics based on type and severity of pain and evaluate response  -  Implement non-pharmacological measures as appropriate and evaluate response  - Consider cultural and social influences on pain and pain management  - Manage/alleviate anxiety  - Utilize distraction and/or relaxation techniques  - Monitor for opioid side effects  - Notify MD/LIP if interventions unsuccessful or patient reports new pain  - Anticipate increased pain with activity and pre-medicate as appropriate  Outcome: Not Progressing     Problem: RISK FOR INFECTION - ADULT  Goal: Absence of fever/infection during anticipated neutropenic period  Description: INTERVENTIONS  - Monitor WBC  - Administer growth factors as ordered  - Implement neutropenic guidelines  Outcome: Progressing     Problem: DISCHARGE PLANNING  Goal: Discharge to home or other facility with appropriate resources  Description: INTERVENTIONS:  - Identify barriers to discharge w/pt and caregiver  - Include patient/family/discharge partner in discharge planning  - Arrange for needed discharge resources and transportation as appropriate  - Identify discharge learning needs (meds, wound care, etc)  - Arrange for interpreters to assist at discharge as needed  - Consider post-discharge preferences of patient/family/discharge partner  - Complete POLST form as appropriate  - Assess patient's ability to be responsible for managing their own health  - Refer to Case Management Department for coordinating discharge planning if the patient needs post-hospital services based on physician/LIP order or complex needs related to functional status, cognitive ability or social support system  Outcome: Progressing     Problem: GASTROINTESTINAL - ADULT  Goal: Maintains or returns to baseline bowel function  Description: INTERVENTIONS:  - Assess bowel function  - Maintain adequate hydration with IV or PO as ordered and tolerated  - Evaluate effectiveness of GI medications  - Encourage mobilization and activity  - Obtain nutritional consult as needed  - Establish a  toileting routine/schedule  - Consider collaborating with pharmacy to review patient's medication profile  Outcome: Progressing  Goal: Minimal or absence of nausea and vomiting  Description: INTERVENTIONS:  - Maintain adequate hydration with IV or PO as ordered and tolerated  - Nasogastric tube to low intermittent suction as ordered  - Evaluate effectiveness of ordered antiemetic medications  - Provide nonpharmacologic comfort measures as appropriate  - Advance diet as tolerated, if ordered  - Obtain nutritional consult as needed  - Evaluate fluid balance  Outcome: Progressing  Goal: Maintains adequate nutritional intake (undernourished)  Description: INTERVENTIONS:  - Monitor percentage of each meal consumed  - Identify factors contributing to decreased intake, treat as appropriate  - Assist with meals as needed  - Monitor I&O, WT and lab values  - Obtain nutritional consult as needed  - Optimize oral hygiene and moisture  - Encourage food from home; allow for food preferences  - Enhance eating environment  Outcome: Progressing

## 2024-04-12 NOTE — PLAN OF CARE
Patient is alert and oriented. Room air. CPAP worn overnight. Vital signs stable. Patient refused telemetry, MD aware. Pain managed with prn norco and morphine. Up independently. TPN and IV abx continued through R PICC. Q6 accuchecks discontinued per order. Call light and personal belongings within reach. Safety precautions in place.    Last night, patient received results from 4/11 CT scan and was having increased anxiety and expressed wanting to leave the hospital. Notified Dr. Gant, Dr. Dong, and  nurse. Patient able to speak to Dr. Gant over the phone regarding results. Agreed to continue care plan.    Problem: Patient Centered Care  Goal: Patient preferences are identified and integrated in the patient's plan of care  Description: Interventions:  - What would you like us to know as we care for you? This is my third time having diverticulitis  - Provide timely, complete, and accurate information to patient/family  - Incorporate patient and family knowledge, values, beliefs, and cultural backgrounds into the planning and delivery of care  - Encourage patient/family to participate in care and decision-making at the level they choose  - Honor patient and family perspectives and choices  Outcome: Progressing     Problem: Patient/Family Goals  Goal: Patient/Family Long Term Goal  Description: Patient's Long Term Goal: Discharge home    Interventions:  - Manage abdominal pain  - Tolerate adequate oral intake without complication  - Determine appropriate antibiotic plan  - See additional Care Plan goals for specific interventions  Outcome: Progressing  Goal: Patient/Family Short Term Goal  Description: Patient's Short Term Goal: Manage abdominal pain    Interventions:   - Transition from IV to oral pain medications  - Bowel rest with slow diet tolerance  - Eat small frequent light meals  - See additional Care Plan goals for specific interventions  Outcome: Progressing     Problem: PAIN - ADULT  Goal:  Verbalizes/displays adequate comfort level or patient's stated pain goal  Description: INTERVENTIONS:  - Encourage pt to monitor pain and request assistance  - Assess pain using appropriate pain scale  - Administer analgesics based on type and severity of pain and evaluate response  - Implement non-pharmacological measures as appropriate and evaluate response  - Consider cultural and social influences on pain and pain management  - Manage/alleviate anxiety  - Utilize distraction and/or relaxation techniques  - Monitor for opioid side effects  - Notify MD/LIP if interventions unsuccessful or patient reports new pain  - Anticipate increased pain with activity and pre-medicate as appropriate  Outcome: Progressing     Problem: RISK FOR INFECTION - ADULT  Goal: Absence of fever/infection during anticipated neutropenic period  Description: INTERVENTIONS  - Monitor WBC  - Administer growth factors as ordered  - Implement neutropenic guidelines  Outcome: Progressing     Problem: DISCHARGE PLANNING  Goal: Discharge to home or other facility with appropriate resources  Description: INTERVENTIONS:  - Identify barriers to discharge w/pt and caregiver  - Include patient/family/discharge partner in discharge planning  - Arrange for needed discharge resources and transportation as appropriate  - Identify discharge learning needs (meds, wound care, etc)  - Arrange for interpreters to assist at discharge as needed  - Consider post-discharge preferences of patient/family/discharge partner  - Complete POLST form as appropriate  - Assess patient's ability to be responsible for managing their own health  - Refer to Case Management Department for coordinating discharge planning if the patient needs post-hospital services based on physician/LIP order or complex needs related to functional status, cognitive ability or social support system  Outcome: Progressing     Problem: GASTROINTESTINAL - ADULT  Goal: Maintains or returns to baseline  bowel function  Description: INTERVENTIONS:  - Assess bowel function  - Maintain adequate hydration with IV or PO as ordered and tolerated  - Evaluate effectiveness of GI medications  - Encourage mobilization and activity  - Obtain nutritional consult as needed  - Establish a toileting routine/schedule  - Consider collaborating with pharmacy to review patient's medication profile  Outcome: Progressing  Goal: Minimal or absence of nausea and vomiting  Description: INTERVENTIONS:  - Maintain adequate hydration with IV or PO as ordered and tolerated  - Nasogastric tube to low intermittent suction as ordered  - Evaluate effectiveness of ordered antiemetic medications  - Provide nonpharmacologic comfort measures as appropriate  - Advance diet as tolerated, if ordered  - Obtain nutritional consult as needed  - Evaluate fluid balance  Outcome: Progressing  Goal: Maintains adequate nutritional intake (undernourished)  Description: INTERVENTIONS:  - Monitor percentage of each meal consumed  - Identify factors contributing to decreased intake, treat as appropriate  - Assist with meals as needed  - Monitor I&O, WT and lab values  - Obtain nutritional consult as needed  - Optimize oral hygiene and moisture  - Encourage food from home; allow for food preferences  - Enhance eating environment  Outcome: Progressing

## 2024-04-12 NOTE — PROGRESS NOTES
INFECTIOUS DISEASE PROGRESS NOTE    Don Cabral Patient Status:  Inpatient    1965 MRN [id number]   Location [facility] 4W/SW/SE Attending Tamica Hardy MD   Hosp Day # 10 PCP Stephen Christianson MD     SUBJECTIVE  ROS done. On TPN. Still having pain. Had repeat CT yesterday.     ASSESSMENT/PLAN:    Antibiotics:  zosyn -, meropenem  --      Impression:     # Acute sigmoid diverticulitis with microperforation, no drainable abscess seen on CT  - most recent CT  with increased inflammation but no abscess.   - continues to have pain. Started on TPN 24  # leukocytosis  # Abdominal pain, ?ileus  # Recurrent sigmoid diverticulitis     Recommend:     - On meropenem day 5. Plan for invanz on discharge if clinically improves.     -  TPN per surgery  -  monitor for improvement. May need surgery this admission if no improvement.   -  follow fever curve, wbc.   - d/w patient  - d/w staff  - will follow        History of Present Illness:   Patient is a 58 year old male with h/o recurrent diverticulitis, cardiac arrhythmia, depression, NELLIE.  Presents with recurrent diverticulitis.  CT showed cute sigmoid diverticulitis.  Trace suspected focus of associated localized pneumoperitoneum/micro perforation.  No evidence of a drainable associated fluid collection.  Progressed inflammatory change adjacent to the hepatic flexure and proximal sigmoid colon.  Interval development of mild circumferential wall thickening splenic flexure and proximal sigmoid colon which may reflect a nonspecific colitis.  No evidence of pneumatosis or associated drainable fluid collection.     Started on zosyn.  Still with abdominal pain but slowly improved.      OBJECTIVE  /73 (BP Location: Left arm)   Pulse 80   Temp 99.2 °F (37.3 °C) (Oral)   Resp 17   Wt 230 lb 14.4 oz (104.7 kg)   SpO2 98%   BMI 28.86 kg/m²     General: No acute distress. Alert.  HEENT: EOMI   Abdomen: Soft, TTP, distended.    Musculoskeletal: No edema  Integument: No rashes    Labs:     Recent Labs   Lab 04/08/24  0452 04/09/24  0638 04/10/24  0527 04/11/24  0611 04/12/24  0610   WBC 11.1* 10.7 10.2 10.8 11.0   HGB 13.7 12.9* 12.1* 12.1* 12.1*   .0 420.0 446.0 432.0 475.0*       Recent Labs   Lab 04/08/24 0452 04/09/24  0638 04/10/24  0527 04/11/24  0611 04/12/24  0610    135* 135* 137 138  138   K 3.8 4.0 3.6 4.1 4.0  4.0    100 101 104 104  104   CO2 31.0 29.0 29.0 28.0 28.0  28.0   BUN 11 12 12 14 13  13   CREATSERUM 0.80 0.64* 0.58* 0.56* 0.52*  0.52*       Recent Labs   Lab 04/10/24  0527 04/12/24  0610   ALT 15 18   AST 14 19         Microbiology: Reviewed    Imaging: Reviewed       MD [facility]  [phone]

## 2024-04-13 LAB
ALBUMIN SERPL-MCNC: 3.5 G/DL (ref 3.2–4.8)
ANION GAP SERPL CALC-SCNC: 1 MMOL/L (ref 0–18)
ANION GAP SERPL CALC-SCNC: 1 MMOL/L (ref 0–18)
BASOPHILS # BLD AUTO: 0.01 X10(3) UL (ref 0–0.2)
BASOPHILS NFR BLD AUTO: 0.1 %
BUN BLD-MCNC: 12 MG/DL (ref 9–23)
BUN BLD-MCNC: 12 MG/DL (ref 9–23)
BUN/CREAT SERPL: 23.5 (ref 10–20)
BUN/CREAT SERPL: 23.5 (ref 10–20)
CALCIUM BLD-MCNC: 8.5 MG/DL (ref 8.7–10.4)
CALCIUM BLD-MCNC: 8.5 MG/DL (ref 8.7–10.4)
CHLORIDE SERPL-SCNC: 105 MMOL/L (ref 98–112)
CHLORIDE SERPL-SCNC: 105 MMOL/L (ref 98–112)
CO2 SERPL-SCNC: 30 MMOL/L (ref 21–32)
CO2 SERPL-SCNC: 30 MMOL/L (ref 21–32)
CREAT BLD-MCNC: 0.51 MG/DL
CREAT BLD-MCNC: 0.51 MG/DL
DEPRECATED RDW RBC AUTO: 41.5 FL (ref 35.1–46.3)
EGFRCR SERPLBLD CKD-EPI 2021: 118 ML/MIN/1.73M2 (ref 60–?)
EGFRCR SERPLBLD CKD-EPI 2021: 118 ML/MIN/1.73M2 (ref 60–?)
EOSINOPHIL # BLD AUTO: 0.18 X10(3) UL (ref 0–0.7)
EOSINOPHIL NFR BLD AUTO: 1.4 %
ERYTHROCYTE [DISTWIDTH] IN BLOOD BY AUTOMATED COUNT: 13.3 % (ref 11–15)
GLUCOSE BLD-MCNC: 110 MG/DL (ref 70–99)
GLUCOSE BLD-MCNC: 110 MG/DL (ref 70–99)
GLUCOSE BLDC GLUCOMTR-MCNC: 106 MG/DL (ref 70–99)
HCT VFR BLD AUTO: 36.5 %
HGB BLD-MCNC: 12.3 G/DL
IMM GRANULOCYTES # BLD AUTO: 0.05 X10(3) UL (ref 0–1)
IMM GRANULOCYTES NFR BLD: 0.4 %
LYMPHOCYTES # BLD AUTO: 1.75 X10(3) UL (ref 1–4)
LYMPHOCYTES NFR BLD AUTO: 13.7 %
MAGNESIUM SERPL-MCNC: 2.1 MG/DL (ref 1.6–2.6)
MCH RBC QN AUTO: 28.8 PG (ref 26–34)
MCHC RBC AUTO-ENTMCNC: 33.7 G/DL (ref 31–37)
MCV RBC AUTO: 85.5 FL
MONOCYTES # BLD AUTO: 1.04 X10(3) UL (ref 0.1–1)
MONOCYTES NFR BLD AUTO: 8.2 %
NEUTROPHILS # BLD AUTO: 9.73 X10 (3) UL (ref 1.5–7.7)
NEUTROPHILS # BLD AUTO: 9.73 X10(3) UL (ref 1.5–7.7)
NEUTROPHILS NFR BLD AUTO: 76.2 %
OSMOLALITY SERPL CALC.SUM OF ELEC: 282 MOSM/KG (ref 275–295)
OSMOLALITY SERPL CALC.SUM OF ELEC: 282 MOSM/KG (ref 275–295)
PHOSPHATE SERPL-MCNC: 3.5 MG/DL (ref 2.4–5.1)
PLATELET # BLD AUTO: 572 10(3)UL (ref 150–450)
POTASSIUM SERPL-SCNC: 4.3 MMOL/L (ref 3.5–5.1)
POTASSIUM SERPL-SCNC: 4.3 MMOL/L (ref 3.5–5.1)
RBC # BLD AUTO: 4.27 X10(6)UL
SODIUM SERPL-SCNC: 136 MMOL/L (ref 136–145)
SODIUM SERPL-SCNC: 136 MMOL/L (ref 136–145)
WBC # BLD AUTO: 12.8 X10(3) UL (ref 4–11)

## 2024-04-13 PROCEDURE — 99233 SBSQ HOSP IP/OBS HIGH 50: CPT | Performed by: HOSPITALIST

## 2024-04-13 RX ORDER — HYDROMORPHONE HYDROCHLORIDE 1 MG/ML
0.2 INJECTION, SOLUTION INTRAMUSCULAR; INTRAVENOUS; SUBCUTANEOUS EVERY 2 HOUR PRN
Status: DISCONTINUED | OUTPATIENT
Start: 2024-04-13 | End: 2024-04-16

## 2024-04-13 RX ORDER — HYDROMORPHONE HYDROCHLORIDE 1 MG/ML
0.4 INJECTION, SOLUTION INTRAMUSCULAR; INTRAVENOUS; SUBCUTANEOUS EVERY 2 HOUR PRN
Status: DISCONTINUED | OUTPATIENT
Start: 2024-04-13 | End: 2024-04-16

## 2024-04-13 RX ORDER — HYDROMORPHONE HYDROCHLORIDE 1 MG/ML
0.8 INJECTION, SOLUTION INTRAMUSCULAR; INTRAVENOUS; SUBCUTANEOUS EVERY 2 HOUR PRN
Status: DISCONTINUED | OUTPATIENT
Start: 2024-04-13 | End: 2024-04-16

## 2024-04-13 RX ORDER — KETOROLAC TROMETHAMINE 30 MG/ML
30 INJECTION, SOLUTION INTRAMUSCULAR; INTRAVENOUS EVERY 6 HOURS PRN
Status: DISPENSED | OUTPATIENT
Start: 2024-04-13 | End: 2024-04-15

## 2024-04-13 NOTE — PLAN OF CARE
Problem: Patient Centered Care  Goal: Patient preferences are identified and integrated in the patient's plan of care  Description: Interventions:  - What would you like us to know as we care for you? This is my third time having diverticulitis  - Provide timely, complete, and accurate information to patient/family  - Incorporate patient and family knowledge, values, beliefs, and cultural backgrounds into the planning and delivery of care  - Encourage patient/family to participate in care and decision-making at the level they choose  - Honor patient and family perspectives and choices  Outcome: Progressing     Problem: Patient/Family Goals  Goal: Patient/Family Long Term Goal  Description: Patient's Long Term Goal: Discharge home    Interventions:  - Manage abdominal pain  - Tolerate adequate oral intake without complication  - Determine appropriate antibiotic plan  - See additional Care Plan goals for specific interventions  Outcome: Progressing  Goal: Patient/Family Short Term Goal  Description: Patient's Short Term Goal: Manage abdominal pain    Interventions:   - Transition from IV to oral pain medications  - Bowel rest with slow diet tolerance  - Eat small frequent light meals  - See additional Care Plan goals for specific interventions  Outcome: Progressing     Problem: PAIN - ADULT  Goal: Verbalizes/displays adequate comfort level or patient's stated pain goal  Description: INTERVENTIONS:  - Encourage pt to monitor pain and request assistance  - Assess pain using appropriate pain scale  - Administer analgesics based on type and severity of pain and evaluate response  - Implement non-pharmacological measures as appropriate and evaluate response  - Consider cultural and social influences on pain and pain management  - Manage/alleviate anxiety  - Utilize distraction and/or relaxation techniques  - Monitor for opioid side effects  - Notify MD/LIP if interventions unsuccessful or patient reports new pain  -  Anticipate increased pain with activity and pre-medicate as appropriate  Outcome: Progressing     Problem: RISK FOR INFECTION - ADULT  Goal: Absence of fever/infection during anticipated neutropenic period  Description: INTERVENTIONS  - Monitor WBC  - Administer growth factors as ordered  - Implement neutropenic guidelines  Outcome: Progressing     Problem: DISCHARGE PLANNING  Goal: Discharge to home or other facility with appropriate resources  Description: INTERVENTIONS:  - Identify barriers to discharge w/pt and caregiver  - Include patient/family/discharge partner in discharge planning  - Arrange for needed discharge resources and transportation as appropriate  - Identify discharge learning needs (meds, wound care, etc)  - Arrange for interpreters to assist at discharge as needed  - Consider post-discharge preferences of patient/family/discharge partner  - Complete POLST form as appropriate  - Assess patient's ability to be responsible for managing their own health  - Refer to Case Management Department for coordinating discharge planning if the patient needs post-hospital services based on physician/LIP order or complex needs related to functional status, cognitive ability or social support system  Outcome: Progressing     Problem: GASTROINTESTINAL - ADULT  Goal: Maintains or returns to baseline bowel function  Description: INTERVENTIONS:  - Assess bowel function  - Maintain adequate hydration with IV or PO as ordered and tolerated  - Evaluate effectiveness of GI medications  - Encourage mobilization and activity  - Obtain nutritional consult as needed  - Establish a toileting routine/schedule  - Consider collaborating with pharmacy to review patient's medication profile  Outcome: Progressing  Goal: Minimal or absence of nausea and vomiting  Description: INTERVENTIONS:  - Maintain adequate hydration with IV or PO as ordered and tolerated  - Nasogastric tube to low intermittent suction as ordered  - Evaluate  effectiveness of ordered antiemetic medications  - Provide nonpharmacologic comfort measures as appropriate  - Advance diet as tolerated, if ordered  - Obtain nutritional consult as needed  - Evaluate fluid balance  Outcome: Progressing  Goal: Maintains adequate nutritional intake (undernourished)  Description: INTERVENTIONS:  - Monitor percentage of each meal consumed  - Identify factors contributing to decreased intake, treat as appropriate  - Assist with meals as needed  - Monitor I&O, WT and lab values  - Obtain nutritional consult as needed  - Optimize oral hygiene and moisture  - Encourage food from home; allow for food preferences  - Enhance eating environment  Outcome: Progressing     Alert and oriented x4, anxious at times. Scheduled klonopin given. Reports moderate-severe to L abdomen. Pain managed with prn oral norco and iv morphine. TPN continued. IV abx infused. + diarrhea. Prn maalox given for indigestion. Tolerating clear liquids. Plan for surgery Monday

## 2024-04-13 NOTE — PROGRESS NOTES
[facility]  part of [facility]    Progress Note    Don Cabral Patient Status:  Inpatient    1965 MRN [id number]   Location [facility] [place] 4W/SW/SE Attending Dionisio Dong MD   Hosp Day # 11 PCP Stephen Christianson MD     Assessment and Plan:     Acute diverticulitis  Plan to pursue exploration and end colostomy on Monday per Dr. Macias  May remain on clear liquids  Continue IV antibiotics      Subjective:   Patient stable and afebrile no acute events overnight    Objective:   Patient Vitals for the past 24 hrs:   BP Temp Temp src Pulse Resp SpO2 Weight   24 1013 -- -- -- -- -- -- 230 lb 14.4 oz (104.7 kg)   24 0556 144/71 -- -- 75 -- -- --   24 0414 133/51 98.2 °F (36.8 °C) Oral 70 18 98 % --   24 2241 135/66 98.6 °F (37 °C) Oral 81 18 94 % --   24 1223 135/70 99.1 °F (37.3 °C) Oral 71 18 93 % --       Intake/Output                   04/10/24 0700 - 24 0659 24 0700 - 24 0659 24 0700 - 24 0659       Intake    P.O.  --  400  0    P.O. -- 400 0    I.V.  --  1092  --    I.V. -- 1092 --    IV PIGGYBACK  100  100  --    Volume (mL) (meropenem (Merrem) 500 mg in sodium chloride 0.9% 100 mL IVPB-MBP) 100 100 --    Total Intake 100 1592 0       Output    Urine  --  --  --    Urine Occurrence -- 2 x --    Stool  --  --  --    Stool Count Calculated for I/O -- 1 x --    Total Output -- -- --       Net I/O     100 1592 0            Exam:   /62 (BP Location: Left arm)   Pulse 75   Temp 97.6 °F (36.4 °C) (Oral)   Resp 18   Wt 104.7 kg (230 lb 14.4 oz)   SpO2 96%   BMI 28.86 kg/m²   Gen:  NAD  Abd:  Soft, distended, left abdominal discomfort to palpation, unchanged.    Results:     Lab Results   Component Value Date    WBC 11.0 2024    HGB 12.1 (L) 2024    HCT 35.0 (L) 2024    .0 (H) 2024    CREATSERUM 0.52 (L) 2024    CREATSERUM 0.52 (L) 2024    BUN 13 2024    BUN 13  04/12/2024     04/12/2024     04/12/2024    K 4.0 04/12/2024    K 4.0 04/12/2024     04/12/2024     04/12/2024    CO2 28.0 04/12/2024    CO2 28.0 04/12/2024     (H) 04/12/2024     (H) 04/12/2024    CA 8.4 (L) 04/12/2024    CA 8.4 (L) 04/12/2024    ALB 3.6 04/12/2024    ALKPHO 52 04/12/2024    BILT 0.4 04/12/2024    TP 5.9 04/12/2024    AST 19 04/12/2024    ALT 18 04/12/2024    PTT 24.9 09/28/2023    INR 1.01 09/28/2023    TSH 1.850 09/28/2023    LIP 37 04/02/2024    PSA 2.53 08/02/2021    DDIMER 2.44 (H) 06/23/2023    MG 2.1 04/12/2024    PHOS 3.4 04/12/2024    TROP <0.045 02/05/2020       CT ABDOMEN+PELVIS(CONTRAST ONLY)(CPT=74177)    Result Date: 4/11/2024  CONCLUSION:   Increasing inflammation and stranding involving a broad area of mesenteric fat in the left lateral abdomen likely either mesenteric infarct or epiploic appendagitis.  No abscess    Dictated by (CST): Carmelo Babb MD on 4/11/2024 at 7:25 PM     Finalized by (CST): Carmelo Babb MD on 4/11/2024 at 7:36 PM               Serge Qureshi MD  Complex General Surgical Oncology  [facility]  [email or web address]

## 2024-04-13 NOTE — PROGRESS NOTES
[facility]  Progress Note     Don Cabral  : 1965    Status: Inpatient  Day #: 11    Attending: Dionisio Dong MD  PCP: Stephen Christianson MD     Assessment and Plan:    Sigmoid diverticulitis  RECURRENT   LLQ abdominal pain   Leukocytosis resolved  3rd episode  - failed outpt PO abx.   - history of recurrent diverticulitis  - CT showed acute sigmoid diverticulitis with microperforation, no fluid collection.   - Last colonoscopy 2023.   - Pain control.  - prefers PO - norco, IV morphine, dilaudid or toradol for breakthrough   - off zosyn, cont meropenem.   - mid line placed /   - Clear liquid diet per surgery   - started TPN  - Plans for Invanz x 2 weeks on discharge.   - will eventually benefit from future sigmoidectomy. Trying to avoid during acute episode.   - CT abd from  showing increased inflammation, no abscess   - GI, ID and general surgery on consult.   - tentatively planning for \"ex laparotomy, segmental resection with end colostomy on Monday if no clinical improvement\" per surgery     Pt relays hx of PVCs, a-fib, LBBB.  Hx of intermittent premature ventricular tachycardia   - remote tele     Hypertension  - cont lisinopril, verapamil     GERD  - IV PPI bid     Major depressive disorder   Pt says he talked to his psychiatrist and and can stop neurontin.  - stop neurontin  - cont klonopin, zoloft, zyprexa       DVT Mechanical Prophylaxis:        DVT Pharmacologic Prophylaxis   Medication    enoxaparin (Lovenox) 60 MG/0.6ML SUBQ injection 60 mg               Subjective:      Initial Chief Complaint:  abdominal pain  Pt with significant pain at the moment  No nausea or vomiting  Hemodynamically stable    Objective:      Temp:  [97.6 °F (36.4 °C)-98.7 °F (37.1 °C)] 98.7 °F (37.1 °C)  Pulse:  [75-85] 85  Resp:  [18] 18  BP: (125-134)/(57-70) 134/70  SpO2:  [96 %-98 %] 98 %  General:  Alert, no distress  HEENT:  Normocephalic, atraumatic  Cardiac:  Regular rate, regular  rhythm  Pulmonary:  Clear to auscultation bilaterally, respirations unlabored  Gastrointestinal:  Soft, non-tender, normal bowel sounds  Musculoskeletal:  No joint swelling  Extremities:  No edema, no cyanosis, no clubbing  Neurologic:  nonfocal  Psychiatric:  Normal affect, calm and appropriate  No intake or output data in the 24 hours ending 04/13/24 1615        Recent Labs   Lab 04/11/24  0611 04/12/24  0610 04/13/24 0612   WBC 10.8 11.0 12.8*   HGB 12.1* 12.1* 12.3*   HCT 34.3* 35.0* 36.5*   .0 475.0* 572.0*   RBC 4.09* 4.10* 4.27*   MCV 83.9 85.4 85.5   MCH 29.6 29.5 28.8   MCHC 35.3 34.6 33.7   RDW 13.1 13.1 13.3   NEPRELIM 7.87* 7.93* 9.73*     Recent Labs   Lab 04/10/24  0527 04/11/24  0611 04/12/24  0610 04/13/24  0612   BUN 12 14 13  13 12  12   CREATSERUM 0.58* 0.56* 0.52*  0.52* 0.51*  0.51*   CA 8.5* 8.4* 8.4*  8.4* 8.5*  8.5*   ALB 3.7  --  3.6 3.5   * 137 138  138 136  136   K 3.6 4.1 4.0  4.0 4.3  4.3    104 104  104 105  105   CO2 29.0 28.0 28.0  28.0 30.0  30.0   * 104* 100*  100* 110*  110*   MG 2.0 2.0 2.1 2.1   PHOS 1.7* 2.0* 3.4 3.5   BILT 0.6  --  0.4  --    AST 14  --  19  --    ALT 15  --  18  --    ALKPHO 53  --  52  --    TP 6.2  --  5.9  --        CT ABDOMEN+PELVIS(CONTRAST ONLY)(CPT=74177)    Result Date: 4/11/2024  CONCLUSION:   Increasing inflammation and stranding involving a broad area of mesenteric fat in the left lateral abdomen likely either mesenteric infarct or epiploic appendagitis.  No abscess    Dictated by (CST): Carmelo Babb MD on 4/11/2024 at 7:25 PM     Finalized by (CST): Carmelo Babb MD on 4/11/2024 at 7:36 PM             Medications:   enoxaparin  0.5 mg/kg Subcutaneous Daily    magnesium oxide  400 mg Oral Once    pantoprazole  40 mg Intravenous Q12H    meropenem  500 mg Intravenous Q8H    clonazePAM  0.5 mg Oral TID    OLANZapine  2.5 mg Oral Nightly    verapamil ER  120 mg Oral Nightly    sertraline  75 mg Oral Daily     lisinopril  20 mg Oral Daily      PRN Meds:   HYDROmorphone **OR** HYDROmorphone **OR** HYDROmorphone    ketorolac    dextrose 10%    clonazePAM    morphINE **OR** morphINE **OR** morphINE    alum-mag hydroxide-simethicone    simethicone    HYDROcodone-acetaminophen **OR** HYDROcodone-acetaminophen    acetaminophen    acetaminophen    ondansetron    prochlorperazine    temazepam    albuterol    Supplementary Documentation:        MDM High. Time spent on chart/note review, review of labs/imaging, discussion with patient, physical exam, discussion with staff, consultants, coordinating care, writing progress note, discussion of plan of care.    Dionisio Dong MD

## 2024-04-13 NOTE — PLAN OF CARE
Late Entry: Writer received call from JERSON Alva stating, \"Can you help me in room 441? The patient is upset about the results of his CT scan that he received in My Chart and wants to leave if he doesn't have emergency surgery tonight.\" After knocking and introducing myself, the patient was at the bedside packing his belongings into his bags. I sat at the bedside and asked how can I help. The patient stated, \"I don't know what is going on here, but I insisted to the doctor that I have this imaging to see what was going on and my results says it is worse. I am not happy because according to results I need emergent surgery tonight. I spoke with someone at [facility] where they know me and they can do the surgery tonight. I want to go tonight. I don't want to wait until tomorrow.\" Patient is removing the tele box and refusing to wear it. I replied, I will call the doctor and let them know of your concerns. I asked the patient for his patience while I contact his doctors. The patient sat down and agreed to give me time to work on his concerns. At 2120, I Perfect Served Dr. REYNALDO Dong of the above data and asked the doctor to call my ext. [phone]. I called the  and asked for them to paged Dr. Gant directly. While waiting, the patient came to the desk and asked JERSON Regan to disconnect him from the TPN and fluids that were running through his double lumen PICC. RN disconnected as patient requested and the patient returned to his room. Dr. Dong called and I relayed the above data. While communicating with Dr. Dong, Dr. Gant called. Dr. Dong and I ended our call and I relayed the above data to Dr. Gant. I asked Dr. Gant if she could talk with the patient and explain his CT results. Dr. Gant stated, \" I am on call for Dr. Novoa. I will have to review his chart, imaging, and contact Dr. Novoa, then I can communicate with the patient.\" I gave Dr. Gant my ext. [phone] and updated the patient. The patient  stated, \"How will that take for her to call back. I already called my wife to come here to get me.\" I replied, there is no set time with the doctors, but please give them time to review your chart and tests. The patient replied, \" I can respect that and thank you for helping me. You are the only one who has taken the time to help.\" I replied, thank you. The patient stated, \"If Dr. Gant can explain my results to me, I would consider staying, but I will only give her until 2245.\" At 2153, Dr. Gant was Perfect Served: I just explained to the patient our conversation. Patient stated,\" If Dr. Gant can explain my results to me, I would consider staying, but I will only give her until 10:45pm. I already called my wife to come.\" I asked him to give you time to review his chart and tests. He agreed, but only until 2245. My direct number is [phone] [facility]. At 2155 by Perfect Serve Dr. Gant replied, \"Reviewing the chart now. I also placed a message to Dr. Novoa to get some more history. Let the patient know please that we are working on it. I will give Dr. Novoa a bit longer to get back to me. I haven't heard from him yet.\" Approximately 2210, Dr. Gant called ext. [phone] and spoke with the patient directly on the phone while I sat at bedside with the patient. After the called ended, the patient stated, \"Thank you again for helping. You are very patient and I appreciate you. I will stay and talked with the doctors tomorrow. I replied, I am glad I was able to help and I will update Nida, your nurse tonight of your plans and to restart your plan of care. Nida RN was updated of the above data and to resume plan of care. At 2227, Dr. Dong was Perfect Serve of the above data and the patient still refused wear the tele box. Dr. Dong replied, \"That's fine. Thanks.\" At 2229 Dr. Gant was Perfect Served: Thank you so much for talking with the patient.\" Dr. Gant replied, \"Sure. Thank you for letting me know what's going  on. Also I heard back from Dr. Novoa.\"

## 2024-04-14 LAB
ANION GAP SERPL CALC-SCNC: 3 MMOL/L (ref 0–18)
BASOPHILS # BLD AUTO: 0.01 X10(3) UL (ref 0–0.2)
BASOPHILS NFR BLD AUTO: 0.1 %
BUN BLD-MCNC: 14 MG/DL (ref 9–23)
BUN/CREAT SERPL: 30.4 (ref 10–20)
CALCIUM BLD-MCNC: 8 MG/DL (ref 8.7–10.4)
CHLORIDE SERPL-SCNC: 107 MMOL/L (ref 98–112)
CO2 SERPL-SCNC: 28 MMOL/L (ref 21–32)
CREAT BLD-MCNC: 0.46 MG/DL
DEPRECATED RDW RBC AUTO: 41.4 FL (ref 35.1–46.3)
EGFRCR SERPLBLD CKD-EPI 2021: 122 ML/MIN/1.73M2 (ref 60–?)
EOSINOPHIL # BLD AUTO: 0.26 X10(3) UL (ref 0–0.7)
EOSINOPHIL NFR BLD AUTO: 2.5 %
ERYTHROCYTE [DISTWIDTH] IN BLOOD BY AUTOMATED COUNT: 13.2 % (ref 11–15)
GLUCOSE BLD-MCNC: 112 MG/DL (ref 70–99)
HCT VFR BLD AUTO: 31.6 %
HGB BLD-MCNC: 10.9 G/DL
IMM GRANULOCYTES # BLD AUTO: 0.05 X10(3) UL (ref 0–1)
IMM GRANULOCYTES NFR BLD: 0.5 %
LYMPHOCYTES # BLD AUTO: 1.52 X10(3) UL (ref 1–4)
LYMPHOCYTES NFR BLD AUTO: 14.6 %
MAGNESIUM SERPL-MCNC: 2 MG/DL (ref 1.6–2.6)
MCH RBC QN AUTO: 29.7 PG (ref 26–34)
MCHC RBC AUTO-ENTMCNC: 34.5 G/DL (ref 31–37)
MCV RBC AUTO: 86.1 FL
MONOCYTES # BLD AUTO: 1.03 X10(3) UL (ref 0.1–1)
MONOCYTES NFR BLD AUTO: 9.9 %
NEUTROPHILS # BLD AUTO: 7.56 X10 (3) UL (ref 1.5–7.7)
NEUTROPHILS # BLD AUTO: 7.56 X10(3) UL (ref 1.5–7.7)
NEUTROPHILS NFR BLD AUTO: 72.4 %
OSMOLALITY SERPL CALC.SUM OF ELEC: 287 MOSM/KG (ref 275–295)
PHOSPHATE SERPL-MCNC: 3.7 MG/DL (ref 2.4–5.1)
PLATELET # BLD AUTO: 552 10(3)UL (ref 150–450)
POTASSIUM SERPL-SCNC: 3.9 MMOL/L (ref 3.5–5.1)
RBC # BLD AUTO: 3.67 X10(6)UL
SARS-COV-2 RNA RESP QL NAA+PROBE: NOT DETECTED
SODIUM SERPL-SCNC: 138 MMOL/L (ref 136–145)
WBC # BLD AUTO: 10.4 X10(3) UL (ref 4–11)

## 2024-04-14 PROCEDURE — 99233 SBSQ HOSP IP/OBS HIGH 50: CPT | Performed by: HOSPITALIST

## 2024-04-14 RX ORDER — METRONIDAZOLE 500 MG/1
500 TABLET ORAL ONCE
Status: DISCONTINUED | OUTPATIENT
Start: 2024-04-14 | End: 2024-04-14

## 2024-04-14 RX ORDER — NEOMYCIN SULFATE 500 MG/1
1000 TABLET ORAL ONCE
Status: COMPLETED | OUTPATIENT
Start: 2024-04-14 | End: 2024-04-14

## 2024-04-14 RX ORDER — NEOMYCIN SULFATE 500 MG/1
1000 TABLET ORAL ONCE
Status: DISCONTINUED | OUTPATIENT
Start: 2024-04-14 | End: 2024-04-14

## 2024-04-14 NOTE — PLAN OF CARE
Problem: Patient Centered Care  Goal: Patient preferences are identified and integrated in the patient's plan of care  Description: Interventions:  - What would you like us to know as we care for you? This is my third time having diverticulitis  - Provide timely, complete, and accurate information to patient/family  - Incorporate patient and family knowledge, values, beliefs, and cultural backgrounds into the planning and delivery of care  - Encourage patient/family to participate in care and decision-making at the level they choose  - Honor patient and family perspectives and choices  Outcome: Not Progressing     Problem: Patient/Family Goals  Goal: Patient/Family Long Term Goal  Description: Patient's Long Term Goal: Discharge home    Interventions:  - Manage abdominal pain  - Tolerate adequate oral intake without complication  - Determine appropriate antibiotic plan  - See additional Care Plan goals for specific interventions  Outcome: Not Progressing  Goal: Patient/Family Short Term Goal  Description: Patient's Short Term Goal: Manage abdominal pain    Interventions:   - Transition from IV to oral pain medications  - Bowel rest with slow diet tolerance  - Eat small frequent light meals  - See additional Care Plan goals for specific interventions  Outcome: Not Progressing     Problem: PAIN - ADULT  Goal: Verbalizes/displays adequate comfort level or patient's stated pain goal  Description: INTERVENTIONS:  - Encourage pt to monitor pain and request assistance  - Assess pain using appropriate pain scale  - Administer analgesics based on type and severity of pain and evaluate response  - Implement non-pharmacological measures as appropriate and evaluate response  - Consider cultural and social influences on pain and pain management  - Manage/alleviate anxiety  - Utilize distraction and/or relaxation techniques  - Monitor for opioid side effects  - Notify MD/LIP if interventions unsuccessful or patient reports new  pain  - Anticipate increased pain with activity and pre-medicate as appropriate  Outcome: Not Progressing     Problem: RISK FOR INFECTION - ADULT  Goal: Absence of fever/infection during anticipated neutropenic period  Description: INTERVENTIONS  - Monitor WBC  - Administer growth factors as ordered  - Implement neutropenic guidelines  Outcome: Not Progressing     Problem: DISCHARGE PLANNING  Goal: Discharge to home or other facility with appropriate resources  Description: INTERVENTIONS:  - Identify barriers to discharge w/pt and caregiver  - Include patient/family/discharge partner in discharge planning  - Arrange for needed discharge resources and transportation as appropriate  - Identify discharge learning needs (meds, wound care, etc)  - Arrange for interpreters to assist at discharge as needed  - Consider post-discharge preferences of patient/family/discharge partner  - Complete POLST form as appropriate  - Assess patient's ability to be responsible for managing their own health  - Refer to Case Management Department for coordinating discharge planning if the patient needs post-hospital services based on physician/LIP order or complex needs related to functional status, cognitive ability or social support system  Outcome: Not Progressing     Problem: GASTROINTESTINAL - ADULT  Goal: Maintains or returns to baseline bowel function  Description: INTERVENTIONS:  - Assess bowel function  - Maintain adequate hydration with IV or PO as ordered and tolerated  - Evaluate effectiveness of GI medications  - Encourage mobilization and activity  - Obtain nutritional consult as needed  - Establish a toileting routine/schedule  - Consider collaborating with pharmacy to review patient's medication profile  Outcome: Not Progressing  Goal: Minimal or absence of nausea and vomiting  Description: INTERVENTIONS:  - Maintain adequate hydration with IV or PO as ordered and tolerated  - Nasogastric tube to low intermittent suction as  ordered  - Evaluate effectiveness of ordered antiemetic medications  - Provide nonpharmacologic comfort measures as appropriate  - Advance diet as tolerated, if ordered  - Obtain nutritional consult as needed  - Evaluate fluid balance  Outcome: Not Progressing  Goal: Maintains adequate nutritional intake (undernourished)  Description: INTERVENTIONS:  - Monitor percentage of each meal consumed  - Identify factors contributing to decreased intake, treat as appropriate  - Assist with meals as needed  - Monitor I&O, WT and lab values  - Obtain nutritional consult as needed  - Optimize oral hygiene and moisture  - Encourage food from home; allow for food preferences  - Enhance eating environment  Outcome: Not Progressing     Alert and oriented x4, anxious at times. Reports moderate-severe pain to mid-left abdomen. Pain managed with prn IV dilaudid, morphine, toradol and oral norco. Reports indigestion @ times, prn maalox given. IV abx continued. TPN infusing. Independent with ADLs. NPO @ midnight. Plan for exploratory laparotomy, colon resection tomorrow w/ Dr. Novoa

## 2024-04-14 NOTE — PROGRESS NOTES
[facility]  part of [facility]    Progress Note    Don Cabral Patient Status:  Inpatient    1965 MRN [id number]   Location [facility] 4W/SW/SE Attending Dionisio Dong MD   Hosp Day # 12 PCP Stephen Christianson MD     Assessment and Plan:     Acute diverticulitis  Plan to pursue exploration and end colostomy tomorrow per Dr. Macias  Keep on cear liquids  Continue IV antibiotics      Subjective:   Patient stable and afebrile no acute events overnight    Objective:   Patient Vitals for the past 24 hrs:   BP Temp Temp src Pulse Resp SpO2 Weight   24 1013 -- -- -- -- -- -- 230 lb 14.4 oz (104.7 kg)   24 0556 144/71 -- -- 75 -- -- --   24 0414 133/51 98.2 °F (36.8 °C) Oral 70 18 98 % --   24 2241 135/66 98.6 °F (37 °C) Oral 81 18 94 % --   24 1223 135/70 99.1 °F (37.3 °C) Oral 71 18 93 % --       Intake/Output                   04/10/24 0700 - 24 0659 24 0700 - 24 0659 24 0700 - 24 0659       Intake    P.O.  --  400  0    P.O. -- 400 0    I.V.  --  1092  --    I.V. -- 1092 --    IV PIGGYBACK  100  100  --    Volume (mL) (meropenem (Merrem) 500 mg in sodium chloride 0.9% 100 mL IVPB-MBP) 100 100 --    Total Intake 100 1592 0       Output    Urine  --  --  --    Urine Occurrence -- 2 x --    Stool  --  --  --    Stool Count Calculated for I/O -- 1 x --    Total Output -- -- --       Net I/O     100 1592 0            Exam:   /70 (BP Location: Left arm)   Pulse 70   Temp 98.2 °F (36.8 °C) (Oral)   Resp 18   Wt 119.9 kg (264 lb 6.4 oz)   SpO2 96%   BMI 33.05 kg/m²   Gen:  NAD  Abd:  Soft, distended, left abdominal discomfort to palpation, unchanged.    Results:     Lab Results   Component Value Date    WBC 10.4 2024    HGB 10.9 (L) 2024    HCT 31.6 (L) 2024    .0 (H) 2024    CREATSERUM 0.46 (L) 2024    BUN 14 2024     2024    K 3.9 2024      04/14/2024    CO2 28.0 04/14/2024     (H) 04/14/2024    CA 8.0 (L) 04/14/2024    ALB 3.5 04/13/2024    ALKPHO 52 04/12/2024    BILT 0.4 04/12/2024    TP 5.9 04/12/2024    AST 19 04/12/2024    ALT 18 04/12/2024    PTT 24.9 09/28/2023    INR 1.01 09/28/2023    TSH 1.850 09/28/2023    LIP 37 04/02/2024    PSA 2.53 08/02/2021    DDIMER 2.44 (H) 06/23/2023    MG 2.0 04/14/2024    PHOS 3.7 04/14/2024    TROP <0.045 02/05/2020       No results found.        Serge Qureshi MD  Complex General Surgical Oncology  [email or web address], [email or web address]  [email or web address]

## 2024-04-14 NOTE — PROGRESS NOTES
[facility]  Progress Note     Don Cabral  : 1965    Status: Inpatient  Day #: 12    Attending: Dionisio Dong MD  PCP: Stephen Christianson MD     Assessment and Plan:    Sigmoid diverticulitis  RECURRENT   LLQ abdominal pain   Leukocytosis resolved  3rd episode  - failed outpt PO abx.   - history of recurrent diverticulitis  - CT showed acute sigmoid diverticulitis with microperforation, no fluid collection.   - Last colonoscopy 2023.   - Pain control.  - prefers PO - norco, IV morphine, dilaudid or toradol for breakthrough   - off zosyn, cont meropenem.   - mid line placed    - Clear liquid diet per surgery   - started TPN  - CT abd from  showing increased inflammation, no abscess   - GI, ID and general surgery on consult.   - tentatively planning for \"ex laparotomy, segmental resection with end colostomy on Monday if no clinical improvement\" per surgery     Pt relays hx of PVCs, a-fib, LBBB.  Hx of intermittent premature ventricular tachycardia   - remote tele     Hypertension  - cont lisinopril, verapamil     GERD  - IV PPI bid     Major depressive disorder   Pt says he talked to his psychiatrist and and can stop neurontin.  - stop neurontin  - cont klonopin, zoloft, zyprexa       DVT Mechanical Prophylaxis:        DVT Pharmacologic Prophylaxis   Medication    enoxaparin (Lovenox) 60 MG/0.6ML SUBQ injection 60 mg               Subjective:      Initial Chief Complaint:  abdominal pain  Pt resting comfortably today  Pain controlled currently  No other complaints    Objective:      Temp:  [98.1 °F (36.7 °C)-98.4 °F (36.9 °C)] (P) 98.1 °F (36.7 °C)  Pulse:  [67-78] (P) 67  Resp:  [16-18] (P) 16  BP: (148-156)/(70-78) (P) 122/64  SpO2:  [95 %-96 %] (P) 95 %  General:  Alert, no distress  HEENT:  Normocephalic, atraumatic  Cardiac:  Regular rate, regular rhythm  Pulmonary:  Clear to auscultation bilaterally, respirations unlabored  Gastrointestinal:  Soft, non-tender, normal  bowel sounds  Musculoskeletal:  No joint swelling  Extremities:  No edema, no cyanosis, no clubbing  Neurologic:  nonfocal  Psychiatric:  Normal affect, calm and appropriate    Intake/Output Summary (Last 24 hours) at 4/14/2024 1332  Last data filed at 4/14/2024 0632  Gross per 24 hour   Intake 1315.56 ml   Output --   Net 1315.56 ml           Recent Labs   Lab 04/12/24  0610 04/13/24  0612 04/14/24  0450   WBC 11.0 12.8* 10.4   HGB 12.1* 12.3* 10.9*   HCT 35.0* 36.5* 31.6*   .0* 572.0* 552.0*   RBC 4.10* 4.27* 3.67*   MCV 85.4 85.5 86.1   MCH 29.5 28.8 29.7   MCHC 34.6 33.7 34.5   RDW 13.1 13.3 13.2   NEPRELIM 7.93* 9.73* 7.56     Recent Labs   Lab 04/10/24  0527 04/11/24  0611 04/12/24  0610 04/13/24  0612 04/14/24  0450   BUN 12   < > 13  13 12  12 14   CREATSERUM 0.58*   < > 0.52*  0.52* 0.51*  0.51* 0.46*   CA 8.5*   < > 8.4*  8.4* 8.5*  8.5* 8.0*   ALB 3.7  --  3.6 3.5  --    *   < > 138  138 136  136 138   K 3.6   < > 4.0  4.0 4.3  4.3 3.9      < > 104  104 105  105 107   CO2 29.0   < > 28.0  28.0 30.0  30.0 28.0   *   < > 100*  100* 110*  110* 112*   MG 2.0   < > 2.1 2.1 2.0   PHOS 1.7*   < > 3.4 3.5 3.7   BILT 0.6  --  0.4  --   --    AST 14  --  19  --   --    ALT 15  --  18  --   --    ALKPHO 53  --  52  --   --    TP 6.2  --  5.9  --   --     < > = values in this interval not displayed.       No results found.      Medications:   neomycin  1,000 mg Oral Once    neomycin  1,000 mg Oral Once    neomycin  1,000 mg Oral Once    metRONIDAZOLE  500 mg Oral Once    metRONIDAZOLE  500 mg Oral Once    metRONIDAZOLE  500 mg Oral Once    enoxaparin  0.5 mg/kg Subcutaneous Daily    magnesium oxide  400 mg Oral Once    pantoprazole  40 mg Intravenous Q12H    meropenem  500 mg Intravenous Q8H    clonazePAM  0.5 mg Oral TID    OLANZapine  2.5 mg Oral Nightly    verapamil ER  120 mg Oral Nightly    sertraline  75 mg Oral Daily    lisinopril  20 mg Oral Daily      PRN Meds:    HYDROmorphone **OR** HYDROmorphone **OR** HYDROmorphone    ketorolac    dextrose 10%    clonazePAM    morphINE **OR** morphINE **OR** morphINE    alum-mag hydroxide-simethicone    simethicone    HYDROcodone-acetaminophen **OR** HYDROcodone-acetaminophen    acetaminophen    acetaminophen    ondansetron    prochlorperazine    temazepam    albuterol    Supplementary Documentation:        [facility]. Time spent on chart/note review, review of labs/imaging, discussion with patient, physical exam, discussion with staff, consultants, coordinating care, writing progress note, discussion of plan of care.    Dionisio Dong MD

## 2024-04-14 NOTE — PLAN OF CARE
Don is AxO4. VSS. CPAP at night. Abdominal pain managed with morphine, norco, dilaudid, and toradol. On RA. Tolerating CLD. Receiving TPN via R PICC line. Maalox provided as needed for indigestion. Denies nausea/vomiting overnight. Voiding freely. BM overnight.  Ambulating independently. Receiving Merrem. Lovenox for DVT prophylaxis.  Plan for surgery on Monday if no improvement. Appropriate safety measures maintained, call light within reach, and frequent rounding by nursing staff.       Problem: Patient Centered Care  Goal: Patient preferences are identified and integrated in the patient's plan of care  Description: Interventions:  - What would you like us to know as we care for you? This is my third time having diverticulitis  - Provide timely, complete, and accurate information to patient/family  - Incorporate patient and family knowledge, values, beliefs, and cultural backgrounds into the planning and delivery of care  - Encourage patient/family to participate in care and decision-making at the level they choose  - Honor patient and family perspectives and choices  Outcome: Progressing     Problem: Patient/Family Goals  Goal: Patient/Family Long Term Goal  Description: Patient's Long Term Goal: Discharge home    Interventions:  - Manage abdominal pain  - Tolerate adequate oral intake without complication  - Determine appropriate antibiotic plan  - See additional Care Plan goals for specific interventions  Outcome: Progressing  Goal: Patient/Family Short Term Goal  Description: Patient's Short Term Goal: Manage abdominal pain    Interventions:   - Transition from IV to oral pain medications  - Bowel rest with slow diet tolerance  - Eat small frequent light meals  - See additional Care Plan goals for specific interventions  Outcome: Progressing     Problem: PAIN - ADULT  Goal: Verbalizes/displays adequate comfort level or patient's stated pain goal  Description: INTERVENTIONS:  - Encourage pt to monitor pain  and request assistance  - Assess pain using appropriate pain scale  - Administer analgesics based on type and severity of pain and evaluate response  - Implement non-pharmacological measures as appropriate and evaluate response  - Consider cultural and social influences on pain and pain management  - Manage/alleviate anxiety  - Utilize distraction and/or relaxation techniques  - Monitor for opioid side effects  - Notify MD/LIP if interventions unsuccessful or patient reports new pain  - Anticipate increased pain with activity and pre-medicate as appropriate  Outcome: Progressing     Problem: RISK FOR INFECTION - ADULT  Goal: Absence of fever/infection during anticipated neutropenic period  Description: INTERVENTIONS  - Monitor WBC  - Administer growth factors as ordered  - Implement neutropenic guidelines  Outcome: Progressing     Problem: DISCHARGE PLANNING  Goal: Discharge to home or other facility with appropriate resources  Description: INTERVENTIONS:  - Identify barriers to discharge w/pt and caregiver  - Include patient/family/discharge partner in discharge planning  - Arrange for needed discharge resources and transportation as appropriate  - Identify discharge learning needs (meds, wound care, etc)  - Arrange for interpreters to assist at discharge as needed  - Consider post-discharge preferences of patient/family/discharge partner  - Complete POLST form as appropriate  - Assess patient's ability to be responsible for managing their own health  - Refer to Case Management Department for coordinating discharge planning if the patient needs post-hospital services based on physician/LIP order or complex needs related to functional status, cognitive ability or social support system  Outcome: Progressing     Problem: GASTROINTESTINAL - ADULT  Goal: Maintains or returns to baseline bowel function  Description: INTERVENTIONS:  - Assess bowel function  - Maintain adequate hydration with IV or PO as ordered and  tolerated  - Evaluate effectiveness of GI medications  - Encourage mobilization and activity  - Obtain nutritional consult as needed  - Establish a toileting routine/schedule  - Consider collaborating with pharmacy to review patient's medication profile  Outcome: Progressing  Goal: Minimal or absence of nausea and vomiting  Description: INTERVENTIONS:  - Maintain adequate hydration with IV or PO as ordered and tolerated  - Nasogastric tube to low intermittent suction as ordered  - Evaluate effectiveness of ordered antiemetic medications  - Provide nonpharmacologic comfort measures as appropriate  - Advance diet as tolerated, if ordered  - Obtain nutritional consult as needed  - Evaluate fluid balance  Outcome: Progressing  Goal: Maintains adequate nutritional intake (undernourished)  Description: INTERVENTIONS:  - Monitor percentage of each meal consumed  - Identify factors contributing to decreased intake, treat as appropriate  - Assist with meals as needed  - Monitor I&O, WT and lab values  - Obtain nutritional consult as needed  - Optimize oral hygiene and moisture  - Encourage food from home; allow for food preferences  - Enhance eating environment  Outcome: Progressing

## 2024-04-15 ENCOUNTER — ANESTHESIA EVENT (OUTPATIENT)
Dept: SURGERY | Facility: HOSPITAL | Age: 59
End: 2024-04-15
Payer: COMMERCIAL

## 2024-04-15 ENCOUNTER — ANESTHESIA (OUTPATIENT)
Dept: SURGERY | Facility: HOSPITAL | Age: 59
End: 2024-04-15
Payer: COMMERCIAL

## 2024-04-15 LAB
ANION GAP SERPL CALC-SCNC: 5 MMOL/L (ref 0–18)
ANTIBODY SCREEN: NEGATIVE
BASOPHILS # BLD AUTO: 0.01 X10(3) UL (ref 0–0.2)
BASOPHILS NFR BLD AUTO: 0.1 %
BUN BLD-MCNC: 14 MG/DL (ref 9–23)
BUN/CREAT SERPL: 25 (ref 10–20)
CALCIUM BLD-MCNC: 8.8 MG/DL (ref 8.7–10.4)
CHLORIDE SERPL-SCNC: 104 MMOL/L (ref 98–112)
CO2 SERPL-SCNC: 30 MMOL/L (ref 21–32)
CREAT BLD-MCNC: 0.56 MG/DL
DEPRECATED RDW RBC AUTO: 41.6 FL (ref 35.1–46.3)
EGFRCR SERPLBLD CKD-EPI 2021: 115 ML/MIN/1.73M2 (ref 60–?)
EOSINOPHIL # BLD AUTO: 0.28 X10(3) UL (ref 0–0.7)
EOSINOPHIL NFR BLD AUTO: 2.7 %
ERYTHROCYTE [DISTWIDTH] IN BLOOD BY AUTOMATED COUNT: 13.4 % (ref 11–15)
GLUCOSE BLD-MCNC: 101 MG/DL (ref 70–99)
HCT VFR BLD AUTO: 36.1 %
HGB BLD-MCNC: 12 G/DL
IMM GRANULOCYTES # BLD AUTO: 0.06 X10(3) UL (ref 0–1)
IMM GRANULOCYTES NFR BLD: 0.6 %
LYMPHOCYTES # BLD AUTO: 1.71 X10(3) UL (ref 1–4)
LYMPHOCYTES NFR BLD AUTO: 16.7 %
MAGNESIUM SERPL-MCNC: 2.1 MG/DL (ref 1.6–2.6)
MCH RBC QN AUTO: 28.7 PG (ref 26–34)
MCHC RBC AUTO-ENTMCNC: 33.2 G/DL (ref 31–37)
MCV RBC AUTO: 86.4 FL
MONOCYTES # BLD AUTO: 0.86 X10(3) UL (ref 0.1–1)
MONOCYTES NFR BLD AUTO: 8.4 %
NEUTROPHILS # BLD AUTO: 7.32 X10 (3) UL (ref 1.5–7.7)
NEUTROPHILS # BLD AUTO: 7.32 X10(3) UL (ref 1.5–7.7)
NEUTROPHILS NFR BLD AUTO: 71.5 %
OSMOLALITY SERPL CALC.SUM OF ELEC: 289 MOSM/KG (ref 275–295)
PHOSPHATE SERPL-MCNC: 4.4 MG/DL (ref 2.4–5.1)
PLATELET # BLD AUTO: 713 10(3)UL (ref 150–450)
POTASSIUM SERPL-SCNC: 4.6 MMOL/L (ref 3.5–5.1)
RBC # BLD AUTO: 4.18 X10(6)UL
RH BLOOD TYPE: POSITIVE
RH BLOOD TYPE: POSITIVE
SODIUM SERPL-SCNC: 139 MMOL/L (ref 136–145)
WBC # BLD AUTO: 10.2 X10(3) UL (ref 4–11)

## 2024-04-15 PROCEDURE — 44139 MOBILIZATION OF COLON: CPT | Performed by: SURGERY

## 2024-04-15 PROCEDURE — 0DTG0ZZ RESECTION OF LEFT LARGE INTESTINE, OPEN APPROACH: ICD-10-PCS | Performed by: SURGERY

## 2024-04-15 PROCEDURE — 44143 PARTIAL REMOVAL OF COLON: CPT | Performed by: SURGERY

## 2024-04-15 PROCEDURE — 99233 SBSQ HOSP IP/OBS HIGH 50: CPT | Performed by: HOSPITALIST

## 2024-04-15 PROCEDURE — 0D1L0Z4 BYPASS TRANSVERSE COLON TO CUTANEOUS, OPEN APPROACH: ICD-10-PCS | Performed by: SURGERY

## 2024-04-15 RX ORDER — HYDROMORPHONE HYDROCHLORIDE 1 MG/ML
0.4 INJECTION, SOLUTION INTRAMUSCULAR; INTRAVENOUS; SUBCUTANEOUS EVERY 5 MIN PRN
Status: DISCONTINUED | OUTPATIENT
Start: 2024-04-15 | End: 2024-04-15 | Stop reason: HOSPADM

## 2024-04-15 RX ORDER — MORPHINE SULFATE 4 MG/ML
4 INJECTION, SOLUTION INTRAMUSCULAR; INTRAVENOUS EVERY 10 MIN PRN
Status: DISCONTINUED | OUTPATIENT
Start: 2024-04-15 | End: 2024-04-15 | Stop reason: HOSPADM

## 2024-04-15 RX ORDER — OXYCODONE HYDROCHLORIDE 5 MG/1
5 TABLET ORAL EVERY 4 HOURS PRN
Status: DISCONTINUED | OUTPATIENT
Start: 2024-04-15 | End: 2024-04-21

## 2024-04-15 RX ORDER — PROCHLORPERAZINE EDISYLATE 5 MG/ML
5 INJECTION INTRAMUSCULAR; INTRAVENOUS EVERY 8 HOURS PRN
Status: DISCONTINUED | OUTPATIENT
Start: 2024-04-15 | End: 2024-04-21

## 2024-04-15 RX ORDER — ONDANSETRON 2 MG/ML
4 INJECTION INTRAMUSCULAR; INTRAVENOUS ONCE AS NEEDED
Status: COMPLETED | OUTPATIENT
Start: 2024-04-15 | End: 2024-04-15

## 2024-04-15 RX ORDER — MORPHINE SULFATE 10 MG/ML
6 INJECTION, SOLUTION INTRAMUSCULAR; INTRAVENOUS EVERY 10 MIN PRN
Status: DISCONTINUED | OUTPATIENT
Start: 2024-04-15 | End: 2024-04-15 | Stop reason: HOSPADM

## 2024-04-15 RX ORDER — MORPHINE SULFATE 4 MG/ML
2 INJECTION, SOLUTION INTRAMUSCULAR; INTRAVENOUS EVERY 10 MIN PRN
Status: DISCONTINUED | OUTPATIENT
Start: 2024-04-15 | End: 2024-04-15 | Stop reason: HOSPADM

## 2024-04-15 RX ORDER — DEXAMETHASONE SODIUM PHOSPHATE 4 MG/ML
VIAL (ML) INJECTION AS NEEDED
Status: DISCONTINUED | OUTPATIENT
Start: 2024-04-15 | End: 2024-04-15 | Stop reason: SURG

## 2024-04-15 RX ORDER — LIDOCAINE HYDROCHLORIDE 10 MG/ML
INJECTION, SOLUTION EPIDURAL; INFILTRATION; INTRACAUDAL; PERINEURAL AS NEEDED
Status: DISCONTINUED | OUTPATIENT
Start: 2024-04-15 | End: 2024-04-15 | Stop reason: SURG

## 2024-04-15 RX ORDER — ROCURONIUM BROMIDE 10 MG/ML
INJECTION, SOLUTION INTRAVENOUS AS NEEDED
Status: DISCONTINUED | OUTPATIENT
Start: 2024-04-15 | End: 2024-04-15 | Stop reason: SURG

## 2024-04-15 RX ORDER — HYDROMORPHONE HYDROCHLORIDE 1 MG/ML
0.2 INJECTION, SOLUTION INTRAMUSCULAR; INTRAVENOUS; SUBCUTANEOUS EVERY 5 MIN PRN
Status: DISCONTINUED | OUTPATIENT
Start: 2024-04-15 | End: 2024-04-15 | Stop reason: HOSPADM

## 2024-04-15 RX ORDER — OXYCODONE HYDROCHLORIDE 5 MG/1
10 TABLET ORAL EVERY 4 HOURS PRN
Status: DISCONTINUED | OUTPATIENT
Start: 2024-04-15 | End: 2024-04-21

## 2024-04-15 RX ORDER — SODIUM CHLORIDE, SODIUM LACTATE, POTASSIUM CHLORIDE, CALCIUM CHLORIDE 600; 310; 30; 20 MG/100ML; MG/100ML; MG/100ML; MG/100ML
INJECTION, SOLUTION INTRAVENOUS CONTINUOUS PRN
Status: DISCONTINUED | OUTPATIENT
Start: 2024-04-15 | End: 2024-04-15 | Stop reason: SURG

## 2024-04-15 RX ORDER — MIDAZOLAM HYDROCHLORIDE 1 MG/ML
INJECTION INTRAMUSCULAR; INTRAVENOUS AS NEEDED
Status: DISCONTINUED | OUTPATIENT
Start: 2024-04-15 | End: 2024-04-15 | Stop reason: SURG

## 2024-04-15 RX ORDER — HEPARIN SODIUM 5000 [USP'U]/ML
5000 INJECTION, SOLUTION INTRAVENOUS; SUBCUTANEOUS EVERY 12 HOURS SCHEDULED
Status: DISCONTINUED | OUTPATIENT
Start: 2024-04-16 | End: 2024-04-23

## 2024-04-15 RX ORDER — HYDROMORPHONE HYDROCHLORIDE 1 MG/ML
INJECTION, SOLUTION INTRAMUSCULAR; INTRAVENOUS; SUBCUTANEOUS AS NEEDED
Status: DISCONTINUED | OUTPATIENT
Start: 2024-04-15 | End: 2024-04-15 | Stop reason: SURG

## 2024-04-15 RX ORDER — HYDROMORPHONE HYDROCHLORIDE 1 MG/ML
0.4 INJECTION, SOLUTION INTRAMUSCULAR; INTRAVENOUS; SUBCUTANEOUS EVERY 2 HOUR PRN
Status: DISCONTINUED | OUTPATIENT
Start: 2024-04-15 | End: 2024-04-16

## 2024-04-15 RX ORDER — HYDROMORPHONE HYDROCHLORIDE 1 MG/ML
0.8 INJECTION, SOLUTION INTRAMUSCULAR; INTRAVENOUS; SUBCUTANEOUS EVERY 2 HOUR PRN
Status: DISCONTINUED | OUTPATIENT
Start: 2024-04-15 | End: 2024-04-16

## 2024-04-15 RX ORDER — PROCHLORPERAZINE EDISYLATE 5 MG/ML
5 INJECTION INTRAMUSCULAR; INTRAVENOUS ONCE AS NEEDED
Status: DISCONTINUED | OUTPATIENT
Start: 2024-04-15 | End: 2024-04-15 | Stop reason: HOSPADM

## 2024-04-15 RX ORDER — SODIUM CHLORIDE, SODIUM LACTATE, POTASSIUM CHLORIDE, CALCIUM CHLORIDE 600; 310; 30; 20 MG/100ML; MG/100ML; MG/100ML; MG/100ML
INJECTION, SOLUTION INTRAVENOUS CONTINUOUS
Status: DISCONTINUED | OUTPATIENT
Start: 2024-04-15 | End: 2024-04-15 | Stop reason: HOSPADM

## 2024-04-15 RX ORDER — ONDANSETRON 2 MG/ML
4 INJECTION INTRAMUSCULAR; INTRAVENOUS EVERY 6 HOURS PRN
Status: DISCONTINUED | OUTPATIENT
Start: 2024-04-15 | End: 2024-04-21

## 2024-04-15 RX ORDER — SODIUM CHLORIDE, SODIUM LACTATE, POTASSIUM CHLORIDE, CALCIUM CHLORIDE 600; 310; 30; 20 MG/100ML; MG/100ML; MG/100ML; MG/100ML
INJECTION, SOLUTION INTRAVENOUS CONTINUOUS
Status: DISCONTINUED | OUTPATIENT
Start: 2024-04-15 | End: 2024-04-16

## 2024-04-15 RX ORDER — HALOPERIDOL 5 MG/ML
1 INJECTION INTRAMUSCULAR ONCE
Status: COMPLETED | OUTPATIENT
Start: 2024-04-15 | End: 2024-04-15

## 2024-04-15 RX ORDER — HYDROMORPHONE HYDROCHLORIDE 1 MG/ML
0.6 INJECTION, SOLUTION INTRAMUSCULAR; INTRAVENOUS; SUBCUTANEOUS EVERY 5 MIN PRN
Status: DISCONTINUED | OUTPATIENT
Start: 2024-04-15 | End: 2024-04-15 | Stop reason: HOSPADM

## 2024-04-15 RX ORDER — NALOXONE HYDROCHLORIDE 0.4 MG/ML
0.08 INJECTION, SOLUTION INTRAMUSCULAR; INTRAVENOUS; SUBCUTANEOUS AS NEEDED
Status: DISCONTINUED | OUTPATIENT
Start: 2024-04-15 | End: 2024-04-15 | Stop reason: HOSPADM

## 2024-04-15 RX ADMIN — ONDANSETRON 4 MG: 2 INJECTION INTRAMUSCULAR; INTRAVENOUS at 12:05:00

## 2024-04-15 RX ADMIN — MIDAZOLAM HYDROCHLORIDE 2 MG: 1 INJECTION INTRAMUSCULAR; INTRAVENOUS at 12:02:00

## 2024-04-15 RX ADMIN — ROCURONIUM BROMIDE 10 MG: 10 INJECTION, SOLUTION INTRAVENOUS at 13:35:00

## 2024-04-15 RX ADMIN — SODIUM CHLORIDE, SODIUM LACTATE, POTASSIUM CHLORIDE, CALCIUM CHLORIDE: 600; 310; 30; 20 INJECTION, SOLUTION INTRAVENOUS at 12:20:00

## 2024-04-15 RX ADMIN — HYDROMORPHONE HYDROCHLORIDE 0.5 MG: 1 INJECTION, SOLUTION INTRAMUSCULAR; INTRAVENOUS; SUBCUTANEOUS at 13:52:00

## 2024-04-15 RX ADMIN — SODIUM CHLORIDE, SODIUM LACTATE, POTASSIUM CHLORIDE, CALCIUM CHLORIDE: 600; 310; 30; 20 INJECTION, SOLUTION INTRAVENOUS at 14:42:00

## 2024-04-15 RX ADMIN — LIDOCAINE HYDROCHLORIDE 50 MG: 10 INJECTION, SOLUTION EPIDURAL; INFILTRATION; INTRACAUDAL; PERINEURAL at 12:05:00

## 2024-04-15 RX ADMIN — HYDROMORPHONE HYDROCHLORIDE 0.5 MG: 1 INJECTION, SOLUTION INTRAMUSCULAR; INTRAVENOUS; SUBCUTANEOUS at 14:10:00

## 2024-04-15 RX ADMIN — ROCURONIUM BROMIDE 20 MG: 10 INJECTION, SOLUTION INTRAVENOUS at 13:10:00

## 2024-04-15 RX ADMIN — HYDROMORPHONE HYDROCHLORIDE 0.5 MG: 1 INJECTION, SOLUTION INTRAMUSCULAR; INTRAVENOUS; SUBCUTANEOUS at 14:04:00

## 2024-04-15 RX ADMIN — ROCURONIUM BROMIDE 10 MG: 10 INJECTION, SOLUTION INTRAVENOUS at 14:05:00

## 2024-04-15 RX ADMIN — HYDROMORPHONE HYDROCHLORIDE 0.5 MG: 1 INJECTION, SOLUTION INTRAMUSCULAR; INTRAVENOUS; SUBCUTANEOUS at 14:19:00

## 2024-04-15 RX ADMIN — SODIUM CHLORIDE, SODIUM LACTATE, POTASSIUM CHLORIDE, CALCIUM CHLORIDE: 600; 310; 30; 20 INJECTION, SOLUTION INTRAVENOUS at 11:59:00

## 2024-04-15 RX ADMIN — DEXAMETHASONE SODIUM PHOSPHATE 8 MG: 4 MG/ML VIAL (ML) INJECTION at 12:05:00

## 2024-04-15 RX ADMIN — ROCURONIUM BROMIDE 50 MG: 10 INJECTION, SOLUTION INTRAVENOUS at 12:05:00

## 2024-04-15 NOTE — ANESTHESIA POSTPROCEDURE EVALUATION
Patient: Don Cabral    Procedure Summary       Date: 04/15/24 Room / Location: [facility] MAIN OR  / [facility] MAIN OR    Anesthesia Start: 1159 Anesthesia Stop: 1442    Procedures:       EXPLORATORY LAPAROTOMY, LEFT COLECTOMY WITH END COLOSTOMY, SPLENIC FLEXURE MOBILIZATION (Abdomen)      COLON RESECTION LEFT (Abdomen)      COLOSTOMY (Abdomen) Diagnosis: (Diverticulitis)    Surgeons: Rick Novoa MD Anesthesiologist: Mary Kate Polo MD    Anesthesia Type: general ASA Status: 3            Anesthesia Type: general    Vitals Value Taken Time   /74 04/15/24 1442   Temp 97.4 °F (36.3 °C) 04/15/24 1442   Pulse 86 04/15/24 1442   Resp 27 04/15/24 1442   SpO2 99 % 04/15/24 1442   Vitals shown include unfiled device data.    [facility] AN Post Evaluation:   Patient Evaluated in PACU  Patient Participation: complete - patient participated  Level of Consciousness: awake  Pain Score: 3  Pain Management: adequate  Airway Patency:patent  Dental exam unchanged from preop  Yes    Nausea/Vomiting: none  Cardiovascular Status: acceptable  Respiratory Status: acceptable and nasal cannula  Postoperative Hydration acceptable    YESENIA VEGA CRNA  4/15/2024 2:42 PM

## 2024-04-15 NOTE — OPERATIVE REPORT
[facility]  part of [facility]     Operative Report    Patient Name:  Don Cabral  MR:  [id number]  :  1965  DOS:  04/15/24    Preop Dx:  Diverticulitis  Postop Dx:  Diverticulitis  Procedure:    Left colectomy with end colostomy  Splenic flexure mobilization  Surgeon:  Rick Novoa MD  Surgical Assistant.: Omi Wei CSA  EBL: 200 ml  Complication:  None    INDICATION:  Pt is a 58 year old male who with a history of sigmoid diverticulitis who was admitted for an acute Diverticulitis that now involved the splenic flexure.  We exhausted non operative management and he failed to improved therefore he was subsequently scheduled for a EXPLORATORY LAPAROTOMY, LEFT COLECTOMY WITH END COLOSTOMY, SPLENIC FLEXURE MOBILIZATION.    CONSENT:  An informed consent discussion was held with the patient regarding the nature of left sided diverticulitis, the treatment options and the details of the procedure.  The risks including but not limited to bleeding, wound infection, intra-abdominal infection, injury to the colon, small intestine, left ureter, incomplete resection, anastomotic leak/bleeding/stricture, internal and incisional hernia were discussed.  The patient expressed understanding and want to proceed with the planned procedure.     TECHNIQUE:  The patient was taken to the OR and placed in supine position.  General anesthesia was established and the abdomen was prepped in standard fashion.  A midline incision was made from the epigastric area to the pubis.  The fascia was incised and the abdomen was entered without complication.  Free ascites fluid was encountered and aspirated.       Examination of the sigmoid colon showed areas of fibrosis consistent with prior diverticulitis.  There was also dense fibrosis of the left upper quadrant involving the colon as well as the omentum.  I freed the lateral attachment of the descending colon and sigmoid colon.  The splenic flexure was  mobilized and the transverse colon was divided using the TA contour stapler.  The TA contour stapler was also used to divide the upper rectum.  The mesentery of the distal transverse, descending and sigmoid colon were divided using the ligasure.  We made sure that we were well away from the left ureter during the division of the mesentery.  The specimen was sent to pathology.  We copiously irrigated the abdomen with warm saline and found it to be hemostatic.  Two 10 MARGARITA drains were placed in the splenic flexure and pelvis.      To construct the end colostomy, we created a circular incision on the skin of the LUQ.  The anterior and posterior rectus fascias were incised and the rectus muscle was split.  We delivered the transverse colon through the abdominal wall defect.      The midline fascia was closed using 0 looped PDS.  The skin was closed using staples and telfa strips were used to pack the subcutaneous tissue.      The colostomy was matured using interrupted 4-0 PDS.  Sterile dressings were applied.  All instrument and sponge counts were correct.  I was present during the critical portions of the procedure.    Rick Novoa MD

## 2024-04-15 NOTE — PLAN OF CARE
Patient is A/Ox4 on room air. VSS. Anxious at times but friendly with staff. Reports moderate pain; treated with morphine. Reports of nausea, treated with zofran and compazine. TPN stopped prior to for OR. CHG done  IVFs and ABX running.     Patient brought up from PACU at 1615. VSS. Drains, colostomy and incision clean dry and intact. Patient c/o nausea; treated with zofran. C/o pain; treated with dilaudid. Patient resting in bed, call light within reach, safety precautions in place.  Problem: PAIN - ADULT  Goal: Verbalizes/displays adequate comfort level or patient's stated pain goal  Description: INTERVENTIONS:  - Encourage pt to monitor pain and request assistance  - Assess pain using appropriate pain scale  - Administer analgesics based on type and severity of pain and evaluate response  - Implement non-pharmacological measures as appropriate and evaluate response  - Consider cultural and social influences on pain and pain management  - Manage/alleviate anxiety  - Utilize distraction and/or relaxation techniques  - Monitor for opioid side effects  - Notify MD/LIP if interventions unsuccessful or patient reports new pain  - Anticipate increased pain with activity and pre-medicate as appropriate  Outcome: Progressing     Problem: RISK FOR INFECTION - ADULT  Goal: Absence of fever/infection during anticipated neutropenic period  Description: INTERVENTIONS  - Monitor WBC  - Administer growth factors as ordered  - Implement neutropenic guidelines  Outcome: Progressing     Problem: GASTROINTESTINAL - ADULT  Goal: Minimal or absence of nausea and vomiting  Description: INTERVENTIONS:  - Maintain adequate hydration with IV or PO as ordered and tolerated  - Nasogastric tube to low intermittent suction as ordered  - Evaluate effectiveness of ordered antiemetic medications  - Provide nonpharmacologic comfort measures as appropriate  - Advance diet as tolerated, if ordered  - Obtain nutritional consult as needed  -  Evaluate fluid balance  Outcome: Not Progressing

## 2024-04-15 NOTE — PROGRESS NOTES
[facility]  Progress Note     Don Cabral  : 1965    Status: Inpatient  Day #: 13    Attending: Dionisio Dong MD  PCP: Stephen Christianson MD     Assessment and Plan:    Sigmoid diverticulitis  RECURRENT   LLQ abdominal pain   Leukocytosis resolved  3rd episode  - failed outpt PO abx.   - history of recurrent diverticulitis  - CT showed acute sigmoid diverticulitis with microperforation, no fluid collection.   - Last colonoscopy 2023.   - Pain control.  - prefers PO - norco, IV morphine, dilaudid or toradol for breakthrough   - off zosyn, cont meropenem.   - mid line placed    - Clear liquid diet per surgery   - started TPN  - CT abd from  showing increased inflammation, no abscess   - GI, ID and general surgery on consult.   - going for ex-lap today     Pt relays hx of PVCs, a-fib, LBBB.  Hx of intermittent premature ventricular tachycardia   - remote tele     Hypertension  - cont lisinopril, verapamil     GERD  - IV PPI bid     Major depressive disorder   Pt says he talked to his psychiatrist and and can stop neurontin.  - stop neurontin  - cont klonopin, zoloft, zyprexa       DVT Mechanical Prophylaxis:        DVT Pharmacologic Prophylaxis   Medication    enoxaparin (Lovenox) 60 MG/0.6ML SUBQ injection 60 mg               Subjective:      Initial Chief Complaint:  abdominal pain  Pt resting comfortably today  Will go to OR for surgery   No complaints currently     Objective:      Temp:  [98.1 °F (36.7 °C)-98.6 °F (37 °C)] 98.6 °F (37 °C)  Pulse:  [67-75] 75  Resp:  [16-18] 18  BP: (134-145)/(69-71) 145/71  SpO2:  [94 %-97 %] 94 %  General:  Alert, no distress  HEENT:  Normocephalic, atraumatic  Cardiac:  Regular rate, regular rhythm  Pulmonary:  Clear to auscultation bilaterally, respirations unlabored  Gastrointestinal:  Soft, non-tender, normal bowel sounds  Musculoskeletal:  No joint swelling  Extremities:  No edema, no cyanosis, no clubbing  Neurologic:   nonfocal  Psychiatric:  Normal affect, calm and appropriate    Intake/Output Summary (Last 24 hours) at 4/15/2024 1143  Last data filed at 4/15/2024 0600  Gross per 24 hour   Intake 1100.52 ml   Output --   Net 1100.52 ml           Recent Labs   Lab 04/13/24  0612 04/14/24 0450 04/15/24  0527   WBC 12.8* 10.4 10.2   HGB 12.3* 10.9* 12.0*   HCT 36.5* 31.6* 36.1*   .0* 552.0* 713.0*   RBC 4.27* 3.67* 4.18*   MCV 85.5 86.1 86.4   MCH 28.8 29.7 28.7   MCHC 33.7 34.5 33.2   RDW 13.3 13.2 13.4   NEPRELIM 9.73* 7.56 7.32     Recent Labs   Lab 04/10/24  0527 04/11/24  0611 04/12/24  0610 04/13/24  0612 04/14/24  0450 04/15/24  0527   BUN 12   < > 13  13 12  12 14 14   CREATSERUM 0.58*   < > 0.52*  0.52* 0.51*  0.51* 0.46* 0.56*   CA 8.5*   < > 8.4*  8.4* 8.5*  8.5* 8.0* 8.8   ALB 3.7  --  3.6 3.5  --   --    *   < > 138  138 136  136 138 139   K 3.6   < > 4.0  4.0 4.3  4.3 3.9 4.6      < > 104  104 105  105 107 104   CO2 29.0   < > 28.0  28.0 30.0  30.0 28.0 30.0   *   < > 100*  100* 110*  110* 112* 101*   MG 2.0   < > 2.1 2.1 2.0 2.1   PHOS 1.7*   < > 3.4 3.5 3.7 4.4   BILT 0.6  --  0.4  --   --   --    AST 14  --  19  --   --   --    ALT 15  --  18  --   --   --    ALKPHO 53  --  52  --   --   --    TP 6.2  --  5.9  --   --   --     < > = values in this interval not displayed.       No results found.      Medications:   enoxaparin  0.5 mg/kg Subcutaneous Daily    magnesium oxide  400 mg Oral Once    pantoprazole  40 mg Intravenous Q12H    meropenem  500 mg Intravenous Q8H    clonazePAM  0.5 mg Oral TID    OLANZapine  2.5 mg Oral Nightly    verapamil ER  120 mg Oral Nightly    sertraline  75 mg Oral Daily    lisinopril  20 mg Oral Daily      PRN Meds:   HYDROmorphone **OR** HYDROmorphone **OR** HYDROmorphone    ketorolac    dextrose 10%    clonazePAM    morphINE **OR** morphINE **OR** morphINE    alum-mag hydroxide-simethicone    simethicone    HYDROcodone-acetaminophen **OR**  HYDROcodone-acetaminophen    acetaminophen    acetaminophen    ondansetron    prochlorperazine    temazepam    albuterol    Supplementary Documentation:        [facility]. Time spent on chart/note review, review of labs/imaging, discussion with patient, physical exam, discussion with staff, consultants, coordinating care, writing progress note, discussion of plan of care.    Dionisio Dong MD

## 2024-04-15 NOTE — ANESTHESIA PROCEDURE NOTES
Airway  Date/Time: 4/15/2024 12:07 PM  Urgency: elective    Airway not difficult    General Information and Staff    Patient location during procedure: OR  Anesthesiologist: Mary Kate Polo MD  Performed: anesthesiologist   Performed by: Mary Kate Polo MD  Authorized by: Mary Kate Polo MD      Indications and Patient Condition  Indications for airway management: anesthesia  Spontaneous Ventilation: absent  Sedation level: deep  Preoxygenated: yes  Patient position: sniffing  MILS not maintained throughout  Mask difficulty assessment: 0 - not attempted    Final Airway Details  Final airway type: endotracheal airway      Successful airway: ETT  Cuffed: yes   Successful intubation technique: direct laryngoscopy  Facilitating devices/methods: rapid sequence intubation  Endotracheal tube insertion site: oral  Blade: Nel  Blade size: #4  ETT size (mm): 7.5    Cormack-Lehane Classification: grade I - full view of glottis  Placement verified by: capnometry   Cuff volume (mL): 5  Measured from: lips  ETT to lips (cm): 22  Number of attempts at approach: 1  Number of other approaches attempted: 0

## 2024-04-15 NOTE — ANESTHESIA PREPROCEDURE EVALUATION
Anesthesia PreOp Note    HPI:     Don Cabral is a 58 year old male who presents for preoperative consultation requested by: Rick Novoa MD    Date of Surgery: 4/15/2024    Procedure(s):  EXPLORATORY LAPAROTOMY  COLON RESECTION LEFT  COLOSTOMY  Indication: Diverticulitis    Relevant Problems   No relevant active problems       NPO:                         History Review:  Patient Active Problem List    Diagnosis Date Noted    Intractable abdominal pain 04/02/2024    Acute diverticulitis 12/05/2023    Sigmoid diverticulitis 10/20/2023    Palpitations 09/28/2023    Slow heart rate 09/28/2023    PVC (premature ventricular contraction) 09/28/2023    Bipolar 1 disorder (HCC) 09/28/2023    Symptomatic bradycardia 09/12/2023    Daytime hypersomnolence 07/18/2023    Tooth pain 06/28/2023    Hypokalemia 06/23/2023    Hyperglycemia 06/23/2023    Generalized anxiety disorder 05/14/2018    NELLIE (obstructive sleep apnea) 09/21/2016    Essential hypertension 03/13/2015    ED (erectile dysfunction) 03/13/2015    Anxiety        Past Medical History:    Allergic rhinitis    Anxiety    Arrhythmia    T1 AV block, LBBB    Back pain    Back problem    Depression    Diverticulitis    ED (erectile dysfunction)    High blood pressure    Pneumonia due to organism    Shortness of breath    Sleep apnea    Unspecified essential hypertension    Visual impairment       Past Surgical History:   Procedure Laterality Date    Cholecystectomy  3/12/2023    Colonoscopy N/A 03/22/2016    [facility].  Hyperplastic polyp only.  Repeat 2026    Colonoscopy  05/10/2023    Dental surgery procedure      [place] teeth removed    Incision/drain abscess extra  1996    Knee arthroscopy Right     Laparoscopic cholecystectomy  02/12/2023    Myringoplasty      Other surgical history      R knee arthroscope    Skin tissue procedure unlisted      Tonsillectomy         Medications Prior to Admission   Medication Sig Dispense Refill Last Dose    sertraline 50 MG Oral  Tab TAKE ONE AND ONE-HALF TABLETS BY MOUTH DAILY 45 tablet 1 2024    clonazePAM (KLONOPIN) 0.5 MG Oral Tab 1/2 tablet 5 times daily (Patient taking differently: Take 1 tablet (0.5 mg total) by mouth 4 (four) times daily as needed for Anxiety. 1/2 tablet 5 times daily) 75 tablet 1 2024    OLANZapine (ZYPREXA) 2.5 MG Oral Tab Take 1 tablet (2.5 mg total) by mouth nightly. 30 tablet 2 2024    gabapentin 600 MG Oral Tab Take 1 tablet (600 mg total) by mouth 3 (three) times daily. 90 tablet 1 2024    Docusate Sodium (DOCULASE OR) Take by mouth.   2024    lisinopril 20 MG Oral Tab Take 1 tablet (20 mg total) by mouth daily. 90 tablet 3 2024    acidophilus-pectin Oral Cap Take 1 capsule by mouth 3 (three) times a week.   Past Week    verapamil  MG Oral Tab CR Take 1 tablet (120 mg total) by mouth nightly. 30 tablet 3 2024    Albuterol Sulfate  (90 Base) MCG/ACT Inhalation Aero Soln Inhale 2 puffs into the lungs every 4 (four) hours as needed for Wheezing or Shortness of Breath. 1 each 3 2024    [] HYDROcodone-acetaminophen 5-325 MG Oral Tab Take 1-2 tablets by mouth every 6 (six) hours as needed for Pain. 15 tablet 0     Multiple Vitamins-Minerals (MULTI-VITAMIN/MINERALS) Oral Tab Take 1 tablet by mouth daily.       Tadalafil 20 MG Oral Tab Take 1 tablet (20 mg total) by mouth daily as needed for Erectile Dysfunction. 8 tablet 5      Current Facility-Administered Medications Ordered in Epic   Medication Dose Route Frequency Provider Last Rate Last Admin    adult 3 in 1 TPN   Intravenous Continuous TPN Dionisio Dong MD 95.8 mL/hr at 24 New Bag at 24    HYDROmorphone (Dilaudid) 1 MG/ML injection 0.2 mg  0.2 mg Intravenous Q2H PRN Dionisio Dong MD   0.2 mg at 24 171    Or    HYDROmorphone (Dilaudid) 1 MG/ML injection 0.4 mg  0.4 mg Intravenous Q2H PRN Dionisio Dong MD   0.4 mg at 24    Or    HYDROmorphone (Dilaudid) 1  MG/ML injection 0.8 mg  0.8 mg Intravenous Q2H PRN Dionisio Dong MD   0.8 mg at 04/15/24 0126    ketorolac (Toradol) 30 MG/ML injection 30 mg  30 mg Intravenous Q6H PRN Dionisio Dong MD   30 mg at 04/15/24 0535    enoxaparin (Lovenox) 60 MG/0.6ML SUBQ injection 60 mg  0.5 mg/kg Subcutaneous Daily Dionisio Dong MD   60 mg at 04/13/24 2022    magnesium oxide (Mag-Ox) tab 400 mg  400 mg Oral Once Tamica Hardy MD        dextrose 10% infusion (TPN no rate)   Intravenous Continuous PRN Tamica Hardy MD        pantoprazole (Protonix) 40 mg in sodium chloride 0.9% PF 10 mL IV push  40 mg Intravenous Q12H Shannan Brooks MD   40 mg at 04/14/24 2037    meropenem (Merrem) 500 mg in sodium chloride 0.9% 100 mL IVPB-MBP  500 mg Intravenous Q8H Ronna Menendez MD 33.3 mL/hr at 04/15/24 0521 500 mg at 04/15/24 0521    clonazePAM (KlonoPIN) tab 0.5 mg  0.5 mg Oral TID Tamica Hardy MD   0.5 mg at 04/15/24 0530    clonazePAM (KlonoPIN) tab 0.5 mg  0.5 mg Oral PRN Tamica Hardy MD   0.5 mg at 04/14/24 1813    morphINE PF 2 MG/ML injection 1 mg  1 mg Intravenous Q2H PRN Tamica Hardy MD   1 mg at 04/14/24 1020    Or    morphINE PF 2 MG/ML injection 2 mg  2 mg Intravenous Q2H PRN Tamica Hardy MD   2 mg at 04/14/24 2347    Or    morphINE PF 4 MG/ML injection 4 mg  4 mg Intravenous Q2H PRN Tamica Hardy MD   4 mg at 04/15/24 0336    alum-mag hydroxide-simethicone (Maalox) 200-200-20 MG/5ML oral suspension 30 mL  30 mL Oral QID PRN Tamica Hardy MD   30 mL at 04/14/24 2037    simethicone (Mylicon) chewable tab 80 mg  80 mg Oral QID PRN Vinnie Falcon MD        HYDROcodone-acetaminophen (Norco) 5-325 MG per tab 1 tablet  1 tablet Oral Q4H PRN Tamica Hardy MD   1 tablet at 04/08/24 1207    Or    HYDROcodone-acetaminophen (Norco) 5-325 MG per tab 2 tablet  2 tablet Oral Q4H PRN Tamica Hardy MD   2 tablet at 04/14/24 2042    acetaminophen (Tylenol) tab 650 mg  650 mg Oral Q6H PRN Hayley,  MD Tamica   650 mg at 04/05/24 1111    acetaminophen (Tylenol Extra Strength) tab 500 mg  500 mg Oral Q4H PRN Elaine Ramey MD   500 mg at 04/08/24 0607    ondansetron (Zofran) 4 MG/2ML injection 4 mg  4 mg Intravenous Q6H PRN Elaine Ramey MD   4 mg at 04/15/24 0520    prochlorperazine (Compazine) 10 MG/2ML injection 5 mg  5 mg Intravenous Q8H PRN Elaine Ramey MD        temazepam (Restoril) cap 15 mg  15 mg Oral Nightly PRN Elaine Ramey MD        albuterol (Ventolin HFA) 108 (90 Base) MCG/ACT inhaler 2 puff  2 puff Inhalation Q4H PRN Elaine Ramey MD        OLANZapine (ZyPREXA) tab 2.5 mg  2.5 mg Oral Nightly Elaine Ramey MD   2.5 mg at 04/14/24 2037    verapamil ER (Calan-SR) tab 120 mg  120 mg Oral Nightly Elaine Ramey MD   120 mg at 04/14/24 2037    sertraline (Zoloft) tab 75 mg  75 mg Oral Daily Elaine Ramey MD   75 mg at 04/15/24 0531    lisinopril (Prinivil; Zestril) tab 20 mg  20 mg Oral Daily Elaine Ramey MD   20 mg at 04/14/24 0445     Current Outpatient Medications Ordered in Epic   Medication Sig Dispense Refill    ertapenem 1 g Injection Recon Soln Inject 1 g into the vein daily for 14 days. Please send weekly labs: CBC with diff, CMP to [facility] attn: Dr Ronna Menendez MD fax number [phone]. 14 each 0       Allergies   Allergen Reactions    Seasonal OTHER (SEE COMMENTS)     congestion       Family History   Problem Relation Age of Onset    Heart Disorder Father         aflutter with ablation    Hypertension Father     Cancer Father 60        Prostate    Heart Disorder Mother         murmur no treatment needed    Hypertension Mother     Stroke Maternal Grandmother     Other (stroke) Maternal Grandmother     Dementia Paternal Grandmother     Heart Disorder Paternal Grandfather         two heart attacks and CHF    Diabetes Paternal Grandfather     Other (Diabetes, type 2) Paternal Grandfather      Social History     Socioeconomic History    Marital status:     Occupational History    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[employer]   Tobacco Use    Smoking status: Former     Current packs/day: 0.00     Average packs/day: 0.5 packs/day for 19.0 years (9.5 ttl pk-yrs)     Types: Cigarettes     Start date: 1981     Quit date: 2000     Years since quittin.7     Passive exposure: Never    Smokeless tobacco: Never   Vaping Use    Vaping status: Never Used   Substance and Sexual Activity    Alcohol use: Not Currently     Comment: Stopped alcohol 2000    Drug use: No    Sexual activity: Yes     Partners: Female   Other Topics Concern    Caffeine Concern Yes     Comment: coffee in AM/ soda     Exercise Yes     Comment: X1-2 times per week        Available pre-op labs reviewed.  Lab Results   Component Value Date    WBC 10.2 04/15/2024    RBC 4.18 (L) 04/15/2024    HGB 12.0 (L) 04/15/2024    HCT 36.1 (L) 04/15/2024    MCV 86.4 04/15/2024    MCH 28.7 04/15/2024    MCHC 33.2 04/15/2024    RDW 13.4 04/15/2024    .0 (H) 04/15/2024     Lab Results   Component Value Date     04/15/2024    K 4.6 04/15/2024     04/15/2024    CO2 30.0 04/15/2024    BUN 14 04/15/2024    CREATSERUM 0.56 (L) 04/15/2024     (H) 04/15/2024    PGLU 106 (H) 2024    CA 8.8 04/15/2024          Vital Signs:  Body mass index is 33.05 kg/m².   weight is 119.9 kg (264 lb 6.4 oz). His oral temperature is 98.3 °F (36.8 °C). His blood pressure is 134/69 and his pulse is 74. His respiration is 18 and oxygen saturation is 96%.   Vitals:    24 0610 24 1221 04/14/24 2025 04/15/24 0532   BP:  (P) 122/64 138/69 134/69   Pulse:  (P) 67 73 74   Resp:  (P) 16 16 18   Temp:  (P) 98.1 °F (36.7 °C) 98.6 °F (37 °C) 98.3 °F (36.8 °C)   TempSrc:  (P) Oral Oral Oral   SpO2:  (P) 95% 97% 96%   Weight: 119.9 kg (264 lb 6.4 oz)           Anesthesia Evaluation     Patient summary reviewed and Nursing notes reviewed    Airway   Mallampati: II  TM distance: >3 FB  Neck ROM: full  Dental       Pulmonary - normal exam   (+) shortness of breath, sleep apnea  Cardiovascular - normal exam  Exercise tolerance: good  (+) hypertension, dysrhythmias    Neuro/Psych    (+)  anxiety/panic attacks,  bipolar disorder, depression      GI/Hepatic/Renal      Comments: Sigmoid diverticulitis    Endo/Other - negative ROS   Abdominal  - normal exam                 Anesthesia Plan:   ASA:  3  Plan:   General  Airway:  ETT  Informed Consent Plan and Risks Discussed With:  Patient  Use of Blood Products Discussed With:  Patient  Blood Product Use Consented    Discussed plan with:  CRNA      I have informed Don Cabral and/or legal guardian or family member of the nature of the anesthetic plan, benefits, risks including possible dental damage if relevant, major complications, and any alternative forms of anesthetic management.   All of the patient's questions were answered to the best of my ability. The patient desires the anesthetic management as planned.  Mary Kate Polo MD  4/15/2024 8:10 AM  Present on Admission:  **None**

## 2024-04-15 NOTE — PLAN OF CARE
Problem: Patient Centered Care  Goal: Patient preferences are identified and integrated in the patient's plan of care  Description: Interventions:  - What would you like us to know as we care for you? This is my third time having diverticulitis  - Provide timely, complete, and accurate information to patient/family  - Incorporate patient and family knowledge, values, beliefs, and cultural backgrounds into the planning and delivery of care  - Encourage patient/family to participate in care and decision-making at the level they choose  - Honor patient and family perspectives and choices  Outcome: Progressing     Problem: Patient/Family Goals  Goal: Patient/Family Long Term Goal  Description: Patient's Long Term Goal: Discharge home    Interventions:  - Manage abdominal pain  - Tolerate adequate oral intake without complication  - Determine appropriate antibiotic plan  - See additional Care Plan goals for specific interventions  Outcome: Progressing  Goal: Patient/Family Short Term Goal  Description: Patient's Short Term Goal: Manage abdominal pain    Interventions:   - Transition from IV to oral pain medications  - Bowel rest with slow diet tolerance  - Eat small frequent light meals  - See additional Care Plan goals for specific interventions  Outcome: Progressing     Problem: PAIN - ADULT  Goal: Verbalizes/displays adequate comfort level or patient's stated pain goal  Description: INTERVENTIONS:  - Encourage pt to monitor pain and request assistance  - Assess pain using appropriate pain scale  - Administer analgesics based on type and severity of pain and evaluate response  - Implement non-pharmacological measures as appropriate and evaluate response  - Consider cultural and social influences on pain and pain management  - Manage/alleviate anxiety  - Utilize distraction and/or relaxation techniques  - Monitor for opioid side effects  - Notify MD/LIP if interventions unsuccessful or patient reports new pain  -  Anticipate increased pain with activity and pre-medicate as appropriate  Outcome: Progressing     Problem: RISK FOR INFECTION - ADULT  Goal: Absence of fever/infection during anticipated neutropenic period  Description: INTERVENTIONS  - Monitor WBC  - Administer growth factors as ordered  - Implement neutropenic guidelines  Outcome: Progressing     Problem: DISCHARGE PLANNING  Goal: Discharge to home or other facility with appropriate resources  Description: INTERVENTIONS:  - Identify barriers to discharge w/pt and caregiver  - Include patient/family/discharge partner in discharge planning  - Arrange for needed discharge resources and transportation as appropriate  - Identify discharge learning needs (meds, wound care, etc)  - Arrange for interpreters to assist at discharge as needed  - Consider post-discharge preferences of patient/family/discharge partner  - Complete POLST form as appropriate  - Assess patient's ability to be responsible for managing their own health  - Refer to Case Management Department for coordinating discharge planning if the patient needs post-hospital services based on physician/LIP order or complex needs related to functional status, cognitive ability or social support system  Outcome: Progressing     Problem: GASTROINTESTINAL - ADULT  Goal: Maintains or returns to baseline bowel function  Description: INTERVENTIONS:  - Assess bowel function  - Maintain adequate hydration with IV or PO as ordered and tolerated  - Evaluate effectiveness of GI medications  - Encourage mobilization and activity  - Obtain nutritional consult as needed  - Establish a toileting routine/schedule  - Consider collaborating with pharmacy to review patient's medication profile  Outcome: Progressing  Goal: Minimal or absence of nausea and vomiting  Description: INTERVENTIONS:  - Maintain adequate hydration with IV or PO as ordered and tolerated  - Nasogastric tube to low intermittent suction as ordered  - Evaluate  effectiveness of ordered antiemetic medications  - Provide nonpharmacologic comfort measures as appropriate  - Advance diet as tolerated, if ordered  - Obtain nutritional consult as needed  - Evaluate fluid balance  Outcome: Progressing  Goal: Maintains adequate nutritional intake (undernourished)  Description: INTERVENTIONS:  - Monitor percentage of each meal consumed  - Identify factors contributing to decreased intake, treat as appropriate  - Assist with meals as needed  - Monitor I&O, WT and lab values  - Obtain nutritional consult as needed  - Optimize oral hygiene and moisture  - Encourage food from home; allow for food preferences  - Enhance eating environment  Outcome: Progressing     Don is aware of his plan of care. Patient is alert and oriented x4, room air. Pain managed with PRN medications - see MAR. Patient states pain is never gone. Denies any nausea. NPO after midnight for planned surgery. TPN infusing into right PICC, right arm precautions maintained. Voids WNL. Patient states he is passing gas. IV abx. Safety measures in place, call light within reach, will continue to monitor.

## 2024-04-16 ENCOUNTER — APPOINTMENT (OUTPATIENT)
Dept: GENERAL RADIOLOGY | Facility: HOSPITAL | Age: 59
End: 2024-04-16
Attending: HOSPITALIST
Payer: COMMERCIAL

## 2024-04-16 LAB
ALBUMIN SERPL-MCNC: 3.9 G/DL (ref 3.2–4.8)
ANION GAP SERPL CALC-SCNC: 5 MMOL/L (ref 0–18)
BASOPHILS # BLD AUTO: 0.01 X10(3) UL (ref 0–0.2)
BASOPHILS NFR BLD AUTO: 0.1 %
BUN BLD-MCNC: 16 MG/DL (ref 9–23)
BUN/CREAT SERPL: 25 (ref 10–20)
CALCIUM BLD-MCNC: 9 MG/DL (ref 8.7–10.4)
CHLORIDE SERPL-SCNC: 101 MMOL/L (ref 98–112)
CO2 SERPL-SCNC: 31 MMOL/L (ref 21–32)
CREAT BLD-MCNC: 0.64 MG/DL
DEPRECATED RDW RBC AUTO: 41.1 FL (ref 35.1–46.3)
EGFRCR SERPLBLD CKD-EPI 2021: 110 ML/MIN/1.73M2 (ref 60–?)
EOSINOPHIL # BLD AUTO: 0 X10(3) UL (ref 0–0.7)
EOSINOPHIL NFR BLD AUTO: 0 %
ERYTHROCYTE [DISTWIDTH] IN BLOOD BY AUTOMATED COUNT: 13.2 % (ref 11–15)
GLUCOSE BLD-MCNC: 145 MG/DL (ref 70–99)
HCT VFR BLD AUTO: 38.1 %
HGB BLD-MCNC: 12.9 G/DL
IMM GRANULOCYTES # BLD AUTO: 0.12 X10(3) UL (ref 0–1)
IMM GRANULOCYTES NFR BLD: 0.7 %
LYMPHOCYTES # BLD AUTO: 1.17 X10(3) UL (ref 1–4)
LYMPHOCYTES NFR BLD AUTO: 7.2 %
MAGNESIUM SERPL-MCNC: 2 MG/DL (ref 1.6–2.6)
MCH RBC QN AUTO: 28.9 PG (ref 26–34)
MCHC RBC AUTO-ENTMCNC: 33.9 G/DL (ref 31–37)
MCV RBC AUTO: 85.2 FL
MONOCYTES # BLD AUTO: 1.22 X10(3) UL (ref 0.1–1)
MONOCYTES NFR BLD AUTO: 7.5 %
NEUTROPHILS # BLD AUTO: 13.68 X10 (3) UL (ref 1.5–7.7)
NEUTROPHILS # BLD AUTO: 13.68 X10(3) UL (ref 1.5–7.7)
NEUTROPHILS NFR BLD AUTO: 84.5 %
OSMOLALITY SERPL CALC.SUM OF ELEC: 288 MOSM/KG (ref 275–295)
PHOSPHATE SERPL-MCNC: 4.1 MG/DL (ref 2.4–5.1)
PLATELET # BLD AUTO: 846 10(3)UL (ref 150–450)
POTASSIUM SERPL-SCNC: 4.9 MMOL/L (ref 3.5–5.1)
RBC # BLD AUTO: 4.47 X10(6)UL
SODIUM SERPL-SCNC: 137 MMOL/L (ref 136–145)
WBC # BLD AUTO: 16.2 X10(3) UL (ref 4–11)

## 2024-04-16 PROCEDURE — 99233 SBSQ HOSP IP/OBS HIGH 50: CPT | Performed by: HOSPITALIST

## 2024-04-16 PROCEDURE — 71045 X-RAY EXAM CHEST 1 VIEW: CPT | Performed by: HOSPITALIST

## 2024-04-16 RX ORDER — MORPHINE SULFATE 2 MG/ML
1 INJECTION, SOLUTION INTRAMUSCULAR; INTRAVENOUS EVERY 2 HOUR PRN
Status: DISCONTINUED | OUTPATIENT
Start: 2024-04-16 | End: 2024-04-17

## 2024-04-16 RX ORDER — MORPHINE SULFATE 4 MG/ML
4 INJECTION, SOLUTION INTRAMUSCULAR; INTRAVENOUS EVERY 2 HOUR PRN
Status: DISCONTINUED | OUTPATIENT
Start: 2024-04-16 | End: 2024-04-17

## 2024-04-16 RX ORDER — MORPHINE SULFATE 2 MG/ML
2 INJECTION, SOLUTION INTRAMUSCULAR; INTRAVENOUS EVERY 2 HOUR PRN
Status: DISCONTINUED | OUTPATIENT
Start: 2024-04-16 | End: 2024-04-17

## 2024-04-16 NOTE — PROGRESS NOTES
[facility]  part of [facility]    Progress Note    Don Cabral Patient Status:  Inpatient    1965 MRN [id number]   Location [facility] 4W/SW/SE Attending Tamica Hardy MD   Hosp Day # 14 PCP Stephen Christianson MD     Chief Complaint:   Chief Complaint   Patient presents with    Abdomen/Flank Pain       Subjective:   Don Cabral is was SOB a bit this morning his pulse ox was 89% on RA placed on o2. Hasn't used IS yet. No cough. Abd pain not controlled morphine worked better for him.  - dilaudid hasn't helped too much. No gas in ostomy, + nausea this AM no vomiting.       Objective:   Objective:    Blood pressure 125/66, pulse 80, temperature 98.6 °F (37 °C), temperature source Oral, resp. rate 18, weight 264 lb 6.4 oz (119.9 kg), SpO2 90%.    Physical Exam:    General: No acute distress.   Respiratory: Diminished bases B/L   Cardiovascular: S1, S2. Regular rate and rhythm. No murmurs, rubs or gallops.   Abdomen: Soft, nontender, nondistended.  hypoactive bowel sounds. No rebound or guarding. Ostomy intact no gas.   Neurologic: No focal neurological deficits.   Musculoskeletal: Moves all extremities.  Extremities: No edema.      Results:   Results:    Labs:  Recent Labs   Lab 24  0610 24  0612 24  0450 04/15/24  0524  0534   WBC 11.0 12.8* 10.4 10.2 16.2*   HGB 12.1* 12.3* 10.9* 12.0* 12.9*   MCV 85.4 85.5 86.1 86.4 85.2   .0* 572.0* 552.0* 713.0* 846.0*       Recent Labs   Lab 04/10/24  0527 24  0611 24  0610 24  0612 24  0450 04/15/24  0527 24  0534   *   < > 100*  100* 110*  110* 112* 101* 145*   BUN 12   < > 13  13 12  12 14 14 16   CREATSERUM 0.58*   < > 0.52*  0.52* 0.51*  0.51* 0.46* 0.56* 0.64*   CA 8.5*   < > 8.4*  8.4* 8.5*  8.5* 8.0* 8.8 9.0   ALB 3.7  --  3.6 3.5  --   --  3.9   *   < > 138  138 136  136 138 139 137   K 3.6   < > 4.0  4.0 4.3  4.3 3.9 4.6 4.9       < > 104  104 105  105 107 104 101   CO2 29.0   < > 28.0  28.0 30.0  30.0 28.0 30.0 31.0   ALKPHO 53  --  52  --   --   --   --    AST 14  --  19  --   --   --   --    ALT 15  --  18  --   --   --   --    BILT 0.6  --  0.4  --   --   --   --    TP 6.2  --  5.9  --   --   --   --     < > = values in this interval not displayed.       Estimated Creatinine Clearance: 152.2 mL/min (A) (based on SCr of 0.64 mg/dL (L)).    No results for input(s): \"PTP\", \"INR\" in the last 168 hours.         Culture:  Hospital Encounter on 04/02/24   1. Blood Culture     Status: None    Collection Time: 04/02/24  5:18 PM    Specimen: Blood,peripheral   Result Value Ref Range    Blood Culture Result No Growth 5 Days N/A       Cardiac  No results for input(s): \"TROP\", \"PBNP\" in the last 168 hours.      Imaging: Imaging data reviewed in Epic.  No results found.    Medications:    heparin  5,000 Units Subcutaneous 2 times per day    [Transfer Hold] enoxaparin  0.5 mg/kg Subcutaneous Daily    [Transfer Hold] magnesium oxide  400 mg Oral Once    pantoprazole  40 mg Intravenous Q12H    meropenem  500 mg Intravenous Q8H    clonazePAM  0.5 mg Oral TID    OLANZapine  2.5 mg Oral Nightly    verapamil ER  120 mg Oral Nightly    sertraline  75 mg Oral Daily    lisinopril  20 mg Oral Daily         Assessment and Plan:   Assessment & Plan:      Acute Sigmoid diverticulitis  LLQ abdominal pain   3rd episode  - failed outpt PO abx.   - history of recurrent diverticulitis  - CT showed acute sigmoid diverticulitis with microperforation, no fluid collection.   - Last colonoscopy 05/2023.   - Pain control.  - IV dilaudid or PO oxyIR for pain PRN   - IV meropenem.   - mid line placed 4/5   - sips of clears as tolerated.   - cont TPN.   - CT abd from 4/11 showing increased inflammation, no abscess   - GI, ID and general surgery on consult.   - s/p L colectomy with end coloscopy and splenic flexure mobilization 4/15   - wound care for ostomy   - morphine and  oxy IR for pain PRN.     Hypoxia   - Likely from atelectasis   - increase activity   - IS  - CXR      Pt relays hx of PVCs, a-fib, LBBB.  Hx of intermittent premature ventricular tachycardia   - remote tele     Hypertension  - cont lisinopril, verapamil     GERD  - IV PPI bid     Major depressive disorder   Pt says he talked to his psychiatrist and and can stop neurontin.  - stop neurontin  - cont klonopin, zoloft, zyprexa    >55min spent, >50% spent counseling and coordinating care in the form of educating pt/family and d/w consultants and staff. Most of the time spent discussing the above plan.        Plan of care discussed with patient or family at bedside.    Tamica Hardy MD  Hospitalist          Supplementary Documentation:     Quality:  DVT Prophylaxis: heparin   CODE status: Full  Dispo: per clinical course           Estimated date of discharge: TBD  Discharge is dependent on: clinical stability  At this point Mr. Cabral is expected to be discharge to: home

## 2024-04-16 NOTE — PROGRESS NOTES
[facility]  part of [facility]    Progress Note    Don Cabral Patient Status:  Inpatient    1965 MRN [id number]   Location [facility] 4W/SW/SE Attending Tamica Hardy MD   Hosp Day # 14 PCP Stephen Christianson MD       Subjective:   POD1 s/p left colectomy with end colostomy. Pt with abdominal pain, reportedly vomited this morning.    Objective:   Vital Signs:  Blood pressure 125/66, pulse 80, temperature 98.6 °F (37 °C), temperature source Oral, resp. rate 18, weight 264 lb 6.4 oz (119.9 kg), SpO2 90%.    Physical Exam     General: No acute distress. Alert and oriented x 3.  HEENT: Moist mucous membranes. Anicteric.   Neck: Supple, No JVD.   Respiratory: Nonlabored resp.  Cardiovascular: Regular rate.   Abdomen: Soft, expected incisional tenderness, no rebound tnd, no guarding, nondistended.  No masses. Normal bowel sounds. Incision c/d/I, MARGARIAT serosanguineous, ostomy with no output.   Neurologic: No focal neurological deficits.  Musculoskeletal: Full range of motion of all extremities. No calf tenderness. No swelling noted.  Integument: No lesions. No erythema.  Psychiatric: Appropriate mood and affect.         Assessment and Plan:     Sigmoid diverticulitis      Intractable abdominal pain    Sips of clears as tolerated. Ambulate, pain control, and incentive spirometry. NPO if nausea or vomiting continues. Continue ostomy education. Will continue to follow and monitor progress.    Results:     Lab Results   Component Value Date    WBC 16.2 (H) 2024    HGB 12.9 (L) 2024    HCT 38.1 (L) 2024    .0 (H) 2024    CREATSERUM 0.64 (L) 2024    BUN 16 2024     2024    K 4.9 2024     2024    CO2 31.0 2024     (H) 2024    CA 9.0 2024    ALB 3.9 2024    ALKPHO 52 2024    BILT 0.4 2024    TP 5.9 2024    AST 19 2024    ALT 18 2024    PTT 24.9 2023     INR 1.01 09/28/2023    TSH 1.850 09/28/2023    LIP 37 04/02/2024    PSA 2.53 08/02/2021    DDIMER 2.44 (H) 06/23/2023    MG 2.0 04/16/2024    PHOS 4.1 04/16/2024    TROP <0.045 02/05/2020       No results found.                Stephen Nunez PA-C  4/16/2024

## 2024-04-16 NOTE — PROGRESS NOTES
WOUND CARE NOTE    History:  Past Medical History:    Allergic rhinitis    Anxiety    Arrhythmia    T1 AV block, LBBB    Back pain    Back problem    Depression    Diverticulitis    ED (erectile dysfunction)    High blood pressure    Pneumonia due to organism    Shortness of breath    Sleep apnea    Unspecified essential hypertension    Visual impairment     Past Surgical History:   Procedure Laterality Date    Cholecystectomy  3/12/2023    Colonoscopy N/A 2016    Maida.  Hyperplastic polyp only.  Repeat     Colonoscopy  05/10/2023    Dental surgery procedure      [place] teeth removed    Incision/drain abscess extra      Knee arthroscopy Right     Laparoscopic cholecystectomy  2023    Myringoplasty      Other surgical history      R knee arthroscope    Skin tissue procedure unlisted      Tonsillectomy        Social History     Socioeconomic History    Marital status:    Occupational History    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   Tobacco Use    Smoking status: Former     Current packs/day: 0.00     Average packs/day: 0.5 packs/day for 19.0 years (9.5 ttl pk-yrs)     Types: Cigarettes     Start date: 1981     Quit date: 2000     Years since quittin.8     Passive exposure: Never    Smokeless tobacco: Never   Vaping Use    Vaping status: Never Used   Substance and Sexual Activity    Alcohol use: Not Currently     Comment: Stopped alcohol 2000    Drug use: No    Sexual activity: Yes     Partners: Female   Other Topics Concern    Caffeine Concern Yes     Comment: coffee in AM/ soda     Exercise Yes     Comment: X1-2 times per week      Social Determinants of Health     Financial Resource Strain: Patient Declined (3/28/2024)    [facility]    Overall Financial Resource Strain (CARDIA)     Difficulty of Paying Living Expenses: Patient declined   Food Insecurity: No Food Insecurity (2024)    Food  Insecurity     Food Insecurity: Never true   Transportation Needs: No Transportation Needs (4/2/2024)    Transportation Needs     Lack of Transportation: No   Housing Stability: Low Risk  (4/2/2024)    Housing Stability     Housing Instability: No         PLAN   Recommendations:  Dr. Novoa is following the pt.   Dietary consult for recommendations for nutrition to optimize wound healing, new colostomy  Glucose control to help promote wound healing    Wound(s)  Location:  Abdomen  Per Dr. Novoa     Ostomy:  Change flange every 5-7 days and prn  Appliance 2 1/4 inch 2 piece, skin prep  Teaching: Ostomy folder given  Patient: started colostomy care teaching. Response to teaching: good    Discharge Recommendations:   The pt. may benefit from  care nurse to reinforce the colostomy care teaching      OBJECTIVE   MD Consult new new colostomy care and teaching      ASSESSMENT   Rohit Score:  Rohit Scale Score: 19    Chart Reviewed: yes  The pt. is sitting up in bed, I gave him a colostomy information folder and reviewed it with the pt. Started colostomy care teaching, practicing pouch emptying, cleaning, appliance changes, supplies, nutrition when he can eat and hydration. All questions answered, he will practice emptying when the has stool and I will continue to follow and teach, the pt. will change the appliance next visit. Spoke with the nurse.         Ostomy:  Stoma location: LLQ  Stoma type: End colostomy   Stoma color: red, moist  Stoma condition: edematous  Stoma diameter: 1 1/2 inches  Ostomy output: none, scant gas  Stoma height: budding  Peristomal skin: intact, pink, close to midline incision  Pouching system:two piece 2 1/4 inch and skin prep  Able to care for stoma: Yes started teaching the pt. today     Allergies: Seasonal    Labs:   Lab Results   Component Value Date    WBC 16.2 (H) 04/16/2024    HGB 12.9 (L) 04/16/2024    HCT 38.1 (L) 04/16/2024    .0 (H) 04/16/2024    CREATSERUM 0.64 (L) 04/16/2024     BUN 16 04/16/2024     04/16/2024    K 4.9 04/16/2024     04/16/2024    CO2 31.0 04/16/2024     (H) 04/16/2024    CA 9.0 04/16/2024    ALB 3.9 04/16/2024    ALKPHO 52 04/12/2024    BILT 0.4 04/12/2024    TP 5.9 04/12/2024    AST 19 04/12/2024    ALT 18 04/12/2024    LIP 37 04/02/2024    MG 2.0 04/16/2024    PHOS 4.1 04/16/2024     No results found for: \"PREALBUMIN\"      Time Spent 1 Hour.    Augusta Bowman RN  [facility]  [facility]  [phone]     04/16/24 0920   Wound 04/15/24 Abdomen Medial;Upper   Date First Assessed/Time First Assessed: 04/15/24 1224   Primary Wound Type: Incision  Location: Abdomen  Wound Location Orientation: Medial;Upper   Closure Staples   Dressing Status Clean;Dry;Intact   Colostomy Descending/sigmoid LLQ   Placement Date/Time: 04/15/24 1413   Inserted by: Dr. Novoa  Colostomy Type: Descending/sigmoid  Location: LLQ  Stoma Size (cm): 2.5 cm  Appliance Size: 2 1/4   Stomal Appliance 2 piece;Intact  (changed for teaching)   Stomal Assessment Moist;Edema;Budding;Pink;Red  (1 1/2 inch stoma)   Peristomal Assessment Pink;Intact   Treatment Appliance change;Bag change;Site care   Dressing Change Due 04/23/24   Output (mL) 0 mL  (scant gas)   Wound Follow Up   Follow up needed Yes

## 2024-04-16 NOTE — PROGRESS NOTES
INFECTIOUS DISEASE PROGRESS NOTE    Don Cabral Patient Status:  Inpatient    1965 MRN [id number]   Location [facility] 4W/SW/SE Attending Tamica Hardy MD   Hosp Day # 14 PCP Stephen Christianson MD     SUBJECTIVE  Post op. Clear sips. +flatus. Feels better.    ASSESSMENT/PLAN:    Antibiotics:  zosyn /-, meropenem  --      Impression:     # Acute sigmoid diverticulitis with microperforation, no drainable abscess seen on CT  - most recent CT  with increased inflammation but no abscess.   - continues to have pain. Started on TPN 24  -  s/p OR 4/15/24 for L colectomy, end colostomy    # leukocytosis  # Abdominal pain, ?ileus  # Recurrent sigmoid diverticulitis     Recommend:     -  continue IV meropenem - POD #1 - will consider transition to PO on discharge pending clinical improvement  -  TPN per surgery  -  follow fever curve, wbc.   - d/w patient, staff        History of Present Illness:   Patient is a 58 year old male with h/o recurrent diverticulitis, cardiac arrhythmia, depression, NELLIE.  Presents with recurrent diverticulitis.  CT showed cute sigmoid diverticulitis.  Trace suspected focus of associated localized pneumoperitoneum/micro perforation.  No evidence of a drainable associated fluid collection.  Progressed inflammatory change adjacent to the hepatic flexure and proximal sigmoid colon.  Interval development of mild circumferential wall thickening splenic flexure and proximal sigmoid colon which may reflect a nonspecific colitis.  No evidence of pneumatosis or associated drainable fluid collection.     Started on zosyn.  Still with abdominal pain but slowly improved.      OBJECTIVE  /66 (BP Location: Left arm)   Pulse 80   Temp 98.6 °F (37 °C) (Oral)   Resp 18   Wt 264 lb 6.4 oz (119.9 kg)   SpO2 90%   BMI 33.05 kg/m²     General: No acute distress. Alert.  HEENT: EOMI   Abdomen: post op dressing, ostomy w/o output  Musculoskeletal: No edema  Integument: No  zafar    Labs:     Recent Labs   Lab 04/12/24  0610 04/13/24  0612 04/14/24  0450 04/15/24  0527 04/16/24  0534   WBC 11.0 12.8* 10.4 10.2 16.2*   HGB 12.1* 12.3* 10.9* 12.0* 12.9*   .0* 572.0* 552.0* 713.0* 846.0*       Recent Labs   Lab 04/12/24  0610 04/13/24  0612 04/14/24  0450 04/15/24  0527 04/16/24  0534     138 136  136 138 139 137   K 4.0  4.0 4.3  4.3 3.9 4.6 4.9     104 105  105 107 104 101   CO2 28.0  28.0 30.0  30.0 28.0 30.0 31.0   BUN 13  13 12  12 14 14 16   CREATSERUM 0.52*  0.52* 0.51*  0.51* 0.46* 0.56* 0.64*       Recent Labs   Lab 04/10/24  0527 04/12/24  0610   ALT 15 18   AST 14 19         Microbiology: Reviewed    Imaging: Reviewed       Efrain Young MD  [facility]  [phone]

## 2024-04-16 NOTE — PLAN OF CARE
Problem: Patient Centered Care  Goal: Patient preferences are identified and integrated in the patient's plan of care  Description: Interventions:  - What would you like us to know as we care for you? This is my third time having diverticulitis  - Provide timely, complete, and accurate information to patient/family  - Incorporate patient and family knowledge, values, beliefs, and cultural backgrounds into the planning and delivery of care  - Encourage patient/family to participate in care and decision-making at the level they choose  - Honor patient and family perspectives and choices  Outcome: Progressing     Problem: Patient/Family Goals  Goal: Patient/Family Long Term Goal  Description: Patient's Long Term Goal: Discharge home    Interventions:  - Manage abdominal pain  - Tolerate adequate oral intake without complication  - Determine appropriate antibiotic plan  - See additional Care Plan goals for specific interventions  Outcome: Progressing  Goal: Patient/Family Short Term Goal  Description: Patient's Short Term Goal: Manage abdominal pain    Interventions:   - Transition from IV to oral pain medications  - Bowel rest with slow diet tolerance  - Eat small frequent light meals  - See additional Care Plan goals for specific interventions  Outcome: Progressing     Problem: PAIN - ADULT  Goal: Verbalizes/displays adequate comfort level or patient's stated pain goal  Description: INTERVENTIONS:  - Encourage pt to monitor pain and request assistance  - Assess pain using appropriate pain scale  - Administer analgesics based on type and severity of pain and evaluate response  - Implement non-pharmacological measures as appropriate and evaluate response  - Consider cultural and social influences on pain and pain management  - Manage/alleviate anxiety  - Utilize distraction and/or relaxation techniques  - Monitor for opioid side effects  - Notify MD/LIP if interventions unsuccessful or patient reports new pain  -  Anticipate increased pain with activity and pre-medicate as appropriate  Outcome: Progressing     Problem: RISK FOR INFECTION - ADULT  Goal: Absence of fever/infection during anticipated neutropenic period  Description: INTERVENTIONS  - Monitor WBC  - Administer growth factors as ordered  - Implement neutropenic guidelines  Outcome: Progressing     Problem: DISCHARGE PLANNING  Goal: Discharge to home or other facility with appropriate resources  Description: INTERVENTIONS:  - Identify barriers to discharge w/pt and caregiver  - Include patient/family/discharge partner in discharge planning  - Arrange for needed discharge resources and transportation as appropriate  - Identify discharge learning needs (meds, wound care, etc)  - Arrange for interpreters to assist at discharge as needed  - Consider post-discharge preferences of patient/family/discharge partner  - Complete POLST form as appropriate  - Assess patient's ability to be responsible for managing their own health  - Refer to Case Management Department for coordinating discharge planning if the patient needs post-hospital services based on physician/LIP order or complex needs related to functional status, cognitive ability or social support system  Outcome: Progressing     Problem: GASTROINTESTINAL - ADULT  Goal: Maintains or returns to baseline bowel function  Description: INTERVENTIONS:  - Assess bowel function  - Maintain adequate hydration with IV or PO as ordered and tolerated  - Evaluate effectiveness of GI medications  - Encourage mobilization and activity  - Obtain nutritional consult as needed  - Establish a toileting routine/schedule  - Consider collaborating with pharmacy to review patient's medication profile  Outcome: Progressing  Goal: Minimal or absence of nausea and vomiting  Description: INTERVENTIONS:  - Maintain adequate hydration with IV or PO as ordered and tolerated  - Nasogastric tube to low intermittent suction as ordered  - Evaluate  effectiveness of ordered antiemetic medications  - Provide nonpharmacologic comfort measures as appropriate  - Advance diet as tolerated, if ordered  - Obtain nutritional consult as needed  - Evaluate fluid balance  Outcome: Progressing  Goal: Maintains adequate nutritional intake (undernourished)  Description: INTERVENTIONS:  - Monitor percentage of each meal consumed  - Identify factors contributing to decreased intake, treat as appropriate  - Assist with meals as needed  - Monitor I&O, WT and lab values  - Obtain nutritional consult as needed  - Optimize oral hygiene and moisture  - Encourage food from home; allow for food preferences  - Enhance eating environment  Outcome: Progressing       A/ox4, on RA, CPAP at night, up x1 SBA with walker, ambulated in hallway, surgical dressing is clean/dry/intact, x2 MARGARITA drains in place, TPN and IV antibiotics continued overnight via right arm picc, vazquez and colostomy in place, tolerating fluids, denies nausea, received PRN Oxycodone and IV Dilaudid for pain management, PRN Maalox given, vital signs stable, no acute changes overnight. Call light within reach, safety measures in place, frequent rounding done, plan of care continued.

## 2024-04-16 NOTE — DIETARY NOTE
ADULT NUTRITION REASSESSMENT      RECOMMENDATIONS TO MD: See Nutrition Intervention for TPN specifics --will taper and DC TPN as pt tolerates and po progresses.      Pt is at high nutrition risk due to TPN.  Pt does not meet malnutrition criteria.       ADMITTING DIAGNOSIS:  Sigmoid diverticulitis [K57.32]     PERTINENT PAST MEDICAL HISTORY:  has a past medical history of Allergic rhinitis, Anxiety, Arrhythmia, Back pain, Back problem, Depression, Diverticulitis, ED (erectile dysfunction), High blood pressure, Pneumonia due to organism, Shortness of breath, Sleep apnea (2012), Unspecified essential hypertension, and Visual impairment.    has a past surgical history that includes myringoplasty; knee arthroscopy (Right); skin tissue procedure unlisted; incision/drain abscess extra (1996); dental surgery procedure; colonoscopy (N/A, 03/22/2016); other surgical history; tonsillectomy; laparoscopic cholecystectomy (02/12/2023); cholecystectomy (3/12/2023); and colonoscopy (05/10/2023).     PATIENT STATUS: Initial 04/07/24: Patient (pt) identified at Nutrition risk due to NPO/cl liq x 4-5 days after the screening process.  Diagnosis & PMH above.  Medical findings:  Sigmoid diverticulitis with microperforation. Interval CT showing progress of inflammatory changes--progressive colitis. Non-operative management. May eventually benefit from Sigmoidectomy per MD.   Upon visit, pt laying in bed, not in distress, abd pain is better today. Reports seeing RD at [facility]. Taking % cl liq, laurel well. Had one day of Soft/low fiber diet on 4/5 but retracted back to cl liq. Diet hx/eval:    recent decreased po d/t the onset of acute GI illness. Otherwise, Adequate nutrition intake based on reported 2 light meals at bfast and dinner and main or full meal at lunch. Pt reports utilizing light dinner d/t sleep apnea. Wt eval:    no recent significant wt loss per pt, but did states recent wt gain from 250# to current wt of 264#.  Nutrition findings:    Pt is well nourished. MD wanted Ensure Oral Nutrition Supplement ( ONS ) and ordered Ensure Clear QID. Pt prefers them in apple flavor. Agree with current diet and ONS. Anticipate diet advancement, however, if unable to advance diet beyond cl liq in the next 5 days, consider Nutrition support.     4/9/2024:  Received MD consult to initiate/manage CPN.   On Cl liq and Oral Nutrition Supplement ( ONS ) Ensure Clears QID. Tolerating, no N/V, but abd pain continues. Surgery eval noted. Labs and meds reviewed. On Merrem and Mag oxide.   Re-visited pt. Explained rationale of CPN.  Pt voiced concerns about severe venous pain when IV Potassium were infused peripherally or via midline in Oct 2023 admission.  Pt reported No known food allergies, states no allergies to eggs. Potential mild refeeding risk.   Discussed with RN to monitor CPN initiation closely for potential reactions such as flushing, rashes, chest pain, back pain. If this occurs, CPN may be stopped.     4/12/24: Discussed care with RN this am. TPN + NPO. Taking a few sips of water with medications per RN. Abdominal Pain reported. Per RN: Talk of Procedure. NPO at this time. Monitor medical plans.     4/16/24 Update: POD #1 exp lap left colectomy with end colostomy. CLD initiated. Talked with RN this am and pt in a lot of pain, therefore did not visit pt. Not taking any CLD yet. Full TPN continued.         FOOD/NUTRITION INTAKE ANALYSIS:    Current Appetite:  good appetite PTA  Current Intake:  CLD--but none taken yet  Current Intake Meeting Needs: TPN meeting needs.     Percent Meals Eaten (last 6 days)       Date/Time Percent Meals Eaten (%)    04/12/24 1051 0 %           Food Allergies: No Known Food Allergies (NKFA)  Cultural/Ethnic/Zoroastrianism Preferences: Not Obtained    GASTROINTESTINAL: +BM 4/14  New colostomy. Distention, tenderness.     MEDICATIONS: reviewed Antibiotic    adult 3 in 1 TPN      adult 3 in 1 TPN 95.8 mL/hr at  04/15/24 2158    dextrose 10%       LABS: reviewed - Adjustments being made in TPN as needed.   POC Glucose in Target.   Last A1c value was 5.5% done 9/22/23.    Recent Labs     04/14/24  0450 04/15/24  0527 04/16/24  0534   * 101* 145*   BUN 14 14 16   CREATSERUM 0.46* 0.56* 0.64*   CA 8.0* 8.8 9.0   MG 2.0 2.1 2.0    139 137   K 3.9 4.6 4.9    104 101   CO2 28.0 30.0 31.0   PHOS 3.7 4.4 4.1   OSMOCALC 287 289 288     NUTRITION RELATED PHYSICAL FINDINGS:  - Nutrition Focused Physical Exam (NFPE): well nourished   - Fluid Accumulation: none  see RN documentation for details  - Skin Integrity: intact see RN documentation for details    ANTHROPOMETRICS:  HT:  6'3\"  WT: 119.9 kg (264 lb 6.4 oz) - wt on 4/12 was error. Stable wt since admission.    BMI: Body mass index is 33.05 kg/m².  BMI CLASSIFICATION: 30-34.9 kg/m2 - obesity class I  IBW: 196 lbs        135% IBW  Usual Body Wt: 250 lbs per pt      106% UBW  WEIGHT HISTORY:    Patient Weight(s) for the past 336 hrs:   Weight   04/14/24 0610 119.9 kg (264 lb 6.4 oz)   04/12/24 1013 104.7 kg (230 lb 14.4 oz)   04/02/24 1758 119.9 kg (264 lb 6.4 oz)     Wt Readings from Last 10 Encounters:   04/14/24 119.9 kg (264 lb 6.4 oz)   03/27/24 115.7 kg (255 lb)   02/28/24 116.1 kg (255 lb 14.4 oz)   01/29/24 115.7 kg (255 lb)   01/27/24 113.4 kg (250 lb)   01/15/24 115.2 kg (254 lb)   12/21/23 113.4 kg (250 lb)   12/18/23 113.4 kg (250 lb)   11/29/23 108.9 kg (240 lb)   11/17/23 108.9 kg (240 lb)     NUTRITION DIAGNOSIS/PROBLEM:    Inadequate oral intake related to Decreased ability to consume sufficient energy d/t sigmoid diverticulitis with microperforation as evidenced by npo/cl liq x 4-5 days.     NUTRITION DIAGNOSIS PROGRESS:  Improvement (unresolved)--TPN meeting nutritional needs until tolerating oral diet      NUTRITION INTERVENTION:     NUTRITION PRESCRIPTION:   Estimated Nutrition needs: Dosing wt of 120 kg --wt taken on 4/2 .  Calories: 1085-7413  calories/day (MSJ 2114 x 1.2 AF x 1.0 IF  or 19-21 calories per kg Dosing wt)  Protein: 116-134 g protein/day (1.3-1.5 g protein/kg  Ideal body wt (IBW))                Fluids: ~ 2650  ml /day (HSM  ml/kg--adjusted BW of 96.8 kg ( 213#) )     - Diet: CLD--but no intake yet    Receiving TPN: 2300 ml volume, 130 g protein, 1200 kcal dextrose, 650 kcal lipid.   Provides: 2370 total calories (100% estimated calorie needs) and 100% protein needs.       Procedures    Clear liquid diet Is Patient on Accuchecks? No   - Meals and snacks:  po as tolerated.    - Medical Food Supplements- None at this time. Rational/Benefits discussed.    - Vitamin and mineral supplements: none Receiving in TPN.   - Feeding assistance: meal set up  - Nutrition education: diet education before discharge and assess education needs -new colostomy  - Coordination of nutrition care: collaboration with other providers  - Discharge and transfer of nutrition care to new setting or provider: monitor plans    MONITOR AND EVALUATE/NUTRITION GOALS:  - Food and Nutrient Intake:    Monitor: for PO initiation and for PO diet advancement. Monitor plans to resume oral diet.   - Food and Nutrient Administration:    Monitor: TPN tolerance, adequacy of TPN, and for TPN adjustment   - Anthropometric Measurement:    Monitor weight  - Nutrition Goals:    maintain wt within 5%, TPN to meet greater than 80% nutrition needs, labs within acceptable limits, promote healing, and improved GI status.    RD will follow up/Manage TPN daily.      Maria Del Carmen Quesada RD, LDN   Clinical Dietitian [phone]

## 2024-04-16 NOTE — CM/SW NOTE
AMOL spoke with Maria E at [facility], provided updates. Informed her that pt is POD #1 exp lap with new colostomy.  Wound service on consult for teaching.     Will continue to assess for HH needs.     Continue with plan for in office teach and train.     Roro Marquez RN, BSN  Nurse   [phone]

## 2024-04-17 LAB
ANION GAP SERPL CALC-SCNC: 5 MMOL/L (ref 0–18)
BASOPHILS # BLD AUTO: 0.02 X10(3) UL (ref 0–0.2)
BASOPHILS NFR BLD AUTO: 0.1 %
BUN BLD-MCNC: 21 MG/DL (ref 9–23)
BUN/CREAT SERPL: 33.3 (ref 10–20)
CALCIUM BLD-MCNC: 8.7 MG/DL (ref 8.7–10.4)
CHLORIDE SERPL-SCNC: 100 MMOL/L (ref 98–112)
CO2 SERPL-SCNC: 32 MMOL/L (ref 21–32)
CREAT BLD-MCNC: 0.63 MG/DL
DEPRECATED RDW RBC AUTO: 43.6 FL (ref 35.1–46.3)
EGFRCR SERPLBLD CKD-EPI 2021: 111 ML/MIN/1.73M2 (ref 60–?)
EOSINOPHIL # BLD AUTO: 0.4 X10(3) UL (ref 0–0.7)
EOSINOPHIL NFR BLD AUTO: 2.8 %
ERYTHROCYTE [DISTWIDTH] IN BLOOD BY AUTOMATED COUNT: 13.6 % (ref 11–15)
GLUCOSE BLD-MCNC: 118 MG/DL (ref 70–99)
HCT VFR BLD AUTO: 34.3 %
HGB BLD-MCNC: 10.9 G/DL
IMM GRANULOCYTES # BLD AUTO: 0.14 X10(3) UL (ref 0–1)
IMM GRANULOCYTES NFR BLD: 1 %
LYMPHOCYTES # BLD AUTO: 2.32 X10(3) UL (ref 1–4)
LYMPHOCYTES NFR BLD AUTO: 16.3 %
MAGNESIUM SERPL-MCNC: 2.1 MG/DL (ref 1.6–2.6)
MCH RBC QN AUTO: 27.9 PG (ref 26–34)
MCHC RBC AUTO-ENTMCNC: 31.8 G/DL (ref 31–37)
MCV RBC AUTO: 87.7 FL
MONOCYTES # BLD AUTO: 1.25 X10(3) UL (ref 0.1–1)
MONOCYTES NFR BLD AUTO: 8.8 %
NEUTROPHILS # BLD AUTO: 10.11 X10 (3) UL (ref 1.5–7.7)
NEUTROPHILS # BLD AUTO: 10.11 X10(3) UL (ref 1.5–7.7)
NEUTROPHILS NFR BLD AUTO: 71 %
OSMOLALITY SERPL CALC.SUM OF ELEC: 288 MOSM/KG (ref 275–295)
PHOSPHATE SERPL-MCNC: 3.4 MG/DL (ref 2.4–5.1)
PLATELET # BLD AUTO: 965 10(3)UL (ref 150–450)
POTASSIUM SERPL-SCNC: 4.1 MMOL/L (ref 3.5–5.1)
RBC # BLD AUTO: 3.91 X10(6)UL
SODIUM SERPL-SCNC: 137 MMOL/L (ref 136–145)
TRIGL SERPL-MCNC: 121 MG/DL (ref 30–149)
WBC # BLD AUTO: 14.2 X10(3) UL (ref 4–11)

## 2024-04-17 PROCEDURE — 99233 SBSQ HOSP IP/OBS HIGH 50: CPT | Performed by: HOSPITALIST

## 2024-04-17 PROCEDURE — 3E0336Z INTRODUCTION OF NUTRITIONAL SUBSTANCE INTO PERIPHERAL VEIN, PERCUTANEOUS APPROACH: ICD-10-PCS | Performed by: HOSPITALIST

## 2024-04-17 RX ORDER — MORPHINE SULFATE 2 MG/ML
2 INJECTION, SOLUTION INTRAMUSCULAR; INTRAVENOUS EVERY 4 HOURS PRN
Status: DISCONTINUED | OUTPATIENT
Start: 2024-04-17 | End: 2024-04-21

## 2024-04-17 RX ORDER — KETOROLAC TROMETHAMINE 30 MG/ML
30 INJECTION, SOLUTION INTRAMUSCULAR; INTRAVENOUS EVERY 6 HOURS PRN
Status: DISCONTINUED | OUTPATIENT
Start: 2024-04-17 | End: 2024-04-19

## 2024-04-17 RX ORDER — MORPHINE SULFATE 2 MG/ML
1 INJECTION, SOLUTION INTRAMUSCULAR; INTRAVENOUS EVERY 4 HOURS PRN
Status: DISCONTINUED | OUTPATIENT
Start: 2024-04-17 | End: 2024-04-21

## 2024-04-17 RX ORDER — MORPHINE SULFATE 4 MG/ML
4 INJECTION, SOLUTION INTRAMUSCULAR; INTRAVENOUS EVERY 4 HOURS PRN
Status: DISCONTINUED | OUTPATIENT
Start: 2024-04-17 | End: 2024-04-21

## 2024-04-17 NOTE — PROGRESS NOTES
INFECTIOUS DISEASE PROGRESS NOTE    Don Cabral Patient Status:  Inpatient    1965 MRN [id number]   Location [facility] 4W/SW/SE Attending Tamica Hardy MD   Hosp Day # 15 PCP Stephen Christianson MD     SUBJECTIVE  Ambulating. Tolerating clears. +pain. No BM. +flatus    ASSESSMENT/PLAN:    Antibiotics:  zosyn 4/2-, meropenem  --      Impression:     # Acute sigmoid diverticulitis with microperforation, no drainable abscess seen on CT  - most recent CT  with increased inflammation but no abscess.   - continues to have pain. Started on TPN 24  -  s/p OR 4/15/24 for L colectomy, end colostomy    # leukocytosis  # Abdominal pain, ?ileus  # Recurrent sigmoid diverticulitis     Recommend:     -  continue IV meropenem - POD #2 - will consider transition to PO on discharge pending clinical improvement  -  TPN per surgery  -  follow fever curve, wbc.   - d/w patient, staff        History of Present Illness:   Patient is a 58 year old male with h/o recurrent diverticulitis, cardiac arrhythmia, depression, NELLIE.  Presents with recurrent diverticulitis.  CT showed cute sigmoid diverticulitis.  Trace suspected focus of associated localized pneumoperitoneum/micro perforation.  No evidence of a drainable associated fluid collection.  Progressed inflammatory change adjacent to the hepatic flexure and proximal sigmoid colon.  Interval development of mild circumferential wall thickening splenic flexure and proximal sigmoid colon which may reflect a nonspecific colitis.  No evidence of pneumatosis or associated drainable fluid collection.     Started on zosyn.  Still with abdominal pain but slowly improved.      OBJECTIVE  /62 (BP Location: Left arm)   Pulse 84   Temp 98.6 °F (37 °C) (Oral)   Resp 20   Wt 264 lb 6.4 oz (119.9 kg)   SpO2 98%   BMI 33.05 kg/m²     General: No acute distress. Alert.  HEENT: EOMI   Abdomen: post op dressing, ostomy w/o output  Musculoskeletal: No  edema  Integument: No rashes    Labs:     Recent Labs   Lab 04/13/24  0612 04/14/24  0450 04/15/24  0527 04/16/24  0534 04/17/24  0601   WBC 12.8* 10.4 10.2 16.2* 14.2*   HGB 12.3* 10.9* 12.0* 12.9* 10.9*   .0* 552.0* 713.0* 846.0* 965.0*       Recent Labs   Lab 04/13/24  0612 04/14/24  0450 04/15/24  0527 04/16/24  0534 04/17/24  0601     136 138 139 137 137   K 4.3  4.3 3.9 4.6 4.9 4.1     105 107 104 101 100   CO2 30.0  30.0 28.0 30.0 31.0 32.0   BUN 12  12 14 14 16 21   CREATSERUM 0.51*  0.51* 0.46* 0.56* 0.64* 0.63*       Recent Labs   Lab 04/12/24  0610   ALT 18   AST 19         Microbiology: Reviewed    Imaging: Reviewed       Efrain Young MD  [facility]  [phone]

## 2024-04-17 NOTE — PLAN OF CARE
Patient alert and oriented x4. Vss. On room air. Reports shortness of breath at times, o2 98%. IS encourages. Cough and deep breathing encouraged. CLD. Minimal intake. Reports nausea, prn zofran given. Abdominal dressing changed by sx. Colostomy with gas output, changed with WOCN. MARGARITA drains to bulb suction, leak at times, dressing changed by WOCN. Voiding in urinal. PRN morphine and oxy-ir for pain control, pt would like to hold on oxy given the level of indigestion he has. TPN as ordered. Abx as ordered. Ambulating independently. Fall precautions in place. Call light within reach.   Problem: Patient Centered Care  Goal: Patient preferences are identified and integrated in the patient's plan of care  Description: Interventions:  - What would you like us to know as we care for you? This is my third time having diverticulitis  - Provide timely, complete, and accurate information to patient/family  - Incorporate patient and family knowledge, values, beliefs, and cultural backgrounds into the planning and delivery of care  - Encourage patient/family to participate in care and decision-making at the level they choose  - Honor patient and family perspectives and choices  Outcome: Progressing     Problem: Patient/Family Goals  Goal: Patient/Family Long Term Goal  Description: Patient's Long Term Goal: Discharge home    Interventions:  - Manage abdominal pain  - Tolerate adequate oral intake without complication  - Determine appropriate antibiotic plan  - See additional Care Plan goals for specific interventions  Outcome: Progressing  Goal: Patient/Family Short Term Goal  Description: Patient's Short Term Goal: Manage abdominal pain    Interventions:   - Transition from IV to oral pain medications  - Bowel rest with slow diet tolerance  - Eat small frequent light meals  - See additional Care Plan goals for specific interventions  Outcome: Progressing     Problem: PAIN - ADULT  Goal: Verbalizes/displays adequate comfort  level or patient's stated pain goal  Description: INTERVENTIONS:  - Encourage pt to monitor pain and request assistance  - Assess pain using appropriate pain scale  - Administer analgesics based on type and severity of pain and evaluate response  - Implement non-pharmacological measures as appropriate and evaluate response  - Consider cultural and social influences on pain and pain management  - Manage/alleviate anxiety  - Utilize distraction and/or relaxation techniques  - Monitor for opioid side effects  - Notify MD/LIP if interventions unsuccessful or patient reports new pain  - Anticipate increased pain with activity and pre-medicate as appropriate  Outcome: Progressing     Problem: RISK FOR INFECTION - ADULT  Goal: Absence of fever/infection during anticipated neutropenic period  Description: INTERVENTIONS  - Monitor WBC  - Administer growth factors as ordered  - Implement neutropenic guidelines  Outcome: Progressing     Problem: DISCHARGE PLANNING  Goal: Discharge to home or other facility with appropriate resources  Description: INTERVENTIONS:  - Identify barriers to discharge w/pt and caregiver  - Include patient/family/discharge partner in discharge planning  - Arrange for needed discharge resources and transportation as appropriate  - Identify discharge learning needs (meds, wound care, etc)  - Arrange for interpreters to assist at discharge as needed  - Consider post-discharge preferences of patient/family/discharge partner  - Complete POLST form as appropriate  - Assess patient's ability to be responsible for managing their own health  - Refer to Case Management Department for coordinating discharge planning if the patient needs post-hospital services based on physician/LIP order or complex needs related to functional status, cognitive ability or social support system  Outcome: Progressing     Problem: GASTROINTESTINAL - ADULT  Goal: Maintains or returns to baseline bowel function  Description:  INTERVENTIONS:  - Assess bowel function  - Maintain adequate hydration with IV or PO as ordered and tolerated  - Evaluate effectiveness of GI medications  - Encourage mobilization and activity  - Obtain nutritional consult as needed  - Establish a toileting routine/schedule  - Consider collaborating with pharmacy to review patient's medication profile  Outcome: Progressing  Goal: Minimal or absence of nausea and vomiting  Description: INTERVENTIONS:  - Maintain adequate hydration with IV or PO as ordered and tolerated  - Nasogastric tube to low intermittent suction as ordered  - Evaluate effectiveness of ordered antiemetic medications  - Provide nonpharmacologic comfort measures as appropriate  - Advance diet as tolerated, if ordered  - Obtain nutritional consult as needed  - Evaluate fluid balance  Outcome: Progressing  Goal: Maintains adequate nutritional intake (undernourished)  Description: INTERVENTIONS:  - Monitor percentage of each meal consumed  - Identify factors contributing to decreased intake, treat as appropriate  - Assist with meals as needed  - Monitor I&O, WT and lab values  - Obtain nutritional consult as needed  - Optimize oral hygiene and moisture  - Encourage food from home; allow for food preferences  - Enhance eating environment  Outcome: Progressing

## 2024-04-17 NOTE — PLAN OF CARE
Pt A&Ox4. Room air, CPAP at night. Colostomy in place, no output overnight. Voiding post avzquez removal. X2 MARGARITA drains in place. Pain controlled with PRN morphine and oxycodone. Given maalox PRN for upset stomach. TPN infusing. Clear liquid diet. Ambulating in the halls and room. Call light within reach.     Problem: Patient Centered Care  Goal: Patient preferences are identified and integrated in the patient's plan of care  Description: Interventions:  - What would you like us to know as we care for you? This is my third time having diverticulitis  - Provide timely, complete, and accurate information to patient/family  - Incorporate patient and family knowledge, values, beliefs, and cultural backgrounds into the planning and delivery of care  - Encourage patient/family to participate in care and decision-making at the level they choose  - Honor patient and family perspectives and choices  Outcome: Progressing     Problem: Patient/Family Goals  Goal: Patient/Family Long Term Goal  Description: Patient's Long Term Goal: Discharge home    Interventions:  - Manage abdominal pain  - Tolerate adequate oral intake without complication  - Determine appropriate antibiotic plan  - See additional Care Plan goals for specific interventions  Outcome: Progressing  Goal: Patient/Family Short Term Goal  Description: Patient's Short Term Goal: Manage abdominal pain    Interventions:   - Transition from IV to oral pain medications  - Bowel rest with slow diet tolerance  - Eat small frequent light meals  - See additional Care Plan goals for specific interventions  Outcome: Progressing     Problem: PAIN - ADULT  Goal: Verbalizes/displays adequate comfort level or patient's stated pain goal  Description: INTERVENTIONS:  - Encourage pt to monitor pain and request assistance  - Assess pain using appropriate pain scale  - Administer analgesics based on type and severity of pain and evaluate response  - Implement non-pharmacological measures  as appropriate and evaluate response  - Consider cultural and social influences on pain and pain management  - Manage/alleviate anxiety  - Utilize distraction and/or relaxation techniques  - Monitor for opioid side effects  - Notify MD/LIP if interventions unsuccessful or patient reports new pain  - Anticipate increased pain with activity and pre-medicate as appropriate  Outcome: Progressing     Problem: RISK FOR INFECTION - ADULT  Goal: Absence of fever/infection during anticipated neutropenic period  Description: INTERVENTIONS  - Monitor WBC  - Administer growth factors as ordered  - Implement neutropenic guidelines  Outcome: Progressing     Problem: DISCHARGE PLANNING  Goal: Discharge to home or other facility with appropriate resources  Description: INTERVENTIONS:  - Identify barriers to discharge w/pt and caregiver  - Include patient/family/discharge partner in discharge planning  - Arrange for needed discharge resources and transportation as appropriate  - Identify discharge learning needs (meds, wound care, etc)  - Arrange for interpreters to assist at discharge as needed  - Consider post-discharge preferences of patient/family/discharge partner  - Complete POLST form as appropriate  - Assess patient's ability to be responsible for managing their own health  - Refer to Case Management Department for coordinating discharge planning if the patient needs post-hospital services based on physician/LIP order or complex needs related to functional status, cognitive ability or social support system  Outcome: Progressing     Problem: GASTROINTESTINAL - ADULT  Goal: Maintains or returns to baseline bowel function  Description: INTERVENTIONS:  - Assess bowel function  - Maintain adequate hydration with IV or PO as ordered and tolerated  - Evaluate effectiveness of GI medications  - Encourage mobilization and activity  - Obtain nutritional consult as needed  - Establish a toileting routine/schedule  - Consider  collaborating with pharmacy to review patient's medication profile  Outcome: Progressing  Goal: Minimal or absence of nausea and vomiting  Description: INTERVENTIONS:  - Maintain adequate hydration with IV or PO as ordered and tolerated  - Nasogastric tube to low intermittent suction as ordered  - Evaluate effectiveness of ordered antiemetic medications  - Provide nonpharmacologic comfort measures as appropriate  - Advance diet as tolerated, if ordered  - Obtain nutritional consult as needed  - Evaluate fluid balance  Outcome: Progressing  Goal: Maintains adequate nutritional intake (undernourished)  Description: INTERVENTIONS:  - Monitor percentage of each meal consumed  - Identify factors contributing to decreased intake, treat as appropriate  - Assist with meals as needed  - Monitor I&O, WT and lab values  - Obtain nutritional consult as needed  - Optimize oral hygiene and moisture  - Encourage food from home; allow for food preferences  - Enhance eating environment  Outcome: Progressing

## 2024-04-17 NOTE — PROGRESS NOTES
[facility]  part of [facility]    Progress Note    Don Cabral Patient Status:  Inpatient    1965 MRN [id number]   Location [facility] 4W/SW/SE Attending Tamica Hardy MD   Hosp Day # 15 PCP Stephen Christianson MD       Subjective:   Tolerating sips of clears. Scant flatus. Continued abdominal pain.    Objective:   Vital Signs:  Blood pressure 123/62, pulse 84, temperature 98.6 °F (37 °C), temperature source Oral, resp. rate 20, weight 264 lb 6.4 oz (119.9 kg), SpO2 98%.    Physical Exam     General: No acute distress. Alert and oriented x 3.  HEENT: Moist mucous membranes. Anicteric.   Neck: Supple, No JVD.   Respiratory: Nonlabored resp.  Cardiovascular: Regular rate.   Abdomen: Soft, expected incisional tenderness, no rebound tnd, no guarding, nondistended.  No masses. Normal bowel sounds. Incisions c/d/I, MARGARITA serosanguineous, ostomy with scant flatus  Neurologic: No focal neurological deficits.  Musculoskeletal: Full range of motion of all extremities. No calf tenderness. No swelling noted.  Integument: No lesions. No erythema.  Psychiatric: Appropriate mood and affect.         Assessment and Plan:     Sigmoid diverticulitis      Intractable abdominal pain    Continue clears and TPN until definitive return of bowel function. Continue monitoring MARGARITA output. Ostomy education. Ambulate as able. Will continue to follow and monitor progress.    Results:     Lab Results   Component Value Date    WBC 14.2 (H) 2024    HGB 10.9 (L) 2024    HCT 34.3 (L) 2024    .0 (H) 2024    CREATSERUM 0.63 (L) 2024    BUN 21 2024     2024    K 4.1 2024     2024    CO2 32.0 2024     (H) 2024    CA 8.7 2024    ALB 3.9 2024    ALKPHO 52 2024    BILT 0.4 2024    TP 5.9 2024    AST 19 2024    ALT 18 2024    PTT 24.9 2023    INR 1.01 2023    TSH 1.850  09/28/2023    LIP 37 04/02/2024    PSA 2.53 08/02/2021    DDIMER 2.44 (H) 06/23/2023    MG 2.1 04/17/2024    PHOS 3.4 04/17/2024    TROP <0.045 02/05/2020       XR CHEST AP PORTABLE  (CPT=71045)    Result Date: 4/16/2024  CONCLUSION:  Patchy left lower lobe airspace opacity may be secondary to atelectasis or pneumonia.    Dictated by (CST): Daniel River MD on 4/16/2024 at 1:33 PM     Finalized by (CST): Daniel River MD on 4/16/2024 at 1:36 PM                       Stephen Nunez PA-C  4/17/2024

## 2024-04-17 NOTE — WOUND PROGRESS NOTE
Ostomy Care Services  Follow up on the pt. to continue ostomy care teaching. He is up in the room, sob, in pain. He texted his spouse and she will come for in teaching at 2:30 pm. I will return then. I will also message the MD as he is asking for additional pain meds. Spoke with the nurse.

## 2024-04-17 NOTE — PROGRESS NOTES
04/17/24 1430   Wound 04/15/24 Abdomen Medial;Upper   Date First Assessed/Time First Assessed: 04/15/24 1224   Primary Wound Type: Incision  Location: Abdomen  Wound Location Orientation: Medial;Upper   Dressing Status Clean;Dry;Intact;Dressing Reinforced   Colostomy Descending/sigmoid LLQ   Placement Date/Time: 04/15/24 1413   Inserted by: Dr. Novoa  Colostomy Type: Descending/sigmoid  Location: LLQ  Stoma Size (cm): 2.5 cm  Appliance Size: 2 1/4   Stomal Appliance 2 piece;Intact  (changed for teaching, pt. changed the appliance)   Stomal Assessment Budding;Edema;Moist;Pink;Red  (1 1/2 inch stoma)   Peristomal Assessment Intact;Pink  (close to mid line)   Treatment Appliance change;Bag change;Site care  (2 1/4 inch 2 pc, skin prep, pt. changed the appliance)   Dressing Change Due 04/24/24   Output (mL) 0 mL   Wound Follow Up   Follow up needed Yes     Ostomy Care Services  The pt. is sitting up in bed, his spouse is at the bedside for teaching. The pt. has no output in the pouch, no gas noted at this time. The pt. practiced emptying the pouch and he changed his appliance with some assist. I answered all of their questions. Educated the spouse on the colostomy information folder. He did a good job. I also changed the 2 MARGARITA drain dressings. I will continue to follow and teach the pt. and spouse. Spoke with the pt's nurse.

## 2024-04-17 NOTE — PROGRESS NOTES
[facility]  part of [facility]    Progress Note    Don Cabral Patient Status:  Inpatient    1965 MRN [id number]   Location [facility] 4W/SW/SE Attending Tamica Hardy MD   Hosp Day # 15 PCP Stephen Christianson MD     Chief Complaint:   Chief Complaint   Patient presents with    Abdomen/Flank Pain       Subjective:   Don Cabral is doing better - he was able to get up and walk the halls today. Still has abd pain still is distended. Minimal gas in ostomy noted. No N/V.   Objective:   Objective:    Blood pressure 123/62, pulse 84, temperature 98.6 °F (37 °C), temperature source Oral, resp. rate 20, weight 264 lb 6.4 oz (119.9 kg), SpO2 98%.    Physical Exam:    General: No acute distress.   Respiratory: Diminished bases B/L   Cardiovascular: S1, S2. Regular rate and rhythm. No murmurs, rubs or gallops.   Abdomen: Soft, nontender, distended.  hypoactive bowel sounds. No rebound or guarding. Ostomy intact no gas.   Neurologic: No focal neurological deficits.   Musculoskeletal: Moves all extremities.  Extremities: No edema.      Results:   Results:    Labs:  Recent Labs   Lab 24  0612 24  0450 04/15/24  0527 24  0534 24  0601   WBC 12.8* 10.4 10.2 16.2* 14.2*   HGB 12.3* 10.9* 12.0* 12.9* 10.9*   MCV 85.5 86.1 86.4 85.2 87.7   .0* 552.0* 713.0* 846.0* 965.0*       Recent Labs   Lab 24  0610 24  0612 24  0450 04/15/24  0527 24  0534 24  0601   *  100* 110*  110*   < > 101* 145* 118*   BUN 13  13 12  12   < > 14 16 21   CREATSERUM 0.52*  0.52* 0.51*  0.51*   < > 0.56* 0.64* 0.63*   CA 8.4*  8.4* 8.5*  8.5*   < > 8.8 9.0 8.7   ALB 3.6 3.5  --   --  3.9  --      138 136  136   < > 139 137 137   K 4.0  4.0 4.3  4.3   < > 4.6 4.9 4.1     104 105  105   < > 104 101 100   CO2 28.0  28.0 30.0  30.0   < > 30.0 31.0 32.0   ALKPHO 52  --   --   --   --   --    AST 19  --   --   --   --   --     ALT 18  --   --   --   --   --    BILT 0.4  --   --   --   --   --    TP 5.9  --   --   --   --   --     < > = values in this interval not displayed.       Estimated Creatinine Clearance: 154.6 mL/min (A) (based on SCr of 0.63 mg/dL (L)).    No results for input(s): \"PTP\", \"INR\" in the last 168 hours.         Culture:  Hospital Encounter on 04/02/24   1. Blood Culture     Status: None    Collection Time: 04/02/24  5:18 PM    Specimen: Blood,peripheral   Result Value Ref Range    Blood Culture Result No Growth 5 Days N/A       Cardiac  No results for input(s): \"TROP\", \"PBNP\" in the last 168 hours.      Imaging: Imaging data reviewed in [facility].  XR CHEST AP PORTABLE  (CPT=71045)    Result Date: 4/16/2024  CONCLUSION:  Patchy left lower lobe airspace opacity may be secondary to atelectasis or pneumonia.    Dictated by (CST): Daniel River MD on 4/16/2024 at 1:33 PM     Finalized by (CST): Daniel River MD on 4/16/2024 at 1:36 PM           Medications:    heparin  5,000 Units Subcutaneous 2 times per day    [Transfer Hold] enoxaparin  0.5 mg/kg Subcutaneous Daily    [Transfer Hold] magnesium oxide  400 mg Oral Once    pantoprazole  40 mg Intravenous Q12H    meropenem  500 mg Intravenous Q8H    clonazePAM  0.5 mg Oral TID    OLANZapine  2.5 mg Oral Nightly    verapamil ER  120 mg Oral Nightly    sertraline  75 mg Oral Daily    lisinopril  20 mg Oral Daily         Assessment and Plan:   Assessment & Plan:      Acute Sigmoid diverticulitis  LLQ abdominal pain   3rd episode  - failed outpt PO abx.   - history of recurrent diverticulitis  - CT showed acute sigmoid diverticulitis with microperforation, no fluid collection.   - Last colonoscopy 05/2023.   - Pain control.  - IV morphine PO oxyIR for pain PRN   - IV meropenem. Consider pO abx on discharge.   - mid line placed 4/5   - clears as tolerated.   - cont TPN.   - CT abd from 4/11 showing increased inflammation, no abscess   - GI, ID and general surgery on consult.   -  s/p L colectomy with end coloscopy and splenic flexure mobilization 4/15   - wound care for ostomy   - increase ambulation     Hypoxia   - Likely from atelectasis   - increase activity   - IS  - CXR      Pt relays hx of PVCs, a-fib, LBBB.  Hx of intermittent premature ventricular tachycardia   - remote tele     Hypertension  - cont lisinopril, verapamil     GERD  - IV PPI bid     Major depressive disorder   Pt says he talked to his psychiatrist and and can stop neurontin.  - stop neurontin  - cont klonopin, zoloft, zyprexa    >55min spent, >50% spent counseling and coordinating care in the form of educating pt/family and d/w consultants and staff. Most of the time spent discussing the above plan.        Plan of care discussed with patient or family at bedside.    Tamica Hardy MD  Hospitalist          Supplementary Documentation:     Quality:  DVT Prophylaxis: heparin   CODE status: Full  Dispo: per clinical course           Estimated date of discharge: TBD  Discharge is dependent on: clinical stability  At this point Mr. Cabral is expected to be discharge to: home

## 2024-04-17 NOTE — PLAN OF CARE
Patient A/Ox4 on room air. VSS. C/o of pain, treated with IV dilaudid, morphine and PO oxy. C/o of nausea; treated with zofran. Ice pack offered to patient. Iv abx administered per order. IVF's and TPN infusing. Tolerating clears. Vance taken out and voiding freely. Colostomy in place. Up SBA. Safety precautions in place, call light within reach.

## 2024-04-18 ENCOUNTER — APPOINTMENT (OUTPATIENT)
Dept: GENERAL RADIOLOGY | Facility: HOSPITAL | Age: 59
End: 2024-04-18
Attending: HOSPITALIST
Payer: COMMERCIAL

## 2024-04-18 LAB
ANION GAP SERPL CALC-SCNC: 7 MMOL/L (ref 0–18)
BASOPHILS # BLD AUTO: 0.01 X10(3) UL (ref 0–0.2)
BASOPHILS NFR BLD AUTO: 0.1 %
BUN BLD-MCNC: 17 MG/DL (ref 9–23)
BUN/CREAT SERPL: 32.1 (ref 10–20)
CALCIUM BLD-MCNC: 8.8 MG/DL (ref 8.7–10.4)
CHLORIDE SERPL-SCNC: 102 MMOL/L (ref 98–112)
CO2 SERPL-SCNC: 28 MMOL/L (ref 21–32)
CREAT BLD-MCNC: 0.53 MG/DL
DEPRECATED RDW RBC AUTO: 41 FL (ref 35.1–46.3)
EGFRCR SERPLBLD CKD-EPI 2021: 117 ML/MIN/1.73M2 (ref 60–?)
EOSINOPHIL # BLD AUTO: 0.33 X10(3) UL (ref 0–0.7)
EOSINOPHIL NFR BLD AUTO: 2.8 %
ERYTHROCYTE [DISTWIDTH] IN BLOOD BY AUTOMATED COUNT: 13.2 % (ref 11–15)
GLUCOSE BLD-MCNC: 104 MG/DL (ref 70–99)
HCT VFR BLD AUTO: 33.3 %
HGB BLD-MCNC: 11.4 G/DL
IMM GRANULOCYTES # BLD AUTO: 0.21 X10(3) UL (ref 0–1)
IMM GRANULOCYTES NFR BLD: 1.8 %
LYMPHOCYTES # BLD AUTO: 2.19 X10(3) UL (ref 1–4)
LYMPHOCYTES NFR BLD AUTO: 18.3 %
MAGNESIUM SERPL-MCNC: 2.2 MG/DL (ref 1.6–2.6)
MCH RBC QN AUTO: 28.9 PG (ref 26–34)
MCHC RBC AUTO-ENTMCNC: 34.2 G/DL (ref 31–37)
MCV RBC AUTO: 84.3 FL
MONOCYTES # BLD AUTO: 0.91 X10(3) UL (ref 0.1–1)
MONOCYTES NFR BLD AUTO: 7.6 %
NEUTROPHILS # BLD AUTO: 8.35 X10 (3) UL (ref 1.5–7.7)
NEUTROPHILS # BLD AUTO: 8.35 X10(3) UL (ref 1.5–7.7)
NEUTROPHILS NFR BLD AUTO: 69.4 %
OSMOLALITY SERPL CALC.SUM OF ELEC: 286 MOSM/KG (ref 275–295)
PHOSPHATE SERPL-MCNC: 3.9 MG/DL (ref 2.4–5.1)
PLATELET # BLD AUTO: 789 10(3)UL (ref 150–450)
POTASSIUM SERPL-SCNC: 4 MMOL/L (ref 3.5–5.1)
RBC # BLD AUTO: 3.95 X10(6)UL
SODIUM SERPL-SCNC: 137 MMOL/L (ref 136–145)
WBC # BLD AUTO: 12 X10(3) UL (ref 4–11)

## 2024-04-18 PROCEDURE — 99233 SBSQ HOSP IP/OBS HIGH 50: CPT | Performed by: HOSPITALIST

## 2024-04-18 PROCEDURE — 74019 RADEX ABDOMEN 2 VIEWS: CPT | Performed by: HOSPITALIST

## 2024-04-18 NOTE — WOUND PROGRESS NOTE
Ostomy Care  Follow up with the pt. to answer any questions, I spoke with the pt. earlier today and he is emptying his pouch with the staff. The pt. is currently in radiology. I will continue to follow as needed. Spoke with the nurse. The pt. changed his appliance yesterday and his spouse watched him.

## 2024-04-18 NOTE — PLAN OF CARE
No acute changes over night. Pt is alert and oriented on RA. Pt is voiding freely. R PICC and R arm precautions. TPN running as ordered. MARGARITA drain emptied. IV abx running as ordered. Pain managed with IV Toradol and IV morphine. Pt complained of excruciating pain after oral medications which seemed to resolve on its own. Pt is up independently. Call light is within reach and safety measures are in place.    Problem: PAIN - ADULT  Goal: Verbalizes/displays adequate comfort level or patient's stated pain goal  Description: INTERVENTIONS:  - Encourage pt to monitor pain and request assistance  - Assess pain using appropriate pain scale  - Administer analgesics based on type and severity of pain and evaluate response  - Implement non-pharmacological measures as appropriate and evaluate response  - Consider cultural and social influences on pain and pain management  - Manage/alleviate anxiety  - Utilize distraction and/or relaxation techniques  - Monitor for opioid side effects  - Notify MD/LIP if interventions unsuccessful or patient reports new pain  - Anticipate increased pain with activity and pre-medicate as appropriate  Outcome: Progressing     Problem: GASTROINTESTINAL - ADULT  Goal: Maintains or returns to baseline bowel function  Description: INTERVENTIONS:  - Assess bowel function  - Maintain adequate hydration with IV or PO as ordered and tolerated  - Evaluate effectiveness of GI medications  - Encourage mobilization and activity  - Obtain nutritional consult as needed  - Establish a toileting routine/schedule  - Consider collaborating with pharmacy to review patient's medication profile  Outcome: Progressing  Goal: Minimal or absence of nausea and vomiting  Description: INTERVENTIONS:  - Maintain adequate hydration with IV or PO as ordered and tolerated  - Nasogastric tube to low intermittent suction as ordered  - Evaluate effectiveness of ordered antiemetic medications  - Provide nonpharmacologic comfort  measures as appropriate  - Advance diet as tolerated, if ordered  - Obtain nutritional consult as needed  - Evaluate fluid balance  Outcome: Progressing  Goal: Maintains adequate nutritional intake (undernourished)  Description: INTERVENTIONS:  - Monitor percentage of each meal consumed  - Identify factors contributing to decreased intake, treat as appropriate  - Assist with meals as needed  - Monitor I&O, WT and lab values  - Obtain nutritional consult as needed  - Optimize oral hygiene and moisture  - Encourage food from home; allow for food preferences  - Enhance eating environment  Outcome: Progressing     Problem: RISK FOR INFECTION - ADULT  Goal: Absence of fever/infection during anticipated neutropenic period  Description: INTERVENTIONS  - Monitor WBC  - Administer growth factors as ordered  - Implement neutropenic guidelines  Outcome: Progressing     Problem: DISCHARGE PLANNING  Goal: Discharge to home or other facility with appropriate resources  Description: INTERVENTIONS:  - Identify barriers to discharge w/pt and caregiver  - Include patient/family/discharge partner in discharge planning  - Arrange for needed discharge resources and transportation as appropriate  - Identify discharge learning needs (meds, wound care, etc)  - Arrange for interpreters to assist at discharge as needed  - Consider post-discharge preferences of patient/family/discharge partner  - Complete POLST form as appropriate  - Assess patient's ability to be responsible for managing their own health  - Refer to Case Management Department for coordinating discharge planning if the patient needs post-hospital services based on physician/LIP order or complex needs related to functional status, cognitive ability or social support system  Outcome: Progressing

## 2024-04-18 NOTE — PROGRESS NOTES
[facility]  part of [facility]    Progress Note    Don Cabral Patient Status:  Inpatient    1965 MRN [id number]   Location [facility] 4W/SW/SE Attending Tamica Hardy MD   Hosp Day # 16 PCP Stephen Christianson MD       Subjective:   Pt with abdominal pain. Liquid stool overnight with flatus. Some mild nausea.     Objective:   Vital Signs:  Blood pressure 143/54, pulse 77, temperature 97.8 °F (36.6 °C), temperature source Oral, resp. rate 16, weight 250 lb 2 oz (113.5 kg), SpO2 96%.    Physical Exam     General: No acute distress. Alert and oriented x 3.  HEENT: Moist mucous membranes. Anicteric.   Neck: Supple, No JVD.   Respiratory: Nonlabored resp.  Cardiovascular: Regular rate.   Abdomen: Soft, expected incisional tenderness, no rebound tnd, no guarding, nondistended.  No masses. Normal bowel sounds. Incision c/d/I, MARGARITA serosanguineous, ostomy with liquid stool and flatus  Neurologic: No focal neurological deficits.  Musculoskeletal: Full range of motion of all extremities. No calf tenderness. No swelling noted.  Integument: No lesions. No erythema.  Psychiatric: Appropriate mood and affect.         Assessment and Plan:     Sigmoid diverticulitis      Intractable abdominal pain    Soft diet as tolerated, can wean TPN as PO intake increases. Continue IV abx. Ostomy education. Ambulate. Will continue to follow and monitor progress.    Results:     Lab Results   Component Value Date    WBC 14.2 (H) 2024    HGB 10.9 (L) 2024    HCT 34.3 (L) 2024    .0 (H) 2024    CREATSERUM 0.53 (L) 2024    BUN 17 2024     2024    K 4.0 2024     2024    CO2 28.0 2024     (H) 2024    CA 8.8 2024    ALB 3.9 2024    ALKPHO 52 2024    BILT 0.4 2024    TP 5.9 2024    AST 19 2024    ALT 18 2024    PTT 24.9 2023    INR 1.01 2023    TSH 1.850 2023     LIP 37 04/02/2024    PSA 2.53 08/02/2021    DDIMER 2.44 (H) 06/23/2023    MG 2.2 04/18/2024    PHOS 3.9 04/18/2024    TROP <0.045 02/05/2020       XR CHEST AP PORTABLE  (CPT=71045)    Result Date: 4/16/2024  CONCLUSION:  Patchy left lower lobe airspace opacity may be secondary to atelectasis or pneumonia.    Dictated by (CST): Daniel River MD on 4/16/2024 at 1:33 PM     Finalized by (CST): Daniel River MD on 4/16/2024 at 1:36 PM                       Stephen Nunez PA-C  4/18/2024

## 2024-04-18 NOTE — PLAN OF CARE
Pt is aox4, on room air, lack of appetite, cannot eat and refused to take medication due to abdominal pains. Pain is managed with Morphine and toradol. Pt upset, tearful, and anxious due to pain. Simethicone also given for stomach upset. Encouraged to ambulate to help with the trapped gas pain. Ostomy is having small output. TPN continued. Call light within reach, reinforced use. Wife was at bedside. Safety plan in place.      Problem: Patient Centered Care  Goal: Patient preferences are identified and integrated in the patient's plan of care  Description: Interventions:  - What would you like us to know as we care for you? This is my third time having diverticulitis  - Provide timely, complete, and accurate information to patient/family  - Incorporate patient and family knowledge, values, beliefs, and cultural backgrounds into the planning and delivery of care  - Encourage patient/family to participate in care and decision-making at the level they choose  - Honor patient and family perspectives and choices  Outcome: Progressing     Problem: Patient/Family Goals  Goal: Patient/Family Short Term Goal  Description: Patient's Short Term Goal: Manage abdominal pain    Interventions:   - Transition from IV to oral pain medications  - Bowel rest with slow diet tolerance  - Eat small frequent light meals  - See additional Care Plan goals for specific interventions  Outcome: Progressing     Problem: PAIN - ADULT  Goal: Verbalizes/displays adequate comfort level or patient's stated pain goal  Description: INTERVENTIONS:  - Encourage pt to monitor pain and request assistance  - Assess pain using appropriate pain scale  - Administer analgesics based on type and severity of pain and evaluate response  - Implement non-pharmacological measures as appropriate and evaluate response  - Consider cultural and social influences on pain and pain management  - Manage/alleviate anxiety  - Utilize distraction and/or relaxation  techniques  - Monitor for opioid side effects  - Notify MD/LIP if interventions unsuccessful or patient reports new pain  - Anticipate increased pain with activity and pre-medicate as appropriate  Outcome: Progressing     Problem: RISK FOR INFECTION - ADULT  Goal: Absence of fever/infection during anticipated neutropenic period  Description: INTERVENTIONS  - Monitor WBC  - Administer growth factors as ordered  - Implement neutropenic guidelines  Outcome: Progressing     Problem: DISCHARGE PLANNING  Goal: Discharge to home or other facility with appropriate resources  Description: INTERVENTIONS:  - Identify barriers to discharge w/pt and caregiver  - Include patient/family/discharge partner in discharge planning  - Arrange for needed discharge resources and transportation as appropriate  - Identify discharge learning needs (meds, wound care, etc)  - Arrange for interpreters to assist at discharge as needed  - Consider post-discharge preferences of patient/family/discharge partner  - Complete POLST form as appropriate  - Assess patient's ability to be responsible for managing their own health  - Refer to Case Management Department for coordinating discharge planning if the patient needs post-hospital services based on physician/LIP order or complex needs related to functional status, cognitive ability or social support system  Outcome: Progressing     Problem: GASTROINTESTINAL - ADULT  Goal: Maintains or returns to baseline bowel function  Description: INTERVENTIONS:  - Assess bowel function  - Maintain adequate hydration with IV or PO as ordered and tolerated  - Evaluate effectiveness of GI medications  - Encourage mobilization and activity  - Obtain nutritional consult as needed  - Establish a toileting routine/schedule  - Consider collaborating with pharmacy to review patient's medication profile  Outcome: Progressing  Goal: Minimal or absence of nausea and vomiting  Description: INTERVENTIONS:  - Maintain adequate  hydration with IV or PO as ordered and tolerated  - Nasogastric tube to low intermittent suction as ordered  - Evaluate effectiveness of ordered antiemetic medications  - Provide nonpharmacologic comfort measures as appropriate  - Advance diet as tolerated, if ordered  - Obtain nutritional consult as needed  - Evaluate fluid balance  Outcome: Progressing  Goal: Maintains adequate nutritional intake (undernourished)  Description: INTERVENTIONS:  - Monitor percentage of each meal consumed  - Identify factors contributing to decreased intake, treat as appropriate  - Assist with meals as needed  - Monitor I&O, WT and lab values  - Obtain nutritional consult as needed  - Optimize oral hygiene and moisture  - Encourage food from home; allow for food preferences  - Enhance eating environment  Outcome: Progressing

## 2024-04-18 NOTE — PROGRESS NOTES
[facility]  part of [facility]    Progress Note    Don Cabral Patient Status:  Inpatient    1965 MRN [id number]   Location [facility] 4W/SW/SE Attending Tamica aHrdy MD   Hosp Day # 16 PCP Stephen Christianson MD     Chief Complaint:   Chief Complaint   Patient presents with    Abdomen/Flank Pain       Subjective:   Don Cabral is having  a lot of pain feels the \"pill is touching his stomach\" and causing burning pain. Was up walking earlier. Had some stool in ostomy. Mild nausea   Objective:   Objective:    Blood pressure 143/54, pulse 77, temperature 97.8 °F (36.6 °C), temperature source Oral, resp. rate 16, weight 250 lb 2 oz (113.5 kg), SpO2 96%.    Physical Exam:    General: No acute distress. Crying in pain   Respiratory: Diminished bases B/L   Cardiovascular: S1, S2. Regular rate and rhythm. No murmurs, rubs or gallops.   Abdomen: Soft, nontender, distended.  hypoactive bowel sounds. No rebound or guarding. Ostomy intact no gas.   Neurologic: No focal neurological deficits.   Musculoskeletal: Moves all extremities.  Extremities: No edema.      Results:   Results:    Labs:  Recent Labs   Lab 24  0612 24  0450 04/15/24  0527 24  0534 24  0601   WBC 12.8* 10.4 10.2 16.2* 14.2*   HGB 12.3* 10.9* 12.0* 12.9* 10.9*   MCV 85.5 86.1 86.4 85.2 87.7   .0* 552.0* 713.0* 846.0* 965.0*       Recent Labs   Lab 24  0610 24  0612 24  0450 24  0534 24  0601 24  0655   *  100* 110*  110*   < > 145* 118* 104*   BUN 13  13 12  12   < > 16 21 17   CREATSERUM 0.52*  0.52* 0.51*  0.51*   < > 0.64* 0.63* 0.53*   CA 8.4*  8.4* 8.5*  8.5*   < > 9.0 8.7 8.8   ALB 3.6 3.5  --  3.9  --   --      138 136  136   < > 137 137 137   K 4.0  4.0 4.3  4.3   < > 4.9 4.1 4.0     104 105  105   < > 101 100 102   CO2 28.0  28.0 30.0  30.0   < > 31.0 32.0 28.0   ALKPHO 52  --   --   --   --   --    AST  19  --   --   --   --   --    ALT 18  --   --   --   --   --    BILT 0.4  --   --   --   --   --    TP 5.9  --   --   --   --   --     < > = values in this interval not displayed.       Estimated Creatinine Clearance: 183.8 mL/min (A) (based on SCr of 0.53 mg/dL (L)).    No results for input(s): \"PTP\", \"INR\" in the last 168 hours.         Culture:  Hospital Encounter on 04/02/24   1. Blood Culture     Status: None    Collection Time: 04/02/24  5:18 PM    Specimen: Blood,peripheral   Result Value Ref Range    Blood Culture Result No Growth 5 Days N/A       Cardiac  No results for input(s): \"TROP\", \"PBNP\" in the last 168 hours.      Imaging: Imaging data reviewed in [facility].  XR CHEST AP PORTABLE  (CPT=71045)    Result Date: 4/16/2024  CONCLUSION:  Patchy left lower lobe airspace opacity may be secondary to atelectasis or pneumonia.    Dictated by (CST): Daniel River MD on 4/16/2024 at 1:33 PM     Finalized by (CST): Daniel River MD on 4/16/2024 at 1:36 PM           Medications:    heparin  5,000 Units Subcutaneous 2 times per day    [Transfer Hold] enoxaparin  0.5 mg/kg Subcutaneous Daily    [Transfer Hold] magnesium oxide  400 mg Oral Once    pantoprazole  40 mg Intravenous Q12H    meropenem  500 mg Intravenous Q8H    clonazePAM  0.5 mg Oral TID    OLANZapine  2.5 mg Oral Nightly    verapamil ER  120 mg Oral Nightly    sertraline  75 mg Oral Daily    lisinopril  20 mg Oral Daily         Assessment and Plan:   Assessment & Plan:      Acute Sigmoid diverticulitis  LLQ abdominal pain   3rd episode  - failed outpt PO abx.   - history of recurrent diverticulitis  - CT showed acute sigmoid diverticulitis with microperforation, no fluid collection.   - Last colonoscopy 05/2023.   - Pain control.  - IV morphine PO oxyIR for pain PRN   - IV meropenem. Consider pO abx on discharge.   - mid line placed 4/5   - clears as tolerated.   - cont TPN.   - CT abd from 4/11 showing increased inflammation, no abscess   - GI, ID and general  surgery on consult.   - s/p L colectomy with end coloscopy and splenic flexure mobilization 4/15   - wound care for ostomy   - increase ambulation     Hypoxia   - Likely from atelectasis   - increase activity   - IS  - CXR      Pt relays hx of PVCs, a-fib, LBBB.  Hx of intermittent premature ventricular tachycardia   - remote tele     Hypertension  - cont lisinopril, verapamil     GERD  - IV PPI bid     Major depressive disorder   Pt says he talked to his psychiatrist and and can stop neurontin.  - stop neurontin  - cont klonopin, zoloft, zyprexa    >55min spent, >50% spent counseling and coordinating care in the form of educating pt/family and d/w consultants and staff. Most of the time spent discussing the above plan.        Plan of care discussed with patient or family at bedside.    Tamica Hardy MD  Hospitalist          Supplementary Documentation:     Quality:  DVT Prophylaxis: heparin   CODE status: Full  Dispo: per clinical course           Estimated date of discharge: TBD  Discharge is dependent on: clinical stability  At this point Mr. Cabral is expected to be discharge to: home

## 2024-04-19 LAB
ANION GAP SERPL CALC-SCNC: 2 MMOL/L (ref 0–18)
BUN BLD-MCNC: 18 MG/DL (ref 9–23)
BUN/CREAT SERPL: 32.1 (ref 10–20)
CALCIUM BLD-MCNC: 8.7 MG/DL (ref 8.7–10.4)
CHLORIDE SERPL-SCNC: 109 MMOL/L (ref 98–112)
CO2 SERPL-SCNC: 28 MMOL/L (ref 21–32)
CREAT BLD-MCNC: 0.56 MG/DL
EGFRCR SERPLBLD CKD-EPI 2021: 115 ML/MIN/1.73M2 (ref 60–?)
GLUCOSE BLD-MCNC: 112 MG/DL (ref 70–99)
MAGNESIUM SERPL-MCNC: 2.1 MG/DL (ref 1.6–2.6)
OSMOLALITY SERPL CALC.SUM OF ELEC: 291 MOSM/KG (ref 275–295)
PHOSPHATE SERPL-MCNC: 3.9 MG/DL (ref 2.4–5.1)
POTASSIUM SERPL-SCNC: 4.1 MMOL/L (ref 3.5–5.1)
SODIUM SERPL-SCNC: 139 MMOL/L (ref 136–145)

## 2024-04-19 PROCEDURE — 99233 SBSQ HOSP IP/OBS HIGH 50: CPT | Performed by: HOSPITALIST

## 2024-04-19 RX ORDER — KETOROLAC TROMETHAMINE 15 MG/ML
15 INJECTION, SOLUTION INTRAMUSCULAR; INTRAVENOUS EVERY 6 HOURS PRN
Status: DISPENSED | OUTPATIENT
Start: 2024-04-19 | End: 2024-04-21

## 2024-04-19 RX ORDER — OLANZAPINE 2.5 MG/1
2.5 TABLET, FILM COATED ORAL NIGHTLY
Status: DISCONTINUED | OUTPATIENT
Start: 2024-04-19 | End: 2024-04-23

## 2024-04-19 RX ORDER — KETOROLAC TROMETHAMINE 30 MG/ML
30 INJECTION, SOLUTION INTRAMUSCULAR; INTRAVENOUS EVERY 6 HOURS PRN
Status: DISPENSED | OUTPATIENT
Start: 2024-04-19 | End: 2024-04-21

## 2024-04-19 NOTE — PROGRESS NOTES
04/19/24 1120   Wound 04/15/24 Abdomen Medial;Upper   Date First Assessed/Time First Assessed: 04/15/24 1224   Primary Wound Type: Incision  Location: Abdomen  Wound Location Orientation: Medial;Upper   Site Assessment Fragile   Closure Laura   Colostomy Descending/sigmoid LLQ   Placement Date/Time: 04/15/24 1413   Inserted by: Dr. Novoa  Colostomy Type: Descending/sigmoid  Location: LLQ  Stoma Size (cm): 2.5 cm  Appliance Size: 2 1/4   Stomal Appliance 2 piece;Intact  (changed for teaching)   Stomal Assessment Pink;Moist;Edema;Budding;Red  (1 1/2 inch stoma)   Peristomal Assessment Intact;Pink   Treatment Appliance change;Bag change;Site care  (the pt. changed his own appliance)   Dressing Change Due 04/26/24   Output (mL) 25 mL   Wound Follow Up   Follow up needed Yes     Ostomy Care Services  Follow up to continue colostomy care teaching. The pt. is up and about in the room, he has been emptying his pouch. The pt. took a shower and he is sitting up in bed and he changed his appliance with very minimal assistance. He did a good job, used a 2 1/4 inch 2 piece. He is tolerating his diet, I encouraged the pt. to ambulate in the ortiz. All questions answered. Spoke with the nurse. I will continue to follow and teach as needed.

## 2024-04-19 NOTE — PROGRESS NOTES
INFECTIOUS DISEASE PROGRESS NOTE    Don Cabral Patient Status:  Inpatient    1965 MRN [id number]   Location [facility] 4W/SW/SE Attending Tamica Hardy MD   Hosp Day # 17 PCP Stephen Christianson MD     SUBJECTIVE  Tolerating clears. Ambulating. Having output from ostomy. Pain controlled.    ASSESSMENT/PLAN:    Antibiotics:  zosyn /-, meropenem  --      Impression:     # Acute sigmoid diverticulitis with microperforation, no drainable abscess seen on CT  - most recent CT  with increased inflammation but no abscess.   - continues to have pain. Started on TPN 24  -  s/p OR 4/15/24 for L colectomy, end colostomy    # leukocytosis  # Abdominal pain, ?ileus  # Recurrent sigmoid diverticulitis     Recommend:     -  continue IV meropenem - POD #4 - will consider transition to PO on discharge pending clinical improvement  -  TPN per surgery  -  follow fever curve, wbc.   - d/w patient, staff        History of Present Illness:   Patient is a 58 year old male with h/o recurrent diverticulitis, cardiac arrhythmia, depression, NELLIE.  Presents with recurrent diverticulitis.  CT showed cute sigmoid diverticulitis.  Trace suspected focus of associated localized pneumoperitoneum/micro perforation.  No evidence of a drainable associated fluid collection.  Progressed inflammatory change adjacent to the hepatic flexure and proximal sigmoid colon.  Interval development of mild circumferential wall thickening splenic flexure and proximal sigmoid colon which may reflect a nonspecific colitis.  No evidence of pneumatosis or associated drainable fluid collection.     Started on zosyn.  Still with abdominal pain but slowly improved.      OBJECTIVE  /64 (BP Location: Radial)   Pulse 70   Temp 98.6 °F (37 °C) (Oral)   Resp 18   Wt 249 lb 7 oz (113.1 kg)   SpO2 95%   BMI 31.18 kg/m²     General: No acute distress. Alert.  HEENT: EOMI   Abdomen: post op dressing, ostomy w/o output; MARGARITA drains in  place  Musculoskeletal: No edema  Integument: No rashes    Labs:     Recent Labs   Lab 04/14/24  0450 04/15/24  0527 04/16/24  0534 04/17/24  0601 04/18/24  1327   WBC 10.4 10.2 16.2* 14.2* 12.0*   HGB 10.9* 12.0* 12.9* 10.9* 11.4*   .0* 713.0* 846.0* 965.0* 789.0*       Recent Labs   Lab 04/15/24  0527 04/16/24  0534 04/17/24  0601 04/18/24  0655 04/19/24  0543    137 137 137 139   K 4.6 4.9 4.1 4.0 4.1    101 100 102 109   CO2 30.0 31.0 32.0 28.0 28.0   BUN 14 16 21 17 18   CREATSERUM 0.56* 0.64* 0.63* 0.53* 0.56*       No results for input(s): \"ALT\", \"AST\" in the last 168 hours.    Invalid input(s): \"ALPHOS\", \"TBIL\"        Microbiology: Reviewed    Imaging: Reviewed       MD George [facility]  [phone]

## 2024-04-19 NOTE — PROGRESS NOTES
[facility]  part of [facility]    Progress Note    Don Cabral Patient Status:  Inpatient    1965 MRN [id number]   Location [facility] 4W/SW/SE Attending Tamica Hardy MD   Hosp Day # 17 PCP Stephen Christianson MD     Chief Complaint:   Chief Complaint   Patient presents with    Abdomen/Flank Pain       Subjective:   Don Cabral is doing better. Had severe gas pains yesterday. None today. No nausea. Starting to get hungry wants pudding.   Has output in ostomy   No SOB   Objective:   Objective:    Blood pressure 139/69, pulse 60, temperature 97.9 °F (36.6 °C), temperature source Oral, resp. rate 18, weight 249 lb 7 oz (113.1 kg), SpO2 96%.    Physical Exam:    General: No acute distress.   Respiratory: Clear B/L   Cardiovascular: S1, S2. Regular rate and rhythm. No murmurs, rubs or gallops.   Abdomen: Soft, nontender, distended.  + bowel sounds. No rebound or guarding. Ostomy intact no gas.   Neurologic: No focal neurological deficits.   Musculoskeletal: Moves all extremities.  Extremities: No edema.      Results:   Results:    Labs:  Recent Labs   Lab 24  0450 04/15/24  0527 24  0534 24  0601 24  1327   WBC 10.4 10.2 16.2* 14.2* 12.0*   HGB 10.9* 12.0* 12.9* 10.9* 11.4*   MCV 86.1 86.4 85.2 87.7 84.3   .0* 713.0* 846.0* 965.0* 789.0*       Recent Labs   Lab 24  0612 24  0450 24  0534 24  0601 24  0655 24  0543   *  110*   < > 145* 118* 104* 112*   BUN 12  12   < > 16 21 17 18   CREATSERUM 0.51*  0.51*   < > 0.64* 0.63* 0.53* 0.56*   CA 8.5*  8.5*   < > 9.0 8.7 8.8 8.7   ALB 3.5  --  3.9  --   --   --      136   < > 137 137 137 139   K 4.3  4.3   < > 4.9 4.1 4.0 4.1     105   < > 101 100 102 109   CO2 30.0  30.0   < > 31.0 32.0 28.0 28.0    < > = values in this interval not displayed.       Estimated Creatinine Clearance: 173.9 mL/min (A) (based on SCr of 0.56 mg/dL  (L)).    No results for input(s): \"PTP\", \"INR\" in the last 168 hours.         Culture:  Hospital Encounter on 04/02/24   1. Blood Culture     Status: None    Collection Time: 04/02/24  5:18 PM    Specimen: Blood,peripheral   Result Value Ref Range    Blood Culture Result No Growth 5 Days N/A       Cardiac  No results for input(s): \"TROP\", \"PBNP\" in the last 168 hours.      Imaging: Imaging data reviewed in Epic.  XR ABDOMEN OBSTRUCTIVE SERIES ROUTINE(2 VW)(CPT=74019)    Result Date: 4/18/2024  CONCLUSION:  Numerous air-filled dilated small bowel loops measuring up to 6.6 cm.  In the postoperative setting, this is thought to be secondary to ileus.  Small bowel obstruction is the differential consideration.  Recommend serial radiographic and abdominal examinations.  If there is worsening of symptoms or clinical concern, recommend further assessment with CT abdomen pelvis.  Postsurgical changes with left hemiabdomen and pelvic drains present as well as left lower quadrant ostomy    Dictated by (CST): Oksana James MD on 4/18/2024 at 4:04 PM     Finalized by (CST): Oksana James MD on 4/18/2024 at 4:07 PM           Medications:    heparin  5,000 Units Subcutaneous 2 times per day    [Transfer Hold] enoxaparin  0.5 mg/kg Subcutaneous Daily    [Transfer Hold] magnesium oxide  400 mg Oral Once    pantoprazole  40 mg Intravenous Q12H    meropenem  500 mg Intravenous Q8H    clonazePAM  0.5 mg Oral TID    OLANZapine  2.5 mg Oral Nightly    verapamil ER  120 mg Oral Nightly    sertraline  75 mg Oral Daily    lisinopril  20 mg Oral Daily         Assessment and Plan:   Assessment & Plan:      Acute Sigmoid diverticulitis  LLQ abdominal pain   3rd episode  - failed outpt PO abx.   - history of recurrent diverticulitis  - CT showed acute sigmoid diverticulitis with microperforation, no fluid collection.   - Last colonoscopy 05/2023.   - Pain control. Avoid narcotics as able  - IV meropenem. Consider pO abx on discharge.   -  mid line placed 4/5   - XR abd with ileus.   - trials of clears today fulls for dinner if tolerates   - cont TPN.   -  ID and general surgery on consult. GI signed off   - s/p L colectomy with end coloscopy and splenic flexure mobilization 4/15   - wound care for ostomy   - increase ambulation     Hypoxia   - resolved.   - Likely from atelectasis   - increase activity   - IS     Pt relays hx of PVCs, a-fib, LBBB.  Hx of intermittent premature ventricular tachycardia   - remote tele     Hypertension  - cont lisinopril, verapamil     GERD  - IV PPI bid     Major depressive disorder   Pt says he talked to his psychiatrist and and can stop neurontin.  - stop neurontin  - cont klonopin, zoloft, zyprexa    >55min spent, >50% spent counseling and coordinating care in the form of educating pt/family and d/w consultants and staff. Most of the time spent discussing the above plan.        Plan of care discussed with patient or family at bedside.    Tamica Hardy MD  Hospitalist          Supplementary Documentation:     Quality:  DVT Prophylaxis: lovenox   CODE status: Full  Dispo: per clinical course           Estimated date of discharge: TBD  Discharge is dependent on: clinical stability  At this point Mr. Cabral is expected to be discharge to: home

## 2024-04-19 NOTE — PLAN OF CARE
Pt is aox4, room air, clear liq diet w/ poor intake. TPN running. Full liq diet for dinner. Dressing changed, ostomy bag changed per pt. Pain managed with toradol and Morphine. Ambulating freely. Wife at bedside. Voiding freely, stool via ostomy, pt able to empty independently. Jpx2. Call light within reach, reinforcing use. Safety plan ongoing.     Problem: Patient Centered Care  Goal: Patient preferences are identified and integrated in the patient's plan of care  Description: Interventions:  - What would you like us to know as we care for you? This is my third time having diverticulitis  - Provide timely, complete, and accurate information to patient/family  - Incorporate patient and family knowledge, values, beliefs, and cultural backgrounds into the planning and delivery of care  - Encourage patient/family to participate in care and decision-making at the level they choose  - Honor patient and family perspectives and choices  Outcome: Progressing     Problem: Patient/Family Goals  Goal: Patient/Family Short Term Goal  Description: Patient's Short Term Goal: Manage abdominal pain    Interventions:   - Transition from IV to oral pain medications  - Bowel rest with slow diet tolerance  - Eat small frequent light meals  - See additional Care Plan goals for specific interventions  Outcome: Progressing     Problem: PAIN - ADULT  Goal: Verbalizes/displays adequate comfort level or patient's stated pain goal  Description: INTERVENTIONS:  - Encourage pt to monitor pain and request assistance  - Assess pain using appropriate pain scale  - Administer analgesics based on type and severity of pain and evaluate response  - Implement non-pharmacological measures as appropriate and evaluate response  - Consider cultural and social influences on pain and pain management  - Manage/alleviate anxiety  - Utilize distraction and/or relaxation techniques  - Monitor for opioid side effects  - Notify MD/LIP if interventions  unsuccessful or patient reports new pain  - Anticipate increased pain with activity and pre-medicate as appropriate  Outcome: Progressing     Problem: RISK FOR INFECTION - ADULT  Goal: Absence of fever/infection during anticipated neutropenic period  Description: INTERVENTIONS  - Monitor WBC  - Administer growth factors as ordered  - Implement neutropenic guidelines  Outcome: Progressing     Problem: DISCHARGE PLANNING  Goal: Discharge to home or other facility with appropriate resources  Description: INTERVENTIONS:  - Identify barriers to discharge w/pt and caregiver  - Include patient/family/discharge partner in discharge planning  - Arrange for needed discharge resources and transportation as appropriate  - Identify discharge learning needs (meds, wound care, etc)  - Arrange for interpreters to assist at discharge as needed  - Consider post-discharge preferences of patient/family/discharge partner  - Complete POLST form as appropriate  - Assess patient's ability to be responsible for managing their own health  - Refer to Case Management Department for coordinating discharge planning if the patient needs post-hospital services based on physician/LIP order or complex needs related to functional status, cognitive ability or social support system  Outcome: Progressing     Problem: GASTROINTESTINAL - ADULT  Goal: Maintains or returns to baseline bowel function  Description: INTERVENTIONS:  - Assess bowel function  - Maintain adequate hydration with IV or PO as ordered and tolerated  - Evaluate effectiveness of GI medications  - Encourage mobilization and activity  - Obtain nutritional consult as needed  - Establish a toileting routine/schedule  - Consider collaborating with pharmacy to review patient's medication profile  Outcome: Progressing  Goal: Minimal or absence of nausea and vomiting  Description: INTERVENTIONS:  - Maintain adequate hydration with IV or PO as ordered and tolerated  - Nasogastric tube to low  intermittent suction as ordered  - Evaluate effectiveness of ordered antiemetic medications  - Provide nonpharmacologic comfort measures as appropriate  - Advance diet as tolerated, if ordered  - Obtain nutritional consult as needed  - Evaluate fluid balance  Outcome: Progressing  Goal: Maintains adequate nutritional intake (undernourished)  Description: INTERVENTIONS:  - Monitor percentage of each meal consumed  - Identify factors contributing to decreased intake, treat as appropriate  - Assist with meals as needed  - Monitor I&O, WT and lab values  - Obtain nutritional consult as needed  - Optimize oral hygiene and moisture  - Encourage food from home; allow for food preferences  - Enhance eating environment  Outcome: Progressing

## 2024-04-19 NOTE — PROGRESS NOTES
[facility]  part of [facility]    Progress Note    Don Cabral Patient Status:  Inpatient    1965 MRN [id number]   Location [facility] 4W/SW/SE Attending Tamica Hardy MD   Hosp Day # 17 PCP Stephen Christianson MD       Subjective:   Postop ileus. Pain better controlled today, ambulating well.    Objective:   Vital Signs:  Blood pressure 150/64, pulse 70, temperature 98.6 °F (37 °C), temperature source Oral, resp. rate 18, weight 249 lb 7 oz (113.1 kg), SpO2 95%.    Physical Exam     General: No acute distress. Alert and oriented x 3.  HEENT: Moist mucous membranes. Anicteric.   Neck: Supple, No JVD.   Respiratory: Nonlabored resp.  Cardiovascular: Regular rate.   Abdomen: Soft, expected incisional tenderness, no rebound tnd, no guarding, nondistended.  No masses. Normal bowel sounds. Incision c/d/I, ostomy with scant output. MARGARITA drains serosanguinous  Neurologic: No focal neurological deficits.  Musculoskeletal: Full range of motion of all extremities. No calf tenderness. No swelling noted.  Integument: No lesions. No erythema.  Psychiatric: Appropriate mood and affect.         Assessment and Plan:     Sigmoid diverticulitis      Intractable abdominal pain    Clear liquids as tolerated along with TPN. Continue ambulating. Pain control. Ostomy education. Will continue to follow and monitor progress.    Results:     Lab Results   Component Value Date    WBC 12.0 (H) 2024    HGB 11.4 (L) 2024    HCT 33.3 (L) 2024    .0 (H) 2024    CREATSERUM 0.56 (L) 2024    BUN 18 2024     2024    K 4.1 2024     2024    CO2 28.0 2024     (H) 2024    CA 8.7 2024    ALB 3.9 2024    ALKPHO 52 2024    BILT 0.4 2024    TP 5.9 2024    AST 19 2024    ALT 18 2024    PTT 24.9 2023    INR 1.01 2023    TSH 1.850 2023    LIP 37 2024    PSA 2.53  08/02/2021    DDIMER 2.44 (H) 06/23/2023    MG 2.1 04/19/2024    PHOS 3.9 04/19/2024    TROP <0.045 02/05/2020       XR ABDOMEN OBSTRUCTIVE SERIES ROUTINE(2 VW)(CPT=74019)    Result Date: 4/18/2024  CONCLUSION:  Numerous air-filled dilated small bowel loops measuring up to 6.6 cm.  In the postoperative setting, this is thought to be secondary to ileus.  Small bowel obstruction is the differential consideration.  Recommend serial radiographic and abdominal examinations.  If there is worsening of symptoms or clinical concern, recommend further assessment with CT abdomen pelvis.  Postsurgical changes with left hemiabdomen and pelvic drains present as well as left lower quadrant ostomy    Dictated by (CST): Oksana James MD on 4/18/2024 at 4:04 PM     Finalized by (CST): Oksana James MD on 4/18/2024 at 4:07 PM                       Stephen Nunez PA-C  4/19/2024

## 2024-04-19 NOTE — PLAN OF CARE
Problem: Patient Centered Care  Goal: Patient preferences are identified and integrated in the patient's plan of care  Description: Interventions:  - What would you like us to know as we care for you? This is my third time having diverticulitis  - Provide timely, complete, and accurate information to patient/family  - Incorporate patient and family knowledge, values, beliefs, and cultural backgrounds into the planning and delivery of care  - Encourage patient/family to participate in care and decision-making at the level they choose  - Honor patient and family perspectives and choices  Outcome: Progressing     Problem: Patient/Family Goals  Goal: Patient/Family Long Term Goal  Description: Patient's Long Term Goal: Discharge home    Interventions:  - Manage abdominal pain  - Tolerate adequate oral intake without complication  - Determine appropriate antibiotic plan  - See additional Care Plan goals for specific interventions  Outcome: Progressing  Goal: Patient/Family Short Term Goal  Description: Patient's Short Term Goal: Manage abdominal pain    Interventions:   - Transition from IV to oral pain medications  - Bowel rest with slow diet tolerance  - Eat small frequent light meals  - See additional Care Plan goals for specific interventions  Outcome: Progressing     Problem: PAIN - ADULT  Goal: Verbalizes/displays adequate comfort level or patient's stated pain goal  Description: INTERVENTIONS:  - Encourage pt to monitor pain and request assistance  - Assess pain using appropriate pain scale  - Administer analgesics based on type and severity of pain and evaluate response  - Implement non-pharmacological measures as appropriate and evaluate response  - Consider cultural and social influences on pain and pain management  - Manage/alleviate anxiety  - Utilize distraction and/or relaxation techniques  - Monitor for opioid side effects  - Notify MD/LIP if interventions unsuccessful or patient reports new pain  -  Anticipate increased pain with activity and pre-medicate as appropriate  Outcome: Progressing     Problem: RISK FOR INFECTION - ADULT  Goal: Absence of fever/infection during anticipated neutropenic period  Description: INTERVENTIONS  - Monitor WBC  - Administer growth factors as ordered  - Implement neutropenic guidelines  Outcome: Progressing     Problem: DISCHARGE PLANNING  Goal: Discharge to home or other facility with appropriate resources  Description: INTERVENTIONS:  - Identify barriers to discharge w/pt and caregiver  - Include patient/family/discharge partner in discharge planning  - Arrange for needed discharge resources and transportation as appropriate  - Identify discharge learning needs (meds, wound care, etc)  - Arrange for interpreters to assist at discharge as needed  - Consider post-discharge preferences of patient/family/discharge partner  - Complete POLST form as appropriate  - Assess patient's ability to be responsible for managing their own health  - Refer to Case Management Department for coordinating discharge planning if the patient needs post-hospital services based on physician/LIP order or complex needs related to functional status, cognitive ability or social support system  Outcome: Progressing     Problem: GASTROINTESTINAL - ADULT  Goal: Maintains or returns to baseline bowel function  Description: INTERVENTIONS:  - Assess bowel function  - Maintain adequate hydration with IV or PO as ordered and tolerated  - Evaluate effectiveness of GI medications  - Encourage mobilization and activity  - Obtain nutritional consult as needed  - Establish a toileting routine/schedule  - Consider collaborating with pharmacy to review patient's medication profile  Outcome: Progressing  Goal: Minimal or absence of nausea and vomiting  Description: INTERVENTIONS:  - Maintain adequate hydration with IV or PO as ordered and tolerated  - Nasogastric tube to low intermittent suction as ordered  - Evaluate  effectiveness of ordered antiemetic medications  - Provide nonpharmacologic comfort measures as appropriate  - Advance diet as tolerated, if ordered  - Obtain nutritional consult as needed  - Evaluate fluid balance  Outcome: Progressing     Problem: GASTROINTESTINAL - ADULT  Goal: Maintains adequate nutritional intake (undernourished)  Description: INTERVENTIONS:  - Monitor percentage of each meal consumed  - Identify factors contributing to decreased intake, treat as appropriate  - Assist with meals as needed  - Monitor I&O, WT and lab values  - Obtain nutritional consult as needed  - Optimize oral hygiene and moisture  - Encourage food from home; allow for food preferences  - Enhance eating environment  Outcome: Not Progressing     A/ox4, on RA, CPAP at night, patient made to be NPO tonight, received PRN Zofran for nausea, for pain management patient received PRN Toradol and Morphine, ambulates room and hallway independently, x2 MARGARITA drains in place to left side of abdomen, voiding freely, colostomy in place, passing gas via ostomy, vital signs stable, TPN and IV antibiotics continued via right arm picc, no acute changes overnight. Call light within reach, safety measures in place, frequent rounding done, plan of care continued.

## 2024-04-19 NOTE — DIETARY NOTE
ADULT NUTRITION REASSESSMENT      RECOMMENDATIONS TO MD: See Nutrition Intervention for TPN specifics --will taper and DC TPN as pt tolerates and po progresses.      Pt is at high nutrition risk due to TPN.  Pt does not meet malnutrition criteria.       ADMITTING DIAGNOSIS:  Sigmoid diverticulitis [K57.32]     PERTINENT PAST MEDICAL HISTORY:  has a past medical history of Allergic rhinitis, Anxiety, Arrhythmia, Back pain, Back problem, Depression, Diverticulitis, ED (erectile dysfunction), High blood pressure, Pneumonia due to organism, Shortness of breath, Sleep apnea (2012), Unspecified essential hypertension, and Visual impairment.    has a past surgical history that includes myringoplasty; knee arthroscopy (Right); skin tissue procedure unlisted; incision/drain abscess extra (1996); dental surgery procedure; colonoscopy (N/A, 03/22/2016); other surgical history; tonsillectomy; laparoscopic cholecystectomy (02/12/2023); cholecystectomy (3/12/2023); and colonoscopy (05/10/2023).     PATIENT STATUS: Initial 04/07/24: Patient (pt) identified at Nutrition risk due to NPO/cl liq x 4-5 days after the screening process.  Diagnosis & PMH above.  Medical findings:  Sigmoid diverticulitis with microperforation. Interval CT showing progress of inflammatory changes--progressive colitis. Non-operative management. May eventually benefit from Sigmoidectomy per MD.   Upon visit, pt laying in bed, not in distress, abd pain is better today. Reports seeing RD at [facility]. Taking % cl liq, laurel well. Had one day of Soft/low fiber diet on 4/5 but retracted back to cl liq. Diet hx/eval:    recent decreased po d/t the onset of acute GI illness. Otherwise, Adequate nutrition intake based on reported 2 light meals at bfast and dinner and main or full meal at lunch. Pt reports utilizing light dinner d/t sleep apnea. Wt eval:    no recent significant wt loss per pt, but did states recent wt gain from 250# to current wt of 264#.  Nutrition findings:    Pt is well nourished. MD wanted Ensure Oral Nutrition Supplement ( ONS ) and ordered Ensure Clear QID. Pt prefers them in apple flavor. Agree with current diet and ONS. Anticipate diet advancement, however, if unable to advance diet beyond cl liq in the next 5 days, consider Nutrition support.     4/9/2024:  Received MD consult to initiate/manage CPN.   On Cl liq and Oral Nutrition Supplement ( ONS ) Ensure Clears QID. Tolerating, no N/V, but abd pain continues. Surgery eval noted. Labs and meds reviewed. On Merrem and Mag oxide.   Re-visited pt. Explained rationale of CPN.  Pt voiced concerns about severe venous pain when IV Potassium were infused peripherally or via midline in Oct 2023 admission.  Pt reported No known food allergies, states no allergies to eggs. Potential mild refeeding risk.   Discussed with RN to monitor CPN initiation closely for potential reactions such as flushing, rashes, chest pain, back pain. If this occurs, CPN may be stopped.     4/12/24: Discussed care with RN this am. TPN + NPO. Taking a few sips of water with medications per RN. Abdominal Pain reported. Per RN: Talk of Procedure. NPO at this time. Monitor medical plans.     4/16/24 Update: POD #1 exp lap left colectomy with end colostomy. CLD initiated. Talked with RN this am and pt in a lot of pain, therefore did not visit pt. Not taking any CLD yet. Full TPN continued.      4/19 RA UPDATE: diet had been increased to low fiber yesterday, but pt with increased pain and nausea. Made NPO later in the day. Pt reports some of the pain was due to pills on an empty stomach. TPN was tapered slightly yesterday due to diet advancement, will order again tonight at lesser amount due to suspect diet initiation again. If diet not initiated again will plan to increase to full nutrition tomorrow.  POD #4.        FOOD/NUTRITION INTAKE ANALYSIS:    Current Appetite:  good appetite PTA  Current Intake:  back to NPO, negligible  intake when diet was advanced due to pain.   Current Intake Meeting Needs:  TPN meeting >80% of needs.   .     Percent Meals Eaten (last 6 days)       None           Food Allergies: No Known Food Allergies (NKFA)  Cultural/Ethnic/Hoahaoism Preferences: Not Obtained    GASTROINTESTINAL: +BM 4/19-via colostomy, abdominal pain, bloating, and distention New colostomy. Distention, tenderness.     MEDICATIONS: reviewed    adult 3 in 1 TPN 87.5 mL/hr at 04/18/24 2233    dextrose 10%        heparin  5,000 Units Subcutaneous 2 times per day    [Transfer Hold] enoxaparin  0.5 mg/kg Subcutaneous Daily    [Transfer Hold] magnesium oxide  400 mg Oral Once    pantoprazole  40 mg Intravenous Q12H    meropenem  500 mg Intravenous Q8H    clonazePAM  0.5 mg Oral TID    OLANZapine  2.5 mg Oral Nightly    verapamil ER  120 mg Oral Nightly    sertraline  75 mg Oral Daily    lisinopril  20 mg Oral Daily      LABS: reviewed -   Last A1c value was 5.5% done 9/22/23.    Recent Labs     04/17/24  0601 04/18/24  0655 04/19/24  0543   * 104* 112*   BUN 21 17 18   CREATSERUM 0.63* 0.53* 0.56*   CA 8.7 8.8 8.7   MG 2.1 2.2 2.1    137 139   K 4.1 4.0 4.1    102 109   CO2 32.0 28.0 28.0   PHOS 3.4 3.9 3.9   OSMOCALC 288 286 291     NUTRITION RELATED PHYSICAL FINDINGS:  - Nutrition Focused Physical Exam (NFPE): well nourished   - Fluid Accumulation: none  see RN documentation for details  - Skin Integrity: intact see RN documentation for details    ANTHROPOMETRICS:  HT:  6'3\"  WT: 113.1 kg (249 lb 7 oz) -264.4# at adm, 15# less than admission wt.    BMI: Body mass index is 31.18 kg/m².  BMI CLASSIFICATION: 30-34.9 kg/m2 - obesity class I  IBW: 196 lbs        127% IBW  Usual Body Wt: 250 lbs per pt      100% UBW    WEIGHT HISTORY:  (4/12/24 wt is an error  Patient Weight(s) for the past 336 hrs:   Weight   04/19/24 0855 113.1 kg (249 lb 7 oz)   04/18/24 1025 113.5 kg (250 lb 2 oz)   04/14/24 0610 119.9 kg (264 lb 6.4 oz)    04/12/24 1013 104.7 kg (230 lb 14.4 oz)   4/2/24 254.4#  Wt Readings from Last 10 Encounters:   04/19/24 113.1 kg (249 lb 7 oz)   03/27/24 115.7 kg (255 lb)   02/28/24 116.1 kg (255 lb 14.4 oz)   01/29/24 115.7 kg (255 lb)   01/27/24 113.4 kg (250 lb)   01/15/24 115.2 kg (254 lb)   12/21/23 113.4 kg (250 lb)   12/18/23 113.4 kg (250 lb)   11/29/23 108.9 kg (240 lb)   11/17/23 108.9 kg (240 lb)     NUTRITION DIAGNOSIS/PROBLEM:    Inadequate oral intake related to Decreased ability to consume sufficient energy d/t sigmoid diverticulitis with microperforation as evidenced by npo/cl liq x 4-5 days.     NUTRITION DIAGNOSIS PROGRESS:  Improvement (unresolved)--TPN meeting nutritional needs until tolerating oral diet      NUTRITION INTERVENTION:     NUTRITION PRESCRIPTION:   Estimated Nutrition needs: Dosing wt of 113 kg --wt taken on 4/19 .  Calories: 6154-1064 calories/day (20-22 katina/kg current wt)  Protein: 116-134 g protein/day (1.3-1.5 g protein/kg  Ideal body wt (IBW))                Fluids: ~ 2300-2500ml--1 ml/calorie    Receiving TPN: 2100 ml volume, 110 g protein, 1000 kcal dextrose, 500 kcal lipid.   Provides: 1940 total calories (86% estimated min calorie needs) and 95% min protein needs.       Procedures    NPO   - Meals and snacks:  NPO    - Medical Food Supplements- None at this time. Apple ensure clear bid when diet resumes. Rational/Benefits discussed.    - Vitamin and mineral supplements: none Receiving in TPN.   - Feeding assistance: NPO  - Nutrition education:  provided basics of low fiber diet with new colostomy--handout provided.     - Coordination of nutrition care: collaboration with other providers  - Discharge and transfer of nutrition care to new setting or provider: monitor plans    MONITOR AND EVALUATE/NUTRITION GOALS:  - Food and Nutrient Intake:    Monitor: for PO initiation and for PO diet advancement. Monitor plans to resume oral diet.   - Food and Nutrient Administration:    Monitor: TPN  tolerance, adequacy of TPN, and for TPN adjustment   - Anthropometric Measurement:    Monitor weight  - Nutrition Goals:    maintain wt within 5%, TPN to meet greater than 80% nutrition needs, labs within acceptable limits, promote healing, and improved GI status. Taper TPN as po improves.      RD will follow up/Manage TPN daily.      Maria Del Carmen Quesada RD, LDN   Clinical Dietitian [id number]

## 2024-04-20 LAB
ANION GAP SERPL CALC-SCNC: 8 MMOL/L (ref 0–18)
BUN BLD-MCNC: 16 MG/DL (ref 9–23)
BUN/CREAT SERPL: 27.1 (ref 10–20)
CALCIUM BLD-MCNC: 8.8 MG/DL (ref 8.7–10.4)
CHLORIDE SERPL-SCNC: 109 MMOL/L (ref 98–112)
CO2 SERPL-SCNC: 27 MMOL/L (ref 21–32)
CREAT BLD-MCNC: 0.59 MG/DL
DEPRECATED RDW RBC AUTO: 42.1 FL (ref 35.1–46.3)
EGFRCR SERPLBLD CKD-EPI 2021: 113 ML/MIN/1.73M2 (ref 60–?)
ERYTHROCYTE [DISTWIDTH] IN BLOOD BY AUTOMATED COUNT: 13.4 % (ref 11–15)
GLUCOSE BLD-MCNC: 119 MG/DL (ref 70–99)
HCT VFR BLD AUTO: 35.5 %
HGB BLD-MCNC: 11.8 G/DL
MAGNESIUM SERPL-MCNC: 2 MG/DL (ref 1.6–2.6)
MCH RBC QN AUTO: 28.9 PG (ref 26–34)
MCHC RBC AUTO-ENTMCNC: 33.2 G/DL (ref 31–37)
MCV RBC AUTO: 86.8 FL
OSMOLALITY SERPL CALC.SUM OF ELEC: 300 MOSM/KG (ref 275–295)
PHOSPHATE SERPL-MCNC: 3.7 MG/DL (ref 2.4–5.1)
PLATELET # BLD AUTO: 823 10(3)UL (ref 150–450)
POTASSIUM SERPL-SCNC: 4 MMOL/L (ref 3.5–5.1)
RBC # BLD AUTO: 4.09 X10(6)UL
SODIUM SERPL-SCNC: 144 MMOL/L (ref 136–145)
TRIGL SERPL-MCNC: 128 MG/DL (ref 30–149)
WBC # BLD AUTO: 11.8 X10(3) UL (ref 4–11)

## 2024-04-20 PROCEDURE — 99233 SBSQ HOSP IP/OBS HIGH 50: CPT | Performed by: INTERNAL MEDICINE

## 2024-04-20 RX ORDER — HYDROCODONE BITARTRATE AND ACETAMINOPHEN 10; 325 MG/1; MG/1
1 TABLET ORAL EVERY 4 HOURS PRN
Status: DISCONTINUED | OUTPATIENT
Start: 2024-04-20 | End: 2024-04-21

## 2024-04-20 NOTE — PLAN OF CARE
Patient is alert and oriented x4. On room air. Ambulates self. TPN continued. IV abx continued. Ostomy bag intake; no output overnight, only flatus. IV morphine given once prn for pain. IV Toradol given once prn for pain. Abdominal staples & dressing intake; no drainage noted. MARGARITA drains x2 intact & to bulb suction. No acute changes noted throughout shift. Call light within reach.    Problem: Patient Centered Care  Goal: Patient preferences are identified and integrated in the patient's plan of care  Description: Interventions:  - What would you like us to know as we care for you? This is my third time having diverticulitis  - Provide timely, complete, and accurate information to patient/family  - Incorporate patient and family knowledge, values, beliefs, and cultural backgrounds into the planning and delivery of care  - Encourage patient/family to participate in care and decision-making at the level they choose  - Honor patient and family perspectives and choices  Outcome: Progressing     Problem: Patient/Family Goals  Goal: Patient/Family Long Term Goal  Description: Patient's Long Term Goal: Discharge home    Interventions:  - Manage abdominal pain  - Tolerate adequate oral intake without complication  - Determine appropriate antibiotic plan  - See additional Care Plan goals for specific interventions  Outcome: Progressing  Goal: Patient/Family Short Term Goal  Description: Patient's Short Term Goal: Manage abdominal pain    Interventions:   - Transition from IV to oral pain medications  - Bowel rest with slow diet tolerance  - Eat small frequent light meals  - See additional Care Plan goals for specific interventions  Outcome: Progressing     Problem: PAIN - ADULT  Goal: Verbalizes/displays adequate comfort level or patient's stated pain goal  Description: INTERVENTIONS:  - Encourage pt to monitor pain and request assistance  - Assess pain using appropriate pain scale  - Administer analgesics based on type and  severity of pain and evaluate response  - Implement non-pharmacological measures as appropriate and evaluate response  - Consider cultural and social influences on pain and pain management  - Manage/alleviate anxiety  - Utilize distraction and/or relaxation techniques  - Monitor for opioid side effects  - Notify MD/LIP if interventions unsuccessful or patient reports new pain  - Anticipate increased pain with activity and pre-medicate as appropriate  Outcome: Progressing     Problem: RISK FOR INFECTION - ADULT  Goal: Absence of fever/infection during anticipated neutropenic period  Description: INTERVENTIONS  - Monitor WBC  - Administer growth factors as ordered  - Implement neutropenic guidelines  Outcome: Progressing     Problem: DISCHARGE PLANNING  Goal: Discharge to home or other facility with appropriate resources  Description: INTERVENTIONS:  - Identify barriers to discharge w/pt and caregiver  - Include patient/family/discharge partner in discharge planning  - Arrange for needed discharge resources and transportation as appropriate  - Identify discharge learning needs (meds, wound care, etc)  - Arrange for interpreters to assist at discharge as needed  - Consider post-discharge preferences of patient/family/discharge partner  - Complete POLST form as appropriate  - Assess patient's ability to be responsible for managing their own health  - Refer to Case Management Department for coordinating discharge planning if the patient needs post-hospital services based on physician/LIP order or complex needs related to functional status, cognitive ability or social support system  Outcome: Progressing     Problem: GASTROINTESTINAL - ADULT  Goal: Maintains or returns to baseline bowel function  Description: INTERVENTIONS:  - Assess bowel function  - Maintain adequate hydration with IV or PO as ordered and tolerated  - Evaluate effectiveness of GI medications  - Encourage mobilization and activity  - Obtain nutritional  consult as needed  - Establish a toileting routine/schedule  - Consider collaborating with pharmacy to review patient's medication profile  Outcome: Progressing  Goal: Minimal or absence of nausea and vomiting  Description: INTERVENTIONS:  - Maintain adequate hydration with IV or PO as ordered and tolerated  - Nasogastric tube to low intermittent suction as ordered  - Evaluate effectiveness of ordered antiemetic medications  - Provide nonpharmacologic comfort measures as appropriate  - Advance diet as tolerated, if ordered  - Obtain nutritional consult as needed  - Evaluate fluid balance  Outcome: Progressing  Goal: Maintains adequate nutritional intake (undernourished)  Description: INTERVENTIONS:  - Monitor percentage of each meal consumed  - Identify factors contributing to decreased intake, treat as appropriate  - Assist with meals as needed  - Monitor I&O, WT and lab values  - Obtain nutritional consult as needed  - Optimize oral hygiene and moisture  - Encourage food from home; allow for food preferences  - Enhance eating environment  Outcome: Progressing

## 2024-04-20 NOTE — PROGRESS NOTES
GENERALSURGERY for   Feels ok  Po  started solids  Abd neg  Wound ok  Colostomy viable stool+  Labs ok   afeb  cpm

## 2024-04-20 NOTE — PROGRESS NOTES
Progress Note     Don Cabral Patient Status:  Inpatient    1965 MRN [id number]   Location [facility] 4W/SW/SE Attending Silas Espinosa MD   Hosp Day # 18 PCP Stephen Christianson MD     Chief Complaint:   Chief Complaint   Patient presents with    Abdomen/Flank Pain         Subjective:   S: Patient seen and examined, chart reviewed.   Feeling better, made a jump of improvement today      Review of Systems:   10 point ROS completed and was negative, except for pertinent positive and negatives stated in subjective.                Objective:   Vital signs:  Temp:  [97.7 °F (36.5 °C)-98.2 °F (36.8 °C)] 98.2 °F (36.8 °C)  Pulse:  [66-77] 66  Resp:  [18-20] 18  BP: (127-152)/(61-68) 134/64  SpO2:  [95 %-97 %] 97 %    Wt Readings from Last 6 Encounters:   24 249 lb 7 oz (113.1 kg)   24 255 lb (115.7 kg)   24 255 lb 14.4 oz (116.1 kg)   24 255 lb (115.7 kg)   24 250 lb (113.4 kg)   01/15/24 254 lb (115.2 kg)       Physical Exam:    General: No acute distress.   Respiratory: Clear to auscultation bilaterally. No wheezes. No rhonchi.  Cardiovascular: S1, S2. Regular rate and rhythm. No murmurs, rubs or gallops.   Abdomen: Soft, nontender, nondistended.  Positive bowel sounds. No rebound or guarding.  Neurologic: No focal neurological deficits.   Musculoskeletal: Moves all extremities.  Extremities: No edema.    Results:   Diagnostic Data:      Labs:    Labs Last 24 Hours:   BMP     CBC    Other     Na 144 Cl 109 BUN 16 Glu 119   Hb 11.8   PTT - Procal -   K 4.0 CO2 27.0 Cr 0.59   WBC 11.8 >< .0  INR - CRP -   Renal Lytes Endo    Hct 35.5   Trop - D dim -   eGFR - Ca 8.8 POC Gluc  -    LFT   pBNP - Lactic -   eGFR AA - PO4 3.7 A1c -   AST - APk - Prot -  LDL -      Pro-Calcitonin  No results for input(s): \"PCT\" in the last 168 hours.    Cardiac  No results for input(s): \"TROP\", \"PBNP\" in the last 168 hours.    Imaging: Imaging data reviewed in Epic.    No results  found.       Medications:    verapamil ER  120 mg Oral Nightly    OLANZapine  2.5 mg Oral Nightly    heparin  5,000 Units Subcutaneous 2 times per day    pantoprazole  40 mg Intravenous Q12H    meropenem  500 mg Intravenous Q8H    clonazePAM  0.5 mg Oral TID    sertraline  75 mg Oral Daily    lisinopril  20 mg Oral Daily       Assessment & Plan:   ASSESSMENT / PLAN:   Wean off Morphine, start norco, adv diet, ambulate. Home next 2-3 days  Acute Sigmoid diverticulitis  LLQ abdominal pain   3rd episode  - failed outpt PO abx.   - history of recurrent diverticulitis  - CT showed acute sigmoid diverticulitis with microperforation, no fluid collection.   - Last colonoscopy 05/2023.   - Pain control. Avoid narcotics as able  - IV meropenem. Consider pO abx on discharge.   - mid line placed 4/5   - XR abd with ileus.   - trials of clears today fulls for dinner if tolerates   - cont TPN.   -  ID and general surgery on consult. GI signed off   - s/p L colectomy with end coloscopy and splenic flexure mobilization 4/15   - wound care for ostomy   - increase ambulation      Hypoxia   - resolved.   - Likely from atelectasis   - increase activity   - IS     Pt relays hx of PVCs, a-fib, LBBB.  Hx of intermittent premature ventricular tachycardia   - remote tele     Hypertension  - cont lisinopril, verapamil     GERD  - IV PPI bid     Major depressive disorder   Pt says he talked to his psychiatrist and and can stop neurontin.  - stop neurontin  - cont klonopin, zoloft, zyprexa     >55min spent, >50% spent counseling and coordinating care in the form of educating pt/family and d/w consultants and staff. Most of the time spent discussing the above plan.          Silas Espinosa MD, FAAP, FACP  Formerly [facility] Hospitalist  I respond to [employer] and [employer]

## 2024-04-21 LAB
ANION GAP SERPL CALC-SCNC: 5 MMOL/L (ref 0–18)
BUN BLD-MCNC: 23 MG/DL (ref 9–23)
BUN/CREAT SERPL: 40.4 (ref 10–20)
CALCIUM BLD-MCNC: 8.8 MG/DL (ref 8.7–10.4)
CHLORIDE SERPL-SCNC: 109 MMOL/L (ref 98–112)
CO2 SERPL-SCNC: 29 MMOL/L (ref 21–32)
CREAT BLD-MCNC: 0.57 MG/DL
DEPRECATED RDW RBC AUTO: 42.4 FL (ref 35.1–46.3)
EGFRCR SERPLBLD CKD-EPI 2021: 114 ML/MIN/1.73M2 (ref 60–?)
ERYTHROCYTE [DISTWIDTH] IN BLOOD BY AUTOMATED COUNT: 13.7 % (ref 11–15)
GLUCOSE BLD-MCNC: 108 MG/DL (ref 70–99)
HCT VFR BLD AUTO: 32.9 %
HGB BLD-MCNC: 11.4 G/DL
MAGNESIUM SERPL-MCNC: 2 MG/DL (ref 1.6–2.6)
MAGNESIUM SERPL-MCNC: 2.1 MG/DL (ref 1.6–2.6)
MCH RBC QN AUTO: 29.4 PG (ref 26–34)
MCHC RBC AUTO-ENTMCNC: 34.7 G/DL (ref 31–37)
MCV RBC AUTO: 84.8 FL
OSMOLALITY SERPL CALC.SUM OF ELEC: 300 MOSM/KG (ref 275–295)
PHOSPHATE SERPL-MCNC: 3.6 MG/DL (ref 2.4–5.1)
PHOSPHATE SERPL-MCNC: 3.6 MG/DL (ref 2.4–5.1)
PLATELET # BLD AUTO: 757 10(3)UL (ref 150–450)
POTASSIUM SERPL-SCNC: 3.9 MMOL/L (ref 3.5–5.1)
RBC # BLD AUTO: 3.88 X10(6)UL
SODIUM SERPL-SCNC: 143 MMOL/L (ref 136–145)
WBC # BLD AUTO: 10.9 X10(3) UL (ref 4–11)

## 2024-04-21 PROCEDURE — 99233 SBSQ HOSP IP/OBS HIGH 50: CPT | Performed by: INTERNAL MEDICINE

## 2024-04-21 RX ORDER — ONDANSETRON 4 MG/1
4 TABLET, FILM COATED ORAL EVERY 8 HOURS PRN
Status: DISCONTINUED | OUTPATIENT
Start: 2024-04-21 | End: 2024-04-23

## 2024-04-21 RX ORDER — HYDROCODONE BITARTRATE AND ACETAMINOPHEN 10; 325 MG/1; MG/1
2 TABLET ORAL EVERY 6 HOURS PRN
Status: DISCONTINUED | OUTPATIENT
Start: 2024-04-21 | End: 2024-04-21

## 2024-04-21 RX ORDER — HYDROCODONE BITARTRATE AND ACETAMINOPHEN 7.5; 325 MG/1; MG/1
2 TABLET ORAL EVERY 6 HOURS PRN
Status: DISCONTINUED | OUTPATIENT
Start: 2024-04-21 | End: 2024-04-23

## 2024-04-21 RX ORDER — FAMOTIDINE 20 MG/1
20 TABLET, FILM COATED ORAL DAILY
Status: DISCONTINUED | OUTPATIENT
Start: 2024-04-21 | End: 2024-04-22

## 2024-04-21 RX ORDER — CLONAZEPAM 0.5 MG/1
0.5 TABLET ORAL 3 TIMES DAILY PRN
Status: DISCONTINUED | OUTPATIENT
Start: 2024-04-21 | End: 2024-04-21

## 2024-04-21 RX ORDER — TRAMADOL HYDROCHLORIDE 50 MG/1
50 TABLET ORAL EVERY 6 HOURS PRN
Status: DISCONTINUED | OUTPATIENT
Start: 2024-04-22 | End: 2024-04-23

## 2024-04-21 NOTE — PLAN OF CARE
Alert and oriented x4, Ambulating throughout hallway. Pt. Drained ostomy himself. Abdominal dressing was coming off, new dressing applied (ABD pad with tape) No acute changes noted throughout shift. Tolerating diet. TPN discontinued. MARGARITA drain intact minimal output. Voiding. Heparin for DVT prophylaxis. Pain medication provided as needed. Fall precautions maintained- bed locked in lowest position, call light and personal belongings within reach, non-skid socks in place to bilateral feet. Frequent rounding by nursing staff. Plan to decrease pain.     Problem: Patient Centered Care  Goal: Patient preferences are identified and integrated in the patient's plan of care  Description: Interventions:  - What would you like us to know as we care for you? This is my third time having diverticulitis  - Provide timely, complete, and accurate information to patient/family  - Incorporate patient and family knowledge, values, beliefs, and cultural backgrounds into the planning and delivery of care  - Encourage patient/family to participate in care and decision-making at the level they choose  - Honor patient and family perspectives and choices  Outcome: Progressing     Problem: Patient/Family Goals  Goal: Patient/Family Long Term Goal  Description: Patient's Long Term Goal: Discharge home    Interventions:  - Manage abdominal pain  - Tolerate adequate oral intake without complication  - Determine appropriate antibiotic plan  - See additional Care Plan goals for specific interventions  Outcome: Progressing  Goal: Patient/Family Short Term Goal  Description: Patient's Short Term Goal: Manage abdominal pain    Interventions:   - Transition from IV to oral pain medications  - Bowel rest with slow diet tolerance  - Eat small frequent light meals  - See additional Care Plan goals for specific interventions  Outcome: Progressing     Problem: PAIN - ADULT  Goal: Verbalizes/displays adequate comfort level or patient's stated pain  goal  Description: INTERVENTIONS:  - Encourage pt to monitor pain and request assistance  - Assess pain using appropriate pain scale  - Administer analgesics based on type and severity of pain and evaluate response  - Implement non-pharmacological measures as appropriate and evaluate response  - Consider cultural and social influences on pain and pain management  - Manage/alleviate anxiety  - Utilize distraction and/or relaxation techniques  - Monitor for opioid side effects  - Notify MD/LIP if interventions unsuccessful or patient reports new pain  - Anticipate increased pain with activity and pre-medicate as appropriate  Outcome: Progressing     Problem: RISK FOR INFECTION - ADULT  Goal: Absence of fever/infection during anticipated neutropenic period  Description: INTERVENTIONS  - Monitor WBC  - Administer growth factors as ordered  - Implement neutropenic guidelines  Outcome: Progressing     Problem: DISCHARGE PLANNING  Goal: Discharge to home or other facility with appropriate resources  Description: INTERVENTIONS:  - Identify barriers to discharge w/pt and caregiver  - Include patient/family/discharge partner in discharge planning  - Arrange for needed discharge resources and transportation as appropriate  - Identify discharge learning needs (meds, wound care, etc)  - Arrange for interpreters to assist at discharge as needed  - Consider post-discharge preferences of patient/family/discharge partner  - Complete POLST form as appropriate  - Assess patient's ability to be responsible for managing their own health  - Refer to Case Management Department for coordinating discharge planning if the patient needs post-hospital services based on physician/LIP order or complex needs related to functional status, cognitive ability or social support system  Outcome: Progressing     Problem: GASTROINTESTINAL - ADULT  Goal: Maintains or returns to baseline bowel function  Description: INTERVENTIONS:  - Assess bowel  function  - Maintain adequate hydration with IV or PO as ordered and tolerated  - Evaluate effectiveness of GI medications  - Encourage mobilization and activity  - Obtain nutritional consult as needed  - Establish a toileting routine/schedule  - Consider collaborating with pharmacy to review patient's medication profile  Outcome: Progressing  Goal: Minimal or absence of nausea and vomiting  Description: INTERVENTIONS:  - Maintain adequate hydration with IV or PO as ordered and tolerated  - Nasogastric tube to low intermittent suction as ordered  - Evaluate effectiveness of ordered antiemetic medications  - Provide nonpharmacologic comfort measures as appropriate  - Advance diet as tolerated, if ordered  - Obtain nutritional consult as needed  - Evaluate fluid balance  Outcome: Progressing  Goal: Maintains adequate nutritional intake (undernourished)  Description: INTERVENTIONS:  - Monitor percentage of each meal consumed  - Identify factors contributing to decreased intake, treat as appropriate  - Assist with meals as needed  - Monitor I&O, WT and lab values  - Obtain nutritional consult as needed  - Optimize oral hygiene and moisture  - Encourage food from home; allow for food preferences  - Enhance eating environment  Outcome: Progressing     Problem: HEMATOLOGIC - ADULT  Goal: Free from bleeding injury  Description: (Example usage: patient with low platelets)  INTERVENTIONS:  - Avoid intramuscular injections, enemas and rectal medication administration  - Ensure safe mobilization of patient  - Hold pressure on venipuncture sites to achieve adequate hemostasis  - Assess for signs and symptoms of internal bleeding  - Monitor lab trends  - Patient is to report abnormal signs of bleeding to staff  - Avoid use of toothpicks and dental floss  - Use electric shaver for shaving  - Use soft bristle tooth brush  - Limit straining and forceful nose blowing  Outcome: Progressing

## 2024-04-21 NOTE — PLAN OF CARE
Problem: Patient Centered Care  Goal: Patient preferences are identified and integrated in the patient's plan of care  Description: Interventions:  - What would you like us to know as we care for you? This is my third time having diverticulitis  - Provide timely, complete, and accurate information to patient/family  - Incorporate patient and family knowledge, values, beliefs, and cultural backgrounds into the planning and delivery of care  - Encourage patient/family to participate in care and decision-making at the level they choose  - Honor patient and family perspectives and choices  Outcome: Progressing     Problem: Patient/Family Goals  Goal: Patient/Family Long Term Goal  Description: Patient's Long Term Goal: Discharge home    Interventions:  - Manage abdominal pain  - Tolerate adequate oral intake without complication  - Determine appropriate antibiotic plan  - See additional Care Plan goals for specific interventions  Outcome: Progressing  Goal: Patient/Family Short Term Goal  Description: Patient's Short Term Goal: Manage abdominal pain    Interventions:   - Transition from IV to oral pain medications  - Bowel rest with slow diet tolerance  - Eat small frequent light meals  - See additional Care Plan goals for specific interventions  Outcome: Progressing     Problem: PAIN - ADULT  Goal: Verbalizes/displays adequate comfort level or patient's stated pain goal  Description: INTERVENTIONS:  - Encourage pt to monitor pain and request assistance  - Assess pain using appropriate pain scale  - Administer analgesics based on type and severity of pain and evaluate response  - Implement non-pharmacological measures as appropriate and evaluate response  - Consider cultural and social influences on pain and pain management  - Manage/alleviate anxiety  - Utilize distraction and/or relaxation techniques  - Monitor for opioid side effects  - Notify MD/LIP if interventions unsuccessful or patient reports new pain  -  Anticipate increased pain with activity and pre-medicate as appropriate  Outcome: Progressing     Problem: RISK FOR INFECTION - ADULT  Goal: Absence of fever/infection during anticipated neutropenic period  Description: INTERVENTIONS  - Monitor WBC  - Administer growth factors as ordered  - Implement neutropenic guidelines  Outcome: Progressing     Problem: DISCHARGE PLANNING  Goal: Discharge to home or other facility with appropriate resources  Description: INTERVENTIONS:  - Identify barriers to discharge w/pt and caregiver  - Include patient/family/discharge partner in discharge planning  - Arrange for needed discharge resources and transportation as appropriate  - Identify discharge learning needs (meds, wound care, etc)  - Arrange for interpreters to assist at discharge as needed  - Consider post-discharge preferences of patient/family/discharge partner  - Complete POLST form as appropriate  - Assess patient's ability to be responsible for managing their own health  - Refer to Case Management Department for coordinating discharge planning if the patient needs post-hospital services based on physician/LIP order or complex needs related to functional status, cognitive ability or social support system  Outcome: Progressing     Problem: GASTROINTESTINAL - ADULT  Goal: Maintains or returns to baseline bowel function  Description: INTERVENTIONS:  - Assess bowel function  - Maintain adequate hydration with IV or PO as ordered and tolerated  - Evaluate effectiveness of GI medications  - Encourage mobilization and activity  - Obtain nutritional consult as needed  - Establish a toileting routine/schedule  - Consider collaborating with pharmacy to review patient's medication profile  Outcome: Progressing  Goal: Minimal or absence of nausea and vomiting  Description: INTERVENTIONS:  - Maintain adequate hydration with IV or PO as ordered and tolerated  - Nasogastric tube to low intermittent suction as ordered  - Evaluate  effectiveness of ordered antiemetic medications  - Provide nonpharmacologic comfort measures as appropriate  - Advance diet as tolerated, if ordered  - Obtain nutritional consult as needed  - Evaluate fluid balance  Outcome: Progressing  Goal: Maintains adequate nutritional intake (undernourished)  Description: INTERVENTIONS:  - Monitor percentage of each meal consumed  - Identify factors contributing to decreased intake, treat as appropriate  - Assist with meals as needed  - Monitor I&O, WT and lab values  - Obtain nutritional consult as needed  - Optimize oral hygiene and moisture  - Encourage food from home; allow for food preferences  - Enhance eating environment  Outcome: Progressing     Problem: HEMATOLOGIC - ADULT  Goal: Free from bleeding injury  Description: (Example usage: patient with low platelets)  INTERVENTIONS:  - Avoid intramuscular injections, enemas and rectal medication administration  - Ensure safe mobilization of patient  - Hold pressure on venipuncture sites to achieve adequate hemostasis  - Assess for signs and symptoms of internal bleeding  - Monitor lab trends  - Patient is to report abnormal signs of bleeding to staff  - Avoid use of toothpicks and dental floss  - Use electric shaver for shaving  - Use soft bristle tooth brush  - Limit straining and forceful nose blowing  Outcome: Progressing   No acute changes overnight. Don is A/Ox4, VSS on room air. Tolerating low fiber soft diet, TPN infusing via R PICC. Colostomy in place with gas and output. Voiding freely. MARGARITA x2  in place with minimal output. Ambulates independently. Heparin for DVT prophylaxis. Fall and safety precautions maintained. Bed locked in the lowest position. Call light and personal belongings within reach, frequent rounding done.

## 2024-04-21 NOTE — PROGRESS NOTES
Progress Note     Don Cabral Patient Status:  Inpatient    1965 MRN [id number]   Location [facility] 4W/SW/SE Attending Silas Espinosa MD   Hosp Day # 19 PCP Stephen Christianson MD     Chief Complaint:   Chief Complaint   Patient presents with    Abdomen/Flank Pain         Subjective:   S: Patient seen and examined, chart reviewed.   Tolerating advancing diet.  Has not used morphine.  Started taking Norco.  Still on Toradol but willing to try tramadol.      Review of Systems:   10 point ROS completed and was negative, except for pertinent positive and negatives stated in subjective.                Objective:   Vital signs:  Temp:  [97.9 °F (36.6 °C)-98.2 °F (36.8 °C)] 98.2 °F (36.8 °C)  Pulse:  [62-75] 70  Resp:  [16-18] 18  BP: (125-142)/(63-71) 125/64  SpO2:  [96 %-98 %] 98 %    Wt Readings from Last 6 Encounters:   24 249 lb 7 oz (113.1 kg)   24 255 lb (115.7 kg)   24 255 lb 14.4 oz (116.1 kg)   24 255 lb (115.7 kg)   24 250 lb (113.4 kg)   01/15/24 254 lb (115.2 kg)       Physical Exam:    General: No acute distress.   Respiratory: Clear to auscultation bilaterally. No wheezes. No rhonchi.  Cardiovascular: S1, S2. Regular rate and rhythm. No murmurs, rubs or gallops.   Abdomen: Soft, nontender, nondistended.  Positive bowel sounds. No rebound or guarding.  Neurologic: No focal neurological deficits.  Musculoskeletal: Moves all extremities.  Extremities: No edema.    Results:   Diagnostic Data:      Labs:    Labs Last 24 Hours:   BMP     CBC    Other     Na 143 Cl 109 BUN 23 Glu 108   Hb 11.4   PTT - Procal -   K 3.9 CO2 29.0 Cr 0.57   WBC 10.9 >< .0  INR - CRP -   Renal Lytes Endo    Hct 32.9   Trop - D dim -   eGFR - Ca 8.8 POC Gluc  -    LFT   pBNP - Lactic -   eGFR AA - PO4 3.6; 3.6 A1c -   AST - APk - Prot -  LDL -      Pro-Calcitonin  No results for input(s): \"PCT\" in the last 168 hours.    Cardiac  No results for input(s): \"TROP\", \"PBNP\" in  the last 168 hours.    Imaging: Imaging data reviewed in Epic.    No results found.       Medications:    verapamil ER  120 mg Oral Nightly    OLANZapine  2.5 mg Oral Nightly    heparin  5,000 Units Subcutaneous 2 times per day    pantoprazole  40 mg Intravenous Q12H    meropenem  500 mg Intravenous Q8H    clonazePAM  0.5 mg Oral TID    sertraline  75 mg Oral Daily    lisinopril  20 mg Oral Daily       Assessment & Plan:   ASSESSMENT / PLAN:   Acute Sigmoid diverticulitis  LLQ abdominal pain   3rd episode  - failed outpt PO abx.   - history of recurrent diverticulitis  - CT showed acute sigmoid diverticulitis with microperforation, no fluid collection.   - Last colonoscopy 05/2023.   - Pain control. Avoid narcotics as able  - IV meropenem. Consider pO abx on discharge.   - mid line placed 4/5   - XR abd with ileus.   - trials of clears today fulls for dinner if tolerates   - cont TPN.   -  ID and general surgery on consult. GI signed off   - s/p L colectomy with end coloscopy and splenic flexure mobilization 4/15   - wound care for ostomy   - increase ambulation   Discontinue morphine  Tramadol as needed  Norco 7.5 mg 2 tablets every 6 hours as needed  DC pantoprazole  Advance diet  Stop TPN after today's bag is complete    Hypoxia   - resolved.   - Likely from atelectasis   - increase activity   - IS     Pt relays hx of PVCs, a-fib, LBBB.  Hx of intermittent premature ventricular tachycardia   - remote tele     Hypertension  - cont lisinopril, verapamil     GERD  - IV PPI bid     Major depressive disorder   Pt says he talked to his psychiatrist and and can stop neurontin.  - stop neurontin  - cont klonopin, zoloft, zyprexa     >55min spent, >50% spent counseling and coordinating care in the form of educating pt/family and d/w consultants and staff. Most of the time spent discussing the above plan.            Estimated date of discharge: Tomorrow  Discharge is dependent on: Clinical improvement  At this point   Wells is expected to be discharge to: Home    Plan of care discussed with patient    Silas Espinosa MD, FAAP, FACP  [facility] Hospitalist  I respond to Epic Chat and AMS Connect    45 minutes spent on this admission - examining patient, obtaining history, reviewing previous medical records, going over test results/imaging and discussing plan of care. All questions answered.

## 2024-04-21 NOTE — PROGRESS NOTES
GENERAL SURGERY for Dr. Novoa  Feels ok     tolerating solids    Abd neg   Wound ok    Ostomy ok    Labs ok    Stop TPN

## 2024-04-22 ENCOUNTER — TELEPHONE (OUTPATIENT)
Facility: CLINIC | Age: 59
End: 2024-04-22

## 2024-04-22 LAB
ANION GAP SERPL CALC-SCNC: 6 MMOL/L (ref 0–18)
BUN BLD-MCNC: 22 MG/DL (ref 9–23)
BUN/CREAT SERPL: 36.7 (ref 10–20)
CALCIUM BLD-MCNC: 8.9 MG/DL (ref 8.7–10.4)
CHLORIDE SERPL-SCNC: 106 MMOL/L (ref 98–112)
CO2 SERPL-SCNC: 29 MMOL/L (ref 21–32)
CREAT BLD-MCNC: 0.6 MG/DL
EGFRCR SERPLBLD CKD-EPI 2021: 113 ML/MIN/1.73M2 (ref 60–?)
GLUCOSE BLD-MCNC: 98 MG/DL (ref 70–99)
MAGNESIUM SERPL-MCNC: 1.9 MG/DL (ref 1.6–2.6)
OSMOLALITY SERPL CALC.SUM OF ELEC: 295 MOSM/KG (ref 275–295)
PHOSPHATE SERPL-MCNC: 3.8 MG/DL (ref 2.4–5.1)
POTASSIUM SERPL-SCNC: 4.3 MMOL/L (ref 3.5–5.1)
SODIUM SERPL-SCNC: 141 MMOL/L (ref 136–145)

## 2024-04-22 PROCEDURE — 99233 SBSQ HOSP IP/OBS HIGH 50: CPT | Performed by: INTERNAL MEDICINE

## 2024-04-22 RX ORDER — FAMOTIDINE 40 MG/1
40 TABLET, FILM COATED ORAL 2 TIMES DAILY
Status: DISCONTINUED | OUTPATIENT
Start: 2024-04-22 | End: 2024-04-23

## 2024-04-22 RX ORDER — PANTOPRAZOLE SODIUM 40 MG/1
40 TABLET, DELAYED RELEASE ORAL
Status: DISCONTINUED | OUTPATIENT
Start: 2024-04-22 | End: 2024-04-23

## 2024-04-22 RX ORDER — TRAMADOL HYDROCHLORIDE 50 MG/1
50 TABLET ORAL EVERY 6 HOURS PRN
Qty: 30 TABLET | Refills: 0 | Status: SHIPPED | OUTPATIENT
Start: 2024-04-22

## 2024-04-22 RX ORDER — HYDROCODONE BITARTRATE AND ACETAMINOPHEN 7.5; 325 MG/1; MG/1
1 TABLET ORAL EVERY 6 HOURS PRN
Status: DISCONTINUED | OUTPATIENT
Start: 2024-04-22 | End: 2024-04-23

## 2024-04-22 RX ORDER — CLONAZEPAM 0.5 MG/1
0.5 TABLET ORAL 3 TIMES DAILY
Status: SHIPPED | COMMUNITY
Start: 2024-04-23

## 2024-04-22 RX ORDER — MAGNESIUM HYDROXIDE/ALUMINUM HYDROXICE/SIMETHICONE 120; 1200; 1200 MG/30ML; MG/30ML; MG/30ML
30 SUSPENSION ORAL 4 TIMES DAILY PRN
Status: SHIPPED | COMMUNITY
Start: 2024-04-22

## 2024-04-22 RX ORDER — ONDANSETRON 4 MG/1
4 TABLET, FILM COATED ORAL EVERY 8 HOURS PRN
Qty: 30 TABLET | Refills: 1 | Status: SHIPPED | OUTPATIENT
Start: 2024-04-22

## 2024-04-22 RX ORDER — SERTRALINE HYDROCHLORIDE 25 MG/1
75 TABLET, FILM COATED ORAL DAILY
Status: SHIPPED | COMMUNITY
Start: 2024-04-23

## 2024-04-22 RX ORDER — METOCLOPRAMIDE 10 MG/1
10 TABLET ORAL 2 TIMES DAILY PRN
Status: DISCONTINUED | OUTPATIENT
Start: 2024-04-22 | End: 2024-04-23

## 2024-04-22 RX ORDER — HYDROCODONE BITARTRATE AND ACETAMINOPHEN 7.5; 325 MG/1; MG/1
1 TABLET ORAL EVERY 6 HOURS PRN
Qty: 30 TABLET | Refills: 0 | Status: SHIPPED | OUTPATIENT
Start: 2024-04-22

## 2024-04-22 NOTE — PROGRESS NOTES
Progress Note     Don Cabral Patient Status:  Inpatient    1965 MRN [id number]   Location [facility] 4W/SW/SE Attending Silas Espinosa MD   Hosp Day # 20 PCP Stephen Christianson MD     Chief Complaint:   Chief Complaint   Patient presents with    Abdomen/Flank Pain         Subjective:   S: Patient seen and examined, chart reviewed.   Complaints of cramping last night with pills.  Feeling better today.  Ambulating the halls without pain.  D/W him dischareg tomorrow and reconciled the meds.       Review of Systems:   10 point ROS completed and was negative, except for pertinent positive and negatives stated in subjective.                Objective:   Vital signs:  Temp:  [97.6 °F (36.4 °C)-98.2 °F (36.8 °C)] 97.6 °F (36.4 °C)  Pulse:  [64-78] 78  Resp:  [18-20] 20  BP: (117-139)/(60-67) 139/60  SpO2:  [95 %-98 %] 98 %    Wt Readings from Last 6 Encounters:   24 249 lb 7 oz (113.1 kg)   24 255 lb (115.7 kg)   24 255 lb 14.4 oz (116.1 kg)   24 255 lb (115.7 kg)   24 250 lb (113.4 kg)   01/15/24 254 lb (115.2 kg)       Physical Exam:    General: No acute distress.   Respiratory: Clear to auscultation bilaterally. No wheezes. No rhonchi.  Cardiovascular: S1, S2. Regular rate and rhythm. No murmurs, rubs or gallops.   Abdomen: Soft, nontender, nondistended.  Positive bowel sounds. No rebound or guarding.  Stoma looks good  Neurologic: No focal neurological deficits.   Musculoskeletal: Moves all extremities.  Extremities: No edema.    Results:   Diagnostic Data:      Labs:    Labs Last 24 Hours:   BMP     CBC    Other     Na 141 Cl 106 BUN 22 Glu 98   Hb -   PTT - Procal -   K 4.3 CO2 29.0 Cr 0.60   WBC - >< PLT -  INR - CRP -   Renal Lytes Endo    Hct -   Trop - D dim -   eGFR - Ca 8.9 POC Gluc  -    LFT   pBNP - Lactic -   eGFR AA - PO4 3.8 A1c -   AST - APk - Prot -  LDL -      Pro-Calcitonin  No results for input(s): \"PCT\" in the last 168 hours.    Cardiac  No  results for input(s): \"TROP\", \"PBNP\" in the last 168 hours.    Imaging: Imaging data reviewed in Epic.    No results found.       Medications:    pantoprazole  40 mg Oral BID AC    verapamil ER  120 mg Oral Nightly    OLANZapine  2.5 mg Oral Nightly    heparin  5,000 Units Subcutaneous 2 times per day    clonazePAM  0.5 mg Oral TID    sertraline  75 mg Oral Daily    lisinopril  20 mg Oral Daily       Assessment & Plan:   ASSESSMENT / PLAN:     Acute Sigmoid diverticulitis  - failed outpt PO abx.   - history of recurrent diverticulitis  - CT showed acute sigmoid diverticulitis with microperforation, no fluid collection.   - s/p L colectomy with end coloscopy and splenic flexure mobilization 4/15   - prolonged hospitalization required TPN  - wound care for ostomy   Discontinued morphine  Home on Tramadol as needed  Norco 7.5 mg 2 tablets every 6 hours as needed  Home on pantoprazole  Tolerating diet     Hypoxia   - resolved.   - Likely from atelectasis   - increase activity   - IS     Pt relays hx of PVCs, a-fib, LBBB.  Hx of intermittent premature ventricular tachycardia   - remote tele     Hypertension  - cont lisinopril, verapamil     GERD  - PPI PO and Maalox     Major depressive disorder   Pt says he talked to his psychiatrist and and can stop neurontin.  - restart neurontin  - cont klonopin, zoloft, zyprexa        Estimated date of discharge: Tomorrow  Discharge is dependent on: clearance by surgery  At this point Mr. Cabral is expected to be discharge to: home    Plan of care discussed with patient    Silas Espinosa MD, FAAP, FACP  [facility] Hospitalist  I respond to [employer] Chat and AMS Connect    45 minutes spent on this admission - examining patient, obtaining history, reviewing previous medical records, going over test results/imaging and discussing plan of care. All questions answered.

## 2024-04-22 NOTE — PROGRESS NOTES
[facility]  part of [facility]    Progress Note    Don Cabral Patient Status:  Inpatient    1965 MRN [id number]   Location [facility] 4W/SW/SE Attending Silas Espinosa MD   Hosp Day # 20 PCP Stephen Christianson MD       Subjective:   Pain controlled, pt with worsening acid reflux. Ambulating well, tolerating diet.     Objective:   Vital Signs:  Blood pressure 133/67, pulse 72, temperature 97.7 °F (36.5 °C), temperature source Oral, resp. rate 20, weight 249 lb 7 oz (113.1 kg), SpO2 95%.    Physical Exam     General: No acute distress. Alert and oriented x 3.  HEENT: Moist mucous membranes. Anicteric.   Neck: Supple, No JVD.   Respiratory: Nonlabored resp.  Cardiovascular: Regular rate.   Abdomen: Soft, expected incisional tenderness, no rebound tnd, no guarding, nondistended.  No masses. Normal bowel sounds. MARGARITA drains serosanguineous, ostomy patent.  Neurologic: No focal neurological deficits.  Musculoskeletal: Full range of motion of all extremities. No calf tenderness. No swelling noted.  Integument: No lesions. No erythema.  Psychiatric: Appropriate mood and affect.         Assessment and Plan:     Sigmoid diverticulitis      Intractable abdominal pain    Will remove MARGARITA drains. TPN stopped, diet as tolerated, continue ostomy education. Will continue to follow and monitor progress, likely home soon.    Results:     Lab Results   Component Value Date    WBC 10.9 2024    HGB 11.4 (L) 2024    HCT 32.9 (L) 2024    .0 (H) 2024    CREATSERUM 0.60 (L) 2024    BUN 22 2024     2024    K 4.3 2024     2024    CO2 29.0 2024    GLU 98 2024    CA 8.9 2024    ALB 3.9 2024    ALKPHO 52 2024    BILT 0.4 2024    TP 5.9 2024    AST 19 2024    ALT 18 2024    PTT 24.9 2023    INR 1.01 2023    TSH 1.850 2023    LIP 37 2024    PSA 2.53  08/02/2021    DDIMER 2.44 (H) 06/23/2023    MG 1.9 04/22/2024    PHOS 3.8 04/22/2024    TROP <0.045 02/05/2020       No results found.                Stephen Nunez PA-C  4/22/2024

## 2024-04-22 NOTE — PROGRESS NOTES
INFECTIOUS DISEASE PROGRESS NOTE    Don Cabral Patient Status:  Inpatient    1965 MRN [id number]   Location [facility] 4W/SW/SE Attending Tamica Hardy MD   Hosp Day # 20 PCP Stephen Christianson MD     SUBJECTIVE  Doing well. Tolerating PO. +nausea.     ASSESSMENT/PLAN:    Antibiotics:  zosyn -, meropenem  --      Impression:     # Acute sigmoid diverticulitis with microperforation, no drainable abscess seen on CT  - most recent CT  with increased inflammation but no abscess.   - continues to have pain. Started on TPN 24  -  s/p OR 4/15/24 for L colectomy, end colostomy    # leukocytosis  # Abdominal pain, ?ileus  # Recurrent sigmoid diverticulitis     Recommend:     -  continue IV meropenem - discontinue today and monitor off  -  TPN per surgery  -  follow fever curve, wbc.   - d/w patient, staff        History of Present Illness:   Patient is a 58 year old male with h/o recurrent diverticulitis, cardiac arrhythmia, depression, NELLIE.  Presents with recurrent diverticulitis.  CT showed cute sigmoid diverticulitis.  Trace suspected focus of associated localized pneumoperitoneum/micro perforation.  No evidence of a drainable associated fluid collection.  Progressed inflammatory change adjacent to the hepatic flexure and proximal sigmoid colon.  Interval development of mild circumferential wall thickening splenic flexure and proximal sigmoid colon which may reflect a nonspecific colitis.  No evidence of pneumatosis or associated drainable fluid collection.     Started on zosyn.  Still with abdominal pain but slowly improved.      OBJECTIVE  /67 (BP Location: Left leg)   Pulse 72   Temp 97.7 °F (36.5 °C) (Oral)   Resp 20   Wt 249 lb 7 oz (113.1 kg)   SpO2 95%   BMI 31.18 kg/m²     General: No acute distress. Alert.  HEENT: EOMI   Abdomen: post op dressing, ostomy w/o output  Musculoskeletal: No edema  Integument: No rashes    Labs:     Recent Labs   Lab 24  0534  04/17/24  0601 04/18/24  1327 04/20/24  0836 04/21/24  0616   WBC 16.2* 14.2* 12.0* 11.8* 10.9   HGB 12.9* 10.9* 11.4* 11.8* 11.4*   .0* 965.0* 789.0* 823.0* 757.0*       Recent Labs   Lab 04/18/24  0655 04/19/24  0543 04/20/24  0836 04/21/24  0616 04/22/24  0553    139 144 143 141   K 4.0 4.1 4.0 3.9 4.3    109 109 109 106   CO2 28.0 28.0 27.0 29.0 29.0   BUN 17 18 16 23 22   CREATSERUM 0.53* 0.56* 0.59* 0.57* 0.60*       No results for input(s): \"ALT\", \"AST\" in the last 168 hours.    Invalid input(s): \"ALPHOS\", \"TBIL\"        Microbiology: Reviewed    Imaging: Reviewed       MD George [facility]  [phone]

## 2024-04-22 NOTE — PLAN OF CARE
Patient a/o x4. On room air. Ambulates self. IV abx continued. TPN discontinued. Patient was reporting heartburn; MD ordered pepcid; given to patient. Patient has been reporting pain, but has been refusing pain meds. States he doesn't want to take any sort of narcotic. Reports he's been fine without pain meds. Reported one epiode of nausea; PO zofran given. Ostomy bag intact; patient empties it himself; no output during shift. Call light within reach. Calls appropriately.       Problem: Patient Centered Care  Goal: Patient preferences are identified and integrated in the patient's plan of care  Description: Interventions:  - What would you like us to know as we care for you? This is my third time having diverticulitis  - Provide timely, complete, and accurate information to patient/family  - Incorporate patient and family knowledge, values, beliefs, and cultural backgrounds into the planning and delivery of care  - Encourage patient/family to participate in care and decision-making at the level they choose  - Honor patient and family perspectives and choices  Outcome: Progressing     Problem: Patient/Family Goals  Goal: Patient/Family Long Term Goal  Description: Patient's Long Term Goal: Discharge home    Interventions:  - Manage abdominal pain  - Tolerate adequate oral intake without complication  - Determine appropriate antibiotic plan  - See additional Care Plan goals for specific interventions  Outcome: Progressing  Goal: Patient/Family Short Term Goal  Description: Patient's Short Term Goal: Manage abdominal pain    Interventions:   - Transition from IV to oral pain medications  - Bowel rest with slow diet tolerance  - Eat small frequent light meals  - See additional Care Plan goals for specific interventions  Outcome: Progressing     Problem: PAIN - ADULT  Goal: Verbalizes/displays adequate comfort level or patient's stated pain goal  Description: INTERVENTIONS:  - Encourage pt to monitor pain and request  assistance  - Assess pain using appropriate pain scale  - Administer analgesics based on type and severity of pain and evaluate response  - Implement non-pharmacological measures as appropriate and evaluate response  - Consider cultural and social influences on pain and pain management  - Manage/alleviate anxiety  - Utilize distraction and/or relaxation techniques  - Monitor for opioid side effects  - Notify MD/LIP if interventions unsuccessful or patient reports new pain  - Anticipate increased pain with activity and pre-medicate as appropriate  Outcome: Progressing     Problem: RISK FOR INFECTION - ADULT  Goal: Absence of fever/infection during anticipated neutropenic period  Description: INTERVENTIONS  - Monitor WBC  - Administer growth factors as ordered  - Implement neutropenic guidelines  Outcome: Progressing     Problem: DISCHARGE PLANNING  Goal: Discharge to home or other facility with appropriate resources  Description: INTERVENTIONS:  - Identify barriers to discharge w/pt and caregiver  - Include patient/family/discharge partner in discharge planning  - Arrange for needed discharge resources and transportation as appropriate  - Identify discharge learning needs (meds, wound care, etc)  - Arrange for interpreters to assist at discharge as needed  - Consider post-discharge preferences of patient/family/discharge partner  - Complete POLST form as appropriate  - Assess patient's ability to be responsible for managing their own health  - Refer to Case Management Department for coordinating discharge planning if the patient needs post-hospital services based on physician/LIP order or complex needs related to functional status, cognitive ability or social support system  Outcome: Progressing     Problem: GASTROINTESTINAL - ADULT  Goal: Maintains or returns to baseline bowel function  Description: INTERVENTIONS:  - Assess bowel function  - Maintain adequate hydration with IV or PO as ordered and tolerated  -  Evaluate effectiveness of GI medications  - Encourage mobilization and activity  - Obtain nutritional consult as needed  - Establish a toileting routine/schedule  - Consider collaborating with pharmacy to review patient's medication profile  Outcome: Progressing  Goal: Minimal or absence of nausea and vomiting  Description: INTERVENTIONS:  - Maintain adequate hydration with IV or PO as ordered and tolerated  - Nasogastric tube to low intermittent suction as ordered  - Evaluate effectiveness of ordered antiemetic medications  - Provide nonpharmacologic comfort measures as appropriate  - Advance diet as tolerated, if ordered  - Obtain nutritional consult as needed  - Evaluate fluid balance  Outcome: Progressing  Goal: Maintains adequate nutritional intake (undernourished)  Description: INTERVENTIONS:  - Monitor percentage of each meal consumed  - Identify factors contributing to decreased intake, treat as appropriate  - Assist with meals as needed  - Monitor I&O, WT and lab values  - Obtain nutritional consult as needed  - Optimize oral hygiene and moisture  - Encourage food from home; allow for food preferences  - Enhance eating environment  Outcome: Progressing     Problem: HEMATOLOGIC - ADULT  Goal: Free from bleeding injury  Description: (Example usage: patient with low platelets)  INTERVENTIONS:  - Avoid intramuscular injections, enemas and rectal medication administration  - Ensure safe mobilization of patient  - Hold pressure on venipuncture sites to achieve adequate hemostasis  - Assess for signs and symptoms of internal bleeding  - Monitor lab trends  - Patient is to report abnormal signs of bleeding to staff  - Avoid use of toothpicks and dental floss  - Use electric shaver for shaving  - Use soft bristle tooth brush  - Limit straining and forceful nose blowing  Outcome: Progressing

## 2024-04-22 NOTE — PROGRESS NOTES
04/22/24 1045   Colostomy Descending/sigmoid LLQ   Placement Date/Time: 04/15/24 1413   Inserted by: Dr. Novoa  Colostomy Type: Descending/sigmoid  Location: LLQ  Stoma Size (cm): 2.5 cm  Appliance Size: 2 1/4   Stomal Appliance 2 piece;Intact  (2 1/4 inch)   Stomal Assessment Budding;Moist;Pink;Red;Edema   Peristomal Assessment YANG   Dressing Change Due 04/29/24   Wound Follow Up   Follow up needed Yes     Ostomy Care   Follow up on the pt, he is up in the room and he has been walking. He changed his appliance this am due to too much stool in the pouch and it leaked. I answered all of his questions. The pt. is comfortable with emptying his pouch and appliance changes. I gave the pt. some supplies for home and ostomy discharge instructions for home and reviewed these with him. Spoke with the nurse.

## 2024-04-22 NOTE — TELEPHONE ENCOUNTER
Pt notifying Dr Zaidi that he has been in the [facility] so his cpap data will have sporadic use.   Informed him that so far he is in compliance, and that his follow up appointment is not until June so he will have enough time to improve his compliance data.  Pt understood information and had no other questions as this time.      Usage 03/23/2024 - 04/21/2024  Usage days 27/30 days (90%)  >= 4 hours 21 days (70%)  < 4 hours 6 days (20%)  Usage hours 154 hours 18 minutes  Average usage (total days) 5 hours 9 minutes  Average usage (days used) 5 hours 43 minutes  Median usage (days used) 6 hours 24 minutes  Total used hours (value since last reset - 04/21/2024) 1,303 hours  AirSense 11 AutoSet  Serial number [id number]  Mode AutoSet  Min Pressure 9 cmH2O  Max Pressure 20 cmH2O  EPR Fulltime  EPR level 2  Response Standard  Therapy  Pressure - cmH2O Median: 11.4 95th percentile: 17.0 Maximum: 18.3  Leaks - L/min Median: 4.6 95th percentile: 45.0 Maximum: 83.3  Events per hour AI: 3.8 HI: 1.5 AHI: 5.3  Apnea Index Central: 0.2 Obstructive: 1.0 Unknown: 2.5  RERA Index 1.1  Cheyne-Andrea respiration (average duration per night) 0 minutes (0%)

## 2024-04-23 VITALS
SYSTOLIC BLOOD PRESSURE: 135 MMHG | TEMPERATURE: 98 F | BODY MASS INDEX: 30 KG/M2 | RESPIRATION RATE: 18 BRPM | WEIGHT: 243.63 LBS | HEART RATE: 69 BPM | DIASTOLIC BLOOD PRESSURE: 67 MMHG | OXYGEN SATURATION: 96 %

## 2024-04-23 PROCEDURE — 99239 HOSP IP/OBS DSCHRG MGMT >30: CPT | Performed by: HOSPITALIST

## 2024-04-23 NOTE — DISCHARGE SUMMARY
[facility] Hospitalist Discharge Summary   Patient ID:  Don Cabral  [id number]  58 year old  9/28/1965    Admit date: 4/2/2024  Discharge date: 4/23/2024  Primary Care Physician: Stephen Christianson MD   Attending Physician: Tamica Hardy MD   Consults:   Consultants         Provider   Role Specialty     Faustino Lucero MD      Consulting Physician SURGERY, GENERAL     Dillon Capps DO      Consulting Physician INFECTIOUS DISEASES     Salazar Cross MD      Consulting Physician INFECTIOUS DISEASES     Rick Novoa MD      Consulting Physician SURGERY, GENERAL     Shannan Brooks MD      Consulting Physician GASTROENTEROLOGY            Discharge Diagnoses:   Sigmoid diverticulitis    Reason for admission  Copied from admission H&P: The patient is a 58-year-old  male who was seen at [facility] on March 27 for abdominal pain. CT scan of the abdomen at that time showed short segment descending colon thickening with findings consistent with possible epiploic appendagitis. The patient continued to have persistent worsening pain in the left lower quadrant area, came in today for evaluation. CBC and chemistry were unremarkable. CT scan of the abdomen showed acute sigmoid diverticulitis with trace suspected focus of associated localized pneumoperitoneum microperforation, no evidence of drainable associated fluid collection, progressed inflammatory changes adjacent to the hepatic flexure and proximal sigmoid colon, interval development of mild circumferential wall thickening splenic flexure and proximal sigmoid colon which may reflect nonspecific colitis. The patient was started on IV Zosyn, and he will be admitted to the hospital for further management.     Hospital Course:    Acute Sigmoid diverticulitis  - failed outpt PO abx.   - history of recurrent diverticulitis  - CT showed acute sigmoid diverticulitis with microperforation, no fluid collection.   - s/p L colectomy with end coloscopy and splenic  flexure mobilization 4/15   - prolonged hospitalization required TPN  - wound care for ostomy   - Home on Tramadol as needed  - Norco 7.5 mg 2 tablets every 6 hours as needed  - Home on pantoprazole  - Tolerating diet  - off abx completed course of meropenem.      Hypoxia   - resolved.   - Likely from atelectasis   - increase activity   - IS     Pt relays hx of PVCs, a-fib, LBBB.  Hx of intermittent premature ventricular tachycardia   - remote tele     Hypertension  - cont lisinopril, verapamil     GERD  - PPI PO and Maalox     Major depressive disorder   Pt says he talked to his psychiatrist and and can stop neurontin.  - restart neurontin  - cont klonopin, zoloft, zyprexa             EXAM:   GENERAL: no apparent distress, comfortable  NEURO: A/A Ox3, no focal deficits  RESP: non labored, CTAB/L  CARDIO: Regular, no murmur  ABD: soft, NT, ND  EXTREMITIES: no edema, no calf tenderness    Operative Procedures: Procedure(s) (LRB):  EXPLORATORY LAPAROTOMY, LEFT COLECTOMY WITH END COLOSTOMY, SPLENIC FLEXURE MOBILIZATION (N/A)  COLON RESECTION LEFT (N/A)  COLOSTOMY (N/A)    Discharge Instructions     Medication List        START taking these medications      alum-mag hydroxide-simethicone 200-200-20 MG/5ML Susp  Commonly known as: Maalox  Notes to patient: Acid reflux     HYDROcodone-acetaminophen 7.5-325 MG Tabs  Commonly known as: Norco  Take 1 tablet by mouth every 6 (six) hours as needed.  Replaces: HYDROcodone-acetaminophen 5-325 MG Tabs  Notes to patient: For pain -- Do not take at same time of Tylenol because this medicine has Tylenol in it     ondansetron 4 mg tablet  Commonly known as: Zofran  Take 1 tablet (4 mg total) by mouth every 8 (eight) hours as needed for Nausea.  Notes to patient: For nausea     traMADol 50 MG Tabs  Commonly known as: Ultram  Take 1 tablet (50 mg total) by mouth every 6 (six) hours as needed.  Notes to patient: For pain            CHANGE how you take these medications      clonazePAM  0.5 MG Tabs  Commonly known as: KlonoPIN  What changed:   how much to take  how to take this  when to take this  additional instructions     sertraline 25 MG Tabs  Commonly known as: Zoloft  What changed:   medication strength  how much to take  how to take this  when to take this  additional instructions            CONTINUE taking these medications      acidophilus-pectin Caps  Commonly known as: Probiotic     albuterol 108 (90 Base) MCG/ACT Aers  Commonly known as: Ventolin HFA  Inhale 2 puffs into the lungs every 4 (four) hours as needed for Wheezing or Shortness of Breath.     DOCULASE OR     gabapentin 600 MG Tabs  Commonly known as: Neurontin  Take 1 tablet (600 mg total) by mouth 3 (three) times daily.     lisinopril 20 MG Tabs  Commonly known as: Prinivil; Zestril  Take 1 tablet (20 mg total) by mouth daily.     Multi-Vitamin/Minerals Tabs     OLANZapine 2.5 MG Tabs  Commonly known as: ZyPREXA  Take 1 tablet (2.5 mg total) by mouth nightly.     Tadalafil 20 MG Tabs  Take 1 tablet (20 mg total) by mouth daily as needed for Erectile Dysfunction.     verapamil  MG Tbcr  Commonly known as: Calan-SR  Take 1 tablet (120 mg total) by mouth nightly.            STOP taking these medications      HYDROcodone-acetaminophen 5-325 MG Tabs  Commonly known as: Norco  Replaced by: HYDROcodone-acetaminophen 7.5-325 MG Tabs               Where to Get Your Medications        These medications were sent to [id number] - [place], [place] - [facility] [place] [address] [phone], [phone]  [address] [place] [id number]      Hours: 24-hours Phone: [phone]   HYDROcodone-acetaminophen 7.5-325 MG Tabs  ondansetron 4 mg tablet  traMADol 50 MG Tabs         Activity: activity as tolerated  Diet: regular diet  Wound Care: NA  Code Status: Full Code          Important follow up:   Follow-up Information       Serge Qureshi MD. Call.    Specialty: Surgical Oncology  Contact information:  [address]  [place]  [address]  [phone]                             -PCP in [] within 7 days [] within 14 days [] other     Disposition: home  Discharged Condition: good    Hospital Discharge Diagnoses:  diverticulitis    Lace+ Score: 67  59-90 High Risk  29-58 Medium Risk  0-28   Low Risk.    TCM Follow-Up Recommendation:  LACE > 58: High Risk of readmission after discharge from the hospital.            Total Time Coordinating Care: Greater than 30 minutes    Patient had opportunity to ask questions, state understanding, and agree with therapeutic plan as outlined    Tamica Hardy MD  Hospitalist  4/23/2024

## 2024-04-23 NOTE — PROGRESS NOTES
[facility]  part of [facility]    Progress Note    Don Cabral Patient Status:  Inpatient    1965 MRN [id number]   Location [facility] 4W/SW/SE Attending Tamica Hardy MD   Hosp Day # 21 PCP Stephen Christianson MD       Subjective:   Pt feeling well, pain controlled. Still some acid reflux, but manageable.    Objective:   Vital Signs:  Blood pressure 126/60, pulse 86, temperature 97.8 °F (36.6 °C), temperature source Oral, resp. rate 20, weight 249 lb 7 oz (113.1 kg), SpO2 96%.    Physical Exam     General: No acute distress. Alert and oriented x 3.  HEENT: Moist mucous membranes. Anicteric.   Neck: Supple, No JVD.   Respiratory: Nonlabored resp.  Cardiovascular: Regular rate.   Abdomen: Soft, expected incisional tenderness, no rebound tnd, no guarding, nondistended.  No masses. Normal bowel sounds. Incisions c/d/I, ostomy with scant output  Neurologic: No focal neurological deficits.  Musculoskeletal: Full range of motion of all extremities. No calf tenderness. No swelling noted.  Integument: No lesions. No erythema.  Psychiatric: Appropriate mood and affect.       Assessment and Plan:     Sigmoid diverticulitis      Intractable abdominal pain    Stable for discharge home from surgical standpoint. Follow up with Dr. Novoa in 1-2 weeks for staple removal and to discuss reversal surgery.    Results:     Lab Results   Component Value Date    WBC 10.9 2024    HGB 11.4 (L) 2024    HCT 32.9 (L) 2024    .0 (H) 2024    CREATSERUM 0.60 (L) 2024    BUN 22 2024     2024    K 4.3 2024     2024    CO2 29.0 2024    GLU 98 2024    CA 8.9 2024    ALB 3.9 2024    ALKPHO 52 2024    BILT 0.4 2024    TP 5.9 2024    AST 19 2024    ALT 18 2024    PTT 24.9 2023    INR 1.01 2023    TSH 1.850 2023    LIP 37 2024    PSA 2.53 2021    DDIMER 2.44 (H)  06/23/2023    MG 1.9 04/22/2024    PHOS 3.8 04/22/2024    TROP <0.045 02/05/2020       No results found.                Stephen Nunez PA-C  4/23/2024

## 2024-04-23 NOTE — PLAN OF CARE
Problem: Patient Centered Care  Goal: Patient preferences are identified and integrated in the patient's plan of care  Description: Interventions:  - What would you like us to know as we care for you? This is my third time having diverticulitis  - Provide timely, complete, and accurate information to patient/family  - Incorporate patient and family knowledge, values, beliefs, and cultural backgrounds into the planning and delivery of care  - Encourage patient/family to participate in care and decision-making at the level they choose  - Honor patient and family perspectives and choices  Outcome: Progressing     Problem: Patient/Family Goals  Goal: Patient/Family Long Term Goal  Description: Patient's Long Term Goal: Discharge home    Interventions:  - Manage abdominal pain  - Tolerate adequate oral intake without complication  - Determine appropriate antibiotic plan  - See additional Care Plan goals for specific interventions  Outcome: Progressing  Goal: Patient/Family Short Term Goal  Description: Patient's Short Term Goal: Manage abdominal pain    Interventions:   - Transition from IV to oral pain medications  - Bowel rest with slow diet tolerance  - Eat small frequent light meals  - See additional Care Plan goals for specific interventions  Outcome: Progressing     Problem: PAIN - ADULT  Goal: Verbalizes/displays adequate comfort level or patient's stated pain goal  Description: INTERVENTIONS:  - Encourage pt to monitor pain and request assistance  - Assess pain using appropriate pain scale  - Administer analgesics based on type and severity of pain and evaluate response  - Implement non-pharmacological measures as appropriate and evaluate response  - Consider cultural and social influences on pain and pain management  - Manage/alleviate anxiety  - Utilize distraction and/or relaxation techniques  - Monitor for opioid side effects  - Notify MD/LIP if interventions unsuccessful or patient reports new pain  -  Anticipate increased pain with activity and pre-medicate as appropriate  Outcome: Progressing     Problem: RISK FOR INFECTION - ADULT  Goal: Absence of fever/infection during anticipated neutropenic period  Description: INTERVENTIONS  - Monitor WBC  - Administer growth factors as ordered  - Implement neutropenic guidelines  Outcome: Progressing     Problem: DISCHARGE PLANNING  Goal: Discharge to home or other facility with appropriate resources  Description: INTERVENTIONS:  - Identify barriers to discharge w/pt and caregiver  - Include patient/family/discharge partner in discharge planning  - Arrange for needed discharge resources and transportation as appropriate  - Identify discharge learning needs (meds, wound care, etc)  - Arrange for interpreters to assist at discharge as needed  - Consider post-discharge preferences of patient/family/discharge partner  - Complete POLST form as appropriate  - Assess patient's ability to be responsible for managing their own health  - Refer to Case Management Department for coordinating discharge planning if the patient needs post-hospital services based on physician/LIP order or complex needs related to functional status, cognitive ability or social support system  Outcome: Progressing     Problem: GASTROINTESTINAL - ADULT  Goal: Maintains or returns to baseline bowel function  Description: INTERVENTIONS:  - Assess bowel function  - Maintain adequate hydration with IV or PO as ordered and tolerated  - Evaluate effectiveness of GI medications  - Encourage mobilization and activity  - Obtain nutritional consult as needed  - Establish a toileting routine/schedule  - Consider collaborating with pharmacy to review patient's medication profile  Outcome: Progressing  Goal: Minimal or absence of nausea and vomiting  Description: INTERVENTIONS:  - Maintain adequate hydration with IV or PO as ordered and tolerated  - Nasogastric tube to low intermittent suction as ordered  - Evaluate  effectiveness of ordered antiemetic medications  - Provide nonpharmacologic comfort measures as appropriate  - Advance diet as tolerated, if ordered  - Obtain nutritional consult as needed  - Evaluate fluid balance  Outcome: Progressing  Goal: Maintains adequate nutritional intake (undernourished)  Description: INTERVENTIONS:  - Monitor percentage of each meal consumed  - Identify factors contributing to decreased intake, treat as appropriate  - Assist with meals as needed  - Monitor I&O, WT and lab values  - Obtain nutritional consult as needed  - Optimize oral hygiene and moisture  - Encourage food from home; allow for food preferences  - Enhance eating environment  Outcome: Progressing     Problem: HEMATOLOGIC - ADULT  Goal: Free from bleeding injury  Description: (Example usage: patient with low platelets)  INTERVENTIONS:  - Avoid intramuscular injections, enemas and rectal medication administration  - Ensure safe mobilization of patient  - Hold pressure on venipuncture sites to achieve adequate hemostasis  - Assess for signs and symptoms of internal bleeding  - Monitor lab trends  - Patient is to report abnormal signs of bleeding to staff  - Avoid use of toothpicks and dental floss  - Use electric shaver for shaving  - Use soft bristle tooth brush  - Limit straining and forceful nose blowing  Outcome: Progressing       A/ox4, on RA, up self, ambulates room and hallway independently, received PRN Zofran for nausea, PRN Tramadol given for pain management, ostomy in place with good output, voiding freely, saline locked, vital signs stable, no acute changes overnight. Call light within reach, safety measures in place, frequent rounding done, plan of care continued.

## 2024-04-23 NOTE — PLAN OF CARE
Patient AOX4. Room air. Low fiber diet. Complaints of nausea. PRN zofran and reglan given. Heparin for dvt prophylaxis. Pt ambulated in the ortiz several times, tolerated well. Voiding freely/urinal. PRN tramadol and norco given for pain management. IV abx dc per ID. MARGARITA drain removed by MD. Call light within reach. Plan for discharge tomorrow pending medical clearance and surgical.     Problem: Patient Centered Care  Goal: Patient preferences are identified and integrated in the patient's plan of care  Description: Interventions:  - What would you like us to know as we care for you? This is my third time having diverticulitis  - Provide timely, complete, and accurate information to patient/family  - Incorporate patient and family knowledge, values, beliefs, and cultural backgrounds into the planning and delivery of care  - Encourage patient/family to participate in care and decision-making at the level they choose  - Honor patient and family perspectives and choices  Outcome: Progressing     Problem: Patient/Family Goals  Goal: Patient/Family Long Term Goal  Description: Patient's Long Term Goal: Discharge home    Interventions:  - Manage abdominal pain  - Tolerate adequate oral intake without complication  - Determine appropriate antibiotic plan  - See additional Care Plan goals for specific interventions  Outcome: Progressing  Goal: Patient/Family Short Term Goal  Description: Patient's Short Term Goal: Manage abdominal pain    Interventions:   - Transition from IV to oral pain medications  - Bowel rest with slow diet tolerance  - Eat small frequent light meals  - See additional Care Plan goals for specific interventions  Outcome: Progressing     Problem: PAIN - ADULT  Goal: Verbalizes/displays adequate comfort level or patient's stated pain goal  Description: INTERVENTIONS:  - Encourage pt to monitor pain and request assistance  - Assess pain using appropriate pain scale  - Administer analgesics based on type and  severity of pain and evaluate response  - Implement non-pharmacological measures as appropriate and evaluate response  - Consider cultural and social influences on pain and pain management  - Manage/alleviate anxiety  - Utilize distraction and/or relaxation techniques  - Monitor for opioid side effects  - Notify MD/LIP if interventions unsuccessful or patient reports new pain  - Anticipate increased pain with activity and pre-medicate as appropriate  Outcome: Progressing     Problem: RISK FOR INFECTION - ADULT  Goal: Absence of fever/infection during anticipated neutropenic period  Description: INTERVENTIONS  - Monitor WBC  - Administer growth factors as ordered  - Implement neutropenic guidelines  Outcome: Progressing     Problem: DISCHARGE PLANNING  Goal: Discharge to home or other facility with appropriate resources  Description: INTERVENTIONS:  - Identify barriers to discharge w/pt and caregiver  - Include patient/family/discharge partner in discharge planning  - Arrange for needed discharge resources and transportation as appropriate  - Identify discharge learning needs (meds, wound care, etc)  - Arrange for interpreters to assist at discharge as needed  - Consider post-discharge preferences of patient/family/discharge partner  - Complete POLST form as appropriate  - Assess patient's ability to be responsible for managing their own health  - Refer to Case Management Department for coordinating discharge planning if the patient needs post-hospital services based on physician/LIP order or complex needs related to functional status, cognitive ability or social support system  Outcome: Progressing     Problem: GASTROINTESTINAL - ADULT  Goal: Maintains or returns to baseline bowel function  Description: INTERVENTIONS:  - Assess bowel function  - Maintain adequate hydration with IV or PO as ordered and tolerated  - Evaluate effectiveness of GI medications  - Encourage mobilization and activity  - Obtain nutritional  consult as needed  - Establish a toileting routine/schedule  - Consider collaborating with pharmacy to review patient's medication profile  Outcome: Progressing  Goal: Minimal or absence of nausea and vomiting  Description: INTERVENTIONS:  - Maintain adequate hydration with IV or PO as ordered and tolerated  - Nasogastric tube to low intermittent suction as ordered  - Evaluate effectiveness of ordered antiemetic medications  - Provide nonpharmacologic comfort measures as appropriate  - Advance diet as tolerated, if ordered  - Obtain nutritional consult as needed  - Evaluate fluid balance  Outcome: Progressing  Goal: Maintains adequate nutritional intake (undernourished)  Description: INTERVENTIONS:  - Monitor percentage of each meal consumed  - Identify factors contributing to decreased intake, treat as appropriate  - Assist with meals as needed  - Monitor I&O, WT and lab values  - Obtain nutritional consult as needed  - Optimize oral hygiene and moisture  - Encourage food from home; allow for food preferences  - Enhance eating environment  Outcome: Progressing     Problem: HEMATOLOGIC - ADULT  Goal: Free from bleeding injury  Description: (Example usage: patient with low platelets)  INTERVENTIONS:  - Avoid intramuscular injections, enemas and rectal medication administration  - Ensure safe mobilization of patient  - Hold pressure on venipuncture sites to achieve adequate hemostasis  - Assess for signs and symptoms of internal bleeding  - Monitor lab trends  - Patient is to report abnormal signs of bleeding to staff  - Avoid use of toothpicks and dental floss  - Use electric shaver for shaving  - Use soft bristle tooth brush  - Limit straining and forceful nose blowing  Outcome: Progressing

## 2024-04-23 NOTE — CM/SW NOTE
04/23/24 1200   Discharge disposition   Expected discharge disposition Home or Self   Discharge transportation Private car       MD discharge order entered.  CM followed up with patient regarding HH services on discharge.  Patient declined HH needs at this time.    Kalie Matta RN, BSN  Nurse   [phone]

## 2024-04-23 NOTE — PLAN OF CARE
Patient alert and oriented x4. Vss. On room air. Tolerating diet, prn zofran for nausea. Output out of ostomy. Patient reports confidence with ostomy. No leaking noted. Midline incision c/d/I. Supplies given. PICC removed. Cleared for discharge. Instructions given. Education given.   Problem: Patient Centered Care  Goal: Patient preferences are identified and integrated in the patient's plan of care  Description: Interventions:  - What would you like us to know as we care for you? This is my third time having diverticulitis  - Provide timely, complete, and accurate information to patient/family  - Incorporate patient and family knowledge, values, beliefs, and cultural backgrounds into the planning and delivery of care  - Encourage patient/family to participate in care and decision-making at the level they choose  - Honor patient and family perspectives and choices  Outcome: Adequate for Discharge     Problem: Patient/Family Goals  Goal: Patient/Family Long Term Goal  Description: Patient's Long Term Goal: Discharge home    Interventions:  - Manage abdominal pain  - Tolerate adequate oral intake without complication  - Determine appropriate antibiotic plan  - See additional Care Plan goals for specific interventions  Outcome: Adequate for Discharge  Goal: Patient/Family Short Term Goal  Description: Patient's Short Term Goal: Manage abdominal pain    Interventions:   - Transition from IV to oral pain medications  - Bowel rest with slow diet tolerance  - Eat small frequent light meals  - See additional Care Plan goals for specific interventions  Outcome: Adequate for Discharge     Problem: PAIN - ADULT  Goal: Verbalizes/displays adequate comfort level or patient's stated pain goal  Description: INTERVENTIONS:  - Encourage pt to monitor pain and request assistance  - Assess pain using appropriate pain scale  - Administer analgesics based on type and severity of pain and evaluate response  - Implement  non-pharmacological measures as appropriate and evaluate response  - Consider cultural and social influences on pain and pain management  - Manage/alleviate anxiety  - Utilize distraction and/or relaxation techniques  - Monitor for opioid side effects  - Notify MD/LIP if interventions unsuccessful or patient reports new pain  - Anticipate increased pain with activity and pre-medicate as appropriate  Outcome: Adequate for Discharge     Problem: RISK FOR INFECTION - ADULT  Goal: Absence of fever/infection during anticipated neutropenic period  Description: INTERVENTIONS  - Monitor WBC  - Administer growth factors as ordered  - Implement neutropenic guidelines  Outcome: Adequate for Discharge     Problem: DISCHARGE PLANNING  Goal: Discharge to home or other facility with appropriate resources  Description: INTERVENTIONS:  - Identify barriers to discharge w/pt and caregiver  - Include patient/family/discharge partner in discharge planning  - Arrange for needed discharge resources and transportation as appropriate  - Identify discharge learning needs (meds, wound care, etc)  - Arrange for interpreters to assist at discharge as needed  - Consider post-discharge preferences of patient/family/discharge partner  - Complete POLST form as appropriate  - Assess patient's ability to be responsible for managing their own health  - Refer to Case Management Department for coordinating discharge planning if the patient needs post-hospital services based on physician/LIP order or complex needs related to functional status, cognitive ability or social support system  Outcome: Adequate for Discharge     Problem: GASTROINTESTINAL - ADULT  Goal: Maintains or returns to baseline bowel function  Description: INTERVENTIONS:  - Assess bowel function  - Maintain adequate hydration with IV or PO as ordered and tolerated  - Evaluate effectiveness of GI medications  - Encourage mobilization and activity  - Obtain nutritional consult as needed  -  Establish a toileting routine/schedule  - Consider collaborating with pharmacy to review patient's medication profile  Outcome: Adequate for Discharge  Goal: Minimal or absence of nausea and vomiting  Description: INTERVENTIONS:  - Maintain adequate hydration with IV or PO as ordered and tolerated  - Nasogastric tube to low intermittent suction as ordered  - Evaluate effectiveness of ordered antiemetic medications  - Provide nonpharmacologic comfort measures as appropriate  - Advance diet as tolerated, if ordered  - Obtain nutritional consult as needed  - Evaluate fluid balance  Outcome: Adequate for Discharge  Goal: Maintains adequate nutritional intake (undernourished)  Description: INTERVENTIONS:  - Monitor percentage of each meal consumed  - Identify factors contributing to decreased intake, treat as appropriate  - Assist with meals as needed  - Monitor I&O, WT and lab values  - Obtain nutritional consult as needed  - Optimize oral hygiene and moisture  - Encourage food from home; allow for food preferences  - Enhance eating environment  Outcome: Adequate for Discharge     Problem: HEMATOLOGIC - ADULT  Goal: Free from bleeding injury  Description: (Example usage: patient with low platelets)  INTERVENTIONS:  - Avoid intramuscular injections, enemas and rectal medication administration  - Ensure safe mobilization of patient  - Hold pressure on venipuncture sites to achieve adequate hemostasis  - Assess for signs and symptoms of internal bleeding  - Monitor lab trends  - Patient is to report abnormal signs of bleeding to staff  - Avoid use of toothpicks and dental floss  - Use electric shaver for shaving  - Use soft bristle tooth brush  - Limit straining and forceful nose blowing  Outcome: Adequate for Discharge

## 2024-04-23 NOTE — PROGRESS NOTES
INFECTIOUS DISEASE PROGRESS NOTE    Don Cabral Patient Status:  Inpatient    1965 MRN [id number]   Location [facility] 4W/SW/SE Attending Tamica Hardy MD   Hosp Day # 21 PCP Stephen Christianson MD     SUBJECTIVE  Doing well off abx. Discharge today. Still some reflux, nausea.    ASSESSMENT/PLAN:    Antibiotics:  zosyn -, meropenem  --      Impression:     # Acute sigmoid diverticulitis with microperforation, no drainable abscess seen on CT  - most recent CT  with increased inflammation but no abscess.   - continues to have pain. Started on TPN 24  -  s/p OR 4/15/24 for L colectomy, end colostomy    # leukocytosis  # Abdominal pain, ?ileus  # Recurrent sigmoid diverticulitis     Recommend:     -  monitor off abx  -  ok to discharge from ID standpoint  -  follow fever curve, wbc.   - d/w patient, staff        History of Present Illness:   Patient is a 58 year old male with h/o recurrent diverticulitis, cardiac arrhythmia, depression, NELLIE.  Presents with recurrent diverticulitis.  CT showed cute sigmoid diverticulitis.  Trace suspected focus of associated localized pneumoperitoneum/micro perforation.  No evidence of a drainable associated fluid collection.  Progressed inflammatory change adjacent to the hepatic flexure and proximal sigmoid colon.  Interval development of mild circumferential wall thickening splenic flexure and proximal sigmoid colon which may reflect a nonspecific colitis.  No evidence of pneumatosis or associated drainable fluid collection.     Started on zosyn.  Still with abdominal pain but slowly improved.      OBJECTIVE  /67 (BP Location: Left arm)   Pulse 69   Temp 97.8 °F (36.6 °C) (Oral)   Resp 18   Wt 243 lb 9.9 oz (110.5 kg)   SpO2 96%   BMI 30.45 kg/m²     General: No acute distress. Alert.  HEENT: EOMI   Abdomen: post op dressing, ostomy w/output  Musculoskeletal: No edema  Integument: No rashes    Labs: Reviewed    Microbiology:  Reviewed    Imaging: Reviewed       Efrain Young MD  [facility]  [phone]

## 2024-04-24 ENCOUNTER — PATIENT OUTREACH (OUTPATIENT)
Dept: CASE MANAGEMENT | Age: 59
End: 2024-04-24

## 2024-04-24 ENCOUNTER — TELEPHONE (OUTPATIENT)
Dept: SURGERY | Facility: CLINIC | Age: 59
End: 2024-04-24

## 2024-04-24 DIAGNOSIS — K57.92 ACUTE DIVERTICULITIS: Primary | ICD-10-CM

## 2024-04-24 DIAGNOSIS — Z02.9 ENCOUNTERS FOR UNSPECIFIED ADMINISTRATIVE PURPOSE: ICD-10-CM

## 2024-04-24 NOTE — PROGRESS NOTES
NCM attempted to reach the patient to complete a TCM/HFU call. Left message to call back. [facility] provided direct contact info at [phone].

## 2024-04-24 NOTE — TELEPHONE ENCOUNTER
Per patient needs post op appointment for next week, no openings available. Please call thank you.

## 2024-04-24 NOTE — PAYOR COMM NOTE
--------------  DISCHARGE REVIEW    Payor: [employer]  Subscriber #:  [id number]  Authorization Number: [id number]    Admit date: 4/2/24  Admit time:   5:55 PM  Discharge Date: 4/23/2024  2:19 PM     Admitting Physician: Elaine Ramey MD  Attending Physician:  No att. providers found  Primary Care Physician: Stephen Christianson MD          Discharge Summary Notes        Discharge Summary signed by Tamica Hardy MD at 4/23/2024 10:06 AM       Author: Tamica Hardy MD Specialty: HOSPITALIST, Internal Medicine Author Type: Physician    Filed: 4/23/2024 10:06 AM Date of Service: 4/23/2024 10:03 AM Status: Signed    : Tamica Hardy MD (Physician)           [facility] Hospitalist Discharge Summary   Patient ID:  Don Cabral  [id number]  58 year old  9/28/1965    Admit date: 4/2/2024  Discharge date: 4/23/2024  Primary Care Physician: Stephen Christianson MD   Attending Physician: Tamica Hardy MD   Consults:   Consultants         Provider   Role Specialty     Faustino Lucero MD      Consulting Physician SURGERY, GENERAL     Dillon Capps DO      Consulting Physician INFECTIOUS DISEASES     Salazar Cross MD      Consulting Physician INFECTIOUS DISEASES     Rick Novoa MD      Consulting Physician SURGERY, GENERAL     Shannan Brooks MD      Consulting Physician GASTROENTEROLOGY            Discharge Diagnoses:   Sigmoid diverticulitis    Reason for admission  Copied from admission H&P: The patient is a 58-year-old  male who was seen at [facility] on March 27 for abdominal pain. CT scan of the abdomen at that time showed short segment descending colon thickening with findings consistent with possible epiploic appendagitis. The patient continued to have persistent worsening pain in the left lower quadrant area, came in today for evaluation. CBC and chemistry were unremarkable. CT scan of the abdomen showed acute sigmoid diverticulitis with trace suspected focus of associated localized  pneumoperitoneum microperforation, no evidence of drainable associated fluid collection, progressed inflammatory changes adjacent to the hepatic flexure and proximal sigmoid colon, interval development of mild circumferential wall thickening splenic flexure and proximal sigmoid colon which may reflect nonspecific colitis. The patient was started on IV Zosyn, and he will be admitted to the hospital for further management.     Hospital Course:    Acute Sigmoid diverticulitis  - failed outpt PO abx.   - history of recurrent diverticulitis  - CT showed acute sigmoid diverticulitis with microperforation, no fluid collection.   - s/p L colectomy with end coloscopy and splenic flexure mobilization 4/15   - prolonged hospitalization required TPN  - wound care for ostomy   - Home on Tramadol as needed  - Norco 7.5 mg 2 tablets every 6 hours as needed  - Home on pantoprazole  - Tolerating diet  - off abx completed course of meropenem.      Hypoxia   - resolved.   - Likely from atelectasis   - increase activity   - IS     Pt relays hx of PVCs, a-fib, LBBB.  Hx of intermittent premature ventricular tachycardia   - remote tele     Hypertension  - cont lisinopril, verapamil     GERD  - PPI PO and Maalox     Major depressive disorder   Pt says he talked to his psychiatrist and and can stop neurontin.  - restart neurontin  - cont klonopin, zoloft, zyprexa             EXAM:   GENERAL: no apparent distress, comfortable  NEURO: A/A Ox3, no focal deficits  RESP: non labored, CTAB/L  CARDIO: Regular, no murmur  ABD: soft, NT, ND  EXTREMITIES: no edema, no calf tenderness    Operative Procedures: Procedure(s) (LRB):  EXPLORATORY LAPAROTOMY, LEFT COLECTOMY WITH END COLOSTOMY, SPLENIC FLEXURE MOBILIZATION (N/A)  COLON RESECTION LEFT (N/A)  COLOSTOMY (N/A)    Discharge Instructions     Medication List        START taking these medications      alum-mag hydroxide-simethicone 200-200-20 MG/5ML Susp  Commonly known as: Maalox  Notes to  patient: Acid reflux     HYDROcodone-acetaminophen 7.5-325 MG Tabs  Commonly known as: Norco  Take 1 tablet by mouth every 6 (six) hours as needed.  Replaces: HYDROcodone-acetaminophen 5-325 MG Tabs  Notes to patient: For pain -- Do not take at same time of Tylenol because this medicine has Tylenol in it     ondansetron 4 mg tablet  Commonly known as: Zofran  Take 1 tablet (4 mg total) by mouth every 8 (eight) hours as needed for Nausea.  Notes to patient: For nausea     traMADol 50 MG Tabs  Commonly known as: Ultram  Take 1 tablet (50 mg total) by mouth every 6 (six) hours as needed.  Notes to patient: For pain            CHANGE how you take these medications      clonazePAM 0.5 MG Tabs  Commonly known as: KlonoPIN  What changed:   how much to take  how to take this  when to take this  additional instructions     sertraline 25 MG Tabs  Commonly known as: Zoloft  What changed:   medication strength  how much to take  how to take this  when to take this  additional instructions            CONTINUE taking these medications      acidophilus-pectin Caps  Commonly known as: Probiotic     albuterol 108 (90 Base) MCG/ACT Aers  Commonly known as: Ventolin HFA  Inhale 2 puffs into the lungs every 4 (four) hours as needed for Wheezing or Shortness of Breath.     DOCULASE OR     gabapentin 600 MG Tabs  Commonly known as: Neurontin  Take 1 tablet (600 mg total) by mouth 3 (three) times daily.     lisinopril 20 MG Tabs  Commonly known as: Prinivil; Zestril  Take 1 tablet (20 mg total) by mouth daily.     Multi-Vitamin/Minerals Tabs     OLANZapine 2.5 MG Tabs  Commonly known as: ZyPREXA  Take 1 tablet (2.5 mg total) by mouth nightly.     Tadalafil 20 MG Tabs  Take 1 tablet (20 mg total) by mouth daily as needed for Erectile Dysfunction.     verapamil  MG Tbcr  Commonly known as: Calan-SR  Take 1 tablet (120 mg total) by mouth nightly.            STOP taking these medications      HYDROcodone-acetaminophen 5-325 MG  Tabs  Commonly known as: Norco  Replaced by: HYDROcodone-acetaminophen 7.5-325 MG Tabs               Where to Get Your Medications        These medications were sent to [id number] - [place], [place] - [address] [phone], [phone]  [address] [facility] [address]      Hours: 24-hours Phone: [phone]   HYDROcodone-acetaminophen 7.5-325 MG Tabs  ondansetron 4 mg tablet  traMADol 50 MG Tabs         Activity: activity as tolerated  Diet: regular diet  Wound Care: NA  Code Status: Full Code          Important follow up:   Follow-up Information       Serge Qureshi MD. Call.    Specialty: Surgical Oncology  Contact information:  [address]  [place] [address] [phone]                             -PCP in [] within 7 days [] within 14 days [] other     Disposition: home  Discharged Condition: good    Hospital Discharge Diagnoses:  diverticulitis    Lace+ Score: 67  59-90 High Risk  29-58 Medium Risk  0-28   Low Risk.    TCM Follow-Up Recommendation:  LACE > 58: High Risk of readmission after discharge from the hospital.            Total Time Coordinating Care: Greater than 30 minutes    Patient had opportunity to ask questions, state understanding, and agree with therapeutic plan as outlined    Tamica Hardy MD  Hospitalist  4/23/2024        Electronically signed by Tamica Hardy MD on 4/23/2024 10:06 AM         REVIEWER COMMENTS

## 2024-04-25 ENCOUNTER — PATIENT OUTREACH (OUTPATIENT)
Dept: CASE MANAGEMENT | Age: 59
End: 2024-04-25

## 2024-04-25 NOTE — TELEPHONE ENCOUNTER
Per Aaliyah with bedside scheduling states patient called yesterday for an appointment and states patient is a little upset that he has not received a call from a nurse to schedule a 1 week post op. Per Aaliyah please call patient to schedule, thank you

## 2024-04-25 NOTE — PROGRESS NOTES
Initial Post Discharge Follow Up   Discharge Date: 4/23/24  Contact Date: 4/25/2024    Consent Verification:  Assessment Completed With: Patient  HIPAA Verified?  Yes    Discharge Dx:   Acute Sigmoid diverticulitis  - s/p L colectomy with end coloscopy and splenic flexure mobilization 4/15   Hypoxia   Pt relays hx of PVCs, a-fib, LBBB.  Hx of intermittent premature ventricular tachycardia   Hypertension  GERD  Major depressive disorder     General:   How have you been since your discharge from the hospital? The patient reported doing well at home. The patient did admit to abdominal incision pain rated 4/10 for intensity. The patient reported surgical incisions have been healing well and denies any redness, pain, edema, discharge/bleeding, foul odor or heat coming from the incision sites. The incisions were described as intact and soft to the touch. The patient also denies any malaise or fever. The patient reported having ongoing colostomy output and denies any bleeding from the stoma, melena/ hematochezia, hardened stool, inability to pass gas or stool, change in the stoma color or shape, redness, edema or drainage from the incision. The patient also denies any n/v. The patient denies any bilateral calf edema, pain, warmth, redness or tenderness. The patient denies any chest pain, trouble breathing/SOB, coughing, dizziness/syncope, or irregular heart beats. The patient has not checked BP since discharge but denies any headache or confusion.     Do you have any pain since discharge?  Yes  Where: abdominal incisions   Rating on pain scale 1-10, 10 being the worst pain you have ever experienced, what is current pain: 4  Is the pain manageable at home? Yes  How well was your pain managed while in the hospital?   On a scale of 1-5   1- Very Poor and 5- Very well   Very Well  When you were leaving the hospital were your discharge instructions reviewed with you? Yes  How well were your discharge instructions explained to  you?   On a scale of 1-5   1- Very Poor and 5- Very well   Very Well  Do you have any questions about your discharge instructions?  No  Before leaving the hospital was your diagnoses explained to you? Yes  Do you have any questions about your diagnoses? No  Are you able to perform normal daily activities of living as you have prior to your hospital stay (dressing, bathing, ambulating to the bathroom, etc)? yes  (NCM) Was patient given a different diet per AVS? no      Medications:   Current Outpatient Medications   Medication Sig Dispense Refill    GABAPENTIN 600 MG Oral Tab TAKE ONE TABLET BY MOUTH THREE TIMES DAILY 90 tablet 0    clonazePAM 0.5 MG Oral Tab Take 1 tablet (0.5 mg total) by mouth 3 (three) times daily.      HYDROcodone-acetaminophen 7.5-325 MG Oral Tab Take 1 tablet by mouth every 6 (six) hours as needed. 30 tablet 0    sertraline 25 MG Oral Tab Take 3 tablets (75 mg total) by mouth daily.      alum-mag hydroxide-simethicone 200-200-20 MG/5ML Oral Suspension Take 30 mL by mouth 4 (four) times daily as needed for Indigestion.      ondansetron (ZOFRAN) 4 mg tablet Take 1 tablet (4 mg total) by mouth every 8 (eight) hours as needed for Nausea. 30 tablet 1    traMADol 50 MG Oral Tab Take 1 tablet (50 mg total) by mouth every 6 (six) hours as needed. 30 tablet 0    OLANZapine (ZYPREXA) 2.5 MG Oral Tab Take 1 tablet (2.5 mg total) by mouth nightly. 30 tablet 2    Docusate Sodium (DOCULASE OR) Take by mouth.      lisinopril 20 MG Oral Tab Take 1 tablet (20 mg total) by mouth daily. 90 tablet 3    acidophilus-pectin Oral Cap Take 1 capsule by mouth 3 (three) times a week.      verapamil  MG Oral Tab CR Take 1 tablet (120 mg total) by mouth nightly. 30 tablet 3    Multiple Vitamins-Minerals (MULTI-VITAMIN/MINERALS) Oral Tab Take 1 tablet by mouth daily.      Albuterol Sulfate  (90 Base) MCG/ACT Inhalation Aero Soln Inhale 2 puffs into the lungs every 4 (four) hours as needed for Wheezing or  Shortness of Breath. 1 each 3    Tadalafil 20 MG Oral Tab Take 1 tablet (20 mg total) by mouth daily as needed for Erectile Dysfunction. 8 tablet 5     Were there any changes to your current medication(s) noted on the AVS? Yes  If so, were these medication changes discussed with you prior to leaving the hospital? Yes  If a new medication was prescribed:    Was the new medication's purpose & side effects reviewed? Yes  Do you have any questions about your new medication? No  Did you  your discharge medications when you left the hospital? Yes  Let's go over your medications together to make sure we are not missing anything. Medications Reviewed  Are there any reasons that keep you from taking your medication as prescribed? No  Are you having any concerns with constipation? No      Discharge medications reviewed/discussed/and reconciled against outpatient medications with patient.  Any changes or updates to medications sent to PCP.  Patient Acknowledged     Referrals/orders at D/C:  Referrals/orders placed at D/C? no; The patient declined HH services.     Except for Home Health Services mentioned above, have you scheduled these other services? No    DME ordered at D/C? Yes  What? colostomy supplies       Discharge orders, AVS reviewed and discussed with patient. Any changes or updates to orders sent to PCP.  Patient Acknowledged      SDOH:   Transportation:    Transportation Needs: No Transportation Needs (4/25/2024)    Transportation Needs     Lack of Transportation: No       Financial Strain:   Financial Resource Strain: Low Risk  (4/25/2024)    Financial Resource Strain     Difficulty of Paying Living Expenses: Not hard at all     Med Affordability: No     Follow up appointments:      Your appointments       Date & Time Appointment Department ([place])    May 02, 2024 2:20 PM CDT Follow up with Roro Edge MD [facility] ([facility] )        Jun 12, 2024 3:30 PM CDT  Follow Up Visit with Keegan Zaidi MD [facility] ([facility])    Contact your primary care provider if your insurance requires a referral.    Please arrive 15 minutes prior to your scheduled appointment. Be sure to bring your current Insurance card, Photo ID, and medication bottles or a list of your current medications.      A 24 hour notice is required to cancel any appointment or you may be charged a $40 No Show Fee.     Important: 24 hour notice is required to cancel any appointment or you may be charged a $40 No Show Fee. Please notify your physician office.         Oct 01, 2024 6:30 AM CDT CT CHEST with PF CT [facility] ([facility])    Please arrive 15 minutes prior to your scheduled appointment time.              [facility]  [address] [facility] [address]  [phone] [address]  [facility] [address]  [phone] [facility]   [address]  [facility] [id number] [phone]            TCC  Was TCC ordered: No      PCP (If no TCC appointment)  Does patient already have a PCP appointment scheduled? No  [facility] Scheduled PCP office TCM appointment with patient on 5/6/2024 with Dr. Christianson.   The patient requested an evening appointment.       Specialist    Does the patient have any other follow up appointment(s) needing to be scheduled? Yes  Follow up with Rick Novoa in 1 week(s)  Specialty: SURGERY, GENERAL  For follow up, For suture removal  [facility]  [address]  [address]  [facility] [address]  [phone]    Does the patient need assistance scheduling appointment(s): Yes, message sent to TST team    Is there any reason as to why you cannot make your  appointment(s)?  No     Needs post D/C:   Now that you are home, are there any needs or concerns you need addressed before your next visit with your PCP?  (DME, meds, questions, etc.): No    Interventions by NCM:    Reviewed all discharge instructions with the patient with a focus on post-op pain management and wound/colostomy care. All medications were reviewed and educated on the importance of taking the medications as directed.  Patient symptoms were assessed, education was completed for signs/symptoms to monitor, and reviewed when to contact providers vs go to the ED/call 911. Appointments were reviewed and stressed the importance of a close follow up with providers. A TCM-HFU appointment was scheduled. The patient verbalized understanding and will contact the office with any further questions or concerns.       Overall Rating:   How would you rate the care you received while in the hospital? good    CCM referral placed:    Not Applicable    BOOK BY DATE: 5/7/2024

## 2024-04-25 NOTE — PROGRESS NOTES
TCM request (discharged 04/23)    Dr Rick Novoa  SURGERY, GENERAL  [facility]  [address]  [address]  [facility] [address]  [phone]  Follow up 1 week  Dr Novoa's office will call pt, due to no availability in needed time frame  Pt verbalized understanding  Closing encounter

## 2024-04-25 NOTE — PROGRESS NOTES
TCM has contacted patient and patient needs additional appointments.  Please contact patient for assistance with scheduling a follow-up appointment for  general surgery .      Follow up with Rick Novoa in 1 week(s)  Specialty: SURGERY, GENERAL  For follow up, For suture removal  [facility]  [address]  [address]  [facility] [address]  [phone]    Thank you!

## 2024-04-30 ENCOUNTER — OFFICE VISIT (OUTPATIENT)
Dept: SURGERY | Facility: CLINIC | Age: 59
End: 2024-04-30
Payer: COMMERCIAL

## 2024-04-30 VITALS — BODY MASS INDEX: 30 KG/M2 | WEIGHT: 243 LBS

## 2024-04-30 DIAGNOSIS — K57.92 DIVERTICULITIS: ICD-10-CM

## 2024-04-30 DIAGNOSIS — Z09 POSTOPERATIVE EXAMINATION: Primary | ICD-10-CM

## 2024-04-30 PROCEDURE — 99024 POSTOP FOLLOW-UP VISIT: CPT | Performed by: SURGERY

## 2024-04-30 NOTE — PROGRESS NOTES
Chief Complaint   Patient presents with    Post-Op     4/15 exp lap colostomy.  Patient states he feels well.  Denies fevers.  States appetite is good, complains of a sharp pain in his genitalia.         O:  Wt 243 lb (110.2 kg)   BMI 30.37 kg/m²   GEN:  No acute distress  Abd:  Soft, NTND, incisions C/D/I, ostomy in place        Assessment   1. Postoperative examination        Doing well.  Staples removed.  Tentatively plan for ostomy reversal in mid July.  Bowel prep, fleets enema and po abx.         Rick Novoa MD

## 2024-05-06 ENCOUNTER — OFFICE VISIT (OUTPATIENT)
Dept: FAMILY MEDICINE CLINIC | Facility: CLINIC | Age: 59
End: 2024-05-06
Payer: COMMERCIAL

## 2024-05-06 VITALS
HEIGHT: 75 IN | RESPIRATION RATE: 16 BRPM | OXYGEN SATURATION: 98 % | BODY MASS INDEX: 30.71 KG/M2 | WEIGHT: 247 LBS | SYSTOLIC BLOOD PRESSURE: 126 MMHG | DIASTOLIC BLOOD PRESSURE: 68 MMHG | HEART RATE: 72 BPM

## 2024-05-06 DIAGNOSIS — N50.812 PAIN IN BOTH TESTICLES: ICD-10-CM

## 2024-05-06 DIAGNOSIS — N50.811 PAIN IN BOTH TESTICLES: ICD-10-CM

## 2024-05-06 DIAGNOSIS — K57.32 SIGMOID DIVERTICULITIS: ICD-10-CM

## 2024-05-06 DIAGNOSIS — K94.19 ALTERED BOWEL ELIMINATION DUE TO INTESTINAL OSTOMY (HCC): ICD-10-CM

## 2024-05-06 DIAGNOSIS — Z09 HOSPITAL DISCHARGE FOLLOW-UP: Primary | ICD-10-CM

## 2024-05-06 NOTE — PROGRESS NOTES
Subjective:   Don Cabral is a 58 year old male who presents for hospital follow up.   He was discharged from [facility] to Home or Self Care  Admission Date: 4/2/24   Discharge Date: 4/23/24  Hospital Discharge Diagnosis: sigmoid diverticulitis resulting in surgery and ostomy    Interactive contact within 2 business days post discharge first initiated on Date: 4/24/2024    During the visit, the following was completed:  Obtained and reviewed discharge summary, continuity of care documents, and Hospitalist notes  Reviewed Labs (CBC, CMP)    HPI: Don presented to the hospital with diverticulitis, and signs of perforation on 4/2/24, was less conclusive CT scan the week prior, and was sent home with pain control.  Visit was prolonged, and ended up with surgery with ostomy in place due to resection of bowel. Took antibiotics for a week prior to surgery, but didn't resolve it, and had extended course due to this. Ended up doing surgery under the circumstances as not improving. Dealt with pain and ileus after the surgery. Plan on re-anastomosis in July.    Healing pretty well on abdomen, pain resolved.    Testicles sensitive since 4 days after discharge.      History/Other:   Current Medications:  Medication Reconciliation:  I am aware of an inpatient discharge within the last 30 days.  The discharge medication list has been reconciled with the patient's current medication list and reviewed by me.  See medication list for additions of new medication, and changes to current doses of medications and discontinued medications.  Outpatient Medications Marked as Taking for the 5/6/24 encounter (Office Visit) with Stephen Christianson MD   Medication Sig    clonazePAM 0.5 MG Oral Tab Take 1 tablet (0.5 mg total) by mouth 3 (three) times daily.    OLANZapine (ZYPREXA) 2.5 MG Oral Tab Take 1 tablet (2.5 mg total) by mouth nightly.    sertraline (ZOLOFT) 50 MG Oral Tab 1.5 tablets daily    GABAPENTIN 600 MG Oral Tab TAKE  ONE TABLET BY MOUTH THREE TIMES DAILY    HYDROcodone-acetaminophen 7.5-325 MG Oral Tab Take 1 tablet by mouth every 6 (six) hours as needed.    ondansetron (ZOFRAN) 4 mg tablet Take 1 tablet (4 mg total) by mouth every 8 (eight) hours as needed for Nausea.    OLANZapine (ZYPREXA) 2.5 MG Oral Tab Take 1 tablet (2.5 mg total) by mouth nightly.    lisinopril 20 MG Oral Tab Take 1 tablet (20 mg total) by mouth daily.    acidophilus-pectin Oral Cap Take 1 capsule by mouth 3 (three) times a week.    verapamil  MG Oral Tab CR Take 1 tablet (120 mg total) by mouth nightly.    Multiple Vitamins-Minerals (MULTI-VITAMIN/MINERALS) Oral Tab Take 1 tablet by mouth daily.    Albuterol Sulfate  (90 Base) MCG/ACT Inhalation Aero Soln Inhale 2 puffs into the lungs every 4 (four) hours as needed for Wheezing or Shortness of Breath.    Tadalafil 20 MG Oral Tab Take 1 tablet (20 mg total) by mouth daily as needed for Erectile Dysfunction.       Review of Systems:  GENERAL: weight stable, energy stable, no sweating  SKIN: denies any unusual skin lesions  EYES: denies blurred vision or double vision  HEENT: denies nasal congestion, sinus pain or ST  LUNGS: denies shortness of breath with exertion  CARDIOVASCULAR: denies chest pain on exertion or palpitations  GI: denies abdominal pain, denies heartburn, denies diarrhea  MUSCULOSKELETAL: denies pain, normal range of motion of extremities  NEURO: denies headaches, denies dizziness, denies weakness  PSYCHE: denies depression or anxiety  HEMATOLOGIC: denies hx of anemia, or bruising, denies bleeding  ENDOCRINE: denies thyroid history  ALL/ASTHMA: denies hx of allergy or asthma    Objective:   No LMP for male patient.  Estimated body mass index is 30.87 kg/m² as calculated from the following:    Height as of this encounter: 6' 3\" (1.905 m).    Weight as of this encounter: 247 lb (112 kg).   /68   Pulse 72   Resp 16   Ht 6' 3\" (1.905 m)   Wt 247 lb (112 kg)   SpO2 98%    BMI 30.87 kg/m²    Abd: ostomy in place on left side of abdomen, incision sites healed well  Genital: sensitive to testicular palpation, no obvious hernia, or mass.    Assessment & Plan:   1. Hospital discharge follow-up (Primary)  2. Sigmoid diverticulitis  3. Altered bowel elimination due to intestinal ostomy (HCC)  4. Pain in both testicles  -     US SCROTUM W/ DOPPLER (CPT=93975/96463); Future; Expected date: 05/06/2024  Plan on imaging, and treat based on findings.      No follow-ups on file.

## 2024-05-07 ENCOUNTER — HOSPITAL ENCOUNTER (OUTPATIENT)
Dept: ULTRASOUND IMAGING | Age: 59
Discharge: HOME | End: 2024-05-07
Attending: FAMILY MEDICINE
Payer: COMMERCIAL

## 2024-05-07 DIAGNOSIS — N50.812 PAIN IN BOTH TESTICLES: ICD-10-CM

## 2024-05-07 DIAGNOSIS — N50.811 PAIN IN BOTH TESTICLES: ICD-10-CM

## 2024-05-07 PROCEDURE — 76870 US EXAM SCROTUM: CPT | Performed by: FAMILY MEDICINE

## 2024-05-07 PROCEDURE — 93975 VASCULAR STUDY: CPT | Performed by: FAMILY MEDICINE

## 2024-05-09 ENCOUNTER — PATIENT MESSAGE (OUTPATIENT)
Dept: FAMILY MEDICINE CLINIC | Facility: CLINIC | Age: 59
End: 2024-05-09

## 2024-05-09 ENCOUNTER — TELEPHONE (OUTPATIENT)
Facility: CLINIC | Age: 59
End: 2024-05-09

## 2024-05-09 DIAGNOSIS — N50.812 PAIN IN BOTH TESTICLES: Primary | ICD-10-CM

## 2024-05-09 DIAGNOSIS — N50.811 PAIN IN BOTH TESTICLES: Primary | ICD-10-CM

## 2024-05-09 RX ORDER — NAPROXEN 500 MG/1
500 TABLET ORAL 2 TIMES DAILY WITH MEALS
Qty: 28 TABLET | Refills: 0 | Status: SHIPPED | OUTPATIENT
Start: 2024-05-09

## 2024-05-09 NOTE — TELEPHONE ENCOUNTER
[employer] message sent to pt, pt to contact office with response.  [phone] (home)

## 2024-05-09 NOTE — TELEPHONE ENCOUNTER
Will try an NSAID course to start, if not improving into next week to let me know, would consider urology eval and consider abx    Stephen Christianson MD

## 2024-05-09 NOTE — TELEPHONE ENCOUNTER
From: Don Rowe  To: Stephen Christianson  Sent: 5/9/2024 6:32 AM CDT  Subject: PACHECO rowe scrotum pain    Just read your note as I was writing this. Thought the pain was improving yesterday until the pm when it returned as bad as it’s been since the beginning - enough to make me nauseous and need 1/2 a pain pill to prepare for sleep. On Day 11 now and it’s interfering with my life schedule / work. Suggestions? Is there a specialist I should see? CT/MRI of the groin area to see if anything obvious pops out? Meds you suggest I’m happy to try. Let me know, thanks!

## 2024-05-09 NOTE — TELEPHONE ENCOUNTER
Results from US 5/7/24  Hi Don,     The results look normal, reassuring, if pain improving could continue to watch and wait, if not could try empiric treatment for concerns I was worried for, however usually shows up on US. Often I'll try a course of NSAIDs before antibiotics.     Stephen Christianson MD   Written by Stephen Christianson MD on 5/9/2024  6:27 AM CDT  Seen by patient Don Cabral on 5/9/2024  6:28 AM    Pt is still having pain. Please see msg and advise.

## 2024-05-10 ENCOUNTER — TELEPHONE (OUTPATIENT)
Dept: SURGERY | Facility: CLINIC | Age: 59
End: 2024-05-10

## 2024-05-10 NOTE — TELEPHONE ENCOUNTER
Marlyn calling from [address] [place] [address] to gain verbal consent to send the patient ostomy supplies. Please advise.

## 2024-05-15 ENCOUNTER — PATIENT MESSAGE (OUTPATIENT)
Dept: FAMILY MEDICINE CLINIC | Facility: CLINIC | Age: 59
End: 2024-05-15

## 2024-05-15 ENCOUNTER — TELEPHONE (OUTPATIENT)
Dept: SURGERY | Facility: CLINIC | Age: 59
End: 2024-05-15

## 2024-05-15 NOTE — TELEPHONE ENCOUNTER
Received request for ostomy supplies from [address] [place] [address].  MD signed orders and faxed back to company.  Sent to scan with confirmation.

## 2024-05-16 NOTE — TELEPHONE ENCOUNTER
From: Don Cabral  To: Stephen Christianson  Sent: 5/15/2024 12:12 PM CDT  Subject: PACHECO Cabral electrophysiology results     Forgot to mention during my recent visit that I had a visit with [facility] to review results of my LINQ monitor tracking, and they reported very good news - there was no evidence of arrhythmia any more. No PVC’s, no A-fib, nothing. Looks like the plan of modifying meds last fall, getting on the CPAP and losing a few pounds has worked. Next appt is with Dr. Mars on a few months. Thought I’d let you know since they’re not on MyChart.

## 2024-05-31 ENCOUNTER — OFFICE VISIT (OUTPATIENT)
Dept: SURGERY | Facility: CLINIC | Age: 59
End: 2024-05-31

## 2024-05-31 DIAGNOSIS — N50.811 PAIN IN BOTH TESTICLES: Primary | ICD-10-CM

## 2024-05-31 DIAGNOSIS — N50.812 PAIN IN BOTH TESTICLES: Primary | ICD-10-CM

## 2024-05-31 PROCEDURE — 99204 OFFICE O/P NEW MOD 45 MIN: CPT | Performed by: UROLOGY

## 2024-05-31 RX ORDER — AMOXICILLIN AND CLAVULANATE POTASSIUM 875; 125 MG/1; MG/1
1 TABLET, FILM COATED ORAL 2 TIMES DAILY
Qty: 14 TABLET | Refills: 0 | Status: SHIPPED | OUTPATIENT
Start: 2024-05-31

## 2024-05-31 RX ORDER — LEVOFLOXACIN 500 MG/1
500 TABLET, FILM COATED ORAL DAILY
Qty: 14 TABLET | Refills: 0 | Status: SHIPPED | OUTPATIENT
Start: 2024-05-31 | End: 2024-05-31

## 2024-05-31 NOTE — PROGRESS NOTES
Urology Clinic Note - New Patient    Referring Provider:  No referring provider defined for this encounter.     Primary Care Provider:  Stephen Christianson MD     Chief Complaint:   Scrotal content pain    HPI:     Don Cabral is a 58 year old male with history of depression, NELLIE,  referred for scrotal content pain.    Patient recently had ex lap colostomy with Dr. Novoa for sigmoid diverticulitis 4/1;  reversal to be done in 2 months.   Patient reports that once the drains were removed after above surgery he has had ongoing testicular pain in both testicles. This comes and goes but has been persistent for more than 1 month. NSAIDs and scrotal support help. Mostly dull pain.   No significant voiding symptoms.   An US was done 5/6;   Impression  CONCLUSION:  Scattered benign-appearing calcifications in both testicles are noted.  Testicular ultrasound is otherwise unremarkable.    He reports no other urologic history- no history of undescended testicle, no family history of testicular cancer   No sysetmic symptoms.        PSA:  Lab Results   Component Value Date    PSA 2.53 08/02/2021    PSA 2.13 08/11/2020    PSA 1.57 08/13/2019    PSA 1.47 08/23/2018    PSA 1.56 07/24/2017    PSA 1.78 08/29/2016      Last Cr was 0.6 done on 4/22/2024.      History:     Past Medical History:    Allergic rhinitis    Anxiety    Arrhythmia    T1 AV block, LBBB    Back pain    Back problem    Depression    Diverticulitis    ED (erectile dysfunction)    High blood pressure    Pneumonia due to organism    Shortness of breath    Sleep apnea    Unspecified essential hypertension    Visual impairment       Past Surgical History:   Procedure Laterality Date    Cholecystectomy  3/12/2023    Colonoscopy N/A 03/22/2016    [facility].  Hyperplastic polyp only.  Repeat 2026    Colonoscopy  05/10/2023    Dental surgery procedure      [place] teeth removed    Incision/drain abscess extra  1996    Knee arthroscopy Right     Laparoscopic cholecystectomy   2023    Myringoplasty      Other surgical history      R knee arthroscope    Skin tissue procedure unlisted      Tonsillectomy         Family History   Problem Relation Age of Onset    Heart Disorder Father         aflutter with ablation    Hypertension Father     Cancer Father 60        Prostate    Heart Disorder Mother         murmur no treatment needed    Hypertension Mother     Stroke Maternal Grandmother     Other (stroke) Maternal Grandmother     Dementia Paternal Grandmother     Heart Disorder Paternal Grandfather         two heart attacks and CHF    Diabetes Paternal Grandfather     Other (Diabetes, type 2) Paternal Grandfather        Social History     Socioeconomic History    Marital status:    Occupational History    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[employer]   Tobacco Use    Smoking status: Former     Current packs/day: 0.00     Average packs/day: 0.5 packs/day for 19.0 years (9.5 ttl pk-yrs)     Types: Cigarettes     Start date: 1981     Quit date: 2000     Years since quittin.9     Passive exposure: Never    Smokeless tobacco: Never   Vaping Use    Vaping status: Never Used   Substance and Sexual Activity    Alcohol use: Not Currently     Comment: Stopped alcohol 2000    Drug use: No    Sexual activity: Yes     Partners: Female   Other Topics Concern    Caffeine Concern Yes     Comment: coffee in AM/ soda     Exercise Yes     Comment: X1-2 times per week      Social Determinants of Health     Financial Resource Strain: Low Risk  (2024)    Financial Resource Strain     Difficulty of Paying Living Expenses: Not hard at all     Med Affordability: No   Food Insecurity: No Food Insecurity (2024)    Food Insecurity     Food Insecurity: Never true   Transportation Needs: No Transportation Needs (2024)    Transportation Needs     Lack of Transportation: No   Housing Stability: Low Risk  (2024)    Housing Stability     Housing Instability: No       Medications  (Active prior to today's visit):  Current Outpatient Medications   Medication Sig Dispense Refill    naproxen 500 MG Oral Tab Take 1 tablet (500 mg total) by mouth 2 (two) times daily with meals. 28 tablet 0    clonazePAM 0.5 MG Oral Tab Take 1 tablet (0.5 mg total) by mouth 3 (three) times daily. 90 tablet 2    OLANZapine (ZYPREXA) 2.5 MG Oral Tab Take 1 tablet (2.5 mg total) by mouth nightly. 90 tablet 0    sertraline (ZOLOFT) 50 MG Oral Tab 1.5 tablets daily 135 tablet 0    GABAPENTIN 600 MG Oral Tab TAKE ONE TABLET BY MOUTH THREE TIMES DAILY 90 tablet 0    HYDROcodone-acetaminophen 7.5-325 MG Oral Tab Take 1 tablet by mouth every 6 (six) hours as needed. 30 tablet 0    ondansetron (ZOFRAN) 4 mg tablet Take 1 tablet (4 mg total) by mouth every 8 (eight) hours as needed for Nausea. 30 tablet 1    OLANZapine (ZYPREXA) 2.5 MG Oral Tab Take 1 tablet (2.5 mg total) by mouth nightly. 30 tablet 2    Docusate Sodium (DOCULASE OR) Take by mouth.      lisinopril 20 MG Oral Tab Take 1 tablet (20 mg total) by mouth daily. 90 tablet 3    acidophilus-pectin Oral Cap Take 1 capsule by mouth 3 (three) times a week.      verapamil  MG Oral Tab CR Take 1 tablet (120 mg total) by mouth nightly. 30 tablet 3    Multiple Vitamins-Minerals (MULTI-VITAMIN/MINERALS) Oral Tab Take 1 tablet by mouth daily.      Albuterol Sulfate  (90 Base) MCG/ACT Inhalation Aero Soln Inhale 2 puffs into the lungs every 4 (four) hours as needed for Wheezing or Shortness of Breath. 1 each 3    Tadalafil 20 MG Oral Tab Take 1 tablet (20 mg total) by mouth daily as needed for Erectile Dysfunction. 8 tablet 5       Allergies:  Allergies   Allergen Reactions    Seasonal OTHER (SEE COMMENTS)     congestion         Review of Systems:   A comprehensive 10-point review of systems was completed.  Pertinent positives and negatives are noted in the the HPI.    Physical Exam:   CONSTITUTIONAL: Well developed, well nourished, in no acute  distress  NEUROLOGIC: Alert and oriented  HEAD: Normocephalic, atraumatic  ENT: Hearing intact   RESPIRATORY: Normal respiratory effort  SKIN: No evident rashes  ABDOMEN: Soft, non-tender, non-distended,    GENITOURINARY: Normal phallus, orthotopic meatus, normal bilateral testicles but tender to palpation which limited exam    US SCROTUM W/ DOPPLER (CPT=93975/83764)    Result Date: 5/7/2024  CONCLUSION:  Scattered benign-appearing calcifications in both testicles are noted.  Testicular ultrasound is otherwise unremarkable.   LOCATION:  [facility]    Dictated by (CST): Salazar Diop MD on 5/07/2024 at 6:51 PM     Finalized by (CST): Salazar Diop MD on 5/07/2024 at 6:52 PM          Assessment & Plan:     Don Cabral is a 58 year old male with history of depression, NELLIE,  referred for scrotal content pain.    # Scrotal content pain; unclear etiology- could be related to pelvic surgery with nerve related discomfort, however he is tender on exam. Reviewed previous US in detail. Asides from microlithiasis was overall normal.  We discussed trial of antibiotics given his pain as treatment for potential orchitis or repeating a US first. He would like to trial treatment. Will start augmentin. Repeat US.   Discussed PFPT, he would like to trial. Order placed. He may start after his colostomy reversal surgery.May have some pelvic floor dyfunction after his above surgery.   # Microlihtiasis   We  reviewed the 2018 literature review from the  Journal of Urology (Cent  J Urol. 2018; 71(3): 310-314). Discussed that testicular microlithiasis can be found in up to 5% of the general male population on US and in the absence of RF (such as prior h/o testis cancer, testicular maldescent, 1st degree relative with testis cancer, testis atrophy < 12 mL) that data suggests that testicular microlithiasis should not increase a man's risk for testis cancer. Follow-up is only recommended where risks factors of testicular  cancer other than testicular microlithiasis are present. Men may continue regular testicular self exams and monitor for changes.  He has none of the above risk factors.   Continue self-exams.        Thank you for this consult.    I have personally reviewed all relevant medical records, labs, and imaging.     Denver Bowser MD  Staff Urologist  [facility]  Office: [phone]

## 2024-06-12 ENCOUNTER — HOSPITAL ENCOUNTER (OUTPATIENT)
Dept: ULTRASOUND IMAGING | Age: 59
Discharge: HOME | End: 2024-06-12
Attending: UROLOGY
Payer: COMMERCIAL

## 2024-06-12 ENCOUNTER — OFFICE VISIT (OUTPATIENT)
Facility: CLINIC | Age: 59
End: 2024-06-12
Payer: COMMERCIAL

## 2024-06-12 VITALS
OXYGEN SATURATION: 97 % | SYSTOLIC BLOOD PRESSURE: 122 MMHG | BODY MASS INDEX: 31.83 KG/M2 | RESPIRATION RATE: 16 BRPM | HEART RATE: 68 BPM | WEIGHT: 256 LBS | TEMPERATURE: 98 F | DIASTOLIC BLOOD PRESSURE: 64 MMHG | HEIGHT: 75 IN

## 2024-06-12 DIAGNOSIS — G47.33 OSA (OBSTRUCTIVE SLEEP APNEA): ICD-10-CM

## 2024-06-12 DIAGNOSIS — I10 ESSENTIAL HYPERTENSION: ICD-10-CM

## 2024-06-12 DIAGNOSIS — G47.19 DAYTIME HYPERSOMNOLENCE: Primary | ICD-10-CM

## 2024-06-12 DIAGNOSIS — N50.812 PAIN IN BOTH TESTICLES: ICD-10-CM

## 2024-06-12 DIAGNOSIS — N50.811 PAIN IN BOTH TESTICLES: ICD-10-CM

## 2024-06-12 PROCEDURE — 93975 VASCULAR STUDY: CPT | Performed by: UROLOGY

## 2024-06-12 PROCEDURE — 76870 US EXAM SCROTUM: CPT | Performed by: UROLOGY

## 2024-06-12 PROCEDURE — 99214 OFFICE O/P EST MOD 30 MIN: CPT | Performed by: INTERNAL MEDICINE

## 2024-06-12 NOTE — PROGRESS NOTES
This is a 58 year old male who presents with the following symptoms, risk factors, behaviors or other items associated with sleep problems.    Sleep Apnea:   family member with sleep apnea; previous sleep study; previous CPAP use  Insomnia:  no symptoms of Insomnia  Restless Leg:  urge to move is worse when seated or lying  Parasomnias:   no symptoms of parasomnias  Daytime Problems:  previous sleep study    The patient's [place] Sleepiness score is .    Don Cabral  2024 - 2024  : 1965  Age: 58 years  [facility]  [address], [address]  Phone: [phone]  Compliance Report  Usage 2024 - 2024  Usage days 86/90 days (96%)  >= 4 hours 79 days (88%)  < 4 hours 7 days (8%)  Usage hours 557 hours 24 minutes  Average usage (total days) 6 hours 12 minutes  Average usage (days used) 6 hours 29 minutes  Median usage (days used) 6 hours 56 minutes  Total used hours (value since last reset - 2024) 1,647 hours  AirSense 11 AutoSet  Serial number [id number]  Mode AutoSet  Min Pressure 9 cmH2O  Max Pressure 20 cmH2O  EPR Fulltime  EPR level 2  Response Standard  Therapy  Pressure - cmH2O Median: 11.8 95th percentile: 18.0 Maximum: 19.1  Leaks - L/min Median: 4.5 95th percentile: 44.7 Maximum: 70.3  Events per hour AI: 3.8 HI: 1.8 AHI: 5.6  Apnea Index Central: 0.3 Obstructive: 1.7 Unknown: 1.8  RERA Index 1.3  Cheyne-Andrea respiration (average duration per night) 0 minutes (0%)

## 2024-06-12 NOTE — PATIENT INSTRUCTIONS
Plan:    Continue current auto CPAP 9-20 as patient is using and benefiting from CPAP use  Advise DME company to perform a Mask Fit   Advised about weight loss   Advised against drowsy driving and to avoid alcoholic beverage and respiratory depressants as these may worsen sleep apnea        Follow-up: 4 months      Keegan Zaidi MD    Obstructive Sleep Apnea  Obstructive sleep apnea is a condition caused by air passages becoming narrowed or blocked during sleep. As a result, breathing stops for short periods. Your body wakes up enough for breathing to start again. But you don't remember it. The cycle of stopped breathing and brief awakenings can repeat dozens of times a night. This prevents the body from getting to the deeper stages of sleep that are needed for good rest.   Signs of sleep apnea include loud snoring, noisy breathing, and gasping sounds during sleep. People with sleep apnea often find they use the bathroom many times during the night. Daytime symptoms include waking up tired after a full night's sleep and waking up with headaches. They can also include feeling very sleepy or falling asleep during the day, and having problems with memory or concentration.   Risk factors for sleep apnea include:  Being overweight  Being assigned male at birth, or being in menopause  Smoking  Using alcohol or sedating medicines  Having enlarged structures in the nose or throat such as enlarged tonsils or adenoids, or extra tissue in the airway  Home care  Lifestyle changes that can help treat snoring and sleep apnea include:   If you're overweight, talk with your healthcare provider about a weight-loss plan for you.  Don't drink alcohol for 3 to 4 hours before bedtime.  Don't take sedating medicines. Ask your healthcare provider about the medicines you take.  If you smoke, talk to your provider about ways to quit. It's important to stay away from secondhand smoke. Don't use e-cigarettes  because of their harmful side effects.  Sleep on your side. This can help prevent gravity from pulling relaxed throat tissues into your breathing passages.  If you have allergies or sinus problems that block your nose, ask your provider for help.  Use positive airway pressure (PAP). Discuss with your provider the benefits of using PAP at home. And talk about the type of PAP that's best for you.  Follow-up care  Follow up with your healthcare provider, or as advised. A diagnosis of sleep apnea is made with a sleep study. Your provider can tell you more about this test.   When to get medical care  See your healthcare provider if you have daytime symptoms of sleep apnea. These include:   Waking up tired after a full night's sleep  Waking up with a headache  Feeling very sleepy or falling asleep during the day  Having problems with memory or concentration  Also talk with your provider if your partner tells you that you snore, gasp for air, or stop breathing while you sleep.   Seeing your provider is important because sleep apnea can make you more likely to have certain health problems. These include high blood pressure, heart attack, stroke, and sexual dysfunction. If you have sleep apnea, talk with your healthcare provider about the best treatments for you.   [employer] last reviewed this educational content on 5/1/2022  © 0079-1906 [employer]. All rights reserved. This information is not intended as a substitute for professional medical care. Always follow your healthcare professional's instructions.        Continuous Positive Air Pressure (CPAP)     A mask over the nose gently directs air into the throat to keep the airway open.     Continuous positive air pressure (CPAP) uses gentle air pressure to hold the airway open. CPAP is often the most effective treatment for sleep apnea. It works very well as a treatment for adults diagnosed with obstructive sleep apnea with a lot of sleepiness. But keep in mind  that it can take several adjustments before the setup is right for you.   How CPAP works  The CPAP machine  is a small portable pump that sits beside the bed. The pump sends air through a hose, which is held over your nose alone, or nose and mouth by a mask. Mild air pressure is gently pushed through your airway. The air pressure nudges sagging tissues aside. This widens the airway so you can breathe better. CPAP may be combined with other kinds of therapy for sleep apnea.   Types of air pressure treatments  There are different types of CPAP. Your doctor or CPAP technician will help you decide which type is best for you:   Basic CPAP keeps the pressure constant all night long.  A bilevel device (BiPAP) provides more pressure when you breathe in and less when you breathe out. A BiPAP machine also may be set to provide automatic breaths to maintain breathing if you stop breathing while sleeping.  An autoCPAP device automatically adjusts pressure throughout the night and in response to changes such as body position, sleep stage, and snoring.  [employer] last reviewed this educational content on 7/1/2019  © 0816-2226 [employer]. All rights reserved. This information is not intended as a substitute for professional medical care. Always follow your healthcare professional's instructions.

## 2024-06-12 NOTE — PROGRESS NOTES
Pulmonary/Critical Care/Sleep Medicine   Progress Note     PCP: Stephen Christianson MD   Phone: [phone]          HPI   I had the pleasure of seeing Don Cabral who is a pleasant 58 year old male who presents for follow up of obstructive Sleep apnea after visit on 12/12/2023     Since last visit the patient developed sigmoid diverticulitis and required resection of sigmoid and descending colon and has colostomy in place which is being planned to be reversed in mid July 2024    Since last visit the patient was started on auto CPAP due to insurance requirement. The patient reports using auto CPAP daily at night at 9-20  cmH2O regularly throughout the night for about 6 hours and states that the  PAP machine is quiet and has a heated humidifier.  But he notes air leak from the mask.  He feels less tired though         He drinks no caffeine beverages      He has pets one dog that does not sleep in bed.      Hx of tobacco use: He  reports that he quit smoking about 23 years ago. His smoking use included cigarettes. He started smoking about 42 years ago. He has a 9.5 pack-year smoking history. He has never been exposed to tobacco smoke. He has never used smokeless tobacco.    Past Medical History:    Allergic rhinitis    Anxiety    Arrhythmia    T1 AV block, LBBB    Back pain    Back problem    Depression    Diverticulitis    ED (erectile dysfunction)    High blood pressure    Pneumonia due to organism    Shortness of breath    Sleep apnea    Unspecified essential hypertension    Visual impairment      Past Surgical History:   Procedure Laterality Date    Cholecystectomy  3/12/2023    Colonoscopy N/A 03/22/2016    [facility].  Hyperplastic polyp only.  Repeat 2026    Colonoscopy  05/10/2023    Dental surgery procedure      [place] teeth removed    Incision/drain abscess extra  1996    Knee arthroscopy Right     Laparoscopic cholecystectomy  02/12/2023    Myringoplasty      Other surgical history      R knee arthroscope     Skin tissue procedure unlisted      Tonsillectomy       Allergies   Allergen Reactions    Seasonal OTHER (SEE COMMENTS)     congestion     Current Outpatient Medications   Medication Sig Dispense Refill    gabapentin 600 MG Oral Tab Take 1 tablet (600 mg total) by mouth 3 (three) times daily. 270 tablet 0    clonazePAM 0.5 MG Oral Tab Take 1 tablet (0.5 mg total) by mouth 3 (three) times daily. 90 tablet 2    OLANZapine (ZYPREXA) 2.5 MG Oral Tab Take 1 tablet (2.5 mg total) by mouth nightly. 90 tablet 0    sertraline (ZOLOFT) 50 MG Oral Tab 1.5 tablets daily 135 tablet 0    lisinopril 20 MG Oral Tab Take 1 tablet (20 mg total) by mouth daily. 90 tablet 3    acidophilus-pectin Oral Cap Take 1 capsule by mouth 3 (three) times a week.      verapamil  MG Oral Tab CR Take 1 tablet (120 mg total) by mouth nightly. 30 tablet 3    Multiple Vitamins-Minerals (MULTI-VITAMIN/MINERALS) Oral Tab Take 1 tablet by mouth daily.      Albuterol Sulfate  (90 Base) MCG/ACT Inhalation Aero Soln Inhale 2 puffs into the lungs every 4 (four) hours as needed for Wheezing or Shortness of Breath. 1 each 3    HYDROcodone-acetaminophen 7.5-325 MG Oral Tab Take 1 tablet by mouth every 6 (six) hours as needed. (Patient not taking: Reported on 6/12/2024) 30 tablet 0    ondansetron (ZOFRAN) 4 mg tablet Take 1 tablet (4 mg total) by mouth every 8 (eight) hours as needed for Nausea. (Patient not taking: Reported on 6/12/2024) 30 tablet 1    Tadalafil 20 MG Oral Tab Take 1 tablet (20 mg total) by mouth daily as needed for Erectile Dysfunction. (Patient not taking: Reported on 6/12/2024) 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[employer]   Tobacco Use    Smoking status: Former     Current packs/day: 0.00     Average packs/day: 0.5 packs/day for 19.0 years (9.5 ttl pk-yrs)     Types: Cigarettes     Start date: 7/4/1981     Quit date:  2000     Years since quittin.9     Passive exposure: Never    Smokeless tobacco: Never   Vaping Use    Vaping status: Never Used   Substance and Sexual Activity    Alcohol use: Not Currently     Comment: Stopped alcohol 2000    Drug use: No    Sexual activity: Yes     Partners: Female   Other Topics Concern    Caffeine Concern Yes     Comment: coffee in AM/ soda     Exercise Yes     Comment: X1-2 times per week      Social Determinants of Health     Financial Resource Strain: Low Risk  (2024)    Financial Resource Strain     Difficulty of Paying Living Expenses: Not hard at all     Med Affordability: No   Food Insecurity: No Food Insecurity (2024)    Food Insecurity     Food Insecurity: Never true   Transportation Needs: No Transportation Needs (2024)    Transportation Needs     Lack of Transportation: No   Housing Stability: Low Risk  (2024)    Housing Stability     Housing Instability: No      Immunization History   Administered Date(s) Administered    >=3 YRS TRI  MULTIDOSE VIAL (92746) FLU CLINIC 2014    Covid-19 Vaccine Moderna 100 mcg/0.5 ml 2021, 2021, 2021    Covid-19 Vaccine Moderna 50 Mcg/0.25 Ml 2022    Covid-19 Vaccine Moderna Bivalent 50mcg/0.5mL 12+ years 2022    FLU VAC QIV SPLIT 3 YRS AND OLDER (06244) 2016, 2018, 2020, 11/10/2021    FLUZONE 6 months and older PFS 0.5 ml (63895) 10/14/2022, 2023    HEP A 07/10/2008, 2009    Pfizer Covid-19 Vaccine 30mcg/0.3ml 12yrs+ (0992-9500) 2023    Pneumococcal (Prevnar 13) 2018    Pneumovax 23 2019    TDAP 2014    Typhoid, Oral 2018, 05/15/2018    Zoster Vaccine Recombinant Adjuvanted (Shingrix) 2019, 2020      Family History   Problem Relation Age of Onset    Heart Disorder Father         Ablation required    Hypertension Father     Cancer Father 60        Prostate    Heart Disorder Mother         murmur no treatment needed     Hypertension Mother     Stroke Maternal Grandmother     Other (stroke) Maternal Grandmother     Dementia Paternal Grandmother     Heart Disorder Paternal Grandfather         two heart attacks and CHF    Diabetes Paternal Grandfather     Other (Diabetes, type 2) Paternal Grandfather         Review of Systems   Constitutional:  Positive for fatigue. Negative for diaphoresis, fever and unexpected weight change.   HENT:  Positive for congestion (more in spring and fall). Negative for mouth sores, nosebleeds, postnasal drip, rhinorrhea, sore throat and trouble swallowing.    Eyes:  Negative for visual disturbance.   Respiratory:  Negative for apnea, cough, choking, chest tightness, shortness of breath and wheezing.    Cardiovascular:  Negative for chest pain, palpitations and leg swelling.   Gastrointestinal:  Negative for abdominal pain, constipation, diarrhea, nausea and vomiting.   Genitourinary:  Negative for difficulty urinating.   Musculoskeletal:  Negative for arthralgias, back pain, gait problem and myalgias.   Neurological:  Negative for dizziness, weakness and headaches.   Psychiatric/Behavioral:  Positive for sleep disturbance.      Vitals:    06/12/24 1546   BP: 122/64   Pulse: 68   Resp: 16   Temp: 97.8 °F (36.6 °C)      Body mass index is 32 kg/m².     Physical Exam  Constitutional:       General: He is not in acute distress.     Appearance: Normal appearance. He is obese. He is not ill-appearing or diaphoretic.   HENT:      Head: Normocephalic and atraumatic.      Nose: Nose normal. No congestion or rhinorrhea.      Comments: Narrow edematous nares      Mouth/Throat:      Mouth: Mucous membranes are moist.      Pharynx: Oropharynx is clear. No oropharyngeal exudate or posterior oropharyngeal erythema.      Comments: Mallampati class III palate   Eyes:      Extraocular Movements: Extraocular movements intact.      Pupils: Pupils are equal, round, and reactive to light.   Cardiovascular:      Rate and  Rhythm: Normal rate.      Pulses: Normal pulses.      Heart sounds: Normal heart sounds. No murmur heard.  Pulmonary:      Effort: Pulmonary effort is normal. No respiratory distress.      Breath sounds: Normal breath sounds. No wheezing or rhonchi.   Chest:      Chest wall: No tenderness.   Abdominal:      General: Abdomen is flat. Bowel sounds are normal.      Palpations: Abdomen is soft.   Musculoskeletal:         General: Normal range of motion.   Skin:     General: Skin is warm.   Neurological:      General: No focal deficit present.      Mental Status: He is alert and oriented to person, place, and time.   Psychiatric:         Mood and Affect: Mood normal.         Behavior: Behavior normal.         Thought Content: Thought content normal.         Judgment: Judgment normal.              Labs:  Personally Reviewed in epic      Imaging:  Personally Reviewed in epic       Study 9/27/2023 Type: Diagnostic   Procedure: The Polysomnogram was performed with a sleep technologist in attendance at the [facility]. The following parameters were monitored: central, occipital and temporal EEG, EOG, submentalis EMG, nasal flow, nasal pressure, respiratory inductance plethysmography and oxygen saturation via continuous pulse oximetry. Sleep stages, periodic limb movements and EEG arousals were scored in accordance with the American Academy of Sleep Medicine Manual for the Scoring of Sleep and Associated Events. Apnea Hypopnea Index was calculated using the recommended definition of hypopnea for scoring respiratory events. Data acquisition, collection and scoring have been validated and clinically correlated.   Sleep History: CHAYITO MEDEIROS is a 57 year old Male referred by Dr. Keegan Zaidi for the evaluation of suspected sleep disordered breathing.   Past Medical History is pertinent for rhinitis, anxiety, back pain, depression, high blood pressure, pneumonia.   At the time of the study, the patient was  prescribed the following medications: clonazepam, multivitamin HCTZ Zoloft, gabapentin.   The patient was studied from 09:45:52 PM to 05:37:27 AM, with a total recording time of 471.6, total sleep time of 350.0 and a sleep efficiency of 74.2%. Wake after sleep onset was 98.0 minutes. The percentage of total sleep time by sleep stage is as follows: Stage 1 1.6%, Stage 2 81.9%, Stage 3 8.3% and Stage REM 8.3%.   Respiratory Analysis: The Apnea-Hypopnea Index (AHI) was 8.4 events per hour. REM related AHI was 10.3 events per hour. Supine related AHI was 45.0. Lateral related AHI was 7.5. The Central Apnea Index was 0. The oxygen saturation gwen was 80.0% and patient spent 47.7% with saturations less than 88%.   Snoring Profile: Snoring was moderate.   Cardiac Profile: Electrocardiogram demonstrated normal sinus rhythm.   EEG Profile: Based on the limited recording montage, there were no significant EEG abnormalities noted. There were 77 arousals, with a total arousal index of 13.2.   Periodic Limb Movements: PLM index of 1.4. PLM arousal index of 0.5.   IMPRESSIONS: This study demonstrates mild obstructive sleep apnea.   Diagnosis: Obstructive Sleep Apnea G47.33   RECOMMENDATIONS:   1. It is recommended to proceed with CPAP titration.   2. The patient should avoid alcohol and sedative medications, as these may worsen severity of symptoms.   3. The patient should avoid drowsy driving.   4. Patient to follow up with a sleep specialist in the pulmonary clinic.     Thank you for allowing me to participate in this patient's care. Please do not hesitate to contact me with any additional questions.   Dictated by: Dr. Zaidi   Electronically signed by Keegan Zaidi MD   (Electronic Signature)   Board Certified in Sleep Medicine     This is a 58 year old male who presents with the following symptoms, risk factors, behaviors or other items associated with sleep problems.     Sleep Apnea:   family member with sleep apnea;  previous sleep study; previous CPAP use  Insomnia:  no symptoms of Insomnia  Restless Leg:  urge to move is worse when seated or lying  Parasomnias:   no symptoms of parasomnias  Daytime Problems:  previous sleep study     The patient's [place] Sleepiness score is .     Don Cabral  2024 - 2024  : 1965  Age: 58 years  [facility]  [address]  [place], [address]  Phone: [phone]  Compliance Report  Usage 2024 - 2024  Usage days 86/90 days (96%)  >= 4 hours 79 days (88%)  < 4 hours 7 days (8%)  Usage hours 557 hours 24 minutes  Average usage (total days) 6 hours 12 minutes  Average usage (days used) 6 hours 29 minutes  Median usage (days used) 6 hours 56 minutes  Total used hours (value since last reset - 2024) 1,647 hours  AirSense 11 AutoSet  Serial number [id number]  Mode AutoSet  Min Pressure 9 cmH2O  Max Pressure 20 cmH2O  EPR Fulltime  EPR level 2  Response Standard  Therapy  Pressure - cmH2O Median: 11.8 95th percentile: 18.0 Maximum: 19.1  Leaks - L/min Median: 4.5 95th percentile: 44.7 Maximum: 70.3  Events per hour AI: 3.8 HI: 1.8 AHI: 5.6  Apnea Index Central: 0.3 Obstructive: 1.7 Unknown: 1.8  RERA Index 1.3  Cheyne-Andrea respiration (average duration per night) 0 minutes (0%)       Impression:  Obstructive sleep apnea syndrome: attended sleep study on 2024 showed Apnea-Hypopnea Index (AHI) was 8.4 events per hour. REM related AHI was 10.3 events per hour. Supine related AHI was 45.0. Lateral related AHI was 7.5. The Central Apnea Index was 0. The oxygen saturation gwen was 80.0% and patient spent 47.7% with saturations less than 88%. Treated with auto CPAP at 9-20 cwp Download data on 2024 for 90  days shows adequate obstructive AHI and compliance although there is an air leak form the mask  Recent left lower lobe community-acquired pneumonia 2023   Allergic rhinitis  Anxiety disorder  Anemia    Depression  Hypertension  Obesity, class   Hx sigmoiditis with bowel resection /so colostomy                               Plan:    Continue current auto CPAP 9-20 as patient is using and benefiting from CPAP use  Advise [employer] company to perform a Mask Fit   Advised about weight loss   Advised against drowsy driving and to avoid alcoholic beverage and respiratory depressants as these may worsen sleep apnea        Follow-up: 4 months    Thank you for allowing me to participate in your patient care.    BANDAR Zaidi MD, FACP, FCCP, FAASM - Pulmonary/Critical care/Sleep Medicine  Please contact our office if you have any questions or concerns at [phone]

## 2024-06-17 ENCOUNTER — TELEPHONE (OUTPATIENT)
Dept: SURGERY | Facility: CLINIC | Age: 59
End: 2024-06-17

## 2024-06-17 RX ORDER — METRONIDAZOLE 500 MG/1
TABLET ORAL
Qty: 6 TABLET | Refills: 0 | Status: SHIPPED | OUTPATIENT
Start: 2024-06-17

## 2024-06-17 RX ORDER — NEOMYCIN SULFATE 500 MG/1
TABLET ORAL
Qty: 6 TABLET | Refills: 0 | Status: SHIPPED | OUTPATIENT
Start: 2024-06-17

## 2024-06-20 ENCOUNTER — TELEPHONE (OUTPATIENT)
Dept: SURGERY | Facility: CLINIC | Age: 59
End: 2024-06-20

## 2024-06-20 NOTE — TELEPHONE ENCOUNTER
Patient received a pre-surgery instructions and medication from his PCP for his surgery. Patient has questions about why his PCP sent this and not 's office. He also is asking if he is being admitted the day before surgery. Please call.

## 2024-06-20 NOTE — TELEPHONE ENCOUNTER
Spoke with patient, assuring him the bowel prep medications have seen sent from Dr. Novoa and the instructions he received were from Dr. Novoa also.  Per the patient, he has heart arrythmia and he may have a problem with the bowel prep.  His Cardiologist is Dr. Mars with [facility].  He will reach out to him, and we will sent a message asking if this bowel prep is appropriate.        Dr. Mars - Mr. Cabral is having surgery in July for a partial colectomy and anastamosis.  His bowel prep includes Miralax, Gatorade and antibiotics, Flagyl and Neomycin,  Is this acceptable for him the night before surgery?  Dolores advise.  Thank you.

## 2024-07-02 NOTE — TELEPHONE ENCOUNTER
Per patient he has unanswered question. Per patient instructions with preparation for surgery has possibility with messing with his cardiac medicine. Per patient he feels as though Dr. Novoa needs to speak with Cardiologist to assure patient will be fine for preparation before procedure. Please advise.

## 2024-07-05 ENCOUNTER — LAB ENCOUNTER (OUTPATIENT)
Dept: LAB | Age: 59
End: 2024-07-05
Attending: SURGERY
Payer: COMMERCIAL

## 2024-07-05 DIAGNOSIS — Z01.818 PREOP TESTING: ICD-10-CM

## 2024-07-05 LAB
ANION GAP SERPL CALC-SCNC: 6 MMOL/L (ref 0–18)
ANTIBODY SCREEN: NEGATIVE
BASOPHILS # BLD AUTO: 0.01 X10(3) UL (ref 0–0.2)
BASOPHILS NFR BLD AUTO: 0.1 %
BUN BLD-MCNC: 10 MG/DL (ref 9–23)
CALCIUM BLD-MCNC: 9.2 MG/DL (ref 8.5–10.1)
CHLORIDE SERPL-SCNC: 104 MMOL/L (ref 98–112)
CO2 SERPL-SCNC: 30 MMOL/L (ref 21–32)
CREAT BLD-MCNC: 0.84 MG/DL
EGFRCR SERPLBLD CKD-EPI 2021: 102 ML/MIN/1.73M2 (ref 60–?)
EOSINOPHIL # BLD AUTO: 0.09 X10(3) UL (ref 0–0.7)
EOSINOPHIL NFR BLD AUTO: 1.3 %
ERYTHROCYTE [DISTWIDTH] IN BLOOD BY AUTOMATED COUNT: 14.2 %
GLUCOSE BLD-MCNC: 104 MG/DL (ref 70–99)
HCT VFR BLD AUTO: 46.8 %
HGB BLD-MCNC: 14.1 G/DL
IMM GRANULOCYTES # BLD AUTO: 0.02 X10(3) UL (ref 0–1)
IMM GRANULOCYTES NFR BLD: 0.3 %
LYMPHOCYTES # BLD AUTO: 1.85 X10(3) UL (ref 1–4)
LYMPHOCYTES NFR BLD AUTO: 27.7 %
MCH RBC QN AUTO: 27.1 PG (ref 26–34)
MCHC RBC AUTO-ENTMCNC: 30.1 G/DL (ref 31–37)
MCV RBC AUTO: 89.8 FL
MONOCYTES # BLD AUTO: 0.56 X10(3) UL (ref 0.1–1)
MONOCYTES NFR BLD AUTO: 8.4 %
NEUTROPHILS # BLD AUTO: 4.15 X10 (3) UL (ref 1.5–7.7)
NEUTROPHILS # BLD AUTO: 4.15 X10(3) UL (ref 1.5–7.7)
NEUTROPHILS NFR BLD AUTO: 62.2 %
OSMOLALITY SERPL CALC.SUM OF ELEC: 289 MOSM/KG (ref 275–295)
PLATELET # BLD AUTO: 366 10(3)UL (ref 150–450)
POTASSIUM SERPL-SCNC: 3.9 MMOL/L (ref 3.5–5.1)
RBC # BLD AUTO: 5.21 X10(6)UL
RH BLOOD TYPE: POSITIVE
SODIUM SERPL-SCNC: 140 MMOL/L (ref 136–145)
WBC # BLD AUTO: 6.7 X10(3) UL (ref 4–11)

## 2024-07-05 PROCEDURE — 80048 BASIC METABOLIC PNL TOTAL CA: CPT

## 2024-07-05 PROCEDURE — 86850 RBC ANTIBODY SCREEN: CPT

## 2024-07-05 PROCEDURE — 86901 BLOOD TYPING SEROLOGIC RH(D): CPT

## 2024-07-05 PROCEDURE — 86900 BLOOD TYPING SEROLOGIC ABO: CPT

## 2024-07-05 PROCEDURE — 85025 COMPLETE CBC W/AUTO DIFF WBC: CPT

## 2024-07-05 PROCEDURE — 36415 COLL VENOUS BLD VENIPUNCTURE: CPT

## 2024-07-05 NOTE — TELEPHONE ENCOUNTER
Dr. Novoa - Please see message below and advise.  I have sent a message and a letter to his cardiologist and have not heard back.  Thank you.

## 2024-07-09 NOTE — TELEPHONE ENCOUNTER
Cardiac clearance received from [facility].  Scanned into patient's chart, patient made aware this is received.

## 2024-07-16 RX ORDER — SODIUM CHLORIDE, SODIUM LACTATE, POTASSIUM CHLORIDE, CALCIUM CHLORIDE 600; 310; 30; 20 MG/100ML; MG/100ML; MG/100ML; MG/100ML
INJECTION, SOLUTION INTRAVENOUS CONTINUOUS
Status: DISCONTINUED | OUTPATIENT
Start: 2024-07-16 | End: 2024-07-16

## 2024-07-17 ENCOUNTER — ANESTHESIA EVENT (OUTPATIENT)
Dept: SURGERY | Facility: HOSPITAL | Age: 59
End: 2024-07-17
Payer: COMMERCIAL

## 2024-07-17 ENCOUNTER — ANESTHESIA (OUTPATIENT)
Dept: SURGERY | Facility: HOSPITAL | Age: 59
End: 2024-07-17
Payer: COMMERCIAL

## 2024-07-17 ENCOUNTER — HOSPITAL ENCOUNTER (INPATIENT)
Facility: HOSPITAL | Age: 59
LOS: 8 days | Discharge: HOME | End: 2024-07-25
Attending: SURGERY | Admitting: HOSPITALIST
Payer: COMMERCIAL

## 2024-07-17 DIAGNOSIS — K57.92 DIVERTICULITIS: ICD-10-CM

## 2024-07-17 DIAGNOSIS — Z01.818 PREOP TESTING: Primary | ICD-10-CM

## 2024-07-17 PROCEDURE — 0DN80ZZ RELEASE SMALL INTESTINE, OPEN APPROACH: ICD-10-PCS | Performed by: SURGERY

## 2024-07-17 PROCEDURE — 44626 REPAIR BOWEL OPENING: CPT | Performed by: SURGERY

## 2024-07-17 PROCEDURE — 99223 1ST HOSP IP/OBS HIGH 75: CPT | Performed by: HOSPITALIST

## 2024-07-17 PROCEDURE — 0DBL0ZZ EXCISION OF TRANSVERSE COLON, OPEN APPROACH: ICD-10-PCS | Performed by: SURGERY

## 2024-07-17 PROCEDURE — 0DNU0ZZ RELEASE OMENTUM, OPEN APPROACH: ICD-10-PCS | Performed by: SURGERY

## 2024-07-17 RX ORDER — NALOXONE HYDROCHLORIDE 0.4 MG/ML
0.08 INJECTION, SOLUTION INTRAMUSCULAR; INTRAVENOUS; SUBCUTANEOUS AS NEEDED
Status: DISCONTINUED | OUTPATIENT
Start: 2024-07-17 | End: 2024-07-17 | Stop reason: HOSPADM

## 2024-07-17 RX ORDER — OXYCODONE HYDROCHLORIDE 5 MG/1
5 TABLET ORAL EVERY 4 HOURS PRN
Status: DISCONTINUED | OUTPATIENT
Start: 2024-07-17 | End: 2024-07-25

## 2024-07-17 RX ORDER — HYDROMORPHONE HYDROCHLORIDE 1 MG/ML
0.6 INJECTION, SOLUTION INTRAMUSCULAR; INTRAVENOUS; SUBCUTANEOUS EVERY 5 MIN PRN
Status: DISCONTINUED | OUTPATIENT
Start: 2024-07-17 | End: 2024-07-17 | Stop reason: HOSPADM

## 2024-07-17 RX ORDER — ROCURONIUM BROMIDE 10 MG/ML
INJECTION, SOLUTION INTRAVENOUS AS NEEDED
Status: DISCONTINUED | OUTPATIENT
Start: 2024-07-17 | End: 2024-07-17 | Stop reason: SURG

## 2024-07-17 RX ORDER — MORPHINE SULFATE 4 MG/ML
2 INJECTION, SOLUTION INTRAMUSCULAR; INTRAVENOUS EVERY 10 MIN PRN
Status: DISCONTINUED | OUTPATIENT
Start: 2024-07-17 | End: 2024-07-17 | Stop reason: HOSPADM

## 2024-07-17 RX ORDER — MAGNESIUM HYDROXIDE 1200 MG/15ML
LIQUID ORAL CONTINUOUS PRN
Status: DISCONTINUED | OUTPATIENT
Start: 2024-07-17 | End: 2024-07-17 | Stop reason: HOSPADM

## 2024-07-17 RX ORDER — CLONAZEPAM 0.5 MG/1
0.5 TABLET ORAL 3 TIMES DAILY PRN
Status: DISCONTINUED | OUTPATIENT
Start: 2024-07-17 | End: 2024-07-25

## 2024-07-17 RX ORDER — OXYCODONE HYDROCHLORIDE 5 MG/1
10 TABLET ORAL EVERY 4 HOURS PRN
Status: DISCONTINUED | OUTPATIENT
Start: 2024-07-17 | End: 2024-07-25

## 2024-07-17 RX ORDER — MORPHINE SULFATE 4 MG/ML
4 INJECTION, SOLUTION INTRAMUSCULAR; INTRAVENOUS EVERY 10 MIN PRN
Status: DISCONTINUED | OUTPATIENT
Start: 2024-07-17 | End: 2024-07-17 | Stop reason: HOSPADM

## 2024-07-17 RX ORDER — DEXAMETHASONE SODIUM PHOSPHATE 4 MG/ML
VIAL (ML) INJECTION AS NEEDED
Status: DISCONTINUED | OUTPATIENT
Start: 2024-07-17 | End: 2024-07-17 | Stop reason: SURG

## 2024-07-17 RX ORDER — ACETAMINOPHEN 500 MG
1000 TABLET ORAL ONCE
Status: COMPLETED | OUTPATIENT
Start: 2024-07-17 | End: 2024-07-17

## 2024-07-17 RX ORDER — ONDANSETRON 2 MG/ML
4 INJECTION INTRAMUSCULAR; INTRAVENOUS EVERY 6 HOURS PRN
Status: DISCONTINUED | OUTPATIENT
Start: 2024-07-17 | End: 2024-07-17

## 2024-07-17 RX ORDER — OLANZAPINE 2.5 MG/1
2.5 TABLET, FILM COATED ORAL NIGHTLY
Status: DISCONTINUED | OUTPATIENT
Start: 2024-07-17 | End: 2024-07-25

## 2024-07-17 RX ORDER — ONDANSETRON 2 MG/ML
INJECTION INTRAMUSCULAR; INTRAVENOUS AS NEEDED
Status: DISCONTINUED | OUTPATIENT
Start: 2024-07-17 | End: 2024-07-17 | Stop reason: SURG

## 2024-07-17 RX ORDER — GABAPENTIN 600 MG/1
600 TABLET ORAL 3 TIMES DAILY
Status: DISCONTINUED | OUTPATIENT
Start: 2024-07-17 | End: 2024-07-25

## 2024-07-17 RX ORDER — HEPARIN SODIUM 5000 [USP'U]/ML
5000 INJECTION, SOLUTION INTRAVENOUS; SUBCUTANEOUS EVERY 12 HOURS SCHEDULED
Status: DISCONTINUED | OUTPATIENT
Start: 2024-07-18 | End: 2024-07-25

## 2024-07-17 RX ORDER — ONDANSETRON 2 MG/ML
4 INJECTION INTRAMUSCULAR; INTRAVENOUS ONCE
Status: COMPLETED | OUTPATIENT
Start: 2024-07-17 | End: 2024-07-17

## 2024-07-17 RX ORDER — MORPHINE SULFATE 10 MG/ML
6 INJECTION, SOLUTION INTRAMUSCULAR; INTRAVENOUS EVERY 10 MIN PRN
Status: DISCONTINUED | OUTPATIENT
Start: 2024-07-17 | End: 2024-07-17 | Stop reason: HOSPADM

## 2024-07-17 RX ORDER — FAMOTIDINE 10 MG/ML
20 INJECTION, SOLUTION INTRAVENOUS ONCE
Status: COMPLETED | OUTPATIENT
Start: 2024-07-17 | End: 2024-07-17

## 2024-07-17 RX ORDER — LISINOPRIL 20 MG/1
20 TABLET ORAL DAILY
Status: DISCONTINUED | OUTPATIENT
Start: 2024-07-18 | End: 2024-07-25

## 2024-07-17 RX ORDER — ALBUTEROL SULFATE 90 UG/1
2 AEROSOL, METERED RESPIRATORY (INHALATION) EVERY 4 HOURS PRN
Status: DISCONTINUED | OUTPATIENT
Start: 2024-07-17 | End: 2024-07-25

## 2024-07-17 RX ORDER — METOCLOPRAMIDE 10 MG/1
10 TABLET ORAL ONCE
Status: DISCONTINUED | OUTPATIENT
Start: 2024-07-17 | End: 2024-07-17 | Stop reason: HOSPADM

## 2024-07-17 RX ORDER — ONDANSETRON 2 MG/ML
4 INJECTION INTRAMUSCULAR; INTRAVENOUS EVERY 6 HOURS PRN
Status: DISCONTINUED | OUTPATIENT
Start: 2024-07-17 | End: 2024-07-25

## 2024-07-17 RX ORDER — HYDRALAZINE HYDROCHLORIDE 20 MG/ML
10 INJECTION INTRAMUSCULAR; INTRAVENOUS
Status: DISCONTINUED | OUTPATIENT
Start: 2024-07-17 | End: 2024-07-25

## 2024-07-17 RX ORDER — KETOROLAC TROMETHAMINE 30 MG/ML
30 INJECTION, SOLUTION INTRAMUSCULAR; INTRAVENOUS EVERY 6 HOURS
Status: DISPENSED | OUTPATIENT
Start: 2024-07-17 | End: 2024-07-19

## 2024-07-17 RX ORDER — HYDROMORPHONE HYDROCHLORIDE 1 MG/ML
0.2 INJECTION, SOLUTION INTRAMUSCULAR; INTRAVENOUS; SUBCUTANEOUS EVERY 5 MIN PRN
Status: DISCONTINUED | OUTPATIENT
Start: 2024-07-17 | End: 2024-07-17 | Stop reason: HOSPADM

## 2024-07-17 RX ORDER — SODIUM CHLORIDE 9 MG/ML
INJECTION, SOLUTION INTRAVENOUS ONCE
Status: COMPLETED | OUTPATIENT
Start: 2024-07-17 | End: 2024-07-17

## 2024-07-17 RX ORDER — METRONIDAZOLE 500 MG/100ML
500 INJECTION, SOLUTION INTRAVENOUS ONCE
Status: COMPLETED | OUTPATIENT
Start: 2024-07-17 | End: 2024-07-17

## 2024-07-17 RX ORDER — HYDROMORPHONE HYDROCHLORIDE 1 MG/ML
INJECTION, SOLUTION INTRAMUSCULAR; INTRAVENOUS; SUBCUTANEOUS AS NEEDED
Status: DISCONTINUED | OUTPATIENT
Start: 2024-07-17 | End: 2024-07-17 | Stop reason: SURG

## 2024-07-17 RX ORDER — SODIUM CHLORIDE, SODIUM LACTATE, POTASSIUM CHLORIDE, CALCIUM CHLORIDE 600; 310; 30; 20 MG/100ML; MG/100ML; MG/100ML; MG/100ML
INJECTION, SOLUTION INTRAVENOUS CONTINUOUS PRN
Status: DISCONTINUED | OUTPATIENT
Start: 2024-07-17 | End: 2024-07-17 | Stop reason: SURG

## 2024-07-17 RX ORDER — HYDROMORPHONE HYDROCHLORIDE 1 MG/ML
0.4 INJECTION, SOLUTION INTRAMUSCULAR; INTRAVENOUS; SUBCUTANEOUS EVERY 5 MIN PRN
Status: DISCONTINUED | OUTPATIENT
Start: 2024-07-17 | End: 2024-07-17 | Stop reason: HOSPADM

## 2024-07-17 RX ORDER — PROCHLORPERAZINE EDISYLATE 5 MG/ML
5 INJECTION INTRAMUSCULAR; INTRAVENOUS EVERY 8 HOURS PRN
Status: DISCONTINUED | OUTPATIENT
Start: 2024-07-17 | End: 2024-07-25

## 2024-07-17 RX ORDER — SODIUM CHLORIDE, SODIUM LACTATE, POTASSIUM CHLORIDE, CALCIUM CHLORIDE 600; 310; 30; 20 MG/100ML; MG/100ML; MG/100ML; MG/100ML
INJECTION, SOLUTION INTRAVENOUS CONTINUOUS
Status: DISCONTINUED | OUTPATIENT
Start: 2024-07-17 | End: 2024-07-17 | Stop reason: HOSPADM

## 2024-07-17 RX ORDER — LIDOCAINE HYDROCHLORIDE 10 MG/ML
INJECTION, SOLUTION EPIDURAL; INFILTRATION; INTRACAUDAL; PERINEURAL AS NEEDED
Status: DISCONTINUED | OUTPATIENT
Start: 2024-07-17 | End: 2024-07-17 | Stop reason: SURG

## 2024-07-17 RX ORDER — SODIUM CHLORIDE, SODIUM LACTATE, POTASSIUM CHLORIDE, CALCIUM CHLORIDE 600; 310; 30; 20 MG/100ML; MG/100ML; MG/100ML; MG/100ML
INJECTION, SOLUTION INTRAVENOUS CONTINUOUS
Status: DISCONTINUED | OUTPATIENT
Start: 2024-07-17 | End: 2024-07-21

## 2024-07-17 RX ORDER — METOCLOPRAMIDE HYDROCHLORIDE 5 MG/ML
10 INJECTION INTRAMUSCULAR; INTRAVENOUS ONCE
Status: DISCONTINUED | OUTPATIENT
Start: 2024-07-17 | End: 2024-07-17 | Stop reason: HOSPADM

## 2024-07-17 RX ORDER — HYDROMORPHONE HYDROCHLORIDE 1 MG/ML
0.4 INJECTION, SOLUTION INTRAMUSCULAR; INTRAVENOUS; SUBCUTANEOUS EVERY 2 HOUR PRN
Status: DISCONTINUED | OUTPATIENT
Start: 2024-07-17 | End: 2024-07-25

## 2024-07-17 RX ORDER — HYDROMORPHONE HYDROCHLORIDE 1 MG/ML
0.8 INJECTION, SOLUTION INTRAMUSCULAR; INTRAVENOUS; SUBCUTANEOUS EVERY 2 HOUR PRN
Status: DISCONTINUED | OUTPATIENT
Start: 2024-07-17 | End: 2024-07-25

## 2024-07-17 RX ORDER — FAMOTIDINE 20 MG/1
20 TABLET, FILM COATED ORAL ONCE
Status: COMPLETED | OUTPATIENT
Start: 2024-07-17 | End: 2024-07-17

## 2024-07-17 RX ADMIN — SODIUM CHLORIDE, SODIUM LACTATE, POTASSIUM CHLORIDE, CALCIUM CHLORIDE: 600; 310; 30; 20 INJECTION, SOLUTION INTRAVENOUS at 10:50:00

## 2024-07-17 RX ADMIN — ROCURONIUM BROMIDE 20 MG: 10 INJECTION, SOLUTION INTRAVENOUS at 13:45:00

## 2024-07-17 RX ADMIN — DEXAMETHASONE SODIUM PHOSPHATE 4 MG: 4 MG/ML VIAL (ML) INJECTION at 10:57:00

## 2024-07-17 RX ADMIN — ROCURONIUM BROMIDE 70 MG: 10 INJECTION, SOLUTION INTRAVENOUS at 10:57:00

## 2024-07-17 RX ADMIN — ONDANSETRON 4 MG: 2 INJECTION INTRAMUSCULAR; INTRAVENOUS at 10:57:00

## 2024-07-17 RX ADMIN — SODIUM CHLORIDE, SODIUM LACTATE, POTASSIUM CHLORIDE, CALCIUM CHLORIDE: 600; 310; 30; 20 INJECTION, SOLUTION INTRAVENOUS at 14:26:00

## 2024-07-17 RX ADMIN — HYDROMORPHONE HYDROCHLORIDE 0.5 MG: 1 INJECTION, SOLUTION INTRAMUSCULAR; INTRAVENOUS; SUBCUTANEOUS at 10:50:00

## 2024-07-17 RX ADMIN — LIDOCAINE HYDROCHLORIDE 50 MG: 10 INJECTION, SOLUTION EPIDURAL; INFILTRATION; INTRACAUDAL; PERINEURAL at 10:57:00

## 2024-07-17 RX ADMIN — METRONIDAZOLE 500 MG: 500 INJECTION, SOLUTION INTRAVENOUS at 11:02:00

## 2024-07-17 RX ADMIN — ROCURONIUM BROMIDE 30 MG: 10 INJECTION, SOLUTION INTRAVENOUS at 12:30:00

## 2024-07-17 NOTE — ANESTHESIA PROCEDURE NOTES
Airway  Date/Time: 7/17/2024 10:59 AM  Urgency: Elective    Airway not difficult    General Information and Staff    Patient location during procedure: OR  Anesthesiologist: Lydia Sage MD  Performed: anesthesiologist   Performed by: Lydia Sage MD  Authorized by: Lydia Sage MD      Indications and Patient Condition  Indications for airway management: anesthesia  Sedation level: deep  Preoxygenated: yes  Patient position: sniffing  Mask difficulty assessment: 1 - vent by mask    Final Airway Details  Final airway type: endotracheal airway      Successful airway: ETT  Cuffed: yes   Successful intubation technique: Video laryngoscopy  Facilitating devices/methods: intubating stylet  Endotracheal tube insertion site: oral  Blade: GlideScope  Blade size: #3  ETT size (mm): 7.5    Cormack-Lehane Classification: grade I - full view of glottis  Placement verified by: capnometry   Measured from: teeth  Number of attempts at approach: 1

## 2024-07-17 NOTE — ANESTHESIA PREPROCEDURE EVALUATION
Anesthesia PreOp Note    HPI:     Don Cabral is a 58 year old male who presents for preoperative consultation requested by: Rick Novoa MD    Date of Surgery: 7/17/2024    Procedure(s):  Exploratory laparotomy partial colectomy with colorectal anastamosis  COLECTOMY  Indication: Diverticulitis [K57.92]    Relevant Problems   No relevant active problems       NPO:  Last Liquid Consumption Date: 07/16/24  Last Liquid Consumption Time: 2100  Last Solid Consumption Date: 07/15/24  Last Solid Consumption Time: 2000  Last Liquid Consumption Date: 07/16/24          History Review:  Patient Active Problem List    Diagnosis Date Noted    Postoperative examination 04/30/2024    Intractable abdominal pain 04/02/2024    Acute diverticulitis 12/05/2023    Sigmoid diverticulitis 10/20/2023    Palpitations 09/28/2023    Slow heart rate 09/28/2023    PVC (premature ventricular contraction) 09/28/2023    Bipolar 1 disorder (HCC) 09/28/2023    Symptomatic bradycardia 09/12/2023    Daytime hypersomnolence 07/18/2023    Tooth pain 06/28/2023    Hypokalemia 06/23/2023    Hyperglycemia 06/23/2023    Generalized anxiety disorder 05/14/2018    NELLIE (obstructive sleep apnea) 09/21/2016    Essential hypertension 03/13/2015    ED (erectile dysfunction) 03/13/2015    Anxiety        Past Medical History:    Allergic rhinitis    Anxiety    Arrhythmia    T1 AV block, LBBB    Back pain    Back problem    lower back    Depression    Diverticulitis    ED (erectile dysfunction)    High blood pressure    History of adverse reaction to anesthesia    Patient states he doesnt know exactly what happened but stated he required haldol post anesthesia    Pneumonia due to organism    Shortness of breath    Sleep apnea    CPAP    Unspecified essential hypertension    Visual impairment    glasses       Past Surgical History:   Procedure Laterality Date    Cholecystectomy  3/12/2023    Colonoscopy N/A 03/22/2016    [facility].  Hyperplastic polyp only.   Repeat 2026    Colonoscopy  05/10/2023    Dental surgery procedure      [place] teeth removed    Incision/drain abscess extra  1996    Knee arthroscopy Right     Laparoscopic cholecystectomy  02/12/2023    Myringoplasty      Other surgical history      R knee arthroscope    Part removal colon w end colostomy      Skin tissue procedure unlisted      Tonsillectomy         Medications Prior to Admission   Medication Sig Dispense Refill Last Dose    clonazePAM 0.5 MG Oral Tab Take 1 tablet (0.5 mg total) by mouth 3 (three) times daily. 90 tablet 2 7/17/2024 at 0930    OLANZapine (ZYPREXA) 2.5 MG Oral Tab Take 1 tablet (2.5 mg total) by mouth nightly. 90 tablet 0 7/16/2024 at 2100    sertraline (ZOLOFT) 50 MG Oral Tab 1.5 tablets daily 135 tablet 0 7/17/2024 at 0600    metRONIDAZOLE 500 MG Oral Tab Take 1 gram at 7:00 pm, take 1 gram at 8:00 pm, and 1 gram at 10:00 pm the day before surgery\" 6 tablet 0 7/16/2024 at 2200    neomycin 500 MG Oral Tab Take 1 gram at 7:00 pm, take 1 gram at 8:00 pm, and 1 gram at 10:00 pm the day before surgery\" 6 tablet 0 7/16/2024 at 2200    gabapentin 600 MG Oral Tab Take 1 tablet (600 mg total) by mouth 3 (three) times daily. 270 tablet 0 7/17/2024 at 0600    lisinopril 20 MG Oral Tab Take 1 tablet (20 mg total) by mouth daily. 90 tablet 3 7/16/2024 at 0800    acidophilus-pectin Oral Cap Take 1 capsule by mouth 3 (three) times a week.   Past Month    verapamil  MG Oral Tab CR Take 1 tablet (120 mg total) by mouth nightly. 30 tablet 3 7/16/2024 at 2100    Multiple Vitamins-Minerals (MULTI-VITAMIN/MINERALS) Oral Tab Take 1 tablet by mouth daily.   7/15/2024    Albuterol Sulfate  (90 Base) MCG/ACT Inhalation Aero Soln Inhale 2 puffs into the lungs every 4 (four) hours as needed for Wheezing or Shortness of Breath. 1 each 3 More than a month    Tadalafil 20 MG Oral Tab Take 1 tablet (20 mg total) by mouth daily as needed for Erectile Dysfunction. 8 tablet 5 More than a month      Current Facility-Administered Medications Ordered in Epic   Medication Dose Route Frequency Provider Last Rate Last Admin    metoclopramide (Reglan) tab 10 mg  10 mg Oral Once Rick Novoa MD        Or    metoclopramide (Reglan) 5 mg/mL injection 10 mg  10 mg Intravenous Once Rick Novoa MD        cefTRIAXone (Rocephin) 2 g in sodium chloride 0.9% 100 mL IVPB-ADDV  2 g Intravenous Once Rick Novoa MD        metRONIDAZOLE in sodium chloride 0.79% (Flagyl) 5 mg/mL IVPB premix 500 mg  500 mg Intravenous Once Rick Novoa MD         No current [facility]-ordered outpatient medications on file.       Allergies   Allergen Reactions    Seasonal OTHER (SEE COMMENTS)     congestion       Family History   Problem Relation Age of Onset    Heart Disorder Father         Ablation required    Hypertension Father     Cancer Father 60        Prostate    Heart Disorder Mother         murmur no treatment needed    Hypertension Mother     Stroke Maternal Grandmother     Other (stroke) Maternal Grandmother     Dementia Paternal Grandmother     Heart Disorder Paternal Grandfather         two heart attacks and CHF    Diabetes Paternal Grandfather     Other (Diabetes, type 2) Paternal Grandfather      Social History     Socioeconomic History    Marital status:    Occupational History    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[employer]   Tobacco Use    Smoking status: Former     Current packs/day: 0.00     Average packs/day: 0.5 packs/day for 19.0 years (9.5 ttl pk-yrs)     Types: Cigarettes     Start date: 1981     Quit date: 2000     Years since quittin.0     Passive exposure: Never    Smokeless tobacco: Never   Vaping Use    Vaping status: Never Used   Substance and Sexual Activity    Alcohol use: Not Currently     Comment: Stopped alcohol 2000    Drug use: No    Sexual activity: Yes     Partners: Female   Other Topics Concern    Caffeine Concern Yes     Comment: coffee in AM/ soda     Exercise Yes     Comment: X1-2 times per  week        Available pre-op labs reviewed.  Lab Results   Component Value Date    WBC 6.7 07/05/2024    RBC 5.21 07/05/2024    HGB 14.1 07/05/2024    HCT 46.8 07/05/2024    MCV 89.8 07/05/2024    MCH 27.1 07/05/2024    MCHC 30.1 (L) 07/05/2024    RDW 14.2 07/05/2024    .0 07/05/2024     Lab Results   Component Value Date     07/05/2024    K 3.9 07/05/2024     07/05/2024    CO2 30.0 07/05/2024    BUN 10 07/05/2024    CREATSERUM 0.84 07/05/2024     (H) 07/05/2024    CA 9.2 07/05/2024          Vital Signs:  Body mass index is 33.38 kg/m².   height is 1.88 m (6' 2\") and weight is 117.9 kg (260 lb). His oral temperature is 97.7 °F (36.5 °C). His blood pressure is 149/76 and his pulse is 82. His respiration is 17 and oxygen saturation is 97%.   Vitals:    07/02/24 1113 07/17/24 0917   BP:  149/76   Pulse:  82   Resp:  17   Temp:  97.7 °F (36.5 °C)   TempSrc:  Oral   SpO2:  97%   Weight: 114.3 kg (252 lb) 117.9 kg (260 lb)   Height: 1.88 m (6' 2\") 1.88 m (6' 2\")        Anesthesia Evaluation     Patient summary reviewed and Nursing notes reviewed    No history of anesthetic complications   Airway   Mallampati: I  TM distance: >3 FB  Neck ROM: full  Dental      Pulmonary    (+) shortness of breath, sleep apnea  Cardiovascular - normal exam  (+) hypertension, dysrhythmias    ROS comment: Left ventricle:  The cavity size was normal. Wall thickness was normal. The   estimated ejection fraction was 55-60%, by visual assessment. No diagnostic   evidence for regional wall motion abnormalities.     LBBB    1. Palpitations, likely secondary to PVCs. Twelve-lead ECG shows some improvement on verapamil.  2. PAF- low burden on MCT  3. Bradycardia, good heart rate response on the treadmill. It is possible that lower heart rate measurements at home may have correlated with sinus rhythm and underlying PVCs, possible bigeminy giving a false reading of heart rate measurement.       Neuro/Psych    (+)   anxiety/panic attacks,  bipolar disorder, depression      GI/Hepatic/Renal - negative ROS     Comments: diverticulitis    Endo/Other    Abdominal                  Anesthesia Plan:   ASA:  3  Plan:   General  Informed Consent Plan and Risks Discussed With:  Patient      I have informed Don Cabral and/or legal guardian or family member of the nature of the anesthetic plan, benefits, risks including possible dental damage if relevant, major complications, and any alternative forms of anesthetic management.   All of the patient's questions were answered to the best of my ability. The patient desires the anesthetic management as planned.  Lydia Sage MD  7/17/2024 9:44 AM  Present on Admission:  **None**

## 2024-07-17 NOTE — OPERATIVE REPORT
[facility]  part of [facility]     Operative Report    Patient Name:  Don Cabral  MR:  [id number]  :  1965  DOS:  24    Preop Dx:  Diverticulitis [K57.92]  Postop Dx:  Diverticulitis [K57.92]  Procedure:    Open partial colectomy with colorectal anastomosis  Extensive lysis of adhesions  Hepatic flexure mobilization  Surgeon:  Rick Novoa MD  Surgical Assistant.: Anderson Veras, THAD,   EBL: Blood Output: 200 mL (2024  2:25 PM)    Complication:  None    INDICATION:  Pt is a 58 year old male who with Diverticulitis [K57.92] who previously underwent an urgent resection of the descending colon with end colostomy who is scheduled for a Exploratory laparotomy partial colectomy with colorectal anastamosis,adhesiolysis.    CONSENT:  An informed consent discussion was held with the patient regarding the nature of bowel discontinuity, the treatment options and the details of the procedure.  The risks including but not limited to bleeding, wound infection, intra-abdominal infection, injury to the colon, small intestine, left ureter, incomplete resection, anastomotic leak/bleeding/stricture, internal and incisional hernia were discussed.  The patient expressed understanding and want to proceed with the planned procedure.     TECHNIQUE:  The patient was taken to the OR and placed in supine position.  General anesthesia was established and the patient was placed in lithotomy position.  The perineum and abdomen were then prepped in standard fashion.  A midline incision was made from the subxiphoid area to the pubis.  The abdomen was entered and extensive adhesions were seen.  I performed adhesiolysis for about two hours to free the omentum from abdominal wall, omentum from small bowel, small bowel from small bowel and small bowel from colon.  I used scissors for the sharp dissection.  Once adhesiolysis was complete, the small bowel was completely free.  No injury to the small bowel  or colon was seen.  I excised the end colostomy from the left abdominal wall.  The anvil head of the 29 mm EEA was inserted and secured using a pursestring of 3-0 PDS.  Since the previous resection was of the distal transverse and descending colon, I had to mobilized the gastrocolic ligament and the hepatic flexure to allow mobilization of the transverse colon to the pelvis.      The rectal stump was identified and cleared of adhesions.  We dilated the rectal stump and extracted residual mucous plugs.  We inserted the 29 mm EEA and performed the anastomosis.  Two complete donuts were seen.  I further reinforced the EEA using 4-0 PDS.  The EEA was tested using air insufflation and found to be air tight.  There was no tension, twisting or bleeding from the EEA.  The abdomen was irrigated with saline and found to be hemostatic.  I placed two 10 MARGARITA drains near the EEA.  The colostomy site and midline fascia were closed using 0 looped PDS.  The skin incisions were closed using staples and the subcutaneous tissues were packed with betadine soaked telfa strips.  Sterile dressings were applied.  All instrument and sponge counts were correct.  I was present during the critical portions of the procedure.    Rick Novoa MD

## 2024-07-17 NOTE — CONSULTS
[facility]    PATIENT'S NAME: CHAYITO MEDEIROS   ATTENDING PHYSICIAN: Elaine Ramey MD   CONSULTING PHYSICIAN: Elaine Ramey MD   PATIENT ACCOUNT#:   [id number]    LOCATION:  [facility] PACU 12 [facility] 10  MEDICAL RECORD #:   [id number]       YOB: 1965  ADMISSION DATE:       07/17/2024      CONSULT DATE:  07/17/2024    REPORT OF CONSULTATION      REASON FOR ADMISSION:  Post exploratory laparotomy, partial colectomy, colorectal anastomosis, and takedown of colostomy.    HISTORY OF PRESENT ILLNESS:  The patient is a 58-year-old  male who initially presented to the hospital in April 2024 for perforated sigmoid diverticulitis which has been recurrent, eventually had left hemicolectomy, end colostomy, and splenic flexure mobilization.  Today, he presented to the hospital for colostomy reversal by his general surgeon, Dr. Rick Novoa.  Postoperatively, transferred to PACU for further monitoring.     PAST MEDICAL HISTORY:  Recurrent perforated sigmoid diverticulitis with multiple hospitalizations, latest in April 2024.  He had history of hypertension, generalized osteoarthritis, degenerative joint disease of lumbar spine, major depressive disorder, bipolar affective disorder.  He had small burden of atrial fibrillation on a cardiac monitor in the past, resolved with verapamil.    PAST SURGICAL HISTORY:  Laparoscopic cholecystectomy, right knee arthroscopic procedure, tonsillectomy, left hemicolectomy with end-colostomy and splenic flexure mobilization in April 2024.     MEDICATIONS:  Please see medication reconciliation list.    ALLERGIES:  No known drug allergies.     SOCIAL HISTORY:  Prior tobacco use.  No current tobacco, alcohol, or drug use.  Lives with his family.  Independent in his basic activities of daily living.    FAMILY HISTORY:  Mother had heart disease and hypertension.  Father had atrial flutter, prostate cancer, and hypertension.    REVIEW OF SYSTEMS:  Currently resting  in bed with abdominal discomfort.  No chest pain.  No shortness of breath.  Other 12-point review of systems negative.      PHYSICAL EXAMINATION:    GENERAL:  Alert, oriented to time, place, and person, moderate distress.  VITAL SIGNS:  Temperature 97.7, pulse 85, respiratory rate 20, blood pressure 128/70, pulse ox 97% on room air.  HEENT:  Atraumatic.  Oropharynx clear.  Moist mucous membranes.  Ears, nose normal.  Eyes:  Anicteric sclerae.  Nasogastric tube in place.  NECK:  Supple.  No lymphadenopathy.  Trachea midline.   LUNGS:  Clear to auscultation bilaterally.  Normal respiratory effort.   HEART:  Regular rate and rhythm.  S1 and S2 auscultated.  No murmur.  ABDOMEN:  Soft, nondistended.  Abdominal dressing with 2 MARGARITA drains in place.  Hypoactive bowel sounds.  EXTREMITIES:  No peripheral edema, clubbing, or cyanosis.  NEUROLOGIC:  Motor and sensory intact.  Cranial nerves II-XII are intact.    ASSESSMENT AND PLAN:    1.   History of perforated sigmoid diverticulitis status post left hemicolectomy and end colostomy with splenic flexure mobilization.  Today status post open partial colectomy with colorectal anastomosis, takedown of colostomy, extensive lysis of adhesions, with hepatic flexure mobilization.  Pain control.  Monitor hemodynamic status.  NG tube suction.  DVT prophylaxis.    2.   Essential hypertension.  Hold oral medications.  Use IV hydralazine p.r.n.  3.   Obstructive sleep apnea.  Apply obstructive sleep apnea protocol and monitor.  4.   Bipolar affective disorder.  Resume home medications when able to tolerate oral intake.    Dictated By Elaine Ramey MD  d: 07/17/2024 15:48:23  t: 07/17/2024 16:01:46  Job [id number]  FB/

## 2024-07-17 NOTE — ANESTHESIA POSTPROCEDURE EVALUATION
Patient: Don Cabral    Procedure Summary       Date: 07/17/24 Room / Location: [facility] MAIN OR 08 / [facility] MAIN OR    Anesthesia Start: 1050 Anesthesia Stop: 1456    Procedures:       Exploratory laparotomy partial colectomy with colorectal anastamosis, extensive adhesiolysis (Abdomen)      COLECTOMY (Abdomen) Diagnosis:       Diverticulitis      (Diverticulitis [K57.92])    Surgeons: Rick Novoa MD Anesthesiologist: Lizzeth Olsen MD    Anesthesia Type: general ASA Status: 3            Anesthesia Type: general    Vitals Value Taken Time   /75 07/17/24 1456   Temp 98.7 07/17/24 1456   Pulse 85 07/17/24 1455   Resp 18 07/17/24 1455   SpO2 96 % 07/17/24 1455   Vitals shown include unfiled device data.    [facility] AN Post Evaluation:   Patient Evaluated in PACU  Patient Participation: complete - patient participated  Level of Consciousness: awake  Pain Management: adequate  Airway Patency:patent  Dental exam unchanged from preop  Yes    Cardiovascular Status: acceptable  Respiratory Status: acceptable  Postoperative Hydration acceptable      LIZZETH OLSEN MD  7/17/2024 2:56 PM

## 2024-07-17 NOTE — ANESTHESIA PROCEDURE NOTES
Peripheral IV  Date/Time: 7/17/2024 11:14 AM  Inserted by: Lydia Sage MD    Placement  Needle size: 18 G  Laterality: left  Location: hand  Site prep: alcohol  Technique: anatomical landmarks  Attempts: 1

## 2024-07-18 LAB
ANION GAP SERPL CALC-SCNC: 4 MMOL/L (ref 0–18)
BASOPHILS # BLD AUTO: 0.02 X10(3) UL (ref 0–0.2)
BASOPHILS NFR BLD AUTO: 0.1 %
BUN BLD-MCNC: 16 MG/DL (ref 9–23)
BUN/CREAT SERPL: 20.8 (ref 10–20)
CALCIUM BLD-MCNC: 8.8 MG/DL (ref 8.7–10.4)
CHLORIDE SERPL-SCNC: 104 MMOL/L (ref 98–112)
CO2 SERPL-SCNC: 29 MMOL/L (ref 21–32)
CREAT BLD-MCNC: 0.77 MG/DL
DEPRECATED RDW RBC AUTO: 42.6 FL (ref 35.1–46.3)
EGFRCR SERPLBLD CKD-EPI 2021: 104 ML/MIN/1.73M2 (ref 60–?)
EOSINOPHIL # BLD AUTO: 0 X10(3) UL (ref 0–0.7)
EOSINOPHIL NFR BLD AUTO: 0 %
ERYTHROCYTE [DISTWIDTH] IN BLOOD BY AUTOMATED COUNT: 14 % (ref 11–15)
GLUCOSE BLD-MCNC: 129 MG/DL (ref 70–99)
HCT VFR BLD AUTO: 41.2 %
HGB BLD-MCNC: 13.7 G/DL
IMM GRANULOCYTES # BLD AUTO: 0.05 X10(3) UL (ref 0–1)
IMM GRANULOCYTES NFR BLD: 0.3 %
LYMPHOCYTES # BLD AUTO: 1.17 X10(3) UL (ref 1–4)
LYMPHOCYTES NFR BLD AUTO: 8 %
MAGNESIUM SERPL-MCNC: 1.8 MG/DL (ref 1.6–2.6)
MCH RBC QN AUTO: 27.7 PG (ref 26–34)
MCHC RBC AUTO-ENTMCNC: 33.3 G/DL (ref 31–37)
MCV RBC AUTO: 83.4 FL
MONOCYTES # BLD AUTO: 1.43 X10(3) UL (ref 0.1–1)
MONOCYTES NFR BLD AUTO: 9.8 %
NEUTROPHILS # BLD AUTO: 11.88 X10 (3) UL (ref 1.5–7.7)
NEUTROPHILS # BLD AUTO: 11.88 X10(3) UL (ref 1.5–7.7)
NEUTROPHILS NFR BLD AUTO: 81.8 %
OSMOLALITY SERPL CALC.SUM OF ELEC: 287 MOSM/KG (ref 275–295)
PHOSPHATE SERPL-MCNC: 4 MG/DL (ref 2.4–5.1)
PLATELET # BLD AUTO: 358 10(3)UL (ref 150–450)
POTASSIUM SERPL-SCNC: 4.3 MMOL/L (ref 3.5–5.1)
RBC # BLD AUTO: 4.94 X10(6)UL
SODIUM SERPL-SCNC: 137 MMOL/L (ref 136–145)
WBC # BLD AUTO: 14.6 X10(3) UL (ref 4–11)

## 2024-07-18 PROCEDURE — 99233 SBSQ HOSP IP/OBS HIGH 50: CPT | Performed by: HOSPITALIST

## 2024-07-18 RX ORDER — MAGNESIUM SULFATE HEPTAHYDRATE 40 MG/ML
2 INJECTION, SOLUTION INTRAVENOUS ONCE
Status: COMPLETED | OUTPATIENT
Start: 2024-07-18 | End: 2024-07-18

## 2024-07-18 NOTE — PLAN OF CARE
Patient alert and orientated.  Patient has NG tube to intermittent suction. Patient has bilateral J.P. drains with serosangeous discharge, PO med's given with sips of water, patient felt like the pill stuck in his throat, Cut large pills in half. Throat spray was ordered, patient took Toradol overnight for pain.  Patient has vazquez in place with marielena output, IV fluids running at 100 ml/hr. Oxygen 3 liter nasal cannula, patient is NELLIE did not wear CPAP overnight. Patient has personal belongings and call light within reach.  Safety measures in place.  Problem: Patient Centered Care  Goal: Patient preferences are identified and integrated in the patient's plan of care  Description: Interventions:  - What would you like us to know as we care for you? I live at home with my wife.  - Provide timely, complete, and accurate information to patient/family  - Incorporate patient and family knowledge, values, beliefs, and cultural backgrounds into the planning and delivery of care  - Encourage patient/family to participate in care and decision-making at the level they choose  - Honor patient and family perspectives and choices  Outcome: Progressing     Problem: Patient/Family Goals  Goal: Patient/Family Long Term Goal  Description: Patient's Long Term Goal: To be able to walk around and feel good    Interventions:  - PT/OT consult  Ambulating patient per orders  - See additional Care Plan goals for specific interventions  Outcome: Progressing  Goal: Patient/Family Short Term Goal  Description: Patient's Short Term Goal: To not have a sore throat    Interventions:   - Throat spray was ordered use per MD orders  - See additional Care Plan goals for specific interventions  Outcome: Progressing

## 2024-07-18 NOTE — PROGRESS NOTES
[facility]  part of [facility]    Progress Note    Don Cabral Patient Status:  Inpatient    1965 MRN [id number]   Location [facility] 4W/SW/SE Attending Dionisio Dong MD   Hosp Day # 1 PCP Stephen Christianson MD       Subjective:   POD1 after colostomy reversal with partial colectomy and extensive lysis of adhesions. Pain controlled, some mild nausea, no vomiting.     Objective:   Vital Signs:  Blood pressure 116/73, pulse 78, temperature 98.4 °F (36.9 °C), temperature source Oral, resp. rate 19, height 6' 2\" (1.88 m), weight 260 lb (117.9 kg), SpO2 92%.    Physical Exam     General: No acute distress. Alert and oriented x 3.  HEENT: Moist mucous membranes. Anicteric.   Neck: Supple, No JVD.   Respiratory: Nonlabored resp.  Cardiovascular: Regular rate.   Abdomen: Soft, expected incisional tenderness, no rebound tnd, no guarding, nondistended.  No masses. Normal bowel sounds. Incisions c/d/I.  Neurologic: No focal neurological deficits.  Musculoskeletal: Full range of motion of all extremities. No calf tenderness. No swelling noted.  Integument: No lesions. No erythema.  Psychiatric: Appropriate mood and affect.         Assessment and Plan:     Sigmoid diverticulitis      Essential hypertension      NELLIE (obstructive sleep apnea)      Bipolar 1 disorder (HCC)    Continue NPO except sips of clears and NGT until bowel functions returns. Ambulate as able. Will continue to follow and monitor progress.    Results:     Lab Results   Component Value Date    WBC 14.6 (H) 2024    HGB 13.7 2024    HCT 41.2 2024    .0 2024    CREATSERUM 0.77 2024    BUN 16 2024     2024    K 4.3 2024     2024    CO2 29.0 2024     (H) 2024    CA 8.8 2024    ALB 3.9 2024    ALKPHO 52 2024    BILT 0.4 2024    TP 5.9 2024    AST 19 2024    ALT 18 2024    PTT 24.9  09/28/2023    INR 1.01 09/28/2023    TSH 1.850 09/28/2023    LIP 37 04/02/2024    PSA 2.53 08/02/2021    DDIMER 2.44 (H) 06/23/2023    MG 1.8 07/18/2024    PHOS 4.0 07/18/2024    TROP <0.045 02/05/2020       No results found.                Stephen Nunez PA-C  7/18/2024

## 2024-07-18 NOTE — PROGRESS NOTES
[facility]  part of [facility]    Progress Note    Don Cabral Patient Status:  Inpatient    1965 MRN [id number]   Location [facility] 4W/SW/SE Attending Dionisio Dong MD   Hosp Day # 1 PCP Stephen Christianson MD       Subjective:   Don Cabral is a(n) 58 year old male was seen and examined  Lying in bed  Pain controlled, 6/10  No n,v,f,c, cp or sob  Not passing flatus yet    Objective:   Blood pressure 116/73, pulse 78, temperature 98.4 °F (36.9 °C), temperature source Oral, resp. rate 19, height 6' 2\" (1.88 m), weight 260 lb (117.9 kg), SpO2 92%.  GENERAL:  Alert, oriented to time, place, and person, NAD  HEENT:  Atraumatic.  Oropharynx clear.  Moist mucous membranes.  Ears, nose normal.  Eyes:  Anicteric sclerae.  Nasogastric tube in place.  NECK:  Supple.  No lymphadenopathy.  Trachea midline.   LUNGS:  Clear to auscultation bilaterally.  Normal respiratory effort.   HEART:  Regular rate and rhythm.  S1 and S2 auscultated.  No murmur.  ABDOMEN:  Soft, nondistended.  Abdominal dressing with 2 MARGARITA drains in place.  Hypoactive bowel sounds.  EXTREMITIES:  No peripheral edema, clubbing, or cyanosis.  NEUROLOGIC:  Motor and sensory intact.  Cranial nerves II-XII are intact.       Current Inpatient Medications:     Current Facility-Administered Medications:     albuterol (Ventolin HFA) 108 (90 Base) MCG/ACT inhaler 2 puff, 2 puff, Inhalation, Q4H PRN    clonazePAM (KlonoPIN) tab 0.5 mg, 0.5 mg, Oral, TID PRN    gabapentin (Neurontin) tab 600 mg, 600 mg, Oral, TID    lisinopril (Prinivil; Zestril) tab 20 mg, 20 mg, Oral, Daily    OLANZapine (ZyPREXA) tab 2.5 mg, 2.5 mg, Oral, Nightly    sertraline (Zoloft) tab 75 mg, 75 mg, Oral, Daily    verapamil ER (Calan-SR) tab 120 mg, 120 mg, Oral, Nightly    lactated ringers infusion, , Intravenous, Continuous    heparin (Porcine) 5000 UNIT/ML injection 5,000 Units, 5,000 Units, Subcutaneous, 2 times per day    ketorolac  (Toradol) 30 MG/ML injection 30 mg, 30 mg, Intravenous, Q6H    oxyCODONE immediate release tab 5 mg, 5 mg, Oral, Q4H PRN **OR** oxyCODONE immediate release tab 10 mg, 10 mg, Oral, Q4H PRN    HYDROmorphone (Dilaudid) 1 MG/ML injection 0.4 mg, 0.4 mg, Intravenous, Q2H PRN **OR** HYDROmorphone (Dilaudid) 1 MG/ML injection 0.8 mg, 0.8 mg, Intravenous, Q2H PRN    ondansetron (Zofran) 4 MG/2ML injection 4 mg, 4 mg, Intravenous, Q6H PRN    prochlorperazine (Compazine) 10 MG/2ML injection 5 mg, 5 mg, Intravenous, Q8H PRN    pantoprazole (Protonix) 40 mg in sodium chloride 0.9% PF 10 mL IV push, 40 mg, Intravenous, Daily    hydrALAzine (Apresoline) 20 mg/mL injection 10 mg, 10 mg, Intravenous, Q3H PRN    phenol (Chloraseptic) 1.4 % oral liquid spray, , Oral, Q2H PRN    Assessment and Plan:   1.       History of perforated sigmoid diverticulitis status post left hemicolectomy and end colostomy with splenic flexure mobilization.    Now status post open partial colectomy with colorectal anastomosis, takedown of colostomy, extensive lysis of adhesions, with hepatic flexure mobilization.    Pain control adequate  Cont Monitor hemodynamic status.  Cont NG tube suction  Cont DVT prophylaxis.      2.       Essential hypertension.    Hold oral medications.  Use IV hydralazine p.r.n.    3.       Obstructive sleep apnea.    Apply obstructive sleep apnea protocol and monitor.    4.       Bipolar affective disorder.    Resume home medications when able to tolerate oral intake.    Full code  MDM: High   Results:     Recent Labs   Lab 07/18/24  0506   RBC 4.94   HGB 13.7   HCT 41.2   MCV 83.4   MCH 27.7   MCHC 33.3   RDW 14.0   NEPRELIM 11.88*   WBC 14.6*   .0         Recent Labs   Lab 07/18/24  0506   *   BUN 16   CREATSERUM 0.77   EGFRCR 104   CA 8.8      K 4.3      CO2 29.0         Imaging:   No results found.          Dionisio Dong MD  7/18/2024

## 2024-07-18 NOTE — PLAN OF CARE
Don is A&Ox4. Vitals stable on room Air. Pain managed with scheduled medications. NGT to LIS. Tolerating sips with meds.  IVF continuous. Vance cath removed, awaiting void. Ambulating hallways. Plan of care continued as ordered.    Problem: Patient Centered Care  Goal: Patient preferences are identified and integrated in the patient's plan of care  Description: Interventions:  - What would you like us to know as we care for you? I have had surgery already  - Provide timely, complete, and accurate information to patient/family  - Incorporate patient and family knowledge, values, beliefs, and cultural backgrounds into the planning and delivery of care  - Encourage patient/family to participate in care and decision-making at the level they choose  - Honor patient and family perspectives and choices  Outcome: Progressing

## 2024-07-18 NOTE — H&P
[facility] [place]  part of [facility]      History of Present Illness:   Patient is a 58 year old male with a colostomy who presents for reversal.    HPI    Past Medical History  Past Medical History:    Allergic rhinitis    Anxiety    Arrhythmia    T1 AV block, LBBB    Back pain    Back problem    lower back    Depression    Diverticulitis    ED (erectile dysfunction)    High blood pressure    History of adverse reaction to anesthesia    Patient states he doesnt know exactly what happened but stated he required haldol post anesthesia    Pneumonia due to organism    Shortness of breath    Sleep apnea    CPAP    Unspecified essential hypertension    Visual impairment    glasses       Past Surgical History  Past Surgical History:   Procedure Laterality Date    Cholecystectomy  3/12/2023    Colonoscopy N/A 03/22/2016    [facility].  Hyperplastic polyp only.  Repeat 2026    Colonoscopy  05/10/2023    Dental surgery procedure      [place] teeth removed    Incision/drain abscess extra  1996    Knee arthroscopy Right     Laparoscopic cholecystectomy  02/12/2023    Myringoplasty      Other surgical history      R knee arthroscope    Part removal colon w end colostomy      Skin tissue procedure unlisted      Tonsillectomy         Family History  Family History   Problem Relation Age of Onset    Heart Disorder Father         Ablation required    Hypertension Father     Cancer Father 60        Prostate    Heart Disorder Mother         murmur no treatment needed    Hypertension Mother     Stroke Maternal Grandmother     Other (stroke) Maternal Grandmother     Dementia Paternal Grandmother     Heart Disorder Paternal Grandfather         two heart attacks and CHF    Diabetes Paternal Grandfather     Other (Diabetes, type 2) Paternal Grandfather        Social History  Social History     Socioeconomic History    Marital status:    Occupational History    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   Tobacco Use     Smoking status: Former     Current packs/day: 0.00     Average packs/day: 0.5 packs/day for 19.0 years (9.5 ttl pk-yrs)     Types: Cigarettes     Start date: 1981     Quit date: 2000     Years since quittin.0     Passive exposure: Never    Smokeless tobacco: Never   Vaping Use    Vaping status: Never Used   Substance and Sexual Activity    Alcohol use: Not Currently     Comment: Stopped alcohol 2000    Drug use: No    Sexual activity: Yes     Partners: Female   Other Topics Concern    Caffeine Concern Yes     Comment: coffee in AM/ soda     Exercise Yes     Comment: X1-2 times per week      Social Determinants of Health     Financial Resource Strain: Low Risk  (2024)    Financial Resource Strain     Difficulty of Paying Living Expenses: Not hard at all     Med Affordability: No   Food Insecurity: No Food Insecurity (2024)    Food Insecurity     Food Insecurity: Never true   Transportation Needs: No Transportation Needs (2024)    Transportation Needs     Lack of Transportation: No   Housing Stability: Low Risk  (2024)    Housing Stability     Housing Instability: No           Current Medications:  Current Facility-Administered Medications   Medication Dose Route Frequency    albuterol (Ventolin HFA) 108 (90 Base) MCG/ACT inhaler 2 puff  2 puff Inhalation Q4H PRN    clonazePAM (KlonoPIN) tab 0.5 mg  0.5 mg Oral TID PRN    gabapentin (Neurontin) tab 600 mg  600 mg Oral TID    lisinopril (Prinivil; Zestril) tab 20 mg  20 mg Oral Daily    OLANZapine (ZyPREXA) tab 2.5 mg  2.5 mg Oral Nightly    sertraline (Zoloft) tab 75 mg  75 mg Oral Daily    verapamil ER (Calan-SR) tab 120 mg  120 mg Oral Nightly    lactated ringers infusion   Intravenous Continuous    heparin (Porcine) 5000 UNIT/ML injection 5,000 Units  5,000 Units Subcutaneous 2 times per day    ketorolac (Toradol) 30 MG/ML injection 30 mg  30 mg Intravenous Q6H    oxyCODONE immediate release tab 5 mg  5 mg Oral Q4H PRN    Or     oxyCODONE immediate release tab 10 mg  10 mg Oral Q4H PRN    HYDROmorphone (Dilaudid) 1 MG/ML injection 0.4 mg  0.4 mg Intravenous Q2H PRN    Or    HYDROmorphone (Dilaudid) 1 MG/ML injection 0.8 mg  0.8 mg Intravenous Q2H PRN    ondansetron (Zofran) 4 MG/2ML injection 4 mg  4 mg Intravenous Q6H PRN    prochlorperazine (Compazine) 10 MG/2ML injection 5 mg  5 mg Intravenous Q8H PRN    pantoprazole (Protonix) 40 mg in sodium chloride 0.9% PF 10 mL IV push  40 mg Intravenous Daily    hydrALAzine (Apresoline) 20 mg/mL injection 10 mg  10 mg Intravenous Q3H PRN    phenol (Chloraseptic) 1.4 % oral liquid spray   Oral Q2H PRN     Medications Prior to Admission   Medication Sig    clonazePAM 0.5 MG Oral Tab Take 1 tablet (0.5 mg total) by mouth 3 (three) times daily. (Patient taking differently: Take 1 tablet (0.5 mg total) by mouth 3 (three) times daily. Takes doses at 0600, 0930, and 1300)    OLANZapine (ZYPREXA) 2.5 MG Oral Tab Take 1 tablet (2.5 mg total) by mouth nightly.    sertraline (ZOLOFT) 50 MG Oral Tab 1.5 tablets daily    metRONIDAZOLE 500 MG Oral Tab Take 1 gram at 7:00 pm, take 1 gram at 8:00 pm, and 1 gram at 10:00 pm the day before surgery\"    neomycin 500 MG Oral Tab Take 1 gram at 7:00 pm, take 1 gram at 8:00 pm, and 1 gram at 10:00 pm the day before surgery\"    gabapentin 600 MG Oral Tab Take 1 tablet (600 mg total) by mouth 3 (three) times daily.    lisinopril 20 MG Oral Tab Take 1 tablet (20 mg total) by mouth daily.    acidophilus-pectin Oral Cap Take 1 capsule by mouth 3 (three) times a week.    verapamil  MG Oral Tab CR Take 1 tablet (120 mg total) by mouth nightly.    Multiple Vitamins-Minerals (MULTI-VITAMIN/MINERALS) Oral Tab Take 1 tablet by mouth daily.    Albuterol Sulfate  (90 Base) MCG/ACT Inhalation Aero Soln Inhale 2 puffs into the lungs every 4 (four) hours as needed for Wheezing or Shortness of Breath.    Tadalafil 20 MG Oral Tab Take 1 tablet (20 mg total) by mouth daily as  needed for Erectile Dysfunction.       Allergies  Allergies   Allergen Reactions    Seasonal OTHER (SEE COMMENTS)     congestion       Review of Systems:   Review of Systems   Constitutional: Negative.    HENT: Negative.     Eyes: Negative.    Respiratory: Negative.     Cardiovascular: Negative.    Gastrointestinal: Negative.    Endocrine: Negative.    Genitourinary: Negative.    Musculoskeletal: Negative.    Skin: Negative.    Allergic/Immunologic: Negative.    Neurological: Negative.    Hematological:  Does not bruise/bleed easily.   Psychiatric/Behavioral: Negative.          Physical Exam:   Vital Signs:  Blood pressure 116/73, pulse 78, temperature 98.4 °F (36.9 °C), temperature source Oral, resp. rate 19, height 6' 2\" (1.88 m), weight 260 lb (117.9 kg), SpO2 92%.     Physical Exam  Vitals reviewed.   Constitutional:       Appearance: Normal appearance. He is well-developed.   HENT:      Head: Normocephalic and atraumatic.   Cardiovascular:      Rate and Rhythm: Normal rate and regular rhythm.   Pulmonary:      Effort: Pulmonary effort is normal.      Breath sounds: Normal breath sounds.   Abdominal:      General: There is no distension.      Palpations: Abdomen is soft. There is no mass.      Tenderness: There is no abdominal tenderness. There is no guarding or rebound.      Comments: Left abdominal colostomy in place.   Musculoskeletal:         General: Normal range of motion.      Cervical back: Normal range of motion and neck supple.   Skin:     General: Skin is warm and dry.   Neurological:      Mental Status: He is alert and oriented to person, place, and time.   Psychiatric:         Speech: Speech normal.         Behavior: Behavior normal.          Results:     Laboratory Data:  Lab Results   Component Value Date    WBC 14.6 (H) 07/18/2024    HGB 13.7 07/18/2024    HCT 41.2 07/18/2024    .0 07/18/2024    CREATSERUM 0.77 07/18/2024    BUN 16 07/18/2024     07/18/2024    K 4.3 07/18/2024    CL  104 07/18/2024    CO2 29.0 07/18/2024     (H) 07/18/2024    CA 8.8 07/18/2024    ALB 3.9 04/16/2024    ALKPHO 52 04/12/2024    TP 5.9 04/12/2024    AST 19 04/12/2024    ALT 18 04/12/2024    PTT 24.9 09/28/2023    INR 1.01 09/28/2023    PTP 13.3 09/28/2023    TSH 1.850 09/28/2023    LIP 37 04/02/2024    PSA 2.53 08/02/2021    DDIMER 2.44 (H) 06/23/2023    MG 1.8 07/18/2024    PHOS 4.0 07/18/2024    TROP <0.045 02/05/2020         Imaging:  No results found.       Impression:     Sigmoid diverticulitis  -sp Zaid's    Plan to proceed with ex lap, partial colectomy with colorectal anastomosis.  Anticipate difficult dissection due to adhesions and location of previous resection.    Rick Novoa MD  7/17/2024

## 2024-07-18 NOTE — PHYSICAL THERAPY NOTE
PHYSICAL THERAPY EVALUATION - INPATIENT     Room Number: 459/459-A  Evaluation Date: 7/18/2024  Type of Evaluation: Initial   Physician Order: PT Eval and Treat (functional mobility screen)    Presenting Problem: Diverticulitis S/P exlap partial colectomy with colorectal anastamosis, extensive adhesiolysis 7/17/24  Co-Morbidities : OA, DJD, major depressive disorder, bipolar affective disorder, HTN, diverticulitis S/P hemicolectomy 4/2024  Reason for Therapy: Mobility Dysfunction and Discharge Planning    PHYSICAL THERAPY ASSESSMENT   Patient is a 58 year old male admitted 7/17/2024 for Diverticulitis S/P exlap partial colectomy with colorectal anastamosis, extensive adhesiolysis.  Prior to admission, patient's baseline is independent no assistive device.  Patient is currently functioning below baseline with gait and stair negotiation.  Patient is requiring supervision as a result of the following impairments: pain and medical status.  Physical Therapy will continue to follow for duration of hospitalization.    Patient will benefit from continued skilled PT Services for duration of hospitalization, however, given the patient is functioning near baseline level do not anticipate skilled therapy needs at discharge .    PLAN  PT Treatment Plan: Bed mobility;Body mechanics;Endurance;Energy conservation;Patient education;Gait training;Strengthening;Stair training  Rehab Potential : Good  Frequency (Obs): Daily    PHYSICAL THERAPY MEDICAL/SOCIAL HISTORY     Problem List  Principal Problem:    Sigmoid diverticulitis  Active Problems:    Essential hypertension    NELLIE (obstructive sleep apnea)    Bipolar 1 disorder (HCC)      HOME SITUATION  Home Situation  Type of Home: House  Home Layout: Two level  Stairs to Enter : 1  Stairs to Bedroom: 13  Railing: Yes  Lives With: Spouse;Son  Drives: Yes (office work)  Patient Owned Equipment: None  Patient Regularly Uses: Glasses     Prior Level of [place]: independent no  assistive device, office work    SUBJECTIVE  \"I have numbness here from my thumb to my elbow and difficulty with fine motor control.\"    PHYSICAL THERAPY EXAMINATION   OBJECTIVE  Precautions: Abdominal protective strategies;NG suction  Fall Risk: Standard fall risk    WEIGHT BEARING RESTRICTION  Weight Bearing Restriction: None                PAIN ASSESSMENT  Ratin  Location: surgical site  Management Techniques: Body mechanics;Breathing techniques    COGNITION  Overall Cognitive Status:  WFL - within functional limits    RANGE OF MOTION AND STRENGTH ASSESSMENT  Upper extremity ROM and strength are within functional limits  BUEs, slowed opposition L hand 2/2 numbness, feeling of incoordination but functional  Lower extremity ROM is within functional limits  BLEs  Lower extremity strength is within functional limits  BLEs    BALANCE  Static Sitting: Normal  Dynamic Sitting: Normal  Static Standing: Normal  Dynamic Standing: Good    NEUROLOGICAL FINDINGS           Sensation: diminished sensation along  median nerve distal to elbow bilaterally L>R          ACTIVITY TOLERANCE  Pulse: 78                      O2 WALK  Oxygen Therapy  SPO2% on Room Air at Rest: 96    AM-PAC '6-Clicks' INPATIENT SHORT FORM - BASIC MOBILITY  How much difficulty does the patient currently have...  Patient Difficulty: Turning over in bed (including adjusting bedclothes, sheets and blankets)?: A Little   Patient Difficulty: Sitting down on and standing up from a chair with arms (e.g., wheelchair, bedside commode, etc.): None   Patient Difficulty: Moving from lying on back to sitting on the side of the bed?: A Little   How much help from another person does the patient currently need...   Help from Another: Moving to and from a bed to a chair (including a wheelchair)?: A Little   Help from Another: Need to walk in hospital room?: A Little   Help from Another: Climbing 3-5 steps with a railing?: A Little     AM-PAC Score:  Raw Score: 19    Approx Degree of Impairment: 41.77%   Standardized Score (AM-PAC Scale): 45.44   CMS Modifier (G-Code): CK    FUNCTIONAL ABILITY STATUS  Functional Mobility/Gait Assessment  Gait Assistance: Supervision  Distance (ft): 400'  Assistive Device: Other (Comment) (IV pole)  Pattern: Within Functional Limits  Sit to Stand: stand-by assist - increased time 2/2 pain    Exercise/Education Provided:  Bed mobility  Body mechanics  Energy conservation  Functional activity tolerated  Gait training  Posture  Transfer training    Skilled Therapy Provided: Pt tolerating household and community distances using IV pole for support, anticipate 1-2 more therapy sessions to progress back to no assistive device and ensure safety with stair navigation.     Patient seen for overlap session with occupational therapy at end of session for ADL education. Patient received seated in bedside chair, agreeable to physical therapy evaluation. Vital signs monitored as noted above, no adverse symptoms and patient stable during session. Education with patient provided verbally and via demonstration on physical therapy plan of care, physiological benefits of out of bed mobility, abdominal protective strategies, pain management strategies, and posture. Next session anticipate to progress gait and stair negotiation.    Patient history and/or personal factors that may impact the plan of care include home accessibility concerns, co-morbidities (OA, DJD, major depressive disorder, bipolar affective disorder, HTN, diverticulitis ) affecting pain levels, medical status, prior surgeries (hemicolectomy 4/2024), and longstanding history of pain. Based on the physical therapy examination of the noted systems and functional activity/participation limitations, the patient presentation is stable given the patient demonstrates no significant barriers to meeting therapy goals.      The patient's Approx Degree of Impairment: 41.77% has been calculated based on  documentation in the [facility] '6 clicks' Inpatient Basic Mobility Short Form.  Research supports that patients with this level of impairment may benefit from home-health PT, however anticipate further progress pending pain management with no skilled therapy needs at discharge.  Final disposition will be made by interdisciplinary medical team.    Patient End of Session: Up in chair;Needs met;Call light within reach;RN aware of session/findings;All patient questions and concerns addressed    CURRENT GOALS  Goals to be met by: 24  Patient Goal Patient's self-stated goal is: decrease pain   Goal #1 Patient is able to demonstrate supine - sit EOB @ level: independent     Goal #1   Current Status    Goal #2 Patient is able to demonstrate transfers EOB to/from [facility] at assistance level: independent with none     Goal #2  Current Status    Goal #3 Patient is able to ambulate 150 feet with assist device: none at assistance level: independent   Goal #3   Current Status    Goal #4 Patient will negotiate 12 stairs/one curb w/ assistive device and supervision   Goal #4   Current Status    Goal #5 Patient to demonstrate independence with home activity/exercise instructions provided to patient in preparation for discharge.   Goal #5   Current Status    Goal #6    Goal #6  Current Status      Patient Evaluation Complexity Level:  History Moderate - 1 or 2 personal factors and/or co-morbidities   Examination of body systems Moderate - addressing a total of 3 or more elements   Clinical Presentation Low- Stable   Clinical Decision Making  Low Complexity     Gait Trainin minutes      Shilpa Connell, PT, DPT  [facility]  Rehab Services - Physical Therapy  [id number]

## 2024-07-18 NOTE — OCCUPATIONAL THERAPY NOTE
OCCUPATIONAL THERAPY EVALUATION - INPATIENT     Room Number: 459/459-A  Evaluation Date: 7/18/2024  Type of Evaluation: Quick Eval  Presenting Problem: sigmoid diverticulitis s/p ex. lap. partial colectomy on 7/17  Hx of HTN, anxiety, depression, NELLIE, bipolar affective disorder     Physician Order: IP Consult to Occupational Therapy  Reason for Therapy: ADL/IADL Dysfunction and Discharge Planning    OCCUPATIONAL THERAPY ASSESSMENT   Patient is a 58 year old male admitted 7/17/2024 for sigmoid diverticulitis s/p ex. lap. partial colectomy.  Prior to admission, patient's baseline is independent with I/ADLs, including driving and working, functional mobility, and transfers.  Patient is currently functioning near baseline with ADLs, functional mobility, and transfers. Anticipate patient will be able to safely discharge home with additional support when medically cleared.    PLAN  Patient has been evaluated and presents with no skilled Occupational Therapy needs  at this time.  Patient will be discharged from Occupational Therapy services. Please re-order if a new functional limitation presents during this admission.    OT Device Recommendations: Long-handled sponge; Shower chair      OCCUPATIONAL THERAPY MEDICAL/SOCIAL HISTORY   Problem List  Principal Problem:    Sigmoid diverticulitis  Active Problems:    Essential hypertension    NELLIE (obstructive sleep apnea)    Bipolar 1 disorder (HCC)    HOME SITUATION  Type of Home: House  Home Layout: Two level  Lives With: Spouse; Son  Toilet and Equipment: Standard height toilet  Shower/Tub and Equipment: Tub-shower combo; Grab bar  Occupation/Status:   Drives: Yes  Patient Regularly Uses: Glasses  Stairs in Home: 1 DANIELA w/ railing    SUBJECTIVE  Patient agreeable to OT evaluation.    OCCUPATIONAL THERAPY EXAMINATION    OBJECTIVE  Precautions: Abdominal protective strategies; Drain(s); NG suction  Fall Risk: Standard fall risk    PAIN ASSESSMENT  Rating: --  (\"moderate\")  Location: abdomen  Management Techniques: Activity promotion; Body mechanics; Repositioning    SENSATION  Patient reported numbness in L thumb and LUE throughout forearm since surgery    RANGE OF MOTION   Upper extremity ROM is within functional limits     STRENGTH ASSESSMENT  Upper extremity strength is within functional limits     COORDINATION  Gross Motor: WFL   Fine Motor: WFL except L thumb    ACTIVITIES OF DAILY LIVING ASSESSMENT  AM-PAC ‘6-Clicks’ Inpatient Daily Activity Short Form  How much help from another person does the patient currently need…  -   Putting on and taking off regular lower body clothing?: A Little  -   Bathing (including washing, rinsing, drying)?: A Little  -   Toileting, which includes using toilet, bedpan or urinal? : A Little  -   Putting on and taking off regular upper body clothing?: None  -   Taking care of personal grooming such as brushing teeth?: None  -   Eating meals?: None    AM-PAC Score:  Score: 21  Approx Degree of Impairment: 32.79%  Standardized Score (AM-PAC Scale): 44.27  CMS Modifier (G-Code): CJ    FUNCTIONAL TRANSFER ASSESSMENT  Chair Transfer: supervision  Comments:  Patient expressed increased pain when sitting d/t low height of chair. Patient declined increasing the height of the chair with blankets at that moment, as he did not want to get up again, but was educated on asking the nurse do so next time wanted to get up.    FUNCTIONAL MOBILITY  supervision for community distance fx mobility using IV pole  Comments:   Patient was up with PT at start of session. No LOB.    ACTIVITIES OF DAILY LIVING  Eating: independent (per obs)  Grooming: independent (per obs)  UB Dressing: independent (per obs)  LB Dressing: supervision seated in chair  Toileting: supervision (per obs)  Comments:   Patient benefited from cues to perform figure-4 technique when LB dressing. Patient reported he wears slip-on shoes at baseline.    EDUCATION PROVIDED  Patient: Role of  Occupational Therapy; Plan of Care; Discharge Recommendations; Adaptive Equipment Recommendations; DME Recommendations; Functional Transfer Techniques; Fall Prevention; Posture/Positioning; Compensatory ADL Techniques; Abdominal Protective Strategies  Patient's Response to Education: Verbalized Understanding; Returned Demonstration    The patient's Approx Degree of Impairment: 32.79% has been calculated based on documentation in the [facility] '6 clicks' Inpatient Daily Activity Short Form.  Research supports that patients with this level of impairment may benefit from home w/ assist. Final disposition will be made by interdisciplinary medical team.    Patient End of Session: Up in chair;Needs met;Call light within reach;RN aware of session/findings;All patient questions and concerns addressed    Patient was able to achieve the following ...   Patient able to toilet transfer  Safely with SUP based on similar observations    Patient able to dress lower extremities  Safely with SUP    Patient/Caregiver able to demonstrate safety with ADLS  safely and independently     Patient Evaluation Complexity Level:   Occupational Profile/Medical History LOW - Brief history including review of medical or therapy records    Specific performance deficits impacting engagement in ADL/IADL LOW  1 - 3 performance deficits    Client Assessment/Performance Deficits MODERATE - Comorbidities and min to mod modifications of tasks    Clinical Decision Making LOW - Analysis of occupational profile, problem-focused assessments, limited treatment options    Overall Complexity LOW     OT Session Time:   Self-Care Home Management: 8 minutes    Jaxon Calles  [facility]  OT Student  ___________________________________________    I provided clinical instruction and supervision for the duration of this session and agree with the above documentation.    Nori Flanagan OTR/L  [facility]  #[phone]

## 2024-07-19 LAB
ALBUMIN SERPL-MCNC: 3.4 G/DL (ref 3.2–4.8)
ALBUMIN/GLOB SERPL: 1.6 {RATIO} (ref 1–2)
ALP LIVER SERPL-CCNC: 47 U/L
ALT SERPL-CCNC: 11 U/L
ANION GAP SERPL CALC-SCNC: 4 MMOL/L (ref 0–18)
AST SERPL-CCNC: 11 U/L (ref ?–34)
BASOPHILS # BLD AUTO: 0.01 X10(3) UL (ref 0–0.2)
BASOPHILS NFR BLD AUTO: 0.1 %
BILIRUB SERPL-MCNC: 1.1 MG/DL (ref 0.3–1.2)
BUN BLD-MCNC: 15 MG/DL (ref 9–23)
BUN/CREAT SERPL: 24.2 (ref 10–20)
CALCIUM BLD-MCNC: 7.9 MG/DL (ref 8.7–10.4)
CHLORIDE SERPL-SCNC: 107 MMOL/L (ref 98–112)
CO2 SERPL-SCNC: 28 MMOL/L (ref 21–32)
CREAT BLD-MCNC: 0.62 MG/DL
DEPRECATED RDW RBC AUTO: 43.7 FL (ref 35.1–46.3)
EGFRCR SERPLBLD CKD-EPI 2021: 111 ML/MIN/1.73M2 (ref 60–?)
EOSINOPHIL # BLD AUTO: 0.19 X10(3) UL (ref 0–0.7)
EOSINOPHIL NFR BLD AUTO: 1.8 %
ERYTHROCYTE [DISTWIDTH] IN BLOOD BY AUTOMATED COUNT: 14.2 % (ref 11–15)
GLOBULIN PLAS-MCNC: 2.1 G/DL (ref 2–3.5)
GLUCOSE BLD-MCNC: 91 MG/DL (ref 70–99)
HCT VFR BLD AUTO: 32.9 %
HGB BLD-MCNC: 10.8 G/DL
IMM GRANULOCYTES # BLD AUTO: 0.04 X10(3) UL (ref 0–1)
IMM GRANULOCYTES NFR BLD: 0.4 %
LYMPHOCYTES # BLD AUTO: 1.17 X10(3) UL (ref 1–4)
LYMPHOCYTES NFR BLD AUTO: 10.9 %
MAGNESIUM SERPL-MCNC: 1.9 MG/DL (ref 1.6–2.6)
MCH RBC QN AUTO: 27.5 PG (ref 26–34)
MCHC RBC AUTO-ENTMCNC: 32.8 G/DL (ref 31–37)
MCV RBC AUTO: 83.7 FL
MONOCYTES # BLD AUTO: 0.84 X10(3) UL (ref 0.1–1)
MONOCYTES NFR BLD AUTO: 7.8 %
NEUTROPHILS # BLD AUTO: 8.48 X10 (3) UL (ref 1.5–7.7)
NEUTROPHILS # BLD AUTO: 8.48 X10(3) UL (ref 1.5–7.7)
NEUTROPHILS NFR BLD AUTO: 79 %
OSMOLALITY SERPL CALC.SUM OF ELEC: 288 MOSM/KG (ref 275–295)
PLATELET # BLD AUTO: 287 10(3)UL (ref 150–450)
POTASSIUM SERPL-SCNC: 3.3 MMOL/L (ref 3.5–5.1)
PROT SERPL-MCNC: 5.5 G/DL (ref 5.7–8.2)
RBC # BLD AUTO: 3.93 X10(6)UL
SODIUM SERPL-SCNC: 139 MMOL/L (ref 136–145)
WBC # BLD AUTO: 10.7 X10(3) UL (ref 4–11)

## 2024-07-19 PROCEDURE — 99233 SBSQ HOSP IP/OBS HIGH 50: CPT | Performed by: HOSPITALIST

## 2024-07-19 RX ORDER — MAGNESIUM HYDROXIDE/ALUMINUM HYDROXICE/SIMETHICONE 120; 1200; 1200 MG/30ML; MG/30ML; MG/30ML
30 SUSPENSION ORAL 4 TIMES DAILY PRN
Status: DISCONTINUED | OUTPATIENT
Start: 2024-07-19 | End: 2024-07-25

## 2024-07-19 RX ORDER — POTASSIUM CHLORIDE 1.5 G/1.58G
40 POWDER, FOR SOLUTION ORAL ONCE
Status: COMPLETED | OUTPATIENT
Start: 2024-07-19 | End: 2024-07-19

## 2024-07-19 RX ORDER — SIMETHICONE 80 MG
80 TABLET,CHEWABLE ORAL 4 TIMES DAILY PRN
Status: DISCONTINUED | OUTPATIENT
Start: 2024-07-19 | End: 2024-07-25

## 2024-07-19 NOTE — PROGRESS NOTES
[facility]  part of [facility]    Progress Note    Dno Cabral Patient Status:  Inpatient    1965 MRN [id number]   Location [facility] 4W/SW/SE Attending Dionisio Dong MD   Hosp Day # 2 PCP Stephen Christianson MD       Subjective:   POD2 after colostomy reversal with partial colectomy and extensive lysis of adhesions. Pt walking frequently, passing some flatus. Pain improving, some nausea, no vomiting.    Objective:   Vital Signs:  Blood pressure 118/69, pulse 76, temperature 98.5 °F (36.9 °C), temperature source Oral, resp. rate 16, height 6' 2\" (1.88 m), weight 260 lb (117.9 kg), SpO2 94%.    Physical Exam     General: No acute distress. Alert and oriented x 3.  HEENT: Moist mucous membranes. Anicteric.   Neck: Supple, No JVD.   Respiratory: Nonlabored resp.  Cardiovascular: Regular rate.   Abdomen: Soft, expected incisional tenderness, no rebound tnd, no guarding, nondistended.  No masses. Normal bowel sounds.  Incisions c/d/I, MARGARITA drains serosanguineous  Neurologic: No focal neurological deficits.  Musculoskeletal: Full range of motion of all extremities. No calf tenderness. No swelling noted.  Integument: No lesions. No erythema.  Psychiatric: Appropriate mood and affect.         Assessment and Plan:     Sigmoid diverticulitis      Essential hypertension      NELLIE (obstructive sleep apnea)      Bipolar 1 disorder (HCC)    NGT removed, okay to start clears. Ambulate as able. Continue IV abx. Monitor MARGARITA drain output.    Results:     Lab Results   Component Value Date    WBC 10.7 2024    HGB 10.8 (L) 2024    HCT 32.9 (L) 2024    .0 2024    CREATSERUM 0.62 (L) 2024    BUN 15 2024     2024    K 3.3 (L) 2024     2024    CO2 28.0 2024    GLU 91 2024    CA 7.9 (L) 2024    ALB 3.4 2024    ALKPHO 47 2024    BILT 1.1 2024    TP 5.5 (L) 2024    AST 11 2024    ALT  11 07/19/2024    PTT 24.9 09/28/2023    INR 1.01 09/28/2023    TSH 1.850 09/28/2023    LIP 37 04/02/2024    PSA 2.53 08/02/2021    DDIMER 2.44 (H) 06/23/2023    MG 1.9 07/19/2024    PHOS 4.0 07/18/2024    TROP <0.045 02/05/2020       No results found.                Stephen Nunez PA-C  7/19/2024

## 2024-07-19 NOTE — PLAN OF CARE
Pt a/o x 4, pt with multiple loose stools this AM. Pt ambulates in hallway per self. Dr. Novoa to BS to remove NG tube, pt tolerated well. Discussed POC, offered emotional support. Call light and belongings with in reach, assessment ongoing.  Problem: Patient Centered Care  Goal: Patient preferences are identified and integrated in the patient's plan of care  Description: Interventions:  - What would you like us to know as we care for you?   - Provide timely, complete, and accurate information to patient/family  - Incorporate patient and family knowledge, values, beliefs, and cultural backgrounds into the planning and delivery of care  - Encourage patient/family to participate in care and decision-making at the level they choose  - Honor patient and family perspectives and choices  Outcome: Progressing     Problem: Patient/Family Goals  Goal: Patient/Family Long Term Goal  Description: Patient's Long Term Goal:     Interventions:  -   - See additional Care Plan goals for specific interventions  Outcome: Progressing  Goal: Patient/Family Short Term Goal  Description: Patient's Short Term Goal:     Interventions:   -   - See additional Care Plan goals for specific interventions  Outcome: Progressing     Problem: PAIN - ADULT  Goal: Verbalizes/displays adequate comfort level or patient's stated pain goal  Description: INTERVENTIONS:  - Encourage pt to monitor pain and request assistance  - Assess pain using appropriate pain scale  - Administer analgesics based on type and severity of pain and evaluate response  - Implement non-pharmacological measures as appropriate and evaluate response  - Consider cultural and social influences on pain and pain management  - Manage/alleviate anxiety  - Utilize distraction and/or relaxation techniques  - Monitor for opioid side effects  - Notify MD/LIP if interventions unsuccessful or patient reports new pain  - Anticipate increased pain with activity and pre-medicate as  appropriate  Outcome: Progressing     Problem: RISK FOR INFECTION - ADULT  Goal: Absence of fever/infection during anticipated neutropenic period  Description: INTERVENTIONS  - Monitor WBC  - Administer growth factors as ordered  - Implement neutropenic guidelines  Outcome: Progressing     Problem: SAFETY ADULT - FALL  Goal: Free from fall injury  Description: INTERVENTIONS:  - Assess pt frequently for physical needs  - Identify cognitive and physical deficits and behaviors that affect risk of falls.  - Jay fall precautions as indicated by assessment.  - Educate pt/family on patient safety including physical limitations  - Instruct pt to call for assistance with activity based on assessment  - Modify environment to reduce risk of injury  - Provide assistive devices as appropriate  - Consider OT/PT consult to assist with strengthening/mobility  - Encourage toileting schedule  Outcome: Progressing     Problem: DISCHARGE PLANNING  Goal: Discharge to home or other facility with appropriate resources  Description: INTERVENTIONS:  - Identify barriers to discharge w/pt and caregiver  - Include patient/family/discharge partner in discharge planning  - Arrange for needed discharge resources and transportation as appropriate  - Identify discharge learning needs (meds, wound care, etc)  - Arrange for interpreters to assist at discharge as needed  - Consider post-discharge preferences of patient/family/discharge partner  - Complete POLST form as appropriate  - Assess patient's ability to be responsible for managing their own health  - Refer to Case Management Department for coordinating discharge planning if the patient needs post-hospital services based on physician/LIP order or complex needs related to functional status, cognitive ability or social support system  Outcome: Progressing     Problem: RESPIRATORY - ADULT  Goal: Achieves optimal ventilation and oxygenation  Description: INTERVENTIONS:  - Assess for changes  in respiratory status  - Assess for changes in mentation and behavior  - Position to facilitate oxygenation and minimize respiratory effort  - Oxygen supplementation based on oxygen saturation or ABGs  - Provide Smoking Cessation handout, if applicable  - Encourage broncho-pulmonary hygiene including cough, deep breathe, Incentive Spirometry  - Assess the need for suctioning and perform as needed  - Assess and instruct to report SOB or any respiratory difficulty  - Respiratory Therapy support as indicated  - Manage/alleviate anxiety  - Monitor for signs/symptoms of CO2 retention  Outcome: Progressing

## 2024-07-19 NOTE — PROGRESS NOTES
[facility]  part of [facility]    Progress Note    Don Cabral Patient Status:  Inpatient    1965 MRN [id number]   Location [facility] 4W/SW/SE Attending Dionisio Dong MD   Hosp Day # 2 PCP Stephen Christianson MD       Subjective:   Don Cabral is a(n) 58 year old male was seen and examined  Lying in bed  Pain controlled  Had a BM  NG removed  No n,v,f,c, cp or sob  Objective:   Blood pressure 115/57, pulse 87, temperature 98.1 °F (36.7 °C), temperature source Oral, resp. rate 17, height 6' 2\" (1.88 m), weight 260 lb (117.9 kg), SpO2 96%.  GENERAL:  Alert, oriented to time, place, and person, NAD  HEENT:  Atraumatic.  Oropharynx clear.  Moist mucous membranes.  Ears, nose normal.  Eyes:  Anicteric sclerae.   NECK:  Supple.  No lymphadenopathy.  Trachea midline.   LUNGS:  Clear to auscultation bilaterally.  Normal respiratory effort.   HEART:  Regular rate and rhythm.  S1 and S2 auscultated.  No murmur.  ABDOMEN:  Soft, nondistended.  Abdominal dressing with 2 MARGARITA drains in place.  Hypoactive bowel sounds.  EXTREMITIES:  No peripheral edema, clubbing, or cyanosis.  NEUROLOGIC:  Motor and sensory intact.  Cranial nerves II-XII are intact.       Current Inpatient Medications:     Current Facility-Administered Medications:     albuterol (Ventolin HFA) 108 (90 Base) MCG/ACT inhaler 2 puff, 2 puff, Inhalation, Q4H PRN    clonazePAM (KlonoPIN) tab 0.5 mg, 0.5 mg, Oral, TID PRN    gabapentin (Neurontin) tab 600 mg, 600 mg, Oral, TID    lisinopril (Prinivil; Zestril) tab 20 mg, 20 mg, Oral, Daily    OLANZapine (ZyPREXA) tab 2.5 mg, 2.5 mg, Oral, Nightly    sertraline (Zoloft) tab 75 mg, 75 mg, Oral, Daily    verapamil ER (Calan-SR) tab 120 mg, 120 mg, Oral, Nightly    lactated ringers infusion, , Intravenous, Continuous    heparin (Porcine) 5000 UNIT/ML injection 5,000 Units, 5,000 Units, Subcutaneous, 2 times per day    ketorolac (Toradol) 30 MG/ML injection 30 mg, 30 mg,  Intravenous, Q6H    oxyCODONE immediate release tab 5 mg, 5 mg, Oral, Q4H PRN **OR** oxyCODONE immediate release tab 10 mg, 10 mg, Oral, Q4H PRN    HYDROmorphone (Dilaudid) 1 MG/ML injection 0.4 mg, 0.4 mg, Intravenous, Q2H PRN **OR** HYDROmorphone (Dilaudid) 1 MG/ML injection 0.8 mg, 0.8 mg, Intravenous, Q2H PRN    ondansetron (Zofran) 4 MG/2ML injection 4 mg, 4 mg, Intravenous, Q6H PRN    prochlorperazine (Compazine) 10 MG/2ML injection 5 mg, 5 mg, Intravenous, Q8H PRN    pantoprazole (Protonix) 40 mg in sodium chloride 0.9% PF 10 mL IV push, 40 mg, Intravenous, Daily    hydrALAzine (Apresoline) 20 mg/mL injection 10 mg, 10 mg, Intravenous, Q3H PRN    phenol (Chloraseptic) 1.4 % oral liquid spray, , Oral, Q2H PRN    Assessment and Plan:   1.       History of perforated sigmoid diverticulitis status post left hemicolectomy and end colostomy with splenic flexure mobilization.    Now status post open partial colectomy with colorectal anastomosis, takedown of colostomy, extensive lysis of adhesions, with hepatic flexure mobilization.    Pain control adequate  Cont Monitor hemodynamic status.  NG removed and tolerating CLD  Had a BM  Cont DVT prophylaxis.      2.       Essential hypertension.    Resume home meds    3.       Obstructive sleep apnea.    Apply obstructive sleep apnea protocol and monitor.    4.       Bipolar affective disorder.    Resume home meds.    Full code  MDM: High   Results:     Recent Labs   Lab 07/18/24  0506 07/19/24  0446   RBC 4.94 3.93*   HGB 13.7 10.8*   HCT 41.2 32.9*   MCV 83.4 83.7   MCH 27.7 27.5   MCHC 33.3 32.8   RDW 14.0 14.2   NEPRELIM 11.88* 8.48*   WBC 14.6* 10.7   .0 287.0         Recent Labs   Lab 07/18/24  0506 07/19/24  0446   * 91   BUN 16 15   CREATSERUM 0.77 0.62*   EGFRCR 104 111   CA 8.8 7.9*    139   K 4.3 3.3*    107   CO2 29.0 28.0         Imaging:   No results found.          Dionisio Dong MD  7/19/2024

## 2024-07-20 PROBLEM — Z01.818 PREOP TESTING: Status: ACTIVE | Noted: 2024-07-20

## 2024-07-20 LAB
ALBUMIN SERPL-MCNC: 3.7 G/DL (ref 3.2–4.8)
ANION GAP SERPL CALC-SCNC: 5 MMOL/L (ref 0–18)
BASOPHILS # BLD AUTO: 0.01 X10(3) UL (ref 0–0.2)
BASOPHILS NFR BLD AUTO: 0.1 %
BUN BLD-MCNC: 14 MG/DL (ref 9–23)
BUN/CREAT SERPL: 19.2 (ref 10–20)
CALCIUM BLD-MCNC: 8.7 MG/DL (ref 8.7–10.4)
CHLORIDE SERPL-SCNC: 107 MMOL/L (ref 98–112)
CO2 SERPL-SCNC: 29 MMOL/L (ref 21–32)
CREAT BLD-MCNC: 0.73 MG/DL
DEPRECATED RDW RBC AUTO: 46.4 FL (ref 35.1–46.3)
EGFRCR SERPLBLD CKD-EPI 2021: 106 ML/MIN/1.73M2 (ref 60–?)
EOSINOPHIL # BLD AUTO: 0.26 X10(3) UL (ref 0–0.7)
EOSINOPHIL NFR BLD AUTO: 2.5 %
ERYTHROCYTE [DISTWIDTH] IN BLOOD BY AUTOMATED COUNT: 14.5 % (ref 11–15)
GLUCOSE BLD-MCNC: 85 MG/DL (ref 70–99)
HCT VFR BLD AUTO: 37.3 %
HGB BLD-MCNC: 11.6 G/DL
IMM GRANULOCYTES # BLD AUTO: 0.03 X10(3) UL (ref 0–1)
IMM GRANULOCYTES NFR BLD: 0.3 %
LYMPHOCYTES # BLD AUTO: 1.57 X10(3) UL (ref 1–4)
LYMPHOCYTES NFR BLD AUTO: 15.3 %
MAGNESIUM SERPL-MCNC: 1.9 MG/DL (ref 1.6–2.6)
MCH RBC QN AUTO: 27.1 PG (ref 26–34)
MCHC RBC AUTO-ENTMCNC: 31.1 G/DL (ref 31–37)
MCV RBC AUTO: 87.1 FL
MONOCYTES # BLD AUTO: 0.89 X10(3) UL (ref 0.1–1)
MONOCYTES NFR BLD AUTO: 8.7 %
NEUTROPHILS # BLD AUTO: 7.51 X10 (3) UL (ref 1.5–7.7)
NEUTROPHILS # BLD AUTO: 7.51 X10(3) UL (ref 1.5–7.7)
NEUTROPHILS NFR BLD AUTO: 73.1 %
OSMOLALITY SERPL CALC.SUM OF ELEC: 292 MOSM/KG (ref 275–295)
PHOSPHATE SERPL-MCNC: 2 MG/DL (ref 2.4–5.1)
PLATELET # BLD AUTO: 340 10(3)UL (ref 150–450)
POTASSIUM SERPL-SCNC: 3.9 MMOL/L (ref 3.5–5.1)
POTASSIUM SERPL-SCNC: 3.9 MMOL/L (ref 3.5–5.1)
RBC # BLD AUTO: 4.28 X10(6)UL
SODIUM SERPL-SCNC: 141 MMOL/L (ref 136–145)
WBC # BLD AUTO: 10.3 X10(3) UL (ref 4–11)

## 2024-07-20 PROCEDURE — 99233 SBSQ HOSP IP/OBS HIGH 50: CPT | Performed by: HOSPITALIST

## 2024-07-20 RX ORDER — FAMOTIDINE 20 MG/1
20 TABLET, FILM COATED ORAL ONCE
Status: COMPLETED | OUTPATIENT
Start: 2024-07-20 | End: 2024-07-20

## 2024-07-20 NOTE — PROGRESS NOTES
[facility]  part of [facility]    Progress Note    Don Cabral Patient Status:  Inpatient    1965 MRN [id number]   Location [facility] 4W/SW/SE Attending Dionisio Dong MD   Hosp Day # 3 PCP Stephen Christianson MD       Subjective:   Don Cabral is a(n) 58 year old male was seen and examined  Lying in bed, no acute events overnight   Pain controlled  Tolerating FLD  No n,v,f,c, cp or sob  Objective:   Blood pressure 116/61, pulse 83, temperature 98.1 °F (36.7 °C), temperature source Oral, resp. rate 16, height 6' 2\" (1.88 m), weight 260 lb (117.9 kg), SpO2 91%.  GENERAL:  Alert, oriented to time, place, and person, NAD  HEENT:  Atraumatic.  Oropharynx clear.  Moist mucous membranes.  Ears, nose normal.  Eyes:  Anicteric sclerae.   NECK:  Supple.  No lymphadenopathy.  Trachea midline.   LUNGS:  Clear to auscultation bilaterally.  Normal respiratory effort.   HEART:  Regular rate and rhythm.  S1 and S2 auscultated.  No murmur.  ABDOMEN:  Soft, nondistended.  Abdominal dressing with 2 MARGARITA drains in place.  BS+  EXTREMITIES:  No peripheral edema, clubbing, or cyanosis.  NEUROLOGIC:  Motor and sensory intact.  Cranial nerves II-XII are intact.       Current Inpatient Medications:     Current Facility-Administered Medications:     sodium phosphate 15 mmol in 0.9% NaCl 100mL IVPB premix, 15 mmol, Intravenous, Once    alum-mag hydroxide-simethicone (Maalox) 200-200-20 MG/5ML oral suspension 30 mL, 30 mL, Oral, QID PRN    simethicone (Mylicon) chewable tab 80 mg, 80 mg, Oral, QID PRN    albuterol (Ventolin HFA) 108 (90 Base) MCG/ACT inhaler 2 puff, 2 puff, Inhalation, Q4H PRN    clonazePAM (KlonoPIN) tab 0.5 mg, 0.5 mg, Oral, TID PRN    gabapentin (Neurontin) tab 600 mg, 600 mg, Oral, TID    lisinopril (Prinivil; Zestril) tab 20 mg, 20 mg, Oral, Daily    OLANZapine (ZyPREXA) tab 2.5 mg, 2.5 mg, Oral, Nightly    sertraline (Zoloft) tab 75 mg, 75 mg, Oral, Daily    verapamil ER  (Calan-SR) tab 120 mg, 120 mg, Oral, Nightly    lactated ringers infusion, , Intravenous, Continuous    heparin (Porcine) 5000 UNIT/ML injection 5,000 Units, 5,000 Units, Subcutaneous, 2 times per day    oxyCODONE immediate release tab 5 mg, 5 mg, Oral, Q4H PRN **OR** oxyCODONE immediate release tab 10 mg, 10 mg, Oral, Q4H PRN    HYDROmorphone (Dilaudid) 1 MG/ML injection 0.4 mg, 0.4 mg, Intravenous, Q2H PRN **OR** HYDROmorphone (Dilaudid) 1 MG/ML injection 0.8 mg, 0.8 mg, Intravenous, Q2H PRN    ondansetron (Zofran) 4 MG/2ML injection 4 mg, 4 mg, Intravenous, Q6H PRN    prochlorperazine (Compazine) 10 MG/2ML injection 5 mg, 5 mg, Intravenous, Q8H PRN    pantoprazole (Protonix) 40 mg in sodium chloride 0.9% PF 10 mL IV push, 40 mg, Intravenous, Daily    hydrALAzine (Apresoline) 20 mg/mL injection 10 mg, 10 mg, Intravenous, Q3H PRN    phenol (Chloraseptic) 1.4 % oral liquid spray, , Oral, Q2H PRN    Assessment and Plan:   1.       History of perforated sigmoid diverticulitis status post left hemicolectomy and end colostomy with splenic flexure mobilization.    Now status post open partial colectomy with colorectal anastomosis, takedown of colostomy, extensive lysis of adhesions, with hepatic flexure mobilization.    Pain control adequate  Cont Monitor hemodynamic status.  NG removed and tolerating FLD today  ADAT as per surgery  Had a BM  Cont DVT prophylaxis.      2.       Essential hypertension.    Cont home meds    3.       Obstructive sleep apnea.    Apply obstructive sleep apnea protocol and monitor.    4.       Bipolar affective disorder.  Cont home meds    Full code  MDM: High   Results:     Recent Labs   Lab 07/18/24  0506 07/19/24  0446 07/20/24  0547   RBC 4.94 3.93* 4.28*   HGB 13.7 10.8* 11.6*   HCT 41.2 32.9* 37.3*   MCV 83.4 83.7 87.1   MCH 27.7 27.5 27.1   MCHC 33.3 32.8 31.1   RDW 14.0 14.2 14.5   NEPRELIM 11.88* 8.48* 7.51   WBC 14.6* 10.7 10.3   .0 287.0 340.0         Recent Labs   Lab  07/18/24  0506 07/19/24  0446 07/20/24  0547   * 91 85   BUN 16 15 14   CREATSERUM 0.77 0.62* 0.73   EGFRCR 104 111 106   CA 8.8 7.9* 8.7    139 141   K 4.3 3.3* 3.9  3.9    107 107   CO2 29.0 28.0 29.0         Imaging:   No results found.          Dionisio Dong MD  7/20/2024

## 2024-07-20 NOTE — PLAN OF CARE
Pt a/o x 4, vss. Medicated pt per md order, monitored pt safety. Pt ambulates in hallway frequently, + gas, tolerating full liquid diet. Discussed POC, offerd emotional support. Instructed pt to use call light for assistance, call light and belongs with in reach. Abd staples with packing in place, new guaze dressings applied with abd and paper tape, abd binder in place. Call light and belongings with in reach, assessment ongoing.   Problem: Patient Centered Care  Goal: Patient preferences are identified and integrated in the patient's plan of care  Description: Interventions:  - What would you like us to know as we care for you?   - Provide timely, complete, and accurate information to patient/family  - Incorporate patient and family knowledge, values, beliefs, and cultural backgrounds into the planning and delivery of care  - Encourage patient/family to participate in care and decision-making at the level they choose  - Honor patient and family perspectives and choices  Outcome: Progressing     Problem: Patient/Family Goals  Goal: Patient/Family Long Term Goal  Description: Patient's Long Term Goal:     Interventions:  -   - See additional Care Plan goals for specific interventions  Outcome: Progressing  Goal: Patient/Family Short Term Goal  Description: Patient's Short Term Goal:     Interventions:   -   - See additional Care Plan goals for specific interventions  Outcome: Progressing     Problem: PAIN - ADULT  Goal: Verbalizes/displays adequate comfort level or patient's stated pain goal  Description: INTERVENTIONS:  - Encourage pt to monitor pain and request assistance  - Assess pain using appropriate pain scale  - Administer analgesics based on type and severity of pain and evaluate response  - Implement non-pharmacological measures as appropriate and evaluate response  - Consider cultural and social influences on pain and pain management  - Manage/alleviate anxiety  - Utilize distraction and/or relaxation  techniques  - Monitor for opioid side effects  - Notify MD/LIP if interventions unsuccessful or patient reports new pain  - Anticipate increased pain with activity and pre-medicate as appropriate  Outcome: Progressing     Problem: RISK FOR INFECTION - ADULT  Goal: Absence of fever/infection during anticipated neutropenic period  Description: INTERVENTIONS  - Monitor WBC  - Administer growth factors as ordered  - Implement neutropenic guidelines  Outcome: Progressing     Problem: SAFETY ADULT - FALL  Goal: Free from fall injury  Description: INTERVENTIONS:  - Assess pt frequently for physical needs  - Identify cognitive and physical deficits and behaviors that affect risk of falls.  - [place] fall precautions as indicated by assessment.  - Educate pt/family on patient safety including physical limitations  - Instruct pt to call for assistance with activity based on assessment  - Modify environment to reduce risk of injury  - Provide assistive devices as appropriate  - Consider OT/PT consult to assist with strengthening/mobility  - Encourage toileting schedule  Outcome: Progressing     Problem: DISCHARGE PLANNING  Goal: Discharge to home or other facility with appropriate resources  Description: INTERVENTIONS:  - Identify barriers to discharge w/pt and caregiver  - Include patient/family/discharge partner in discharge planning  - Arrange for needed discharge resources and transportation as appropriate  - Identify discharge learning needs (meds, wound care, etc)  - Arrange for interpreters to assist at discharge as needed  - Consider post-discharge preferences of patient/family/discharge partner  - Complete POLST form as appropriate  - Assess patient's ability to be responsible for managing their own health  - Refer to Case Management Department for coordinating discharge planning if the patient needs post-hospital services based on physician/LIP order or complex needs related to functional status, cognitive ability  or social support system  Outcome: Progressing     Problem: RESPIRATORY - ADULT  Goal: Achieves optimal ventilation and oxygenation  Description: INTERVENTIONS:  - Assess for changes in respiratory status  - Assess for changes in mentation and behavior  - Position to facilitate oxygenation and minimize respiratory effort  - Oxygen supplementation based on oxygen saturation or ABGs  - Provide Smoking Cessation handout, if applicable  - Encourage broncho-pulmonary hygiene including cough, deep breathe, Incentive Spirometry  - Assess the need for suctioning and perform as needed  - Assess and instruct to report SOB or any respiratory difficulty  - Respiratory Therapy support as indicated  - Manage/alleviate anxiety  - Monitor for signs/symptoms of CO2 retention  Outcome: Progressing

## 2024-07-20 NOTE — PLAN OF CARE
Patient is A/Ox4 on room air. VSS. CLD; tolerating well. Denies nausea. Pain controlled PO oxy.  Up independently walking the halls. IVF's and abx continued overnight. Voiding freely. BM overnight. MARGARITA C/D/I. Call light within reach; safety precautions in place.   Problem: PAIN - ADULT  Goal: Verbalizes/displays adequate comfort level or patient's stated pain goal  Description: INTERVENTIONS:  - Encourage pt to monitor pain and request assistance  - Assess pain using appropriate pain scale  - Administer analgesics based on type and severity of pain and evaluate response  - Implement non-pharmacological measures as appropriate and evaluate response  - Consider cultural and social influences on pain and pain management  - Manage/alleviate anxiety  - Utilize distraction and/or relaxation techniques  - Monitor for opioid side effects  - Notify MD/LIP if interventions unsuccessful or patient reports new pain  - Anticipate increased pain with activity and pre-medicate as appropriate  Outcome: Progressing     Problem: RISK FOR INFECTION - ADULT  Goal: Absence of fever/infection during anticipated neutropenic period  Description: INTERVENTIONS  - Monitor WBC  - Administer growth factors as ordered  - Implement neutropenic guidelines  Outcome: Progressing     Problem: SAFETY ADULT - FALL  Goal: Free from fall injury  Description: INTERVENTIONS:  - Assess pt frequently for physical needs  - Identify cognitive and physical deficits and behaviors that affect risk of falls.  - [place] fall precautions as indicated by assessment.  - Educate pt/family on patient safety including physical limitations  - Instruct pt to call for assistance with activity based on assessment  - Modify environment to reduce risk of injury  - Provide assistive devices as appropriate  - Consider OT/PT consult to assist with strengthening/mobility  - Encourage toileting schedule  Outcome: Progressing     Problem: RESPIRATORY - ADULT  Goal: Achieves  optimal ventilation and oxygenation  Description: INTERVENTIONS:  - Assess for changes in respiratory status  - Assess for changes in mentation and behavior  - Position to facilitate oxygenation and minimize respiratory effort  - Oxygen supplementation based on oxygen saturation or ABGs  - Provide Smoking Cessation handout, if applicable  - Encourage broncho-pulmonary hygiene including cough, deep breathe, Incentive Spirometry  - Assess the need for suctioning and perform as needed  - Assess and instruct to report SOB or any respiratory difficulty  - Respiratory Therapy support as indicated  - Manage/alleviate anxiety  - Monitor for signs/symptoms of CO2 retention  Outcome: Progressing

## 2024-07-20 NOTE — PROGRESS NOTES
[facility]  Progress Note    Don Cabral Patient Status:  Inpatient    1965 MRN [id number]   Location [facility] 4W/SW/SE Attending Dionisio Dong MD   Hosp Day # 3 PCP Stephen Christianson MD     Subjective:  The patient was seen and examined at bedside.  No acute events overnight.  He states he is tolerating a clear liquid diet.  He denies nausea or vomiting.  He is passing flatus and had several small bowel movements.    Objective/Physical Exam:  /61 (BP Location: Right arm)   Pulse 83   Temp 98.1 °F (36.7 °C) (Oral)   Resp 16   Ht 6' 2\" (1.88 m)   Wt 260 lb (117.9 kg)   SpO2 91%   BMI 33.38 kg/m²     Intake/Output Summary (Last 24 hours) at 2024 1107  Last data filed at 2024 0600  Gross per 24 hour   Intake --   Output 190 ml   Net -190 ml         General: Alert, oriented x3. No acute distress.  HEENT: Normocephalic, atraumatic. No scleral icterus.  Pulmonary: No respiratory distress, effort normal.   Abdomen: Mildly-distended, without tympany to percussion. Soft, incisional site tenderness to palpation. No rebound or guarding. No peritoneal signs.   Incision: Midline and transverse left abdominal incision sites are closed with staples they are clean, dry, intact with Telfa packing in place  Extremities: No lower extremity edema. No clubbing or cyanosis.   Skin: Warm, dry. No jaundice.   Drains: Right lower quadrant drain with 140 mL serosanguineous output in left lower quadrant drain with 70 mL serosanguineous output in the last 24 hours        Labs:  Lab Results   Component Value Date    WBC 10.3 2024    HGB 11.6 2024    HCT 37.3 2024    .0 2024      Lab Results   Component Value Date    INR 1.01 2023    INR 1.23 (H) 2023     Lab Results   Component Value Date     2024    K 3.9 2024    K 3.9 2024     2024    CO2 29.0 2024    BUN 14 2024    CREATSERUM 0.73 2024     GLU 85 07/20/2024    CA 8.7 07/20/2024    ALB 3.7 07/20/2024          Assessment:  Patient Active Problem List   Diagnosis    Anxiety    Essential hypertension    ED (erectile dysfunction)    NELLIE (obstructive sleep apnea)    Generalized anxiety disorder    Hypokalemia    Hyperglycemia    Tooth pain    Daytime hypersomnolence    Symptomatic bradycardia    Palpitations    Slow heart rate    PVC (premature ventricular contraction)    Bipolar 1 disorder (HCC)    Sigmoid diverticulitis    Acute diverticulitis    Intractable abdominal pain    Postoperative examination     POD 3 colostomy reversal with partial colectomy and extensive lysis of adhesions    Plan:  Advance to full liquid diet  Antiemetics and analgesics as needed  Encourage ambulation  Encourage incentive spirometer  Medical management per hospitalist  Drain and wound care  DVT prophylaxis with heparin  GI prophylaxis with Protonix      My total face time with this patient was 25 minutes. Greater than half of the visit was spent in counseling the patient on the above listed diagnoses and treatment options.     Tresa Bass PA-C  7/20/2024  11:07 AM

## 2024-07-21 LAB
ALBUMIN SERPL-MCNC: 3.6 G/DL (ref 3.2–4.8)
ANION GAP SERPL CALC-SCNC: 8 MMOL/L (ref 0–18)
BASOPHILS # BLD AUTO: 0.01 X10(3) UL (ref 0–0.2)
BASOPHILS NFR BLD AUTO: 0.1 %
BUN BLD-MCNC: 11 MG/DL (ref 9–23)
BUN/CREAT SERPL: 15.9 (ref 10–20)
CALCIUM BLD-MCNC: 8.5 MG/DL (ref 8.7–10.4)
CHLORIDE SERPL-SCNC: 106 MMOL/L (ref 98–112)
CO2 SERPL-SCNC: 27 MMOL/L (ref 21–32)
CREAT BLD-MCNC: 0.69 MG/DL
DEPRECATED RDW RBC AUTO: 43.4 FL (ref 35.1–46.3)
EGFRCR SERPLBLD CKD-EPI 2021: 108 ML/MIN/1.73M2 (ref 60–?)
EOSINOPHIL # BLD AUTO: 0.19 X10(3) UL (ref 0–0.7)
EOSINOPHIL NFR BLD AUTO: 2 %
ERYTHROCYTE [DISTWIDTH] IN BLOOD BY AUTOMATED COUNT: 14.3 % (ref 11–15)
GLUCOSE BLD-MCNC: 87 MG/DL (ref 70–99)
HCT VFR BLD AUTO: 35.2 %
HGB BLD-MCNC: 11.6 G/DL
IMM GRANULOCYTES # BLD AUTO: 0.03 X10(3) UL (ref 0–1)
IMM GRANULOCYTES NFR BLD: 0.3 %
LYMPHOCYTES # BLD AUTO: 1.5 X10(3) UL (ref 1–4)
LYMPHOCYTES NFR BLD AUTO: 15.8 %
MAGNESIUM SERPL-MCNC: 1.8 MG/DL (ref 1.6–2.6)
MCH RBC QN AUTO: 27.8 PG (ref 26–34)
MCHC RBC AUTO-ENTMCNC: 33 G/DL (ref 31–37)
MCV RBC AUTO: 84.4 FL
MONOCYTES # BLD AUTO: 0.81 X10(3) UL (ref 0.1–1)
MONOCYTES NFR BLD AUTO: 8.5 %
NEUTROPHILS # BLD AUTO: 6.96 X10 (3) UL (ref 1.5–7.7)
NEUTROPHILS # BLD AUTO: 6.96 X10(3) UL (ref 1.5–7.7)
NEUTROPHILS NFR BLD AUTO: 73.3 %
OSMOLALITY SERPL CALC.SUM OF ELEC: 291 MOSM/KG (ref 275–295)
PHOSPHATE SERPL-MCNC: 2.8 MG/DL (ref 2.4–5.1)
PHOSPHATE SERPL-MCNC: 2.8 MG/DL (ref 2.4–5.1)
PLATELET # BLD AUTO: 338 10(3)UL (ref 150–450)
POTASSIUM SERPL-SCNC: 3.7 MMOL/L (ref 3.5–5.1)
RBC # BLD AUTO: 4.17 X10(6)UL
SODIUM SERPL-SCNC: 141 MMOL/L (ref 136–145)
WBC # BLD AUTO: 9.5 X10(3) UL (ref 4–11)

## 2024-07-21 PROCEDURE — 99233 SBSQ HOSP IP/OBS HIGH 50: CPT | Performed by: HOSPITALIST

## 2024-07-21 RX ORDER — FAMOTIDINE 10 MG/ML
20 INJECTION, SOLUTION INTRAVENOUS 2 TIMES DAILY
Status: DISCONTINUED | OUTPATIENT
Start: 2024-07-21 | End: 2024-07-21

## 2024-07-21 RX ORDER — FAMOTIDINE 10 MG/ML
20 INJECTION, SOLUTION INTRAVENOUS 2 TIMES DAILY
Status: DISCONTINUED | OUTPATIENT
Start: 2024-07-21 | End: 2024-07-25

## 2024-07-21 RX ORDER — MAGNESIUM SULFATE HEPTAHYDRATE 40 MG/ML
2 INJECTION, SOLUTION INTRAVENOUS ONCE
Status: COMPLETED | OUTPATIENT
Start: 2024-07-21 | End: 2024-07-21

## 2024-07-21 RX ORDER — FAMOTIDINE 20 MG/1
20 TABLET, FILM COATED ORAL DAILY PRN
Status: DISCONTINUED | OUTPATIENT
Start: 2024-07-21 | End: 2024-07-21

## 2024-07-21 RX ORDER — FAMOTIDINE 10 MG/ML
INJECTION, SOLUTION INTRAVENOUS
Status: COMPLETED
Start: 2024-07-21 | End: 2024-07-21

## 2024-07-21 NOTE — PHYSICAL THERAPY NOTE
PHYSICAL THERAPY TREATMENT NOTE - INPATIENT     Room Number: 459/459-A       Presenting Problem: Diverticulitis S/P exlap partial colectomy with colorectal anastamosis, extensive adhesiolysis 24  Co-Morbidities : Hx of HTN, anxiety, depression, NELLIE, bipolar affective disorder    Problem List  Principal Problem:    Sigmoid diverticulitis  Active Problems:    Essential hypertension    NELLIE (obstructive sleep apnea)    Bipolar 1 disorder (HCC)    Preop testing      PHYSICAL THERAPY ASSESSMENT   Patient demonstrates excellent progress this session, goals  remain in progress.      Patient is requiring modified independent as a result of the following impairments: pain.     Patient continues to function at baseline with  supervision .  Contributing factors to remaining limitations include pain.  Next session anticipate patient to progress  discontinue skill therapy .  Physical Therapy will continue to follow patient for duration of hospitalization.    Patient continues to benefit from continued skilled PT services: for duration of hospitalization, however, given the patient is functioning near baseline level do not anticipate skilled therapy needs at discharge .    PLAN  PT Treatment Plan: Bed mobility;Body mechanics;Endurance;Energy conservation;Patient education;Gait training;Strengthening;Stair training  Frequency (Obs): Daily    SUBJECTIVE  I nfeel good.    OBJECTIVE  Precautions: Abdominal protective strategies    WEIGHT BEARING RESTRICTION                PAIN ASSESSMENT   Ratin  Location: abd  Management Techniques: Body mechanics    BALANCE  Static Sitting: Normal  Dynamic Sitting: Normal  Static Standing: Normal  Dynamic Standing: Good    ACTIVITY TOLERANCE  Pulse: 79  Heart Rate Source: Monitor  Resp: 18  BP: 140/69  BP Location: Right arm  BP Method: Automatic  Patient Position: Semi-[place]     O2 WALK  Oxygen Therapy  SPO2% on Room Air at Rest: 96    AM-PAC '6-Clicks' INPATIENT SHORT FORM - BASIC  MOBILITY  How much difficulty does the patient currently have...  Patient Difficulty: Turning over in bed (including adjusting bedclothes, sheets and blankets)?: None   Patient Difficulty: Sitting down on and standing up from a chair with arms (e.g., wheelchair, bedside commode, etc.): None   Patient Difficulty: Moving from lying on back to sitting on the side of the bed?: None   How much help from another person does the patient currently need...   Help from Another: Moving to and from a bed to a chair (including a wheelchair)?: None   Help from Another: Need to walk in hospital room?: None   Help from Another: Climbing 3-5 steps with a railing?: None     AM-PAC Score:  Raw Score: 24   Approx Degree of Impairment: 0%   Standardized Score (AM-PAC Scale): 61.14   CMS Modifier (G-Code):     FUNCTIONAL ABILITY STATUS  Functional Mobility/Gait Assessment  Gait Assistance: Modified independent  Distance (ft): 400  Assistive Device: None  Pattern: Within Functional Limits  Rolling: independent  Supine to Sit: modified independent  Sit to Supine: modified independent  Sit to Stand: modified independent    Skilled Therapy Provided: patient is safe and indep with transfers and amb on hallway. Instructed on bed mobility with abd protection d/t incision. Patient improved all his mobility, instructed on stairs navigation and he state he will be fine  at home with wife assists.   Patient is discharge from PT d/t met all his goals     The patient's Approx Degree of Impairment: 0% has been calculated based on documentation in the [facility] '6 clicks' Inpatient Daily Activity Short Form.  Research supports that patients with this level of impairment may benefit from home.  Final disposition will be made by interdisciplinary medical team.    THERAPEUTIC EXERCISES  Lower Extremity Ankle pumps  Knee extension  Quad sets     Position Sitting       Patient End of Session: In bed    CURRENT GOALS   Goals to be met by: 7/28/24  Patient Goal  Patient's self-stated goal is: decrease pain   Goal #1 Patient is able to demonstrate supine - sit EOB @ level: independent     Goal #1   Current Status met   Goal #2 Patient is able to demonstrate transfers EOB to/from [facility] at assistance level: independent with none     Goal #2  Current Status met   Goal #3 Patient is able to ambulate 150 feet with assist device: none at assistance level: independent   Goal #3   Current Status met   Goal #4 Patient will negotiate 12 stairs/one curb w/ assistive device and supervision   Goal #4   Current Status instructed   Goal #5 Patient to demonstrate independence with home activity/exercise instructions provided to patient in preparation for discharge.   Goal #5   Current Status Instructed in progress   Goal #6    Goal #6  Current Status      Gait Training: 15 minutes  Therapeutic Activity:  minutes  Neuromuscular Re-education:  minutes  Therapeutic Exercise:  minutes  [place] Repositioning:  minutes  Manual Therapy:  minutes  Can add/delete any of these

## 2024-07-21 NOTE — PROGRESS NOTES
[facility]  Progress Note    Don Cabral Patient Status:  Inpatient    1965 MRN [id number]   Location [facility] 4W/SW/SE Attending Dionisio Dong MD   Hosp Day # 4 PCP Stephen Christianson MD     Subjective:  The patient is resting comfortably in bed.  He states that he had a difficult day yesterday.  He reported increased heartburn and reflux.  He reports reported increased abdominal bloating and distention with increasing to full liquid diet.  He reports a bowel movement.  He denies fever or chills.    Objective/Physical Exam:  General: Alert, orientated x3.  Cooperative.  No apparent distress.  Vital Signs:  Blood pressure 142/76, pulse 90, temperature 98 °F (36.7 °C), temperature source Oral, resp. rate 18, height 74\", weight 260 lb (117.9 kg), SpO2 92%.  Wt Readings from Last 3 Encounters:   24 260 lb (117.9 kg)   24 256 lb (116.1 kg)   24 247 lb (112 kg)     Lungs: No respiratory distress.  Cardiac: Regular rate and rhythm.   Abdomen:  Soft, obese, mildly distended, incisional tenderness, with no rebound or guarding.  No peritoneal signs.   Extremities:  No lower extremity edema noted.    Incision: Clean, dry, intact, no erythema  Drain: With serosanguineous output    Intake/Output:    Intake/Output Summary (Last 24 hours) at 2024 1056  Last data filed at 2024 0611  Gross per 24 hour   Intake 5000 ml   Output 237 ml   Net 4763 ml     I/O last 3 completed shifts:  In: 5000 [I.V.:5000]  Out: 307 [Drains:307]  No intake/output data recorded.    Medications:    famotidine  20 mg Intravenous BID    gabapentin  600 mg Oral TID    lisinopril  20 mg Oral Daily    OLANZapine  2.5 mg Oral Nightly    sertraline  75 mg Oral Daily    verapamil ER  120 mg Oral Nightly    heparin  5,000 Units Subcutaneous 2 times per day       Labs:  Lab Results   Component Value Date    WBC 9.5 2024    HGB 11.6 2024    HCT 35.2 2024    .0 2024      Lab Results   Component Value Date     07/21/2024    K 3.7 07/21/2024     07/21/2024    CO2 27.0 07/21/2024    BUN 11 07/21/2024    CREATSERUM 0.69 07/21/2024    GLU 87 07/21/2024    CA 8.5 07/21/2024    ALB 3.6 07/21/2024     Lab Results   Component Value Date    INR 1.01 09/28/2023    INR 1.23 (H) 06/23/2023         Assessment  Patient Active Problem List   Diagnosis    Anxiety    Essential hypertension    ED (erectile dysfunction)    NELLIE (obstructive sleep apnea)    Generalized anxiety disorder    Hypokalemia    Hyperglycemia    Tooth pain    Daytime hypersomnolence    Symptomatic bradycardia    Palpitations    Slow heart rate    PVC (premature ventricular contraction)    Bipolar 1 disorder (HCC)    Sigmoid diverticulitis    Acute diverticulitis    Intractable abdominal pain    Postoperative examination    Preop testing     POD 4 colostomy reversal with partial colectomy and extensive lysis of adhesions       Plan:  Continue full liquid diet.  He may advance to soft diet if he reports to have decreased abdominal bloating and distention today.  Ambulate and up to chair  DVT prophylaxis with heparin  GI prophylaxis with Pepcid    Quality:  DVT Mechanical Prophylaxis:   SCDs, Early ambuation  DVT Pharmacologic Prophylaxis   Medication    heparin (Porcine) 5000 UNIT/ML injection 5,000 Units                Code Status: Full Code  Vance: No urinary catheter in place  Vance Duration (in days):   Central line:    NILS: 7/22/2024        Kelly Malik PA-C  7/21/2024  10:56 AM

## 2024-07-21 NOTE — PLAN OF CARE
Pt a/o x4, vss. Pt states after he was advanced to full liquid diet yesterday and developed \"gastric distress\". C/O N/D. Pt states he feels better today and is requesting to be allow to have bread added to diet in an attempt to help his stomach pain with oral medications. MD @ [facility] to assess pt @ this time. Call light and belongings with in reach.   Problem: Patient Centered Care  Goal: Patient preferences are identified and integrated in the patient's plan of care  Description: Interventions:  - What would you like us to know as we care for you?   - Provide timely, complete, and accurate information to patient/family  - Incorporate patient and family knowledge, values, beliefs, and cultural backgrounds into the planning and delivery of care  - Encourage patient/family to participate in care and decision-making at the level they choose  - Honor patient and family perspectives and choices  Outcome: Progressing     Problem: Patient/Family Goals  Goal: Patient/Family Long Term Goal  Description: Patient's Long Term Goal:     Interventions:  -   - See additional Care Plan goals for specific interventions  Outcome: Progressing  Goal: Patient/Family Short Term Goal  Description: Patient's Short Term Goal:     Interventions:   -   - See additional Care Plan goals for specific interventions  Outcome: Progressing     Problem: PAIN - ADULT  Goal: Verbalizes/displays adequate comfort level or patient's stated pain goal  Description: INTERVENTIONS:  - Encourage pt to monitor pain and request assistance  - Assess pain using appropriate pain scale  - Administer analgesics based on type and severity of pain and evaluate response  - Implement non-pharmacological measures as appropriate and evaluate response  - Consider cultural and social influences on pain and pain management  - Manage/alleviate anxiety  - Utilize distraction and/or relaxation techniques  - Monitor for opioid side effects  - Notify MD/LIP if interventions  unsuccessful or patient reports new pain  - Anticipate increased pain with activity and pre-medicate as appropriate  Outcome: Progressing     Problem: RISK FOR INFECTION - ADULT  Goal: Absence of fever/infection during anticipated neutropenic period  Description: INTERVENTIONS  - Monitor WBC  - Administer growth factors as ordered  - Implement neutropenic guidelines  Outcome: Progressing     Problem: SAFETY ADULT - FALL  Goal: Free from fall injury  Description: INTERVENTIONS:  - Assess pt frequently for physical needs  - Identify cognitive and physical deficits and behaviors that affect risk of falls.  - [place] fall precautions as indicated by assessment.  - Educate pt/family on patient safety including physical limitations  - Instruct pt to call for assistance with activity based on assessment  - Modify environment to reduce risk of injury  - Provide assistive devices as appropriate  - Consider OT/PT consult to assist with strengthening/mobility  - Encourage toileting schedule  Outcome: Progressing     Problem: DISCHARGE PLANNING  Goal: Discharge to home or other facility with appropriate resources  Description: INTERVENTIONS:  - Identify barriers to discharge w/pt and caregiver  - Include patient/family/discharge partner in discharge planning  - Arrange for needed discharge resources and transportation as appropriate  - Identify discharge learning needs (meds, wound care, etc)  - Arrange for interpreters to assist at discharge as needed  - Consider post-discharge preferences of patient/family/discharge partner  - Complete POLST form as appropriate  - Assess patient's ability to be responsible for managing their own health  - Refer to Case Management Department for coordinating discharge planning if the patient needs post-hospital services based on physician/LIP order or complex needs related to functional status, cognitive ability or social support system  Outcome: Progressing     Problem: RESPIRATORY -  ADULT  Goal: Achieves optimal ventilation and oxygenation  Description: INTERVENTIONS:  - Assess for changes in respiratory status  - Assess for changes in mentation and behavior  - Position to facilitate oxygenation and minimize respiratory effort  - Oxygen supplementation based on oxygen saturation or ABGs  - Provide Smoking Cessation handout, if applicable  - Encourage broncho-pulmonary hygiene including cough, deep breathe, Incentive Spirometry  - Assess the need for suctioning and perform as needed  - Assess and instruct to report SOB or any respiratory difficulty  - Respiratory Therapy support as indicated  - Manage/alleviate anxiety  - Monitor for signs/symptoms of CO2 retention  Outcome: Progressing

## 2024-07-21 NOTE — PROGRESS NOTES
[facility]  part of [facility]    Progress Note    Don Cabral Patient Status:  Inpatient    1965 MRN [id number]   Location [facility] 4W/SW/SE Attending Dionisio Dong MD   Hosp Day # 4 PCP Stephen Christianson MD       Subjective:   Don Cabral is a(n) 58 year old male was seen and examined  Lying in bed, no acute events overnight   Had some abd discomfort and bloating yesterday, currently improved   Tolerating FLD  No n,v,f,c, cp or sob  Objective:   Blood pressure 140/69, pulse 79, temperature 98.3 °F (36.8 °C), temperature source Oral, resp. rate 18, height 6' 2\" (1.88 m), weight 260 lb (117.9 kg), SpO2 93%.  GENERAL:  Alert, oriented to time, place, and person, NAD  HEENT:  Atraumatic.  Oropharynx clear.  Moist mucous membranes.  Ears, nose normal.  Eyes:  Anicteric sclerae.   NECK:  Supple.  No lymphadenopathy.  Trachea midline.   LUNGS:  Clear to auscultation bilaterally.  Normal respiratory effort.   HEART:  Regular rate and rhythm.  S1 and S2 auscultated.  No murmur.  ABDOMEN:  Soft, nondistended.  Abdominal dressing with 2 MARGARITA drains in place.  BS+  EXTREMITIES:  No peripheral edema, clubbing, or cyanosis.  NEUROLOGIC:  Motor and sensory intact.  Cranial nerves II-XII are intact.       Current Inpatient Medications:     Current Facility-Administered Medications:     famotidine (Pepcid) tab 20 mg, 20 mg, Oral, Daily PRN    alum-mag hydroxide-simethicone (Maalox) 200-200-20 MG/5ML oral suspension 30 mL, 30 mL, Oral, QID PRN    simethicone (Mylicon) chewable tab 80 mg, 80 mg, Oral, QID PRN    albuterol (Ventolin HFA) 108 (90 Base) MCG/ACT inhaler 2 puff, 2 puff, Inhalation, Q4H PRN    clonazePAM (KlonoPIN) tab 0.5 mg, 0.5 mg, Oral, TID PRN    gabapentin (Neurontin) tab 600 mg, 600 mg, Oral, TID    lisinopril (Prinivil; Zestril) tab 20 mg, 20 mg, Oral, Daily    OLANZapine (ZyPREXA) tab 2.5 mg, 2.5 mg, Oral, Nightly    sertraline (Zoloft) tab 75 mg, 75 mg, Oral,  Daily    verapamil ER (Calan-SR) tab 120 mg, 120 mg, Oral, Nightly    heparin (Porcine) 5000 UNIT/ML injection 5,000 Units, 5,000 Units, Subcutaneous, 2 times per day    oxyCODONE immediate release tab 5 mg, 5 mg, Oral, Q4H PRN **OR** oxyCODONE immediate release tab 10 mg, 10 mg, Oral, Q4H PRN    HYDROmorphone (Dilaudid) 1 MG/ML injection 0.4 mg, 0.4 mg, Intravenous, Q2H PRN **OR** HYDROmorphone (Dilaudid) 1 MG/ML injection 0.8 mg, 0.8 mg, Intravenous, Q2H PRN    ondansetron (Zofran) 4 MG/2ML injection 4 mg, 4 mg, Intravenous, Q6H PRN    prochlorperazine (Compazine) 10 MG/2ML injection 5 mg, 5 mg, Intravenous, Q8H PRN    hydrALAzine (Apresoline) 20 mg/mL injection 10 mg, 10 mg, Intravenous, Q3H PRN    phenol (Chloraseptic) 1.4 % oral liquid spray, , Oral, Q2H PRN    Assessment and Plan:   1.       History of perforated sigmoid diverticulitis status post left hemicolectomy and end colostomy with splenic flexure mobilization.    Now status post open partial colectomy with colorectal anastomosis, takedown of colostomy, extensive lysis of adhesions, with hepatic flexure mobilization.    Pain control adequate  Cont Monitor hemodynamic status.  NG removed and tolerating FLD  ADAT as per surgery  Had a BM  Cont DVT prophylaxis.      2.       Essential hypertension.    Cont home meds    3.       Obstructive sleep apnea.    Apply obstructive sleep apnea protocol and monitor.    4.       Bipolar affective disorder.  Cont home meds    Full code  MDM: High   Results:     Recent Labs   Lab 07/19/24  0446 07/20/24  0547 07/21/24  0615   RBC 3.93* 4.28* 4.17*   HGB 10.8* 11.6* 11.6*   HCT 32.9* 37.3* 35.2*   MCV 83.7 87.1 84.4   MCH 27.5 27.1 27.8   MCHC 32.8 31.1 33.0   RDW 14.2 14.5 14.3   NEPRELIM 8.48* 7.51 6.96   WBC 10.7 10.3 9.5   .0 340.0 338.0         Recent Labs   Lab 07/19/24  0446 07/20/24  0547 07/21/24  0615   GLU 91 85 87   BUN 15 14 11   CREATSERUM 0.62* 0.73 0.69*   EGFRCR 111 106 108   CA 7.9* 8.7 8.5*     141 141   K 3.3* 3.9  3.9 3.7    107 106   CO2 28.0 29.0 27.0         Imaging:   No results found.          Dionisio Dong MD  7/21/2024

## 2024-07-21 NOTE — PLAN OF CARE
Problem: Patient Centered Care  Goal: Patient preferences are identified and integrated in the patient's plan of care  Description: Interventions:  - Provide timely, complete, and accurate information to patient/family  - Incorporate patient and family knowledge, values, beliefs, and cultural backgrounds into the planning and delivery of care  - Encourage patient/family to participate in care and decision-making at the level they choose  - Honor patient and family perspectives and choices  Outcome: Progressing    Problem: PAIN - ADULT  Goal: Verbalizes/displays adequate comfort level or patient's stated pain goal  Description: INTERVENTIONS:  - Encourage pt to monitor pain and request assistance  - Assess pain using appropriate pain scale  - Administer analgesics based on type and severity of pain and evaluate response  - Implement non-pharmacological measures as appropriate and evaluate response  - Consider cultural and social influences on pain and pain management  - Manage/alleviate anxiety  - Utilize distraction and/or relaxation techniques  - Monitor for opioid side effects  - Notify MD/LIP if interventions unsuccessful or patient reports new pain  - Anticipate increased pain with activity and pre-medicate as appropriate  Outcome: Progressing     Problem: SAFETY ADULT - FALL  Goal: Free from fall injury  Description: INTERVENTIONS:  - Assess pt frequently for physical needs  - Identify cognitive and physical deficits and behaviors that affect risk of falls.  - [place] fall precautions as indicated by assessment.  - Educate pt/family on patient safety including physical limitations  - Instruct pt to call for assistance with activity based on assessment  - Modify environment to reduce risk of injury  - Provide assistive devices as appropriate  - Consider OT/PT consult to assist with strengthening/mobility  - Encourage toileting schedule  Outcome: Progressing     Problem: RESPIRATORY - ADULT  Goal: Achieves  optimal ventilation and oxygenation  Description: INTERVENTIONS:  - Assess for changes in respiratory status  - Assess for changes in mentation and behavior  - Position to facilitate oxygenation and minimize respiratory effort  - Oxygen supplementation based on oxygen saturation or ABGs  - Provide Smoking Cessation handout, if applicable  - Encourage broncho-pulmonary hygiene including cough, deep breathe, Incentive Spirometry  - Assess the need for suctioning and perform as needed  - Assess and instruct to report SOB or any respiratory difficulty  - Respiratory Therapy support as indicated  - Manage/alleviate anxiety  - Monitor for signs/symptoms of CO2 retention  Outcome: Progressing  Patient aoX4 with complaints of mild abdominal pain (declined pain med) and nausea (zofran given). VSS on room air, tele monitoring (no calls), IVF infusing, heparin given for VTE prophylaxis. Right and left MARGARITA drains in place, greater output from right drain. Patient ambulates independently. Safety precautions maintained. Call light within reach.

## 2024-07-22 LAB
ALBUMIN SERPL-MCNC: 3.7 G/DL (ref 3.2–4.8)
ALBUMIN/GLOB SERPL: 1.7 {RATIO} (ref 1–2)
ALP LIVER SERPL-CCNC: 44 U/L
ALT SERPL-CCNC: 12 U/L
ANION GAP SERPL CALC-SCNC: 10 MMOL/L (ref 0–18)
AST SERPL-CCNC: 20 U/L (ref ?–34)
BASOPHILS # BLD AUTO: 0.01 X10(3) UL (ref 0–0.2)
BASOPHILS NFR BLD AUTO: 0.1 %
BILIRUB SERPL-MCNC: 0.7 MG/DL (ref 0.3–1.2)
BUN BLD-MCNC: 12 MG/DL (ref 9–23)
BUN/CREAT SERPL: 17.6 (ref 10–20)
CALCIUM BLD-MCNC: 8.2 MG/DL (ref 8.7–10.4)
CHLORIDE SERPL-SCNC: 107 MMOL/L (ref 98–112)
CO2 SERPL-SCNC: 26 MMOL/L (ref 21–32)
CREAT BLD-MCNC: 0.68 MG/DL
DEPRECATED RDW RBC AUTO: 44.6 FL (ref 35.1–46.3)
EGFRCR SERPLBLD CKD-EPI 2021: 108 ML/MIN/1.73M2 (ref 60–?)
EOSINOPHIL # BLD AUTO: 0.23 X10(3) UL (ref 0–0.7)
EOSINOPHIL NFR BLD AUTO: 2.6 %
ERYTHROCYTE [DISTWIDTH] IN BLOOD BY AUTOMATED COUNT: 14.3 % (ref 11–15)
GLOBULIN PLAS-MCNC: 2.2 G/DL (ref 2–3.5)
GLUCOSE BLD-MCNC: 88 MG/DL (ref 70–99)
HCT VFR BLD AUTO: 35.6 %
HGB BLD-MCNC: 11.3 G/DL
IMM GRANULOCYTES # BLD AUTO: 0.03 X10(3) UL (ref 0–1)
IMM GRANULOCYTES NFR BLD: 0.3 %
LYMPHOCYTES # BLD AUTO: 1.56 X10(3) UL (ref 1–4)
LYMPHOCYTES NFR BLD AUTO: 17.4 %
MAGNESIUM SERPL-MCNC: 1.9 MG/DL (ref 1.6–2.6)
MCH RBC QN AUTO: 27.3 PG (ref 26–34)
MCHC RBC AUTO-ENTMCNC: 31.7 G/DL (ref 31–37)
MCV RBC AUTO: 86 FL
MONOCYTES # BLD AUTO: 0.7 X10(3) UL (ref 0.1–1)
MONOCYTES NFR BLD AUTO: 7.8 %
NEUTROPHILS # BLD AUTO: 6.43 X10 (3) UL (ref 1.5–7.7)
NEUTROPHILS # BLD AUTO: 6.43 X10(3) UL (ref 1.5–7.7)
NEUTROPHILS NFR BLD AUTO: 71.8 %
OSMOLALITY SERPL CALC.SUM OF ELEC: 295 MOSM/KG (ref 275–295)
PLATELET # BLD AUTO: 343 10(3)UL (ref 150–450)
POTASSIUM SERPL-SCNC: 3.8 MMOL/L (ref 3.5–5.1)
PROT SERPL-MCNC: 5.9 G/DL (ref 5.7–8.2)
RBC # BLD AUTO: 4.14 X10(6)UL
SODIUM SERPL-SCNC: 143 MMOL/L (ref 136–145)
WBC # BLD AUTO: 9 X10(3) UL (ref 4–11)

## 2024-07-22 PROCEDURE — 99233 SBSQ HOSP IP/OBS HIGH 50: CPT | Performed by: HOSPITALIST

## 2024-07-22 RX ORDER — SODIUM CHLORIDE, SODIUM LACTATE, POTASSIUM CHLORIDE, CALCIUM CHLORIDE 600; 310; 30; 20 MG/100ML; MG/100ML; MG/100ML; MG/100ML
INJECTION, SOLUTION INTRAVENOUS CONTINUOUS
Status: DISCONTINUED | OUTPATIENT
Start: 2024-07-22 | End: 2024-07-24

## 2024-07-22 NOTE — PLAN OF CARE
Patient is alert and oriented x4. RA. Tele with no calls. Saline locked. Passing gas. Ambulates self. Given PRN Mylicon. Declined gabapentin. Heparin for DVT prophylaxis. Right and left MARGARITA drains in place. Call light with in reach. All safety measures in place.   Problem: Patient Centered Care  Goal: Patient preferences are identified and integrated in the patient's plan of care  Description: Interventions:  -   - Provide timely, complete, and accurate information to patient/family  - Incorporate patient and family knowledge, values, beliefs, and cultural backgrounds into the planning and delivery of care  - Encourage patient/family to participate in care and decision-making at the level they choose  - Honor patient and family perspectives and choices  Outcome: Progressing     - See additional Care Plan goals for specific interventions  Outcome: Progressing     Problem: PAIN - ADULT  Goal: Verbalizes/displays adequate comfort level or patient's stated pain goal  Description: INTERVENTIONS:  - Encourage pt to monitor pain and request assistance  - Assess pain using appropriate pain scale  - Administer analgesics based on type and severity of pain and evaluate response  - Implement non-pharmacological measures as appropriate and evaluate response  - Consider cultural and social influences on pain and pain management  - Manage/alleviate anxiety  - Utilize distraction and/or relaxation techniques  - Monitor for opioid side effects  - Notify MD/LIP if interventions unsuccessful or patient reports new pain  - Anticipate increased pain with activity and pre-medicate as appropriate  Outcome: Progressing     Problem: RISK FOR INFECTION - ADULT  Goal: Absence of fever/infection during anticipated neutropenic period  Description: INTERVENTIONS  - Monitor WBC  - Administer growth factors as ordered  - Implement neutropenic guidelines  Outcome: Progressing     Problem: SAFETY ADULT - FALL  Goal: Free from fall  injury  Description: INTERVENTIONS:  - Assess pt frequently for physical needs  - Identify cognitive and physical deficits and behaviors that affect risk of falls.  - [place] fall precautions as indicated by assessment.  - Educate pt/family on patient safety including physical limitations  - Instruct pt to call for assistance with activity based on assessment  - Modify environment to reduce risk of injury  - Provide assistive devices as appropriate  - Consider OT/PT consult to assist with strengthening/mobility  - Encourage toileting schedule  Outcome: Progressing     Problem: DISCHARGE PLANNING  Goal: Discharge to home or other facility with appropriate resources  Description: INTERVENTIONS:  - Identify barriers to discharge w/pt and caregiver  - Include patient/family/discharge partner in discharge planning  - Arrange for needed discharge resources and transportation as appropriate  - Identify discharge learning needs (meds, wound care, etc)  - Arrange for interpreters to assist at discharge as needed  - Consider post-discharge preferences of patient/family/discharge partner  - Complete POLST form as appropriate  - Assess patient's ability to be responsible for managing their own health  - Refer to Case Management Department for coordinating discharge planning if the patient needs post-hospital services based on physician/LIP order or complex needs related to functional status, cognitive ability or social support system  Outcome: Progressing     Problem: RESPIRATORY - ADULT  Goal: Achieves optimal ventilation and oxygenation  Description: INTERVENTIONS:  - Assess for changes in respiratory status  - Assess for changes in mentation and behavior  - Position to facilitate oxygenation and minimize respiratory effort  - Oxygen supplementation based on oxygen saturation or ABGs  - Provide Smoking Cessation handout, if applicable  - Encourage broncho-pulmonary hygiene including cough, deep breathe, Incentive  Spirometry  - Assess the need for suctioning and perform as needed  - Assess and instruct to report SOB or any respiratory difficulty  - Respiratory Therapy support as indicated  - Manage/alleviate anxiety  - Monitor for signs/symptoms of CO2 retention  Outcome: Progressing

## 2024-07-22 NOTE — PROGRESS NOTES
[facility]    Progress Note    Don Cabral Patient Status:  Inpatient    1965 MRN [id number]   Location [facility] 4W/SW/SE Attending Dionisio Dong MD   Hosp Day # 5 PCP Stephen Christianson MD       Subjective:   Pt with some increased nausea and distention with full liquid diet. No fever or chills.     Objective:   Vital Signs:  Blood pressure 140/71, pulse 79, temperature 98.1 °F (36.7 °C), temperature source Oral, resp. rate 16, height 6' 2\" (1.88 m), weight 260 lb (117.9 kg), SpO2 95%.    Physical Exam     General: No acute distress. Alert and oriented x 3.  HEENT: Moist mucous membranes. Anicteric.   Neck: Supple, No JVD.   Respiratory: Nonlabored resp.  Cardiovascular: Regular rate.   Abdomen: Soft, NT, no rebound tnd, no guarding, distended.  No masses. Normal bowel sounds.  MARGARITA drains serosanguineous  Neurologic: No focal neurological deficits.  Musculoskeletal: Full range of motion of all extremities. No calf tenderness. No swelling noted.  Integument: No lesions. No erythema.  Psychiatric: Appropriate mood and affect.         Assessment and Plan:     Sigmoid diverticulitis      Essential hypertension      NELLIE (obstructive sleep apnea)      Bipolar 1 disorder (HCC)      Preop testing    Passing gas, small bms. Okay to advance to soft diet as tolerated. Continue ambulating, monitor MARGARITA drain output.    Results:     Lab Results   Component Value Date    WBC 9.0 2024    HGB 11.3 (L) 2024    HCT 35.6 (L) 2024    .0 2024    CREATSERUM 0.68 (L) 2024    BUN 12 2024     2024    K 3.8 2024     2024    CO2 26.0 2024    GLU 88 2024    CA 8.2 (L) 2024    ALB 3.7 2024    ALKPHO 44 (L) 2024    BILT 0.7 2024    TP 5.9 2024    AST 20 2024    ALT 12 2024    PTT 24.9 2023    INR 1.01 2023    TSH 1.850 2023    LIP 37  04/02/2024    PSA 2.53 08/02/2021    DDIMER 2.44 (H) 06/23/2023    MG 1.9 07/22/2024    PHOS 2.8 07/21/2024    PHOS 2.8 07/21/2024    TROP <0.045 02/05/2020       No results found.                Stephen Nunez PA-C  7/22/2024

## 2024-07-22 NOTE — PLAN OF CARE
Plan of care reviewed with Don. Patient states, \"I am feeling terrible today.\" C/o bloating and discomfort. Declines wanting to order anything to eat. IVF re-started. Passing gas, but distended and firm. Pain managed with prn oxycodone. Ambulating in hallway. Safety measures in place and call light within reach.

## 2024-07-22 NOTE — PROGRESS NOTES
[facility]  part of [facility]    Progress Note    Don Cabral Patient Status:  Inpatient    1965 MRN [id number]   Location [facility] 4W/SW/SE Attending Dionisio Dong MD   Hosp Day # 5 PCP Stephen Christianson MD       Subjective:   Don Cabral is a(n) 58 year old male was seen and examined  Lying in bed, no acute events overnight   Feeling okay today, bloating persists  Pain controlled   No n,v,f,c, cp or sob  Objective:   Blood pressure 141/74, pulse 79, temperature 98.4 °F (36.9 °C), temperature source Oral, resp. rate 18, height 6' 2\" (1.88 m), weight 260 lb (117.9 kg), SpO2 93%.  GENERAL:  Alert, oriented to time, place, and person, NAD  HEENT:  Atraumatic.  Oropharynx clear.  Moist mucous membranes.  Ears, nose normal.  Eyes:  Anicteric sclerae.   NECK:  Supple.  No lymphadenopathy.  Trachea midline.   LUNGS:  Clear to auscultation bilaterally.  Normal respiratory effort.   HEART:  Regular rate and rhythm.  S1 and S2 auscultated.  No murmur.  ABDOMEN:  Soft, nondistended.  Abdominal dressing with 2 MARGARITA drains in place.  BS+  EXTREMITIES:  No peripheral edema, clubbing, or cyanosis.  NEUROLOGIC:  Motor and sensory intact.  Cranial nerves II-XII are intact.       Current Inpatient Medications:     Current Facility-Administered Medications:     famotidine (Pepcid) 20 mg/2mL injection 20 mg, 20 mg, Intravenous, BID    alum-mag hydroxide-simethicone (Maalox) 200-200-20 MG/5ML oral suspension 30 mL, 30 mL, Oral, QID PRN    simethicone (Mylicon) chewable tab 80 mg, 80 mg, Oral, QID PRN    albuterol (Ventolin HFA) 108 (90 Base) MCG/ACT inhaler 2 puff, 2 puff, Inhalation, Q4H PRN    clonazePAM (KlonoPIN) tab 0.5 mg, 0.5 mg, Oral, TID PRN    gabapentin (Neurontin) tab 600 mg, 600 mg, Oral, TID    lisinopril (Prinivil; Zestril) tab 20 mg, 20 mg, Oral, Daily    OLANZapine (ZyPREXA) tab 2.5 mg, 2.5 mg, Oral, Nightly    sertraline (Zoloft) tab 75 mg, 75 mg, Oral, Daily     verapamil ER (Calan-SR) tab 120 mg, 120 mg, Oral, Nightly    heparin (Porcine) 5000 UNIT/ML injection 5,000 Units, 5,000 Units, Subcutaneous, 2 times per day    oxyCODONE immediate release tab 5 mg, 5 mg, Oral, Q4H PRN **OR** oxyCODONE immediate release tab 10 mg, 10 mg, Oral, Q4H PRN    HYDROmorphone (Dilaudid) 1 MG/ML injection 0.4 mg, 0.4 mg, Intravenous, Q2H PRN **OR** HYDROmorphone (Dilaudid) 1 MG/ML injection 0.8 mg, 0.8 mg, Intravenous, Q2H PRN    ondansetron (Zofran) 4 MG/2ML injection 4 mg, 4 mg, Intravenous, Q6H PRN    prochlorperazine (Compazine) 10 MG/2ML injection 5 mg, 5 mg, Intravenous, Q8H PRN    hydrALAzine (Apresoline) 20 mg/mL injection 10 mg, 10 mg, Intravenous, Q3H PRN    phenol (Chloraseptic) 1.4 % oral liquid spray, , Oral, Q2H PRN    Assessment and Plan:   1.       History of perforated sigmoid diverticulitis status post left hemicolectomy and end colostomy with splenic flexure mobilization.    Now status post open partial colectomy with colorectal anastomosis, takedown of colostomy, extensive lysis of adhesions, with hepatic flexure mobilization.    Pain control adequate  Cont Monitor hemodynamic status.  NG removed and tolerating FLD  ADAT as per surgery  Advised to avoid dairy and caffeine   Had a BM  Cont DVT prophylaxis.      2.       Essential hypertension.    Cont home meds    3.       Obstructive sleep apnea.    Apply obstructive sleep apnea protocol and monitor.    4.       Bipolar affective disorder.  Cont home meds    Full code  MDM: High   Results:     Recent Labs   Lab 07/20/24  0547 07/21/24  0615 07/22/24  0550   RBC 4.28* 4.17* 4.14*   HGB 11.6* 11.6* 11.3*   HCT 37.3* 35.2* 35.6*   MCV 87.1 84.4 86.0   MCH 27.1 27.8 27.3   MCHC 31.1 33.0 31.7   RDW 14.5 14.3 14.3   NEPRELIM 7.51 6.96 6.43   WBC 10.3 9.5 9.0   .0 338.0 343.0         Recent Labs   Lab 07/20/24  0547 07/21/24  0615 07/22/24  0550   GLU 85 87 88   BUN 14 11 12   CREATSERUM 0.73 0.69* 0.68*   EGFRCR 106 108  108   CA 8.7 8.5* 8.2*    141 143   K 3.9  3.9 3.7 3.8    106 107   CO2 29.0 27.0 26.0         Imaging:   No results found.          Dionisio Dong MD  7/22/2024

## 2024-07-23 LAB
ALBUMIN SERPL-MCNC: 3.6 G/DL (ref 3.2–4.8)
ALBUMIN/GLOB SERPL: 1.8 {RATIO} (ref 1–2)
ALP LIVER SERPL-CCNC: 45 U/L
ALT SERPL-CCNC: 17 U/L
ANION GAP SERPL CALC-SCNC: 8 MMOL/L (ref 0–18)
AST SERPL-CCNC: 21 U/L (ref ?–34)
BASOPHILS # BLD AUTO: 0.01 X10(3) UL (ref 0–0.2)
BASOPHILS NFR BLD AUTO: 0.1 %
BILIRUB SERPL-MCNC: 0.5 MG/DL (ref 0.3–1.2)
BUN BLD-MCNC: 12 MG/DL (ref 9–23)
BUN/CREAT SERPL: 17.4 (ref 10–20)
CALCIUM BLD-MCNC: 8.3 MG/DL (ref 8.7–10.4)
CHLORIDE SERPL-SCNC: 108 MMOL/L (ref 98–112)
CO2 SERPL-SCNC: 27 MMOL/L (ref 21–32)
CREAT BLD-MCNC: 0.69 MG/DL
DEPRECATED RDW RBC AUTO: 42.4 FL (ref 35.1–46.3)
EGFRCR SERPLBLD CKD-EPI 2021: 108 ML/MIN/1.73M2 (ref 60–?)
EOSINOPHIL # BLD AUTO: 0.22 X10(3) UL (ref 0–0.7)
EOSINOPHIL NFR BLD AUTO: 2.4 %
ERYTHROCYTE [DISTWIDTH] IN BLOOD BY AUTOMATED COUNT: 14.3 % (ref 11–15)
GLOBULIN PLAS-MCNC: 2 G/DL (ref 2–3.5)
GLUCOSE BLD-MCNC: 88 MG/DL (ref 70–99)
HCT VFR BLD AUTO: 34.6 %
HGB BLD-MCNC: 11.5 G/DL
IMM GRANULOCYTES # BLD AUTO: 0.06 X10(3) UL (ref 0–1)
IMM GRANULOCYTES NFR BLD: 0.7 %
LYMPHOCYTES # BLD AUTO: 1.69 X10(3) UL (ref 1–4)
LYMPHOCYTES NFR BLD AUTO: 18.7 %
MCH RBC QN AUTO: 27.6 PG (ref 26–34)
MCHC RBC AUTO-ENTMCNC: 33.2 G/DL (ref 31–37)
MCV RBC AUTO: 83 FL
MONOCYTES # BLD AUTO: 0.67 X10(3) UL (ref 0.1–1)
MONOCYTES NFR BLD AUTO: 7.4 %
NEUTROPHILS # BLD AUTO: 6.39 X10 (3) UL (ref 1.5–7.7)
NEUTROPHILS # BLD AUTO: 6.39 X10(3) UL (ref 1.5–7.7)
NEUTROPHILS NFR BLD AUTO: 70.7 %
OSMOLALITY SERPL CALC.SUM OF ELEC: 295 MOSM/KG (ref 275–295)
PLATELET # BLD AUTO: 345 10(3)UL (ref 150–450)
POTASSIUM SERPL-SCNC: 3.7 MMOL/L (ref 3.5–5.1)
PROT SERPL-MCNC: 5.6 G/DL (ref 5.7–8.2)
RBC # BLD AUTO: 4.17 X10(6)UL
SODIUM SERPL-SCNC: 143 MMOL/L (ref 136–145)
WBC # BLD AUTO: 9 X10(3) UL (ref 4–11)

## 2024-07-23 PROCEDURE — 99233 SBSQ HOSP IP/OBS HIGH 50: CPT | Performed by: HOSPITALIST

## 2024-07-23 RX ORDER — ACETAMINOPHEN 10 MG/ML
1000 INJECTION, SOLUTION INTRAVENOUS EVERY 6 HOURS
Status: DISCONTINUED | OUTPATIENT
Start: 2024-07-23 | End: 2024-07-25

## 2024-07-23 RX ORDER — ACETAMINOPHEN 10 MG/ML
1000 INJECTION, SOLUTION INTRAVENOUS EVERY 12 HOURS
Status: DISCONTINUED | OUTPATIENT
Start: 2024-07-23 | End: 2024-07-23

## 2024-07-23 RX ORDER — ACETAMINOPHEN 10 MG/ML
1000 INJECTION, SOLUTION INTRAVENOUS EVERY 6 HOURS
Status: DISCONTINUED | OUTPATIENT
Start: 2024-07-23 | End: 2024-07-23

## 2024-07-23 NOTE — PLAN OF CARE
Don is up independently ambulating in the hallway frequently. Voiding. Had BMs. Tolerating full liquid diet, advance to low fiber soft. Request tylenol for pain--ordered q6 hours. MARGARITA sites intact. Okay per surgery for patient to shower. Plan for home possibly tomorrow if able to tolerate diet.     Problem: Patient Centered Care  Goal: Patient preferences are identified and integrated in the patient's plan of care  Description: Interventions:  - What would you like us to know as we care for you? \"Last time I had surgery I was in here for 21 days.\"  - Provide timely, complete, and accurate information to patient/family  - Incorporate patient and family knowledge, values, beliefs, and cultural backgrounds into the planning and delivery of care  - Encourage patient/family to participate in care and decision-making at the level they choose  - Honor patient and family perspectives and choices  Outcome: Progressing        Problem: PAIN - ADULT  Goal: Verbalizes/displays adequate comfort level or patient's stated pain goal  Description: INTERVENTIONS:  - Encourage pt to monitor pain and request assistance  - Assess pain using appropriate pain scale  - Administer analgesics based on type and severity of pain and evaluate response  - Implement non-pharmacological measures as appropriate and evaluate response  - Consider cultural and social influences on pain and pain management  - Manage/alleviate anxiety  - Utilize distraction and/or relaxation techniques  - Monitor for opioid side effects  - Notify MD/LIP if interventions unsuccessful or patient reports new pain  - Anticipate increased pain with activity and pre-medicate as appropriate  Outcome: Progressing     Problem: RISK FOR INFECTION - ADULT  Goal: Absence of fever/infection during anticipated neutropenic period  Description: INTERVENTIONS  - Monitor WBC  - Administer growth factors as ordered  - Implement neutropenic guidelines  Outcome: Progressing     Problem:  SAFETY ADULT - FALL  Goal: Free from fall injury  Description: INTERVENTIONS:  - Assess pt frequently for physical needs  - Identify cognitive and physical deficits and behaviors that affect risk of falls.  - [place] fall precautions as indicated by assessment.  - Educate pt/family on patient safety including physical limitations  - Instruct pt to call for assistance with activity based on assessment  - Modify environment to reduce risk of injury  - Provide assistive devices as appropriate  - Consider OT/PT consult to assist with strengthening/mobility  - Encourage toileting schedule  Outcome: Progressing     Problem: DISCHARGE PLANNING  Goal: Discharge to home or other facility with appropriate resources  Description: INTERVENTIONS:  - Identify barriers to discharge w/pt and caregiver  - Include patient/family/discharge partner in discharge planning  - Arrange for needed discharge resources and transportation as appropriate  - Identify discharge learning needs (meds, wound care, etc)  - Arrange for interpreters to assist at discharge as needed  - Consider post-discharge preferences of patient/family/discharge partner  - Complete POLST form as appropriate  - Assess patient's ability to be responsible for managing their own health  - Refer to Case Management Department for coordinating discharge planning if the patient needs post-hospital services based on physician/LIP order or complex needs related to functional status, cognitive ability or social support system  Outcome: Progressing     Problem: RESPIRATORY - ADULT  Goal: Achieves optimal ventilation and oxygenation  Description: INTERVENTIONS:  - Assess for changes in respiratory status  - Assess for changes in mentation and behavior  - Position to facilitate oxygenation and minimize respiratory effort  - Oxygen supplementation based on oxygen saturation or ABGs  - Provide Smoking Cessation handout, if applicable  - Encourage broncho-pulmonary hygiene including  cough, deep breathe, Incentive Spirometry  - Assess the need for suctioning and perform as needed  - Assess and instruct to report SOB or any respiratory difficulty  - Respiratory Therapy support as indicated  - Manage/alleviate anxiety  - Monitor for signs/symptoms of CO2 retention  Outcome: Progressing

## 2024-07-23 NOTE — PROGRESS NOTES
[facility]  part of [facility]    Progress Note    Don Cabral Patient Status:  Inpatient    1965 MRN [id number]   Location [facility] 4W/SW/SE Attending Dionisio Dong MD   Hosp Day # 6 PCP Stephen Christianson MD       Subjective:   Pt feeling better today. Had bms overnight and reports distention has improved. No nausea or vomiting today.    Objective:   Vital Signs:  Blood pressure 136/66, pulse 65, temperature 98.2 °F (36.8 °C), temperature source Oral, resp. rate 18, height 6' 2\" (1.88 m), weight 260 lb (117.9 kg), SpO2 98%.    Physical Exam     General: No acute distress. Alert and oriented x 3.  HEENT: Moist mucous membranes. Anicteric.   Neck: Supple, No JVD.   Respiratory: Nonlabored resp.  Cardiovascular: Regular rate.   Abdomen: Soft, mild incisional tenderness, no rebound tnd, no guarding, nondistended.  No masses. Normal bowel sounds. Incisions c/d/I, MARGARITA drains serosanguineous  Neurologic: No focal neurological deficits.  Musculoskeletal: Full range of motion of all extremities. No calf tenderness. No swelling noted.  Integument: No lesions. No erythema.  Psychiatric: Appropriate mood and affect.         Assessment and Plan:     Sigmoid diverticulitis      Essential hypertension      NELLIE (obstructive sleep apnea)      Bipolar 1 disorder (HCC)      Preop testing    Soft diet as tolerated. Ambulate as able. Monitor MARGARITA drain output. Will continue to follow and monitor progress.    Results:     Lab Results   Component Value Date    WBC 9.0 2024    HGB 11.5 (L) 2024    HCT 34.6 (L) 2024    .0 2024    CREATSERUM 0.69 (L) 2024    BUN 12 2024     2024    K 3.7 2024     2024    CO2 27.0 2024    GLU 88 2024    CA 8.3 (L) 2024    ALB 3.6 2024    ALKPHO 45 2024    BILT 0.5 2024    TP 5.6 (L) 2024    AST 21 2024    ALT 17 2024    PTT 24.9 2023     INR 1.01 09/28/2023    TSH 1.850 09/28/2023    LIP 37 04/02/2024    PSA 2.53 08/02/2021    DDIMER 2.44 (H) 06/23/2023    MG 1.9 07/22/2024    PHOS 2.8 07/21/2024    PHOS 2.8 07/21/2024    TROP <0.045 02/05/2020       No results found.                Stephen Nunez PA-C  7/23/2024

## 2024-07-23 NOTE — PROGRESS NOTES
[facility]  part of [facility]    Progress Note    Don Cabral Patient Status:  Inpatient    1965 MRN [id number]   Location [facility] 4W/SW/SE Attending Dionisio Dong MD   Hosp Day # 6 PCP Stephen Christianson MD       Subjective:   Don Cabral is a(n) 58 year old male was seen and examined  Lying in bed, no acute events overnight   Feeling okay today, bloating improved after multiple Bms overnight  Pain controlled   No n,v,f,c, cp or sob  Objective:   Blood pressure 136/66, pulse 65, temperature 98.2 °F (36.8 °C), temperature source Oral, resp. rate 18, height 6' 2\" (1.88 m), weight 260 lb (117.9 kg), SpO2 98%.  GENERAL:  Alert, oriented to time, place, and person, NAD  HEENT:  Atraumatic.  Oropharynx clear.  Moist mucous membranes.  Ears, nose normal.  Eyes:  Anicteric sclerae.   NECK:  Supple.  No lymphadenopathy.  Trachea midline.   LUNGS:  Clear to auscultation bilaterally.  Normal respiratory effort.   HEART:  Regular rate and rhythm.  S1 and S2 auscultated.  No murmur.  ABDOMEN:  Soft, nondistended.  Abdominal dressing with 2 MARGARITA drains in place.  BS+  EXTREMITIES:  No peripheral edema, clubbing, or cyanosis.  NEUROLOGIC:  Motor and sensory intact.  Cranial nerves II-XII are intact.       Current Inpatient Medications:     Current Facility-Administered Medications:     acetaminophen (Ofirmev) 10 mg/mL infusion premix 1,000 mg, 1,000 mg, Intravenous, Q6H    lactated ringers infusion, , Intravenous, Continuous    famotidine (Pepcid) 20 mg/2mL injection 20 mg, 20 mg, Intravenous, BID    alum-mag hydroxide-simethicone (Maalox) 200-200-20 MG/5ML oral suspension 30 mL, 30 mL, Oral, QID PRN    simethicone (Mylicon) chewable tab 80 mg, 80 mg, Oral, QID PRN    albuterol (Ventolin HFA) 108 (90 Base) MCG/ACT inhaler 2 puff, 2 puff, Inhalation, Q4H PRN    clonazePAM (KlonoPIN) tab 0.5 mg, 0.5 mg, Oral, TID PRN    gabapentin (Neurontin) tab 600 mg, 600 mg, Oral, TID     lisinopril (Prinivil; Zestril) tab 20 mg, 20 mg, Oral, Daily    OLANZapine (ZyPREXA) tab 2.5 mg, 2.5 mg, Oral, Nightly    sertraline (Zoloft) tab 75 mg, 75 mg, Oral, Daily    verapamil ER (Calan-SR) tab 120 mg, 120 mg, Oral, Nightly    heparin (Porcine) 5000 UNIT/ML injection 5,000 Units, 5,000 Units, Subcutaneous, 2 times per day    oxyCODONE immediate release tab 5 mg, 5 mg, Oral, Q4H PRN **OR** oxyCODONE immediate release tab 10 mg, 10 mg, Oral, Q4H PRN    HYDROmorphone (Dilaudid) 1 MG/ML injection 0.4 mg, 0.4 mg, Intravenous, Q2H PRN **OR** HYDROmorphone (Dilaudid) 1 MG/ML injection 0.8 mg, 0.8 mg, Intravenous, Q2H PRN    ondansetron (Zofran) 4 MG/2ML injection 4 mg, 4 mg, Intravenous, Q6H PRN    prochlorperazine (Compazine) 10 MG/2ML injection 5 mg, 5 mg, Intravenous, Q8H PRN    hydrALAzine (Apresoline) 20 mg/mL injection 10 mg, 10 mg, Intravenous, Q3H PRN    phenol (Chloraseptic) 1.4 % oral liquid spray, , Oral, Q2H PRN    Assessment and Plan:   1.       History of perforated sigmoid diverticulitis status post left hemicolectomy and end colostomy with splenic flexure mobilization.    Now status post open partial colectomy with colorectal anastomosis, takedown of colostomy, extensive lysis of adhesions, with hepatic flexure mobilization.    Pain control adequate  Cont Monitor hemodynamic status.  NG removed and tolerating liquids and bread  ADAT as per surgery  Advised to avoid dairy and caffeine   Having multiple loose Bms, no blood  Bloating improved   Cont DVT prophylaxis.      2.       Essential hypertension.    Cont home meds    3.       Obstructive sleep apnea.    Apply obstructive sleep apnea protocol and monitor.    4.       Bipolar affective disorder.  Cont home meds    Full code  MDM: High   Results:     Recent Labs   Lab 07/21/24  0615 07/22/24  0550 07/23/24  0543   RBC 4.17* 4.14* 4.17*   HGB 11.6* 11.3* 11.5*   HCT 35.2* 35.6* 34.6*   MCV 84.4 86.0 83.0   MCH 27.8 27.3 27.6   MCHC 33.0 31.7 33.2    RDW 14.3 14.3 14.3   NEPRELIM 6.96 6.43 6.39   WBC 9.5 9.0 9.0   .0 343.0 345.0         Recent Labs   Lab 07/21/24  0615 07/22/24  0550 07/23/24  0543   GLU 87 88 88   BUN 11 12 12   CREATSERUM 0.69* 0.68* 0.69*   EGFRCR 108 108 108   CA 8.5* 8.2* 8.3*    143 143   K 3.7 3.8 3.7    107 108   CO2 27.0 26.0 27.0         Imaging:   No results found.          Dionisio Dong MD  7/23/2024

## 2024-07-23 NOTE — PLAN OF CARE
Patient is A/Ox4 on room air. VSS. FLD; little appetite. Denies nausea. Denies pain. Up independently.  IVF's continued overnight. Voiding freely. BM overnight. MARGARITA C/D/I. Call light within reach; safety precautions in place.   Problem: Patient/Family Goals  Goal: Patient/Family Long Term Goal  Description: Patient's Long Term Goal:     Interventions:  -   - See additional Care Plan goals for specific interventions  Outcome: Progressing  Goal: Patient/Family Short Term Goal  Description: Patient's Short Term Goal:     Interventions:   -   - See additional Care Plan goals for specific interventions  Outcome: Progressing     Problem: PAIN - ADULT  Goal: Verbalizes/displays adequate comfort level or patient's stated pain goal  Description: INTERVENTIONS:  - Encourage pt to monitor pain and request assistance  - Assess pain using appropriate pain scale  - Administer analgesics based on type and severity of pain and evaluate response  - Implement non-pharmacological measures as appropriate and evaluate response  - Consider cultural and social influences on pain and pain management  - Manage/alleviate anxiety  - Utilize distraction and/or relaxation techniques  - Monitor for opioid side effects  - Notify MD/LIP if interventions unsuccessful or patient reports new pain  - Anticipate increased pain with activity and pre-medicate as appropriate  Outcome: Progressing     Problem: RISK FOR INFECTION - ADULT  Goal: Absence of fever/infection during anticipated neutropenic period  Description: INTERVENTIONS  - Monitor WBC  - Administer growth factors as ordered  - Implement neutropenic guidelines  Outcome: Progressing     Problem: SAFETY ADULT - FALL  Goal: Free from fall injury  Description: INTERVENTIONS:  - Assess pt frequently for physical needs  - Identify cognitive and physical deficits and behaviors that affect risk of falls.  - [place] fall precautions as indicated by assessment.  - Educate pt/family on patient safety  including physical limitations  - Instruct pt to call for assistance with activity based on assessment  - Modify environment to reduce risk of injury  - Provide assistive devices as appropriate  - Consider OT/PT consult to assist with strengthening/mobility  - Encourage toileting schedule  Outcome: Progressing     Problem: RESPIRATORY - ADULT  Goal: Achieves optimal ventilation and oxygenation  Description: INTERVENTIONS:  - Assess for changes in respiratory status  - Assess for changes in mentation and behavior  - Position to facilitate oxygenation and minimize respiratory effort  - Oxygen supplementation based on oxygen saturation or ABGs  - Provide Smoking Cessation handout, if applicable  - Encourage broncho-pulmonary hygiene including cough, deep breathe, Incentive Spirometry  - Assess the need for suctioning and perform as needed  - Assess and instruct to report SOB or any respiratory difficulty  - Respiratory Therapy support as indicated  - Manage/alleviate anxiety  - Monitor for signs/symptoms of CO2 retention  Outcome: Progressing

## 2024-07-24 LAB
ALBUMIN SERPL-MCNC: 3.6 G/DL (ref 3.2–4.8)
ANION GAP SERPL CALC-SCNC: 9 MMOL/L (ref 0–18)
BASOPHILS # BLD AUTO: 0 X10(3) UL (ref 0–0.2)
BASOPHILS NFR BLD AUTO: 0 %
BUN BLD-MCNC: 8 MG/DL (ref 9–23)
BUN/CREAT SERPL: 13.3 (ref 10–20)
CALCIUM BLD-MCNC: 8.2 MG/DL (ref 8.7–10.4)
CHLORIDE SERPL-SCNC: 108 MMOL/L (ref 98–112)
CO2 SERPL-SCNC: 26 MMOL/L (ref 21–32)
CREAT BLD-MCNC: 0.6 MG/DL
DEPRECATED RDW RBC AUTO: 41.9 FL (ref 35.1–46.3)
EGFRCR SERPLBLD CKD-EPI 2021: 113 ML/MIN/1.73M2 (ref 60–?)
EOSINOPHIL # BLD AUTO: 0.23 X10(3) UL (ref 0–0.7)
EOSINOPHIL NFR BLD AUTO: 2.7 %
ERYTHROCYTE [DISTWIDTH] IN BLOOD BY AUTOMATED COUNT: 14.5 % (ref 11–15)
GLUCOSE BLD-MCNC: 98 MG/DL (ref 70–99)
HCT VFR BLD AUTO: 34.2 %
HGB BLD-MCNC: 11.7 G/DL
IMM GRANULOCYTES # BLD AUTO: 0.02 X10(3) UL (ref 0–1)
IMM GRANULOCYTES NFR BLD: 0.2 %
LYMPHOCYTES # BLD AUTO: 1.56 X10(3) UL (ref 1–4)
LYMPHOCYTES NFR BLD AUTO: 18.6 %
MAGNESIUM SERPL-MCNC: 1.7 MG/DL (ref 1.6–2.6)
MCH RBC QN AUTO: 27.9 PG (ref 26–34)
MCHC RBC AUTO-ENTMCNC: 34.2 G/DL (ref 31–37)
MCV RBC AUTO: 81.6 FL
MONOCYTES # BLD AUTO: 0.53 X10(3) UL (ref 0.1–1)
MONOCYTES NFR BLD AUTO: 6.3 %
NEUTROPHILS # BLD AUTO: 6.03 X10 (3) UL (ref 1.5–7.7)
NEUTROPHILS # BLD AUTO: 6.03 X10(3) UL (ref 1.5–7.7)
NEUTROPHILS NFR BLD AUTO: 72.2 %
OSMOLALITY SERPL CALC.SUM OF ELEC: 294 MOSM/KG (ref 275–295)
PHOSPHATE SERPL-MCNC: 2.9 MG/DL (ref 2.4–5.1)
PLATELET # BLD AUTO: 377 10(3)UL (ref 150–450)
POTASSIUM SERPL-SCNC: 3.4 MMOL/L (ref 3.5–5.1)
RBC # BLD AUTO: 4.19 X10(6)UL
SODIUM SERPL-SCNC: 143 MMOL/L (ref 136–145)
WBC # BLD AUTO: 8.4 X10(3) UL (ref 4–11)

## 2024-07-24 PROCEDURE — 99233 SBSQ HOSP IP/OBS HIGH 50: CPT | Performed by: HOSPITALIST

## 2024-07-24 RX ORDER — CALCIUM CARBONATE 500 MG/1
500 TABLET, CHEWABLE ORAL 2 TIMES DAILY PRN
Status: DISCONTINUED | OUTPATIENT
Start: 2024-07-24 | End: 2024-07-25

## 2024-07-24 RX ORDER — CALCIUM CARBONATE 500 MG/1
500 TABLET, CHEWABLE ORAL 3 TIMES DAILY PRN
Status: DISCONTINUED | OUTPATIENT
Start: 2024-07-24 | End: 2024-07-24

## 2024-07-24 RX ORDER — MAGNESIUM OXIDE 400 MG/1
400 TABLET ORAL ONCE
Status: COMPLETED | OUTPATIENT
Start: 2024-07-24 | End: 2024-07-24

## 2024-07-24 RX ORDER — POTASSIUM CHLORIDE 20 MEQ/1
40 TABLET, EXTENDED RELEASE ORAL ONCE
Status: COMPLETED | OUTPATIENT
Start: 2024-07-24 | End: 2024-07-24

## 2024-07-24 NOTE — PLAN OF CARE
Problem: Patient Centered Care  Goal: Patient preferences are identified and integrated in the patient's plan of care  Description: Interventions:  - What would you like us to know as we care for you? home  - Provide timely, complete, and accurate information to patient/family  - Incorporate patient and family knowledge, values, beliefs, and cultural backgrounds into the planning and delivery of care  - Encourage patient/family to participate in care and decision-making at the level they choose  - Honor patient and family perspectives and choices  Outcome: Progressing     Problem: Patient/Family Goals  Goal: Patient/Family Long Term Goal  Description: Patient's Long Term Goal: improve GI     Interventions:  - follow plan of care  -IV fluids  - See additional Care Plan goals for specific interventions  Outcome: Progressing  Goal: Patient/Family Short Term Goal  Description: Patient's Short Term Goal: home    Interventions:   - follow MD orders  -monitor symptoms.   - See additional Care Plan goals for specific interventions  Outcome: Progressing     Problem: PAIN - ADULT  Goal: Verbalizes/displays adequate comfort level or patient's stated pain goal  Description: INTERVENTIONS:  - Encourage pt to monitor pain and request assistance  - Assess pain using appropriate pain scale  - Administer analgesics based on type and severity of pain and evaluate response  - Implement non-pharmacological measures as appropriate and evaluate response  - Consider cultural and social influences on pain and pain management  - Manage/alleviate anxiety  - Utilize distraction and/or relaxation techniques  - Monitor for opioid side effects  - Notify MD/LIP if interventions unsuccessful or patient reports new pain  - Anticipate increased pain with activity and pre-medicate as appropriate  Outcome: Progressing     Problem: RISK FOR INFECTION - ADULT  Goal: Absence of fever/infection during anticipated neutropenic period  Description:  INTERVENTIONS  - Monitor WBC  - Administer growth factors as ordered  - Implement neutropenic guidelines  Outcome: Progressing     Problem: SAFETY ADULT - FALL  Goal: Free from fall injury  Description: INTERVENTIONS:  - Assess pt frequently for physical needs  - Identify cognitive and physical deficits and behaviors that affect risk of falls.  - [place] fall precautions as indicated by assessment.  - Educate pt/family on patient safety including physical limitations  - Instruct pt to call for assistance with activity based on assessment  - Modify environment to reduce risk of injury  - Provide assistive devices as appropriate  - Consider OT/PT consult to assist with strengthening/mobility  - Encourage toileting schedule  Outcome: Progressing     Problem: DISCHARGE PLANNING  Goal: Discharge to home or other facility with appropriate resources  Description: INTERVENTIONS:  - Identify barriers to discharge w/pt and caregiver  - Include patient/family/discharge partner in discharge planning  - Arrange for needed discharge resources and transportation as appropriate  - Identify discharge learning needs (meds, wound care, etc)  - Arrange for interpreters to assist at discharge as needed  - Consider post-discharge preferences of patient/family/discharge partner  - Complete POLST form as appropriate  - Assess patient's ability to be responsible for managing their own health  - Refer to Case Management Department for coordinating discharge planning if the patient needs post-hospital services based on physician/LIP order or complex needs related to functional status, cognitive ability or social support system  Outcome: Progressing     Problem: RESPIRATORY - ADULT  Goal: Achieves optimal ventilation and oxygenation  Description: INTERVENTIONS:  - Assess for changes in respiratory status  - Assess for changes in mentation and behavior  - Position to facilitate oxygenation and minimize respiratory effort  - Oxygen  supplementation based on oxygen saturation or ABGs  - Provide Smoking Cessation handout, if applicable  - Encourage broncho-pulmonary hygiene including cough, deep breathe, Incentive Spirometry  - Assess the need for suctioning and perform as needed  - Assess and instruct to report SOB or any respiratory difficulty  - Respiratory Therapy support as indicated  - Manage/alleviate anxiety  - Monitor for signs/symptoms of CO2 retention  Outcome: Progressing

## 2024-07-24 NOTE — PROGRESS NOTES
[facility]  part of [facility]    Progress Note    Don Cabral Patient Status:  Inpatient    1965 MRN [id number]   Location [facility] 4W/SW/SE Attending Dionisio Dong MD   Hosp Day # 7 PCP Stephen Christianson MD       Subjective:   Don Cabral is a(n) 58 year old male was seen and examined  Continues to have good stool output, loose.  Abdominal pain still present but improving slowly  Objective:   Blood pressure 153/76, pulse 67, temperature 98.1 °F (36.7 °C), temperature source Oral, resp. rate 18, height 6' 2\" (1.88 m), weight 260 lb (117.9 kg), SpO2 97%.  GENERAL:  Alert, oriented to time, place, and person, NAD  HEENT:  Atraumatic.  Oropharynx clear.  Moist mucous membranes.  Ears, nose normal.  Eyes:  Anicteric sclerae.   NECK:  Supple.  No lymphadenopathy.  Trachea midline.   LUNGS:  Clear to auscultation bilaterally.  Normal respiratory effort.   HEART:  Regular rate and rhythm.  S1 and S2 auscultated.  No murmur.  ABDOMEN:  Soft, nondistended.  Abdominal dressing with 2 MARGARITA drains in place.  BS+  EXTREMITIES:  No peripheral edema, clubbing, or cyanosis.  NEUROLOGIC:  Motor and sensory intact.  Cranial nerves II-XII are intact.       Current Inpatient Medications:     Current Facility-Administered Medications:     calcium carbonate (Tums) chewable tab 500 mg, 500 mg, Oral, BID PRN    acetaminophen (Ofirmev) 10 mg/mL infusion premix 1,000 mg, 1,000 mg, Intravenous, Q6H    famotidine (Pepcid) 20 mg/2mL injection 20 mg, 20 mg, Intravenous, BID    alum-mag hydroxide-simethicone (Maalox) 200-200-20 MG/5ML oral suspension 30 mL, 30 mL, Oral, QID PRN    simethicone (Mylicon) chewable tab 80 mg, 80 mg, Oral, QID PRN    albuterol (Ventolin HFA) 108 (90 Base) MCG/ACT inhaler 2 puff, 2 puff, Inhalation, Q4H PRN    clonazePAM (KlonoPIN) tab 0.5 mg, 0.5 mg, Oral, TID PRN    gabapentin (Neurontin) tab 600 mg, 600 mg, Oral, TID    lisinopril (Prinivil; Zestril) tab 20 mg,  20 mg, Oral, Daily    OLANZapine (ZyPREXA) tab 2.5 mg, 2.5 mg, Oral, Nightly    sertraline (Zoloft) tab 75 mg, 75 mg, Oral, Daily    verapamil ER (Calan-SR) tab 120 mg, 120 mg, Oral, Nightly    heparin (Porcine) 5000 UNIT/ML injection 5,000 Units, 5,000 Units, Subcutaneous, 2 times per day    oxyCODONE immediate release tab 5 mg, 5 mg, Oral, Q4H PRN **OR** oxyCODONE immediate release tab 10 mg, 10 mg, Oral, Q4H PRN    HYDROmorphone (Dilaudid) 1 MG/ML injection 0.4 mg, 0.4 mg, Intravenous, Q2H PRN **OR** HYDROmorphone (Dilaudid) 1 MG/ML injection 0.8 mg, 0.8 mg, Intravenous, Q2H PRN    ondansetron (Zofran) 4 MG/2ML injection 4 mg, 4 mg, Intravenous, Q6H PRN    prochlorperazine (Compazine) 10 MG/2ML injection 5 mg, 5 mg, Intravenous, Q8H PRN    hydrALAzine (Apresoline) 20 mg/mL injection 10 mg, 10 mg, Intravenous, Q3H PRN    phenol (Chloraseptic) 1.4 % oral liquid spray, , Oral, Q2H PRN    Assessment and Plan:   1.       History of perforated sigmoid diverticulitis status post left hemicolectomy and end colostomy with splenic flexure mobilization.    Now status post open partial colectomy with colorectal anastomosis, takedown of colostomy, extensive lysis of adhesions, with hepatic flexure mobilization.    Pain control adequate  Cont Monitor hemodynamic status.  NG removed and tolerating liquids and bread  ADAT as per surgery  Advised to avoid dairy and caffeine   Having multiple loose Bms, no blood  Bloating improved   Cont DVT prophylaxis.      2.       Essential hypertension.    Cont home meds    3.       Obstructive sleep apnea.    Apply obstructive sleep apnea protocol and monitor.    4.       Bipolar affective disorder.  Cont home meds    Full code  MDM: High   Results:     Recent Labs   Lab 07/22/24  0550 07/23/24  0543 07/24/24  0631   RBC 4.14* 4.17* 4.19*   HGB 11.3* 11.5* 11.7*   HCT 35.6* 34.6* 34.2*   MCV 86.0 83.0 81.6   MCH 27.3 27.6 27.9   MCHC 31.7 33.2 34.2   RDW 14.3 14.3 14.5   NEPRELIM 6.43 6.39  6.03   WBC 9.0 9.0 8.4   .0 345.0 377.0         Recent Labs   Lab 07/22/24  0550 07/23/24  0543 07/24/24  0631   GLU 88 88 98   BUN 12 12 8*   CREATSERUM 0.68* 0.69* 0.60*   EGFRCR 108 108 113   CA 8.2* 8.3* 8.2*    143 143   K 3.8 3.7 3.4*    108 108   CO2 26.0 27.0 26.0         Imaging:   No results found.

## 2024-07-24 NOTE — PROGRESS NOTES
[facility]  Progress Note    Don Cabral Patient Status:  Inpatient    1965 MRN [id number]   Location [facility] 4W/SW/SE Attending Naun Bhagat MD   Hosp Day # 7 PCP Stephen Christianson MD     Subjective:  The patient is resting comfortably in bed today.  He reports mild incisional pain.  He denies nausea or vomiting.  He reports loose bowel movements.  He continues to report a decrease in appetite.    Objective/Physical Exam:  General: Alert, orientated x3.  Cooperative.  No apparent distress.  Vital Signs:  Blood pressure 153/76, pulse 67, temperature 98.1 °F (36.7 °C), temperature source Oral, resp. rate 18, height 74\", weight 260 lb (117.9 kg), SpO2 97%.  Wt Readings from Last 3 Encounters:   24 260 lb (117.9 kg)   24 256 lb (116.1 kg)   24 247 lb (112 kg)     Lungs: No respiratory distress.  Cardiac: Regular rate and rhythm.   Abdomen:  Soft, non distended, non tender, with no rebound or guarding.  No peritoneal signs.   Extremities:  No lower extremity edema noted.    Incision: Clean, dry, intact, no erythema  Drain: With serosanguineous output x 2    Intake/Output:    Intake/Output Summary (Last 24 hours) at 2024 1310  Last data filed at 2024 1123  Gross per 24 hour   Intake 2869.34 ml   Output 200 ml   Net 2669.34 ml     I/O last 3 completed shifts:  In: 2377.7 [P.O.:520; I.V.:1657.7; IV PIGGYBACK:200]  Out: 255 [Drains:255]  I/O this shift:  In: 1011.7 [P.O.:440; I.V.:571.7]  Out: 45 [Drains:45]    Medications:    acetaminophen  1,000 mg Intravenous Q6H    famotidine  20 mg Intravenous BID    gabapentin  600 mg Oral TID    lisinopril  20 mg Oral Daily    OLANZapine  2.5 mg Oral Nightly    sertraline  75 mg Oral Daily    verapamil ER  120 mg Oral Nightly    heparin  5,000 Units Subcutaneous 2 times per day       Labs:  Lab Results   Component Value Date    WBC 8.4 2024    HGB 11.7 2024    HCT 34.2 2024    .0 2024      Lab Results   Component Value Date     07/24/2024    K 3.4 07/24/2024     07/24/2024    CO2 26.0 07/24/2024    BUN 8 07/24/2024    CREATSERUM 0.60 07/24/2024    GLU 98 07/24/2024    CA 8.2 07/24/2024    ALB 3.6 07/24/2024     Lab Results   Component Value Date    INR 1.01 09/28/2023    INR 1.23 (H) 06/23/2023         Assessment  Patient Active Problem List   Diagnosis    Anxiety    Essential hypertension    ED (erectile dysfunction)    NELLIE (obstructive sleep apnea)    Generalized anxiety disorder    Hypokalemia    Hyperglycemia    Tooth pain    Daytime hypersomnolence    Symptomatic bradycardia    Palpitations    Slow heart rate    PVC (premature ventricular contraction)    Bipolar 1 disorder (HCC)    Sigmoid diverticulitis    Acute diverticulitis    Intractable abdominal pain    Postoperative examination    Preop testing     Postop day 7 colostomy reversal with partial colectomy and extensive lysis of adhesions      Plan:  Continue soft diet as tolerated.  Add nutritional supplements daily due to increased appetite.  The patient may have Gatorade.  Continue drain care  Ambulate and up to chair  DVT prophylaxis with heparin  GI prophylaxis with Pepcid    Quality:  DVT Mechanical Prophylaxis:   SCDs, Early ambuation  DVT Pharmacologic Prophylaxis   Medication    heparin (Porcine) 5000 UNIT/ML injection 5,000 Units                Code Status: Full Code  Vance: No urinary catheter in place  Vance Duration (in days):   Central line:    NILS: 7/24/2024        Kelly Malik PA-C  7/24/2024  1:10 PM

## 2024-07-24 NOTE — PLAN OF CARE
Problem: Patient Centered Care  Goal: Patient preferences are identified and integrated in the patient's plan of care  Description: Interventions:  - What would you like us to know as we care for you?   - Provide timely, complete, and accurate information to patient/family  - Incorporate patient and family knowledge, values, beliefs, and cultural backgrounds into the planning and delivery of care  - Encourage patient/family to participate in care and decision-making at the level they choose  - Honor patient and family perspectives and choices  Outcome: Progressing     Problem: Patient/Family Goals  Goal: Patient/Family Long Term Goal  Description: Patient's Long Term Goal:     Interventions:  -   - See additional Care Plan goals for specific interventions  Outcome: Progressing  Goal: Patient/Family Short Term Goal  Description: Patient's Short Term Goal:     Interventions:   -   - See additional Care Plan goals for specific interventions  Outcome: Progressing     Problem: PAIN - ADULT  Goal: Verbalizes/displays adequate comfort level or patient's stated pain goal  Description: INTERVENTIONS:  - Encourage pt to monitor pain and request assistance  - Assess pain using appropriate pain scale  - Administer analgesics based on type and severity of pain and evaluate response  - Implement non-pharmacological measures as appropriate and evaluate response  - Consider cultural and social influences on pain and pain management  - Manage/alleviate anxiety  - Utilize distraction and/or relaxation techniques  - Monitor for opioid side effects  - Notify MD/LIP if interventions unsuccessful or patient reports new pain  - Anticipate increased pain with activity and pre-medicate as appropriate  Outcome: Progressing     Problem: RISK FOR INFECTION - ADULT  Goal: Absence of fever/infection during anticipated neutropenic period  Description: INTERVENTIONS  - Monitor WBC  - Administer growth factors as ordered  - Implement neutropenic  guidelines  Outcome: Progressing     Problem: SAFETY ADULT - FALL  Goal: Free from fall injury  Description: INTERVENTIONS:  - Assess pt frequently for physical needs  - Identify cognitive and physical deficits and behaviors that affect risk of falls.  - [place] fall precautions as indicated by assessment.  - Educate pt/family on patient safety including physical limitations  - Instruct pt to call for assistance with activity based on assessment  - Modify environment to reduce risk of injury  - Provide assistive devices as appropriate  - Consider OT/PT consult to assist with strengthening/mobility  - Encourage toileting schedule  Outcome: Progressing     Problem: DISCHARGE PLANNING  Goal: Discharge to home or other facility with appropriate resources  Description: INTERVENTIONS:  - Identify barriers to discharge w/pt and caregiver  - Include patient/family/discharge partner in discharge planning  - Arrange for needed discharge resources and transportation as appropriate  - Identify discharge learning needs (meds, wound care, etc)  - Arrange for interpreters to assist at discharge as needed  - Consider post-discharge preferences of patient/family/discharge partner  - Complete POLST form as appropriate  - Assess patient's ability to be responsible for managing their own health  - Refer to Case Management Department for coordinating discharge planning if the patient needs post-hospital services based on physician/LIP order or complex needs related to functional status, cognitive ability or social support system  Outcome: Progressing     Problem: RESPIRATORY - ADULT  Goal: Achieves optimal ventilation and oxygenation  Description: INTERVENTIONS:  - Assess for changes in respiratory status  - Assess for changes in mentation and behavior  - Position to facilitate oxygenation and minimize respiratory effort  - Oxygen supplementation based on oxygen saturation or ABGs  - Provide Smoking Cessation handout, if applicable  -  Encourage broncho-pulmonary hygiene including cough, deep breathe, Incentive Spirometry  - Assess the need for suctioning and perform as needed  - Assess and instruct to report SOB or any respiratory difficulty  - Respiratory Therapy support as indicated  - Manage/alleviate anxiety  - Monitor for signs/symptoms of CO2 retention  Outcome: Progressing     Don is aware of his plan of care. Patient is alert and oriented x4, room air. Pain controlled with sched IV Tylenol. Patient states bloating has improved, tolerated one piece of french toast for dinner. Surgical dressings clean, dry and intact. X2 MARGARITA drains in place, drain care completed as ordered. Voids WNL. Up ambulating the halls independently. Safety measures in place, call light within reach,will continue to monitor.

## 2024-07-24 NOTE — DIETARY NOTE
BRIEF DIETITIAN NOTE      Patient Status 07/24/24: Pt screened for LOS. Pt at low nutritional risk. Pt admitted for sigmoid diverticulitis. Pt s/POD# 7 colostomy reversal with partial colectomy and extensive lysis of adhesions. Visited pt today, wife in the room. Pt reported fair appetite, but stated that currently had pretty bad GERD symptoms. RN provided pt with Maalox in the am, and Tums during pt visit. Pt with fair/good po intakes, consuming % of most meals per documentation. Weight relatively stable, no wt loss noted per Wt Hx on EHR.  Labs reviewed, K+ low, replaced today.  General Surgery PA ordered ONS, per note: \"Continue soft diet as tolerated. Add nutritional supplements daily due to increased appetite.The patient may have Gatorade.\" Per discussion with pt, RD recommends CPM. Will follow per protocol. Please consult RD if earlier nutrition intervention is needed.     Recent Labs     07/21/24  0615 07/22/24  0550 07/23/24  0543 07/24/24  0631   GLU 87 88 88 98   BUN 11 12 12 8*   CREATSERUM 0.69* 0.68* 0.69* 0.60*   CA 8.5* 8.2* 8.3* 8.2*   MG 1.8 1.9  --  1.7    143 143 143   K 3.7 3.8 3.7 3.4*    107 108 108   CO2 27.0 26.0 27.0 26.0   PHOS 2.8  2.8  --   --  2.9   OSMOCALC 291 295 295 294       Percent Meals Eaten (last 6 days)       Date/Time Percent Meals Eaten (%)    07/22/24 1121 100 %    07/23/24 1029 100 %    07/23/24 1606 100 %    07/24/24 1123 60 %             Patient Weight(s) for the past 336 hrs:   Weight   07/17/24 0917 117.9 kg (260 lb)        Wt Readings from Last 6 Encounters:   07/17/24 117.9 kg (260 lb)   06/12/24 116.1 kg (256 lb)   05/06/24 112 kg (247 lb)   04/30/24 110.2 kg (243 lb)   04/23/24 110.5 kg (243 lb 9.9 oz)   03/27/24 115.7 kg (255 lb)        Medical Food Supplements-Surgery PA ordered Ensure Enlive (350 calories/ 20 g protein each) TID Vanilla, Chocolate, or Strawberry and Gatorade. Rational/use of oral supplements discussed per provider. Will monitor   for tolerance and adequacy.     F/u per protocol or as appropriate.       Sarah Deleon RD, LDN  Clinical Dietitian  Office: [phone]

## 2024-07-25 VITALS
OXYGEN SATURATION: 98 % | HEIGHT: 74 IN | DIASTOLIC BLOOD PRESSURE: 71 MMHG | HEART RATE: 78 BPM | WEIGHT: 260 LBS | TEMPERATURE: 98 F | RESPIRATION RATE: 16 BRPM | SYSTOLIC BLOOD PRESSURE: 140 MMHG | BODY MASS INDEX: 33.37 KG/M2

## 2024-07-25 LAB
MAGNESIUM SERPL-MCNC: 1.9 MG/DL (ref 1.6–2.6)
POTASSIUM SERPL-SCNC: 4 MMOL/L (ref 3.5–5.1)

## 2024-07-25 PROCEDURE — 99239 HOSP IP/OBS DSCHRG MGMT >30: CPT | Performed by: HOSPITALIST

## 2024-07-25 RX ORDER — ACETAMINOPHEN 325 MG/1
650 TABLET ORAL EVERY 6 HOURS PRN
Status: SHIPPED | COMMUNITY
Start: 2024-07-25

## 2024-07-25 RX ORDER — ACETAMINOPHEN 325 MG/1
650 TABLET ORAL EVERY 6 HOURS PRN
Status: DISCONTINUED | OUTPATIENT
Start: 2024-07-25 | End: 2024-07-25

## 2024-07-25 RX ORDER — FAMOTIDINE 20 MG/1
20 TABLET, FILM COATED ORAL 2 TIMES DAILY
Status: DISCONTINUED | OUTPATIENT
Start: 2024-07-25 | End: 2024-07-25

## 2024-07-25 RX ORDER — CALCIUM CARBONATE 500 MG/1
500 TABLET, CHEWABLE ORAL 2 TIMES DAILY PRN
Status: SHIPPED | COMMUNITY
Start: 2024-07-25

## 2024-07-25 NOTE — PROGRESS NOTES
[facility]  Progress Note    Don Cabral Patient Status:  Inpatient    1965 MRN [id number]   Location [facility] 4W/SW/SE Attending No att. providers found   Hosp Day # 8 PCP Stephen Christianson MD     Subjective:  Patient reports improved acid reflux today.  He denies any nausea or vomiting.  He reports some diarrhea.  He is passing gas and having bowel movements.  He denies any pain.  He is tolerating soft diet well.  He is able to ambulate around the floor.    Objective/Physical Exam:  General: Alert, orientated x3.  Cooperative.  No apparent distress.  Vital Signs:  Blood pressure 140/71, pulse 78, temperature 98.3 °F (36.8 °C), temperature source Oral, resp. rate 16, height 6' 2\" (1.88 m), weight 260 lb (117.9 kg), SpO2 98%.  Wt Readings from Last 3 Encounters:   24 260 lb (117.9 kg)   24 256 lb (116.1 kg)   24 247 lb (112 kg)     Lungs: No respiratory distress.  Cardiac: Regular rate and rhythm.   Abdomen:  Soft, not distended, non-tender, with no rebound or guarding.  No peritoneal signs.   Extremities:  No lower extremity edema noted.    Incision: Clean, dry, intact, no erythema  Drain: Serous output noted x2.       Intake/Output:    Intake/Output Summary (Last 24 hours) at 2024 1126  Last data filed at 2024 0926  Gross per 24 hour   Intake 240 ml   Output 100 ml   Net 140 ml     I/O last 3 completed shifts:  In: 3109.3 [P.O.:680; I.V.:2229.3; IV PIGGYBACK:200]  Out: 200 [Drains:200]  I/O this shift:  In: -   Out: 30 [Drains:30]    Medications:    famotidine  20 mg Oral BID    acetaminophen  1,000 mg Intravenous Q6H    gabapentin  600 mg Oral TID    lisinopril  20 mg Oral Daily    OLANZapine  2.5 mg Oral Nightly    sertraline  75 mg Oral Daily    verapamil ER  120 mg Oral Nightly    heparin  5,000 Units Subcutaneous 2 times per day       Labs:     Lab Results   Component Value Date    K 4.0 2024     Lab Results   Component Value Date    INR  1.01 09/28/2023    INR 1.23 (H) 06/23/2023         Assessment  Patient Active Problem List   Diagnosis    Anxiety    Essential hypertension    ED (erectile dysfunction)    NELLIE (obstructive sleep apnea)    Generalized anxiety disorder    Hypokalemia    Hyperglycemia    Tooth pain    Daytime hypersomnolence    Symptomatic bradycardia    Palpitations    Slow heart rate    PVC (premature ventricular contraction)    Bipolar 1 disorder (HCC)    Sigmoid diverticulitis    Acute diverticulitis    Intractable abdominal pain    Postoperative examination    Preop testing     Postop day 7 colostomy reversal with partial colectomy and extensive lysis of adhesions       Plan:  Continue soft diet  Discharge instructions reviewed with patient  Patient stable from surgical standpoint to discharge to home with drains x2.  Irrigate wound daily, apply dressings to cover as needed  Follow-up with Dr. Novoa in a week    Quality:  DVT Mechanical Prophylaxis:   SCDs, Early ambuation  DVT Pharmacologic Prophylaxis   Medication    heparin (Porcine) 5000 UNIT/ML injection 5,000 Units                Code Status: Full Code  Vance: No urinary catheter in place  Vance Duration (in days):   Central line:    NILS: 7/25/2024        JOAO Faith  7/25/2024  11:26 AM

## 2024-07-25 NOTE — PLAN OF CARE
Patient alert and oriented x4. On room air. Tolerating diet, no nausea. Denies need for pain medications. Sx removed dressing, replaced with ABD pad. MARGARITA drain education given. Ambulating independently. Cleared for discharge. Instructions given. Education given.   Problem: Patient Centered Care  Goal: Patient preferences are identified and integrated in the patient's plan of care  Description: Interventions:  - What would you like us to know as we care for you?   - Provide timely, complete, and accurate information to patient/family  - Incorporate patient and family knowledge, values, beliefs, and cultural backgrounds into the planning and delivery of care  - Encourage patient/family to participate in care and decision-making at the level they choose  - Honor patient and family perspectives and choices  Outcome: Adequate for Discharge     Problem: Patient/Family Goals  Goal: Patient/Family Long Term Goal  Description: Patient's Long Term Goal:     Interventions:  -   - See additional Care Plan goals for specific interventions  Outcome: Adequate for Discharge  Goal: Patient/Family Short Term Goal  Description: Patient's Short Term Goal:     Interventions:   -  - See additional Care Plan goals for specific interventions  Outcome: Adequate for Discharge     Problem: PAIN - ADULT  Goal: Verbalizes/displays adequate comfort level or patient's stated pain goal  Description: INTERVENTIONS:  - Encourage pt to monitor pain and request assistance  - Assess pain using appropriate pain scale  - Administer analgesics based on type and severity of pain and evaluate response  - Implement non-pharmacological measures as appropriate and evaluate response  - Consider cultural and social influences on pain and pain management  - Manage/alleviate anxiety  - Utilize distraction and/or relaxation techniques  - Monitor for opioid side effects  - Notify MD/LIP if interventions unsuccessful or patient reports new pain  - Anticipate increased  pain with activity and pre-medicate as appropriate  Outcome: Adequate for Discharge     Problem: RISK FOR INFECTION - ADULT  Goal: Absence of fever/infection during anticipated neutropenic period  Description: INTERVENTIONS  - Monitor WBC  - Administer growth factors as ordered  - Implement neutropenic guidelines  Outcome: Adequate for Discharge     Problem: SAFETY ADULT - FALL  Goal: Free from fall injury  Description: INTERVENTIONS:  - Assess pt frequently for physical needs  - Identify cognitive and physical deficits and behaviors that affect risk of falls.  - [place] fall precautions as indicated by assessment.  - Educate pt/family on patient safety including physical limitations  - Instruct pt to call for assistance with activity based on assessment  - Modify environment to reduce risk of injury  - Provide assistive devices as appropriate  - Consider OT/PT consult to assist with strengthening/mobility  - Encourage toileting schedule  Outcome: Adequate for Discharge     Problem: DISCHARGE PLANNING  Goal: Discharge to home or other facility with appropriate resources  Description: INTERVENTIONS:  - Identify barriers to discharge w/pt and caregiver  - Include patient/family/discharge partner in discharge planning  - Arrange for needed discharge resources and transportation as appropriate  - Identify discharge learning needs (meds, wound care, etc)  - Arrange for interpreters to assist at discharge as needed  - Consider post-discharge preferences of patient/family/discharge partner  - Complete POLST form as appropriate  - Assess patient's ability to be responsible for managing their own health  - Refer to Case Management Department for coordinating discharge planning if the patient needs post-hospital services based on physician/LIP order or complex needs related to functional status, cognitive ability or social support system  Outcome: Adequate for Discharge     Problem: RESPIRATORY - ADULT  Goal: Achieves  optimal ventilation and oxygenation  Description: INTERVENTIONS:  - Assess for changes in respiratory status  - Assess for changes in mentation and behavior  - Position to facilitate oxygenation and minimize respiratory effort  - Oxygen supplementation based on oxygen saturation or ABGs  - Provide Smoking Cessation handout, if applicable  - Encourage broncho-pulmonary hygiene including cough, deep breathe, Incentive Spirometry  - Assess the need for suctioning and perform as needed  - Assess and instruct to report SOB or any respiratory difficulty  - Respiratory Therapy support as indicated  - Manage/alleviate anxiety  - Monitor for signs/symptoms of CO2 retention  Outcome: Adequate for Discharge

## 2024-07-25 NOTE — DISCHARGE INSTRUCTIONS
Date Time Output Amount (mL) Description (changes in color or consistency)                                                                          Date Time Output Amount (mL) Description (changes in color or consistency)

## 2024-07-25 NOTE — DISCHARGE SUMMARY
[facility]  part of [facility]     DISCHARGE SUMMARY     Don Cabral Patient Status:  Inpatient    1965 MRN [id number]   Location [facility] 4W/SW/SE Attending Naun Bhagat MD   Hosp Day # 8 PCP Stephen Christianson MD     DATE OF ADMISSION: 2024  DATE OF DISCHARGE:  24  DISPOSITION: home  CONDITION ON DISCHARGE: good    DISCHARGE DIAGNOSES:    Sigmoid diverticulitis    Essential hypertension    NELLIE (obstructive sleep apnea)    Bipolar 1 disorder (HCC)    Preop testing    Procedure:    Open partial colectomy with colorectal anastomosis  Extensive lysis of adhesions  Hepatic flexure mobilization    HISTORY OF PRESENT ILLNESS (COPIED FROM ADMISSION H&P)  Pt is a 58 year old male who with Diverticulitis [K57.92] who previously underwent an urgent resection of the descending colon with end colostomy who is scheduled for a Exploratory laparotomy partial colectomy with colorectal anastamosis,adhesiolysis.     HOSPITAL COURSE:  Patient was admitted after elective surgery as described above.  Did well postoperatively.  Diet advanced and he did well.  Bowel function returned.  Cleared by general surgery for discharge.    Patient understands return to the emergency room for increased pain, fever, discharge, shortness of breath, chest pain, new neurologic symptoms, other concerning symptoms.    PHYSICAL EXAM:  Temp:  [98.1 °F (36.7 °C)-98.3 °F (36.8 °C)] 98.3 °F (36.8 °C)  Pulse:  [67-78] 78  Resp:  [16-18] 16  BP: (140-162)/(68-86) 140/71  SpO2:  [97 %-98 %] 98 %  Gen: A+Ox3.  No distress.   HEENT: NCAT, neck supple, no carotid bruit.  CV: RRR, S1S2, and intact distal pulses. No gallop, rub, murmur.  Pulm: Effort and breath sounds normal. No distress, wheezes, rales, rhonchi.  Abd: Soft, NTND, BS normal, no mass, no HSM, no rebound/guarding.  Incision clean dry and intact  Neuro: Normal reflexes, CN. Sensory/motor exams grossly normal deficit. Coordination  and gait normal.    MS: No joint effusions.  No peripheral edema.  Skin: Skin is warm and dry. No rashes, erythema, diaphoresis.   Psych: Normal mood and affect. Behavior and judgment normal.     DISCHARGE MEDICATIONS     Discharge Medications        START taking these medications        Instructions Prescription details   acetaminophen 325 MG Tabs  Commonly known as: Tylenol      Take 2 tablets (650 mg total) by mouth every 6 (six) hours as needed.   Refills: 0     calcium carbonate 500 MG Chew  Commonly known as: Tums      Chew 1 tablet (500 mg total) by mouth 2 (two) times daily as needed.   Refills: 0            CONTINUE taking these medications        Instructions Prescription details   acidophilus-pectin Caps  Commonly known as: Probiotic      Take 1 capsule by mouth 3 (three) times a week.   Refills: 0     albuterol 108 (90 Base) MCG/ACT Aers  Commonly known as: Ventolin HFA      Inhale 2 puffs into the lungs every 4 (four) hours as needed for Wheezing or Shortness of Breath.   Quantity: 1 each  Refills: 3     clonazePAM 0.5 MG Tabs  Commonly known as: KlonoPIN      Take 1 tablet (0.5 mg total) by mouth 3 (three) times daily.   Quantity: 90 tablet  Refills: 2     gabapentin 600 MG Tabs  Commonly known as: Neurontin      Take 1 tablet (600 mg total) by mouth 3 (three) times daily.   Quantity: 270 tablet  Refills: 0     lisinopril 20 MG Tabs  Commonly known as: Prinivil; Zestril      Take 1 tablet (20 mg total) by mouth daily.   Quantity: 90 tablet  Refills: 3     Multi-Vitamin/Minerals Tabs      Take 1 tablet by mouth daily.   Refills: 0     OLANZapine 2.5 MG Tabs  Commonly known as: ZyPREXA      Take 1 tablet (2.5 mg total) by mouth nightly.   Quantity: 90 tablet  Refills: 0     sertraline 50 MG Tabs  Commonly known as: Zoloft      1.5 tablets daily   Quantity: 135 tablet  Refills: 0     Tadalafil 20 MG Tabs      Take 1 tablet (20 mg total) by mouth daily as needed for Erectile Dysfunction.   Quantity: 8 tablet  Refills: 5      verapamil  MG Tbcr  Commonly known as: Calan-SR      Take 1 tablet (120 mg total) by mouth nightly.   Quantity: 30 tablet  Refills: 3            STOP taking these medications      metRONIDAZOLE 500 MG Tabs  Commonly known as: Flagyl        neomycin 500 MG Tabs  Commonly known as: Mycifradin                 CONSULTANTS  Consultants         Provider   Role Specialty     Elaine Ramey MD      Consulting Physician HOSPITALIST     Rick Novoa MD      Consulting Physician SURGERY, GENERAL            FOLLOW UP:  Stephen Christianson MD  [address] [place] [address]  [address]  [facility] [address]  [phone]    Follow up in 1 week(s)  Post Discharge Followup    Rick Novoa MD  [address] [facility] [address]  [facility] [address]  [phone]    Follow up in 2 week(s)  Post Discharge Followup    The above plan and follow-up instructions were reviewed with the patient and they verbalized understanding and agreement.  They understand to return to the emergency room for any concerning signs or symptoms.  Greater than 30 minutes spent on discharge.  -----------------------    Hospital Discharge Diagnoses:   Sigmoid diverticulitis    Lace+ Score: 50  59-90 High Risk  29-58 Medium Risk  0-28   Low Risk.    TCM Follow-Up Recommendation:  LACE 29-58: Moderate Risk of readmission after discharge from the hospital.

## 2024-07-25 NOTE — PAYOR COMM NOTE
--------------  DISCHARGE REVIEW    Payor: CAILIN MORRISON  Subscriber #:  [id number]  Authorization Number: [id number]    Admit date: 24  Admit time:   8:51 AM  Discharge Date: 2024 10:53 AM     Admitting Physician: Elaine Ramey MD  Attending Physician:  No att. providers found  Primary Care Physician: Stephen Christianson MD          Discharge Summary Notes        Discharge Summary signed by Naun Bhagat MD at 2024  1:56 PM       Author: Naun Bhagat MD Specialty: HOSPITALIST Author Type: Physician    Filed: 2024  1:56 PM Date of Service: 2024  8:40 AM Status: Signed    : Naun Bhagat MD (Physician)           [facility]  part of [facility]     DISCHARGE SUMMARY     Don Cabral Patient Status:  Inpatient    1965 MRN [id number]   Location [facility] 4//SE Attending Naun Bhagat MD   Hosp Day # 8 PCP Stephen Christianson MD     DATE OF ADMISSION: 2024  DATE OF DISCHARGE:  24  DISPOSITION: home  CONDITION ON DISCHARGE: good    DISCHARGE DIAGNOSES:    Sigmoid diverticulitis    Essential hypertension    NELLIE (obstructive sleep apnea)    Bipolar 1 disorder (HCC)    Preop testing    Procedure:    Open partial colectomy with colorectal anastomosis  Extensive lysis of adhesions  Hepatic flexure mobilization    HISTORY OF PRESENT ILLNESS (COPIED FROM ADMISSION H&P)  Pt is a 58 year old male who with Diverticulitis [K57.92] who previously underwent an urgent resection of the descending colon with end colostomy who is scheduled for a Exploratory laparotomy partial colectomy with colorectal anastamosis,adhesiolysis.     HOSPITAL COURSE:  Patient was admitted after elective surgery as described above.  Did well postoperatively.  Diet advanced and he did well.  Bowel function returned.  Cleared by general surgery for discharge.    Patient understands return to the emergency room for increased pain, fever, discharge, shortness of breath, chest pain,  new neurologic symptoms, other concerning symptoms.    PHYSICAL EXAM:  Temp:  [98.1 °F (36.7 °C)-98.3 °F (36.8 °C)] 98.3 °F (36.8 °C)  Pulse:  [67-78] 78  Resp:  [16-18] 16  BP: (140-162)/(68-86) 140/71  SpO2:  [97 %-98 %] 98 %  Gen: A+Ox3.  No distress.   HEENT: NCAT, neck supple, no carotid bruit.  CV: RRR, S1S2, and intact distal pulses. No gallop, rub, murmur.  Pulm: Effort and breath sounds normal. No distress, wheezes, rales, rhonchi.  Abd: Soft, NTND, BS normal, no mass, no HSM, no rebound/guarding.  Incision clean dry and intact  Neuro: Normal reflexes, CN. Sensory/motor exams grossly normal deficit. Coordination  and gait normal.   MS: No joint effusions.  No peripheral edema.  Skin: Skin is warm and dry. No rashes, erythema, diaphoresis.   Psych: Normal mood and affect. Behavior and judgment normal.     DISCHARGE MEDICATIONS     Discharge Medications        START taking these medications        Instructions Prescription details   acetaminophen 325 MG Tabs  Commonly known as: Tylenol      Take 2 tablets (650 mg total) by mouth every 6 (six) hours as needed.   Refills: 0     calcium carbonate 500 MG Chew  Commonly known as: Tums      Chew 1 tablet (500 mg total) by mouth 2 (two) times daily as needed.   Refills: 0            CONTINUE taking these medications        Instructions Prescription details   acidophilus-pectin Caps  Commonly known as: Probiotic      Take 1 capsule by mouth 3 (three) times a week.   Refills: 0     albuterol 108 (90 Base) MCG/ACT Aers  Commonly known as: Ventolin HFA      Inhale 2 puffs into the lungs every 4 (four) hours as needed for Wheezing or Shortness of Breath.   Quantity: 1 each  Refills: 3     clonazePAM 0.5 MG Tabs  Commonly known as: KlonoPIN      Take 1 tablet (0.5 mg total) by mouth 3 (three) times daily.   Quantity: 90 tablet  Refills: 2     gabapentin 600 MG Tabs  Commonly known as: Neurontin      Take 1 tablet (600 mg total) by mouth 3 (three) times daily.   Quantity:  270 tablet  Refills: 0     lisinopril 20 MG Tabs  Commonly known as: Prinivil; Zestril      Take 1 tablet (20 mg total) by mouth daily.   Quantity: 90 tablet  Refills: 3     Multi-Vitamin/Minerals Tabs      Take 1 tablet by mouth daily.   Refills: 0     OLANZapine 2.5 MG Tabs  Commonly known as: ZyPREXA      Take 1 tablet (2.5 mg total) by mouth nightly.   Quantity: 90 tablet  Refills: 0     sertraline 50 MG Tabs  Commonly known as: Zoloft      1.5 tablets daily   Quantity: 135 tablet  Refills: 0     Tadalafil 20 MG Tabs      Take 1 tablet (20 mg total) by mouth daily as needed for Erectile Dysfunction.   Quantity: 8 tablet  Refills: 5     verapamil  MG Tbcr  Commonly known as: Calan-SR      Take 1 tablet (120 mg total) by mouth nightly.   Quantity: 30 tablet  Refills: 3            STOP taking these medications      metRONIDAZOLE 500 MG Tabs  Commonly known as: Flagyl        neomycin 500 MG Tabs  Commonly known as: Mycifradin                 CONSULTANTS  Consultants         Provider   Role Specialty     Elaine Ramey MD      Consulting Physician HOSPITALIST     Rick Novoa MD      Consulting Physician SURGERY, GENERAL            FOLLOW UP:  Stephen Christianson MD  [address] [place] [address] [address]  [facility] [address]  [phone]    Follow up in 1 week(s)  Post Discharge Followup    Rick Novoa MD  [address] [facility] [address]  [facility] [address]  [phone]    Follow up in 2 week(s)  Post Discharge Followup    The above plan and follow-up instructions were reviewed with the patient and they verbalized understanding and agreement.  They understand to return to the emergency room for any concerning signs or symptoms.  Greater than 30 minutes spent on discharge.  -----------------------    Hospital Discharge Diagnoses:   Sigmoid diverticulitis    Lace+ Score: 50  59-90 High Risk  29-58 Medium Risk  0-28   Low Risk.    TCM Follow-Up Recommendation:  LACE 29-58: Moderate Risk of readmission after discharge from the  [facility].    Electronically signed by Naun Bhagat MD on 7/25/2024  1:56 PM         REVIEWER COMMENTS

## 2024-07-25 NOTE — PLAN OF CARE
Patient's pain managed with Tylenol. Reports BM overnight. IV antibiotics infused. CPAP at night. Up self, voiding freely. L, R MARGARITA to bulb suction. Plan for home today. Safety precautions in place.     Problem: PAIN - ADULT  Goal: Verbalizes/displays adequate comfort level or patient's stated pain goal  Description: INTERVENTIONS:  - Encourage pt to monitor pain and request assistance  - Assess pain using appropriate pain scale  - Administer analgesics based on type and severity of pain and evaluate response  - Implement non-pharmacological measures as appropriate and evaluate response  - Consider cultural and social influences on pain and pain management  - Manage/alleviate anxiety  - Utilize distraction and/or relaxation techniques  - Monitor for opioid side effects  - Notify MD/LIP if interventions unsuccessful or patient reports new pain  - Anticipate increased pain with activity and pre-medicate as appropriate  Outcome: Progressing     Problem: Patient Centered Care  Goal: Patient preferences are identified and integrated in the patient's plan of care  Description: Interventions:  - What would you like us to know as we care for you?   - Provide timely, complete, and accurate information to patient/family  - Incorporate patient and family knowledge, values, beliefs, and cultural backgrounds into the planning and delivery of care  - Encourage patient/family to participate in care and decision-making at the level they choose  - Honor patient and family perspectives and choices  Outcome: Progressing

## 2024-07-26 ENCOUNTER — TELEPHONE (OUTPATIENT)
Dept: SURGERY | Facility: CLINIC | Age: 59
End: 2024-07-26

## 2024-07-26 ENCOUNTER — PATIENT OUTREACH (OUTPATIENT)
Dept: CASE MANAGEMENT | Age: 59
End: 2024-07-26

## 2024-07-26 ENCOUNTER — OFFICE VISIT (OUTPATIENT)
Dept: SURGERY | Facility: CLINIC | Age: 59
End: 2024-07-26

## 2024-07-26 ENCOUNTER — TELEPHONE (OUTPATIENT)
Dept: FAMILY MEDICINE CLINIC | Facility: CLINIC | Age: 59
End: 2024-07-26

## 2024-07-26 ENCOUNTER — PATIENT MESSAGE (OUTPATIENT)
Dept: SURGERY | Facility: CLINIC | Age: 59
End: 2024-07-26

## 2024-07-26 VITALS — WEIGHT: 260 LBS | HEIGHT: 74 IN | BODY MASS INDEX: 33.37 KG/M2

## 2024-07-26 DIAGNOSIS — K57.92 ACUTE DIVERTICULITIS: Primary | ICD-10-CM

## 2024-07-26 DIAGNOSIS — Z02.9 ENCOUNTERS FOR UNSPECIFIED ADMINISTRATIVE PURPOSE: ICD-10-CM

## 2024-07-26 DIAGNOSIS — T85.698A BROKEN JACKSON-PRATT DRAIN, INITIAL ENCOUNTER: Primary | ICD-10-CM

## 2024-07-26 PROCEDURE — 99212 OFFICE O/P EST SF 10 MIN: CPT

## 2024-07-26 NOTE — TELEPHONE ENCOUNTER
Patient was discharged from hospital yesterday, and is having trouble with Right drain. States drain is clogged, and last drainage output was 20 ml last night. Left drain is patent, and has 10 ml of drainage Sent information regarding drain care, and reviewed stripping technique. Discussed with JOAO Sandra, and he will obtain sterile flush/supplies from hospital and assist patient today @ 12:30. Patient verbaliized understanding, and confirmed appointment.

## 2024-07-26 NOTE — PROGRESS NOTES
Initial Post Discharge Follow Up   Discharge Date: 7/25/24  Contact Date: 7/26/2024    Consent Verification:  Assessment Completed With: Patient  HIPAA Verified?  Yes    Discharge Dx:   Sigmoid diverticulitis    Essential hypertension    NELLIE (obstructive sleep apnea)    Bipolar 1 disorder (HCC)    Preop testing     Procedure:    Open partial colectomy with colorectal anastomosis  Extensive lysis of adhesions  Hepatic flexure mobilization    General:   How have you been since your discharge from the hospital? The patient reports doing well at home. The patient denies any fever/chills, severe abdominal pain/cramping/tenderness, vomiting, or melena/hematochezia. The patient has continued to have nausea at home but has been tolerating a soft diet at home. The patient has been able to manage incisional pain with tylenol over the counter. The patient denies any redness, pain, edema, discharge/bleeding, foul odor or heat coming from the incision sites. The incision was described as intact and soft to the touch. The patient also denies any malaise or fever. The patient denies any bilateral calf edema, pain, warmth, redness or tenderness. The patient did admit to having a cramp in the left calf that resolved. The patient denies any chest pain, trouble breathing, coughing, dizziness/syncope, irregular heart beat, or sweats.  The patient has not yet checked blood pressure at home. The patient reported the right MARGARITA drain has a blockage due to a blood clot. The patient has attempted strip the tube, but the blockage remains. The patient denies any right MARGARITA drain pain or leaking.  The patient reported 20mL if drainage from the right MARGARITA drain last night, but now the drain does not have output. The patient denies any bilateral MARGARITA drain redness/edema/warmth from the incision site, foul smelling drainage, tube damage. The patient also denies any a sudden increase/decrease in drainage from the left MARGARITA drain.     Nurse care manager did  instruct the patient to go to the emergency department if general surgery is unable to see the patient today for MARGARITA drain care. The patient verbalized understanding and agreement.    Do you have any pain since discharge?  Yes  Where: incisional pain    Rating on pain scale 1-10, 10 being the worst pain you have ever experienced, what is current pain: 3  Is the pain manageable at home? Yes; tylenol over the counter.   How well was your pain managed while in the hospital?   On a scale of 1-5   1- Very Poor and 5- Very well   3  When you were leaving the hospital were your discharge instructions reviewed with you? Yes  How well were your discharge instructions explained to you?   On a scale of 1-5   1- Very Poor and 5- Very well   4  Do you have any questions about your discharge instructions?  Yes; nurse care manager reviewed MARGARITA drain care.   Before leaving the hospital was your diagnoses explained to you? Yes  Do you have any questions about your diagnoses? No  Are you able to perform normal daily activities of living as you have prior to your hospital stay (dressing, bathing, ambulating to the bathroom, etc)? yes  (NCM) Was patient given a different diet per AVS? yes  If so, which diet?   soft diet   Are there any barriers to following this diet? no    Medications: NCM did confirm the patient has discontinued the following as directed:  metRONIDAZOLE 500 MG Tabs (Flagyl)  neomycin 500 MG Tabs (Mycifradin)  Current Outpatient Medications   Medication Sig Dispense Refill    acetaminophen 325 MG Oral Tab Take 2 tablets (650 mg total) by mouth every 6 (six) hours as needed.      calcium carbonate 500 MG Oral Chew Tab Chew 1 tablet (500 mg total) by mouth 2 (two) times daily as needed.      clonazePAM 0.5 MG Oral Tab Take 1 tablet (0.5 mg total) by mouth 3 (three) times daily. (Patient taking differently: Take 1 tablet (0.5 mg total) by mouth 3 (three) times daily. Takes doses at 0600, 0930, and 1300) 90 tablet 2     OLANZapine (ZYPREXA) 2.5 MG Oral Tab Take 1 tablet (2.5 mg total) by mouth nightly. 90 tablet 0    sertraline (ZOLOFT) 50 MG Oral Tab 1.5 tablets daily 135 tablet 0    gabapentin 600 MG Oral Tab Take 1 tablet (600 mg total) by mouth 3 (three) times daily. 270 tablet 0    lisinopril 20 MG Oral Tab Take 1 tablet (20 mg total) by mouth daily. 90 tablet 3    acidophilus-pectin Oral Cap Take 1 capsule by mouth 3 (three) times a week.      verapamil  MG Oral Tab CR Take 1 tablet (120 mg total) by mouth nightly. 30 tablet 3    Multiple Vitamins-Minerals (MULTI-VITAMIN/MINERALS) Oral Tab Take 1 tablet by mouth daily.      Albuterol Sulfate  (90 Base) MCG/ACT Inhalation Aero Soln Inhale 2 puffs into the lungs every 4 (four) hours as needed for Wheezing or Shortness of Breath. 1 each 3    Tadalafil 20 MG Oral Tab Take 1 tablet (20 mg total) by mouth daily as needed for Erectile Dysfunction. 8 tablet 5     Were there any changes to your current medication(s) noted on the AVS? Yes  If so, were these medication changes discussed with you prior to leaving the hospital? Yes  Let's go over your medications together to make sure we are not missing anything. Medications Reviewed  Are there any reasons that keep you from taking your medication as prescribed? No  Are you having any concerns with constipation? No    Discharge medications reviewed/discussed/and reconciled against outpatient medications with patient.  Any changes or updates to medications sent to PCP.  Patient Acknowledged     Referrals/orders at D/C:  Referrals/orders placed at D/C? no    Except for Home Health Services mentioned above, have you scheduled these other services? Yes The patient has a CT of the chest scheduled for 10/1/2024.     If yes, have you started these services? no  DME ordered at D/C? Yes  What? MARGARITA hernandez   Have you received your (DME)? yes    Discharge orders, AVS reviewed and discussed with patient. Any changes or updates to orders  sent to PCP.  Patient Acknowledged      SDOH:   Transportation:   Transportation Needs: No Transportation Needs (7/26/2024)    Transportation Needs     Lack of Transportation: No     Car Seat: Not on file     Financial Strain:   Financial Resource Strain: Low Risk  (7/26/2024)    Financial Resource Strain     Difficulty of Paying Living Expenses: Not hard at all     Med Affordability: No         Follow up appointments:      Your appointments       Date & Time Appointment Department (Center)    Aug 02, 2024 1:30 PM CDT Follow Up Visit with Denver Bowser MD [facility] ([facility])        Aug 06, 2024 12:20 PM CDT Post Op Visit with Rick Novoa MD [facility] ([facility])        Aug 14, 2024 4:20 PM CDT Video Visit with Roro Edge MD [facility] ([facility] )    Please verify your telehealth insurance benefits prior to your appointment.    You must be in the [facility] during the virtual visit.     Please use the [employer] Mobile Manuel and launch the video visit 10 minutes prior to your scheduled appointment time to ensure your camera and microphone are working properly. Once the video visit has started you will be placed in a waiting room until the provider begins the visit.     You will receive an email confirmation with instructions.  If you have questions, call your doctor's office directly.    If you are having issues or need to use a desktop/laptop, please follow the below steps:        1.       Close out all other open apps (could be competing for audio resources)  2.       Disable Bluetooth  3.       Reboot mobile device before joining the video  4.       Come off Wi-Fi and switch over to Data    Please see our Video Visit Tip Sheet if you need additional assistance.     If you believe this is an emergency, please dial 911 immediately.          Sep  05, 2024 3:00 PM CDT Sleep Follow Up with Keegan Zaidi MD [facility] ([facility])    Sleep Patients:  Please bring your PAP device (no mask/hose) to each appointment unless instructed otherwise.        Oct 01, 2024 6:30 AM CDT CT CHEST with PF CT RM1 [facility] ([facility])    Please arrive 15 minutes prior to your scheduled appointment time.            [facility]  [address] [facility] [address]  [phone] [facility]  [address]  [facility] [address]  [phone] [facility] 4  [address] [place] [address]  [facility] [address] [phone]    [facility]  [address] [facility] [place] [address]  [facility] [address]  [phone] [facility]   [address] [facility] [address] [phone]            TCC  Was TCC ordered: No      PCP (If no TCC appointment)  Does patient already have a PCP appointment scheduled? No  [facility] Scheduled PCP office TCM appointment with patient on 8/6/2024 with Dr. Lyons.       Specialist    Does the patient have any other follow up appointment(s) needing to be scheduled? Yes  If yes: [facility] reviewed upcoming specialist appointment with patient: Yes  The patient is scheduled with Dr. Novoa on 8/6/2024, but the patient has been in contact with the office to schedule a sooner appointment.       The patient is scheduled with Dr. Bowser on 8/2/2024.   Does the patient need assistance scheduling appointment(s): No    Is there any reason as to why you cannot make your appointment(s)?  No     Needs post D/C:   Now that you are home, are there any needs or  concerns you need addressed before your next visit with your PCP?  (DME, meds, questions, etc.): Yes; Nurse care manager contacted general surgery to report the patient's right MARGARITA drain update. The patient reported a blood clot present in the MARGARITA drain that is blocking any further discharge/output.   Nurse care manager requested to speak with the nursing staff directly. The nurse reported there are no providers in the clinic to assist the patient. If the patient is not able to strip the MARGARITA drain, and remove the blood clot, the patient will need to go to the emergency department. The nurse reported plans to contact the patient directly.     Interventions by NCM:    Reviewed all discharge instructions with the patient with a focus on post-op directions, MARGARITA drain care, pain management, and diet. All medications were reviewed and educated on the importance of taking the medications as directed.  Patient symptoms were assessed, education was completed for signs/symptoms to monitor, and reviewed when to contact providers versus go to the emergency department/call 911. A call was made to general surgery to provide a condition update. Appointments were reviewed and stressed the importance of a close follow up with providers. A Transitional Care Management-hospital follow up appointment was scheduled.  Confirmed patient has DME (medical equipment) and understands proper use. The patient verbalized understanding and will contact the office with any further questions or concerns.     Overall Rating:   How would you rate the care you received while in the hospital? fair    CCM referral placed:    No    BOOK BY DATE: 8/8/2024

## 2024-07-26 NOTE — TELEPHONE ENCOUNTER
FYI:     Nurse care manager spoke to the patient today for Transitional Care Management. The patient is scheduled for a Transitional Care Management appointment on 8/6/2024.     The patient reported the right MARGARITA drain has a blockage due to a blood clot. The patient has attempted strip the tube, but the blockage remains. The patient denies any right MARGARITA drain pain  or leaking.  The patient reported 20mL if drainage from the MARGARITA drain last night, but now the drain does not have output. The patient denies any bilateral MARGARITA drain redness/edema/warmth from the incision site, foul smelling drainage, tube damage. The patient also denies any a sudden increase/decrease in drainage from the left MARGARITA drain.     Nurse care manager contacted Dr. Novoa's office to provide this condition update. Nurse care manager spoke directly with the office nurse. There are no providers available in the office and the soonest available appointment would be Monday, 7/29/2024.  The office nurse will call the patient directly and recommend the patient be seen in the emergency department.     Nurse care manager spoke with the patient and the patient agreed to return to the emergency department today.     Please provide this update to Dr. Christianson. Thank you.

## 2024-07-26 NOTE — PROGRESS NOTES
Update: Nurse care manager spoke to the patient. The patient agreed to seek MARGARITA drain care at the emergency department today.

## 2024-07-29 ENCOUNTER — TELEPHONE (OUTPATIENT)
Dept: SURGERY | Facility: CLINIC | Age: 59
End: 2024-07-29

## 2024-07-29 ENCOUNTER — OFFICE VISIT (OUTPATIENT)
Dept: SURGERY | Facility: CLINIC | Age: 59
End: 2024-07-29
Payer: COMMERCIAL

## 2024-07-29 VITALS — HEIGHT: 74 IN | BODY MASS INDEX: 33.37 KG/M2 | WEIGHT: 260 LBS

## 2024-07-29 DIAGNOSIS — Z48.89 ENCOUNTER FOR POSTOPERATIVE CARE: Primary | ICD-10-CM

## 2024-07-29 PROCEDURE — 99024 POSTOP FOLLOW-UP VISIT: CPT

## 2024-07-29 NOTE — TELEPHONE ENCOUNTER
Per patient he was told by Stephen that he would pull his drain today, but no appointment was made. Please advise

## 2024-07-29 NOTE — PROGRESS NOTES
S:  Don Cabral is a 58 year old male sp open partial colectomy with colorectal anastomosis.   Doing well, no fevers, no chills, tolerating a general diet, generally normal bowel movements, no calf tenderness nor lower extremity edema, no shortness of breath, no chest pain.   Minimal drain output, no fever or chills. Had some gas pain over the weekend, but otherwise doing better.     O:  Ht 6' 2\" (1.88 m)   Wt 260 lb (117.9 kg)   BMI 33.38 kg/m²   GEN:  No acute distress  L: nonlabored respirations  H: reg rate  Abd:  Soft, NT,ND.  Skin: Incisions C D I, no eryth. Staples intact, MARGARITA drains removed.   Extr: No edema, no calf tenderness    Assessment   1. Encounter for postoperative care        Doing well post op, MARGARITA drains removed.   Continue to keep incision clean and dry.  Maintain a healthy diet.  Maintain good hydration.  F/u next week with Dr. Novoa for staple removal.          Stephen Nunez PA-C

## 2024-08-06 ENCOUNTER — OFFICE VISIT (OUTPATIENT)
Dept: SURGERY | Facility: CLINIC | Age: 59
End: 2024-08-06
Payer: COMMERCIAL

## 2024-08-06 ENCOUNTER — OFFICE VISIT (OUTPATIENT)
Dept: FAMILY MEDICINE CLINIC | Facility: CLINIC | Age: 59
End: 2024-08-06
Payer: COMMERCIAL

## 2024-08-06 VITALS
OXYGEN SATURATION: 97 % | SYSTOLIC BLOOD PRESSURE: 124 MMHG | DIASTOLIC BLOOD PRESSURE: 74 MMHG | WEIGHT: 247 LBS | BODY MASS INDEX: 31.7 KG/M2 | HEART RATE: 102 BPM | HEIGHT: 74 IN | RESPIRATION RATE: 18 BRPM

## 2024-08-06 DIAGNOSIS — Z09 HOSPITAL DISCHARGE FOLLOW-UP: Primary | ICD-10-CM

## 2024-08-06 DIAGNOSIS — Z98.890: ICD-10-CM

## 2024-08-06 DIAGNOSIS — Z09 POSTOPERATIVE EXAMINATION: Primary | ICD-10-CM

## 2024-08-06 PROCEDURE — 99024 POSTOP FOLLOW-UP VISIT: CPT | Performed by: SURGERY

## 2024-08-06 PROCEDURE — 99495 TRANSJ CARE MGMT MOD F2F 14D: CPT | Performed by: FAMILY MEDICINE

## 2024-08-06 NOTE — PROGRESS NOTES
Subjective:   Don Cabral is a 58 year old male who presents for hospital follow up.   He was discharged from [facility] to Home or Self Care  Admission Date: 7/17/24   Discharge Date: 7/25/24  Hospital Discharge Diagnosis: Reversal ostomy    Interactive contact within 2 business days post discharge first initiated on Date: 7/26/2024    During the visit, the following was completed:  Obtained and reviewed discharge summary, continuity of care documents, and Hospitalist notes  Reviewed Labs (CBC, CMP)    HPI: Don has ostomy in place with partial colectomy after svere diverticulitis, and now is s/p reversal and reanastomosis. Has had some labile blood pressures since surgery.    Thinks maybe developed hemorrhoids since the surgery. Has used hemorrhoid ointment    Has had rebekah drains removed, staples to be removed today.    Pain is easily managed with tylenol. Didn't need opioids. Toradol to tylenol.    Did have one episode of explosive diarrhea. Primary concerns are bowel control. Sometimes getting 1-5 seconds warning for bowel movements.    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    History/Other:   Current Medications:  Medication Reconciliation:  I am aware of an inpatient discharge within the last 30 days.  The discharge medication list has been reconciled with the patient's current medication list and reviewed by me.  See medication list for additions of new medication, and changes to current doses of medications and discontinued medications.  Outpatient Medications Marked as Taking for the 8/6/24 encounter (Office Visit) with Stephen Christianson MD   Medication Sig    acetaminophen 325 MG Oral Tab Take 2 tablets (650 mg total) by mouth every 6 (six) hours as needed.    calcium carbonate 500 MG Oral Chew Tab Chew 1 tablet (500 mg total) by mouth 2 (two) times daily as needed.    clonazePAM 0.5 MG Oral Tab Take 1 tablet (0.5 mg total) by mouth 3 (three) times daily.  (Patient taking differently: 1 tablet by mouth in the morning 1 tablet by mouth in the afternoon and 1/2 tablet by mouth in the evening.)    OLANZapine (ZYPREXA) 2.5 MG Oral Tab Take 1 tablet (2.5 mg total) by mouth nightly.    sertraline (ZOLOFT) 50 MG Oral Tab 1.5 tablets daily    gabapentin 600 MG Oral Tab Take 1 tablet (600 mg total) by mouth 3 (three) times daily.    lisinopril 20 MG Oral Tab Take 1 tablet (20 mg total) by mouth daily.    verapamil  MG Oral Tab CR Take 1 tablet (120 mg total) by mouth nightly.    Multiple Vitamins-Minerals (MULTI-VITAMIN/MINERALS) Oral Tab Take 1 tablet by mouth daily.    Albuterol Sulfate  (90 Base) MCG/ACT Inhalation Aero Soln Inhale 2 puffs into the lungs every 4 (four) hours as needed for Wheezing or Shortness of Breath.    Tadalafil 20 MG Oral Tab Take 1 tablet (20 mg total) by mouth daily as needed for Erectile Dysfunction.       Review of Systems:  See HPI    Objective:   No LMP for male patient.  Estimated body mass index is 31.71 kg/m² as calculated from the following:    Height as of this encounter: 6' 2\" (1.88 m).    Weight as of this encounter: 247 lb (112 kg).   /74   Pulse 102   Resp 18   Ht 6' 2\" (1.88 m)   Wt 247 lb (112 kg)   SpO2 97%   BMI 31.71 kg/m²    GENERAL: well developed, well nourished, in no apparent distress  SKIN: incision sites healing well  Recta: hemorrhoid at 10 o clock    Assessment & Plan:   1. Hospital discharge follow-up (Primary)  2. S/P digestive system surgery    Doing well, will keep an eye on GI issues and expecting continued improvement.    Discussed conservative care for hemorrhoid    No follow-ups on file.

## 2024-08-06 NOTE — PROGRESS NOTES
Chief Complaint   Patient presents with    Post-Op     PO  Exploratory laparotomy partial colectomy with colorectal anastamosis, extensive adhesiolysis (Abdomen) 7/17/24  COLECTOMY (Abdomen            O:  VSS  GEN:  No acute distress  Abd:  Soft, NTND, incisions C/D/I in staples      Assessment   1. Postoperative examination        Doing well sp Zaid's reversal and extensive DAREN.  Staples removed.  Continue to avoid heavy lifting for another month.  F/u prn.         Rick Novoa MD

## 2024-08-30 ENCOUNTER — HOSPITAL ENCOUNTER (OUTPATIENT)
Age: 59
Discharge: HOME | End: 2024-08-30
Attending: EMERGENCY MEDICINE
Payer: COMMERCIAL

## 2024-08-30 ENCOUNTER — APPOINTMENT (OUTPATIENT)
Dept: CT IMAGING | Age: 59
End: 2024-08-30
Attending: EMERGENCY MEDICINE
Payer: COMMERCIAL

## 2024-08-30 VITALS
HEIGHT: 75 IN | BODY MASS INDEX: 30.46 KG/M2 | DIASTOLIC BLOOD PRESSURE: 72 MMHG | HEART RATE: 75 BPM | WEIGHT: 245 LBS | RESPIRATION RATE: 16 BRPM | SYSTOLIC BLOOD PRESSURE: 140 MMHG | OXYGEN SATURATION: 97 % | TEMPERATURE: 98 F

## 2024-08-30 DIAGNOSIS — R10.9 ABDOMINAL PAIN, ACUTE: Primary | ICD-10-CM

## 2024-08-30 LAB
#MXD IC: 0.6 X10ˆ3/UL (ref 0.1–1)
BUN BLD-MCNC: 15 MG/DL (ref 7–18)
CHLORIDE BLD-SCNC: 101 MMOL/L (ref 98–112)
CO2 BLD-SCNC: 26 MMOL/L (ref 21–32)
CREAT BLD-MCNC: 0.8 MG/DL
EGFRCR SERPLBLD CKD-EPI 2021: 103 ML/MIN/1.73M2 (ref 60–?)
GLUCOSE BLD-MCNC: 122 MG/DL (ref 70–99)
HCT VFR BLD AUTO: 42.2 %
HCT VFR BLD CALC: 42 %
HGB BLD-MCNC: 13.1 G/DL
ISTAT IONIZED CALCIUM FOR CHEM 8: 1.16 MMOL/L (ref 1.12–1.32)
LYMPHOCYTES # BLD AUTO: 2.2 X10ˆ3/UL (ref 1–4)
LYMPHOCYTES NFR BLD AUTO: 22.7 %
MCH RBC QN AUTO: 26.1 PG (ref 26–34)
MCHC RBC AUTO-ENTMCNC: 31 G/DL (ref 31–37)
MCV RBC AUTO: 84.2 FL (ref 80–100)
MIXED CELL %: 6.3 %
NEUTROPHILS # BLD AUTO: 6.8 X10ˆ3/UL (ref 1.5–7.7)
NEUTROPHILS NFR BLD AUTO: 71 %
PLATELET # BLD AUTO: 438 X10ˆ3/UL (ref 150–450)
POTASSIUM BLD-SCNC: 3.7 MMOL/L (ref 3.6–5.1)
RBC # BLD AUTO: 5.01 X10ˆ6/UL
SODIUM BLD-SCNC: 139 MMOL/L (ref 136–145)
WBC # BLD AUTO: 9.6 X10ˆ3/UL (ref 4–11)

## 2024-08-30 PROCEDURE — 99215 OFFICE O/P EST HI 40 MIN: CPT

## 2024-08-30 PROCEDURE — 74177 CT ABD & PELVIS W/CONTRAST: CPT | Performed by: EMERGENCY MEDICINE

## 2024-08-30 PROCEDURE — 85025 COMPLETE CBC W/AUTO DIFF WBC: CPT | Performed by: EMERGENCY MEDICINE

## 2024-08-30 PROCEDURE — 96374 THER/PROPH/DIAG INJ IV PUSH: CPT

## 2024-08-30 PROCEDURE — 80047 BASIC METABLC PNL IONIZED CA: CPT

## 2024-08-30 RX ORDER — HYDROCODONE BITARTRATE AND ACETAMINOPHEN 5; 325 MG/1; MG/1
1-2 TABLET ORAL EVERY 6 HOURS PRN
Qty: 10 TABLET | Refills: 0 | Status: SHIPPED | OUTPATIENT
Start: 2024-08-30 | End: 2024-09-04

## 2024-08-30 RX ORDER — KETOROLAC TROMETHAMINE 30 MG/ML
30 INJECTION, SOLUTION INTRAMUSCULAR; INTRAVENOUS ONCE
Status: COMPLETED | OUTPATIENT
Start: 2024-08-30 | End: 2024-08-30

## 2024-08-30 NOTE — DISCHARGE INSTRUCTIONS
Follow-up with Dr. Novoa  Take ibuprofen as needed for pain  Take hydrocodone as needed for uncontrolled pain every 6 hours  Return to the emergency department for any worsening symptoms or new concern

## 2024-08-30 NOTE — ED INITIAL ASSESSMENT (HPI)
RLQ abdominal pain -  started Aug 9 intermittent. Now pain is getting worse , early this morning at 1-2 am pain is severe. No pain meds taken . Pain radiating to right side of  back. Pt  had ostomy reversal  July 17 at [place]. Pt sent a messaged to PCP yesterday and was advised to go to an urgent care with imaging capability.  Denies fever, nausea, vomiting, diarrhea,,Blood in stool. Last BM yesterday.

## 2024-08-30 NOTE — ED PROVIDER NOTES
Patient Seen in: [facility]      History     Chief Complaint   Patient presents with    Abdominal Pain     Stated Complaint: lower right abdominal pain    Subjective:   HPI    58-year-old male with a previous history of a bowel resection secondary to complications from diverticulitis, history of colostomy reversal 6 weeks ago, history of atrial fibrillation, hypertension, morbid obesity sleep apnea presents to the immediate care for complaints of abdominal pain.  Patient has been complaining of intermittent abdominal pain since August 9.  Over the last several days its become more persistent.  The pain is in the right flank and right lower quadrant.  He denies any fevers or chills.  Denies nausea or vomiting.  Patient states he still has his appendix.  He denies any dysuria hematuria or frequency.  Denies any melena or bloody stools.  Denies any prior history of kidney stones.    Objective:   Past Medical History:    Allergic rhinitis    Anxiety    Arrhythmia    T1 AV block, LBBB    Back pain    Back problem    lower back    Depression    Diverticulitis    ED (erectile dysfunction)    High blood pressure    History of adverse reaction to anesthesia    Patient states he doesnt know exactly what happened but stated he required haldol post anesthesia    Pneumonia due to organism    Seasonal allergies    Shortness of breath    Sleep apnea    CPAP    Unspecified essential hypertension    Visual impairment    glasses              Past Surgical History:   Procedure Laterality Date    Cholecystectomy  3/12/2023    Colonoscopy N/A 03/22/2016    [facility].  Hyperplastic polyp only.  Repeat 2026    Colonoscopy  05/10/2023    Dental surgery procedure      Finger teeth removed    Incision/drain abscess extra  1996    Knee arthroscopy Right     Laparoscopic cholecystectomy  02/12/2023    Myringoplasty      Other surgical history      R knee arthroscope    Other surgical history  07/17/2024    Exploratory laparotomy  partial colectomy with colorectal anastamosis, extensive adhesiolysis    Part removal colon w end colostomy      Skin tissue procedure unlisted      Tonsillectomy                  Social History     Socioeconomic History    Marital status:    Occupational History    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   Tobacco Use    Smoking status: Former     Current packs/day: 0.00     Average packs/day: 0.5 packs/day for 19.0 years (9.5 ttl pk-yrs)     Types: Cigarettes     Start date: 1981     Quit date: 2000     Years since quittin.1     Passive exposure: Never    Smokeless tobacco: Never   Vaping Use    Vaping status: Never Used   Substance and Sexual Activity    Alcohol use: Not Currently     Comment: Stopped alcohol 2000    Drug use: No    Sexual activity: Yes     Partners: Female   Other Topics Concern    Caffeine Concern Yes     Comment: coffee in AM/ soda     Exercise Yes     Comment: X1-2 times per week      Social Determinants of Health     Financial Resource Strain: Low Risk  (2024)    Financial Resource Strain     Difficulty of Paying Living Expenses: Not hard at all     Med Affordability: No   Food Insecurity: No Food Insecurity (2024)    Food Insecurity     Food Insecurity: Never true   Transportation Needs: No Transportation Needs (2024)    Transportation Needs     Lack of Transportation: No   Housing Stability: Low Risk  (2024)    Housing Stability     Housing Instability: No              Review of Systems    Positive for stated Chief Complaint: Abdominal Pain    Other systems are as noted in HPI.  Constitutional and vital signs reviewed.      All other systems reviewed and negative except as noted above.    Physical Exam     ED Triage Vitals [24 0807]   /68   Pulse 87   Resp 16   Temp 98.2 °F (36.8 °C)   Temp src Oral   SpO2 97 %   O2 Device None (Room air)       Current Vitals:   Vital Signs  BP: 140/72  Pulse: 75  Resp: 16  Temp: 98.2 °F (36.8  °C)  Temp src: Oral    Oxygen Therapy  SpO2: 97 %  O2 Device: None (Room air)            Physical Exam    General: Alert and oriented. No acute distress.  HEENT: Normocephalic. No evidence of trauma. Extraocular movements are intact.  Cardiovascular exam: Regular rate and rhythm  Lungs: Clear to auscultation bilaterally.  Abdomen: Soft, nondistended, nontender.  Extremities: No evidence of deformity. No clubbing or cyanosis.  Neuro: No focal deficit is noted.    ED Course     Labs Reviewed   POCT ISTAT CHEM8 CARTRIDGE - Abnormal; Notable for the following components:       Result Value    ISTAT Glucose 122 (*)     All other components within normal limits   POCT CBC   History is provided by the patient  Differential diagnosis includes renal colic versus pyelonephritis versus biliary colic versus diverticulitis versus appendicitis versus colitis versus bowel obstruction versus adhesions   past medical history significant for bowel resection secondary to acute diverticulitis, morbid obesity hypertension sleep apnea  Surgical history includes a colostomy reversal 6 weeks ago  Patient's medical chart was reviewed.  A CT scan from April 6, 2024 showed no evidence of any renal stones at that time:    CONCLUSION:      KIDNEYS: No radiopaque renal calculus or hydronephrosis     Acute sigmoid diverticulitis has progressed since 4/2/2024.      Colitis involving the splenic flexure and proximal descending colon has also progressed.      Extensive inflammation within the left upper quadrant with thickening of the left lateral conal fascia has also progressed.      Ill-defined free fluid within the right lateral conal fascia and right lower pelvis has progressed.  No abscess or drainable collection seen at this point.      If not recently performed, following resolution of patient's symptoms a colonoscopy should be performed to exclude an underlying mass.      No pneumoperitoneum.      Multiple other incidental findings as  described in the body of the report which are unchanged.             Dictated by (CST): Daniel River MD on 4/06/2024 at 1:13 PM       Finalized by (CST): Daniel River MD on 4/06/2024 at 1:18         Patient was brought back to the examination room immediately.  The patient was then placed on a cardiac monitor and pulse oximetry was applied.  Patient had an IV established and labs were drawn.    The patient was administered [Toradol 30 mg IV] medications for the purposes of treating  [abdominal pain].  The patient had good response to these medications.    Patient continued to be observed here in the emergency department.  Patient remained stable throughout the emergency department observation period.    Review of patient's lab work shows CBC and chemistry panel within normal limits.    CT abdomen pelvis with IV contrast shows area of fat necrosis in the anterior right abdomen and omental infarct in the area of the liver.      CT ABDOMEN+PELVIS(CONTRAST ONLY)(CPT=74177) (Final result)  Result time 08/30/24 09:23:11  Final result by Sebastián Zapien MD (08/30/24 09:23:11)                Impression:    CONCLUSION:    1. Areas of stranding with central fat density in the right anterior abdomen and within the pelvis likely represents areas of fat necrosis.  The fat containing lesion in the right anterior abdomen may also represent an area of omental infarct.  Clinical  correlation is advised.                    General surgery was consulted with Dr. Novoa who responded through [facility] that he is not on-call.  I then paged Dr. Falcon who is listed on [facility] as the on-call surgeon.  I was called back by the physician assistant for Dr. Serge Qureshi.    Findings  of the patient's tests results were discussed with the patient in detail.  They were understanding of these results and their discharge instructions.  All questions were answered.         MDM   Patient was screened and evaluated during this visit.   As a  treating physician attending to the patient, I determined, within reasonable clinical confidence and prior to discharge, that an emergency medical condition was not or was no longer present.  There was no indication for further evaluation, treatment or admission on an emergency basis.  Comprehensive verbal and written discharge and follow-up instructions were provided to help prevent relapse or worsening.  Patient was instructed to follow-up with her primary care provider for further evaluation and treatment, but to return immediately to the ER for worsening, concerning, new, changing or persisting symptoms.  I discussed the case with the patient and they had no questions, complaints, or concerns.  Patient felt comfortable going home.    ^^Please note that this report has been produced using speech recognition software and may contain errors related to that system including, but not limited to, errors in grammar, punctuation, and spelling, as well as words and phrases that possibly may have been recognized inappropriately.  If there are any questions or concerns, contact the dictating provider for clarification                                   Medical Decision Making      Disposition and Plan     Clinical Impression:  1. Abdominal pain, acute         Disposition:  Discharge  8/30/2024 10:40 am    Follow-up:  Rick Novoa MD  [facility] [address] [facility] [address]  [facility] [place] [address]  [phone]                Medications Prescribed:  Current Discharge Medication List        START taking these medications    Details   HYDROcodone-acetaminophen 5-325 MG Oral Tab Take 1-2 tablets by mouth every 6 (six) hours as needed.  Qty: 10 tablet, Refills: 0    Associated Diagnoses: Abdominal pain, acute

## 2024-09-03 ENCOUNTER — OFFICE VISIT (OUTPATIENT)
Dept: SURGERY | Facility: CLINIC | Age: 59
End: 2024-09-03
Payer: COMMERCIAL

## 2024-09-03 VITALS — WEIGHT: 245 LBS | HEIGHT: 75 IN | BODY MASS INDEX: 30.46 KG/M2

## 2024-09-03 DIAGNOSIS — R10.31 RIGHT LOWER QUADRANT ABDOMINAL PAIN: Primary | ICD-10-CM

## 2024-09-03 PROCEDURE — 99212 OFFICE O/P EST SF 10 MIN: CPT | Performed by: SURGERY

## 2024-09-03 NOTE — PROGRESS NOTES
2024 - 2024  : 1965  Age: 58 years  [facility]  [address]  [address]  [place], [address]  Phone: [phone]  Compliance Report  Usage 2024 - 2024  Usage days 87/90 days (97%)  >= 4 hours 85 days (94%)  < 4 hours 2 days (2%)  Usage hours 632 hours 14 minutes  Average usage (total days) 7 hours 1 minutes  Average usage (days used) 7 hours 16 minutes  Median usage (days used) 7 hours 30 minutes  Total used hours (value since last reset - 2024) 2,231 hours  AirSense 11 AutoSet  Serial number [id number]  Mode AutoSet  Min Pressure 9 cmH2O  Max Pressure 20 cmH2O  EPR Fulltime  EPR level 2  Response Standard  Therapy  Pressure - cmH2O Median: 13.2 95th percentile: 19.3 Maximum: 19.8  Leaks - L/min Median: 7.0 95th percentile: 55.5 Maximum: 88.2  Events per hour AI: 8.0 HI: 2.6 AHI: 10.6  Apnea Index Central: 0.2 Obstructive: 3.4 Unknown: 4.3  RERA Index 2.1  Cheyne-Andrea respiration (average duration per night) 1 minutes (0%)    This is a 58 year old male who presents with the following symptoms, risk factors, behaviors or other items associated with sleep problems.    Sleep Apnea:   current CPAP use  Insomnia:  no symptoms of Insomnia  Restless Leg:  moving leges helps relieve discomfort  Parasomnias:   no symptoms of parasomnias  Daytime Problems:  no symptoms of daytime problems    The patient's [place] Sleepiness score is 2/24.

## 2024-09-03 NOTE — PROGRESS NOTES
Chief Complaint   Patient presents with    Consult     Patient here for 6 wks post op Ostomy reversal in July 2024.  Patient is having Right side abdominal pain. Patient reports he went to UC for the pain and he was ordered CT.  Patient would like to talk about the results.        Seen in UC over weekend for abdominal discomfort.  CT showed non specific omental inflammation.  Stool in right colon.    O:  Ht 6' 3\" (1.905 m)   Wt 245 lb (111.1 kg)   BMI 30.62 kg/m²   GEN:  No acute distress  Abd:  Soft, NTND, incision C/D/I      Assessment   1. Right upper and lower quadrant abdominal pain        Etiology of abdominal discomfort unclear but nothing dangerous seen on CT.  Pt reassured.  F/u prn.         Rick Novoa MD

## 2024-09-04 ENCOUNTER — OFFICE VISIT (OUTPATIENT)
Facility: CLINIC | Age: 59
End: 2024-09-04
Payer: COMMERCIAL

## 2024-09-04 VITALS
SYSTOLIC BLOOD PRESSURE: 120 MMHG | HEART RATE: 76 BPM | RESPIRATION RATE: 16 BRPM | TEMPERATURE: 99 F | DIASTOLIC BLOOD PRESSURE: 70 MMHG | BODY MASS INDEX: 32.45 KG/M2 | HEIGHT: 75 IN | OXYGEN SATURATION: 98 % | WEIGHT: 261 LBS

## 2024-09-04 DIAGNOSIS — G47.19 DAYTIME HYPERSOMNOLENCE: ICD-10-CM

## 2024-09-04 DIAGNOSIS — G47.33 OSA (OBSTRUCTIVE SLEEP APNEA): Primary | ICD-10-CM

## 2024-09-04 PROCEDURE — 99214 OFFICE O/P EST MOD 30 MIN: CPT | Performed by: INTERNAL MEDICINE

## 2024-09-04 NOTE — PROGRESS NOTES
Pulmonary/Critical Care/Sleep Medicine   Progress Note     PCP: Stephen Christianson MD   Phone: [phone]          HPI   I had the pleasure of seeing Don Cabral who is a pleasant 58 year old male who presents for follow up of obstructive Sleep apnea after visit on 6/12/2024    Since last visit the patient states that he colostomy  reversed in mid July 2024. More recently he c/o right flank pain since 8/5/2024     The patient reports that he is using auto CPAP daily at night at 9-20  cmH2O regularly throughout the night for about 6 hours and states that the  PAP machine is quiet and has a heated humidifier.          He drinks no caffeine beverages      He has pets one dog that does not sleep in bed.      Hx of tobacco use: He  reports that he quit smoking about 24 years ago. His smoking use included cigarettes. He started smoking about 43 years ago. He has a 9.5 pack-year smoking history. He has never been exposed to tobacco smoke. He has never used smokeless tobacco.    Past Medical History:    Allergic rhinitis    Anxiety    Arrhythmia    T1 AV block, LBBB    Back pain    Back problem    lower back    Depression    Diverticulitis    ED (erectile dysfunction)    High blood pressure    History of adverse reaction to anesthesia    Patient states he doesnt know exactly what happened but stated he required haldol post anesthesia    Pneumonia due to organism    Seasonal allergies    Shortness of breath    Sleep apnea    CPAP    Unspecified essential hypertension    Visual impairment    glasses      Past Surgical History:   Procedure Laterality Date    Cholecystectomy  3/12/2023    Colonoscopy N/A 03/22/2016    [facility].  Hyperplastic polyp only.  Repeat 2026    Colonoscopy  05/10/2023    Dental surgery procedure      [place] teeth removed    Incision/drain abscess extra  1996    Knee arthroscopy Right     Laparoscopic cholecystectomy  02/12/2023    Myringoplasty      Other surgical history      R knee arthroscope     Other surgical history  07/17/2024    Exploratory laparotomy partial colectomy with colorectal anastamosis, extensive adhesiolysis    Part removal colon w end colostomy      Skin tissue procedure unlisted      Tonsillectomy       Allergies   Allergen Reactions    Ciprofloxacin OTHER (SEE COMMENTS)     Increases risk for arrhythmia     Current Outpatient Medications   Medication Sig Dispense Refill    HYDROcodone-acetaminophen 5-325 MG Oral Tab Take 1-2 tablets by mouth every 6 (six) hours as needed. 10 tablet 0    gabapentin 600 MG Oral Tab Take 1 tablet (600 mg total) by mouth 3 (three) times daily. 270 tablet 0    clonazePAM 0.5 MG Oral Tab Take 1 tablet (0.5 mg total) by mouth 3 (three) times daily. (Patient taking differently: 1 tablet by mouth in the morning 1 tablet by mouth in the afternoon and 1/2 tablet by mouth in the evening.) 90 tablet 2    OLANZapine (ZYPREXA) 2.5 MG Oral Tab Take 1 tablet (2.5 mg total) by mouth nightly. 90 tablet 0    sertraline (ZOLOFT) 50 MG Oral Tab 1.5 tablets daily 135 tablet 0    lisinopril 20 MG Oral Tab Take 1 tablet (20 mg total) by mouth daily. 90 tablet 3    acidophilus-pectin Oral Cap Take 1 capsule by mouth 3 (three) times a week.      verapamil  MG Oral Tab CR Take 1 tablet (120 mg total) by mouth nightly. 30 tablet 3    Multiple Vitamins-Minerals (MULTI-VITAMIN/MINERALS) Oral Tab Take 1 tablet by mouth daily.      Albuterol Sulfate  (90 Base) MCG/ACT Inhalation Aero Soln Inhale 2 puffs into the lungs every 4 (four) hours as needed for Wheezing or Shortness of Breath. 1 each 3    Tadalafil 20 MG Oral Tab Take 1 tablet (20 mg total) by mouth daily as needed for Erectile Dysfunction. 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[employer]   Tobacco Use    Smoking status: Former     Current packs/day: 0.00     Average packs/day: 0.5 packs/day for 19.0 years (9.5 ttl  pk-yrs)     Types: Cigarettes     Start date: 1981     Quit date: 2000     Years since quittin.1     Passive exposure: Never    Smokeless tobacco: Never   Vaping Use    Vaping status: Never Used   Substance and Sexual Activity    Alcohol use: Not Currently     Comment: Stopped alcohol 2000    Drug use: No    Sexual activity: Yes     Partners: Female   Other Topics Concern    Caffeine Concern Yes     Comment: coffee in AM/ soda     Exercise Yes     Comment: X1-2 times per week      Social Determinants of Health     Financial Resource Strain: Low Risk  (2024)    Financial Resource Strain     Difficulty of Paying Living Expenses: Not hard at all     Med Affordability: No   Food Insecurity: No Food Insecurity (2024)    Food Insecurity     Food Insecurity: Never true   Transportation Needs: No Transportation Needs (2024)    Transportation Needs     Lack of Transportation: No   Housing Stability: Low Risk  (2024)    Housing Stability     Housing Instability: No      Immunization History   Administered Date(s) Administered    >=3 YRS TRI  MULTIDOSE VIAL (59743) FLU CLINIC 2014    Covid-19 Vaccine Moderna 100 mcg/0.5 ml 2021, 2021, 2021    Covid-19 Vaccine Moderna 50 Mcg/0.25 Ml 2022    Covid-19 Vaccine Moderna Bivalent 50mcg/0.5mL 12+ years 2022    FLU VAC QIV SPLIT 3 YRS AND OLDER (31921) 2016, 2018, 2020, 11/10/2021    FLUZONE 6 months and older PFS 0.5 ml (98791) 10/14/2022, 2023    HEP A 07/10/2008, 2009    Pfizer Covid-19 Vaccine 30mcg/0.3ml 12yrs+ 2023    Pneumococcal (Prevnar 13) 2018    Pneumovax 23 2019    TDAP 2014    Typhoid, Oral 2018, 05/15/2018    Zoster Vaccine Recombinant Adjuvanted (Shingrix) 2019, 2020      Family History   Problem Relation Age of Onset    Heart Disorder Father         Ablation required    Hypertension Father     Cancer Father 60        Prostate     Heart Disorder Mother         murmur no treatment needed    Hypertension Mother     Stroke Maternal Grandmother     Other (stroke) Maternal Grandmother     Dementia Paternal Grandmother     Heart Disorder Paternal Grandfather         two heart attacks and CHF    Diabetes Paternal Grandfather     Other (Diabetes, type 2) Paternal Grandfather         Review of Systems   Constitutional:  Positive for fatigue. Negative for diaphoresis, fever and unexpected weight change.   HENT:  Negative for congestion, mouth sores, nosebleeds, postnasal drip, rhinorrhea, sore throat and trouble swallowing.    Eyes:  Negative for visual disturbance.   Respiratory:  Negative for apnea, cough, choking, chest tightness, shortness of breath and wheezing.    Cardiovascular:  Negative for chest pain, palpitations and leg swelling.   Gastrointestinal:  Negative for abdominal pain, constipation, diarrhea, nausea and vomiting.   Genitourinary:  Negative for difficulty urinating.   Musculoskeletal:  Negative for arthralgias, back pain, gait problem and myalgias.   Neurological:  Negative for dizziness, weakness and headaches.   Psychiatric/Behavioral:  Negative for sleep disturbance.      Vitals:    09/04/24 1522   BP: 120/70   Pulse: 76   Resp: 16   Temp: 98.5 °F (36.9 °C)      Body mass index is 32.62 kg/m².     Physical Exam  Constitutional:       General: He is not in acute distress.     Appearance: Normal appearance. He is obese. He is not ill-appearing or diaphoretic.   HENT:      Head: Normocephalic and atraumatic.      Nose: Nose normal. No congestion or rhinorrhea.      Comments: Narrow edematous nares      Mouth/Throat:      Mouth: Mucous membranes are moist.      Pharynx: Oropharynx is clear. No oropharyngeal exudate or posterior oropharyngeal erythema.      Comments: Mallampati class III palate   Eyes:      Extraocular Movements: Extraocular movements intact.      Pupils: Pupils are equal, round, and reactive to light.    Cardiovascular:      Rate and Rhythm: Normal rate.      Pulses: Normal pulses.      Heart sounds: Normal heart sounds. No murmur heard.  Pulmonary:      Effort: Pulmonary effort is normal. No respiratory distress.      Breath sounds: Normal breath sounds. No wheezing or rhonchi.   Chest:      Chest wall: No tenderness.   Abdominal:      General: Abdomen is flat. Bowel sounds are normal.      Palpations: Abdomen is soft.   Musculoskeletal:         General: Normal range of motion.   Skin:     General: Skin is warm.   Neurological:      General: No focal deficit present.      Mental Status: He is alert and oriented to person, place, and time.   Psychiatric:         Mood and Affect: Mood normal.         Behavior: Behavior normal.         Thought Content: Thought content normal.         Judgment: Judgment normal.              Labs:  Personally Reviewed in epic      Imaging:  Personally Reviewed in epic       Study 9/27/2023 Type: Diagnostic   Procedure: The Polysomnogram was performed with a sleep technologist in attendance at the [facility]. The following parameters were monitored: central, occipital and temporal EEG, EOG, submentalis EMG, nasal flow, nasal pressure, respiratory inductance plethysmography and oxygen saturation via continuous pulse oximetry. Sleep stages, periodic limb movements and EEG arousals were scored in accordance with the American Academy of Sleep Medicine Manual for the Scoring of Sleep and Associated Events. Apnea Hypopnea Index was calculated using the recommended definition of hypopnea for scoring respiratory events. Data acquisition, collection and scoring have been validated and clinically correlated.   Sleep History: CHAYITO MEDEIROS is a 57 year old Male referred by Dr. Keegan Zaidi for the evaluation of suspected sleep disordered breathing.   Past Medical History is pertinent for rhinitis, anxiety, back pain, depression, high blood pressure, pneumonia.   At the time of  the study, the patient was prescribed the following medications: clonazepam, multivitamin HCTZ Zoloft, gabapentin.   The patient was studied from 09:45:52 PM to 05:37:27 AM, with a total recording time of 471.6, total sleep time of 350.0 and a sleep efficiency of 74.2%. Wake after sleep onset was 98.0 minutes. The percentage of total sleep time by sleep stage is as follows: Stage 1 1.6%, Stage 2 81.9%, Stage 3 8.3% and Stage REM 8.3%.   Respiratory Analysis: The Apnea-Hypopnea Index (AHI) was 8.4 events per hour. REM related AHI was 10.3 events per hour. Supine related AHI was 45.0. Lateral related AHI was 7.5. The Central Apnea Index was 0. The oxygen saturation gwen was 80.0% and patient spent 47.7% with saturations less than 88%.   Snoring Profile: Snoring was moderate.   Cardiac Profile: Electrocardiogram demonstrated normal sinus rhythm.   EEG Profile: Based on the limited recording montage, there were no significant EEG abnormalities noted. There were 77 arousals, with a total arousal index of 13.2.   Periodic Limb Movements: PLM index of 1.4. PLM arousal index of 0.5.   IMPRESSIONS: This study demonstrates mild obstructive sleep apnea.   Diagnosis: Obstructive Sleep Apnea G47.33   RECOMMENDATIONS:   1. It is recommended to proceed with CPAP titration.   2. The patient should avoid alcohol and sedative medications, as these may worsen severity of symptoms.   3. The patient should avoid drowsy driving.   4. Patient to follow up with a sleep specialist in the pulmonary clinic.     Thank you for allowing me to participate in this patient's care. Please do not hesitate to contact me with any additional questions.   Dictated by: Dr. Zaidi   Electronically signed by Keegan Zaidi MD   (Electronic Signature)   Board Certified in Sleep Medicine     This is a 58 year old male who presents with the following symptoms, risk factors, behaviors or other items associated with sleep problems.     Sleep Apnea:   family  member with sleep apnea; previous sleep study; previous CPAP use  Insomnia:  no symptoms of Insomnia  Restless Leg:  urge to move is worse when seated or lying  Parasomnias:   no symptoms of parasomnias  Daytime Problems:  previous sleep study     The patient's Miranda Sleepiness score is .     2024 - 2024  : 1965  Age: 58 years  [facility]  [address]  [place], [address]  Phone: [phone]  Compliance Report  Usage 2024 - 2024  Usage days 87/90 days (97%)  >= 4 hours 85 days (94%)  < 4 hours 2 days (2%)  Usage hours 632 hours 14 minutes  Average usage (total days) 7 hours 1 minutes  Average usage (days used) 7 hours 16 minutes  Median usage (days used) 7 hours 30 minutes  Total used hours (value since last reset - 2024) 2,231 hours  AirSense 11 AutoSet  Serial number [id number]  Mode AutoSet  Min Pressure 9 cmH2O  Max Pressure 20 cmH2O  EPR Fulltime  EPR level 2  Response Standard  Therapy  Pressure - cmH2O Median: 13.2 95th percentile: 19.3 Maximum: 19.8  Leaks - L/min Median: 7.0 95th percentile: 55.5 Maximum: 88.2  Events per hour AI: 8.0 HI: 2.6 AHI: 10.6  Apnea Index Central: 0.2 Obstructive: 3.4 Unknown: 4.3  RERA Index 2.1  Cheyne-Andrea respiration (average duration per night) 1 minutes (0%)          Impression:  Obstructive sleep apnea syndrome: attended sleep study on 2024 showed Apnea-Hypopnea Index (AHI) was 8.4 events per hour. REM related AHI was 10.3 events per hour. Supine related AHI was 45.0. Lateral related AHI was 7.5. The Central Apnea Index was 0. The oxygen saturation gwen was 80.0% and patient spent 47.7% with saturations less than 88%. Treated with auto CPAP at 9-20 cwp Download data on 2024 for 90  days shows elevated obstructive AHI 10.8 though patient has adequate compliance suggesting auto CPAP failure. The patent also notes air leak 55.5 from the mask.   Recent left lower lobe community-acquired  pneumonia 6/26/2023   Allergic rhinitis  Anxiety disorder  Anemia   Depression  Hypertension  Obesity, class   Hx sigmoiditis with bowel resection /so colostomy                               Plan:    Schedule CPAP titration sleep study to determine best fitting mask and CPAP/BiPAP settings  Continue current auto CPAP 9-20 as patient is using and benefiting from CPAP use  Advised about weight loss   Advised against drowsy driving and to avoid alcoholic beverage and respiratory depressants as these may worsen sleep apnea        Follow-up: 3 months    Thank you for allowing me to participate in your patient care.    BANDAR Zaidi MD, FACP, FCCP, FAASM - Pulmonary/Critical care/Sleep Medicine  Please contact our office if you have any questions or concerns at [phone]

## 2024-09-20 ENCOUNTER — PATIENT MESSAGE (OUTPATIENT)
Dept: FAMILY MEDICINE CLINIC | Facility: CLINIC | Age: 59
End: 2024-09-20

## 2024-09-20 NOTE — TELEPHONE ENCOUNTER
From: Don Cabral  To: Stephen Christianson  Sent: 9/20/2024 12:15 PM CDT  Subject: PACHECO Cabral COVID vaccination info    Hello, could you please update my COVID history with the following dose, received today. I also got the seasonal flu shot at the same time. Thanks!     Pfizer  Lot# [id number] Exp. 6/11/25  Location:   [facility]  [address] [place] [address]

## 2024-09-23 ENCOUNTER — OFFICE VISIT (OUTPATIENT)
Dept: FAMILY MEDICINE CLINIC | Facility: CLINIC | Age: 59
End: 2024-09-23
Payer: COMMERCIAL

## 2024-09-23 VITALS
HEIGHT: 75 IN | BODY MASS INDEX: 33.32 KG/M2 | OXYGEN SATURATION: 98 % | RESPIRATION RATE: 16 BRPM | SYSTOLIC BLOOD PRESSURE: 116 MMHG | WEIGHT: 268 LBS | DIASTOLIC BLOOD PRESSURE: 76 MMHG | HEART RATE: 96 BPM

## 2024-09-23 DIAGNOSIS — K57.32 SIGMOID DIVERTICULITIS: ICD-10-CM

## 2024-09-23 DIAGNOSIS — I10 ESSENTIAL HYPERTENSION: Primary | ICD-10-CM

## 2024-09-23 DIAGNOSIS — Z09 HOSPITAL DISCHARGE FOLLOW-UP: ICD-10-CM

## 2024-09-23 PROCEDURE — 99212 OFFICE O/P EST SF 10 MIN: CPT | Performed by: FAMILY MEDICINE

## 2024-09-23 NOTE — PROGRESS NOTES
[facility] Progress Note    SUBJECTIVE: Don Cabral 58 year old male is here today for   Chief Complaint   Patient presents with    Heart Problem     F/u from cardiology         Followed with Dr. Mars today. No signs of a-fib, pvcs now resolving. Decided to keep up with the verapamil.    Was having right flank pain but that has resolved,    Had imaging and didn't see anything dangerous.    Testicular pain has resolved with second surgery, the above started and now resovled after surgery.    Having regular bowel movements.    Has gained weight, has been less active after the surgery. Now starting to exercise and lift    Will walk a mile with wife and/or his dog.    Tries to break a sweat daily.    Appetite is back as well,     Has started tapering his clonazepam down to 0.25 TID prn.    Has a titration sleep study planned tomorrow night.          PMH  Past Medical History:    Allergic rhinitis    Anxiety    Arrhythmia    T1 AV block, LBBB    Back pain    Back problem    lower back    Depression    Diverticulitis    ED (erectile dysfunction)    High blood pressure    History of adverse reaction to anesthesia    Patient states he doesnt know exactly what happened but stated he required haldol post anesthesia    Pneumonia due to organism    Seasonal allergies    Shortness of breath    Sleep apnea    CPAP    Unspecified essential hypertension    Visual impairment    glasses        PSH  Past Surgical History:   Procedure Laterality Date    Cholecystectomy  3/12/2023    Colonoscopy N/A 03/22/2016    [facility].  Hyperplastic polyp only.  Repeat 2026    Colonoscopy  05/10/2023    Dental surgery procedure      [place] teeth removed    Incision/drain abscess extra  1996    Knee arthroscopy Right     Laparoscopic cholecystectomy  02/12/2023    Myringoplasty      Other surgical history      R knee arthroscope    Other surgical history  07/17/2024    Exploratory laparotomy partial colectomy with colorectal  anastamosis, extensive adhesiolysis    Part removal colon w end colostomy      Skin tissue procedure unlisted      Tonsillectomy          Social Hx:  See hpi    ROS  See HPI    OBJECTIVE:  /76   Pulse 96   Resp 16   Ht 6' 3\" (1.905 m)   Wt 268 lb (121.6 kg)   SpO2 98%   BMI 33.50 kg/m²     Exam  Skin: incision sites healed well      Labs:          Meds:   Current Outpatient Medications   Medication Sig Dispense Refill    gabapentin 600 MG Oral Tab Take 1 tablet (600 mg total) by mouth 3 (three) times daily. 270 tablet 0    clonazePAM 0.5 MG Oral Tab Take 1 tablet (0.5 mg total) by mouth 3 (three) times daily. (Patient taking differently: 1 tablet by mouth in the morning 1 tablet by mouth in the afternoon and 1/2 tablet by mouth in the evening.) 90 tablet 2    OLANZapine (ZYPREXA) 2.5 MG Oral Tab Take 1 tablet (2.5 mg total) by mouth nightly. 90 tablet 0    sertraline (ZOLOFT) 50 MG Oral Tab 1.5 tablets daily 135 tablet 0    lisinopril 20 MG Oral Tab Take 1 tablet (20 mg total) by mouth daily. 90 tablet 3    verapamil  MG Oral Tab CR Take 1 tablet (120 mg total) by mouth nightly. 30 tablet 3    Multiple Vitamins-Minerals (MULTI-VITAMIN/MINERALS) Oral Tab Take 1 tablet by mouth daily.      Albuterol Sulfate  (90 Base) MCG/ACT Inhalation Aero Soln Inhale 2 puffs into the lungs every 4 (four) hours as needed for Wheezing or Shortness of Breath. 1 each 3    Tadalafil 20 MG Oral Tab Take 1 tablet (20 mg total) by mouth daily as needed for Erectile Dysfunction. 8 tablet 5         Assessment/Plan  Don was seen today for heart problem.    Diagnoses and all orders for this visit:    Essential hypertension    Sigmoid diverticulitis    Hospital discharge follow-up         Doing well, no concerns at this time. Fully recovered, stable on medications.         Total Time spent with patient and coordinating care:  15 minutes.    Follow up: as needed      Stephen Christianson MD

## 2024-09-24 ENCOUNTER — OFFICE VISIT (OUTPATIENT)
Dept: SLEEP CENTER | Age: 59
End: 2024-09-24
Attending: INTERNAL MEDICINE
Payer: COMMERCIAL

## 2024-09-24 DIAGNOSIS — G47.33 OSA (OBSTRUCTIVE SLEEP APNEA): ICD-10-CM

## 2024-09-24 DIAGNOSIS — G47.19 DAYTIME HYPERSOMNOLENCE: ICD-10-CM

## 2024-09-24 PROCEDURE — 95811 POLYSOM 6/>YRS CPAP 4/> PARM: CPT

## 2024-09-27 ENCOUNTER — TELEPHONE (OUTPATIENT)
Facility: CLINIC | Age: 59
End: 2024-09-27

## 2024-09-27 NOTE — TELEPHONE ENCOUNTER
IMPRESSIONS: This study demonstrates inadequate CPAP titration with persistent events .   Diagnosis: Obstructive Sleep Apnea G47.33   RECOMMENDATIONS:   1. The patient should be prescribed Bilevel at 26/22 cm H2O, with humidity at 4 and EPR off.   2. The patient was fitted with a [employer] Vitera mask, size Large. If with persistently elevated AHI on BiPAP consider auto BiPAP   3. The patient should avoid alcohol and sedative medications, as these may worsen severity of symptoms.   4. The patient should avoid drowsy driving.   5. Patient may follow up with a sleep specialist in clinic.     Nurse s/w Dr. Zaidi, verbal order given, adjust bilevel setting to 25/22.  Noted.    Detailed hereO message sent to pt, bi-pap device ordered to [facility] via [place].  DME will verify insurance and once approved will contact pt to arrange delivery date/time.  Per hereO message, pt instructed to follow up with Dr. ZAIDI once he starts using his device, per insurance compliance requirement.  Provided call back numbers.    [phone] ([place])

## 2024-10-01 ENCOUNTER — HOSPITAL ENCOUNTER (OUTPATIENT)
Dept: CT IMAGING | Age: 59
Discharge: HOME | End: 2024-10-01
Attending: INTERNAL MEDICINE
Payer: COMMERCIAL

## 2024-10-01 DIAGNOSIS — R91.8 MULTIPLE PULMONARY NODULES: ICD-10-CM

## 2024-10-01 DIAGNOSIS — R59.0 THORACIC LYMPHADENOPATHY: ICD-10-CM

## 2024-10-01 PROCEDURE — 71260 CT THORAX DX C+: CPT | Performed by: INTERNAL MEDICINE

## 2024-10-04 ENCOUNTER — SLEEP STUDY (OUTPATIENT)
Facility: CLINIC | Age: 59
End: 2024-10-04
Payer: COMMERCIAL

## 2024-10-04 DIAGNOSIS — G47.33 OBSTRUCTIVE SLEEP APNEA: Primary | ICD-10-CM

## 2024-10-04 PROCEDURE — 95811 POLYSOM 6/>YRS CPAP 4/> PARM: CPT | Performed by: INTERNAL MEDICINE

## 2024-10-08 ENCOUNTER — APPOINTMENT (OUTPATIENT)
Dept: GENERAL RADIOLOGY | Facility: HOSPITAL | Age: 59
End: 2024-10-08
Attending: EMERGENCY MEDICINE
Payer: COMMERCIAL

## 2024-10-08 ENCOUNTER — HOSPITAL ENCOUNTER (EMERGENCY)
Facility: HOSPITAL | Age: 59
Discharge: HOME | End: 2024-10-08
Attending: EMERGENCY MEDICINE
Payer: COMMERCIAL

## 2024-10-08 VITALS
TEMPERATURE: 98 F | BODY MASS INDEX: 33.37 KG/M2 | HEART RATE: 64 BPM | OXYGEN SATURATION: 95 % | DIASTOLIC BLOOD PRESSURE: 71 MMHG | HEIGHT: 74 IN | RESPIRATION RATE: 18 BRPM | WEIGHT: 260 LBS | SYSTOLIC BLOOD PRESSURE: 137 MMHG

## 2024-10-08 DIAGNOSIS — R00.2 PALPITATIONS: Primary | ICD-10-CM

## 2024-10-08 LAB
ALBUMIN SERPL-MCNC: 4.6 G/DL (ref 3.2–4.8)
ALBUMIN/GLOB SERPL: 1.5 {RATIO} (ref 1–2)
ALP LIVER SERPL-CCNC: 83 U/L
ALT SERPL-CCNC: 14 U/L
ANION GAP SERPL CALC-SCNC: 4 MMOL/L (ref 0–18)
AST SERPL-CCNC: 13 U/L (ref ?–34)
BASOPHILS # BLD AUTO: 0.01 X10(3) UL (ref 0–0.2)
BASOPHILS NFR BLD AUTO: 0.1 %
BILIRUB SERPL-MCNC: 1 MG/DL (ref 0.3–1.2)
BUN BLD-MCNC: 13 MG/DL (ref 9–23)
CALCIUM BLD-MCNC: 9.9 MG/DL (ref 8.7–10.4)
CHLORIDE SERPL-SCNC: 102 MMOL/L (ref 98–112)
CO2 SERPL-SCNC: 29 MMOL/L (ref 21–32)
CREAT BLD-MCNC: 0.89 MG/DL
EGFRCR SERPLBLD CKD-EPI 2021: 99 ML/MIN/1.73M2 (ref 60–?)
EOSINOPHIL # BLD AUTO: 0.11 X10(3) UL (ref 0–0.7)
EOSINOPHIL NFR BLD AUTO: 1 %
ERYTHROCYTE [DISTWIDTH] IN BLOOD BY AUTOMATED COUNT: 14.6 %
GLOBULIN PLAS-MCNC: 3.1 G/DL (ref 2–3.5)
GLUCOSE BLD-MCNC: 120 MG/DL (ref 70–99)
HCT VFR BLD AUTO: 41.9 %
HGB BLD-MCNC: 13.6 G/DL
IMM GRANULOCYTES # BLD AUTO: 0.03 X10(3) UL (ref 0–1)
IMM GRANULOCYTES NFR BLD: 0.3 %
LYMPHOCYTES # BLD AUTO: 2.98 X10(3) UL (ref 1–4)
LYMPHOCYTES NFR BLD AUTO: 26.5 %
MCH RBC QN AUTO: 26.4 PG (ref 26–34)
MCHC RBC AUTO-ENTMCNC: 32.5 G/DL (ref 31–37)
MCV RBC AUTO: 81.4 FL
MONOCYTES # BLD AUTO: 0.75 X10(3) UL (ref 0.1–1)
MONOCYTES NFR BLD AUTO: 6.7 %
NEUTROPHILS # BLD AUTO: 7.35 X10 (3) UL (ref 1.5–7.7)
NEUTROPHILS # BLD AUTO: 7.35 X10(3) UL (ref 1.5–7.7)
NEUTROPHILS NFR BLD AUTO: 65.4 %
OSMOLALITY SERPL CALC.SUM OF ELEC: 281 MOSM/KG (ref 275–295)
PLATELET # BLD AUTO: 378 10(3)UL (ref 150–450)
POTASSIUM SERPL-SCNC: 3.8 MMOL/L (ref 3.5–5.1)
PROT SERPL-MCNC: 7.7 G/DL (ref 5.7–8.2)
RBC # BLD AUTO: 5.15 X10(6)UL
SODIUM SERPL-SCNC: 135 MMOL/L (ref 136–145)
TROPONIN I SERPL HS-MCNC: 4 NG/L
WBC # BLD AUTO: 11.2 X10(3) UL (ref 4–11)

## 2024-10-08 PROCEDURE — 93010 ELECTROCARDIOGRAM REPORT: CPT

## 2024-10-08 PROCEDURE — 85025 COMPLETE CBC W/AUTO DIFF WBC: CPT | Performed by: EMERGENCY MEDICINE

## 2024-10-08 PROCEDURE — 80053 COMPREHEN METABOLIC PANEL: CPT | Performed by: EMERGENCY MEDICINE

## 2024-10-08 PROCEDURE — 36415 COLL VENOUS BLD VENIPUNCTURE: CPT

## 2024-10-08 PROCEDURE — 99284 EMERGENCY DEPT VISIT MOD MDM: CPT

## 2024-10-08 PROCEDURE — 84484 ASSAY OF TROPONIN QUANT: CPT | Performed by: EMERGENCY MEDICINE

## 2024-10-08 PROCEDURE — 93005 ELECTROCARDIOGRAM TRACING: CPT

## 2024-10-08 PROCEDURE — 80053 COMPREHEN METABOLIC PANEL: CPT

## 2024-10-08 PROCEDURE — 84484 ASSAY OF TROPONIN QUANT: CPT

## 2024-10-08 PROCEDURE — 85025 COMPLETE CBC W/AUTO DIFF WBC: CPT

## 2024-10-08 PROCEDURE — 99285 EMERGENCY DEPT VISIT HI MDM: CPT

## 2024-10-09 LAB
ATRIAL RATE: 78 BPM
P AXIS: 90 DEGREES
P-R INTERVAL: 260 MS
Q-T INTERVAL: 378 MS
QRS DURATION: 126 MS
QTC CALCULATION (BEZET): 430 MS
R AXIS: -46 DEGREES
T AXIS: 96 DEGREES
VENTRICULAR RATE: 78 BPM

## 2024-10-09 NOTE — ED PROVIDER NOTES
Patient Seen in: [facility]      History     Chief Complaint   Patient presents with    Arrythmia/Palpitations     Stated Complaint: irregular heart beats, worsening tonight    Subjective:   HPI    Patient is a 59-year-old male presents to ED for evaluation palpitations..  Patient states since yesterday has felt like his heart is beating irregularly.  Patient states has history of ectopy, atrial fibrillation, first-degree block.  Patient feels like his heart will beat out of rhythm sometimes fast.  Patient saw his cardiologist today and they investigated the loop recorder that he has implanted.  Patient had verapamil increased to 180 mg.  Denies fever, vomiting.  No other complaints.  Objective:     No pertinent past medical history.            No pertinent past surgical history.              No pertinent social history.                Physical Exam     ED Triage Vitals [10/08/24 2232]   /76   Pulse 80   Resp 22   Temp 98 °F (36.7 °C)   Temp src Temporal   SpO2 98 %   O2 Device None (Room air)       Current Vitals:   Vital Signs  BP: 137/71  Pulse: 64  Resp: 18  Temp: 98 °F (36.7 °C)  Temp src: Temporal  MAP (mmHg): 91    Oxygen Therapy  SpO2: 95 %  O2 Device: None (Room air)        Physical Exam  GENERAL: No acute distress, well appearing and non-toxic, Alert and oriented X 3   HEENT: Normocephalic, atraumatic.  Moist mucous membranes.  Pupils equal round reactive to light and accommodation, extraocular motion is intact, sclerae white, conjunctiva is pink.  Oropharynx is unremarkable, no exudate.  NECK: Supple, trachea midline, no lymphadenopathy.   LUNG: Lungs clear to auscultation bilaterally, no wheezing, no rales, no rhonchi.  CARDIOVASCULAR: Regular rate and rhythm.  Normal S1S2.  No S3S4 or murmur.  ABDOMEN: Bowel sounds are present. Soft. nondistended, no pulsatile masses. nontender  MUSCULOSKELETAL: No calf tenderness.  Dorsalis and Posterior Tibial pulses present. No  clubbing. No cyanosis.  No edema.   SKIN EXAMINATIoN: Warm and dry with normal appearance.  No rashes or lesions.  NEUROLOGICAL:  Motor strength intact all groups.  normal sensation, speech intact    ED Course     Labs Reviewed   COMP METABOLIC PANEL (14) - Abnormal; Notable for the following components:       Result Value    Glucose 120 (*)     Sodium 135 (*)     All other components within normal limits   CBC WITH DIFFERENTIAL WITH PLATELET - Abnormal; Notable for the following components:    WBC 11.2 (*)     All other components within normal limits   TROPONIN I HIGH SENSITIVITY - Normal   RAINBOW DRAW BLUE     EKG    Rate, intervals and axes as noted on EKG Report.  Rate: 78  Rhythm: Sinus Rhythm  Reading: Sinus rhythm.  First-degree block.  Left bundle branch block which is old when compared to prior EKG                       MDM      Patient is a 59-year-old male presents to ED for evaluation of palpitations.  Patient differential includes cardiac arrhythmia, ectopy, electrolyte abnormality.  Patient had laboratory test performed which were unremarkable except for sodium 135 and white blood cell count 11.2.  Normal troponin.  Electrolytes otherwise normal.  EKG unchanged.  First-degree block.  Supposed with [facility] [place] [facility] nurse practitioner stated they investigated loop recorder and there was no significant arrhythmias noted.  Patient was advised to follow-up with his appointment on October 18.    Patient was screened and evaluated during this visit.   As a treating physician attending to the patient, I determined, within reasonable clinical confidence and prior to discharge, that an emergency medical condition was not or was no longer present.  There was no indication for further evaluation, treatment or admission on an emergency basis.  Comprehensive verbal and written discharge and follow-up instructions were provided to help prevent relapse or worsening.  Patient was instructed to  follow-up with her primary care provider for further evaluation and treatment, but to return immediately to the ER for worsening, concerning, new, changing or persisting symptoms.  I discussed the case with the patient and they had no questions, complaints, or concerns.  Patient felt comfortable going home.                        Medical Decision Making      Disposition and Plan     Clinical Impression:  1. Palpitations         Disposition:  Discharge  10/8/2024 11:35 pm    Follow-up:  Stephen Christianson MD  [address] [place] [id number]  [phone]    Follow up  As needed    Stevenson Mars MD  [address] [place] [id number]  [phone]    Follow up in 1 week(s)            Medications Prescribed:  Discharge Medication List as of 10/8/2024 11:42 PM              Supplementary Documentation:

## 2024-10-09 NOTE — PLAN OF CARE
Progress Note  Don Cabral Patient Status:  Emergency    1965 MRN [id number]   Location [facility] EMERGENCY DEPARTMENT Attending No att. providers found   Hosp Day # 0 PCP Stephen Christianson MD     Paged by Dr. Mar, ED Physician    HPI:  59 year old male who presented to the ED with c/o palpitations. Patient seen in [facility] office for palpitations. EKG completed was unremarkable. LINQ recorder interrogated and did not show any critical events from 2024-10/8/2024. Patient's dose of verapamil was increased from 120 mg to 180 mg. Follow up appointment also scheduled for 10/18/2024 at [facility] office. Work up in ED was unremarkable.      Labs:  Na 135, troponin negative, WBC 11.2      Diagnostics:   EKG: Sinus rhythm with 1st degree AV block.           Assessment/Plan:    - Disposition as per ED physician  - Discharged home for outpatient cardiology follow up with [facility]. Scheduled for appointment on 10/18      JOSAFAT Durán  [facility]  10/9/2024  3:54 AM

## 2024-10-09 NOTE — ED INITIAL ASSESSMENT (HPI)
Pt c/o irregular HR since yesterday. He denies CP. States he saw cardiology today, Verapamil increased to 180 mg daily. Pt denies МАРИНА

## 2024-10-28 ENCOUNTER — TELEPHONE (OUTPATIENT)
Facility: CLINIC | Age: 59
End: 2024-10-28

## 2024-10-28 NOTE — TELEPHONE ENCOUNTER
Pt c/o pressure being too high that it's causing his mask to leak and flutter in the middle of the night .  Requesting for the pressure lowered closer to his previous settings.   Pt was on apap 9-20 before.   Informed pt pressure will be reduced to max 22, epap 18 w/ ps 4.   Instructed to call in 2 weeks to review data.   Pt agreed with plan.      Usage days 10/10 days (100%)  >= 4 hours 10 days (100%)  < 4 hours 0 days (0%)  Usage hours 63 hours 49 minutes  Average usage (total days) 6 hours 23 minutes  Average usage (days used) 6 hours 23 minutes  Median usage (days used) 6 hours 39 minutes  Total used hours (value since last reset - 10/27/2024) 85 hours  AirCurve 11 VAuto  Serial number [id number]  Mode VAuto  Max IPAP 25 cmH2O  Min EPAP 22 cmH2O  Pressure Support 3 cmH2O  Therapy  Leaks - L/min Median: 7.1 95th percentile: 38.0 Maximum: 70.2  Events per hour AI: 0.1 HI: 0.0 AHI: 0.1  Apnea Index Central: 0.1 Obstructive: 0.0 Unknown: 0.0

## 2024-11-06 ENCOUNTER — HOSPITAL ENCOUNTER (OUTPATIENT)
Age: 59
Discharge: HOME | End: 2024-11-06
Payer: COMMERCIAL

## 2024-11-06 VITALS
DIASTOLIC BLOOD PRESSURE: 66 MMHG | HEIGHT: 75 IN | RESPIRATION RATE: 18 BRPM | BODY MASS INDEX: 32.33 KG/M2 | SYSTOLIC BLOOD PRESSURE: 144 MMHG | OXYGEN SATURATION: 98 % | HEART RATE: 75 BPM | TEMPERATURE: 97 F | WEIGHT: 260 LBS

## 2024-11-06 DIAGNOSIS — M79.652 PAIN OF LEFT THIGH: Primary | ICD-10-CM

## 2024-11-06 PROCEDURE — 99213 OFFICE O/P EST LOW 20 MIN: CPT

## 2024-11-06 PROCEDURE — 99212 OFFICE O/P EST SF 10 MIN: CPT

## 2024-11-07 NOTE — ED INITIAL ASSESSMENT (HPI)
Since this am, c/o left inner thigh pain. Pain has subsided but hurts when he palpates the area. Denies injury. No pain meds taken.

## 2024-11-07 NOTE — DISCHARGE INSTRUCTIONS
As we discussed ice area of soreness.  Do stretching exercises.  Take over-the-counter anti-inflammatory such as naproxen or ibuprofen.  May also take Tylenol.  Anytime develop worsening or uncontrolled pain or pain that is not going away follow-up.  Other things to consider is if you start having any swelling to the area, no a mass to the area, changes in color of the skin or any worsening of symptoms sure to follow-up immediately.

## 2024-11-07 NOTE — ED PROVIDER NOTES
Patient Seen in: [facility]      History     Chief Complaint   Patient presents with    Leg or Foot Injury     Stated Complaint: Inner left leg has shooting pain    Subjective:   59-year-old male who presents today with sudden onset of sharp pain to the inner aspect of the left thigh.  Denies any injuries.  States was driving got out of his car was walking felt a sudden sharp pain.  Pain did finally subside on its own.  But states if he pushes certain area he does have pain to the leg.  Pain radiated from the upper thigh down to the knee.  No gross swelling or change in color skin.  Denies any recent long distance travel or idle lifestyle.  Denies any chest pain shortness of breath.  No history of DVT.  Denies any injuries or increase in activity.  Alert orientated x 3.  No other symptoms or concerns.  The patient's medication list, past medical history and social history elements as listed in today's nurse's notes were reviewed and agreed (except as otherwise stated in the HPI).  The patient's family history reviewed and determined to be noncontributory to the presenting problem              Objective:     Past Medical History:    Allergic rhinitis    Anxiety    Arrhythmia    T1 AV block, LBBB    Back pain    Back problem    lower back    Depression    Diverticulitis    ED (erectile dysfunction)    High blood pressure    History of adverse reaction to anesthesia    Patient states he doesnt know exactly what happened but stated he required haldol post anesthesia    Pneumonia due to organism    Seasonal allergies    Shortness of breath    Sleep apnea    CPAP    Unspecified essential hypertension    Visual impairment    glasses              Past Surgical History:   Procedure Laterality Date    Cholecystectomy  3/12/2023    Colonoscopy N/A 03/22/2016    [facility].  Hyperplastic polyp only.  Repeat 2026    Colonoscopy  05/10/2023    Dental surgery procedure      [place] teeth removed    Incision/drain  abscess extra  1996    Knee arthroscopy Right     Laparoscopic cholecystectomy  2023    Myringoplasty      Other surgical history      R knee arthroscope    Other surgical history  2024    Exploratory laparotomy partial colectomy with colorectal anastamosis, extensive adhesiolysis    Part removal colon w end colostomy      Skin tissue procedure unlisted      Tonsillectomy                  Social History     Socioeconomic History    Marital status:    Occupational History    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   Tobacco Use    Smoking status: Former     Current packs/day: 0.00     Average packs/day: 0.5 packs/day for 19.0 years (9.5 ttl pk-yrs)     Types: Cigarettes     Start date: 1981     Quit date: 2000     Years since quittin.3     Passive exposure: Never    Smokeless tobacco: Never   Vaping Use    Vaping status: Never Used   Substance and Sexual Activity    Alcohol use: Not Currently     Comment: Stopped alcohol 2000    Drug use: No    Sexual activity: Yes     Partners: Female   Other Topics Concern    Caffeine Concern Yes     Comment: coffee in AM/ soda     Exercise Yes     Comment: X1-2 times per week      Social Drivers of Health     Financial Resource Strain: Low Risk  (2024)    Financial Resource Strain     Difficulty of Paying Living Expenses: Not hard at all     Med Affordability: No   Food Insecurity: No Food Insecurity (2024)    Food Insecurity     Food Insecurity: Never true   Transportation Needs: No Transportation Needs (2024)    Transportation Needs     Lack of Transportation: No   Housing Stability: Low Risk  (2024)    Housing Stability     Housing Instability: No              Review of Systems    Positive for stated complaint: Inner left leg has shooting pain  Other systems are as noted in HPI.  Constitutional and vital signs reviewed.      All other systems reviewed and negative except as noted above.    Physical Exam     ED Triage  Vitals [11/06/24 1850]   /66   Pulse 75   Resp 18   Temp 97.3 °F (36.3 °C)   Temp src Temporal   SpO2 98 %   O2 Device None (Room air)       Current Vitals:   Vital Signs  BP: 144/66  Pulse: 75  Resp: 18  Temp: 97.3 °F (36.3 °C)  Temp src: Temporal    Oxygen Therapy  SpO2: 98 %  O2 Device: None (Room air)        Physical Exam  Vitals and nursing note reviewed.   Constitutional:       Appearance: Normal appearance.   HENT:      Head: Normocephalic.      Mouth/Throat:      Mouth: Mucous membranes are moist.   Cardiovascular:      Rate and Rhythm: Normal rate.   Pulmonary:      Effort: Pulmonary effort is normal.   Musculoskeletal:      Comments: No gross swelling, skin is warm dry normal color.  Normal flexion extension at the hip and knee.  CMS intact.   Skin:     General: Skin is warm and dry.   Neurological:      Mental Status: He is alert and oriented to person, place, and time.             ED Course   Labs Reviewed - No data to display                MDM     Please note that this report has been produced using speech recognition software and may contain errors related to that system including, but not limited to, errors in grammar, punctuation, and spelling, as well as words and phrases that possibly may have been recognized inappropriately.  If there are any questions or concerns, contact the dictating provider for clarification.              Medical Decision Making  Differential diagnosis includes but is not limited to: Sciatica, muscle strain, muscle spasm/cramp, DVT      Presents today with complaints of sudden pain to the inner aspect of the left thigh.  Denies any known injury or increase in activity heavy lifting or anything that would cause pain.  Patient states was driving out of his car was walking and felt a sudden sharp pain which did finally resolve on its own.  Still does have some pain with point tenderness to palpation over certain area.  No mass.  Did explain to patient we do not have  ultrasound here at this time however I have a very low suspicion of DVT at this time.  Patient PERC negative.  Pain has subsided.  No swelling no change in color of the skin.  Encouraged patient to ice leg and rest.  Take over-the-counter NSAID as well as Tylenol for pain.  If pain persist or especially with any worsening of pain, heaviness to the leg, mass change color to skin swelling or any worsening symptoms to follow-up immediately for further evaluation care and possible ultrasound.  Patient verbalized understanding and agreed to plan of care.    Risk  OTC drugs.        Disposition and Plan     Clinical Impression:  1. Pain of left thigh         Disposition:  Discharge  11/6/2024  7:01 pm    Follow-up:  Stephen Christianson MD  [address] [place] [id number]  [phone]    In 1 week  As needed          Medications Prescribed:  Discharge Medication List as of 11/6/2024  7:02 PM              Supplementary Documentation:

## 2024-11-29 ENCOUNTER — APPOINTMENT (OUTPATIENT)
Dept: GENERAL RADIOLOGY | Age: 59
End: 2024-11-29
Attending: PHYSICIAN ASSISTANT
Payer: COMMERCIAL

## 2024-11-29 ENCOUNTER — SPINE CENTER NAVIGATION (OUTPATIENT)
Age: 59
End: 2024-11-29

## 2024-11-29 ENCOUNTER — HOSPITAL ENCOUNTER (OUTPATIENT)
Age: 59
Discharge: HOME | End: 2024-11-29
Payer: COMMERCIAL

## 2024-11-29 VITALS
SYSTOLIC BLOOD PRESSURE: 143 MMHG | WEIGHT: 263 LBS | BODY MASS INDEX: 33.75 KG/M2 | DIASTOLIC BLOOD PRESSURE: 77 MMHG | HEIGHT: 74 IN | TEMPERATURE: 97 F | RESPIRATION RATE: 18 BRPM | HEART RATE: 72 BPM | OXYGEN SATURATION: 99 %

## 2024-11-29 DIAGNOSIS — M54.6 ACUTE BILATERAL THORACIC BACK PAIN: Primary | ICD-10-CM

## 2024-11-29 DIAGNOSIS — R09.89 CHEST CONGESTION: ICD-10-CM

## 2024-11-29 PROCEDURE — 99214 OFFICE O/P EST MOD 30 MIN: CPT

## 2024-11-29 PROCEDURE — 96372 THER/PROPH/DIAG INJ SC/IM: CPT

## 2024-11-29 PROCEDURE — 71046 X-RAY EXAM CHEST 2 VIEWS: CPT | Performed by: PHYSICIAN ASSISTANT

## 2024-11-29 PROCEDURE — 72072 X-RAY EXAM THORAC SPINE 3VWS: CPT | Performed by: PHYSICIAN ASSISTANT

## 2024-11-29 RX ORDER — BACLOFEN 10 MG/1
10 TABLET ORAL 3 TIMES DAILY PRN
Qty: 15 TABLET | Refills: 0 | Status: SHIPPED | OUTPATIENT
Start: 2024-11-29

## 2024-11-29 RX ORDER — KETOROLAC TROMETHAMINE 30 MG/ML
60 INJECTION, SOLUTION INTRAMUSCULAR; INTRAVENOUS ONCE
Status: COMPLETED | OUTPATIENT
Start: 2024-11-29 | End: 2024-11-29

## 2024-11-29 NOTE — PROGRESS NOTES
Spine Center Referral Navigation Encounter Note    Referred by: Sonia Ashford PA-C    Imaging: XR Thoracic Spine   If imaging done at an external facility, instructed patient to bring disc of MRI to appointment.     Previously Seen Spine Care Providers: None    Referred to: Lito Shelton DO in Physiatry     Information below is patient reported.      [facility]  Are you currently experiencing any of the following symptoms?      No    Is your condition due to an injury that occurred at work or is your care for this condition being coordinated by Worker's Compensation?      No    Are you looking for a second surgical opinion?      No    Have you had surgery on your spine (neck or back) in the last 12 months?      No    In the last several weeks, have you experienced weakness or balance issues that have caused falls or difficulty lifting your legs or feet (lower body) and/or weakness that has caused you to drop items or caused changes in handwriting (upper body)?      No    In the last 3 months, have you had spine injections AND physical therapy for your back or neck that did not improve your symptoms?      No    : Patient needs to be seen by non-surgical team. Does patient require a new visit or established visit?      New patient visit    Are you closer to [facility] or [facility]?      [facility]

## 2024-11-29 NOTE — ED INITIAL ASSESSMENT (HPI)
Back pain -  started 2 weeks  worse for the past 3 days.  Chest tightness when inhaling  /congestion - pt states he was exposed to 2 people dx with pneumonia.  Denies fever

## 2024-11-29 NOTE — ED PROVIDER NOTES
Patient Seen in: [facility]      History     Chief Complaint   Patient presents with    Chest Congestion    Back Pain     Stated Complaint: pneumonia exp / disc hernia    Subjective:   HPI    60 YO male with below stated past medical history presents to immediate care with concerns for pneumonia.  Patient states he feels some congestion in his chest.  He states he does not really have a cough. He states he presented similarly last year when he had pneumonia. Denies fever, SOB or chest pain.     Secondly, patient complains of bilateral upper thoracic back pain starting 2 weeks ago.  He states pain started at the right thoracic back and has since spread to the left.  Pain is only present with movement. Denies known injury/trauma.  Denies pain radiation, weakness, saddle anesthesia, bowel/bladder dysfunction, fever.        Objective:     Past Medical History:    Allergic rhinitis    Anxiety    Arrhythmia    T1 AV block, LBBB    Back pain    Back problem    lower back    Depression    Diverticulitis    ED (erectile dysfunction)    High blood pressure    History of adverse reaction to anesthesia    Patient states he doesnt know exactly what happened but stated he required haldol post anesthesia    Pneumonia due to organism    Seasonal allergies    Shortness of breath    Sleep apnea    CPAP    Unspecified essential hypertension    Visual impairment    glasses              Past Surgical History:   Procedure Laterality Date    Cholecystectomy  3/12/2023    Colonoscopy N/A 03/22/2016    [facility].  Hyperplastic polyp only.  Repeat 2026    Colonoscopy  05/10/2023    Dental surgery procedure      [place] teeth removed    Incision/drain abscess extra  1996    Knee arthroscopy Right     Laparoscopic cholecystectomy  02/12/2023    Myringoplasty      Other surgical history      R knee arthroscope    Other surgical history  07/17/2024    Exploratory laparotomy partial colectomy with colorectal anastamosis, extensive  adhesiolysis    Part removal colon w end colostomy      Skin tissue procedure unlisted      Tonsillectomy                  No pertinent social history.            Review of Systems    Positive for stated complaint: pneumonia exp / disc hernia  Other systems are as noted in HPI.  Constitutional and vital signs reviewed.      All other systems reviewed and negative except as noted above.    Physical Exam     ED Triage Vitals   BP 11/29/24 0818 143/77   Pulse 11/29/24 0818 72   Resp 11/29/24 0935 18   Temp 11/29/24 0818 97 °F (36.1 °C)   Temp src 11/29/24 0818 Temporal   SpO2 11/29/24 0818 99 %   O2 Device --        Current Vitals:   Vital Signs  BP: 143/77  Pulse: 72  Resp: 18  Temp: 97 °F (36.1 °C)  Temp src: Temporal    Oxygen Therapy  SpO2: 99 %        Physical Exam  Vitals and nursing note reviewed.   Constitutional:       General: He is not in acute distress.     Appearance: Normal appearance. He is not ill-appearing, toxic-appearing or diaphoretic.   Cardiovascular:      Rate and Rhythm: Normal rate.      Heart sounds: Normal heart sounds.   Pulmonary:      Effort: Pulmonary effort is normal. No respiratory distress.      Breath sounds: Normal breath sounds. No wheezing.   Musculoskeletal:      Thoracic back: Tenderness (bilateral, no midline tenderness) present.   Neurological:      Mental Status: He is alert and oriented to person, place, and time.   Psychiatric:         Behavior: Behavior normal.         ED Course   Labs Reviewed - No data to display  XR CHEST PA + LAT CHEST (CPT=71046)    Result Date: 11/29/2024  PROCEDURE:  XR CHEST PA + LAT CHEST (CPT=71046)  INDICATIONS:  pneumonia exp / disc hernia, congestion/cough  COMPARISON:  [facility] , XR, XR CHEST AP/PA (1 VIEW) (CPT=71045), 9/28/2023, 4:46 PM.  TECHNIQUE:  PA and lateral chest radiographs were obtained.  PATIENT STATED HISTORY: (As transcribed by Technologist)  Chest congestion. No fever. Shielded    FINDINGS:  Normal heart size and pulmonary  vascularity. No pleural effusion or pneumothorax. No lobar consolidation.  Monitoring device projecting along the left chest.  Degenerative changes the spine.            CONCLUSION:  No active cardiopulmonary process identified.   LOCATION:  [facility]   Dictated by (CST): Elías Rosenbaum MD on 11/29/2024 at 9:27 AM     Finalized by (CST): Elías Rosenbaum MD on 11/29/2024 at 9:27 AM       XR THORACIC SPINE (3 VIEWS) (CPT=72072)    Result Date: 11/29/2024  PROCEDURE:  XR THORACIC SPINE (3 VIEWS) (CPT=72072)  TECHNIQUE:  AP, lateral, and swimmer's views of the thoracic spine were obtained.  COMPARISON:  None.  INDICATIONS:  pneumonia exp / disc hernia, back pain  PATIENT STATED HISTORY: (As transcribed by Technologist)  Patient states mid to upper back pain x 2 weeks. worse in the last 3 days. No known injury. Shielded    FINDINGS:  Monitoring device projecting along the left chest.  No acute fracture or subluxation in the thoracic spine.  Mild degenerative disc space loss and endplate spurring throughout the thoracic spine.  Mildly exaggerated thoracic kyphosis with anatomic alignment.  Vertebral body heights are well-maintained.  Surgical clips the right upper abdomen.  Normal mineralization.            CONCLUSION:  No acute fracture or subluxation in the thoracic spine.  Degenerative changes as above.   LOCATION:  [facility]    Dictated by (CST): Elías Rosenbaum MD on 11/29/2024 at 9:26 AM     Finalized by (CST): Elías Rosenbaum MD on 11/29/2024 at 9:27 AM         I independently reviewed x-rays.  No acute process.    MDM      Differential diagnosis considered but not limited to viral URI, pneumonia, strain, herniated disc, less likely fracture in the absence of injury    Afebrile and nontoxic in appearance.  Lungs sound clear to auscultation bilaterally.  Chest x-ray negative for acute process.    Mechanical bilateral thoracic back pain. No midline tenderness. No radiculopathy. No alarm symptoms at this time including  bowel or bladder dysfunction, saddle anesthesia or fever. Subjective improvement after Toradol 60 mg IM and heat pack.  X-ray thoracic spine negative for acute process.  Degenerative changes noted.  Patient in agreement to continue follow-up with spine center for further management of back pain.  Baclofen prescribed. Return precautions discussed.     Medical Decision Making  Amount and/or Complexity of Data Reviewed  Radiology: ordered and independent interpretation performed. Decision-making details documented in ED Course.    Risk  Prescription drug management.        Disposition and Plan     Clinical Impression:  1. Acute bilateral thoracic back pain    2. Chest congestion         Disposition:  Discharge  11/29/2024  9:38 am    Follow-up:  No follow-up provider specified.        Medications Prescribed:  Current Discharge Medication List        START taking these medications    Details   baclofen 10 MG Oral Tab Take 1 tablet (10 mg total) by mouth 3 (three) times daily as needed (muscle spasm).  Qty: 15 tablet, Refills: 0                 Supplementary Documentation:

## 2024-12-02 ENCOUNTER — OFFICE VISIT (OUTPATIENT)
Dept: PAIN CLINIC | Facility: CLINIC | Age: 59
End: 2024-12-02
Payer: COMMERCIAL

## 2024-12-02 VITALS — OXYGEN SATURATION: 98 % | HEART RATE: 74 BPM

## 2024-12-02 DIAGNOSIS — M79.18 MYOFASCIAL PAIN: Primary | ICD-10-CM

## 2024-12-02 PROCEDURE — 99214 OFFICE O/P EST MOD 30 MIN: CPT | Performed by: PHYSICIAN ASSISTANT

## 2024-12-02 RX ORDER — VERAPAMIL HYDROCHLORIDE 180 MG/1
CAPSULE, EXTENDED RELEASE ORAL
COMMUNITY
Start: 2024-11-30

## 2024-12-02 RX ORDER — METHYLPREDNISOLONE 4 MG/1
TABLET ORAL
Qty: 21 TABLET | Refills: 0 | Status: SHIPPED | OUTPATIENT
Start: 2024-12-02

## 2024-12-02 NOTE — PATIENT INSTRUCTIONS
Refill policies:    Allow 2-3 business days for refills; controlled substances may take longer.  Contact your pharmacy at least 5 days prior to running out of medication and have them send an electronic request or submit request through the “request refill” option in your [employer] account.  Refills are not addressed on weekends; covering physicians do not authorize routine medications on weekends.  No narcotics or controlled substances are refilled after noon on Fridays or by on call physicians.  By law, narcotics must be electronically prescribed.  A 30 day supply with no refills is the maximum allowed.  If your prescription is due for a refill, you may be due for a follow up appointment.  To best provide you care, patients receiving routine medications need to be seen at least once a year.  Patients receiving narcotic/controlled substance medications need to be seen at least once every 3 months.  In the event that your preferred pharmacy does not have the requested medication in stock (e.g. Backordered), it is your responsibility to find another pharmacy that has the requested medication available.  We will gladly send a new prescription to that pharmacy at your request.    Scheduling Tests:    If your physician has ordered radiology tests such as MRI or CT scans, please contact Central Scheduling at [phone] right away to schedule the test.  Once scheduled, the [facility] Centralized Referral Team will work with your insurance carrier to obtain pre-certification or prior authorization.  Depending on your insurance carrier, approval may take 3-10 days.  It is highly recommended patients assure they have received an authorization before having a test performed.  If test is done without insurance authorization, patient may be responsible for the entire amount billed.      Precertification and Prior Authorizations:  If your physician has recommended that you have a procedure or additional testing performed the [facility]  Centralized Referral Team will contact your insurance carrier to obtain pre-certification or prior authorization.    You are strongly encouraged to contact your insurance carrier to verify that your procedure/test has been approved and is a COVERED benefit.  Although the [facility] Centralized Referral Team does its due diligence, the insurance carrier gives the disclaimer that \"Although the procedure is authorized, this does not guarantee payment.\"    Ultimately the patient is responsible for payment.   Thank you for your understanding in this matter.  Paperwork Completion:  If you require FMLA or disability paperwork for your recovery, please make sure to either drop it off or have it faxed to our office at [phone]. Be sure the form has your name and date of birth on it.  The form will be faxed to our Forms Department and they will complete it for you.  There is a 25$ fee for all forms that need to be filled out.  Please be aware there is a 10-14 day turnaround time.  You will need to sign a release of information (DIMAS) form if your paperwork does not come with one.  You may call the Forms Department with any questions at [phone].  Their fax number is [phone].

## 2024-12-02 NOTE — PROGRESS NOTES
Patient: Don Cabral  Medical Record Number: [id number]  Site: [facility]  Referring Physician:  Sonia Ashford PA-C  PCP: Dr. Christianson    Dear Dr. Ashford:    Thank you very much for requesting this consultation. I had the opportunity to evaluate and initiate care for your patient today, as per your request.    HISTORY OF CHIEF COMPLAINT:      Don Cabral is a 59 year old male, who complains of thoracic pain.    Pt is here today at the request of [facility] with pain in above described distribution that began atraumatically 2-3 weeks ago.  Was seen in [facility] on 11/9/24 where he was given toradol and sent for XR chest and XR T spine.  Discharged for further options.  He has been recently worked up by cardiology, and states that he has been given the \"all clear\" from their perspective.  Since onset of this pain, it has begun to improve,and ROM is noticeably better.      VAS Pain Score:  1/10    Aggravating Factors: Relieving Factors:   ROM Limiting ROM      Past Treatment Attempted/Patient’s Response:  As above     Past Medical History:    Allergic rhinitis    Anxiety    Arrhythmia    T1 AV block, LBBB    Back pain    Back problem    lower back    Depression    Diverticulitis    ED (erectile dysfunction)    High blood pressure    History of adverse reaction to anesthesia    Patient states he doesnt know exactly what happened but stated he required haldol post anesthesia    Pneumonia due to organism    Seasonal allergies    Shortness of breath    Sleep apnea    CPAP    Unspecified essential hypertension    Visual impairment    glasses      Past Surgical History:   Procedure Laterality Date    Cholecystectomy  3/12/2023    Colonoscopy N/A 03/22/2016    [facility].  Hyperplastic polyp only.  Repeat 2026    Colonoscopy  05/10/2023    Dental surgery procedure      [place] teeth removed    Incision/drain abscess extra  1996    Knee arthroscopy Right     Laparoscopic cholecystectomy  02/12/2023     Myringoplasty      Other surgical history      R knee arthroscope    Other surgical history  2024    Exploratory laparotomy partial colectomy with colorectal anastamosis, extensive adhesiolysis    Part removal colon w end colostomy      Skin tissue procedure unlisted      Tonsillectomy        Family History   Problem Relation Age of Onset    Heart Disorder Father         Ablation required    Hypertension Father     Cancer Father 60        Prostate    Heart Disorder Mother         murmur no treatment needed    Hypertension Mother     Stroke Maternal Grandmother     Other (stroke) Maternal Grandmother     Dementia Paternal Grandmother     Heart Disorder Paternal Grandfather         two heart attacks and CHF    Diabetes Paternal Grandfather     Other (Diabetes, type 2) Paternal Grandfather       Social History     Socioeconomic History    Marital status:    Occupational History    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[employer]   Tobacco Use    Smoking status: Former     Current packs/day: 0.00     Average packs/day: 0.5 packs/day for 19.0 years (9.5 ttl pk-yrs)     Types: Cigarettes     Start date: 1981     Quit date: 2000     Years since quittin.4     Passive exposure: Never    Smokeless tobacco: Never   Vaping Use    Vaping status: Never Used   Substance and Sexual Activity    Alcohol use: Not Currently     Comment: Stopped alcohol 2000    Drug use: No    Sexual activity: Yes     Partners: Female   Other Topics Concern    Caffeine Concern Yes     Comment: coffee in AM/ soda     Exercise Yes     Comment: X1-2 times per week       Current Medications:  Current Outpatient Medications   Medication Sig Dispense Refill    baclofen 10 MG Oral Tab Take 1 tablet (10 mg total) by mouth 3 (three) times daily as needed (muscle spasm). 15 tablet 0    gabapentin 600 MG Oral Tab Take 1 tablet (600 mg total) by mouth 3 (three) times daily. 270 tablet 0    sertraline (ZOLOFT) 50 MG Oral Tab 1.5 tablets daily  135 tablet 0    clonazePAM 0.5 MG Oral Tab Take 1 tablet (0.5 mg total) by mouth 3 (three) times daily. 90 tablet 2    lisinopril 20 MG Oral Tab Take 1 tablet (20 mg total) by mouth daily. 90 tablet 3    verapamil  MG Oral Tab CR Take 1 tablet (120 mg total) by mouth nightly. 30 tablet 3    Multiple Vitamins-Minerals (MULTI-VITAMIN/MINERALS) Oral Tab Take 1 tablet by mouth daily.      Albuterol Sulfate  (90 Base) MCG/ACT Inhalation Aero Soln Inhale 2 puffs into the lungs every 4 (four) hours as needed for Wheezing or Shortness of Breath. 1 each 3    Tadalafil 20 MG Oral Tab Take 1 tablet (20 mg total) by mouth daily as needed for Erectile Dysfunction. 8 tablet 5        Functional Assessment: Patient reports that they are able to complete all of their ADL's such as eating, bathing, using the toilet, dressing and getting up from a bed or a chair independently.      REVIEW OF SYSTEMS:   10 point review of systems is otherwise negative,unless otherwise in HPI.      Radiology/Lab Test Reviewed:  XR T spine:    FINDINGS:  Monitoring device projecting along the left chest.  No acute fracture or subluxation in the thoracic spine.  Mild degenerative disc space loss and endplate spurring throughout the thoracic spine.  Mildly exaggerated thoracic kyphosis with anatomic alignment.  Vertebral body heights are well-maintained.  Surgical clips the right upper abdomen.  Normal mineralization.         CBC:    Lab Results   Component Value Date    WBC 11.2 (H) 10/08/2024    WBC 8.4 07/24/2024    WBC 9.0 07/23/2024     Lab Results   Component Value Date    HEMOGLOBIN 13.1 08/30/2024     Lab Results   Component Value Date    .0 10/08/2024    .0 07/24/2024    .0 07/23/2024         PHYSICAL EXAMIMATION   PHYSICAL EXAMINATION: Don Cabral is a 59 year old male who is observed sitting comfortably on a chair in the exam room alert and oriented times three. He looks consistent with his stated age.     Ht Readings from Last 1 Encounters:   11/29/24 74\"     Wt Readings from Last 1 Encounters:   11/29/24 263 lb (119.3 kg)     The patient is well developed, well nourished, well muscled, obese. He moves independently from sitting to standing with ease.       Inspection:  No acute distress    Patient displays Non-antalgic gait, and is able to Normal heel walk, Normal toe walk.    Coordination:  Well-coordinated, fluent gait, able to engage in rapid alternating movements of upper and lower extremities.    ROM Lumbar Spine:  See chart below:  Motion Right (+ or -) Left (+ or -)   Lumbar Flexion - -   Lumbar Extension - -   Lumbar Bending - -   Lumbar Extension/ twisting motion - -     Lumbar/Sacral Integument:  Skin over lumbar sacral spine is intact without rashes, excoriations, lesions or erythema noted    Palpation:  See chart below:  Palpation of lumbar area Right (+ or -) Left (+ or -)   Lumbar facets - -   Lumbar paraspinals - -   Piriformis - -   SIJ - -   Trochanteric Bursa - -     Pain to palpation R mid/superior thoracic paraspinal musculature       Strength:  Strength of bilateral lower extremities grossly intact; 5/5 throughout    Sensation:  No sensory deficits noted on bilateral lower extremities to light touch    Tests:  Test Right (+ or -) Left (+ or -)   SLR - -   [facility]     Babinski     Clonus       HEAD/NECK: Head is normocephalic, neck supple  EYES: EOMI, SHELL  SKIN EXAM: Skin is intact, head, neck, trunk and arms/legs. No rashes, mottling or ulcerations.  LYMPH EXAM: There is no lymph edema in either lower extremity.  VASCULAR EXAM: Pedal pulses are normal bilaterally, with good distal perfusion. No clubbing or cyanosis.  ABDOMINAL EXAM: Abdomen is soft without masses palpated.  HEART: normal, regular, S1 and S2  LUNGS:CTA  MUSCULOSKELETAL: Smooth, pain-free ROM to bilat hips, ankles, and knees.     Do you have any known blood/bleeding disorders?  No  Does patient currently take blood thinners?    None  Does patient currently take any antibiotics?   No      Patient is currently on pain medications:  No  Reason pain medications are prescribed: N/A  Pain medications are prescribed by: N/A  [place] Prescription Monitoring review: N/A  DIRE: N/A  Treatment decision: N/A    MEDICAL DECISION MAKING:     Impression: myofascial pain    R thoracic paraspinal muscle pain in setting of XR evidence of thoracic degenerative changes.  Symptoms present for past 2-3 weeks, and have been significantly improved since toradol injection in [facility].  On exam, pain reproduced to palpation R superior/mid thoracic paraspinal musculature, and is without sensory/motor dysfunction.  Did discuss options, and will have him try course of tapered PO steroid with PT focused on HEP.  If ineffective, consider TPI.      Plan: PT/HEP, PO steroid, f/u if pain persists.         The patient indicates understanding of these issues and agrees to the plan.      Thank you very much.     Respectfully yours,    JOAO Dan

## 2024-12-02 NOTE — PROGRESS NOTES
Subjective:   Patient ID: Don Cabral is a 59 year old male.    HPI    History/Other:   Review of Systems  Current Outpatient Medications   Medication Sig Dispense Refill    baclofen 10 MG Oral Tab Take 1 tablet (10 mg total) by mouth 3 (three) times daily as needed (muscle spasm). 15 tablet 0    gabapentin 600 MG Oral Tab Take 1 tablet (600 mg total) by mouth 3 (three) times daily. 270 tablet 0    sertraline (ZOLOFT) 50 MG Oral Tab 1.5 tablets daily 135 tablet 0    clonazePAM 0.5 MG Oral Tab Take 1 tablet (0.5 mg total) by mouth 3 (three) times daily. 90 tablet 2    lisinopril 20 MG Oral Tab Take 1 tablet (20 mg total) by mouth daily. 90 tablet 3    verapamil  MG Oral Tab CR Take 1 tablet (120 mg total) by mouth nightly. 30 tablet 3    Multiple Vitamins-Minerals (MULTI-VITAMIN/MINERALS) Oral Tab Take 1 tablet by mouth daily.      Albuterol Sulfate  (90 Base) MCG/ACT Inhalation Aero Soln Inhale 2 puffs into the lungs every 4 (four) hours as needed for Wheezing or Shortness of Breath. 1 each 3    Tadalafil 20 MG Oral Tab Take 1 tablet (20 mg total) by mouth daily as needed for Erectile Dysfunction. 8 tablet 5     Allergies:Allergies[1]    Objective:   Physical Exam    Assessment & Plan:   No diagnosis found.    No orders of the defined types were placed in this encounter.      Meds This Visit:  Requested Prescriptions      No prescriptions requested or ordered in this encounter       Imaging & Referrals:  None        Location of Pain: cervical and thoracic region    Date Pain Began: 2 weeks          Work Related:   No        Receiving Work Comp/Disability:   No    Numeric Rating Scale:  Pain at Present:  1                                                                                                            (No Pain) 0  to  10 (Worst Pain)                 Minimum Pain:   1  Maximum Pain  7    Distribution of Pain:    bilateral    Quality of Pain:   sharp/stabbing    Origin of Pain:     Degenerative    Aggravating Factors:    Other turning, twisting, right arm over head    Past Treatments for Current Pain Condition:   Other NA    Prior diagnostic testing for your pain:  xrays          [1]   Allergies  Allergen Reactions    Ciprofloxacin OTHER (SEE COMMENTS)     Increases risk for arrhythmia

## 2024-12-16 NOTE — PROGRESS NOTES
[facility]  SLEEP PROGRESS NOTE        HPI:   This is a 59 year old male coming in for   Chief Complaint   Patient presents with    Obstructive Sleep Apnea (NELLIE)     31-90  Ffm - large   Replacement device      HPI:     Using BiPAP  Struggling with keeping the mask on his face  Was switched from large FFM to medium, leaks are improved    Was previously on CPAP  Had major abdominal surgery- ostomy and then ostomy reversal   Since then he had to switch to BiPAP due to high residual AHI  Initially started at BiPAP 25/22      In bed 10p  Out of bed 530-6a  SL quick  Nighttime awakenings 2-3x to use RR, no trouble falling back asleep  Naps none  Caffeine 1 soda in AM, 1-2 cups coffee    Denies teeth grinding, leg cramps, dry mouth, tinnitus, chest pain, thoracic back pain, bloating, drowsy driving, sleep walking, sleep talking  Restless leg during the day related to psych meds  AM headache - worsened since he ran into issues with CPAP      DME company: [employer]  Mask type: FFM       MALA CHAYITO  10/15/2024 - 12/15/2024  Patient ID: [id number]  : 1965  Age: 59 years  [facility]  [address]  [place], [address]  Phone: [phone]  Compliance Report  Usage 10/15/2024 - 12/15/2024  Usage days 62/62 days (100%)  >= 4 hours 61 days (98%)  < 4 hours 1 days (2%)  Usage hours 395 hours 53 minutes  Average usage (total days) 6 hours 23 minutes  Average usage (days used) 6 hours 23 minutes  Median usage (days used) 6 hours 37 minutes  Total used hours (value since last reset - 12/15/2024) 396 hours  AirCurve 11 VAuto  Serial number [id number]  Mode VAuto  Max IPAP 22 cmH2O  Min EPAP 18 cmH2O  Pressure Support 4 cmH2O  Therapy  Leaks - L/min Median: 3.6 95th percentile: 37.1 Maximum: 75.2  Events per hour AI: 0.3 HI: 0.1 AHI: 0.4  Apnea Index Central: 0.2 Obstructive: 0.2 Unknown: 0.0      2023 PSG   Respiratory Analysis: The Apnea-Hypopnea Index (AHI) was 8.4 events per hour. REM  related AHI was 10.3 events per hour. Supine related AHI was 45.0. Lateral related AHI was 7.5. The Central Apnea Index was 0. The oxygen saturation gwen was 80.0% and patient spent 47.7% with saturations less than 88%.       Patient: Sleep review of systems today: see form.      Pt  PCP:  Stephen Christianson MD  No referring provider defined for this encounter.           No data to display                    Past Medical History:    Allergic rhinitis    Anxiety    Arrhythmia    T1 AV block, LBBB    Back pain    Back problem    lower back    Depression    Diverticulitis    ED (erectile dysfunction)    High blood pressure    History of adverse reaction to anesthesia    Patient states he doesnt know exactly what happened but stated he required haldol post anesthesia    Pneumonia due to organism    Seasonal allergies    Shortness of breath    Sleep apnea    CPAP    Unspecified essential hypertension    Visual impairment    glasses     Past Surgical History:   Procedure Laterality Date    Cholecystectomy  3/12/2023    Colonoscopy N/A 03/22/2016    [facility].  Hyperplastic polyp only.  Repeat 2026    Colonoscopy  05/10/2023    Dental surgery procedure      [place] teeth removed    Incision/drain abscess extra  1996    Knee arthroscopy Right     Laparoscopic cholecystectomy  02/12/2023    Myringoplasty      Other surgical history      R knee arthroscope    Other surgical history  07/17/2024    Exploratory laparotomy partial colectomy with colorectal anastamosis, extensive adhesiolysis    Part removal colon w end colostomy      Skin tissue procedure unlisted      Tonsillectomy       Social History:  Social History     Social History Narrative    Not on file     Social History     Socioeconomic History    Marital status:    Occupational History    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[employer]   Tobacco Use    Smoking status: Former     Current packs/day: 0.00     Average packs/day: 0.5 packs/day for 19.0 years (9.5 ttl pk-yrs)      Types: Cigarettes     Start date: 1981     Quit date: 2000     Years since quittin.4     Passive exposure: Never    Smokeless tobacco: Never   Vaping Use    Vaping status: Never Used   Substance and Sexual Activity    Alcohol use: Not Currently     Comment: Stopped alcohol 2000    Drug use: No    Sexual activity: Yes     Partners: Female   Other Topics Concern    Caffeine Concern Yes     Comment: coffee in AM/ soda     Exercise Yes     Comment: X1-2 times per week      Social Drivers of Health     Financial Resource Strain: Low Risk  (2024)    Financial Resource Strain     Difficulty of Paying Living Expenses: Not hard at all     Med Affordability: No   Food Insecurity: No Food Insecurity (2024)    Food Insecurity     Food Insecurity: Never true   Transportation Needs: No Transportation Needs (2024)    Transportation Needs     Lack of Transportation: No   Housing Stability: Low Risk  (2024)    Housing Stability     Housing Instability: No     Family History:  Family History   Problem Relation Age of Onset    Heart Disorder Father         Ablation required    Hypertension Father     Cancer Father 60        Prostate    Heart Disorder Mother         murmur no treatment needed    Hypertension Mother     Stroke Maternal Grandmother     Other (stroke) Maternal Grandmother     Dementia Paternal Grandmother     Heart Disorder Paternal Grandfather         two heart attacks and CHF    Diabetes Paternal Grandfather     Other (Diabetes, type 2) Paternal Grandfather      Allergies:  Allergies[1]  Current Meds:  Current Outpatient Medications   Medication Sig Dispense Refill    tobramycin 0.3 % Ophthalmic Solution Place 1 drop into the right eye every 4 (four) hours for 7 days. 1 each 0    sertraline (ZOLOFT) 50 MG Oral Tab 1.5 tablets daily 135 tablet 0    gabapentin 600 MG Oral Tab Take 1 tablet (600 mg total) by mouth 3 (three) times daily. 270 tablet 0    Verapamil HCl  MG Oral  Capsule SR 24 Hr       methylPREDNISolone (MEDROL) 4 MG Oral Tablet Therapy Pack Take as directed 21 tablet 0    baclofen 10 MG Oral Tab Take 1 tablet (10 mg total) by mouth 3 (three) times daily as needed (muscle spasm). 15 tablet 0    clonazePAM 0.5 MG Oral Tab Take 1 tablet (0.5 mg total) by mouth 3 (three) times daily. 90 tablet 2    lisinopril 20 MG Oral Tab Take 1 tablet (20 mg total) by mouth daily. 90 tablet 3    Multiple Vitamins-Minerals (MULTI-VITAMIN/MINERALS) Oral Tab Take 1 tablet by mouth daily.      Albuterol Sulfate  (90 Base) MCG/ACT Inhalation Aero Soln Inhale 2 puffs into the lungs every 4 (four) hours as needed for Wheezing or Shortness of Breath. 1 each 3    Tadalafil 20 MG Oral Tab Take 1 tablet (20 mg total) by mouth daily as needed for Erectile Dysfunction. 8 tablet 5      Counseling given: Not Answered         Problem List:  Patient Active Problem List   Diagnosis    Anxiety    Essential hypertension    ED (erectile dysfunction)    NELLIE (obstructive sleep apnea)    Generalized anxiety disorder    Hypokalemia    Hyperglycemia    Tooth pain    Daytime hypersomnolence    Symptomatic bradycardia    Palpitations    Slow heart rate    PVC (premature ventricular contraction)    Bipolar 1 disorder (HCC)    Sigmoid diverticulitis    Acute diverticulitis    Intractable abdominal pain    Postoperative examination    Preop testing    Right lower quadrant abdominal pain       REVIEW OF SYSTEMS:   Review of Systems  See HPI    EXAM:   /68 (BP Location: Right arm, Patient Position: Sitting, Cuff Size: large)   Pulse 88   Resp 20   Ht 6' 3\" (1.905 m)   Wt 271 lb (122.9 kg)   SpO2 97%   BMI 33.87 kg/m²  Estimated body mass index is 33.87 kg/m² as calculated from the following:    Height as of this encounter: 6' 3\" (1.905 m).    Weight as of this encounter: 271 lb (122.9 kg).   Neck in inches:      Wt Readings from Last 6 Encounters:   12/17/24 271 lb (122.9 kg)   12/17/24 271 lb (122.9 kg)    11/29/24 263 lb (119.3 kg)   11/06/24 260 lb (117.9 kg)   10/08/24 260 lb (117.9 kg)   09/23/24 268 lb (121.6 kg)     BP Readings from Last 3 Encounters:   12/17/24 118/68   12/17/24 108/62   11/29/24 143/77     Pulse Readings from Last 3 Encounters:   12/17/24 88   12/17/24 71   12/02/24 74     SpO2 Readings from Last 3 Encounters:   12/17/24 97%   12/17/24 97%   12/02/24 98%      Patient weight not recorded    Vital signs reviewed.  Physical Exam  Vitals and nursing note reviewed.   Constitutional:       Appearance: Normal appearance. He is obese.   HENT:      Head: Normocephalic and atraumatic.      Right Ear: External ear normal.      Left Ear: External ear normal.   Pulmonary:      Effort: Pulmonary effort is normal. No respiratory distress.   Musculoskeletal:      Cervical back: Normal range of motion and neck supple.   Neurological:      General: No focal deficit present.      Mental Status: He is alert and oriented to person, place, and time.   Psychiatric:         Attention and Perception: Attention and perception normal.         Mood and Affect: Mood and affect normal.         Speech: Speech normal.         Behavior: Behavior normal. Behavior is cooperative.         Thought Content: Thought content normal.         Cognition and Memory: Cognition and memory normal.         Judgment: Judgment normal.         ASSESSMENT AND PLAN:   1. NELLIE (obstructive sleep apnea)  - Struggling with mask fit and high pressure  - Mask refit today, turn off \"auto off\" feature  - RTO in 3 months    2. Essential hypertension  - /68    There are no Patient Instructions on file for this visit.    Independent interpretation of Sleep Download as defined above.  Continue with Rx management of Sleep apnea with PAP therapy.    COMPLIANCE is required by insurance for 4 hours a night most nights of the week.    Advised if still with sleep apnea and not using CPAP has a 7 fold increase in risk of heart attack, stroke, abnormal  heart rhythm  and death,  increased risk of driving accidents.     Advised to refrain from driving when sleepy.      Recommend weight loss, and maintain and optimal BMI with Exercise 30 minutes most days to target heart rate .     Advised patient to change filters,masks,hoses  and tubes and equiptment on a  regular schedule.    Filters and seals shall be changed every 1 month,  Hoses every 3 months,   CPAP mask and humidifier chamber changed every 6 month  with the durable medical equipment provider.         Meds & Refills for this Visit:  Requested Prescriptions      No prescriptions requested or ordered in this encounter       Outcome: Parent verbalizes understanding. Parent is notified to call with any questions, complications, allergies, or worsening or changing symptoms.  Parent is to call with any side effects or complications from the treatments as a result of today.     \" This note was created utilizing Dragon speech recognition software.  Please excuse any grammatical errors. Call my office if you have any questions regarding this note. \"     Lary DELATORRE LPN  12/16/2024  1:16 PM         [1]   Allergies  Allergen Reactions    Ciprofloxacin OTHER (SEE COMMENTS)     Increases risk for arrhythmia

## 2024-12-17 ENCOUNTER — OFFICE VISIT (OUTPATIENT)
Dept: FAMILY MEDICINE CLINIC | Facility: CLINIC | Age: 59
End: 2024-12-17
Payer: COMMERCIAL

## 2024-12-17 ENCOUNTER — OFFICE VISIT (OUTPATIENT)
Facility: CLINIC | Age: 59
End: 2024-12-17
Payer: COMMERCIAL

## 2024-12-17 VITALS
DIASTOLIC BLOOD PRESSURE: 68 MMHG | SYSTOLIC BLOOD PRESSURE: 118 MMHG | OXYGEN SATURATION: 97 % | BODY MASS INDEX: 33.69 KG/M2 | HEART RATE: 88 BPM | HEIGHT: 75 IN | WEIGHT: 271 LBS | RESPIRATION RATE: 20 BRPM

## 2024-12-17 VITALS
HEART RATE: 71 BPM | TEMPERATURE: 98 F | RESPIRATION RATE: 16 BRPM | HEIGHT: 75 IN | OXYGEN SATURATION: 97 % | WEIGHT: 271 LBS | DIASTOLIC BLOOD PRESSURE: 62 MMHG | BODY MASS INDEX: 33.69 KG/M2 | SYSTOLIC BLOOD PRESSURE: 108 MMHG

## 2024-12-17 DIAGNOSIS — H00.011 HORDEOLUM EXTERNUM OF RIGHT UPPER EYELID: Primary | ICD-10-CM

## 2024-12-17 DIAGNOSIS — G47.33 OSA (OBSTRUCTIVE SLEEP APNEA): Primary | ICD-10-CM

## 2024-12-17 DIAGNOSIS — I10 ESSENTIAL HYPERTENSION: ICD-10-CM

## 2024-12-17 PROCEDURE — 99214 OFFICE O/P EST MOD 30 MIN: CPT | Performed by: PHYSICIAN ASSISTANT

## 2024-12-17 PROCEDURE — 99213 OFFICE O/P EST LOW 20 MIN: CPT | Performed by: NURSE PRACTITIONER

## 2024-12-17 RX ORDER — TOBRAMYCIN 3 MG/ML
1 SOLUTION/ DROPS OPHTHALMIC EVERY 4 HOURS
Qty: 1 EACH | Refills: 0 | Status: SHIPPED | OUTPATIENT
Start: 2024-12-17 | End: 2024-12-24

## 2024-12-17 NOTE — PATIENT INSTRUCTIONS
Warm compresses  Medication as prescribed  Follow up with ophthalmology if not improving the next week  Seek urgent follow up for new/ worsening symptoms.

## 2024-12-17 NOTE — PROGRESS NOTES
CHIEF COMPLAINT:     Chief Complaint   Patient presents with    Eye Problem     2 weeks, redness, irriation, slight swelling, right eye  OTC eyewash       HPI:   Don Cabral is a 59 year old male who presents with chief complaint of sty to right upper eyelid. Symptoms began  2  weeks ago. Using warm compresses and eyelid wash, he is getting the sty to drain some but it has still not resolved. Denies eye pain or vision changes.    Current Outpatient Medications   Medication Sig Dispense Refill    sertraline (ZOLOFT) 50 MG Oral Tab 1.5 tablets daily 135 tablet 0    gabapentin 600 MG Oral Tab Take 1 tablet (600 mg total) by mouth 3 (three) times daily. 270 tablet 0    Verapamil HCl  MG Oral Capsule SR 24 Hr       methylPREDNISolone (MEDROL) 4 MG Oral Tablet Therapy Pack Take as directed 21 tablet 0    baclofen 10 MG Oral Tab Take 1 tablet (10 mg total) by mouth 3 (three) times daily as needed (muscle spasm). 15 tablet 0    clonazePAM 0.5 MG Oral Tab Take 1 tablet (0.5 mg total) by mouth 3 (three) times daily. 90 tablet 2    lisinopril 20 MG Oral Tab Take 1 tablet (20 mg total) by mouth daily. 90 tablet 3    Multiple Vitamins-Minerals (MULTI-VITAMIN/MINERALS) Oral Tab Take 1 tablet by mouth daily.      Albuterol Sulfate  (90 Base) MCG/ACT Inhalation Aero Soln Inhale 2 puffs into the lungs every 4 (four) hours as needed for Wheezing or Shortness of Breath. 1 each 3    Tadalafil 20 MG Oral Tab Take 1 tablet (20 mg total) by mouth daily as needed for Erectile Dysfunction. 8 tablet 5      Past Medical History:    Allergic rhinitis    Anxiety    Arrhythmia    T1 AV block, LBBB    Back pain    Back problem    lower back    Depression    Diverticulitis    ED (erectile dysfunction)    High blood pressure    History of adverse reaction to anesthesia    Patient states he doesnt know exactly what happened but stated he required haldol post anesthesia    Pneumonia due to organism    Seasonal allergies     Shortness of breath    Sleep apnea    CPAP    Unspecified essential hypertension    Visual impairment    glasses      Past Surgical History:   Procedure Laterality Date    Cholecystectomy  3/12/2023    Colonoscopy N/A 2016    [facility].  Hyperplastic polyp only.  Repeat     Colonoscopy  05/10/2023    Dental surgery procedure      [place] teeth removed    Incision/drain abscess extra      Knee arthroscopy Right     Laparoscopic cholecystectomy  2023    Myringoplasty      Other surgical history      R knee arthroscope    Other surgical history  2024    Exploratory laparotomy partial colectomy with colorectal anastamosis, extensive adhesiolysis    Part removal colon w end colostomy      Skin tissue procedure unlisted      Tonsillectomy        Family History   Problem Relation Age of Onset    Heart Disorder Father         Ablation required    Hypertension Father     Cancer Father 60        Prostate    Heart Disorder Mother         murmur no treatment needed    Hypertension Mother     Stroke Maternal Grandmother     Other (stroke) Maternal Grandmother     Dementia Paternal Grandmother     Heart Disorder Paternal Grandfather         two heart attacks and CHF    Diabetes Paternal Grandfather     Other (Diabetes, type 2) Paternal Grandfather       Social History     Socioeconomic History    Marital status:    Occupational History    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[employer]   Tobacco Use    Smoking status: Former     Current packs/day: 0.00     Average packs/day: 0.5 packs/day for 19.0 years (9.5 ttl pk-yrs)     Types: Cigarettes     Start date: 1981     Quit date: 2000     Years since quittin.4     Passive exposure: Never    Smokeless tobacco: Never   Vaping Use    Vaping status: Never Used   Substance and Sexual Activity    Alcohol use: Not Currently     Comment: Stopped alcohol 2000    Drug use: No    Sexual activity: Yes     Partners: Female   Other Topics Concern     Caffeine Concern Yes     Comment: coffee in AM/ soda     Exercise Yes     Comment: X1-2 times per week      Social Drivers of Health     Financial Resource Strain: Low Risk  (7/26/2024)    Financial Resource Strain     Difficulty of Paying Living Expenses: Not hard at all     Med Affordability: No   Food Insecurity: No Food Insecurity (7/17/2024)    Food Insecurity     Food Insecurity: Never true   Transportation Needs: No Transportation Needs (7/26/2024)    Transportation Needs     Lack of Transportation: No   Housing Stability: Low Risk  (7/17/2024)    Housing Stability     Housing Instability: No         REVIEW OF SYSTEMS:   GENERAL: feels well otherwise  SKIN: no rashes  EYES:denies blurred vision or double vision. See HPI  HENT: denies ear pain, congestion, sore throat  LUNGS: denies shortness of breath or cough  CARDIOVASCULAR: denies chest pain or palpitations   GI: denies N/V/C or abdominal pain  NEURO: denies headaches     EXAM:   /62   Pulse 71   Temp 97.6 °F (36.4 °C)   Resp 16   Ht 6' 3\" (1.905 m)   Wt 271 lb (122.9 kg)   SpO2 97%   BMI 33.87 kg/m²   GENERAL: well developed, well nourished,in no apparent distress  SKIN: no rashes,no suspicious lesions  EYES: PERRLA, EOMI, bilateral conjunctiva non erythematous. Small hordeolum noted to right upper eyelid on lashline  HENT: atraumatic, normocephalic  NECK: supple, non tender  LUNGS: clear to auscultation bilaterally.   CARDIO: RRR without murmur  LYMPH: no preauricular lymphadenopathy. No cervical lymphadenopathy    ASSESSMENT AND PLAN:   Don Cabral is a 59 year old male who presents with:    ASSESSMENT:   Encounter Diagnosis   Name Primary?    Hordeolum externum of right upper eyelid Yes       PLAN: Discussed comfort measures.   Medication as listed below  To follow up with ophthalmology if not improving the next week  To seek urgent follow up for new/ worsening symptoms.      Requested Prescriptions     Signed Prescriptions Disp  Refills    tobramycin 0.3 % Ophthalmic Solution 1 each 0     Sig: Place 1 drop into the right eye every 4 (four) hours for 7 days.         Patient Instructions   Warm compresses  Medication as prescribed  Follow up with ophthalmology if not improving the next week  Seek urgent follow up for new/ worsening symptoms.     Call or return if not improved in 2-3 days.  The patient is asked to follow up with their PCP prn.

## 2025-01-09 NOTE — TELEPHONE ENCOUNTER
01/09/2025, 02:00 PM    by JIM VEGA    Settings sent to device    Request [id number]    Set Mode to VAuto  Set Min EPAP to 16.0 cmH2O  Set Max IPAP to 20.0 cmH2O  Set Pressure support to 4.0 cmH2O  Set Ramp enable to On  Set Ramp time to 30 min  Set Start EPAP to 4.0 cmH2O  Set Ti Min to 0.3 sec  Set Ti Max to 2.0 sec  Set Trigger to Medium  Set Cycle Sensitivity to Medium  Set Climate Control to Auto  Set Humidifier level to Auto  Set Tube temperature to Auto

## 2025-01-11 ENCOUNTER — OFFICE VISIT (OUTPATIENT)
Dept: FAMILY MEDICINE CLINIC | Facility: CLINIC | Age: 60
End: 2025-01-11
Payer: COMMERCIAL

## 2025-01-11 VITALS
WEIGHT: 272 LBS | SYSTOLIC BLOOD PRESSURE: 124 MMHG | DIASTOLIC BLOOD PRESSURE: 66 MMHG | HEIGHT: 75 IN | RESPIRATION RATE: 16 BRPM | HEART RATE: 85 BPM | OXYGEN SATURATION: 98 % | BODY MASS INDEX: 33.82 KG/M2

## 2025-01-11 DIAGNOSIS — R00.2 PALPITATIONS: ICD-10-CM

## 2025-01-11 DIAGNOSIS — E66.09 CLASS 1 OBESITY DUE TO EXCESS CALORIES WITH SERIOUS COMORBIDITY AND BODY MASS INDEX (BMI) OF 34.0 TO 34.9 IN ADULT: Primary | ICD-10-CM

## 2025-01-11 DIAGNOSIS — E66.811 CLASS 1 OBESITY DUE TO EXCESS CALORIES WITH SERIOUS COMORBIDITY AND BODY MASS INDEX (BMI) OF 34.0 TO 34.9 IN ADULT: Primary | ICD-10-CM

## 2025-01-11 PROCEDURE — 99213 OFFICE O/P EST LOW 20 MIN: CPT | Performed by: FAMILY MEDICINE

## 2025-01-11 RX ORDER — TADALAFIL 20 MG/1
20 TABLET ORAL
Qty: 12 TABLET | Refills: 1 | Status: SHIPPED | OUTPATIENT
Start: 2025-01-11

## 2025-01-11 NOTE — PROGRESS NOTES
[facility] Progress Note    SUBJECTIVE: Don Cabral 59 year old male is here today for   Chief Complaint   Patient presents with    Weight Problem       Has a few things going on to update on.    When I saw him last, had the abdominal surgery, reanastomosis, has a diet with no seeds or nuts. Arrhythmia continues, follows with Dr. Mars, some benefit on verpamil but still present. LBBB, had a nuclear PET that was normal. 6 month follow up. Still have type 1 AV block, PVCs    Sleep apnea, struggling with the new CPAP. Feels like things changed after his first surgery, CPAP just didn't work well, despite being at his lowest weight fo ra while, put to the side assuming related to stressors of the events at the time, had an updated sleep study, now on Bipap fairly high pressure, sometimes breaks the seal. Now adjusting to find the right dose. I reviewed Barbara Bailey PA-C notes. This has led to less regular use of the machine.    Saw ophthalmology, due to possible eye infection, was treated with tobramycin.  However assumed not  infection, but ry eye, irritation from eyes more open, broekn seal and air towards eyes. Sleep quality    Psych meds, feels like SSRI less effective, sees pyschiatry, and despite less effective, still manageable, zoloft at 75 mg. Does better with this than previously, due to benefits from therapy.    Primary worry is his weight, recently pulled a muscle in his back, Currently at the heaviest he has been in a few years.    Currently working long hours, winter mor elimiting. Work is desk bound more than previosuly.      PMH  Past Medical History:    Allergic rhinitis    Anxiety    Arrhythmia    T1 AV block, LBBB    Back pain    Back problem    lower back    Depression    Diverticulitis    ED (erectile dysfunction)    High blood pressure    History of adverse reaction to anesthesia    Patient states he doesnt know exactly what happened but stated he required haldol post anesthesia     Pneumonia due to organism    Seasonal allergies    Shortness of breath    Sleep apnea    CPAP    Unspecified essential hypertension    Visual impairment    glasses        PSH  Past Surgical History:   Procedure Laterality Date    Cholecystectomy  3/12/2023    Colonoscopy N/A 03/22/2016    [facility].  Hyperplastic polyp only.  Repeat 2026    Colonoscopy  05/10/2023    Dental surgery procedure      [place] teeth removed    Incision/drain abscess extra  1996    Knee arthroscopy Right     Laparoscopic cholecystectomy  02/12/2023    Myringoplasty      Other surgical history      R knee arthroscope    Other surgical history  07/17/2024    Exploratory laparotomy partial colectomy with colorectal anastamosis, extensive adhesiolysis    Part removal colon w end colostomy      Skin tissue procedure unlisted      Tonsillectomy          Social Hx:  See HPI    ROS  See HPI    OBJECTIVE:  /66   Pulse 85   Resp 16   Ht 6' 3\" (1.905 m)   Wt 272 lb (123.4 kg)   SpO2 98%   BMI 34.00 kg/m²           Labs:          Meds:   Current Outpatient Medications   Medication Sig Dispense Refill    semaglutide-weight management 0.25 MG/0.5ML Subcutaneous Solution Auto-injector Inject 0.5 mL (0.25 mg total) into the skin once a week for 4 doses. 2 mL 0    Tadalafil 20 MG Oral Tab Take 1 tablet (20 mg total) by mouth daily as needed for Erectile Dysfunction. 12 tablet 1    sertraline (ZOLOFT) 50 MG Oral Tab 1.5 tablets daily 135 tablet 0    gabapentin 600 MG Oral Tab Take 1 tablet (600 mg total) by mouth 3 (three) times daily. 270 tablet 0    Verapamil HCl  MG Oral Capsule SR 24 Hr       clonazePAM 0.5 MG Oral Tab Take 1 tablet (0.5 mg total) by mouth 3 (three) times daily. 90 tablet 2    lisinopril 20 MG Oral Tab Take 1 tablet (20 mg total) by mouth daily. 90 tablet 3    Multiple Vitamins-Minerals (MULTI-VITAMIN/MINERALS) Oral Tab Take 1 tablet by mouth daily.      Albuterol Sulfate  (90 Base) MCG/ACT Inhalation Aero Soln  Inhale 2 puffs into the lungs every 4 (four) hours as needed for Wheezing or Shortness of Breath. 1 each 3         Assessment/Plan  Don was seen today for weight problem.    Diagnoses and all orders for this visit:    Class 1 obesity due to excess calories with serious comorbidity and body mass index (BMI) of 34.0 to 34.9 in adult  -     semaglutide-weight management 0.25 MG/0.5ML Subcutaneous Solution Auto-injector; Inject 0.5 mL (0.25 mg total) into the skin once a week for 4 doses.    Other orders  -     Tadalafil 20 MG Oral Tab; Take 1 tablet (20 mg total) by mouth daily as needed for Erectile Dysfunction.            Did warn prior auths are not processed by us, but can see if goes through, he is an appropriate canddiate    Does have mild early cataracts.    Goals, working towards CPAP/BIPAP working better,     So ultimate goal is weight loss.      Total Time spent with patient and coordinating care:  25 minutes.    Follow up: for physical      Stephen Christianson MD

## 2025-01-17 ENCOUNTER — APPOINTMENT (OUTPATIENT)
Dept: CT IMAGING | Age: 60
End: 2025-01-17
Attending: EMERGENCY MEDICINE
Payer: COMMERCIAL

## 2025-01-17 ENCOUNTER — HOSPITAL ENCOUNTER (OUTPATIENT)
Age: 60
Discharge: HOME | End: 2025-01-17
Attending: EMERGENCY MEDICINE
Payer: COMMERCIAL

## 2025-01-17 VITALS
HEART RATE: 83 BPM | DIASTOLIC BLOOD PRESSURE: 74 MMHG | TEMPERATURE: 98 F | HEIGHT: 74 IN | WEIGHT: 267 LBS | RESPIRATION RATE: 18 BRPM | OXYGEN SATURATION: 97 % | SYSTOLIC BLOOD PRESSURE: 128 MMHG | BODY MASS INDEX: 34.27 KG/M2

## 2025-01-17 DIAGNOSIS — R10.33 ABDOMINAL PAIN, PERIUMBILICAL: ICD-10-CM

## 2025-01-17 DIAGNOSIS — A08.4 VIRAL ENTERITIS: Primary | ICD-10-CM

## 2025-01-17 LAB
#MXD IC: 0.6 X10ˆ3/UL (ref 0.1–1)
ATRIAL RATE: 78 BPM
BILIRUB UR QL STRIP: NEGATIVE
BUN BLD-MCNC: 15 MG/DL (ref 7–18)
CHLORIDE BLD-SCNC: 101 MMOL/L (ref 98–112)
CLARITY UR: CLEAR
CO2 BLD-SCNC: 24 MMOL/L (ref 21–32)
COLOR UR: YELLOW
CREAT BLD-MCNC: 0.8 MG/DL
EGFRCR SERPLBLD CKD-EPI 2021: 103 ML/MIN/1.73M2 (ref 60–?)
GLUCOSE BLD-MCNC: 93 MG/DL (ref 70–99)
GLUCOSE UR STRIP-MCNC: NEGATIVE MG/DL
HCT VFR BLD AUTO: 42.3 %
HCT VFR BLD CALC: 43 %
HGB BLD-MCNC: 13.8 G/DL
ISTAT IONIZED CALCIUM FOR CHEM 8: 1.19 MMOL/L (ref 1.12–1.32)
KETONES UR STRIP-MCNC: NEGATIVE MG/DL
LEUKOCYTE ESTERASE UR QL STRIP: NEGATIVE
LYMPHOCYTES # BLD AUTO: 2.4 X10ˆ3/UL (ref 1–4)
LYMPHOCYTES NFR BLD AUTO: 24.2 %
MCH RBC QN AUTO: 27.7 PG (ref 26–34)
MCHC RBC AUTO-ENTMCNC: 32.6 G/DL (ref 31–37)
MCV RBC AUTO: 84.8 FL (ref 80–100)
MIXED CELL %: 6.1 %
NEUTROPHILS # BLD AUTO: 6.9 X10ˆ3/UL (ref 1.5–7.7)
NEUTROPHILS NFR BLD AUTO: 69.7 %
NITRITE UR QL STRIP: NEGATIVE
P AXIS: 82 DEGREES
P-R INTERVAL: 250 MS
PH UR STRIP: 7 [PH]
PLATELET # BLD AUTO: 388 X10ˆ3/UL (ref 150–450)
POTASSIUM BLD-SCNC: 3.9 MMOL/L (ref 3.6–5.1)
PROT UR STRIP-MCNC: NEGATIVE MG/DL
Q-T INTERVAL: 382 MS
QRS DURATION: 128 MS
QTC CALCULATION (BEZET): 435 MS
R AXIS: -35 DEGREES
RBC # BLD AUTO: 4.99 X10ˆ6/UL
SODIUM BLD-SCNC: 136 MMOL/L (ref 136–145)
SP GR UR STRIP: 1.02
T AXIS: 89 DEGREES
UROBILINOGEN UR STRIP-ACNC: <2 MG/DL
VENTRICULAR RATE: 78 BPM
WBC # BLD AUTO: 9.9 X10ˆ3/UL (ref 4–11)

## 2025-01-17 PROCEDURE — 36415 COLL VENOUS BLD VENIPUNCTURE: CPT

## 2025-01-17 PROCEDURE — 80047 BASIC METABLC PNL IONIZED CA: CPT

## 2025-01-17 PROCEDURE — 74177 CT ABD & PELVIS W/CONTRAST: CPT | Performed by: EMERGENCY MEDICINE

## 2025-01-17 PROCEDURE — 93010 ELECTROCARDIOGRAM REPORT: CPT

## 2025-01-17 PROCEDURE — 99215 OFFICE O/P EST HI 40 MIN: CPT

## 2025-01-17 PROCEDURE — 93005 ELECTROCARDIOGRAM TRACING: CPT

## 2025-01-17 PROCEDURE — 81002 URINALYSIS NONAUTO W/O SCOPE: CPT

## 2025-01-17 PROCEDURE — 85025 COMPLETE CBC W/AUTO DIFF WBC: CPT | Performed by: EMERGENCY MEDICINE

## 2025-01-17 NOTE — ED INITIAL ASSESSMENT (HPI)
Generalized abdominal pain starting on Monday. Diarrhea starting on Tuesday. Reports constipation today, but when he went to the bathroom he had blood on stool. Denies fevers.     Also states that he has been having increased palpitations this week.

## 2025-01-17 NOTE — ED PROVIDER NOTES
Patient Seen in: [facility]      History     Chief Complaint   Patient presents with    Abdomen/Flank Pain    GI Bleeding    Arrythmia/Palpitations     Stated Complaint: Abdominal pain    Subjective:   HPI      58 yo male with prior diverticulitis and colon resection. He presents now with abdominal pain that started on Monday. Pain was in the center of the abdomen and associated with bloating. Developed diarrhea on Tuesday. No vomiting. Today is a bit constipated and strained to have a bowel movement and noted blood on the stool. Has a known arrhythmia and feels like he has felt the irregularity more this week.     Objective:     Past Medical History:    Allergic rhinitis    Anxiety    Arrhythmia    T1 AV block, LBBB    Back pain    Back problem    lower back    Depression    Diverticulitis    ED (erectile dysfunction)    High blood pressure    History of adverse reaction to anesthesia    Patient states he doesnt know exactly what happened but stated he required haldol post anesthesia    Pneumonia due to organism    Seasonal allergies    Shortness of breath    Sleep apnea    CPAP    Unspecified essential hypertension    Visual impairment    glasses              Past Surgical History:   Procedure Laterality Date    Cholecystectomy  3/12/2023    Colonoscopy N/A 03/22/2016    [facility].  Hyperplastic polyp only.  Repeat 2026    Colonoscopy  05/10/2023    Dental surgery procedure      [place] teeth removed    Incision/drain abscess extra  1996    Knee arthroscopy Right     Laparoscopic cholecystectomy  02/12/2023    Myringoplasty      Other surgical history      R knee arthroscope    Other surgical history  07/17/2024    Exploratory laparotomy partial colectomy with colorectal anastamosis, extensive adhesiolysis    Part removal colon w end colostomy      Skin tissue procedure unlisted      Tonsillectomy                  Social History     Socioeconomic History    Marital status:    Occupational  History    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[employer]   Tobacco Use    Smoking status: Former     Current packs/day: 0.00     Average packs/day: 0.5 packs/day for 19.0 years (9.5 ttl pk-yrs)     Types: Cigarettes     Start date: 1981     Quit date: 2000     Years since quittin.5     Passive exposure: Never    Smokeless tobacco: Never   Vaping Use    Vaping status: Never Used   Substance and Sexual Activity    Alcohol use: Not Currently     Comment: Stopped alcohol 2000    Drug use: No    Sexual activity: Yes     Partners: Female   Other Topics Concern    Caffeine Concern Yes     Comment: coffee in AM/ soda     Exercise Yes     Comment: X1-2 times per week      Social Drivers of Health     Financial Resource Strain: Low Risk  (2024)    Financial Resource Strain     Difficulty of Paying Living Expenses: Not hard at all     Med Affordability: No   Food Insecurity: No Food Insecurity (2024)    Food Insecurity     Food Insecurity: Never true   Transportation Needs: No Transportation Needs (2024)    Transportation Needs     Lack of Transportation: No   Housing Stability: Low Risk  (2024)    Housing Stability     Housing Instability: No              Review of Systems    Positive for stated complaint: Abdominal pain  Other systems are as noted in HPI.  Constitutional and vital signs reviewed.      All other systems reviewed and negative except as noted above.    Physical Exam     ED Triage Vitals [25 1353]   /74   Pulse 83   Resp 18   Temp 98.1 °F (36.7 °C)   Temp src Oral   SpO2 97 %   O2 Device None (Room air)       Current Vitals:   Vital Signs  BP: 128/74  Pulse: 83  Resp: 18  Temp: 98.1 °F (36.7 °C)  Temp src: Oral    Oxygen Therapy  SpO2: 97 %  O2 Device: None (Room air)        Physical Exam  Vitals and nursing note reviewed.   Constitutional:       Appearance: Normal appearance. He is well-developed.   HENT:      Head: Normocephalic and atraumatic.   Cardiovascular:       Rate and Rhythm: Normal rate and regular rhythm.   Pulmonary:      Effort: Pulmonary effort is normal. No respiratory distress.   Abdominal:      General: Abdomen is protuberant.      Palpations: Abdomen is soft.      Tenderness: There is no abdominal tenderness.   Skin:     General: Skin is warm and dry.      Capillary Refill: Capillary refill takes less than 2 seconds.   Neurological:      General: No focal deficit present.      Mental Status: He is alert.   Psychiatric:         Mood and Affect: Mood normal.         Behavior: Behavior normal.            ED Course     Labs Reviewed   [facility] POCT URINALYSIS DIPSTICK - Abnormal; Notable for the following components:       Result Value    Blood, Urine Trace-Intact (*)     All other components within normal limits   POCT ISTAT CHEM8 CARTRIDGE - Normal   POCT CBC     EKG    Rate, intervals and axes as noted on EKG Report.  Rate: 78  Rhythm: Sinus Rhythm  Readinst degree AV block, Left axis, LBBB. Abnormal EKG. Similar findings on previous.                        MDM             Medical Decision Making  Diverticulitis, viral infection, metabolic derangement all in differential.   EKG reviewed by myself. No acute change.   CBC and Chem both reviewed by myself and are normal.   CT images reviewed by myself. No acute process. Stable hemangioma.   Discharge home. Diet as tolerated.       Disposition and Plan     Clinical Impression:  1. Viral enteritis    2. Abdominal pain, periumbilical         Disposition:  Discharge  2025  4:29 pm    Follow-up:  Stephen Christianson MD  [address] [place] [id number]  [phone]      As needed          Medications Prescribed:  Current Discharge Medication List              Supplementary Documentation:

## 2025-01-28 ENCOUNTER — NURSE ONLY (OUTPATIENT)
Dept: FAMILY MEDICINE CLINIC | Facility: CLINIC | Age: 60
End: 2025-01-28
Payer: COMMERCIAL

## 2025-01-28 ENCOUNTER — TELEPHONE (OUTPATIENT)
Dept: FAMILY MEDICINE CLINIC | Facility: CLINIC | Age: 60
End: 2025-01-28

## 2025-01-28 PROCEDURE — 90715 TDAP VACCINE 7 YRS/> IM: CPT | Performed by: FAMILY MEDICINE

## 2025-01-28 PROCEDURE — 90471 IMMUNIZATION ADMIN: CPT | Performed by: FAMILY MEDICINE

## 2025-02-01 ENCOUNTER — APPOINTMENT (OUTPATIENT)
Dept: CT IMAGING | Age: 60
End: 2025-02-01
Attending: EMERGENCY MEDICINE
Payer: COMMERCIAL

## 2025-02-01 ENCOUNTER — HOSPITAL ENCOUNTER (EMERGENCY)
Age: 60
Discharge: HOME | End: 2025-02-01
Attending: PHYSICAL MEDICINE & REHABILITATION
Payer: COMMERCIAL

## 2025-02-01 VITALS
TEMPERATURE: 98 F | DIASTOLIC BLOOD PRESSURE: 65 MMHG | RESPIRATION RATE: 18 BRPM | HEART RATE: 65 BPM | HEIGHT: 74 IN | WEIGHT: 270 LBS | SYSTOLIC BLOOD PRESSURE: 118 MMHG | OXYGEN SATURATION: 97 % | BODY MASS INDEX: 34.65 KG/M2

## 2025-02-01 DIAGNOSIS — K63.89 EPIPLOIC APPENDAGITIS: Primary | ICD-10-CM

## 2025-02-01 LAB
ALBUMIN SERPL-MCNC: 4.6 G/DL (ref 3.2–4.8)
ALBUMIN/GLOB SERPL: 1.6 {RATIO} (ref 1–2)
ALP LIVER SERPL-CCNC: 71 U/L
ALT SERPL-CCNC: 15 U/L
ANION GAP SERPL CALC-SCNC: 10 MMOL/L (ref 0–18)
AST SERPL-CCNC: 13 U/L (ref ?–34)
BASOPHILS # BLD AUTO: 0.01 X10(3) UL (ref 0–0.2)
BASOPHILS NFR BLD AUTO: 0.1 %
BILIRUB SERPL-MCNC: 1.1 MG/DL (ref 0.3–1.2)
BUN BLD-MCNC: 13 MG/DL (ref 9–23)
BUN BLD-MCNC: 14 MG/DL (ref 7–18)
CALCIUM BLD-MCNC: 9.5 MG/DL (ref 8.7–10.6)
CHLORIDE BLD-SCNC: 103 MMOL/L (ref 98–112)
CHLORIDE SERPL-SCNC: 103 MMOL/L (ref 98–112)
CO2 BLD-SCNC: 23 MMOL/L (ref 21–32)
CO2 SERPL-SCNC: 24 MMOL/L (ref 21–32)
CREAT BLD-MCNC: 0.7 MG/DL
CREAT BLD-MCNC: 0.72 MG/DL
EGFRCR SERPLBLD CKD-EPI 2021: 106 ML/MIN/1.73M2 (ref 60–?)
EGFRCR SERPLBLD CKD-EPI 2021: 107 ML/MIN/1.73M2 (ref 60–?)
EOSINOPHIL # BLD AUTO: 0.22 X10(3) UL (ref 0–0.7)
EOSINOPHIL NFR BLD AUTO: 2.2 %
ERYTHROCYTE [DISTWIDTH] IN BLOOD BY AUTOMATED COUNT: 14.8 %
GLOBULIN PLAS-MCNC: 2.8 G/DL (ref 2–3.5)
GLUCOSE BLD-MCNC: 112 MG/DL (ref 70–99)
GLUCOSE BLD-MCNC: 114 MG/DL (ref 70–99)
HCT VFR BLD AUTO: 42.4 %
HCT VFR BLD CALC: 41 %
HGB BLD-MCNC: 14.2 G/DL
IMM GRANULOCYTES # BLD AUTO: 0.05 X10(3) UL (ref 0–1)
IMM GRANULOCYTES NFR BLD: 0.5 %
ISTAT IONIZED CALCIUM FOR CHEM 8: 1.13 MMOL/L (ref 1.12–1.32)
LYMPHOCYTES # BLD AUTO: 2.26 X10(3) UL (ref 1–4)
LYMPHOCYTES NFR BLD AUTO: 22.8 %
MCH RBC QN AUTO: 28.3 PG (ref 26–34)
MCHC RBC AUTO-ENTMCNC: 33.5 G/DL (ref 31–37)
MCV RBC AUTO: 84.6 FL
MONOCYTES # BLD AUTO: 0.64 X10(3) UL (ref 0.1–1)
MONOCYTES NFR BLD AUTO: 6.5 %
NEUTROPHILS # BLD AUTO: 6.73 X10 (3) UL (ref 1.5–7.7)
NEUTROPHILS # BLD AUTO: 6.73 X10(3) UL (ref 1.5–7.7)
NEUTROPHILS NFR BLD AUTO: 67.9 %
OSMOLALITY SERPL CALC.SUM OF ELEC: 285 MOSM/KG (ref 275–295)
PLATELET # BLD AUTO: 415 10(3)UL (ref 150–450)
POTASSIUM BLD-SCNC: 3.8 MMOL/L (ref 3.6–5.1)
POTASSIUM SERPL-SCNC: 3.8 MMOL/L (ref 3.5–5.1)
PROT SERPL-MCNC: 7.4 G/DL (ref 5.7–8.2)
RBC # BLD AUTO: 5.01 X10(6)UL
SODIUM BLD-SCNC: 138 MMOL/L (ref 136–145)
SODIUM SERPL-SCNC: 137 MMOL/L (ref 136–145)
WBC # BLD AUTO: 9.9 X10(3) UL (ref 4–11)

## 2025-02-01 PROCEDURE — 74176 CT ABD & PELVIS W/O CONTRAST: CPT | Performed by: EMERGENCY MEDICINE

## 2025-02-01 PROCEDURE — 80047 BASIC METABLC PNL IONIZED CA: CPT

## 2025-02-01 PROCEDURE — 99285 EMERGENCY DEPT VISIT HI MDM: CPT

## 2025-02-01 PROCEDURE — 99284 EMERGENCY DEPT VISIT MOD MDM: CPT

## 2025-02-01 PROCEDURE — 96374 THER/PROPH/DIAG INJ IV PUSH: CPT

## 2025-02-01 PROCEDURE — 80053 COMPREHEN METABOLIC PANEL: CPT | Performed by: PHYSICAL MEDICINE & REHABILITATION

## 2025-02-01 PROCEDURE — 85025 COMPLETE CBC W/AUTO DIFF WBC: CPT | Performed by: PHYSICAL MEDICINE & REHABILITATION

## 2025-02-01 RX ORDER — KETOROLAC TROMETHAMINE 15 MG/ML
15 INJECTION, SOLUTION INTRAMUSCULAR; INTRAVENOUS ONCE
Status: COMPLETED | OUTPATIENT
Start: 2025-02-01 | End: 2025-02-01

## 2025-02-01 NOTE — ED INITIAL ASSESSMENT (HPI)
PT to the ED for evaluation of RLQ abd pain that began Thursday afternoon and has been getting progressively worse. PT denies n/v/d.

## 2025-02-01 NOTE — ED PROVIDER NOTES
Patient Seen in: [facility] [place]      History     Chief Complaint   Patient presents with    Abdomen/Flank Pain     Stated Complaint: RLQ abdominal pain since Thursday afternoon. Getting progressively worse.    Subjective:   HPI      Patient has a history of recurrent diverticulitis that required an urgent surgery years ago.  He is here today for right lower quadrant pain that began Thursday and has progressed.  No nausea no vomiting or diarrhea.  No urinary symptoms.  No fevers.  No upper abdominal pain.    Objective:     Past Medical History:    Allergic rhinitis    Anxiety    Arrhythmia    T1 AV block, LBBB    Back pain    Back problem    lower back    Depression    Diverticulitis    ED (erectile dysfunction)    High blood pressure    History of adverse reaction to anesthesia    Patient states he doesnt know exactly what happened but stated he required haldol post anesthesia    Pneumonia due to organism    Seasonal allergies    Shortness of breath    Sleep apnea    CPAP    Unspecified essential hypertension    Visual impairment    glasses              Past Surgical History:   Procedure Laterality Date    Cholecystectomy  3/12/2023    Colonoscopy N/A 03/22/2016    [facility].  Hyperplastic polyp only.  Repeat 2026    Colonoscopy  05/10/2023    Dental surgery procedure      [place] teeth removed    Incision/drain abscess extra  1996    Knee arthroscopy Right     Laparoscopic cholecystectomy  02/12/2023    Myringoplasty      Other surgical history      R knee arthroscope    Other surgical history  07/17/2024    Exploratory laparotomy partial colectomy with colorectal anastamosis, extensive adhesiolysis    Part removal colon w end colostomy      Skin tissue procedure unlisted      Tonsillectomy                  Social History     Socioeconomic History    Marital status:    Occupational History    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[employer]   Tobacco Use    Smoking status: Former      Current packs/day: 0.00     Average packs/day: 0.5 packs/day for 19.0 years (9.5 ttl pk-yrs)     Types: Cigarettes     Start date: 1981     Quit date: 2000     Years since quittin.5     Passive exposure: Never    Smokeless tobacco: Never   Vaping Use    Vaping status: Never Used   Substance and Sexual Activity    Alcohol use: Not Currently     Comment: Stopped alcohol 2000    Drug use: No    Sexual activity: Yes     Partners: Female   Other Topics Concern    Caffeine Concern Yes     Comment: coffee in AM/ soda     Exercise Yes     Comment: X1-2 times per week      Social Drivers of Health     Financial Resource Strain: Low Risk  (2024)    Financial Resource Strain     Difficulty of Paying Living Expenses: Not hard at all     Med Affordability: No   Food Insecurity: No Food Insecurity (2024)    Food Insecurity     Food Insecurity: Never true   Transportation Needs: No Transportation Needs (2024)    Transportation Needs     Lack of Transportation: No   Housing Stability: Low Risk  (2024)    Housing Stability     Housing Instability: No                  Physical Exam     ED Triage Vitals [25 0609]   /67   Pulse 69   Resp 20   Temp 97.6 °F (36.4 °C)   Temp src Oral   SpO2 97 %   O2 Device None (Room air)       Current Vitals:   Vital Signs  BP: 118/65  Pulse: 65  Resp: 18  Temp: 97.6 °F (36.4 °C)  Temp src: Oral    Oxygen Therapy  SpO2: 97 %  O2 Device: None (Room air)        Physical Exam    Physical Exam   Constitutional: Awake, alert, well appearing  Head: Normocephalic and atraumatic.   Eyes: Conjunctivae are normal. Pupils are equal, round, and reactive to light.   Neck: Normal range of motion. No JVD  Cardiovascular: Normal rate, regular rhythm  Pulmonary/Chest: Normal effort.  No accessory muscle use.  No cyanosis.  Abdominal:     Surgical scars evident    Tender in the far right lateral lower quadrant.  No upper abdominal tenderness no peritoneal signs.  Not  distended    Neurological: Pt is alert and oriented to person, place, and time. no cranial nerve deficits. Speech fluent      ED Course     Labs Reviewed   COMP METABOLIC PANEL (14) - Abnormal; Notable for the following components:       Result Value    Glucose 112 (*)     All other components within normal limits   POCT ISTAT CHEM8 CARTRIDGE - Abnormal; Notable for the following components:    ISTAT Glucose 114 (*)     All other components within normal limits   CBC WITH DIFFERENTIAL WITH PLATELET              I-STAT BMP fine.  CBC fine.  *Chemistry analyzer down, CMP not available       MDM            Differential diagnoses considered: Surgical emergency such as acute appendicitis, less likely kidney stone, less likely right-sided diverticulitis all considered.  Patient's status post cholecystectomy remotely.    -Will reevaluate after abdomen pelvis CT      0945  Discussed CT scan results with patient.  Discussed epiploic appendagitis.  He is concerned because last time he had this diagnosis he ended up actually diverticulitis and he required surgery.  I think because of his history it is reasonable to cover with antibiotics though if it was indeed just epiploic appendagitis that this would have no role.  Concern here is that is actually right side there for diverticulitis.    -Follow typical diet for diverticulitis  -Antibiotics for potential diverticulitis  -Outpatient follow-up with the surgeon    I visualized the radiology studies, my independent interpretation: No free air noted on CT scan    *Comorbidities contributing to the complexity of decision making:  history of diverticulitis requiring surgery  *External charts reviewed: n/a  *Additional sources of history: n/a    Shared decision making was done by: patient, myself.          Medical Decision Making      Disposition and Plan     Clinical Impression:  1. Epiploic appendagitis         Disposition:  Discharge  2/1/2025  9:29 am    Follow-up:  No follow-up  provider specified.        Medications Prescribed:  Discharge Medication List as of 2/1/2025  9:37 AM        START taking these medications    Details   amoxicillin clavulanate 875-125 MG Oral Tab Take 1 tablet by mouth 2 (two) times daily for 10 days., Normal, Disp-20 tablet, R-0                 Supplementary Documentation:

## 2025-02-01 NOTE — DISCHARGE INSTRUCTIONS
.As we discussed, your CT scan today does not suggest that you have an infection.  However given your history we going  to give you antibiotics

## 2025-02-04 ENCOUNTER — TELEPHONE (OUTPATIENT)
Dept: FAMILY MEDICINE CLINIC | Facility: CLINIC | Age: 60
End: 2025-02-04

## 2025-02-04 DIAGNOSIS — G47.33 OBSTRUCTIVE SLEEP APNEA OF ADULT: Primary | ICD-10-CM

## 2025-02-04 NOTE — TELEPHONE ENCOUNTER
Pt needs a hosp fu after he finishes abx around 2/14 with Dr. Christianson (no openings)    Also, pt asking to speak with nurse Bianka regarding yao SHEPHERD

## 2025-02-04 NOTE — TELEPHONE ENCOUNTER
Dr. Christianson,  Where to schedule?  He has been told we do not do appeals. Can discuss at follow up.

## 2025-02-09 ENCOUNTER — APPOINTMENT (OUTPATIENT)
Dept: CT IMAGING | Facility: HOSPITAL | Age: 60
End: 2025-02-09
Attending: EMERGENCY MEDICINE
Payer: COMMERCIAL

## 2025-02-09 ENCOUNTER — APPOINTMENT (OUTPATIENT)
Dept: GENERAL RADIOLOGY | Facility: HOSPITAL | Age: 60
End: 2025-02-09
Attending: EMERGENCY MEDICINE
Payer: COMMERCIAL

## 2025-02-09 ENCOUNTER — HOSPITAL ENCOUNTER (OUTPATIENT)
Facility: HOSPITAL | Age: 60
Setting detail: OBSERVATION
Discharge: HOME | End: 2025-02-11
Attending: EMERGENCY MEDICINE | Admitting: HOSPITALIST
Payer: COMMERCIAL

## 2025-02-09 DIAGNOSIS — F41.9 ANXIETY: ICD-10-CM

## 2025-02-09 DIAGNOSIS — K63.89 EPIPLOIC APPENDAGITIS: ICD-10-CM

## 2025-02-09 DIAGNOSIS — F41.1 GAD (GENERALIZED ANXIETY DISORDER): ICD-10-CM

## 2025-02-09 DIAGNOSIS — B99.9 INTRA-ABDOMINAL INFECTION: ICD-10-CM

## 2025-02-09 DIAGNOSIS — R10.9 ABDOMINAL PAIN, ACUTE: Primary | ICD-10-CM

## 2025-02-09 LAB
ALBUMIN SERPL-MCNC: 4.9 G/DL (ref 3.2–4.8)
ALBUMIN/GLOB SERPL: 1.8 {RATIO} (ref 1–2)
ALP LIVER SERPL-CCNC: 74 U/L
ALT SERPL-CCNC: 19 U/L
ANION GAP SERPL CALC-SCNC: 9 MMOL/L (ref 0–18)
AST SERPL-CCNC: 15 U/L (ref ?–34)
ATRIAL RATE: 70 BPM
BASOPHILS # BLD AUTO: 0.01 X10(3) UL (ref 0–0.2)
BASOPHILS NFR BLD AUTO: 0.1 %
BILIRUB SERPL-MCNC: 0.7 MG/DL (ref 0.3–1.2)
BILIRUB UR QL STRIP.AUTO: NEGATIVE
BUN BLD-MCNC: 19 MG/DL (ref 9–23)
CALCIUM BLD-MCNC: 9.7 MG/DL (ref 8.7–10.6)
CHLORIDE SERPL-SCNC: 103 MMOL/L (ref 98–112)
CLARITY UR REFRACT.AUTO: CLEAR
CO2 SERPL-SCNC: 27 MMOL/L (ref 21–32)
CREAT BLD-MCNC: 0.89 MG/DL
EGFRCR SERPLBLD CKD-EPI 2021: 99 ML/MIN/1.73M2 (ref 60–?)
EOSINOPHIL # BLD AUTO: 0.19 X10(3) UL (ref 0–0.7)
EOSINOPHIL NFR BLD AUTO: 2 %
ERYTHROCYTE [DISTWIDTH] IN BLOOD BY AUTOMATED COUNT: 14.4 %
GLOBULIN PLAS-MCNC: 2.7 G/DL (ref 2–3.5)
GLUCOSE BLD-MCNC: 128 MG/DL (ref 70–99)
GLUCOSE UR STRIP.AUTO-MCNC: NORMAL MG/DL
HCT VFR BLD AUTO: 44.5 %
HGB BLD-MCNC: 14.6 G/DL
IMM GRANULOCYTES # BLD AUTO: 0.05 X10(3) UL (ref 0–1)
IMM GRANULOCYTES NFR BLD: 0.5 %
KETONES UR STRIP.AUTO-MCNC: NEGATIVE MG/DL
LEUKOCYTE ESTERASE UR QL STRIP.AUTO: NEGATIVE
LIPASE SERPL-CCNC: 40 U/L (ref 12–53)
LYMPHOCYTES # BLD AUTO: 2.24 X10(3) UL (ref 1–4)
LYMPHOCYTES NFR BLD AUTO: 23.9 %
MCH RBC QN AUTO: 27.9 PG (ref 26–34)
MCHC RBC AUTO-ENTMCNC: 32.8 G/DL (ref 31–37)
MCV RBC AUTO: 85.1 FL
MONOCYTES # BLD AUTO: 0.32 X10(3) UL (ref 0.1–1)
MONOCYTES NFR BLD AUTO: 3.4 %
NEUTROPHILS # BLD AUTO: 6.56 X10 (3) UL (ref 1.5–7.7)
NEUTROPHILS # BLD AUTO: 6.56 X10(3) UL (ref 1.5–7.7)
NEUTROPHILS NFR BLD AUTO: 70.1 %
NITRITE UR QL STRIP.AUTO: NEGATIVE
OSMOLALITY SERPL CALC.SUM OF ELEC: 292 MOSM/KG (ref 275–295)
P AXIS: 90 DEGREES
P-R INTERVAL: 272 MS
PH UR STRIP.AUTO: 7 [PH] (ref 5–8)
PLATELET # BLD AUTO: 401 10(3)UL (ref 150–450)
POTASSIUM SERPL-SCNC: 3.7 MMOL/L (ref 3.5–5.1)
PROT SERPL-MCNC: 7.6 G/DL (ref 5.7–8.2)
PROT UR STRIP.AUTO-MCNC: NEGATIVE MG/DL
Q-T INTERVAL: 384 MS
QRS DURATION: 130 MS
QTC CALCULATION (BEZET): 414 MS
R AXIS: -48 DEGREES
RBC # BLD AUTO: 5.23 X10(6)UL
RBC UR QL AUTO: NEGATIVE
SODIUM SERPL-SCNC: 139 MMOL/L (ref 136–145)
SP GR UR STRIP.AUTO: >1.03 (ref 1–1.03)
T AXIS: 93 DEGREES
UROBILINOGEN UR STRIP.AUTO-MCNC: NORMAL MG/DL
VENTRICULAR RATE: 70 BPM
WBC # BLD AUTO: 9.4 X10(3) UL (ref 4–11)

## 2025-02-09 PROCEDURE — 99223 1ST HOSP IP/OBS HIGH 75: CPT | Performed by: HOSPITALIST

## 2025-02-09 PROCEDURE — 74177 CT ABD & PELVIS W/CONTRAST: CPT | Performed by: EMERGENCY MEDICINE

## 2025-02-09 PROCEDURE — 71045 X-RAY EXAM CHEST 1 VIEW: CPT | Performed by: EMERGENCY MEDICINE

## 2025-02-09 PROCEDURE — 99223 1ST HOSP IP/OBS HIGH 75: CPT | Performed by: SURGERY

## 2025-02-09 RX ORDER — ACETAMINOPHEN 325 MG/1
650 TABLET ORAL EVERY 4 HOURS PRN
Status: DISCONTINUED | OUTPATIENT
Start: 2025-02-09 | End: 2025-02-11

## 2025-02-09 RX ORDER — CLONAZEPAM 0.5 MG/1
0.5 TABLET ORAL 2 TIMES DAILY
Status: DISCONTINUED | OUTPATIENT
Start: 2025-02-09 | End: 2025-02-11

## 2025-02-09 RX ORDER — METRONIDAZOLE 500 MG/100ML
500 INJECTION, SOLUTION INTRAVENOUS ONCE
Status: COMPLETED | OUTPATIENT
Start: 2025-02-09 | End: 2025-02-09

## 2025-02-09 RX ORDER — LISINOPRIL 20 MG/1
20 TABLET ORAL DAILY
Status: DISCONTINUED | OUTPATIENT
Start: 2025-02-10 | End: 2025-02-11

## 2025-02-09 RX ORDER — HYDROMORPHONE HYDROCHLORIDE 1 MG/ML
0.8 INJECTION, SOLUTION INTRAMUSCULAR; INTRAVENOUS; SUBCUTANEOUS EVERY 2 HOUR PRN
Status: DISCONTINUED | OUTPATIENT
Start: 2025-02-09 | End: 2025-02-11

## 2025-02-09 RX ORDER — SENNOSIDES 8.6 MG
17.2 TABLET ORAL NIGHTLY PRN
Status: DISCONTINUED | OUTPATIENT
Start: 2025-02-09 | End: 2025-02-11

## 2025-02-09 RX ORDER — NAPROXEN 250 MG/1
250 TABLET ORAL 2 TIMES DAILY WITH MEALS
Status: DISCONTINUED | OUTPATIENT
Start: 2025-02-09 | End: 2025-02-11

## 2025-02-09 RX ORDER — ENOXAPARIN SODIUM 100 MG/ML
40 INJECTION SUBCUTANEOUS DAILY
Status: DISCONTINUED | OUTPATIENT
Start: 2025-02-10 | End: 2025-02-11

## 2025-02-09 RX ORDER — BISACODYL 10 MG
10 SUPPOSITORY, RECTAL RECTAL
Status: DISCONTINUED | OUTPATIENT
Start: 2025-02-09 | End: 2025-02-11

## 2025-02-09 RX ORDER — BENZONATATE 100 MG/1
200 CAPSULE ORAL 3 TIMES DAILY PRN
Status: DISCONTINUED | OUTPATIENT
Start: 2025-02-09 | End: 2025-02-11

## 2025-02-09 RX ORDER — HYDROMORPHONE HYDROCHLORIDE 1 MG/ML
0.4 INJECTION, SOLUTION INTRAMUSCULAR; INTRAVENOUS; SUBCUTANEOUS EVERY 2 HOUR PRN
Status: DISCONTINUED | OUTPATIENT
Start: 2025-02-09 | End: 2025-02-11

## 2025-02-09 RX ORDER — ONDANSETRON 2 MG/ML
4 INJECTION INTRAMUSCULAR; INTRAVENOUS ONCE
Status: COMPLETED | OUTPATIENT
Start: 2025-02-09 | End: 2025-02-09

## 2025-02-09 RX ORDER — ECHINACEA PURPUREA EXTRACT 125 MG
1 TABLET ORAL
Status: DISCONTINUED | OUTPATIENT
Start: 2025-02-09 | End: 2025-02-11

## 2025-02-09 RX ORDER — HYDROCODONE BITARTRATE AND ACETAMINOPHEN 5; 325 MG/1; MG/1
1 TABLET ORAL EVERY 4 HOURS PRN
Status: DISCONTINUED | OUTPATIENT
Start: 2025-02-09 | End: 2025-02-11

## 2025-02-09 RX ORDER — POLYETHYLENE GLYCOL 3350 17 G/17G
17 POWDER, FOR SOLUTION ORAL DAILY PRN
Status: DISCONTINUED | OUTPATIENT
Start: 2025-02-09 | End: 2025-02-11

## 2025-02-09 RX ORDER — GABAPENTIN 600 MG/1
600 TABLET ORAL
Status: DISCONTINUED | OUTPATIENT
Start: 2025-02-09 | End: 2025-02-09

## 2025-02-09 RX ORDER — ACETAMINOPHEN 500 MG
500 TABLET ORAL EVERY 4 HOURS PRN
Status: DISCONTINUED | OUTPATIENT
Start: 2025-02-09 | End: 2025-02-09

## 2025-02-09 RX ORDER — METRONIDAZOLE 500 MG/100ML
500 INJECTION, SOLUTION INTRAVENOUS EVERY 8 HOURS
Status: DISCONTINUED | OUTPATIENT
Start: 2025-02-09 | End: 2025-02-09 | Stop reason: DRUGHIGH

## 2025-02-09 RX ORDER — ONDANSETRON 2 MG/ML
8 INJECTION INTRAMUSCULAR; INTRAVENOUS EVERY 6 HOURS PRN
Status: DISCONTINUED | OUTPATIENT
Start: 2025-02-09 | End: 2025-02-11

## 2025-02-09 RX ORDER — ALBUTEROL SULFATE 90 UG/1
2 INHALANT RESPIRATORY (INHALATION) EVERY 4 HOURS PRN
Status: DISCONTINUED | OUTPATIENT
Start: 2025-02-09 | End: 2025-02-11

## 2025-02-09 RX ORDER — HYDROMORPHONE HYDROCHLORIDE 1 MG/ML
INJECTION, SOLUTION INTRAMUSCULAR; INTRAVENOUS; SUBCUTANEOUS EVERY 30 MIN PRN
Status: DISCONTINUED | OUTPATIENT
Start: 2025-02-09 | End: 2025-02-11

## 2025-02-09 RX ORDER — GABAPENTIN 600 MG/1
600 TABLET ORAL 3 TIMES DAILY
Status: DISCONTINUED | OUTPATIENT
Start: 2025-02-09 | End: 2025-02-09

## 2025-02-09 RX ORDER — GABAPENTIN 600 MG/1
600 TABLET ORAL
Status: DISCONTINUED | OUTPATIENT
Start: 2025-02-09 | End: 2025-02-11

## 2025-02-09 RX ORDER — HYDROMORPHONE HYDROCHLORIDE 1 MG/ML
0.2 INJECTION, SOLUTION INTRAMUSCULAR; INTRAVENOUS; SUBCUTANEOUS EVERY 2 HOUR PRN
Status: DISCONTINUED | OUTPATIENT
Start: 2025-02-09 | End: 2025-02-11

## 2025-02-09 RX ORDER — METRONIDAZOLE 500 MG/100ML
500 INJECTION, SOLUTION INTRAVENOUS EVERY 12 HOURS
Status: DISCONTINUED | OUTPATIENT
Start: 2025-02-09 | End: 2025-02-11

## 2025-02-09 RX ORDER — VERAPAMIL HYDROCHLORIDE 180 MG/1
180 TABLET, FILM COATED, EXTENDED RELEASE ORAL DAILY
Status: DISCONTINUED | OUTPATIENT
Start: 2025-02-09 | End: 2025-02-11

## 2025-02-09 RX ORDER — KETOROLAC TROMETHAMINE 15 MG/ML
15 INJECTION, SOLUTION INTRAMUSCULAR; INTRAVENOUS EVERY 6 HOURS
Status: DISCONTINUED | OUTPATIENT
Start: 2025-02-09 | End: 2025-02-09

## 2025-02-09 RX ORDER — SODIUM CHLORIDE 9 MG/ML
INJECTION, SOLUTION INTRAVENOUS CONTINUOUS
Status: DISCONTINUED | OUTPATIENT
Start: 2025-02-09 | End: 2025-02-11

## 2025-02-09 RX ORDER — HYDROCODONE BITARTRATE AND ACETAMINOPHEN 5; 325 MG/1; MG/1
2 TABLET ORAL EVERY 4 HOURS PRN
Status: DISCONTINUED | OUTPATIENT
Start: 2025-02-09 | End: 2025-02-11

## 2025-02-09 NOTE — ED QUICK NOTES
Orders for admission, patient is aware of plan and ready to go upstairs. Any questions, please call ED RN Billy at extension [phone].     Patient Covid vaccination status: Fully vaccinated     COVID Test Ordered in ED: None    COVID Suspicion at Admission: N/A    Running Infusions:      Mental Status/LOC at time of transport: A/OX4    Other pertinent information:   CIWA score: N/A   NIH score:  N/A

## 2025-02-09 NOTE — ED PROVIDER NOTES
Patient Seen in: [facility] Emergency Department      History     Chief Complaint   Patient presents with    Abdomen/Flank Pain     Stated Complaint: right lower quadrant pain     Subjective:   HPI      Patient with history of diverticulitis, status post partial colectomy last April, followed by colostomy takedown, lysis of adhesions in July, has been doing well since then until 12 days ago started to have right-sided abdominal pain, CT on February 1 in [place] showed omental infarct versus epiploic appendagitis.  He has been taking Augmentin since then, but has worse pain, more constant, more severe.  Last year in April patient also had CT findings consistent with epiploic appendagitis, but ended up with worsening diverticulitis over the course of multiple CTs and ED visits ended up with surgery.  February 1 CT Abdo pelvis showed: There is inflammatory changes noted within the fat lateral to the mid ascending colon.  The inflammatory changes likely related to an omental infarct which patient is had previous but in different location.  The size of the area of   inflammation is approximately 6 x 2 cm.  The underlying right colon appears to be grossly unremarkable.  The terminal ileum demonstrates submucosal fat involving the distal ileum which is a finding can be seen with sequela of chronic inflammation within   the bowel.  There is no active small bowel inflammation at this time.  There is no bowel obstruction, abscess, or free air.  Postsurgical changes are noted within the sigmoid colon.  The appendix is not visualized.  Moderate amount of stool within the   cecum.   Objective:     No pertinent past medical history.            No pertinent past surgical history.              No pertinent social history.                Physical Exam     ED Triage Vitals   BP 02/09/25 0755 146/88   Pulse 02/09/25 0755 72   Resp 02/09/25 0755 13   Temp 02/09/25 0755 98 °F (36.7 °C)   Temp src 02/09/25 1211 Oral   SpO2  02/09/25 0755 99 %   O2 Device 02/09/25 1211 None (Room air)       Current Vitals:   Vital Signs  BP: 135/66  Pulse: 56  Resp: 18  Temp: 97.9 °F (36.6 °C)  Temp src: Oral  MAP (mmHg): 84    Oxygen Therapy  SpO2: 93 %  O2 Device: None (Room air)  Mode: AutoPAP  Pulse Oximetry Type: Continuous  Oximetry Probe Site Changed: No        Physical Exam  General: Well-developed, high BMI, moderate distress secondary to pain  Head and neck: Normocephalic, atraumatic  Cardiovascular: Regular rate and rhythm, normal S1 and S2, no obvious murmurs, rubs, or gallops   Lungs: Clear to auscultation bilaterally with equal breath sounds, no wheezing, no rhonchi   Abdomen: Positive bowel sounds,  soft, full, tender throughout the right side of the belly especially mid and lower.  No rebound  Extremities: No cyanosis, no clubbing, no edema   Musculoskeletal: No tenderness, swelling, deformity   Skin: No significant lesions   Neuro: Grossly intact to patient's baseline    ED Course     Labs Reviewed   COMP METABOLIC PANEL (14) - Abnormal; Notable for the following components:       Result Value    Glucose 128 (*)     Albumin 4.9 (*)     All other components within normal limits   URINALYSIS WITH CULTURE REFLEX - Abnormal; Notable for the following components:    Spec Gravity >1.030 (*)     All other components within normal limits   LIPASE - Normal   CBC WITH DIFFERENTIAL WITH PLATELET     EKG    Rate, intervals and axes as noted on EKG Report.  Rate: 70  Rhythm: Sinus Rhythm  Reading: No acute process         Differential diagnosis includes diverticulitis, small bowel obstruction, among other processes       CT Abdo pelvis with IV contrast images reviewed by myself show no free air.  Radiology read mentions:  CONCLUSION:    1. Stable inflammatory changes are seen in the mesenteric or omental fat lateral to the mid ascending colon.  This may reflect an omental infarct.  Epiploic appendagitis is possible although this does not have the  classic appearance for epiploic   appendagitis.   2. Minimal scant ascites noted adjacent to the right lobe of the liver.   3. No abscess or free air.   4. Hepatic hemangioma again noted without significant change.        MDM      59-year-old, history of diverticulitis, requiring urgent partial colectomy in April, ostomy takedown in July of last year, recently diagnosed with epiploic appendagitis which was also the CT finding in his previous visits.  Has been taking Augmentin but pain is worse.  Has definite right-sided abdominal tenderness, no rebound.  Blood work here is reassuring.  Normal white blood cell count.  No TAMAR.  Urine has high specific gravity but no sign of infection.  CT shows stable inflammatory changes consistent with omental infarct, or epiploic appendagitis.  Given patient's previous history with very nonspecific findings on CT leading to significant clinical findings requiring surgery, will err on the conservative side.  Patient definitely has pain that is increased after 8 days of antibiotics.  Dr. Clemens has been contacted.  Dr. Escobar here at bedside.  Will admit to med telemetry.  IV Rocephin and Flagyl given here.        Admission disposition: 2/9/2025 10:27 AM           Medical Decision Making      Disposition and Plan     Clinical Impression:  1. Abdominal pain, acute         Disposition:  Admit  2/9/2025 10:27 am    Follow-up:  No follow-up provider specified.        Medications Prescribed:  Current Discharge Medication List              Supplementary Documentation:         Hospital Problems       Present on Admission  Date Reviewed: 1/11/2025            ICD-10-CM Noted POA    * (Principal) Abdominal pain, acute R10.9 2/9/2025 Unknown    Epiploic appendagitis K63.89 2/9/2025 Unknown    Intra-abdominal infection B99.9 2/9/2025 Unknown

## 2025-02-09 NOTE — H&P
[facility]     Don Cabral Patient Status:  Emergency    1965 MRN [id number]   Location [facility] EMERGENCY DEPARTMENT Attending Talya Rubin MD   Hosp Day # 0 PCP Stephen Christianson MD     Chief Complaint: abd pain  Subjective:    History of Present Illness:     Don Cabral is a 59 year old male with 10 days of worsening RLQ abdominal pain, despite being on augmentin since last week.  No vomiting, diarhrea, fevers.  Has hx of diverticultisi that evolved in context of CT suggesting epiploic appendigitis, eventually requiring partial colectomy.  Says the pain is worsening.    History/Other:    Past Medical History:  Past Medical History:    Allergic rhinitis    Anxiety    Arrhythmia    T1 AV block, LBBB    Back pain    Back problem    lower back    Depression    Diverticulitis    ED (erectile dysfunction)    High blood pressure    History of adverse reaction to anesthesia    Patient states he doesnt know exactly what happened but stated he required haldol post anesthesia    Pneumonia due to organism    Seasonal allergies    Shortness of breath    Sleep apnea    CPAP    Unspecified essential hypertension    Visual impairment    glasses     Past Surgical History:   Past Surgical History:   Procedure Laterality Date    Cholecystectomy  3/12/2023    Colonoscopy N/A 2016    [facility].  Hyperplastic polyp only.  Repeat     Colonoscopy  05/10/2023    Dental surgery procedure      [place] teeth removed    Incision/drain abscess extra      Knee arthroscopy Right     Laparoscopic cholecystectomy  2023    Myringoplasty      Other surgical history      R knee arthroscope    Other surgical history  2024    Exploratory laparotomy partial colectomy with colorectal anastamosis, extensive adhesiolysis    Part removal colon w end colostomy      Skin tissue procedure unlisted      Tonsillectomy        Family History:   Family History   Problem Relation  Age of Onset    Heart Disorder Father         Ablation required    Hypertension Father     Cancer Father 60        Prostate    Heart Disorder Mother         murmur no treatment needed    Hypertension Mother     Stroke Maternal Grandmother     Other (stroke) Maternal Grandmother     Dementia Paternal Grandmother     Heart Disorder Paternal Grandfather         two heart attacks and CHF    Diabetes Paternal Grandfather     Other (Diabetes, type 2) Paternal Grandfather        hypertension Social History:    reports that he quit smoking about 24 years ago. His smoking use included cigarettes. He started smoking about 43 years ago. He has a 9.5 pack-year smoking history. He has never been exposed to tobacco smoke. He has never used smokeless tobacco. He reports that he does not currently use alcohol. He reports that he does not use drugs.     Allergies: Allergies[1]    Medications:  Medications Ordered Prior to Encounter[2]    Review of Systems:   A comprehensive 12 point review of systems was completed.    Pertinent positives and negatives noted in the HPI.    Objective:   Physical Exam:    /75   Pulse 65   Temp 98 °F (36.7 °C)   Resp 15   Ht 6' 3\" (1.905 m)   Wt 270 lb (122.5 kg)   SpO2 97%   BMI 33.75 kg/m²   General: No acute distress, Alert  Respiratory: No rhonchi, no wheezes  Cardiovascular: S1, S2. Regular rate and rhythm  Abdomen: tender RLQ; nondistended; abdominal scars noted.  Neuro: No new focal deficits  Extremities: No edema      Results:    Labs:      Labs Last 24 Hours:    Recent Labs   Lab 02/09/25  0759   RBC 5.23   HGB 14.6   HCT 44.5   MCV 85.1   MCH 27.9   MCHC 32.8   RDW 14.4   NEPRELIM 6.56   WBC 9.4   .0       Recent Labs   Lab 02/09/25  0759   *   BUN 19   CREATSERUM 0.89   EGFRCR 99   CA 9.7   ALB 4.9*      K 3.7      CO2 27.0   ALKPHO 74   AST 15   ALT 19   BILT 0.7   TP 7.6       Lab Results   Component Value Date    INR 1.01 09/28/2023    INR 1.23 (H)  06/23/2023       No results for input(s): \"TROP\", \"TROPHS\", \"CK\" in the last 168 hours.    No results for input(s): \"TROP\", \"PBNP\" in the last 168 hours.    No results for input(s): \"PCT\" in the last 168 hours.    Imaging: Imaging data reviewed in Epic.    Assessment & Plan:      #possible omental infarcts and/or epiploic appendigitis; given he is not improving on augmentin, will start broad spectrum IV abx and consult surgery; NPO; IV fluids.  Prn pain meds  #hx afib  #hx PVCs and intermittent LBBB  #Hx diverticulitis s/p Open partial colectomy with colorectal anastomosis  #HTN  #GERD  #Depression    Will do med rec once review complete.    Quality:  DVT prophylaxis:  SCDs, Lovenox  Code Status: see chart  Vance: NO  Vance Duration (in days): N/A  Central line: NO  Estimated discharge date: tbd    Plan of care discussed with patient and ER MD Avery Ordoñez MD    Supplementary Documentation:                                     [1]   Allergies  Allergen Reactions    Ciprofloxacin OTHER (SEE COMMENTS)     Increases risk for arrhythmia   [2]   Current Facility-Administered Medications on File Prior to Encounter   Medication Dose Route Frequency Provider Last Rate Last Admin    [COMPLETED] ketorolac (Toradol) 15 MG/ML injection 15 mg  15 mg Intravenous Once Tulio Cordoba MD   15 mg at 02/01/25 0701    [COMPLETED] iopamidol 76% (ISOVUE-370) injection for power injector  90 mL Intravenous ONCE PRN Rosa Marquez MD   90 mL at 01/17/25 1538    [COMPLETED] ketorolac (Toradol) 60 MG/2ML IM injection 60 mg  60 mg Intramuscular Once Sonia Ashford PA-C   60 mg at 11/29/24 0835     Current Outpatient Medications on File Prior to Encounter   Medication Sig Dispense Refill    semaglutide-weight management 0.25 MG/0.5ML Subcutaneous Solution Auto-injector Inject 0.5 mL (0.25 mg total) into the skin once a week. 4 each 0    clonazePAM 0.5 MG Oral Tab Take 1 tablet (0.5 mg total) by mouth 3 (three) times daily. 90 tablet  2    amoxicillin clavulanate 875-125 MG Oral Tab Take 1 tablet by mouth 2 (two) times daily for 10 days. 20 tablet 0    [] semaglutide-weight management 0.25 MG/0.5ML Subcutaneous Solution Auto-injector Inject 0.5 mL (0.25 mg total) into the skin once a week for 4 doses. 2 mL 0    Tadalafil 20 MG Oral Tab Take 1 tablet (20 mg total) by mouth daily as needed for Erectile Dysfunction. 12 tablet 1    [] tobramycin 0.3 % Ophthalmic Solution Place 1 drop into the right eye every 4 (four) hours for 7 days. 1 each 0    sertraline (ZOLOFT) 50 MG Oral Tab 1.5 tablets daily 135 tablet 0    gabapentin 600 MG Oral Tab Take 1 tablet (600 mg total) by mouth 3 (three) times daily. 270 tablet 0    Verapamil HCl  MG Oral Capsule SR 24 Hr       lisinopril 20 MG Oral Tab Take 1 tablet (20 mg total) by mouth daily. 90 tablet 3    Multiple Vitamins-Minerals (MULTI-VITAMIN/MINERALS) Oral Tab Take 1 tablet by mouth daily.      Albuterol Sulfate  (90 Base) MCG/ACT Inhalation Aero Soln Inhale 2 puffs into the lungs every 4 (four) hours as needed for Wheezing or Shortness of Breath. 1 each 3

## 2025-02-09 NOTE — CONSULTS
[facility]  Report of Consultation    Don Cabral Patient Status:  Observation    1965 MRN [id number]   Location [facility] 3SW-A Attending Avery Cardenas MD   Hosp Day # 0 PCP Stephen Christianson MD     Requesting Physician:  Talya Rubin MD     Reason for Consultation:  Omental infarct    I have seen and evaluated the patient in conjunction with my physician assistant.    I reviewed all data and imaging from this admission.  I concur with the radiologist interpretation except as noted.    I have modified this document to reflect my data interpretation, my physical exam findings, and my care plan.    Chief Complaint:  Worsening abdominal pain    History of Present Illness:  Don Cabral is a 59 year old male who presents to [facility] on 2025 with worsening abdominal pain.  Patient reports pain began on . He describes the pain as constant shooting pains that are localized in the right lower quadrant.  Patient went to [facility] emergency department on  due to thinking his pain was caused by acute appendicitis.  At the time, patient underwent a CT of the abdomen and pelvis which revealed inflammatory changes within the fat lateral to the mid due to omental infarct versus epiploic appendagitis.  Patient was discharged and started on Augmentin which he believes may have worsened his abdominal pain, with significantly increased pain beginning this past Thursday.    Upon presentation to the hospital, patient was slightly hypertensive at 146/88 but otherwise hemodynamically stable and afebrile.  CBC was negative for leukocytosis with a WBC count of 9.4.  Hemoglobin stable at 14.6.  CMP was unremarkable.  Lipase was within normal limits at 40.  Urinalysis was negative for UTI.  CT abdomen pelvis revealed stable inflammatory changes mesenteric or omental fat lateral to the mid ascending colon.  This may reflect an omental infarct.  Epiploic appendagitis is possible although  this does not have a classic appearance for epiglottic appendagitis per radiologist report.  There was also minimal scant ascites noted adjacent to the right lobe of the liver as well as a hepatic hemangioma without any significant changes from last scan.  No abscess or free air noted.    Past medical history significant for diverticulitis s/p exploratory laparotomy with left colectomy and end colostomy on 4/15/2024 with Dr. Novoa, and subsequent open partial colectomy with colorectal anastomosis and extensive lysis of adhesions on 7/17/2024.    Past abdominal surgical history includes the previously mentioned procedures in addition to laparoscopic cholecystectomy in 2023.      History:  Past Medical History:    Allergic rhinitis    Anxiety    Arrhythmia    T1 AV block, LBBB    Back pain    Back problem    lower back    Depression    Diverticulitis    ED (erectile dysfunction)    High blood pressure    History of adverse reaction to anesthesia    Patient states he doesnt know exactly what happened but stated he required haldol post anesthesia    Pneumonia due to organism    Seasonal allergies    Shortness of breath    Sleep apnea    CPAP    Unspecified essential hypertension    Visual impairment    glasses     Past Surgical History:   Procedure Laterality Date    Cholecystectomy  3/12/2023    Colonoscopy N/A 03/22/2016    [facility].  Hyperplastic polyp only.  Repeat 2026    Colonoscopy  05/10/2023    Dental surgery procedure      [place] teeth removed    Incision/drain abscess extra  1996    Knee arthroscopy Right     Laparoscopic cholecystectomy  02/12/2023    Myringoplasty      Other surgical history      R knee arthroscope    Other surgical history  07/17/2024    Exploratory laparotomy partial colectomy with colorectal anastamosis, extensive adhesiolysis    Part removal colon w end colostomy      Skin tissue procedure unlisted      Tonsillectomy       Family History   Problem Relation Age of Onset    Heart Disorder  Father         Ablation required    Hypertension Father     Cancer Father 60        Prostate    Heart Disorder Mother         murmur no treatment needed    Hypertension Mother     Stroke Maternal Grandmother     Other (stroke) Maternal Grandmother     Dementia Paternal Grandmother     Heart Disorder Paternal Grandfather         two heart attacks and CHF    Diabetes Paternal Grandfather     Other (Diabetes, type 2) Paternal Grandfather       reports that he quit smoking about 24 years ago. His smoking use included cigarettes. He started smoking about 43 years ago. He has a 9.5 pack-year smoking history. He has never been exposed to tobacco smoke. He has never used smokeless tobacco. He reports that he does not currently use alcohol. He reports that he does not use drugs.    Allergies:  Allergies[1]    Medications:  Medications Prior to Admission   Medication Sig    clonazePAM 0.5 MG Oral Tab Take 1 tablet (0.5 mg total) by mouth 3 (three) times daily. (Patient taking differently: Take 1 tablet (0.5 mg total) by mouth 2 (two) times daily.)    sertraline (ZOLOFT) 50 MG Oral Tab 1.5 tablets daily    gabapentin 600 MG Oral Tab Take 1 tablet (600 mg total) by mouth 3 (three) times daily.    Verapamil HCl  MG Oral Capsule SR 24 Hr     lisinopril 20 MG Oral Tab Take 1 tablet (20 mg total) by mouth daily.    Multiple Vitamins-Minerals (MULTI-VITAMIN/MINERALS) Oral Tab Take 1 tablet by mouth daily.    semaglutide-weight management 0.25 MG/0.5ML Subcutaneous Solution Auto-injector Inject 0.5 mL (0.25 mg total) into the skin once a week.    amoxicillin clavulanate 875-125 MG Oral Tab Take 1 tablet by mouth 2 (two) times daily for 10 days.    [] semaglutide-weight management 0.25 MG/0.5ML Subcutaneous Solution Auto-injector Inject 0.5 mL (0.25 mg total) into the skin once a week for 4 doses.    Tadalafil 20 MG Oral Tab Take 1 tablet (20 mg total) by mouth daily as needed for Erectile Dysfunction.    []  tobramycin 0.3 % Ophthalmic Solution Place 1 drop into the right eye every 4 (four) hours for 7 days.    Albuterol Sulfate  (90 Base) MCG/ACT Inhalation Aero Soln Inhale 2 puffs into the lungs every 4 (four) hours as needed for Wheezing or Shortness of Breath.         Current Facility-Administered Medications:     HYDROmorphone (Dilaudid) 1 MG/ML injection 0.5-1 mg, 0.5-1 mg, Intravenous, Q30 Min PRN    [START ON 2/10/2025] cefTRIAXone (Rocephin) 2 g in sodium chloride 0.9% 100 mL IVPB-ADDV, 2 g, Intravenous, Q24H    sodium chloride 0.9% infusion, , Intravenous, Continuous    melatonin tab 3 mg, 3 mg, Oral, Nightly PRN    polyethylene glycol (PEG 3350) (Miralax) 17 g oral packet 17 g, 17 g, Oral, Daily PRN    sennosides (Senokot) tab 17.2 mg, 17.2 mg, Oral, Nightly PRN    bisacodyl (Dulcolax) 10 MG rectal suppository 10 mg, 10 mg, Rectal, Daily PRN    ondansetron (Zofran) 4 MG/2ML injection 8 mg, 8 mg, Intravenous, Q6H PRN    benzonatate (Tessalon) cap 200 mg, 200 mg, Oral, TID PRN    glycerin-hypromellose- (Artificial Tears) 0.2-0.2-1 % ophthalmic solution 1 drop, 1 drop, Both Eyes, QID PRN    sodium chloride (Saline Mist) 0.65 % nasal solution 1 spray, 1 spray, Each Nare, Q3H PRN    [START ON 2/10/2025] enoxaparin (Lovenox) 40 MG/0.4ML SUBQ injection 40 mg, 40 mg, Subcutaneous, Daily    HYDROmorphone (Dilaudid) 1 MG/ML injection 0.2 mg, 0.2 mg, Intravenous, Q2H PRN **OR** HYDROmorphone (Dilaudid) 1 MG/ML injection 0.4 mg, 0.4 mg, Intravenous, Q2H PRN **OR** HYDROmorphone (Dilaudid) 1 MG/ML injection 0.8 mg, 0.8 mg, Intravenous, Q2H PRN    acetaminophen (Tylenol) tab 650 mg, 650 mg, Oral, Q4H PRN **OR** HYDROcodone-acetaminophen (Norco) 5-325 MG per tab 1 tablet, 1 tablet, Oral, Q4H PRN **OR** HYDROcodone-acetaminophen (Norco) 5-325 MG per tab 2 tablet, 2 tablet, Oral, Q4H PRN    HYDROmorphone (Dilaudid) 1 MG/ML injection 0.25-0.5 mg, 0.25-0.5 mg, Intravenous, Q30 Min PRN    verapamil ER (Calan-SR)  tab 180 mg, 180 mg, Oral, Daily    albuterol (Ventolin HFA) 108 (90 Base) MCG/ACT inhaler 2 puff, 2 puff, Inhalation, Q4H PRN    clonazePAM (KlonoPIN) tab 0.5 mg, 0.5 mg, Oral, BID    [START ON 2/10/2025] lisinopril (Prinivil; Zestril) tab 20 mg, 20 mg, Oral, Daily    [START ON 2/10/2025] sertraline (Zoloft) tab 75 mg, 75 mg, Oral, Daily    ketorolac (Toradol) 15 MG/ML injection 15 mg, 15 mg, Intravenous, q6h    gabapentin (Neurontin) tab 600 mg, 600 mg, Oral, 3 times per day    metroNIDAZOLE in sodium chloride 0.79% (Flagyl) 5 mg/mL IVPB premix 500 mg, 500 mg, Intravenous, Q12H    Review of Systems:  Review of Systems   Constitutional:  Negative for chills and fever.   Gastrointestinal:  Positive for abdominal pain and abdominal distention. Negative for nausea, vomiting, diarrhea and constipation.       Physical Exam:  /66 (BP Location: Left arm)   Pulse 56   Temp 97.9 °F (36.6 °C) (Oral)   Resp 18   Ht 75\"   Wt 270 lb (122.5 kg)   SpO2 93%   BMI 33.75 kg/m²   Physical Exam  Vitals and nursing note reviewed.   Constitutional:       General: He is not in acute distress.  HENT:      Head: Normocephalic and atraumatic.   Pulmonary:      Effort: Pulmonary effort is normal.      Breath sounds: No stridor. No wheezing.   Abdominal:      Comments: Soft. + Tenderness to RLQ on deep palpation. Nondistended. No rebound or guarding.   Neurological:      Mental Status: He is alert.         Laboratory Data:  Recent Labs   Lab 02/09/25  0759   RBC 5.23   HGB 14.6   HCT 44.5   MCV 85.1   MCH 27.9   MCHC 32.8   RDW 14.4   NEPRELIM 6.56   WBC 9.4   .0       Recent Labs   Lab 02/09/25  0759   *   BUN 19   CREATSERUM 0.89   CA 9.7   ALB 4.9*      K 3.7      CO2 27.0   ALKPHO 74   AST 15   ALT 19   BILT 0.7   TP 7.6         No results for input(s): \"PTP\", \"INR\", \"PTT\" in the last 168 hours.      CT ABDOMEN+PELVIS(CONTRAST ONLY)(CPT=74177)    Result Date: 2/9/2025  CONCLUSION:  1. Stable  inflammatory changes are seen in the mesenteric or omental fat lateral to the mid ascending colon.  This may reflect an omental infarct.  Epiploic appendagitis is possible although this does not have the classic appearance for epiploic appendagitis. 2. Minimal scant ascites noted adjacent to the right lobe of the liver. 3. No abscess or free air. 4. Hepatic hemangioma again noted without significant change.    LOCATION:  [facility]   Dictated by (CST): Christian Burns MD on 2/09/2025 at 9:03 AM     Finalized by (CST): Christian Burns MD on 2/09/2025 at 9:13 AM       XR CHEST AP PORTABLE  (CPT=71045)    Result Date: 2/9/2025  CONCLUSION:  1. Cardiomegaly without edema. 2. Scant left pleural effusion or pleural thickening noted blunting the left costophrenic angle.  Small left basilar infiltrate or atelectasis. 3. Possible calcified pleural plaque in the left lung base. 4. Old right rib fractures.    LOCATION:  [facility]      Dictated by (CST): Christian Burns MD on 2/09/2025 at 8:57 AM     Finalized by (CST): Christian Burns MD on 2/09/2025 at 8:59 AM        Omental infarct    The patient's case was discussed with Dr. Clemens who determined that acute surgical intervention was not necessary at this time.  Nonoperative management will be implemented at this time, including pain control and empiric antibiotic therapy.  Patient may have regular diet as tolerated.  Continue analgesics and antiemetics as needed.  Continue ambulation and up to chair.  Continue IV antibiotics.  Appreciate hospitalist recommendations.  DVT prophylaxis with Lovenox.      JOAO Hobbs  2/9/2025  2:10 PM    Is this a shared or split note between Advanced Practice Provider and Physician? Yes       Medical Decision Making         Impression:  Patient Active Problem List   Diagnosis    Anxiety    Essential hypertension    ED (erectile dysfunction)    NELLIE (obstructive sleep apnea)    Generalized anxiety disorder    Hypokalemia     Hyperglycemia    Tooth pain    Daytime hypersomnolence    Symptomatic bradycardia    Palpitations    Slow heart rate    PVC (premature ventricular contraction)    Bipolar 1 disorder (HCC)    Sigmoid diverticulitis    Acute diverticulitis    Intractable abdominal pain    Postoperative examination    Preop testing    Right lower quadrant abdominal pain    Abdominal pain, acute    Intra-abdominal infection    Epiploic appendagitis       Addendum:    The patient was independently examined. I agree with the PA's assessment and plan.     Epiploic appendagitis versus omental infarct.    No acute surgical intervention required.  NSAIDs will be administered-meloxicam or Naprosyn as ibuprofen has been ineffective at home.  Empiric antibiotics.  No dietary restrictions.  Diet as tolerated.  Ambulate without restriction.  No surgical intervention anticipated.  Once pain controlled and patient is discharged home, he should follow-up with Dr. Macias his surgeon for further management strategies.  The patient was provided ample opportunity to ask questions.  All of the patient's questions were answered in detail.  The patient voiced understanding of the care plan.    More than 50% of clinical time and 100% MDM was performed by me.     I, Dr. Ryan Clemens, personally performed the services described in this documentation, as documented  by JOAO James, PA-C,  and they are both accurate and complete.      Ryan Clemens MD FACS  Trauma Medical Director, [facility]    The 21st Century Cures Act makes medical notes like these available to patients in the interest of transparency. Please be advised this is a medical document. Medical documents are intended to carry relevant information, facts as evident, and the clinical opinion of the practitioner. The medical note is intended as peer to peer communication and may appear blunt or direct. It is written in medical language and may contain abbreviations  or verbiage that are unfamiliar.    This note was prepared using Dragon Medical voice recognition dictation software. As a result, errors may occur. When identified, these errors have been corrected. While every attempt is made to correct errors during dictation, discrepancies may still exist.                                      [1]   Allergies  Allergen Reactions    Ciprofloxacin OTHER (SEE COMMENTS)     Increases risk for arrhythmia

## 2025-02-09 NOTE — PLAN OF CARE
NURSING ADMISSION NOTE      Patient admitted via Cart from ED.   Oriented to room.  Safety precautions initiated.  Bed in low position.  Call light in reach.  Skin intact, no redness or breakdown.

## 2025-02-10 LAB
ALBUMIN SERPL-MCNC: 4.4 G/DL (ref 3.2–4.8)
ALBUMIN/GLOB SERPL: 1.8 {RATIO} (ref 1–2)
ALP LIVER SERPL-CCNC: 64 U/L
ALT SERPL-CCNC: 15 U/L
ANION GAP SERPL CALC-SCNC: 9 MMOL/L (ref 0–18)
AST SERPL-CCNC: 14 U/L (ref ?–34)
BILIRUB SERPL-MCNC: 0.7 MG/DL (ref 0.3–1.2)
BUN BLD-MCNC: 19 MG/DL (ref 9–23)
CALCIUM BLD-MCNC: 9.3 MG/DL (ref 8.7–10.6)
CHLORIDE SERPL-SCNC: 103 MMOL/L (ref 98–112)
CO2 SERPL-SCNC: 26 MMOL/L (ref 21–32)
CREAT BLD-MCNC: 0.74 MG/DL
EGFRCR SERPLBLD CKD-EPI 2021: 105 ML/MIN/1.73M2 (ref 60–?)
ERYTHROCYTE [DISTWIDTH] IN BLOOD BY AUTOMATED COUNT: 14.3 %
GLOBULIN PLAS-MCNC: 2.5 G/DL (ref 2–3.5)
GLUCOSE BLD-MCNC: 98 MG/DL (ref 70–99)
HCT VFR BLD AUTO: 39.9 %
HGB BLD-MCNC: 13.1 G/DL
MAGNESIUM SERPL-MCNC: 1.9 MG/DL (ref 1.6–2.6)
MCH RBC QN AUTO: 27.8 PG (ref 26–34)
MCHC RBC AUTO-ENTMCNC: 32.8 G/DL (ref 31–37)
MCV RBC AUTO: 84.5 FL
OSMOLALITY SERPL CALC.SUM OF ELEC: 288 MOSM/KG (ref 275–295)
PHOSPHATE SERPL-MCNC: 4.1 MG/DL (ref 2.4–5.1)
PLATELET # BLD AUTO: 327 10(3)UL (ref 150–450)
POTASSIUM SERPL-SCNC: 4 MMOL/L (ref 3.5–5.1)
PROT SERPL-MCNC: 6.9 G/DL (ref 5.7–8.2)
RBC # BLD AUTO: 4.72 X10(6)UL
SODIUM SERPL-SCNC: 138 MMOL/L (ref 136–145)
WBC # BLD AUTO: 7.7 X10(3) UL (ref 4–11)

## 2025-02-10 PROCEDURE — 99232 SBSQ HOSP IP/OBS MODERATE 35: CPT | Performed by: HOSPITALIST

## 2025-02-10 PROCEDURE — 99232 SBSQ HOSP IP/OBS MODERATE 35: CPT | Performed by: SURGERY

## 2025-02-10 RX ORDER — METHYLPREDNISOLONE 4 MG/1
4 TABLET ORAL
Status: DISCONTINUED | OUTPATIENT
Start: 2025-02-13 | End: 2025-02-11

## 2025-02-10 RX ORDER — METHYLPREDNISOLONE 4 MG/1
8 TABLET ORAL
Status: DISCONTINUED | OUTPATIENT
Start: 2025-02-11 | End: 2025-02-11

## 2025-02-10 RX ORDER — METHYLPREDNISOLONE 4 MG/1
4 TABLET ORAL
Status: COMPLETED | OUTPATIENT
Start: 2025-02-10 | End: 2025-02-10

## 2025-02-10 RX ORDER — METHYLPREDNISOLONE 4 MG/1
8 TABLET ORAL
Status: COMPLETED | OUTPATIENT
Start: 2025-02-10 | End: 2025-02-10

## 2025-02-10 RX ORDER — METHYLPREDNISOLONE 4 MG/1
4 TABLET ORAL
Status: DISCONTINUED | OUTPATIENT
Start: 2025-02-15 | End: 2025-02-11

## 2025-02-10 RX ORDER — METHYLPREDNISOLONE 4 MG/1
4 TABLET ORAL
Status: DISCONTINUED | OUTPATIENT
Start: 2025-02-12 | End: 2025-02-11

## 2025-02-10 RX ORDER — METHYLPREDNISOLONE 4 MG/1
4 TABLET ORAL NIGHTLY
Status: DISCONTINUED | OUTPATIENT
Start: 2025-02-14 | End: 2025-02-11

## 2025-02-10 RX ORDER — METHYLPREDNISOLONE 4 MG/1
4 TABLET ORAL
Status: DISCONTINUED | OUTPATIENT
Start: 2025-02-11 | End: 2025-02-11

## 2025-02-10 RX ORDER — METHYLPREDNISOLONE 4 MG/1
4 TABLET ORAL
Status: DISCONTINUED | OUTPATIENT
Start: 2025-02-14 | End: 2025-02-11

## 2025-02-10 NOTE — PLAN OF CARE
Patient A&O X4 on RA- hx NELLIE w/ CPAP. VSS, /IS/telemetry- NSR/SB. SCDs, Lovenox. Voiding without difficulty via bathroom. On IV Flagyl Q12h and IV Rocephin Q24h. Denies abdominal pain or nausea. On regular diet. Up ad james. Reminded to use call light. Plan to dc home when cleared.

## 2025-02-10 NOTE — PROGRESS NOTES
[facility]   part of [facility]     Hospitalist Progress Note     Don Cabral Patient Status:  Observation    1965 MRN [id number]   Location [facility] 3SW-A Attending Avery Cardenas MD   Hosp Day # 0 PCP Stephen Christianson MD     Chief Complaint:   Chief Complaint   Patient presents with    Abdomen/Flank Pain       Subjective:     Feeling a bit better today    Objective:    Review of Systems:   6  point ROS completed and was negative, except for pertinent positive and negatives stated in subjective.    Vital signs:  Temp:  [97.8 °F (36.6 °C)-98.4 °F (36.9 °C)] 97.9 °F (36.6 °C)  Pulse:  [51-67] 59  Resp:  [18] 18  BP: (124-135)/(52-68) 133/54  SpO2:  [93 %-97 %] 97 %    Physical Exam:    General: No acute distress.   Respiratory: Clear to auscultation bilaterally. No wheezes. No rhonchi.  Cardiovascular: S1, S2. Regular rate and rhythm. No murmurs.  Abdomen: Soft, tender in RLQ but less today, nondistended.    Extremities: No edema.    Diagnostic Data:    Labs:  Recent Labs   Lab 25  0759 02/10/25  0433   WBC 9.4 7.7   HGB 14.6 13.1   MCV 85.1 84.5   .0 327.0       Recent Labs   Lab 25  0759 02/10/25  0433   * 98   BUN 19 19   CREATSERUM 0.89 0.74   CA 9.7 9.3   ALB 4.9* 4.4    138   K 3.7 4.0    103   CO2 27.0 26.0   ALKPHO 74 64   AST 15 14   ALT 19 15   BILT 0.7 0.7   TP 7.6 6.9       Estimated Creatinine Clearance: 130 mL/min (based on SCr of 0.74 mg/dL).    No results for input(s): \"PTP\", \"INR\" in the last 168 hours.         COVID-19 Lab Results    COVID-19  Lab Results   Component Value Date    COVID19 Not Detected 2024    COVID19 Not Detected 2023    COVID19 Not Detected 2023       Pro-Calcitonin  No results for input(s): \"PCT\" in the last 168 hours.    Cardiac  No results for input(s): \"TROP\", \"PBNP\" in the last 168 hours.    Creatinine Kinase  No results for input(s): \"CK\" in the last 168 hours.    Inflammatory Markers  No  results for input(s): \"CRP\", \"BEVERLEY\", \"LDH\", \"DDIMER\" in the last 168 hours.    No results for input(s): \"TROP\", \"TROPHS\", \"CK\" in the last 168 hours.    Imaging: Imaging data reviewed in Epic.    Medications:    cefTRIAXone  2 g Intravenous Q24H    enoxaparin  40 mg Subcutaneous Daily    verapamil ER  180 mg Oral Daily    clonazePAM  0.5 mg Oral BID    lisinopril  20 mg Oral Daily    sertraline  75 mg Oral Daily    gabapentin  600 mg Oral 3 times per day    metroNIDAZOLE  500 mg Intravenous Q12H    naproxen  250 mg Oral BID with meals       Assessment & Plan:      #possible omental infarcts and/or epiploic appendigitis; given he is not improving on augmentin, will continue broad spectrum IV abx; continue antiinflammatories as well; appreciate surgery's input.  #hx afib  #hx PVCs and intermittent LBBB  #Hx diverticulitis s/p Open partial colectomy with colorectal anastomosis  #HTN  #GERD  #Depression      Plan of care discussed with patient and RN    Avery Ordoñez MD    Supplementary Documentation:     Quality:  DVT Prophylaxis: scds, lovenox  CODE status: see chart  Vance: no  Central line: no      Estimated date of discharge: 1 day  At this point Mr. Cabral is expected to be discharge to: home

## 2025-02-10 NOTE — PLAN OF CARE
Pt A&Ox4. VSS on RA, hx NELLIE with CPAP. . Telemetry monitoring, sinus carina. IV abx as scheduled. Denies pain. Up ad james. Plan to discharge to home once medically cleared. Call light within reach. Will continue to monitor.

## 2025-02-10 NOTE — PROGRESS NOTES
[facility]  Progress Note    Don Cabral Patient Status:  Observation    1965 MRN [id number]   Location [facility] 3SW-A Attending Avery Cardenas MD   Hosp Day # 0 PCP Stephen Christianson MD     Subjective:  He is seen and examined resting in bed. He reports the severe pain he was experiencing prior to presenting to the ER has resolved. He reports only the lingering/dull pain remains. He reports tolerating diet.     Objective/Physical Exam:  /54 (BP Location: Right arm)   Pulse 59   Temp 97.9 °F (36.6 °C) (Oral)   Resp 18   Ht 6' 3\" (1.905 m)   Wt 270 lb (122.5 kg)   SpO2 97%   BMI 33.75 kg/m²     Intake/Output Summary (Last 24 hours) at 2/10/2025 0951  Last data filed at 2025 1700  Gross per 24 hour   Intake 920 ml   Output --   Net 920 ml         General: Awake, Alert,   Cooperative.  No apparent distress.  HEENT: Normocephalic, atraumatic. No scleral icterus.   Pulm: no respiratory distress, no increased work of breathing  Abdomen:  Soft, non-distended,mildly tender to palpation in right lower quadrant, with no rebound or guarding.  No peritoneal signs. Well healed midline incision   Skin: Warm, dry. No jaundice.  Extremities: No lower extremity edema     Labs:  Lab Results   Component Value Date    WBC 7.7 02/10/2025    HGB 13.1 02/10/2025    HCT 39.9 02/10/2025    .0 02/10/2025      Lab Results   Component Value Date    INR 1.01 2023    INR 1.23 (H) 2023     Lab Results   Component Value Date     02/10/2025    K 4.0 02/10/2025     02/10/2025    CO2 26.0 02/10/2025    BUN 19 02/10/2025    CREATSERUM 0.74 02/10/2025    GLU 98 02/10/2025    CA 9.3 02/10/2025    ALKPHO 64 02/10/2025    ALT 15 02/10/2025    AST 14 02/10/2025    BILT 0.7 02/10/2025    ALB 4.4 02/10/2025    TP 6.9 02/10/2025            Assessment:  Patient Active Problem List   Diagnosis    Anxiety    Essential hypertension    ED (erectile dysfunction)    NELLIE (obstructive sleep apnea)     Generalized anxiety disorder    Hypokalemia    Hyperglycemia    Tooth pain    Daytime hypersomnolence    Symptomatic bradycardia    Palpitations    Slow heart rate    PVC (premature ventricular contraction)    Bipolar 1 disorder (HCC)    Sigmoid diverticulitis    Acute diverticulitis    Intractable abdominal pain    Postoperative examination    Preop testing    Right lower quadrant abdominal pain    Abdominal pain, acute    Intra-abdominal infection    Epiploic appendagitis       Omental infarct     Plan:  No plan for acute surgical intervention  Continue regular diet as tolerated  Analgesics and antiemetics as needed- naproxen. Will add medrol dosepak   Encourage ambulation and up to chair  Continue IV antibiotics- rocephin and flagyl  Medical management per primary   DVT prophylaxis with lovenox   Once pain is controlled, patient is stable for discharge home from a general surgery standpoint. He should follow up with his surgeon Dr. Castro upon discharge.     Latanya Richardson PA-C  2/10/2025  9:51 AM    Addendum:    The patient was independently examined. I agree with the PA's assessment and plan.     Events noted.  Care plan reviewed with patient.  Patient had multiple questions which were answered to his understanding.    Continue NSAIDs.  Continue short course empiric antibiotics.  Will add Medrol Dosepak taper.  Anticipate discharge and patient to follow-up with his surgeon for further management options.    More than 50% of clinical time and 100% MDM was performed by me.     I, Dr. Ryan Clemens, personally performed the services described in this documentation, as documented  by Ms Dylan PA-C, ISELA,  and they are both accurate and complete.      Ryan Clemens MD FACS  Trauma Medical Director, [facility] General Surgery    The 21st Century Cures Act makes medical notes like these available to patients in the interest of transparency. Please be advised this is a medical document. Medical  documents are intended to carry relevant information, facts as evident, and the clinical opinion of the practitioner. The medical note is intended as peer to peer communication and may appear blunt or direct. It is written in medical language and may contain abbreviations or verbiage that are unfamiliar.    This note was prepared using Dragon Medical voice recognition dictation software. As a result, errors may occur. When identified, these errors have been corrected. While every attempt is made to correct errors during dictation, discrepancies may still exist.

## 2025-02-11 VITALS
TEMPERATURE: 98 F | DIASTOLIC BLOOD PRESSURE: 53 MMHG | RESPIRATION RATE: 18 BRPM | OXYGEN SATURATION: 95 % | HEART RATE: 55 BPM | BODY MASS INDEX: 33.57 KG/M2 | HEIGHT: 75 IN | SYSTOLIC BLOOD PRESSURE: 116 MMHG | WEIGHT: 270 LBS

## 2025-02-11 PROCEDURE — 99231 SBSQ HOSP IP/OBS SF/LOW 25: CPT

## 2025-02-11 PROCEDURE — 99239 HOSP IP/OBS DSCHRG MGMT >30: CPT | Performed by: HOSPITALIST

## 2025-02-11 RX ORDER — CEFUROXIME AXETIL 500 MG/1
500 TABLET ORAL 2 TIMES DAILY
Qty: 14 TABLET | Refills: 0 | Status: SHIPPED | OUTPATIENT
Start: 2025-02-11 | End: 2025-02-18

## 2025-02-11 RX ORDER — HYDROCODONE BITARTRATE AND ACETAMINOPHEN 5; 325 MG/1; MG/1
1 TABLET ORAL EVERY 6 HOURS PRN
Qty: 15 TABLET | Refills: 0 | Status: SHIPPED | OUTPATIENT
Start: 2025-02-11

## 2025-02-11 RX ORDER — PANTOPRAZOLE SODIUM 40 MG/1
40 TABLET, DELAYED RELEASE ORAL DAILY
Qty: 7 TABLET | Refills: 0 | Status: SHIPPED | OUTPATIENT
Start: 2025-02-11 | End: 2025-02-18

## 2025-02-11 RX ORDER — NAPROXEN 250 MG/1
250 TABLET ORAL 2 TIMES DAILY WITH MEALS
Qty: 14 TABLET | Refills: 0 | Status: SHIPPED | OUTPATIENT
Start: 2025-02-11 | End: 2025-02-18

## 2025-02-11 RX ORDER — METHYLPREDNISOLONE 4 MG/1
TABLET ORAL
Qty: 21 TABLET | Refills: 0 | Status: SHIPPED | OUTPATIENT
Start: 2025-02-11

## 2025-02-11 RX ORDER — METHYLPREDNISOLONE 4 MG/1
TABLET ORAL
Qty: 21 TABLET | Refills: 0 | Status: SHIPPED | OUTPATIENT
Start: 2025-02-11 | End: 2025-02-14 | Stop reason: ALTCHOICE

## 2025-02-11 RX ORDER — CLONAZEPAM 0.5 MG/1
0.5 TABLET ORAL 2 TIMES DAILY
Status: SHIPPED | COMMUNITY
Start: 2025-02-11

## 2025-02-11 RX ORDER — METRONIDAZOLE 500 MG/1
500 TABLET ORAL 2 TIMES DAILY
Qty: 14 TABLET | Refills: 0 | Status: SHIPPED | OUTPATIENT
Start: 2025-02-11 | End: 2025-02-18

## 2025-02-11 NOTE — PLAN OF CARE
NURSING DISCHARGE NOTE    Discharged Home via Ambulatory.  Accompanied by Family member  Belongings Taken by patient/family.  AVS reviewed with patient, patient verbalized understanding.

## 2025-02-11 NOTE — DISCHARGE SUMMARY
[facility]     Don Cabral Patient Status:  Observation    1965 MRN [id number]   Location [facility] 3SW-A Attending Avery Cardenas MD   Hosp Day # 0 PCP Stephen Christianson MD     Date of Admission: 2025  Date of Discharge: 2025  Discharge Disposition: Home or Self Care  Chief complaint:   Chief Complaint   Patient presents with    Abdomen/Flank Pain         Discharge Diagnoses and Brief Synopsis:  #possible omental infarcts and/or epiploic appendigitis; improved on anti-inflammatories and ABX; discharged on medrol dose pack, nsaids, abx  #hx afib  #hx PVCs and intermittent LBBB  #Hx diverticulitis s/p Open partial colectomy with colorectal anastomosis  #HTN  #GERD  #Depression        Lab/Test results pending at Discharge:   none        Admission History of Present Illness (author of HPI not necessarily myself; see H&P author): Don Cabral is a 59 year old male with 10 days of worsening RLQ abdominal pain, despite being on augmentin since last week.  No vomiting, diarhrea, fevers.  Has hx of diverticultisi that evolved in context of CT suggesting epiploic appendigitis, eventually requiring partial colectomy.  Says the pain is worsening.       Lace+ Score: 60  59-90 High Risk  29-58 Medium Risk  0-28   Low Risk       TCM Follow-Up Recommendation:  LACE > 58: High Risk of readmission after discharge from the hospital.      Discharge Medication List:     Discharge Medications        START taking these medications        Instructions Prescription details   cefuroxime 500 MG Tabs  Commonly known as: Ceftin      Take 1 tablet (500 mg total) by mouth 2 (two) times daily for 7 days.   Stop taking on: 2025  Quantity: 14 tablet  Refills: 0     HYDROcodone-acetaminophen 5-325 MG Tabs  Commonly known as: Norco      Take 1 tablet by mouth every 6 (six) hours as needed.   Quantity: 15 tablet  Refills: 0     methylPREDNISolone 4 MG Tbpk  Commonly known as:  Medrol Dosepak      Take as directed on dose pack instructions   Quantity: 21 tablet  Refills: 0     methylPREDNISolone 4 MG Tbpk  Commonly known as: Medrol Dosepak      Take as directed on dose pack instructions   Quantity: 21 tablet  Refills: 0     metroNIDAZOLE 500 MG Tabs  Commonly known as: Flagyl      Take 1 tablet (500 mg total) by mouth in the morning and 1 tablet (500 mg total) before bedtime. Do all this for 7 days.   Stop taking on: February 18, 2025  Quantity: 14 tablet  Refills: 0     naproxen 250 MG Tabs  Commonly known as: Naprosyn      Take 1 tablet (250 mg total) by mouth 2 (two) times daily with meals for 7 days.   Stop taking on: February 18, 2025  Quantity: 14 tablet  Refills: 0     pantoprazole 40 MG Tbec  Commonly known as: Protonix      Take 1 tablet (40 mg total) by mouth daily for 7 days.   Stop taking on: February 18, 2025  Quantity: 7 tablet  Refills: 0            CHANGE how you take these medications        Instructions Prescription details   semaglutide-weight management 0.25 MG/0.5ML Soaj  Commonly known as: Wegovy  What changed: Another medication with the same name was removed. Continue taking this medication, and follow the directions you see here.      Inject 0.5 mL (0.25 mg total) into the skin once a week.   Quantity: 4 each  Refills: 0            CONTINUE taking these medications        Instructions Prescription details   albuterol 108 (90 Base) MCG/ACT Aers  Commonly known as: Ventolin HFA      Inhale 2 puffs into the lungs every 4 (four) hours as needed for Wheezing or Shortness of Breath.   Quantity: 1 each  Refills: 3     clonazePAM 0.5 MG Tabs  Commonly known as: KlonoPIN      Take 1 tablet (0.5 mg total) by mouth 2 (two) times daily.   Refills: 0     gabapentin 600 MG Tabs  Commonly known as: Neurontin      Take 1 tablet (600 mg total) by mouth 3 (three) times daily.   Quantity: 270 tablet  Refills: 0     lisinopril 20 MG Tabs  Commonly known as: Prinivil; Zestril      Take  1 tablet (20 mg total) by mouth daily.   Quantity: 90 tablet  Refills: 3     sertraline 50 MG Tabs  Commonly known as: Zoloft      1.5 tablets daily   Quantity: 135 tablet  Refills: 0     Tadalafil 20 MG Tabs      Take 1 tablet (20 mg total) by mouth daily as needed for Erectile Dysfunction.   Quantity: 12 tablet  Refills: 1     Verapamil HCl  MG Cp24  Commonly known as: VERELAN       Refills: 0            STOP taking these medications      amoxicillin clavulanate 875-125 MG Tabs  Commonly known as: Augmentin        Multi-Vitamin/Minerals Tabs        tobramycin 0.3 % Soln  Commonly known as: Tobrex                  Where to Get Your Medications        These medications were sent to [facility] [address], [facility] [address] [phone], [phone]  [address] [place] [address], [address] [address]      Phone: [phone]   methylPREDNISolone 4 MG Tbpk       These medications were sent to [id number] - [place], [place] - [address] [place] [address] [phone], [phone]  [facility] [place] [id number]      Hours: 24-hours Phone: [phone]   cefuroxime 500 MG Tabs  HYDROcodone-acetaminophen 5-325 MG Tabs  methylPREDNISolone 4 MG Tbpk  metroNIDAZOLE 500 MG Tabs  naproxen 250 MG Tabs  pantoprazole 40 MG Tbec         [place]    Follow-up appointment:   Stephen Christianson MD  [address] [place] [id number] [phone]    Follow up in 1 week(s)      Rick Novoa MD  [address] [facility] [address]  [facility] [address]  [phone]    Follow up      Appointments for Next 30 Days 2/11/2025 - 3/13/2025        Date Arrival Time Visit Type Length Department Provider     2/14/2025  3:30 PM  [facility] HOSPITAL FOLLOW UP [[id number]] 15 min [facility], [address] [place] [address] Stephen Christianson MD    Patient Instructions:         Location Instructions:     Masks are optional for all patients and visitors, unless otherwise indicated.               2/18/2025 12:30 PM   FOLLOW UP VISIT [[id number]] 10 min [facility] Rick Novoa MD    Patient Instructions:         Location Instructions:     Your appointment is located at [address] [facility] in [place], [place].&nbsp; Please park in the Yellow lot and enter through the [facility] entrance.&nbsp; Then proceed to [address]  Masks are optional for all patients and visitors, unless otherwise indicated.               2/28/2025  2:30 PM  [facility] PT ORTHO EVAL [[id number]] 45 min [facility] in [facility]    Patient Instructions:         Location Instructions:     Your appointment is located at [address] [facility], [facility] [address], [place].  Please arrive 10 minutes prior to your scheduled appointment time.  Masks are optional for all patients and visitors, unless otherwise indicated.               3/7/2025  2:30 PM  [facility] PT ORTHO TX [[id number]] 45 min [facility] in [facility]    Patient Instructions:         Location Instructions:     Your appointment is located at [address] [facility], [facility] [address], [place].  Please arrive 10 minutes prior to your scheduled appointment time.  Masks are optional for all patients and visitors, unless otherwise indicated.                      Vital signs:  Temp:  [97.9 °F (36.6 °C)-98.1 °F (36.7 °C)] 97.9 °F (36.6 °C)  Pulse:  [55-58] 55  Resp:  [18] 18  BP: (116-125)/(53-65) 116/53  SpO2:  [95 %-97 %] 95 %    Physical Exam:    General: No acute distress.   Respiratory: Clear to auscultation bilaterally. No wheezes. No rhonchi.  Cardiovascular: S1, S2. Regular rate and rhythm. No murmurs, rubs or gallops.   Abdomen: Soft, less tender today, nondistended.   Neurologic: No focal neurological deficits.   Musculoskeletal: Moves all extremities.  Extremities: No edema.  -----------------------------------------------------------------------------------------------  PATIENT DISCHARGE INSTRUCTIONS: See electronic  chart    Avery Ordoñez MD    Time spent:   30 minutes

## 2025-02-11 NOTE — PROGRESS NOTES
Patient seen and examined.  Discharge if ok with consultants.  Hospitalist portion of med rec completed.    Avery Cardenas MD  [facility]  Internal Medicine Hospitalist

## 2025-02-11 NOTE — PROGRESS NOTES
[facility]  Progress Note    Don Cabral Patient Status:  Observation    1965 MRN [id number]   Location [facility] 3SW-A Attending Avery Cardenas MD   Hosp Day # 0 PCP Stephen Christianson MD     Subjective:  The patient was seen and examined at bedside. He reports that his abdominal pain is improved. He is tolerating a diet. He denies nausea or vomiting. He is passing flatus and having bowel movements.     Objective/Physical Exam:  /53 (BP Location: Left arm)   Pulse 55   Temp 97.9 °F (36.6 °C) (Oral)   Resp 18   Ht 75\"   Wt 270 lb (122.5 kg)   SpO2 95%   BMI 33.75 kg/m²     Intake/Output Summary (Last 24 hours) at 2025 0947  Last data filed at 2/10/2025 1900  Gross per 24 hour   Intake 1080 ml   Output --   Net 1080 ml         General: Alert, oriented x3. No acute distress.  HEENT: Normocephalic, atraumatic. No scleral icterus.  Pulmonary: No respiratory distress, effort normal.   Abdomen: Non-distended, without tympany to percussion. Soft, mild RLQ discomfort to palpation. No rebound or guarding. No peritoneal signs.   Incision: well healed midline and LLQ former ostomy site incisions  Extremities: No lower extremity edema. No clubbing or cyanosis.   Skin: Warm, dry. No jaundice.       Labs:      Lab Results   Component Value Date    INR 1.01 2023    INR 1.23 (H) 2023             Assessment:  Patient Active Problem List   Diagnosis    Anxiety    Essential hypertension    ED (erectile dysfunction)    NELLIE (obstructive sleep apnea)    Generalized anxiety disorder    Hypokalemia    Hyperglycemia    Tooth pain    Daytime hypersomnolence    Symptomatic bradycardia    Palpitations    Slow heart rate    PVC (premature ventricular contraction)    Bipolar 1 disorder (HCC)    Sigmoid diverticulitis    Acute diverticulitis    Intractable abdominal pain    Postoperative examination    Preop testing    Right lower quadrant abdominal pain    Abdominal pain, acute     Intra-abdominal infection    Epiploic appendagitis     Omental infarct    Plan:  No plan for acute general surgical intervention. The patient may discharge home from a general surgical standpoint  Antiemetics and analgesics as needed  Diet as tolerated   Finish medrol dosepak upon discharge  Encourage ambulation and up to chair  Medical management per primary  DVT prophylaxis with lovenox  Follow up with Dr. Novoa upon discharge      My total face time with this patient was 25 minutes. Greater than half of the visit was spent in counseling the patient on the above listed diagnoses and treatment options.     Tresa Bass PA-C  2/11/2025  9:47 AM

## 2025-02-11 NOTE — PLAN OF CARE
Pt states pain/tenderness to RLQ improved. Abdomen soft, non-distended. VSS, tolerating IV abx. Plan for possible dc home tomorrow.

## 2025-02-14 ENCOUNTER — OFFICE VISIT (OUTPATIENT)
Dept: FAMILY MEDICINE CLINIC | Facility: CLINIC | Age: 60
End: 2025-02-14
Payer: COMMERCIAL

## 2025-02-14 VITALS
DIASTOLIC BLOOD PRESSURE: 70 MMHG | HEIGHT: 75 IN | SYSTOLIC BLOOD PRESSURE: 132 MMHG | RESPIRATION RATE: 16 BRPM | WEIGHT: 273 LBS | BODY MASS INDEX: 33.94 KG/M2 | HEART RATE: 84 BPM | OXYGEN SATURATION: 98 %

## 2025-02-14 DIAGNOSIS — Z09 HOSPITAL DISCHARGE FOLLOW-UP: Primary | ICD-10-CM

## 2025-02-14 DIAGNOSIS — K63.89 EPIPLOIC APPENDAGITIS: ICD-10-CM

## 2025-02-14 PROCEDURE — 99213 OFFICE O/P EST LOW 20 MIN: CPT | Performed by: FAMILY MEDICINE

## 2025-02-14 NOTE — PROGRESS NOTES
[facility] Progress Note    SUBJECTIVE: Don Cabral 59 year old male is here today for   Chief Complaint   Patient presents with    Hospital F/U       Follow up from the hospital. Had gone to ER week prior to that, found epiploic appendagitis, NSAIDs and wait it out. Worsened and ended up returning to the ER and put in hospital. CT scan showed possible omentum infarct.    Area of pain increased, followed by surgery. Dr. Clmeens. Has follow up with his surgeon next week    Since then has improved. While in the hospital went from severe pain to just manageable pain and then now last 2 days no soreness at all.    Not sure what triggered it.    Steroid course and broad spectrum abx  Nsaids    .        PMH  Past Medical History:    Allergic rhinitis    Anxiety    Arrhythmia    T1 AV block, LBBB    Back pain    Back problem    lower back    Depression    Diverticulitis    ED (erectile dysfunction)    High blood pressure    History of adverse reaction to anesthesia    Patient states he doesnt know exactly what happened but stated he required haldol post anesthesia    Pneumonia due to organism    Seasonal allergies    Shortness of breath    Sleep apnea    CPAP    Unspecified essential hypertension    Visual impairment    glasses        PSH  Past Surgical History:   Procedure Laterality Date    Cholecystectomy  3/12/2023    Colonoscopy N/A 03/22/2016    [facility].  Hyperplastic polyp only.  Repeat 2026    Colonoscopy  05/10/2023    Dental surgery procedure      [place] teeth removed    Incision/drain abscess extra  1996    Knee arthroscopy Right     Laparoscopic cholecystectomy  02/12/2023    Myringoplasty      Other surgical history      R knee arthroscope    Other surgical history  07/17/2024    Exploratory laparotomy partial colectomy with colorectal anastamosis, extensive adhesiolysis    Part removal colon w end colostomy      Skin tissue procedure unlisted      Tonsillectomy          Social Hx:  No  changes    ROS  See HPI    OBJECTIVE:  /70   Pulse 84   Resp 16   Ht 6' 3\" (1.905 m)   Wt 273 lb (123.8 kg)   SpO2 98%   BMI 34.12 kg/m²           Labs:          Meds:   Current Outpatient Medications   Medication Sig Dispense Refill    methylPREDNISolone 4 MG Oral Tablet Therapy Pack Take as directed on dose pack instructions 21 tablet 0    cefuroxime 500 MG Oral Tab Take 1 tablet (500 mg total) by mouth 2 (two) times daily for 7 days. 14 tablet 0    clonazePAM 0.5 MG Oral Tab Take 1 tablet (0.5 mg total) by mouth 2 (two) times daily.      metroNIDAZOLE 500 MG Oral Tab Take 1 tablet (500 mg total) by mouth in the morning and 1 tablet (500 mg total) before bedtime. Do all this for 7 days. 14 tablet 0    naproxen 250 MG Oral Tab Take 1 tablet (250 mg total) by mouth 2 (two) times daily with meals for 7 days. 14 tablet 0    pantoprazole 40 MG Oral Tab EC Take 1 tablet (40 mg total) by mouth daily for 7 days. 7 tablet 0    HYDROcodone-acetaminophen 5-325 MG Oral Tab Take 1 tablet by mouth every 6 (six) hours as needed. 15 tablet 0    semaglutide-weight management 0.25 MG/0.5ML Subcutaneous Solution Auto-injector Inject 0.5 mL (0.25 mg total) into the skin once a week. 4 each 0    Tadalafil 20 MG Oral Tab Take 1 tablet (20 mg total) by mouth daily as needed for Erectile Dysfunction. 12 tablet 1    sertraline (ZOLOFT) 50 MG Oral Tab 1.5 tablets daily 135 tablet 0    gabapentin 600 MG Oral Tab Take 1 tablet (600 mg total) by mouth 3 (three) times daily. 270 tablet 0    Verapamil HCl  MG Oral Capsule SR 24 Hr       lisinopril 20 MG Oral Tab Take 1 tablet (20 mg total) by mouth daily. 90 tablet 3    Albuterol Sulfate  (90 Base) MCG/ACT Inhalation Aero Soln Inhale 2 puffs into the lungs every 4 (four) hours as needed for Wheezing or Shortness of Breath. 1 each 3         Assessment/Plan  Don was seen today for hospital f/u.    Diagnoses and all orders for this visit:    Hospital discharge  follow-up    Epiploic appendagitis         He has improved, doing better, has follow up with his surgeon    For weight loss, discussed checking with insurance their guidelines for coverage, could consider compounded semaglutide.         Total Time spent with patient and coordinating care:  15 minutes.    Follow up: as needed      Stephen Christianson MD

## 2025-02-18 ENCOUNTER — OFFICE VISIT (OUTPATIENT)
Dept: SURGERY | Facility: CLINIC | Age: 60
End: 2025-02-18
Payer: COMMERCIAL

## 2025-02-18 VITALS
SYSTOLIC BLOOD PRESSURE: 140 MMHG | BODY MASS INDEX: 33.94 KG/M2 | HEART RATE: 75 BPM | WEIGHT: 273 LBS | HEIGHT: 75 IN | DIASTOLIC BLOOD PRESSURE: 78 MMHG

## 2025-02-18 DIAGNOSIS — R10.31 RIGHT LOWER QUADRANT ABDOMINAL PAIN: Primary | ICD-10-CM

## 2025-02-18 PROCEDURE — 99212 OFFICE O/P EST SF 10 MIN: CPT | Performed by: SURGERY

## 2025-02-18 RX ORDER — DIAZEPAM 5 MG/1
5 TABLET ORAL EVERY 6 HOURS PRN
Qty: 30 TABLET | Refills: 2 | Status: SHIPPED | OUTPATIENT
Start: 2025-02-18

## 2025-02-18 NOTE — PROGRESS NOTES
Chief Complaint   Patient presents with    Abdominal Pain     Follow up ED, RLQ abdominal pain, 2/9/25.         O:  /78   Pulse 75   Ht 6' 3\" (1.905 m)   Wt 273 lb (123.8 kg)   BMI 34.12 kg/m²   GEN:  No acute distress  Abd:  Soft, NTND, incisions healed.          Assessment   1. Right lower quadrant abdominal pain        Likely due to muscle spasm from drain track scar tissue.  Low dose valium 5mg q 6hrs and abdominal binder.  F/u prn.         Rick Novoa MD

## 2025-02-20 ENCOUNTER — TELEPHONE (OUTPATIENT)
Dept: PHYSICAL THERAPY | Facility: HOSPITAL | Age: 60
End: 2025-02-20

## 2025-02-27 ENCOUNTER — TELEPHONE (OUTPATIENT)
Dept: PHYSICAL THERAPY | Facility: HOSPITAL | Age: 60
End: 2025-02-27

## 2025-02-27 ENCOUNTER — TELEPHONE (OUTPATIENT)
Dept: FAMILY MEDICINE CLINIC | Facility: CLINIC | Age: 60
End: 2025-02-27

## 2025-02-27 DIAGNOSIS — K63.89 EPIPLOIC APPENDAGITIS: Primary | ICD-10-CM

## 2025-02-27 NOTE — TELEPHONE ENCOUNTER
Dr. Christianson,  Patient states he got better, saw surgeon. Pain went totally away for 2-3 days. Then pain came back Sunday, pain is now a 3/10. No fever. Patient is at work. He is wondering is this problem is returning. Looking for advice.

## 2025-02-27 NOTE — TELEPHONE ENCOUNTER
I saw Dr. Novoa's note suspecting possible muscle spasm related to scare tissue, any benefit from the diazepam for the pain?    Can add further nsaids to it, but if not improving, I think specialist opinion makes sense, I would like to avoid further CT scans if possible.    Stephen Christianson MD

## 2025-02-27 NOTE — TELEPHONE ENCOUNTER
Patient scheduled appt with Dr. Christianson for 4/8/25 Persistent LRQ abdominal pain - routing to triage for additional RN assessment and timely scheduling review

## 2025-02-28 ENCOUNTER — OFFICE VISIT (OUTPATIENT)
Dept: PHYSICAL THERAPY | Age: 60
End: 2025-02-28
Attending: PHYSICIAN ASSISTANT
Payer: COMMERCIAL

## 2025-02-28 DIAGNOSIS — M79.18 MYOFASCIAL PAIN: Primary | ICD-10-CM

## 2025-02-28 PROCEDURE — 97161 PT EVAL LOW COMPLEX 20 MIN: CPT

## 2025-02-28 PROCEDURE — 97110 THERAPEUTIC EXERCISES: CPT

## 2025-02-28 NOTE — TELEPHONE ENCOUNTER
Dr. Christianson,  Spoke to patient - he was not happy with Dr. Novoa - he did not take Diazepam as he is on Klonopin. Did take Ibuprofen 600 mg and it dulls the pain. Saw Dr. Clemens in house - would be open to going to see him. Please advise.

## 2025-03-01 NOTE — PROGRESS NOTES
SPINE EVALUATION:     Diagnosis:   Myofascial pain (M79.18) Patient:  Don Cabral (59 year old, male)        Referring Provider: Stephen Craig  Today's Date   3/1/2025    Precautions:  None   Date of Evaluation: 02/28/25  Next MD visit: No data recorded  Date of Surgery: N/A     PATIENT SUMMARY   Summary of chief complaints: pain in the Thoracic area (paraspinal) R>L. now its going to the left side too  History of current condition: The pt experienced this pain on that intensity and had to go to the ref. Provider x 2 weeks. Never taken teatments for thos pain. 18 years back the pt had disc herniation in L5-S1 area. Did PT for that but then it snapped back into place. SCiatic pain was gone   Pain level: current 0 /10, at best 0 /10, at worst 4 /10  Description of symptoms: sharp twinge in the muscles of the mid back in the R side. The pain is constant, and the patient can move against the pain.   Occupation: desk job   Leisure activities/Hobbies: N/A   Prior level of function: pretty active around the house. Exercise regime is poor  Current limitations: constant pain bothering more than limiting any activity. It leanne incrreases with certain  mivet and pt has beben avoiding those movet  Pt goals: Relief of pain  Past medical history was reviewed with Don.  Significant findings include:    Imaging/Tests:     Don  has a past medical history of Allergic rhinitis, Anxiety, Arrhythmia, Back pain, Back problem, Depression, Diverticulitis, ED (erectile dysfunction), High blood pressure, History of adverse reaction to anesthesia, Pneumonia due to organism, Seasonal allergies, Shortness of breath, Sleep apnea (2012), Unspecified essential hypertension, and Visual impairment.  He  has a past surgical history that includes myringoplasty; knee arthroscopy (Right); skin tissue procedure unlisted; incision/drain abscess extra (1996); dental surgery procedure; colonoscopy (N/A, 03/22/2016); other surgical  history; tonsillectomy; laparoscopic cholecystectomy (02/12/2023); cholecystectomy (3/12/2023); colonoscopy (05/10/2023); part removal colon w end colostomy; and other surgical history (07/17/2024).    ASSESSMENT  Don presents to physical therapy evaluation with primary c/o pain in the Thoracic area (paraspinal) R>L. now its going to the left side too. The results of the objective tests and measures show Myofascitis in the rhomboids / Mid traps. Trigger points, weakness of corresponding ms and Postural problems. Functional deficits include but are not limited to constant pain bothering more than limiting any activity. It leanne incrreases with certain  mivet and pt has beben avoiding those movet. Signs and symptoms are consistent with diagnosis of Myofascial pain (M79.18). Pt and PT discussed evaluation findings, pathology, POC and HEP.  Pt voiced understanding and performs HEP correctly without reported pain. Skilled Physical Therapy is medically necessary to address the above impairments and reach functional goals.    OBJECTIVE:   Musculoskeletal:  Observation/Posture: forward head, rounded shoulders, kyphosis, dowager;s hump   Accessory Motion: scapular motions restricted in depressiona and retraction   Palpation: TTP in the rhomboids R     Special tests:   none     BRAULIO ROM WNL and Strength (5/5) except below:  (* denotes performed with pain)     Cervical ROM MMT (-/5)     Flex restricted 4/5     Ext restricted 5/5    R L R L     Side bend restricted restricted 3+/5 3+/5     Rotation normsl WFL 5-5 5-5   ,   Shoulder   ROM MMT (-/5)    R L R L     Flex     5/5 5/5     Ext     4/5 4/5     Abd (C5)     5/5 5/5     IR     5/5 5/5     ER     5/5 5/5     Low Trap n/a 3+/5 4+/5     Mid Trap n/a 3+/5 4/5     SA n/a         ,   Elbow   ROM MMT (-/5)    R L R L     Flex (C6)     5/5 5/5     Ext (C7)     5/5 5/5     Pronation             Supination                     Neurological:  Sensation: Grossly intact to light  touch BRAULIO UE/LE except     Deep Tendon Reflexes: WNL except     UMN: signs and symptoms WNL except     Peripheral Neurodynamic: WNL except       Balance and Functional Mobility:  Gait: pt ambulates on level ground with  .  Balance: SLS: EO R  , EO L           Today's Treatment and Response:   Pt education was provided on exam findings, treatment diagnosis, treatment plan, expectations, and prognosis.  Today's Treatment       2/28/2025   PT Treatment   Therapeutic Exercise Min 10   Evaluation Min 35   Total of Timed Procedures 10   Total of Service Based 35   Total Treatment Time 45         Patient was instructed in and issued a HEP for: Access Code: [email or web address]  URL: [email or web address]  Date: 02/28/2025  Prepared by: James Boland    Exercises  - Supine Cervical Sidebending Stretch  - 1 x daily - 5 x weekly - 1 sets - 5 reps - 5s hold  - Supine Cervical Sidebending Stretch (Mirrored)  - 1 x daily - 5 x weekly - 1 sets - 5 reps - 5s hold  - Seated Cervical Flexion Stretch with Finger Support Behind Neck  - 1 x daily - 5 x weekly - 1 sets - 5 reps - 5s hold  - Single Arm Doorway Pec Stretch at 90 Degrees Abduction  - 1 x daily - 5 x weekly - 1 sets - 4 reps - 15s hold  - Standing Shoulder Horizontal Abduction with Resistance  - 1 x daily - 5 x weekly - 1 sets - 10 reps - 3s hold  - Wall Aristes  - 1 x daily - 5 x weekly - 1 sets - 10 reps - 3s hold    Charges:  PT EVAL: Low Complexity,    In agreement with evaluation findings and clinical rationale, this evaluation involved LOW COMPLEXITY decision making due to no personal factors/comorbidities, 1-2 body structures involved/activity limitations, and stable symptoms as documented in the evaluation.                                                                         PLAN OF CARE:    Goals: (to be met in 4 visits)   Goals    None          Frequency / Duration: Patient will be seen 1x/week or a total of 5  visits over a 90 day period. Treatment will  include: Manual Therapy; Therapeutic Exercise; Home Exercise Program instruction    Education or treatment limitation: None   Rehab Potential: good     Neck Disability Index Score  No data recorded    Patient/Family/Caregiver was advised of these findings, precautions, and treatment options and has agreed to actively participate in planning and for this course of care.    Thank you for your referral. Please co-sign or sign and return this letter via fax as soon as possible to [phone]. If you have any questions, please contact me at Dept: [phone]    Sincerely,  Electronically signed by therapist: James Boland PT  Physician's certification required: Yes  I certify the need for these services furnished under this plan of treatment and while under my care.    X___________________________________________________ Date____________________    Certification From: 3/1/2025  To:5/30/2025

## 2025-03-05 ENCOUNTER — OFFICE VISIT (OUTPATIENT)
Facility: LOCATION | Age: 60
End: 2025-03-05
Payer: COMMERCIAL

## 2025-03-05 VITALS
OXYGEN SATURATION: 98 % | SYSTOLIC BLOOD PRESSURE: 130 MMHG | DIASTOLIC BLOOD PRESSURE: 81 MMHG | HEART RATE: 83 BPM | RESPIRATION RATE: 18 BRPM | TEMPERATURE: 98 F

## 2025-03-05 DIAGNOSIS — K63.89 EPIPLOIC APPENDAGITIS: Primary | ICD-10-CM

## 2025-03-05 PROCEDURE — 99213 OFFICE O/P EST LOW 20 MIN: CPT | Performed by: SURGERY

## 2025-03-05 RX ORDER — CHLORHEXIDINE GLUCONATE ORAL RINSE 1.2 MG/ML
SOLUTION DENTAL
COMMUNITY
Start: 2025-02-24

## 2025-03-05 NOTE — H&P
New Patient Visit Note       Active Problems      1. Epiploic appendagitis        Chief Complaint   Chief Complaint   Patient presents with    New Patient     NP- Epiploic appendagitis, [facility] f/u 02/09/2025. Pt reports RLQ pain. Pt reports some nausea when the pain gets really bad. Pt reports no other symptoms.        History of Present Illness     The patient presents for continued follow-up of what is believed to be epiploic appendagitis for which she was hospitalized February 2025.  Patient reports ongoing pain and discomfort despite being treated with antibiotics, NSAIDs, and steroid taper.  The patient did follow-up with his surgeon at [place] that no meaningful intervention occurred.  Patient now presents for continued follow-up and recommendations.  The patient had repeat CT scan which shows persistent inflammation around the ascending colon and the mesentery.  I reviewed the images I concur with the findings.  I discussed the findings with the patient and his spouse in the context of his presenting symptoms.    The patient has not experienced any relief to this point and he is frustrated with his clinical trajectory.  I had a lengthy discussion with the patient regarding natural history of inflammatory changes such as his.  I stated also that there may be a role for colonoscopy to evaluate for underlying colitis as the images appear to show inflammation of the colon as well to my interpretation.  The patient has previously seen [facility] and I referred the patient back to [facility] for further recommendations and possible colonoscopy evaluation with biopsy of the area of concern.    In the interim I recommend the patient continue a new course of antibiotics for presumed colitis as empiric treatment.  I also recommend the patient use NSAIDs augmented with occasional opioid medication to help with pain control until further diagnostic workup is completed.    Allergies  Don is allergic to  ciprofloxacin.    Past Medical / Surgical / Social / Family History    The past medical and past surgical history have been reviewed by me today.    Past Medical History:    Allergic rhinitis    Anxiety    Arrhythmia    T1 AV block, LBBB    Back pain    Back problem    lower back    Depression    Diverticulitis    ED (erectile dysfunction)    High blood pressure    History of adverse reaction to anesthesia    Patient states he doesnt know exactly what happened but stated he required haldol post anesthesia    Pneumonia due to organism    Seasonal allergies    Shortness of breath    Sleep apnea    CPAP    Unspecified essential hypertension    Visual impairment    glasses     Past Surgical History:   Procedure Laterality Date    Cholecystectomy  3/12/2023    Colonoscopy N/A 03/22/2016    [place].  Hyperplastic polyp only.  Repeat 2026    Colonoscopy  05/10/2023    Dental surgery procedure      [place] teeth removed    Incision/drain abscess extra  1996    Knee arthroscopy Right     Laparoscopic cholecystectomy  02/12/2023    Myringoplasty      Other surgical history      R knee arthroscope    Other surgical history  07/17/2024    Exploratory laparotomy partial colectomy with colorectal anastamosis, extensive adhesiolysis    Part removal colon w end colostomy      Skin tissue procedure unlisted      Tonsillectomy         The family history and social history have been reviewed by me today.    Family History   Problem Relation Age of Onset    Heart Disorder Father         Ablation required    Hypertension Father     Cancer Father 60        Prostate    Heart Disorder Mother         murmur no treatment needed    Hypertension Mother     Stroke Maternal Grandmother     Other (stroke) Maternal Grandmother     Dementia Paternal Grandmother     Heart Disorder Paternal Grandfather         two heart attacks and CHF    Diabetes Paternal Grandfather     Other (Diabetes, type 2) Paternal Grandfather      Social History      Socioeconomic History    Marital status:    Occupational History    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[employer]   Tobacco Use    Smoking status: Former     Current packs/day: 0.00     Average packs/day: 0.5 packs/day for 19.0 years (9.5 ttl pk-yrs)     Types: Cigarettes     Start date: 1981     Quit date: 2000     Years since quittin.6     Passive exposure: Never    Smokeless tobacco: Never   Vaping Use    Vaping status: Never Used   Substance and Sexual Activity    Alcohol use: Not Currently     Comment: Stopped alcohol 2000    Drug use: No    Sexual activity: Yes     Partners: Female   Other Topics Concern    Caffeine Concern Yes     Comment: coffee in AM/ soda     Exercise Yes     Comment: X1-2 times per week         Current Outpatient Medications:     oxyCODONE 5 MG Oral Tab, Take 1 tablet (5 mg total) by mouth every 6 (six) hours as needed for Pain., Disp: 20 tablet, Rfl: 0    chlorhexidine gluconate 0.12 % Mouth/Throat Solution, RINSE WITH ONE CAPFUL BY MOUTH FOR 30 SECONDS, THEN SPIT. REPEAT 2 TIMES DAILY. DO NOT SWALLOW., Disp: , Rfl:     amoxicillin clavulanate 875-125 MG Oral Tab, Take 1 tablet by mouth 2 (two) times daily for 10 days., Disp: 20 tablet, Rfl: 0    clonazePAM 0.5 MG Oral Tab, Take 1 tablet (0.5 mg total) by mouth 2 (two) times daily., Disp: , Rfl:     HYDROcodone-acetaminophen 5-325 MG Oral Tab, Take 1 tablet by mouth every 6 (six) hours as needed., Disp: 15 tablet, Rfl: 0    Tadalafil 20 MG Oral Tab, Take 1 tablet (20 mg total) by mouth daily as needed for Erectile Dysfunction., Disp: 12 tablet, Rfl: 1    sertraline (ZOLOFT) 50 MG Oral Tab, 1.5 tablets daily, Disp: 135 tablet, Rfl: 0    gabapentin 600 MG Oral Tab, Take 1 tablet (600 mg total) by mouth 3 (three) times daily., Disp: 270 tablet, Rfl: 0    Verapamil HCl  MG Oral Capsule SR 24 Hr, , Disp: , Rfl:     lisinopril 20 MG Oral Tab, Take 1 tablet (20 mg total) by mouth daily., Disp: 90 tablet, Rfl: 3     Albuterol Sulfate  (90 Base) MCG/ACT Inhalation Aero Soln, Inhale 2 puffs into the lungs every 4 (four) hours as needed for Wheezing or Shortness of Breath., Disp: 1 each, Rfl: 3    diazePAM 5 MG Oral Tab, Take 1 tablet (5 mg total) by mouth every 6 (six) hours as needed for Anxiety., Disp: 30 tablet, Rfl: 2    methylPREDNISolone 4 MG Oral Tablet Therapy Pack, Take as directed on dose pack instructions, Disp: 21 tablet, Rfl: 0    semaglutide-weight management 0.25 MG/0.5ML Subcutaneous Solution Auto-injector, Inject 0.5 mL (0.25 mg total) into the skin once a week., Disp: 4 each, Rfl: 0      Review of Systems  The Review of Systems has been reviewed by me during today.  Review of Systems   Constitutional: Negative.    Respiratory: Negative.     Cardiovascular: Negative.    Gastrointestinal: Negative.    Skin: Negative.    Neurological: Negative.        Physical Findings   /81 (BP Location: Right arm, Patient Position: Sitting, Cuff Size: large)   Pulse 83   Temp 97.5 °F (36.4 °C) (Temporal)   Resp 18   SpO2 98%   Physical Exam  Vitals and nursing note reviewed.   Constitutional:       General: He is not in acute distress.     Appearance: He is well-developed.   HENT:      Head: Normocephalic and atraumatic.   Eyes:      General: No scleral icterus.     Pupils: Pupils are equal, round, and reactive to light.   Neck:      Vascular: No JVD.      Trachea: No tracheal deviation.   Cardiovascular:      Rate and Rhythm: Normal rate and regular rhythm.   Pulmonary:      Effort: Pulmonary effort is normal. No respiratory distress.      Breath sounds: No stridor.   Abdominal:      General: Bowel sounds are normal. There is no distension.      Palpations: Abdomen is soft. Abdomen is not rigid. There is no mass.      Tenderness: There is abdominal tenderness in the right upper quadrant and right lower quadrant. There is no guarding or rebound. Negative signs include Kenney's sign and McBurney's sign.       Hernia: No hernia is present.   Musculoskeletal:         General: Normal range of motion.      Cervical back: Normal range of motion and neck supple.   Skin:     General: Skin is warm and dry.   Neurological:      Mental Status: He is alert and oriented to person, place, and time.   Psychiatric:         Behavior: Behavior normal.             Assessment   1. Epiploic appendagitis          Plan     Patient's initial CT scan was interpreted as epiploic appendagitis versus omental infarct, I would have expected patient's pain to have improved with management to date.    Repeat imaging raises concern for inflammation of the colon itself and I wonder if there is underlying colitis or which may need further investigation and workup.    I will empirically start the patient on a new course of antibiotics and asked him to continue using NSAIDs and oxycodone to optimize pain control.    I have also recommended patient [facility] he is previously seen to discuss further workup strategies potentially colonoscopy with biopsy to evaluate for other etiologies of his pain perhaps even colitis.    The patient will return to my attention in 2 to 4 weeks for continued follow-up or sooner if he fails to improve.    The patient was provided ample opportunity to ask questions.  All of the patient's questions were answered in detail.  The patient voiced understanding of the care plan.     No orders of the defined types were placed in this encounter.      Imaging & Referrals   None    Follow Up  No follow-ups on file.    Ryan Clemens MD

## 2025-03-06 RX ORDER — OXYCODONE HYDROCHLORIDE 5 MG/1
5 TABLET ORAL EVERY 6 HOURS PRN
Qty: 20 TABLET | Refills: 0 | Status: SHIPPED | OUTPATIENT
Start: 2025-03-06 | End: 2025-03-07

## 2025-03-06 NOTE — PROGRESS NOTES
CHAYITO MEDEIROS  2024 - 2025  Patient ID: [id number]  : 1965  Age: 59 years  [place]  [address]  [place], [address]  Phone: [phone]  Compliance Report  Usage 2024 - 2025  Usage days 88/90 days (98%)  >= 4 hours 61 days (68%)  < 4 hours 27 days (30%)  Usage hours 414 hours 23 minutes  Average usage (total days) 4 hours 36 minutes  Average usage (days used) 4 hours 43 minutes  Median usage (days used) 4 hours 27 minutes  Total used hours (value since last reset - 2025) 751 hours  [employer] 11 VAuto  Serial number [id number]  Mode VAuto  Max IPAP 20 cmH2O  Min EPAP 16 cmH2O  Pressure Support 4 cmH2O  Therapy  Leaks - L/min Median: 54.9 95th percentile: 106.7 Maximum: 112.1  Events per hour AI: 3.0 HI: 0.2 AHI: 3.2  Apnea Index Central: 0.5 Obstructive: 0.1 Unknown: 2.4

## 2025-03-07 ENCOUNTER — OFFICE VISIT (OUTPATIENT)
Facility: CLINIC | Age: 60
End: 2025-03-07
Payer: COMMERCIAL

## 2025-03-07 ENCOUNTER — APPOINTMENT (OUTPATIENT)
Dept: PHYSICAL THERAPY | Age: 60
End: 2025-03-07
Attending: PHYSICIAN ASSISTANT
Payer: COMMERCIAL

## 2025-03-07 VITALS
WEIGHT: 277 LBS | RESPIRATION RATE: 16 BRPM | SYSTOLIC BLOOD PRESSURE: 128 MMHG | HEART RATE: 64 BPM | TEMPERATURE: 98 F | DIASTOLIC BLOOD PRESSURE: 68 MMHG | BODY MASS INDEX: 34.44 KG/M2 | OXYGEN SATURATION: 96 % | HEIGHT: 75 IN

## 2025-03-07 DIAGNOSIS — I10 ESSENTIAL HYPERTENSION: ICD-10-CM

## 2025-03-07 DIAGNOSIS — Z95.818 IMPLANTABLE LOOP RECORDER PRESENT: ICD-10-CM

## 2025-03-07 DIAGNOSIS — G47.33 OSA (OBSTRUCTIVE SLEEP APNEA): Primary | ICD-10-CM

## 2025-03-07 PROCEDURE — 99214 OFFICE O/P EST MOD 30 MIN: CPT | Performed by: PHYSICIAN ASSISTANT

## 2025-03-07 RX ORDER — IBUPROFEN 600 MG/1
600 TABLET, FILM COATED ORAL EVERY 6 HOURS PRN
COMMUNITY

## 2025-03-07 NOTE — PROGRESS NOTES
[facility]  SLEEP PROGRESS NOTE        HPI:   This is a 59 year old male coming in for   Chief Complaint   Patient presents with    Follow - Up     LOV 24 for NELLIE - CPAP pressure causing leaks    [place] Total Score - 0       HPI:     Pressure reduced from 22/18 to 20/16  Still feels that pressure can be too high, having trouble breathing   Still struggling with leakages waking him up  Cleans mask daily with soap  Replaces every 6 weeks     Patient: Sleep review of systems today: see form.    Was previously on CPAP  Had major abdominal surgery- ostomy and then ostomy reversal   Since then he had to switch to BiPAP due to high residual AHI  Initially started at BiPAP         In bed 10p  Out of bed 530-6a  SL quick  Nighttime awakenings 2-3x to use RR, no trouble falling back asleep  Naps none  Caffeine 1 soda in AM, 1-2 cups coffee     Denies teeth grinding, leg cramps, dry mouth, tinnitus, chest pain, thoracic back pain, bloating, drowsy driving, sleep walking, sleep talking  Restless leg during the day related to psych meds  AM headache - worsened since he ran into issues with CPAP     DME company: [employer]  Mask type: FFM - CHAYITO NEWSOME  2024 - 2025  Patient ID: [id number]  : 1965  Age: 59 years  [facility]  [address], [address]  Phone: [phone]  Compliance Report  Usage 2024 - 2025  Usage days 88/90 days (98%)  >= 4 hours 61 days (68%)  < 4 hours 27 days (30%)  Usage hours 414 hours 23 minutes  Average usage (total days) 4 hours 36 minutes  Average usage (days used) 4 hours 43 minutes  Median usage (days used) 4 hours 27 minutes  Total used hours (value since last reset - 2025) 751 hours  AirCurve 11 BigSwerve  Serial number [id number]  Mode VAuto  Max IPAP 20 cmH2O  Min EPAP 16 cmH2O  Pressure Support 4 cmH2O  Therapy  Leaks - L/min Median: 54.9 95th percentile: 106.7 Maximum: 112.1  Events per hour AI: 3.0 HI: 0.2  AHI: 3.2  Apnea Index Central: 0.5 Obstructive: 0.1 Unknown: 2.4      9/24/2024 Titration - 268#?  RECOMMENDATIONS:   1. The patient should be prescribed Bilevel at 26/22 cm H2O, with humidity at 4 and EPR off.   2. The patient was fitted with a Aptela and Kymab Vitera mask, size Large. If with persistently elevated AHI on BiPAP consider auto BiPAP       9/27/2023 PSG   Respiratory Analysis: The Apnea-Hypopnea Index (AHI) was 8.4 events per hour. REM related AHI was 10.3 events per hour. Supine related AHI was 45.0. Lateral related AHI was 7.5. The Central Apnea Index was 0. The oxygen saturation gwen was 80.0% and patient spent 47.7% with saturations less than 88%.          Last CBC  Lab Results   Component Value Date    WBC 7.7 02/10/2025    RBC 4.72 02/10/2025    HGB 13.1 02/10/2025    HCT 39.9 02/10/2025    MCV 84.5 02/10/2025    MCH 27.8 02/10/2025    MCHC 32.8 02/10/2025    RDW 14.3 02/10/2025    .0 02/10/2025        Last CMP/BMP  Lab Results   Component Value Date    GLU 98 02/10/2025    BUN 19 02/10/2025    BUNCREA 13.3 07/24/2024    CREATSERUM 0.74 02/10/2025    ANIONGAP 9 02/10/2025     12/09/2014    GFRNAA 80 07/23/2022    GFRAA 92 07/23/2022    CA 9.3 02/10/2025    OSMOCALC 288 02/10/2025    ALKPHO 64 02/10/2025    AST 14 02/10/2025    ALT 15 02/10/2025    BILT 0.7 02/10/2025    TP 6.9 02/10/2025    ALB 4.4 02/10/2025    GLOBULIN 2.5 02/10/2025     02/10/2025    K 4.0 02/10/2025     02/10/2025    CO2 26.0 02/10/2025       Last iron panel  No results found for: \"IRON\", \"IRONTOT\"  No results found for: \"BEVERLEY\"      Pt  PCP:  Stephen Christianson MD  No referring provider defined for this encounter.           No data to display                  Past Medical History:    Allergic rhinitis    Anxiety    Arrhythmia    T1 AV block, LBBB    Back pain    Back problem    lower back    Depression    Diverticulitis    ED (erectile dysfunction)    High blood pressure    History of adverse reaction  to anesthesia    Patient states he doesnt know exactly what happened but stated he required haldol post anesthesia    Pneumonia due to organism    Seasonal allergies    Shortness of breath    Sleep apnea    CPAP    Unspecified essential hypertension    Visual impairment    glasses     Past Surgical History:   Procedure Laterality Date    Cholecystectomy  3/12/2023    Colonoscopy N/A 2016    [facility].  Hyperplastic polyp only.  Repeat     Colonoscopy  05/10/2023    Dental surgery procedure      [place] teeth removed    Incision/drain abscess extra      Knee arthroscopy Right     Laparoscopic cholecystectomy  2023    Myringoplasty      Other surgical history      R knee arthroscope    Other surgical history  2024    Exploratory laparotomy partial colectomy with colorectal anastamosis, extensive adhesiolysis    Part removal colon w end colostomy      Skin tissue procedure unlisted      Tonsillectomy       Social History:  Social History     Social History Narrative    Not on file     Social History     Socioeconomic History    Marital status:    Occupational History    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[employer]   Tobacco Use    Smoking status: Former     Current packs/day: 0.00     Average packs/day: 0.5 packs/day for 19.0 years (9.5 ttl pk-yrs)     Types: Cigarettes     Start date: 1981     Quit date: 2000     Years since quittin.6     Passive exposure: Never    Smokeless tobacco: Never   Vaping Use    Vaping status: Never Used   Substance and Sexual Activity    Alcohol use: Not Currently     Comment: Stopped alcohol 2000    Drug use: No    Sexual activity: Yes     Partners: Female   Other Topics Concern    Caffeine Concern Yes     Comment: coffee in AM/ soda     Exercise Yes     Comment: X1-2 times per week      Social Drivers of Health     Food Insecurity: No Food Insecurity (2025)    NCSS - Food Insecurity     Worried About Running Out of Food in the Last Year: No      Ran Out of Food in the Last Year: No   Transportation Needs: No Transportation Needs (2/9/2025)    NCSS - Transportation     Lack of Transportation: No   Housing Stability: Not At Risk (2/9/2025)    NCSS - Housing/Utilities     Has Housing: Yes     Worried About Losing Housing: No     Unable to Get Utilities: No     Family History:  Family History   Problem Relation Age of Onset    Heart Disorder Father         Ablation required    Hypertension Father     Cancer Father 60        Prostate    Heart Disorder Mother         murmur no treatment needed    Hypertension Mother     Stroke Maternal Grandmother     Other (stroke) Maternal Grandmother     Dementia Paternal Grandmother     Heart Disorder Paternal Grandfather         two heart attacks and CHF    Diabetes Paternal Grandfather     Other (Diabetes, type 2) Paternal Grandfather      Allergies:  Allergies[1]  Current Meds:  Current Outpatient Medications   Medication Sig Dispense Refill    Econazole Nitrate 1 % External Cream Apply 1 Application topically 2 (two) times daily. 85 g 2    ibuprofen 600 MG Oral Tab Take 1 tablet (600 mg total) by mouth every 6 (six) hours as needed for Pain.      chlorhexidine gluconate 0.12 % Mouth/Throat Solution RINSE WITH ONE CAPFUL BY MOUTH FOR 30 SECONDS, THEN SPIT. REPEAT 2 TIMES DAILY. DO NOT SWALLOW.      amoxicillin clavulanate 875-125 MG Oral Tab Take 1 tablet by mouth 2 (two) times daily for 10 days. 20 tablet 0    clonazePAM 0.5 MG Oral Tab Take 1 tablet (0.5 mg total) by mouth 2 (two) times daily.      HYDROcodone-acetaminophen 5-325 MG Oral Tab Take 1 tablet by mouth every 6 (six) hours as needed. 15 tablet 0    Tadalafil 20 MG Oral Tab Take 1 tablet (20 mg total) by mouth daily as needed for Erectile Dysfunction. 12 tablet 1    sertraline (ZOLOFT) 50 MG Oral Tab 1.5 tablets daily 135 tablet 0    gabapentin 600 MG Oral Tab Take 1 tablet (600 mg total) by mouth 3 (three) times daily. 270 tablet 0    Verapamil HCl   MG Oral Capsule SR 24 Hr       lisinopril 20 MG Oral Tab Take 1 tablet (20 mg total) by mouth daily. 90 tablet 3    Albuterol Sulfate  (90 Base) MCG/ACT Inhalation Aero Soln Inhale 2 puffs into the lungs every 4 (four) hours as needed for Wheezing or Shortness of Breath. 1 each 3      Counseling given: Not Answered         Problem List:  Patient Active Problem List   Diagnosis    Anxiety    Essential hypertension    ED (erectile dysfunction)    NELLIE (obstructive sleep apnea)-mild, 8.4    Generalized anxiety disorder    Hypokalemia    Hyperglycemia    Tooth pain    Daytime hypersomnolence    Symptomatic bradycardia    Palpitations    Slow heart rate    PVC (premature ventricular contraction)    Bipolar 1 disorder (HCC)    Sigmoid diverticulitis    Acute diverticulitis    Intractable abdominal pain    Postoperative examination    Preop testing    Right lower quadrant abdominal pain    Abdominal pain, acute    Intra-abdominal infection    Epiploic appendagitis    Implantable loop recorder present       REVIEW OF SYSTEMS:   Review of Systems  See HPI    EXAM:   /68   Pulse 64   Temp 97.5 °F (36.4 °C) (Temporal)   Resp 16   Ht 6' 3\" (1.905 m)   Wt 277 lb (125.6 kg)   SpO2 96%   BMI 34.62 kg/m²  Estimated body mass index is 34.62 kg/m² as calculated from the following:    Height as of this encounter: 6' 3\" (1.905 m).    Weight as of this encounter: 277 lb (125.6 kg).   Neck in inches:      Wt Readings from Last 6 Encounters:   03/07/25 277 lb (125.6 kg)   02/18/25 273 lb (123.8 kg)   02/14/25 273 lb (123.8 kg)   02/09/25 270 lb (122.5 kg)   02/01/25 270 lb (122.5 kg)   01/17/25 267 lb (121.1 kg)     BP Readings from Last 3 Encounters:   03/07/25 128/68   03/05/25 130/81   02/18/25 140/78     Pulse Readings from Last 3 Encounters:   03/07/25 64   03/05/25 83   02/18/25 75     SpO2 Readings from Last 3 Encounters:   03/07/25 96%   03/05/25 98%   02/14/25 98%      Patient weight not recorded    Vital signs  reviewed.  Physical Exam  Vitals and nursing note reviewed.   Constitutional:       Appearance: Normal appearance. He is obese.   HENT:      Head: Normocephalic and atraumatic.      Right Ear: External ear normal.      Left Ear: External ear normal.   Pulmonary:      Effort: Pulmonary effort is normal. No respiratory distress.   Musculoskeletal:      Cervical back: Normal range of motion and neck supple.   Neurological:      General: No focal deficit present.      Mental Status: He is alert and oriented to person, place, and time.   Psychiatric:         Attention and Perception: Attention and perception normal.         Mood and Affect: Mood and affect normal.         Speech: Speech normal.         Behavior: Behavior normal. Behavior is cooperative.         Thought Content: Thought content normal.         Cognition and Memory: Cognition and memory normal.         Judgment: Judgment normal.         ASSESSMENT AND PLAN:   1. NELLIE (obstructive sleep apnea)-mild, 8.4  -Discussed with Dr. Zaidi, update BiPAP pressures to 20/12, keep PS 4  -Mask refit today for AirTouch F20.  Okayed with cardiology office regarding his loop recorder and presence of magnets on new mask.  -Return to office in 1 to 3 months with Dr. Zaidi    2. Essential hypertension  -/68    3. Implantable loop recorder present  -Discussed with cardiology regarding new mask with magnets as above    There are no Patient Instructions on file for this visit.    Independent interpretation of Sleep Download as defined above.  Continue with Rx management of Sleep apnea with PAP therapy.    COMPLIANCE is required by insurance for 4 hours a night most nights of the week.  Advised if still with sleep apnea and not using CPAP has a 7 fold increase in risk of heart attack, stroke, abnormal heart rhythm  and death,  increased risk of driving accidents.     Advised to refrain from driving when sleepy.      Recommend weight loss, and maintain and optimal BMI with  Exercise 30 minutes most days to target heart rate .     Advised patient to change filters,masks,hoses  and tubes and equiptment on a  regular schedule.    Filters and seals shall be changed every 1 month,  Hoses every 3 months,   CPAP mask and humidifier chamber changed every 6 month  with the durable medical equipment provider.         Meds & Refills for this Visit:  Requested Prescriptions      No prescriptions requested or ordered in this encounter       Outcome: Parent verbalizes understanding. Parent is notified to call with any questions, complications, allergies, or worsening or changing symptoms.  Parent is to call with any side effects or complications from the treatments as a result of today.     \" This note was created utilizing Dragon speech recognition software.  Please excuse any grammatical errors. Call my office if you have any questions regarding this note. \"     Barbara Bailey PA-C  3/7/2025  9:52 AM         [1]   Allergies  Allergen Reactions    Ciprofloxacin OTHER (SEE COMMENTS)     Increases risk for arrhythmia

## 2025-03-11 ENCOUNTER — HOSPITAL ENCOUNTER (OUTPATIENT)
Facility: HOSPITAL | Age: 60
Setting detail: OBSERVATION
Discharge: HOME | End: 2025-03-14
Attending: EMERGENCY MEDICINE | Admitting: HOSPITALIST
Payer: COMMERCIAL

## 2025-03-11 ENCOUNTER — APPOINTMENT (OUTPATIENT)
Dept: CT IMAGING | Facility: HOSPITAL | Age: 60
End: 2025-03-11
Attending: EMERGENCY MEDICINE
Payer: COMMERCIAL

## 2025-03-11 DIAGNOSIS — R10.31 ABDOMINAL PAIN, RIGHT LOWER QUADRANT: Primary | ICD-10-CM

## 2025-03-11 DIAGNOSIS — K55.069 OMENTAL INFARCTION (HCC): ICD-10-CM

## 2025-03-11 PROBLEM — I10 ESSENTIAL HYPERTENSION, BENIGN: Status: ACTIVE | Noted: 2025-03-11

## 2025-03-11 PROBLEM — F41.0 PANIC DISORDER: Status: ACTIVE | Noted: 2022-10-18

## 2025-03-11 PROBLEM — I49.3 PVCS (PREMATURE VENTRICULAR CONTRACTIONS): Status: ACTIVE | Noted: 2023-09-15

## 2025-03-11 PROBLEM — R79.89 ELEVATED LFTS: Status: ACTIVE | Noted: 2023-06-30

## 2025-03-11 PROBLEM — I51.7 LVH (LEFT VENTRICULAR HYPERTROPHY): Status: ACTIVE | Noted: 2022-01-10

## 2025-03-11 PROBLEM — I44.7 LBBB (LEFT BUNDLE BRANCH BLOCK): Status: ACTIVE | Noted: 2023-09-15

## 2025-03-11 PROBLEM — R00.1 BRADYCARDIA: Status: ACTIVE | Noted: 2023-09-15

## 2025-03-11 PROBLEM — F33.2 MDD (MAJOR DEPRESSIVE DISORDER), RECURRENT SEVERE, WITHOUT PSYCHOSIS (HCC): Status: ACTIVE | Noted: 2022-10-18

## 2025-03-11 PROBLEM — R42 DIZZINESS: Status: ACTIVE | Noted: 2023-09-15

## 2025-03-11 PROBLEM — F41.1 GAD (GENERALIZED ANXIETY DISORDER): Status: ACTIVE | Noted: 2022-10-18

## 2025-03-11 LAB
ALBUMIN SERPL-MCNC: 4.7 G/DL (ref 3.2–4.8)
ALBUMIN/GLOB SERPL: 2 {RATIO} (ref 1–2)
ALP LIVER SERPL-CCNC: 77 U/L
ALT SERPL-CCNC: 20 U/L
ANION GAP SERPL CALC-SCNC: 8 MMOL/L (ref 0–18)
AST SERPL-CCNC: 18 U/L (ref ?–34)
BASOPHILS # BLD AUTO: 0.01 X10(3) UL (ref 0–0.2)
BASOPHILS NFR BLD AUTO: 0.1 %
BILIRUB SERPL-MCNC: 0.5 MG/DL (ref 0.3–1.2)
BUN BLD-MCNC: 9 MG/DL (ref 9–23)
CALCIUM BLD-MCNC: 9.6 MG/DL (ref 8.7–10.6)
CHLORIDE SERPL-SCNC: 101 MMOL/L (ref 98–112)
CO2 SERPL-SCNC: 31 MMOL/L (ref 21–32)
CREAT BLD-MCNC: 0.89 MG/DL
EGFRCR SERPLBLD CKD-EPI 2021: 99 ML/MIN/1.73M2 (ref 60–?)
EOSINOPHIL # BLD AUTO: 0.16 X10(3) UL (ref 0–0.7)
EOSINOPHIL NFR BLD AUTO: 1.8 %
ERYTHROCYTE [DISTWIDTH] IN BLOOD BY AUTOMATED COUNT: 13.8 %
GLOBULIN PLAS-MCNC: 2.3 G/DL (ref 2–3.5)
GLUCOSE BLD-MCNC: 105 MG/DL (ref 70–99)
HCT VFR BLD AUTO: 42.7 %
HGB BLD-MCNC: 14.4 G/DL
IMM GRANULOCYTES # BLD AUTO: 0.04 X10(3) UL (ref 0–1)
IMM GRANULOCYTES NFR BLD: 0.4 %
LYMPHOCYTES # BLD AUTO: 2.13 X10(3) UL (ref 1–4)
LYMPHOCYTES NFR BLD AUTO: 23.5 %
MCH RBC QN AUTO: 28.3 PG (ref 26–34)
MCHC RBC AUTO-ENTMCNC: 33.7 G/DL (ref 31–37)
MCV RBC AUTO: 83.9 FL
MONOCYTES # BLD AUTO: 0.57 X10(3) UL (ref 0.1–1)
MONOCYTES NFR BLD AUTO: 6.3 %
NEUTROPHILS # BLD AUTO: 6.17 X10 (3) UL (ref 1.5–7.7)
NEUTROPHILS # BLD AUTO: 6.17 X10(3) UL (ref 1.5–7.7)
NEUTROPHILS NFR BLD AUTO: 67.9 %
OSMOLALITY SERPL CALC.SUM OF ELEC: 289 MOSM/KG (ref 275–295)
PLATELET # BLD AUTO: 442 10(3)UL (ref 150–450)
POTASSIUM SERPL-SCNC: 4 MMOL/L (ref 3.5–5.1)
PROT SERPL-MCNC: 7 G/DL (ref 5.7–8.2)
RBC # BLD AUTO: 5.09 X10(6)UL
SODIUM SERPL-SCNC: 140 MMOL/L (ref 136–145)
WBC # BLD AUTO: 9.1 X10(3) UL (ref 4–11)

## 2025-03-11 PROCEDURE — 74177 CT ABD & PELVIS W/CONTRAST: CPT | Performed by: EMERGENCY MEDICINE

## 2025-03-11 PROCEDURE — 99223 1ST HOSP IP/OBS HIGH 75: CPT | Performed by: HOSPITALIST

## 2025-03-11 RX ORDER — ECHINACEA PURPUREA EXTRACT 125 MG
1 TABLET ORAL
Status: DISCONTINUED | OUTPATIENT
Start: 2025-03-11 | End: 2025-03-14

## 2025-03-11 RX ORDER — HYDROCODONE BITARTRATE AND ACETAMINOPHEN 5; 325 MG/1; MG/1
1 TABLET ORAL EVERY 4 HOURS PRN
Status: DISCONTINUED | OUTPATIENT
Start: 2025-03-11 | End: 2025-03-14

## 2025-03-11 RX ORDER — HYDROMORPHONE HYDROCHLORIDE 1 MG/ML
0.5 INJECTION, SOLUTION INTRAMUSCULAR; INTRAVENOUS; SUBCUTANEOUS EVERY 30 MIN PRN
Status: ACTIVE | OUTPATIENT
Start: 2025-03-11 | End: 2025-03-11

## 2025-03-11 RX ORDER — BENZONATATE 100 MG/1
200 CAPSULE ORAL 3 TIMES DAILY PRN
Status: DISCONTINUED | OUTPATIENT
Start: 2025-03-11 | End: 2025-03-14

## 2025-03-11 RX ORDER — HYDROCODONE BITARTRATE AND ACETAMINOPHEN 5; 325 MG/1; MG/1
2 TABLET ORAL EVERY 4 HOURS PRN
Status: DISCONTINUED | OUTPATIENT
Start: 2025-03-11 | End: 2025-03-14

## 2025-03-11 RX ORDER — ACETAMINOPHEN 325 MG/1
650 TABLET ORAL EVERY 4 HOURS PRN
Status: DISCONTINUED | OUTPATIENT
Start: 2025-03-11 | End: 2025-03-14

## 2025-03-11 RX ORDER — BISACODYL 10 MG
10 SUPPOSITORY, RECTAL RECTAL
Status: DISCONTINUED | OUTPATIENT
Start: 2025-03-11 | End: 2025-03-14

## 2025-03-11 RX ORDER — KETOROLAC TROMETHAMINE 15 MG/ML
15 INJECTION, SOLUTION INTRAMUSCULAR; INTRAVENOUS EVERY 6 HOURS PRN
Status: DISPENSED | OUTPATIENT
Start: 2025-03-11 | End: 2025-03-13

## 2025-03-11 RX ORDER — LISINOPRIL 20 MG/1
20 TABLET ORAL DAILY
Status: DISCONTINUED | OUTPATIENT
Start: 2025-03-12 | End: 2025-03-14

## 2025-03-11 RX ORDER — ONDANSETRON 2 MG/ML
4 INJECTION INTRAMUSCULAR; INTRAVENOUS EVERY 4 HOURS PRN
Status: ACTIVE | OUTPATIENT
Start: 2025-03-11 | End: 2025-03-11

## 2025-03-11 RX ORDER — ENOXAPARIN SODIUM 100 MG/ML
40 INJECTION SUBCUTANEOUS NIGHTLY
Status: DISCONTINUED | OUTPATIENT
Start: 2025-03-11 | End: 2025-03-14

## 2025-03-11 RX ORDER — HYDROMORPHONE HYDROCHLORIDE 1 MG/ML
0.5 INJECTION, SOLUTION INTRAMUSCULAR; INTRAVENOUS; SUBCUTANEOUS EVERY 30 MIN PRN
Status: DISCONTINUED | OUTPATIENT
Start: 2025-03-11 | End: 2025-03-14

## 2025-03-11 RX ORDER — MORPHINE SULFATE 2 MG/ML
2 INJECTION, SOLUTION INTRAMUSCULAR; INTRAVENOUS EVERY 2 HOUR PRN
Status: DISCONTINUED | OUTPATIENT
Start: 2025-03-11 | End: 2025-03-14

## 2025-03-11 RX ORDER — ALBUTEROL SULFATE 90 UG/1
2 INHALANT RESPIRATORY (INHALATION) EVERY 4 HOURS PRN
Status: DISCONTINUED | OUTPATIENT
Start: 2025-03-11 | End: 2025-03-14

## 2025-03-11 RX ORDER — SODIUM CHLORIDE 9 MG/ML
INJECTION, SOLUTION INTRAVENOUS CONTINUOUS
Status: ACTIVE | OUTPATIENT
Start: 2025-03-11 | End: 2025-03-11

## 2025-03-11 RX ORDER — SODIUM CHLORIDE 9 MG/ML
INJECTION, SOLUTION INTRAVENOUS CONTINUOUS
Status: DISCONTINUED | OUTPATIENT
Start: 2025-03-11 | End: 2025-03-14

## 2025-03-11 RX ORDER — POLYETHYLENE GLYCOL 3350 17 G/17G
17 POWDER, FOR SOLUTION ORAL DAILY PRN
Status: DISCONTINUED | OUTPATIENT
Start: 2025-03-11 | End: 2025-03-14

## 2025-03-11 RX ORDER — ASPIRIN 81 MG/1
324 TABLET, CHEWABLE ORAL ONCE
Status: COMPLETED | OUTPATIENT
Start: 2025-03-11 | End: 2025-03-11

## 2025-03-11 RX ORDER — SENNOSIDES 8.6 MG
17.2 TABLET ORAL NIGHTLY PRN
Status: DISCONTINUED | OUTPATIENT
Start: 2025-03-11 | End: 2025-03-14

## 2025-03-11 RX ORDER — ONDANSETRON 2 MG/ML
8 INJECTION INTRAMUSCULAR; INTRAVENOUS EVERY 6 HOURS PRN
Status: DISCONTINUED | OUTPATIENT
Start: 2025-03-11 | End: 2025-03-14

## 2025-03-11 RX ORDER — KETOROLAC TROMETHAMINE 15 MG/ML
15 INJECTION, SOLUTION INTRAMUSCULAR; INTRAVENOUS ONCE
Status: COMPLETED | OUTPATIENT
Start: 2025-03-11 | End: 2025-03-11

## 2025-03-11 RX ORDER — ACETAMINOPHEN 500 MG
500 TABLET ORAL EVERY 4 HOURS PRN
Status: DISCONTINUED | OUTPATIENT
Start: 2025-03-11 | End: 2025-03-14

## 2025-03-11 RX ORDER — VERAPAMIL HYDROCHLORIDE 180 MG/1
180 TABLET, FILM COATED, EXTENDED RELEASE ORAL DAILY
Status: DISCONTINUED | OUTPATIENT
Start: 2025-03-12 | End: 2025-03-14

## 2025-03-11 RX ORDER — MORPHINE SULFATE 2 MG/ML
1 INJECTION, SOLUTION INTRAMUSCULAR; INTRAVENOUS EVERY 2 HOUR PRN
Status: DISCONTINUED | OUTPATIENT
Start: 2025-03-11 | End: 2025-03-14

## 2025-03-11 RX ORDER — GABAPENTIN 600 MG/1
600 TABLET ORAL 3 TIMES DAILY
Status: DISCONTINUED | OUTPATIENT
Start: 2025-03-11 | End: 2025-03-14

## 2025-03-11 RX ORDER — MORPHINE SULFATE 4 MG/ML
4 INJECTION, SOLUTION INTRAMUSCULAR; INTRAVENOUS EVERY 2 HOUR PRN
Status: DISCONTINUED | OUTPATIENT
Start: 2025-03-11 | End: 2025-03-14

## 2025-03-11 NOTE — ED INITIAL ASSESSMENT (HPI)
Patient has RLQ pain x 1.5 months. Was admitted here in Feb for same, saw Dr. Clemens. Had medication pain management which resolved pain for 3-4 days. Pain has since returned. Dr. Clemens sent him to GI and GI sent him to ED. Pain currently 5/10. Took ibuprofen 600 mg 1100, some relief with ibuprofen, taking half of norco every other day. No fevers. No nausea, vomiting or diarrhea. Oral intake normal.

## 2025-03-11 NOTE — H&P
[facility]     Don Cabral Patient Status:  Emergency    1965 MRN [id number]   Location [facility] Attending Charan Prieto MD   Hosp Day # 0 PCP Stephen Christianson MD     Chief Complaint: rlq pain    Subjective:    History of Present Illness:     Don Cabral is a 59 year old male with RLQ pain since mid to late February.  He was doing much better after his last hospitalization but then 3 days after discharge, his pain came back in RLQ and is rather severe and localized and sharp.  No vomiting or diarrhea.   It seems like steroids and nsaids and abx helped for a little while but then pain came back.  Saw GI in clinic and was advised to come to ER.      History/Other:    Past Medical History:  Past Medical History:    Abdominal pain    Beginning of diverticulitis saga that led to removal part of my large colon    Allergic rhinitis    Anxiety    Arrhythmia    T1 AV block, LBBB    Back pain    Back problem    lower back    Decorative tattoo    Depression    Diverticulitis    ED (erectile dysfunction)    Heart palpitations    Under treatment with [facility] [place] for arrhythmia    Hemorrhoids    One internal, occasional external    High blood pressure    History of adverse reaction to anesthesia    Patient states he doesnt know exactly what happened but stated he required haldol post anesthesia    Indigestion    Periodic last 20 yrs    Pneumonia due to organism    Presence of other cardiac implants and grafts    Loop recorder for arrhythmia    Seasonal allergies    Shortness of breath    Sleep apnea    CPAP    Stress    Unspecified essential hypertension    Visual impairment    glasses    Wears glasses    Weight gain     Past Surgical History:   Past Surgical History:   Procedure Laterality Date    Cholecystectomy  3/12/2023    Col[facility]oscopy N/A 2016    [facility].  Hyperplastic polyp only.  Repeat     Colonoscopy   05/10/2023    Dental surgery procedure      [place] teeth removed    Incision/drain abscess extra  1996    Knee arthroscopy Right     Laparoscopic cholecystectomy  02/12/2023    Myringoplasty      Other surgical history      R knee arthroscope    Other surgical history  07/17/2024    Exploratory laparotomy partial colectomy with colorectal anastamosis, extensive adhesiolysis    Part removal colon w end colostomy      Skin tissue procedure unlisted      Tonsillectomy        Family History:   Family History   Problem Relation Age of Onset    Heart Disorder Father         Ablation required    Hypertension Father     Cancer Father 60        Prostate    Prostate Cancer Father     Heart Disorder Mother         murmur no treatment needed    Hypertension Mother     Stroke Maternal Grandmother     Other (stroke) Maternal Grandmother     Dementia Paternal Grandmother     Heart Disorder Paternal Grandfather         two heart attacks and CHF    Diabetes Paternal Grandfather     Other (Diabetes, type 2) Paternal Grandfather        hypertension Social History:    reports that he quit smoking about 24 years ago. His smoking use included cigarettes. He started smoking about 43 years ago. He has a 9.5 pack-year smoking history. He has never been exposed to tobacco smoke. He has never used smokeless tobacco. He reports that he does not currently use alcohol. He reports that he does not use drugs.     Allergies: Allergies[1]    Medications:  Medications Ordered Prior to Encounter[2]    Review of Systems:   A comprehensive 12 point review of systems was completed.    Pertinent positives and negatives noted in the HPI.    Objective:   Physical Exam:    /72   Pulse 57   Temp 97 °F (36.1 °C) (Temporal)   Resp 20   Ht 6' 3\" (1.905 m)   Wt 275 lb (124.7 kg)   SpO2 97%   BMI 34.37 kg/m²   General: No acute distress, Alert  Respiratory: No rhonchi, no wheezes  Cardiovascular: S1, S2. Regular rate and rhythm  Abdomen: Soft, tender  localized to RLQ, +BS  Neuro: No new focal deficits  Extremities: No edema      Results:    Labs:      Labs Last 24 Hours:    Recent Labs   Lab 03/11/25  1450   RBC 5.09   HGB 14.4   HCT 42.7   MCV 83.9   MCH 28.3   MCHC 33.7   RDW 13.8   NEPRELIM 6.17   WBC 9.1   .0       No results for input(s): \"GLU\", \"BUN\", \"CREATSERUM\", \"GFRAA\", \"GFRNAA\", \"EGFRCR\", \"CA\", \"ALB\", \"NA\", \"K\", \"CL\", \"CO2\", \"ALKPHO\", \"AST\", \"ALT\", \"BILT\", \"TP\" in the last 168 hours.    Lab Results   Component Value Date    INR 1.01 09/28/2023    INR 1.23 (H) 06/23/2023       No results for input(s): \"TROP\", \"TROPHS\", \"CK\" in the last 168 hours.    No results for input(s): \"TROP\", \"PBNP\" in the last 168 hours.    No results for input(s): \"PCT\" in the last 168 hours.    Imaging: Imaging data reviewed in Epic.    Assessment & Plan:      #recurrent RLQ pain; unclear if epiploic appendigitis vs adhesion related pain; repeat CT pending;  GI and surgery consulted.  CLD;  prn pain meds; IV fluids; might need endoscopy to further investigate.    #hx afib    #hx PVCs and intermittent LBBB    #Hx diverticulitis s/p Open partial colectomy with colorectal anastomosis    #HTN    #GERD    #Depression    Will do med rec once review complete.    Quality:  DVT prophylaxis:  SCDs, Lovenox  Code Status: see chart  Vance: NO  Vance Duration (in days): N/A  Central line: NO  Estimated discharge date: tbd    Plan of care discussed with patient and ER MD Avery Ordoñez MD    Supplementary Documentation:                                     [1]   Allergies  Allergen Reactions    Ciprofloxacin OTHER (SEE COMMENTS)     Increases risk for arrhythmia   [2]   Current Facility-Administered Medications on File Prior to Encounter   Medication Dose Route Frequency Provider Last Rate Last Admin    [COMPLETED] methylPREDNISolone (Medrol) tab 8 mg  8 mg Oral Once Latanya Richardson PA-C   8 mg at 02/10/25 1029    Followed by    [COMPLETED] methylPREDNISolone (Medrol) tab 4 mg  4  mg Oral Once Latanya Richardson PA-C   4 mg at 02/10/25 1159    Followed by    [COMPLETED] methylPREDNISolone (Medrol) tab 4 mg  4 mg Oral Once Latanya Richardson PA-C   4 mg at 02/10/25 1700    Followed by    [COMPLETED] methylPREDNISolone (Medrol) tab 8 mg  8 mg Oral nightly Latanya Richardson PA-C   8 mg at 02/10/25 2055    [COMPLETED] sodium chloride 0.9 % IV bolus 1,000 mL  1,000 mL Intravenous Once Talya Rubin MD   Stopped at 25 1110    [COMPLETED] ondansetron (Zofran) 4 MG/2ML injection 4 mg  4 mg Intravenous Once Talya Rubin MD   4 mg at 25 0830    [COMPLETED] iopamidol 76% (ISOVUE-370) injection for power injector  100 mL Intravenous ONCE PRN Talya Rubin MD   100 mL at 25 0901    [COMPLETED] cefTRIAXone (Rocephin) 2 g in sodium chloride 0.9% 100 mL IVPB-ADDV  2 g Intravenous Once Talya Rubin MD   Stopped at 25 1259    [COMPLETED] metroNIDAZOLE in sodium chloride 0.79% (Flagyl) 5 mg/mL IVPB premix 500 mg  500 mg Intravenous Once Talya Rubin MD   Stopped at 25 1300    [COMPLETED] ketorolac (Toradol) 15 MG/ML injection 15 mg  15 mg Intravenous Once Tulio Cordoba MD   15 mg at 25 0701    [COMPLETED] iopamidol 76% (ISOVUE-370) injection for power injector  90 mL Intravenous ONCE PRN Rosa Marquez MD   90 mL at 25 1538     Current Outpatient Medications on File Prior to Encounter   Medication Sig Dispense Refill    Econazole Nitrate 1 % External Cream Apply 1 Application topically 2 (two) times daily. 85 g 2    ibuprofen 600 MG Oral Tab Take 1 tablet (600 mg total) by mouth every 6 (six) hours as needed for Pain.      chlorhexidine gluconate 0.12 % Mouth/Throat Solution RINSE WITH ONE CAPFUL BY MOUTH FOR 30 SECONDS, THEN SPIT. REPEAT 2 TIMES DAILY. DO NOT SWALLOW.      amoxicillin clavulanate 875-125 MG Oral Tab Take 1 tablet by mouth 2 (two) times daily for 10 days. 20 tablet 0    [] cefuroxime 500 MG Oral Tab Take 1 tablet (500 mg  total) by mouth 2 (two) times daily for 7 days. 14 tablet 0    clonazePAM 0.5 MG Oral Tab Take 1 tablet (0.5 mg total) by mouth 2 (two) times daily.      [] metroNIDAZOLE 500 MG Oral Tab Take 1 tablet (500 mg total) by mouth in the morning and 1 tablet (500 mg total) before bedtime. Do all this for 7 days. 14 tablet 0    [] naproxen 250 MG Oral Tab Take 1 tablet (250 mg total) by mouth 2 (two) times daily with meals for 7 days. 14 tablet 0    [] pantoprazole 40 MG Oral Tab EC Take 1 tablet (40 mg total) by mouth daily for 7 days. 7 tablet 0    HYDROcodone-acetaminophen 5-325 MG Oral Tab Take 1 tablet by mouth every 6 (six) hours as needed. 15 tablet 0    Tadalafil 20 MG Oral Tab Take 1 tablet (20 mg total) by mouth daily as needed for Erectile Dysfunction. 12 tablet 1    sertraline (ZOLOFT) 50 MG Oral Tab 1.5 tablets daily 135 tablet 0    gabapentin 600 MG Oral Tab Take 1 tablet (600 mg total) by mouth 3 (three) times daily. 270 tablet 0    Verapamil HCl  MG Oral Capsule SR 24 Hr       lisinopril 20 MG Oral Tab Take 1 tablet (20 mg total) by mouth daily. 90 tablet 3    Albuterol Sulfate  (90 Base) MCG/ACT Inhalation Aero Soln Inhale 2 puffs into the lungs every 4 (four) hours as needed for Wheezing or Shortness of Breath. 1 each 3

## 2025-03-11 NOTE — ED QUICK NOTES
Orders for admission, patient is aware of plan and ready to go upstairs. Any questions, please call ED RN Rosa at extension [phone].     Patient Covid vaccination status: Fully vaccinated     COVID Test Ordered in ED: None    COVID Suspicion at Admission: N/A    Running Infusions:  None    Mental Status/LOC at time of transport: a/ox4    Other pertinent information:   CIWA score: N/A   NIH score:  N/A

## 2025-03-11 NOTE — ED PROVIDER NOTES
Patient Seen in: [facility] Emergency Department      History     Chief Complaint   Patient presents with    Abdomen/Flank Pain     Stated Complaint: RLQ pain    Subjective:   HPI  59-year-old male who presents to the emergency department complaining of persistent right lower quadrant abdominal pain.  Reviewing his records he is had multiple bouts of inflammatory change to the right lower quadrant.  He had epiploic appendagitis where he was hospitalized on 2/9/2025 on review of his records.  He has had CT scans performed that showed persistent inflammation and persistent pain.  He had seen general surgery as an outpatient and they suggested he follow-up with a gastroenterologist to assess for possible colitis.  Reviewing his records he was seen today 3/11/2025 by gastroenterology and they wanted him hospitalized with they can perform endoscopy and other procedures to assess for possible etiologies of his chronic inflammation.  The patient had persistent discomfort despite taking medications such as anti-inflammatories at home.  He denies any fevers or chills.  No loose stools.        Objective:     Past Medical History:    Abdominal pain    Beginning of diverticulitis saga that led to removal part of my large colon    Allergic rhinitis    Anxiety    Arrhythmia    T1 AV block, LBBB    Back pain    Back problem    lower back    Decorative tattoo    Depression    Diverticulitis    ED (erectile dysfunction)    Heart palpitations    Under treatment with [facility] for arrhythmia    Hemorrhoids    One internal, occasional external    High blood pressure    History of adverse reaction to anesthesia    Patient states he doesnt know exactly what happened but stated he required haldol post anesthesia    Indigestion    Periodic last 20 yrs    Pneumonia due to organism    Presence of other cardiac implants and grafts    Loop recorder for arrhythmia    Seasonal allergies    Shortness of breath     Sleep apnea    CPAP    Stress    Unspecified essential hypertension    Visual impairment    glasses    Wears glasses    Weight gain              Past Surgical History:   Procedure Laterality Date    Cholecystectomy  3/12/2023    Colonoscopy N/A 2016    [facility].  Hyperplastic polyp only.  Repeat     Colonoscopy  05/10/2023    Dental surgery procedure      [place] teeth removed    Incision/drain abscess extra      Knee arthroscopy Right     Laparoscopic cholecystectomy  2023    Myringoplasty      Other surgical history      R knee arthroscope    Other surgical history  2024    Exploratory laparotomy partial colectomy with colorectal anastamosis, extensive adhesiolysis    Part removal colon w end colostomy      Skin tissue procedure unlisted      Tonsillectomy                  Social History     Socioeconomic History    Marital status:    Occupational History    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   Tobacco Use    Smoking status: Former     Current packs/day: 0.00     Average packs/day: 0.5 packs/day for 19.0 years (9.5 ttl pk-yrs)     Types: Cigarettes     Start date: 1981     Quit date: 2000     Years since quittin.7     Passive exposure: Never    Smokeless tobacco: Never   Vaping Use    Vaping status: Never Used   Substance and Sexual Activity    Alcohol use: Not Currently     Comment: Stopped alcohol 2000    Drug use: No    Sexual activity: Yes     Partners: Female   Other Topics Concern    Caffeine Concern Yes     Comment: coffee in AM/ soda     Exercise Yes     Comment: X1-2 times per week      Social Drivers of Health     Food Insecurity: No Food Insecurity (3/11/2025)    NCSS - Food Insecurity     Worried About Running Out of Food in the Last Year: No     Ran Out of Food in the Last Year: No   Transportation Needs: No Transportation Needs (3/11/2025)    NCSS - Transportation     Lack of Transportation: No   Housing Stability: Not At Risk (3/11/2025)    NCSS  - Housing/Utilities     Has Housing: Yes     Worried About Losing Housing: No     Unable to Get Utilities: No                  Physical Exam     ED Triage Vitals [03/11/25 1428]   /81   Pulse 74   Resp 20   Temp 97 °F (36.1 °C)   Temp src Temporal   SpO2 97 %   O2 Device None (Room air)       Current Vitals:   Vital Signs  BP: 116/72  Pulse: 57  Resp: 20  Temp: 97 °F (36.1 °C)  Temp src: Temporal  MAP (mmHg): 86    Oxygen Therapy  SpO2: 97 %  O2 Device: None (Room air)        Physical Exam  General: This a pleasant nontoxic appearing patient in no apparent distress alert and oriented ×3  HEENT: Pupils are equal reactive to light.  Extra ocular motions are intact.  No scleral icterus or conjunctival pallor. Head is atraumatic normocephalic.  Oral mucosa moist.   Lungs: Clear to auscultation bilaterally.  No wheezes, rhonchi, or rales appreciated.  No accessory muscle use noted for breathing.  Cardiac: Regular rate and rhythm.  Normal S1 and 2 without murmurs or ectopy appreciated  Abdomen: He has voluntary guarding the right lower quadrant on palpation.  Midline scar from previous abdominal surgeries no abdominal distention.    Extremities: No cyanosis, no edema or clubbing.  Pulses are +2.  Full range of motion is noted of the extremities without deformities.  No tenderness.  Neurologically intact.    ED Course     Labs Reviewed   COMP METABOLIC PANEL (14) - Abnormal; Notable for the following components:       Result Value    Glucose 105 (*)     All other components within normal limits   CBC WITH DIFFERENTIAL WITH PLATELET   RAINBOW DRAW LAVENDER   RAINBOW DRAW LIGHT GREEN   RAINBOW DRAW BLUE   RAINBOW DRAW GOLD   Narrative  PROCEDURE:  CT ABDOMEN+PELVIS (CONTRAST ONLY) (CPT=74177)     COMPARISON:  [facility] CT, CT ABDOMEN+PELVIS(CONTRAST ONLY)(CPT=74177), 2/09/2025, 8:46 AM.     INDICATIONS:  RLQ pain     TECHNIQUE:  CT scanning was performed from the dome of the diaphragm to the pubic symphysis with  non-ionic intravenous contrast material. Post contrast coronal MPR imaging was performed.  Dose reduction techniques were used. Dose information is  transmitted to the ACR (American College of Radiology) NRDR (National Radiology Data Registry) which includes the Dose Index Registry.     PATIENT STATED HISTORY:(As transcribed by Technologist)  Ongoing right lower quadrant abdominal pain for about two months.      CONTRAST USED:  100cc of Isovue 370     FINDINGS:    LIVER:  Stable appearance of a hyperenhancing lesion along the posterior aspect of right hepatic lobe measuring up to 2.9 x 2.8 cm.  This has features compatible with a hemangioma.  BILIARY:  Postsurgical changes of cholecystectomy noted.  PANCREAS:  No lesion, fluid collection, ductal dilatation, or atrophy.    SPLEEN:  No enlargement or focal lesion.    KIDNEYS:  No mass, obstruction, or calcification.    ADRENALS:  No mass or enlargement.    AORTA/VASCULAR:  No aneurysm or dissection.    RETROPERITONEUM:  No mass or adenopathy.    BOWEL/MESENTERY:  No dilated loops of small bowel are seen.  Portions of the bowel are decompressed, limiting assessment.  There has been slight interval increase in prominence of inflammatory changes noted of the right lower quadrant centered on the  expected region of the omentum (series 4, image 73 and image 69).  This is somewhat difficult to measure given its irregular configuration but measures in the range of approximately 5.8 x 2.1 cm compared with the prior study where it was measured at 6.4  x 1.2 cm.  Lack of oral contrast somewhat limits assessment of the bowel.  There are postsurgical changes.  The appendix is not clearly identified on this exam.  ABDOMINAL WALL:  No mass or hernia.    URINARY BLADDER:  No visible focal wall thickening, lesion, or calculus.    PELVIC NODES:  No adenopathy.    PELVIC ORGANS:  No visible mass.  Pelvic organs appropriate for patient age.    BONES:  No bony lesion or fracture.    LUNG  BASES:  No visible pulmonary or pleural disease.    OTHER:  Negative.                    Impression  CONCLUSION:  There has been slight interval increase in inflammatory changes centered on the expected region of the omentum within the right lower quadrant.  Again this has features most compatible with omental infarct.  There is no evidence of an  abscess or drainable fluid collection.        LOCATION:  [facility]        Dictated by (CST): Radu Patel MD on 3/11/2025 at 5:11 PM      Finalized by (CST): Radu Patel MD on 3/11/2025 at 5:22 PM                  MDM    Differential diagnosis includes but is not limited to bowel perforation, abscess, inflammatory bowel changes, bowel obstruction.  Laboratories are sent.  CT scan will be performed of the abdomen pelvis.  I did speak to the hospital service came to see the patient will admit the patient for continued management care.  The GI service was contacted to let them be aware that the patient is hospitalized and they can perform the procedures when they have the opportunity.  He received Toradol and Dilaudid for pain control.  Admission disposition: 3/11/2025  5:50 PM       His CBC was normal.  Glucose of 105 the rest the metabolic panel was normal.  CT scan performed  I reviewed the x-rays and agree with the radiologist report that showed there is been slight interval increase inflammatory changes centered on the expected region of the omentum within the right lower quadrant.  Again this has features most compatible with omental infarct.  There is no evidence of an abscess or drainable fluid collection.  The patient will be hospitalized for continued management and care.  Medical Decision Making      Disposition and Plan     Clinical Impression:  1. Abdominal pain, right lower quadrant    2. Omental infarction (HCC)         Disposition:  Admit  3/11/2025  5:50 pm    Follow-up:  No follow-up provider specified.        Medications Prescribed:  Current Discharge Medication  List              Supplementary Documentation:         Hospital Problems       Present on Admission  Date Reviewed: 3/11/2025            ICD-10-CM Noted POA    * (Principal) Abdominal pain, right lower quadrant R10.31 3/11/2025 Unknown

## 2025-03-11 NOTE — ED QUICK NOTES
Orders for admission, patient is aware of plan and ready to go upstairs. Any questions, please call ED RN roxanne at extension [phone].     Patient Covid vaccination status: Fully vaccinated     COVID Test Ordered in ED: None    COVID Suspicion at Admission: N/A    Running Infusions:  None    Mental Status/LOC at time of transport: [id number]    Other pertinent information:   CIWA score: N/A   NIH score:  N/A

## 2025-03-12 ENCOUNTER — ANESTHESIA EVENT (OUTPATIENT)
Dept: ENDOSCOPY | Facility: HOSPITAL | Age: 60
End: 2025-03-12
Payer: COMMERCIAL

## 2025-03-12 PROBLEM — I10 PRIMARY HYPERTENSION: Status: ACTIVE | Noted: 2025-03-12

## 2025-03-12 PROBLEM — F41.8 DEPRESSION WITH ANXIETY: Status: ACTIVE | Noted: 2025-03-12

## 2025-03-12 PROBLEM — Z86.79 HISTORY OF CARDIAC ARRHYTHMIA: Status: ACTIVE | Noted: 2025-03-12

## 2025-03-12 LAB
ADENOVIRUS F 40/41 PCR: NEGATIVE
ANION GAP SERPL CALC-SCNC: 7 MMOL/L (ref 0–18)
ASTROVIRUS PCR: NEGATIVE
ATRIAL RATE: 65 BPM
BUN BLD-MCNC: 11 MG/DL (ref 9–23)
C CAYETANENSIS DNA SPEC QL NAA+PROBE: NEGATIVE
CALCIUM BLD-MCNC: 9.3 MG/DL (ref 8.7–10.6)
CAMPY SP DNA.DIARRHEA STL QL NAA+PROBE: NEGATIVE
CEA SERPL-MCNC: 1.1 NG/ML (ref ?–5)
CHLORIDE SERPL-SCNC: 103 MMOL/L (ref 98–112)
CO2 SERPL-SCNC: 29 MMOL/L (ref 21–32)
CREAT BLD-MCNC: 0.81 MG/DL
CRYPTOSP DNA SPEC QL NAA+PROBE: NEGATIVE
EAEC PAA PLAS AGGR+AATA ST NAA+NON-PRB: NEGATIVE
EC STX1+STX2 + H7 FLIC SPEC NAA+PROBE: NEGATIVE
EGFRCR SERPLBLD CKD-EPI 2021: 102 ML/MIN/1.73M2 (ref 60–?)
ENTAMOEBA HISTOLYTICA PCR: NEGATIVE
EPEC EAE GENE STL QL NAA+NON-PROBE: NEGATIVE
ERYTHROCYTE [DISTWIDTH] IN BLOOD BY AUTOMATED COUNT: 14.1 %
ETEC LTA+ST1A+ST1B TOX ST NAA+NON-PROBE: NEGATIVE
GIARDIA LAMBLIA PCR: NEGATIVE
GLUCOSE BLD-MCNC: 88 MG/DL (ref 70–99)
HCT VFR BLD AUTO: 40.5 %
HGB BLD-MCNC: 13.1 G/DL
LACTATE SERPL-SCNC: 0.8 MMOL/L (ref 0.5–2)
MCH RBC QN AUTO: 28.6 PG (ref 26–34)
MCHC RBC AUTO-ENTMCNC: 32.3 G/DL (ref 31–37)
MCV RBC AUTO: 88.4 FL
NOROVIRUS GI/GII PCR: NEGATIVE
OSMOLALITY SERPL CALC.SUM OF ELEC: 287 MOSM/KG (ref 275–295)
P AXIS: 76 DEGREES
P SHIGELLOIDES DNA STL QL NAA+PROBE: NEGATIVE
P-R INTERVAL: 284 MS
PLATELET # BLD AUTO: 342 10(3)UL (ref 150–450)
PLATELETS.RETICULATED NFR BLD AUTO: 0.7 % (ref 0–7)
POTASSIUM SERPL-SCNC: 4.3 MMOL/L (ref 3.5–5.1)
Q-T INTERVAL: 388 MS
QRS DURATION: 116 MS
QTC CALCULATION (BEZET): 403 MS
R AXIS: -46 DEGREES
RBC # BLD AUTO: 4.58 X10(6)UL
ROTAVIRUS A PCR: NEGATIVE
SALMONELLA DNA SPEC QL NAA+PROBE: NEGATIVE
SAPOVIRUS PCR: NEGATIVE
SHIGELLA SP+EIEC IPAH ST NAA+NON-PROBE: NEGATIVE
SODIUM SERPL-SCNC: 139 MMOL/L (ref 136–145)
T AXIS: 74 DEGREES
V CHOLERAE DNA SPEC QL NAA+PROBE: NEGATIVE
VENTRICULAR RATE: 65 BPM
VIBRIO DNA SPEC NAA+PROBE: NEGATIVE
WBC # BLD AUTO: 8.2 X10(3) UL (ref 4–11)
YERSINIA DNA SPEC NAA+PROBE: NEGATIVE

## 2025-03-12 PROCEDURE — 99223 1ST HOSP IP/OBS HIGH 75: CPT | Performed by: SURGERY

## 2025-03-12 PROCEDURE — 99232 SBSQ HOSP IP/OBS MODERATE 35: CPT | Performed by: INTERNAL MEDICINE

## 2025-03-12 RX ORDER — CLONAZEPAM 0.5 MG/1
0.5 TABLET ORAL 2 TIMES DAILY
Status: DISCONTINUED | OUTPATIENT
Start: 2025-03-12 | End: 2025-03-14

## 2025-03-12 NOTE — PLAN OF CARE
Pt tolerating CL diet. Aware of NPO after midnight for colonoscopy tomorrow. Will start Golytely at 1700. Pt had soft, formed brown stool - sent for GI panel.

## 2025-03-12 NOTE — PLAN OF CARE
Pt tolerating Golytley prep; has not yet started having any BM's. 2000ml will be completed by 1900

## 2025-03-12 NOTE — CONSULTS
[address] [place] [address]                       Gastroenterology Consultation-[facility]    Don Cabral Patient Status:  Observation    1965 MRN [id number]   [facility] 3NW-A Attending Avery Cardenas MD   Hosp Day # 0 PCP Stephen Christianson MD     Reason for consultation: Persistent RLQ pain; abnormal CT a/p   HPI: This is a 59 yr-old male with PMhx that includes HTN, LBBB, NELLIE, anxiety/depression, and complicated diverticulitis which involved the splenic flexure s/p left colectomy with end colostomy (2024; Dr Rick Novoa) s/p open colostomy reversal (2024; Dr Novoa) who was re-admitted yesterday with persistent RLQ pain.   Pt reports pain has been present since 2025--was seen in ER with imaging at that time suggesting inflammatory changes within the fat lateral to the mid-ascending colon possibly d/t omental infarct vs epiploic appendagitis--pt was discharged home on course of Augmentin however pt returned to ER  with worsening pain and was admitted. Imaging from admission last month suggested stable inflammatory changes in the mesenteric/omental fat lateral to the mid-ascending colon which may represent omental infarct or less likely epiploic appendagitis + minimal scant ascites adjacent to right lobe of liver. During admission, pt was eval by general surgery and was discharged on Medrol pack, Ceftin, Flagyl, and naproxen. Pt was seen by gen surg in the office last week d/t persistent pain and was prescribed an additional course of abx + NSAIDs however pain persisted and pt was admitted again yesterday.   Pt reports chronic, constant RLQ pain which at times radiates to groin into leg. No change in pain with PO intake or BMs. He reports no nausea or vomiting and a robust appetite with wt gain over the last 2 months despite \"healthier\" food choices. He reports passing formed stool 1-2x daily without diarrhea or constipation and no melena or hematochezia. No heartburn, dysphagia,  or odynophagia. Has been using ibuprofen once daily with minimal improvement.    CT a/p IV (3/11/25) suggests slight interval increase in inflammatory changes centered on the expected region of the omentum within the RLQ felt to be most compatible with omental infarct and NO abscess or drainable fluid noted; labs normal CBC, normal chemistry panel, normal lactic acid, and normal albumin/protein.   Endoscopic History  Colonoscopy 5/2023 (Dr Varsha Mix; [facility]) for diverticulitis revealed 4 polyps (2-6 mm), diverticulosis, and hemorrhoids  Colonoscopy 3/2016 (Dr Tam Bey) for rectal bleeding revealed 8 mm polyp in descending colon, few diverticula in the sigmoid colon, and internal hemorrhoids.   Since admission, pt has remained afebrile without BMs or nausea.   PMHx:   Past Medical History:    Abdominal pain    Beginning of diverticulitis saga that led to removal part of my large colon    Allergic rhinitis    Anxiety    Arrhythmia    T1 AV block, LBBB    Back pain    Back problem    lower back    Decorative tattoo    Depression    Diverticulitis    ED (erectile dysfunction)    Heart palpitations    Under treatment with [facility] for arrhythmia    Hemorrhoids    One internal, occasional external    High blood pressure    History of adverse reaction to anesthesia    Patient states he doesnt know exactly what happened but stated he required haldol post anesthesia    Indigestion    Periodic last 20 yrs    Pneumonia due to organism    Presence of other cardiac implants and grafts    Loop recorder for arrhythmia    Seasonal allergies    Shortness of breath    Sleep apnea    CPAP    Stress    Unspecified essential hypertension    Visual impairment    glasses    Wears glasses    Weight gain                PSHx:   Past Surgical History:   Procedure Laterality Date    Cholecystectomy  3/12/2023    Colonoscopy N/A 03/22/2016    [facility].  Hyperplastic polyp only.  Repeat 2026    Colonoscopy  05/10/2023     Dental surgery procedure      [place] teeth removed    Incision/drain abscess extra      Knee arthroscopy Right     Laparoscopic cholecystectomy  2023    Myringoplasty      Other surgical history      R knee arthroscope    Other surgical history  2024    Exploratory laparotomy partial colectomy with colorectal anastamosis, extensive adhesiolysis    Part removal colon w end colostomy      Skin tissue procedure unlisted      Tonsillectomy       Medications:    clonazePAM (KlonoPIN) tab 0.5 mg  0.5 mg Oral BID    [COMPLETED] ketorolac (Toradol) 15 MG/ML injection 15 mg  15 mg Intravenous Once    HYDROmorphone (Dilaudid) 1 MG/ML injection 0.5 mg  0.5 mg Intravenous Q30 Min PRN    [] sodium chloride 0.9% infusion   Intravenous Continuous    [] HYDROmorphone (Dilaudid) 1 MG/ML injection 0.5 mg  0.5 mg Intravenous Q30 Min PRN    [] ondansetron (Zofran) 4 MG/2ML injection 4 mg  4 mg Intravenous Q4H PRN    sodium chloride 0.9% infusion   Intravenous Continuous    acetaminophen (Tylenol Extra Strength) tab 500 mg  500 mg Oral Q4H PRN    melatonin tab 3 mg  3 mg Oral Nightly PRN    polyethylene glycol (PEG 3350) (Miralax) 17 g oral packet 17 g  17 g Oral Daily PRN    sennosides (Senokot) tab 17.2 mg  17.2 mg Oral Nightly PRN    bisacodyl (Dulcolax) 10 MG rectal suppository 10 mg  10 mg Rectal Daily PRN    ondansetron (Zofran) 4 MG/2ML injection 8 mg  8 mg Intravenous Q6H PRN    benzonatate (Tessalon) cap 200 mg  200 mg Oral TID PRN    glycerin-hypromellose- (Artificial Tears) 0.2-0.2-1 % ophthalmic solution 1 drop  1 drop Both Eyes QID PRN    sodium chloride (Saline Mist) 0.65 % nasal solution 1 spray  1 spray Each Nare Q3H PRN    enoxaparin (Lovenox) 40 MG/0.4ML SUBQ injection 40 mg  40 mg Subcutaneous Nightly    morphINE PF 2 MG/ML injection 1 mg  1 mg Intravenous Q2H PRN    Or    morphINE PF 2 MG/ML injection 2 mg  2 mg Intravenous Q2H PRN    Or    morphINE PF 4 MG/ML injection  4 mg  4 mg Intravenous Q2H PRN    acetaminophen (Tylenol) tab 650 mg  650 mg Oral Q4H PRN    Or    HYDROcodone-acetaminophen (Norco) 5-325 MG per tab 1 tablet  1 tablet Oral Q4H PRN    Or    HYDROcodone-acetaminophen (Norco) 5-325 MG per tab 2 tablet  2 tablet Oral Q4H PRN    ketorolac (Toradol) 15 MG/ML injection 15 mg  15 mg Intravenous Q6H PRN    [COMPLETED] iopamidol 76% (ISOVUE-370) injection for power injector  100 mL Intravenous ONCE PRN    [COMPLETED] aspirin chewable tab 324 mg  324 mg Oral Once    albuterol (Ventolin HFA) 108 (90 Base) MCG/ACT inhaler 2 puff  2 puff Inhalation Q4H PRN    amoxicillin clavulanate (Augmentin) 875-125 MG per tab 1 tablet  1 tablet Oral BID    gabapentin (Neurontin) tab 600 mg  600 mg Oral TID    lisinopril (Prinivil; Zestril) tab 20 mg  20 mg Oral Daily    sertraline (Zoloft) tab 75 mg  75 mg Oral Daily    verapamil ER (Calan-SR) tab 180 mg  180 mg Oral Daily     Allergies: Allergies[1]  Social HX:   Social History     Socioeconomic History    Marital status:    Occupational History    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[employer]   Tobacco Use    Smoking status: Former     Current packs/day: 0.00     Average packs/day: 0.5 packs/day for 19.0 years (9.5 ttl pk-yrs)     Types: Cigarettes     Start date: 1981     Quit date: 2000     Years since quittin.7     Passive exposure: Never    Smokeless tobacco: Never   Vaping Use    Vaping status: Never Used   Substance and Sexual Activity    Alcohol use: Not Currently     Comment: Stopped alcohol 2000    Drug use: No    Sexual activity: Yes     Partners: Female   Other Topics Concern    Caffeine Concern Yes     Comment: coffee in AM/ soda     Exercise Yes     Comment: X1-2 times per week      Social Drivers of Health     Food Insecurity: No Food Insecurity (3/11/2025)    NCSS - Food Insecurity     Worried About Running Out of Food in the Last Year: No     Ran Out of Food in the Last Year: No   Transportation Needs:  No Transportation Needs (3/11/2025)    NCSS - Transportation     Lack of Transportation: No   Housing Stability: Not At Risk (3/11/2025)    NCSS - Housing/Utilities     Has Housing: Yes     Worried About Losing Housing: No     Unable to Get Utilities: No      FamHx: The patient has no family history of colon cancer or other gastrointestinal malignancies;  No family history of ulcer disease, or inflammatory bowel disease  ROS:  In addition to the pertinent positives described above:            Infectious Disease:  No chronic infections or recent fevers prior to the acute illness            Cardiovascular: + HTN            Respiratory: + NELLIE            Hematologic: The patient reports no easy bruising, frequent gum bleeding or nose bleeding;  The patient has no history of known chronic anemia            Dermatologic: The patient reports no recent rashes or chronic skin disorders            Rheumatologic: The patient reports no history of chronic arthritis, myalgias, arthralgias            Genitourinary:  The patient reports no history of recurrent urinary tract infections, hematuria, dysuria, or nephrolithiasis           Psychiatric: + depression, anxiety           Oncologic: The patient reports no history of prior solid tumor or hematologic malignancy           ENT: The patient reports no hoarseness of voice, hearing loss, sinus congestion, tinnitus           Neurologic: The patient reports no history of seizure, stroke, or frequent headaches  PE: /61 (BP Location: Right arm)   Pulse 58   Temp 97.9 °F (36.6 °C) (Oral)   Resp 15   Ht 6' 3\" (1.905 m)   Wt 275 lb (124.7 kg)   SpO2 98%   BMI 34.37 kg/m²   Gen: AAO x 3, able to speak in complete sentences  HENT: EOMI, PERRL, oropharynx is clear with moist mucosal membranes  Eyes: Sclerae are anicteric  Neck:  Supple without nuchal rigidity  CV: Regular rate and rhythm, with normal S1 and S2  Resp: Clear to auscultation bilaterally without wheezes; rubs,  rhonchi, or rales  Abdomen: Obese, soft, RLQ tenderness with light palpation, non-distended with the presence of bowel sounds; No hepatosplenomegaly; + voluntary guarding; No ascites is clinically apparent; no tympany to percussion; healed abd incisions   Ext: No peripheral edema or cyanosis  Skin: Warm and dry  Psychiatric: Appropriate mood and congruent affect without obvious depression or anxiety  Labs:   Lab Results   Component Value Date    WBC 8.2 03/12/2025    HGB 13.1 03/12/2025    HCT 40.5 03/12/2025    .0 03/12/2025    CREATSERUM 0.81 03/12/2025    BUN 11 03/12/2025     03/12/2025    K 4.3 03/12/2025     03/12/2025    CO2 29.0 03/12/2025    GLU 88 03/12/2025    CA 9.3 03/12/2025    ALB 4.7 03/11/2025    ALKPHO 77 03/11/2025    BILT 0.5 03/11/2025    AST 18 03/11/2025    ALT 20 03/11/2025     Recent Labs   Lab 03/11/25  1450 03/12/25  0522   * 88   BUN 9 11   CREATSERUM 0.89 0.81   CA 9.6 9.3    139   K 4.0 4.3    103   CO2 31.0 29.0     Recent Labs   Lab 03/11/25  1450 03/12/25  0522   RBC 5.09 4.58   HGB 14.4 13.1   HCT 42.7 40.5   MCV 83.9 88.4   MCH 28.3 28.6   MCHC 33.7 32.3   RDW 13.8 14.1   NEPRELIM 6.17  --    WBC 9.1 8.2   .0 342.0       Recent Labs   Lab 03/11/25  1450   ALT 20   AST 18       Imaging:   PROCEDURE:  CT ABDOMEN+PELVIS (CONTRAST ONLY) (CPT=74177)     COMPARISON:  [facility] , CT, CT ABDOMEN+PELVIS(CONTRAST ONLY)(CPT=74177), 2/09/2025, 8:46 AM.     INDICATIONS:  RLQ pain     TECHNIQUE:  CT scanning was performed from the dome of the diaphragm to the pubic symphysis with non-ionic intravenous contrast material. Post contrast coronal MPR imaging was performed.  Dose reduction techniques were used. Dose information is  transmitted to the ACR (American College of Radiology) NRDR (National Radiology Data Registry) which includes the Dose Index Registry.     PATIENT STATED HISTORY:(As transcribed by Technologist)  Ongoing right lower quadrant  abdominal pain for about two months.      CONTRAST USED:  100cc of Isovue 370     FINDINGS:    LIVER:  Stable appearance of a hyperenhancing lesion along the posterior aspect of right hepatic lobe measuring up to 2.9 x 2.8 cm.  This has features compatible with a hemangioma.  BILIARY:  Postsurgical changes of cholecystectomy noted.  PANCREAS:  No lesion, fluid collection, ductal dilatation, or atrophy.    SPLEEN:  No enlargement or focal lesion.    KIDNEYS:  No mass, obstruction, or calcification.    ADRENALS:  No mass or enlargement.    AORTA/VASCULAR:  No aneurysm or dissection.    RETROPERITONEUM:  No mass or adenopathy.    BOWEL/MESENTERY:  No dilated loops of small bowel are seen.  Portions of the bowel are decompressed, limiting assessment.  There has been slight interval increase in prominence of inflammatory changes noted of the right lower quadrant centered on the  expected region of the omentum (series 4, image 73 and image 69).  This is somewhat difficult to measure given its irregular configuration but measures in the range of approximately 5.8 x 2.1 cm compared with the prior study where it was measured at 6.4  x 1.2 cm.  Lack of oral contrast somewhat limits assessment of the bowel.  There are postsurgical changes.  The appendix is not clearly identified on this exam.  ABDOMINAL WALL:  No mass or hernia.    URINARY BLADDER:  No visible focal wall thickening, lesion, or calculus.    PELVIC NODES:  No adenopathy.    PELVIC ORGANS:  No visible mass.  Pelvic organs appropriate for patient age.    BONES:  No bony lesion or fracture.    LUNG BASES:  No visible pulmonary or pleural disease.    OTHER:  Negative.        Impression   CONCLUSION:  There has been slight interval increase in inflammatory changes centered on the expected region of the omentum within the right lower quadrant.  Again this has features most compatible with omental infarct.  There is no evidence of an  abscess or drainable fluid  collection.      LOCATION:  [facility]      Dictated by (CST): Radu Patel MD on 3/11/2025 at 5:11 PM      Finalized by (CST): Raud Patel MD on 3/11/2025 at 5:22 PM      Impression: 59 yr-old male with hx of HTN, LBBB, NELLIE, anxiety/depression, and complicated diverticulitis which involved the splenic flexure s/p left colectomy with end colostomy (4/2024; Dr Rick Novoa) s/p open colostomy reversal (7/2024; Dr Novoa) who was re-admitted yesterday with persistent RLQ pain since 1/30/2025. Imaging has suggested inflammatory changes within the fat lateral to the mid-ascending colon possibly d/t omental infarct vs epiploic appendagitis--pt has completed several courses of abx, steroids, and NSAIDs without a resolution in pain. No diarrhea, overt GI bleeding, or change in appetite.   Will plan for colonoscopy in AM with bx to assess for an underlying IBD or abnormality causing pain and in the interm r/o infection however this is less likely given lack of diarrhea. The risks, benefits, alternatives of the procedure including the risks of anesthesia, bleeding, perforation, missed lesions, need for surgery, and infection were discussed with the patient. He expressed understanding of the risks and was agreeable to proceed.    Recommendations:     Plan for colonoscopy with bx in AM under MAC with Dr Bacon  Clear liquid diet today; NPO after midnight  with sips of water for necessary medications   Golytely 4000 ml tonight, split prep (each dose to be completed in 2 hours)  Stool for C Diff, GI PCR panel, and fecal calprotectin; enteric isolation   CBC, BMP, Mg in AM correcting electrolytes as needed per Hospitalist recommendations     Thank you for the consultation, we will follow the patient with you.  Attending addendum (Dr STEVE Bacon) to follow later today and provide formal, final recommendations at that time   JOSAFAT Ramos  11:27 AM  3/12/2025  [facility]  [phone]    GI attending addendum:    I have  personally seen and examined this patient and agree with above nurse practitioner's assessment and recommendations.     Briefly, patient is a 59-year-old male with chronic medical conditions as above presented to GI clinic yesterday to Dr. Judah Bacon with severe abdominal pain and was advised to come to the emergency room.  Was additionally managed by surgery for possible epiploic appendagitis and omental infarct with recent treatment of NSAIDs, steroids, antibiotics.  He notes pain initially started improving when he stopped the medications symptoms recurred after 3.5 days.  CT on admission again showed omental infarct.  After further discussion with surgery, would like colonoscopy prior to any additional surgical intervention to rule out additional etiologies for the pain.  On physical exam, patient was resting comfortably in bed.  His abdomen was soft, tender to palpation in the right abdomen, and nondistended.  Patient with right-sided abdominal pain that is likely due to infarct noted on CT, however will also rule out intrinsic colonic pathology.  Will tentatively plan for colonoscopy tomorrow.  He can be on clear liquid diet today and then n.p.o. after midnight.  Will start GoLytely prep tonight.  General surgery is also following and appreciate their recommendations.  Stool studies are also currently pending with GI stool panel and calprotectin.  Additional recommendations as above.  Thank you for the consultation.  Will continue to follow along with you.    Job Bacon,   6:06 PM  3/12/2025  [facility]  [phone]       [1]   Allergies  Allergen Reactions    Ciprofloxacin OTHER (SEE COMMENTS)     Increases risk for arrhythmia

## 2025-03-12 NOTE — HISTORICAL OFFICE NOTE
Continuity of Care Document  3/7/2025  Don Cabral - 59 y.o. Male; born [birth year]Summary of episode note, generated on Mar. 11, 2025March 11, 2025   CHIEF COMPLAINT    CHIEF COMPLAINT  Reason for Visit/Chief Complaint   Follow up loop check   Don Cabral is a 59-year-old gentleman with PMH significant for: Hx LINQ implant, bradycardia, PVC's, LBBB, HTN, diverticulitis and NELLIE. 4/15/24: Underwent colostomy for recurrent diverticulitis with reversal July 2024.  2/9/25: Hospitalized with RLQ pain and etiology was unclear.  No arrhythmias on Loop.3/7/25: EKG: SR LBB 83 BPM, QRS: 132 ms, QTc: 400 ms, MD: 260 ms.  9/12/23: Echo: LVEF: 55-60 %, NRWMA.  10/2/23: Treadmill: No bradycardia or HB.  9/18/23: 9 Day MCT: SR min HR: 43 BPM, Ave HR: 59 BPM, Max HR: 116 BPM, 1st AVB, Possible PAF 1-2 minutes.  11/13/24: Nuclear PET Scan: No evidence of ischemia. EF: 57 %.  2/2/25: Loop interrogation: No arrhythmias. Current Visit:  No palpitations or arrhythmias on Loop. Continues on Verapamil 180 mg QD.  2/9/25: Hospitalized with RUQ pain and etiology was unclear. Continues to suffer from LRQ pain.  [facility].  Await C-scope.     PROBLEMS    PROBLEMS  Problem Effective Dates Date resolved Problem Status   PVCs (premature ventricular contractions), [SNOMED-CT: 721771350] 9/15/2023 - Active   LBBB (left bundle branch block), [SNOMED-CT: 12358369] 9/15/2023 - Active   Bradycardia, [SNOMED-CT: 26713139] 9/15/2023 - Active   Dizziness, [SNOMED-CT: 027166598] 9/15/2023 - Active   Essential hypertension, [SNOMED-CT: 74948466] 9/12/2023 - Active   NELLIE (obstructive sleep apnea), [SNOMED-CT: 22120728] 9/12/2023 - Active   Hypokalemia, [SNOMED-CT: 10996532] 9/12/2023 - Active   Elevated LFTs, [SNOMED-CT: 329468380] 6/30/2023 - Active   Hyperglycemia, [SNOMED-CT: 89103868] 6/23/2023 - Active   Acute kidney injury, [[facility]: 51497688984594951] 6/23/2023 - Active     ENCOUNTER    ENCOUNTER  Problem  Effective Dates Date resolved Problem Status   PVCs (premature ventricular contractions), [SNOMED-CT: 161366132] 9/15/2023 - Active   LBBB (left bundle branch block), [SNOMED-CT: 66408005] 9/15/2023 - Active   Bradycardia, [SNOMED-CT: 38624733] 9/15/2023 - Active   Dizziness, [SNOMED-CT: 020456864] 9/15/2023 - Active   Essential hypertension, [SNOMED-CT: 04531619] 9/12/2023 - Active   NELLIE (obstructive sleep apnea), [SNOMED-CT: 67239544] 9/12/2023 - Active   Hypokalemia, [SNOMED-CT: 57083593] 9/12/2023 - Active   Elevated LFTs, [SNOMED-CT: 011915347] 6/30/2023 - Active   Hyperglycemia, [SNOMED-CT: 46973597] 6/23/2023 - Active   Acute kidney injury, [SNOMED-CT: 70604252451192199] 6/23/2023 - Active     VITAL SIGNS    VITAL SIGNS  Date / Time: 3/7/2025   BP Systolic 128 mmHg   BP Diastolic 68 mmHg   Height 74 inches   Weight 277 lbs   Pulse Rate 83 bpm   BSA (Body Surface Area) 2.6 cc/m2   BMI (Body Mass Index) 35.6 cc/m2   Blood Pressure 128 / 68 mmHg     PHYSICAL EXAMINATION    PHYSICAL EXAMINATION  Header Details   Vitals Right Arm Sitting  / 68 mmHg, Pulse rate 83 bpm, Regular, Height in 6' 2\", BMI: 35.6, Weight in 277.01 lbs (or) 125.65 kgs, BSA : 2.6 cc/m²   General Appearance No Acute Distress, Well groomed, Normal Body habitus, Appropriate   Head/Eyes/Ears/Nose/Mouth/Throat Conjunctiva pink, Sclera Clear, EOMI, PERRLA   Neck Normal carotid pulsations, No carotid bruits, Normal thyroid palpation without goiter, No JVD   Cardiovascular Intact distal pulses, Regular rhythm. Normal and normal S1 and S2  Carotid pulses are 2+ on the right side.   Gastrointestinal Abdomen soft, Non-tender, Normoactive bowel sounds, No organomegaly   Musculoskeletal Normal spine   Gait Normal gait   Strength and tone Normal muscle strength, Normal muscle tone   Upper Extremities No clubbing, No cyanosis, No edema   Lower Extremities Pulses 2+ and equal bilaterally, No edema   Skin Warm and dry, Intact   Neurologic / Psychiatric  Alert and Oriented   Speech Normal speech     ALLERGIES, ADVERSE REACTIONS, ALERTS    No data available    MEDICATIONS ADMINISTERED DURING VISIT    No data available    MEDICATIONS    MEDICATIONS  Medication Start Date Route/Frequency Status   Albuterol Sulfate  (90 Base) MCG/ACT Inhalation Aero Soln, [RxNorm: 8129943] 10/3/2023 Inhale 2 puffs into the lungs every 4 (four) hours as needed for Wheezing or Shortness of Breath. Active   clonazePAM 0.25 mg disintegrating tablet, [RxNorm: 923666] 11/25/2024 Take 1 tablet orally 2 times a day. Active   gabapentin 600 MG Oral Tab, [RxNorm: 959221] 10/3/2023 Take 1 tablet (600 mg total) by mouth 3 (three) times daily. Active   HYDROcodone 5 mg-acetaminophen 325 mg tablet, [RxNorm: 428290] 3/7/2025 Take one-half tablet orally once a day. Active   ibuprofen 600 mg tablet, [RxNorm: 070144] 3/7/2025 Take 1 tablet orally once a day. Active   lisinopril 20 MG Oral Tab, [RxNorm: 270424] 10/3/2023 Take 1 tablet (20 mg total) by mouth daily. Active   Multiple Vitamins-Minerals (MULTI-VITAMIN/MINERALS) Oral Tab, [RxNorm: 0] 10/3/2023 Take 1 tablet by mouth daily. Active   OLANZapine 2.5 mg tablet, [RxNorm: 298461] 9/23/2024 Take 1 tablet orally once a day. Active   ondansetron HCL 4 mg tablet, [RxNorm: 907635] 5/2/2024 Take 1 tablet orally 3 times a day as needed. Active   Probiotic 15 billion cell capsule, [RxNorm: 0] 10/4/2023 Take 1 capsule orally 2 times a week Active   sertraline (ZOLOFT) 50 MG Oral Tab, [RxNorm: 414373] 10/3/2023 1.5 tablets daily Active   verapamiL  mg 24 hr capsule,extended release, [RxNorm: 172441] 10/18/2024 Take 1 capsule orally once a day. Active   verapamiL  mg 24 hr capsule,extended release, [RxNorm: 055969] 3/7/2025 Take 1 capsule orally once a day. Active     ASSESSMENT    Don Cabral is a 59-year-old gentleman with PMH significant for: Hx LINQ implant, bradycardia, PVC's, LBBB, HTN, diverticulitis and NELLIE. 4/15/24: Underwent  colostomy for recurrent diverticulitis with reversal July 2024.  2/9/25: Hospitalized with RLQ pain and etiology was unclear.  No arrhythmias on Loop.3/7/25: EKG: SR LBB 83 BPM, QRS: 132 ms, QTc: 400 ms, VT: 260 ms.  9/12/23: Echo: LVEF: 55-60 %, NRWMA.  10/2/23: Treadmill: No bradycardia or HB.  9/18/23: 9 Day MCT: SR min HR: 43 BPM, Ave HR: 59 BPM, Max HR: 116 BPM, 1st AVB, Possible PAF 1-2 minutes.  11/13/24: Nuclear PET Scan: No evidence of ischemia. EF: 57 %.  2/2/25: Loop interrogation: No arrhythmias. Plan:  No palpitations or arrhythmias on Loop. Continues on Verapamil 180 mg QD.  2/9/25: Hospitalized with RLQ pain and etiology was unclear. Continues to suffer from RLQ pain.  Following with [facility].  Await C-scope.  Follow up with Dr. Mars on 5/18/25.    FAMILY HISTORY    FAMILY HISTORY  Relationship Age Diagnosis   Father 0 PVCs (premature ventricular contractions); Atrial flutter, unspecified   Mother 0 FH: arrhythmia     GENERAL STATUS    No data available    PAST MEDICAL HISTORY    PAST MEDICAL HISTORY  Problem Date diagonsed Date resolved Status   PVCs (premature ventricular contractions), [SNOMED-CT: 565545956] 9/15/2023 - Active   LBBB (left bundle branch block), [SNOMED-CT: 91059303] 9/15/2023 - Active   Bradycardia, [SNOMED-CT: 66206841] 9/15/2023 - Active   Dizziness, [SNOMED-CT: 509391714] 9/15/2023 - Active   Essential hypertension, [SNOMED-CT: 50405512] 9/12/2023 - Active   NELLIE (obstructive sleep apnea), [SNOMED-CT: 86383079] 9/12/2023 - Active   Hypokalemia, [SNOMED-CT: 48261627] 9/12/2023 - Active   Elevated LFTs, [SNOMED-CT: 849005753] 6/30/2023 - Active   Hyperglycemia, [SNOMED-CT: 21644917] 6/23/2023 - Active   Acute kidney injury, [SNOMED-CT: 22483458887353514] 6/23/2023 - Active     HISTORY OF PRESENT ILLNESS    Don Cabral is a 59-year-old gentleman with PMH significant for: Hx LINQ implant, bradycardia, PVC's, LBBB, HTN, diverticulitis and NELLIE. 4/15/24: Underwent colostomy for  recurrent diverticulitis with reversal July 2024.  2/9/25: Hospitalized with RLQ pain and etiology was unclear.  No arrhythmias on Loop.3/7/25: EKG: SR LBB 83 BPM, QRS: 132 ms, QTc: 400 ms, OR: 260 ms.  9/12/23: Echo: LVEF: 55-60 %, NRWMA.  10/2/23: Treadmill: No bradycardia or HB.  9/18/23: 9 Day MCT: SR min HR: 43 BPM, Ave HR: 59 BPM, Max HR: 116 BPM, 1st AVB, Possible PAF 1-2 minutes.  11/13/24: Nuclear PET Scan: No evidence of ischemia. EF: 57 %.  2/2/25: Loop interrogation: No arrhythmias. Current Visit:  No palpitations or arrhythmias on Loop. Continues on Verapamil 180 mg QD.  2/9/25: Hospitalized with RUQ pain and etiology was unclear. Continues to suffer from LRQ pain.  Following with [facility].  Await C-scope.    IMMUNIZATIONS    No data available    PLAN OF CARE    PLAN OF CARE  Planned Care Date   EKG (electrocardiogram) 1/1/1900   Take 1 capsule orally once a day.-verapamiL  mg 24 hr capsule,extended release 3/7/2025   Follow up visit - Evangelina MAURICE 1/1/1900     PROCEDURES    No data available    RESULTS    RESULTS  Name Result Date Location - Ordered By   TSH [LOINC: 71331-0] 1.850 mIU/mL 09/28/2023 03:10:00 PM [facility] ([facility])  Address: [address] [place] [address]  [phone]  tel:   Nuclear PET 1.Stress EKG is non-diagnostic due to LBBB.1.This is a normal perfusion study, no perfusion defects noted. 2.The left ventricular cavity is noted to be normal on the stress studies. The stress left ventricular ejection fraction was calculated to be 60% and left ventricular global function is normal. The rest left ventricular cavity is noted to be normal. The rest left ventricular ejection fraction was calculated to be 57% and rest left ventricular global function is normal. 3.This scan is suggestive of low risk for future cardiovascular events. 4.The study quality is average. 11/13/2024 1:00:00 PM Pascual Caraballo MD   Ambulatory Telemetry Monitor 1.This is an average  quality study. 2.Predominant rhythm is normal sinus rhythm. 3.The minimum heart rate recorded was 43 beats / minute. The maximum heart rate is 116 beats / minute. The mean heart rate is 59 beats / minute. 4.First degree AV block noted. 5.Afib burden, episodes 1-2 minutes for a total burden of 1.5 horus, less than 1%. 9/18/2023 8:30:00 AM Stevenson Mars MD     REVIEW OF SYSTEMS    REVIEW OF SYSTEMS  Header Details   Constitutional No history of Weight Loss, Anorexia   Eyes No history of Blurry vision, Visual changes, Double vision   ENT No history of Bloody nose (epistaxis), Gingival bleeding   Gastrointestinal No history of Abdominal pain, Nausea, Vomiting, Diarrhea, BRBPR, Black tarry stools, GERD, Anorexia, Constipation   Cardiovascular No history of Chest pain, FONG, Palpitations, Syncope, PND, Orthopnea, Edema, Claudication   Genitourinary No history of Dysuria, Hematuria, Frequency, ED   Musculoskeletal No history of Myalgias, Arthritis   Respiratory No history of SOB, Wheezing, Sputum   Neurological No history of Migraines, Numbness, Limb weakness   Endocrine No history of Polyuria, Polydyspia   Hem/Lymphatic No history of Easy bruising, Blood clots, Hx of blood transfusion, Anemia, Bleeding problems   Integumentary No history of Rashes, Skin changes     SOCIAL HISTORY    SOCIAL HISTORY  Social History Element Description Effective Dates   Smoking status Former smoker -     FUNCTIONAL STATUS    No data available    INSTRUCTIONS    INSTRUCTIONS  NAME TYPE DATE   Increased BMI: Provide patient with information regarding diet and lifestyle changes. Goals 3/7/2025     MEDICAL EQUIPMENT    No data available    Goals Sections    No data available    REASON FOR REFERRAL           Health Concerns Section    No data available    COGNITIVE/MENTAL STATUS    No data available    Patient Demographics    Patient Demographics  Patient Address Patient Name Communication   [address] [place] [address] Don  Misha [phone] (Home)     Patient Demographics  Language Race / Ethnicity Marital Status   English - Spoken (Preferred) White / Not  or       Document Information    Primary Care Provider Other Service Providers Document Coverage Dates   Evangelinazeinab Santillan  NPI: [id number]  [phone] (Work)  [address] [place] [address]  [place] [address]  Interpreting Physicians  [facility]  [phone] (Work)  [address] [place] [address] Nupurmilena Bedoya  NPI: [id number]  [phone] (Work)  [address] [place] [address]  [place] [address]  Nurses     Naomi Buenrostro  NPI: [id number]  [phone] (Work)  [address] [place] [address]  [place] [address]  Nurses     Pastora Howard  NPI: [id number]  [phone] (Work)  [address] [place] [address]  [place] [address]  Nurses Mar. 07, 2025March 07, 2025      Organization   [facility]  [phone] (Work)  [address] [place] [address]  [place] [address]     Encounter Providers Encounter Date    Mar. 07, 2025March 07, 2025       Legal Authenticator    Evangelina MAURICE Tyrell  NPI: [id number]  [phone] (Work)  [address] [place] [address]  [place] [address]          ___________________________________________________________  Facility Logo [address] [place] [address] [phone]      Don Cabral  Progress Note  Demographics:  Name: Don Cabral YOB: 1965  Age: 59, Male Medical Record No: [id number]  Visited Date/Time: 11/25/2024 08:20 AM    Chief Complaints  11/20 LINQ implant  History of Present Illness  59-year-old gentleman here for hospital discharge follow-up.  He presented with symptoms of dizziness, palpitations.    In the hospital, an ECG demonstrated  borderline left bundle branch block pattern on ECG, first-degree AV block.  He was noted to have frequent PVCs.  He exercised on a treadmill, had a good heart rate response with activity. Beta-blocker was stopped while he was in the hospital, medications were changed to verapamil to treat PVCs    Status post LINQ monitor demonstrated sinus rhythm, rare PVCs, between 0-0.1% atrial fibrillation.    .    4/2024, hospitalized for diverticulitis at [place], had a colostomy and subsequent take down.   Cardiac risk factors Former smoker  Past Medical History  1.PVCs (premature ventricular contractions)  2.LBBB (left bundle branch block)  3.Bradycardia  4.Dizziness  5.Hypokalemia  6.NELLIE (obstructive sleep apnea)  7.Essential hypertension  8.Elevated LFTs  9.Acute kidney injury  10.Hyperglycemia  Family History  1. Father - PVCs (premature ventricular contractions); Atrial flutter, unspecified  2. Mother - FH: arrhythmia  Social History  Smoking status Former smoker  Tobacco usage - No (Non-smoker for personal reasons (finding))  Review of systems  Cardiovascular No history of Chest pain, FONG, Palpitations, Syncope, PND, Orthopnea, Edema and Claudication  Physical Examination  Vitals Right Arm Sitting  / 68 mmHg, Pulse rate 89 bpm, Regular, Height in 6' 2\", BMI: 33.8, Weight in 263 lbs (or) 119.29 kgs and BSA : 2.53 cc/m²  General Appearance No Acute Distress  Head/Eyes/Ears/Nose/Mouth/Throat Conjunctiva pink, Sclera Clear and Mucous membranes Moist  Neck Normal carotid pulsations, No carotid bruits and No JVD  Respiratory Unlabored, Lungs clear with normal breath sounds and Equal bilaterally  Cardiovascular Intact distal pulses and Regular rhythm. Normal rate present. Normal and normal S1 and S2    Allergies  No medication allergies noted.  Medications  1.Albuterol Sulfate  (90 Base) MCG/ACT Inhalation Aero Soln, Inhale 2 puffs into the lungs every 4 (four) hours as needed for Wheezing or Shortness of  Breath.  2.clonazePAM 0.25 mg disintegrating tablet, Take 1 tablet orally 2 times a day.  3.gabapentin 600 MG Oral Tab, Take 1 tablet (600 mg total) by mouth 3 (three) times daily.  4.lisinopril 20 MG Oral Tab, Take 1 tablet (20 mg total) by mouth daily.  5.Multiple Vitamins-Minerals (MULTI-VITAMIN/MINERALS) Oral Tab, Take 1 tablet by mouth daily.  6.OLANZapine 2.5 mg tablet, Take 1 tablet orally once a day.  7.ondansetron HCL 4 mg tablet, Take 1 tablet orally 3 times a day as needed.  8.Probiotic 15 billion cell capsule, Take 1 capsule orally 2 times a week  9.sertraline (ZOLOFT) 50 MG Oral Tab, 1.5 tablets daily  10.verapamiL  mg 24 hr capsule,extended release, Take 1 capsule orally once a day.  Impression  1.PVCs (premature ventricular contractions)  2.LBBB (left bundle branch block)  3.Bradycardia  4.Dizziness  5.Hypokalemia  6.NELLIE (obstructive sleep apnea)  7.Essential hypertension  8.Elevated LFTs  9.Acute kidney injury  10.Hyperglycemia  Assessment & Plan  1.  Palpitations, likely secondary to PVCs.  Twelve-lead ECG shows some improvement on verapamil.  2. PAF- monitored on LINQ, rare.   3.  Bradycardia, good heart rate response on the treadmill.  It is possible that lower heart rate measurements at home may have correlated with sinus rhythm and underlying PVCs, possible bigeminy giving a false reading of heart rate measurement.  4. LBBB, intermittent.  5. LINQ monitor in place, 0.1% AF  6.  Obstructive sleep apnea.  Presently on CPAP.  7.  Depression and anxiety.  8. History of diverticulitis, was being considered for surgery for sigmoid colectomy.     -Continue verapamil.  - OK for olanzapine  - Follow up in 6 months      Increased BMI: Provide patient with information regarding diet and lifestyle changes.    Increased BMI: Provide patient with information regarding diet and lifestyle changes.  Labs and Diagnostics ordered  1.EKG (electrocardiogram) (Today)  Future appointments  1.Referral Visit -  Stephen Sammy ([email or web address]) : (Today)  2.Follow up visit - Setvenson Mars MD (6 Months)  Miscellaneous  1.Weight monitoring (regime/therapy)  Nurses documentation  Triage - Nursing Doc:  Upcoming surgeries: no  Use of assistive devices(s): no  Triage & medication list reviewed by: ROSA  EKG: yes  Refills:  no  Patient instructions  Medications:   1. Continue current medications.    Provider Follow Up:  1. Follow up with Dr. Mars in 6 months    Please bring in you medication bottles or updated medicine list to your next appointment.  Call [facility] if you have any problems or concerns at [phone]   Diagnostics Details  ACTMonitoring 09/18/2023  1.This is an average quality study.    2.Predominant rhythm is normal sinus rhythm.    3.The minimum heart rate recorded was 43 beats / minute. The maximum heart rate is 116 beats / minute. The mean heart rate is 59 beats / minute.    4.First degree AV block noted.    5.Afib burden, episodes 1-2 minutes for a total burden of 1.5 horus, less than 1%.    CPOE Orders carried out by: Pastora Howard  Care Providers: Stevenson Mars MD, Naomi Buenrostro [facility] and Pastora Howard  Electronically Authenticated by  Stevenson Mars MD  11/26/2024 08:41:01 AM  Disclaimer: Components of this note were documented using voice recognition system and are subject to errors not corrected at proofreading. Contact the author of this note for any clarifications.

## 2025-03-12 NOTE — CONSULTS
[facility]   part of [facility]    Advanced Heart Failure Consultation    Don Cabral Patient Status:  Observation    1965 MRN [id number]   Location [facility] 3NW-A Attending Avery Cardenas MD   Hosp Day # 0 PCP Stephen Christianson MD     Reason for Consultation:  Concern for mesenteric ischemia     History of Present Illness:  Don Cabral is a a(n) 59 year old male with PMHX of Brief AF (on rate control no AC) years ago that has since been SR with LINQ in place, history of prior PVC's and intermittent LBBB, diverticulitis with open partial colectomy with colorectal anastamosis, HTN, GERD, and depression, obesity, who presents with RLQ pain since mid to late February. He states the pain is constant, and localized and sharp. Denies diarrhea, vomiting and does not endorse that food ingestion worsenss the pain. He saw GI in clinic and was advised to come to ED for evaluation. He had LINQ placement and no arrthymias on loop were noted, and no evidence of AF including last week during office visit as well as today during interrogation. This all started when he possible had AF noted 1-2 minutes in  and since then was adjusted on medications and has been SR since then. He denies palpitations, arrthymias, and otherwise stable on verapamil 180 daily. He follows with GI and planning for C scope tomorrow. He denies CP, SOB, syncope otherwise. He denies palpitations. Continues to have RLQ abdominal pain.     History:  Past Medical History:    Abdominal pain    Beginning of diverticulitis saga that led to removal part of my large colon    Allergic rhinitis    Anxiety    Arrhythmia    T1 AV block, LBBB    Back pain    Back problem    lower back    Decorative tattoo    Depression    Diverticulitis    ED (erectile dysfunction)    Heart palpitations    Under treatment with [facility] [place] for arrhythmia    Hemorrhoids    One internal, occasional external    High blood pressure     History of adverse reaction to anesthesia    Patient states he doesnt know exactly what happened but stated he required haldol post anesthesia    Indigestion    Periodic last 20 yrs    Pneumonia due to organism    Presence of other cardiac implants and grafts    Loop recorder for arrhythmia    Seasonal allergies    Shortness of breath    Sleep apnea    CPAP    Stress    Unspecified essential hypertension    Visual impairment    glasses    Wears glasses    Weight gain     Past Surgical History:   Procedure Laterality Date    Cholecystectomy  3/12/2023    Colonoscopy N/A 03/22/2016    [facility].  Hyperplastic polyp only.  Repeat 2026    Colonoscopy  05/10/2023    Dental surgery procedure      [place] teeth removed    Incision/drain abscess extra  1996    Knee arthroscopy Right     Laparoscopic cholecystectomy  02/12/2023    Myringoplasty      Other surgical history      R knee arthroscope    Other surgical history  07/17/2024    Exploratory laparotomy partial colectomy with colorectal anastamosis, extensive adhesiolysis    Part removal colon w end colostomy      Skin tissue procedure unlisted      Tonsillectomy       Family History   Problem Relation Age of Onset    Heart Disorder Father         Ablation required    Hypertension Father     Cancer Father 60        Prostate    Prostate Cancer Father     Heart Disorder Mother         murmur no treatment needed    Hypertension Mother     Stroke Maternal Grandmother     Other (stroke) Maternal Grandmother     Dementia Paternal Grandmother     Heart Disorder Paternal Grandfather         two heart attacks and CHF    Diabetes Paternal Grandfather     Other (Diabetes, type 2) Paternal Grandfather       reports that he quit smoking about 24 years ago. His smoking use included cigarettes. He started smoking about 43 years ago. He has a 9.5 pack-year smoking history. He has never been exposed to tobacco smoke. He has never used smokeless tobacco. He reports that he does not  currently use alcohol. He reports that he does not use drugs.    Allergies:  Allergies[1]    Medications:    Current Facility-Administered Medications:     clonazePAM (KlonoPIN) tab 0.5 mg, 0.5 mg, Oral, BID    polyethylene glycol-electrolyte (Golytely) 236 g oral solution 4,000 mL, 4,000 mL, Oral, Once    HYDROmorphone (Dilaudid) 1 MG/ML injection 0.5 mg, 0.5 mg, Intravenous, Q30 Min PRN    sodium chloride 0.9% infusion, , Intravenous, Continuous    acetaminophen (Tylenol Extra Strength) tab 500 mg, 500 mg, Oral, Q4H PRN    melatonin tab 3 mg, 3 mg, Oral, Nightly PRN    polyethylene glycol (PEG 3350) (Miralax) 17 g oral packet 17 g, 17 g, Oral, Daily PRN    sennosides (Senokot) tab 17.2 mg, 17.2 mg, Oral, Nightly PRN    bisacodyl (Dulcolax) 10 MG rectal suppository 10 mg, 10 mg, Rectal, Daily PRN    ondansetron (Zofran) 4 MG/2ML injection 8 mg, 8 mg, Intravenous, Q6H PRN    benzonatate (Tessalon) cap 200 mg, 200 mg, Oral, TID PRN    glycerin-hypromellose- (Artificial Tears) 0.2-0.2-1 % ophthalmic solution 1 drop, 1 drop, Both Eyes, QID PRN    sodium chloride (Saline Mist) 0.65 % nasal solution 1 spray, 1 spray, Each Nare, Q3H PRN    enoxaparin (Lovenox) 40 MG/0.4ML SUBQ injection 40 mg, 40 mg, Subcutaneous, Nightly    morphINE PF 2 MG/ML injection 1 mg, 1 mg, Intravenous, Q2H PRN **OR** morphINE PF 2 MG/ML injection 2 mg, 2 mg, Intravenous, Q2H PRN **OR** morphINE PF 4 MG/ML injection 4 mg, 4 mg, Intravenous, Q2H PRN    acetaminophen (Tylenol) tab 650 mg, 650 mg, Oral, Q4H PRN **OR** HYDROcodone-acetaminophen (Norco) 5-325 MG per tab 1 tablet, 1 tablet, Oral, Q4H PRN **OR** HYDROcodone-acetaminophen (Norco) 5-325 MG per tab 2 tablet, 2 tablet, Oral, Q4H PRN    ketorolac (Toradol) 15 MG/ML injection 15 mg, 15 mg, Intravenous, Q6H PRN    albuterol (Ventolin HFA) 108 (90 Base) MCG/ACT inhaler 2 puff, 2 puff, Inhalation, Q4H PRN    amoxicillin clavulanate (Augmentin) 875-125 MG per tab 1 tablet, 1 tablet, Oral,  BID    gabapentin (Neurontin) tab 600 mg, 600 mg, Oral, TID    lisinopril (Prinivil; Zestril) tab 20 mg, 20 mg, Oral, Daily    sertraline (Zoloft) tab 75 mg, 75 mg, Oral, Daily    verapamil ER (Calan-SR) tab 180 mg, 180 mg, Oral, Daily    Review of Systems:  All systems were reviewed and are negative except as described above in HPI.     Physical Exam:  Blood pressure 122/65, pulse 66, temperature 98.5 °F (36.9 °C), temperature source Oral, resp. rate 18, height 6' 3\" (1.905 m), weight 275 lb (124.7 kg), SpO2 93%.  Temp (24hrs), Av.9 °F (36.6 °C), Min:97 °F (36.1 °C), Max:98.5 °F (36.9 °C)    Wt Readings from Last 3 Encounters:   25 275 lb (124.7 kg)   25 276 lb 2.2 oz (125.3 kg)   25 277 lb (125.6 kg)       Intake/Output Summary (Last 24 hours) at 3/12/2025 1347  Last data filed at 3/12/2025 1200  Gross per 24 hour   Intake 1684 ml   Output 925 ml   Net 759 ml       Telemetry: SR rates 50s-60s, no AF noted.     General: Alert and oriented in no apparent distress. Obese.   HEENT: No focal deficits.  Neck: No JVD, carotids 2+ no bruits.  Cardiac: Regular rate and rhythm, S1, S2 normal, no rubs or gallops.   Lungs: Clear without wheezes, rales, rhonchi or dullness.  Normal excursions and effort.  Abdomen: Soft, surgical scar intact, RLQ pain with guarding present.   Extremities: Without clubbing, cyanosis or edema.  Peripheral pulses are 2+.  Neurologic: Alert and oriented, normal affect.  Skin: Warm and dry.     Laboratories and Data:  Diagnostics:  EKG: SR with 1st deg AVB, no AF  Echo: preserved LVEF on last echo  Stress Test: as previously,   Cath: N/A  CXR: mild cardiomegaly, no edema.   CT A/P - increase in inflammatory changes centered on region of omentum within RLQ - concern for omental infarct. No fluid collection or abscess.     Labs:   Lab Results   Component Value Date    WBC 8.2 2025    RBC 4.58 2025    HGB 13.1 2025    HCT 40.5 2025    MCV 88.4 2025     MCH 28.6 03/12/2025    MCHC 32.3 03/12/2025    RDW 14.1 03/12/2025    .0 03/12/2025     Lab Results   Component Value Date    INR 1.01 09/28/2023    INR 1.23 (H) 06/23/2023     No results for input(s): \"BNPML\" in the last 168 hours.  BUN (mg/dL)   Date Value   03/12/2025 11   03/11/2025 9   02/10/2025 19     Blood Urea Nitrogen (mg/dL)   Date Value   08/02/2021 15.0   08/11/2020 13.0   08/13/2019 16.0     Creatinine (mg/dL)   Date Value   03/12/2025 0.81   03/11/2025 0.89   02/10/2025 0.74   08/02/2021 0.71   08/11/2020 0.81   08/13/2019 0.97       Assessment/Plan:  Patient Active Problem List   Diagnosis    Anxiety    Essential hypertension    ED (erectile dysfunction)    NELLIE (obstructive sleep apnea)-mild, 8.4    Generalized anxiety disorder    Hypokalemia    Hyperglycemia    Tooth pain    Daytime hypersomnolence    Symptomatic bradycardia    Palpitations    Slow heart rate    PVC (premature ventricular contraction)    Bipolar 1 disorder (HCC)    Sigmoid diverticulitis    Acute diverticulitis    Intractable abdominal pain    Postoperative examination    Preop testing    Right lower quadrant abdominal pain    Abdominal pain, acute    Intra-abdominal infection    Epiploic appendagitis    Implantable loop recorder present    LBBB (left bundle branch block)    LVH (left ventricular hypertrophy)    MDD (major depressive disorder), recurrent severe, without psychosis (HCC)    Dizziness    Elevated LFTs    Essential hypertension, benign    CLARIBEL (generalized anxiety disorder)    Panic disorder    PVCs (premature ventricular contractions)    Bradycardia    Abdominal pain, right lower quadrant    Omental infarction (HCC)    Depression with anxiety    History of cardiac arrhythmia    Primary hypertension     RLQ Pain with Omental Infarction   - GS, GI, and primary following, and planning for colonoscopy tomorrow   - management as per their recommendations, supportive care  - no evidence of AF on tele, EKG, or monitor  LINQ to suggest thrombo-embolic event etiology but appreciate all consultant management, patient has never been on AC before either.   - can consider AC as warranted by other consultants if deemed appropriate if no other acute findings are found from GI and GS standpoint however, for now, continue management as per GI.     Obesity  - encourage weight loss with diet and exercise    PVC's  - well controlled on rate controlling medications  - continue for now, monitor on tele, for PVCs and any AF occurrence   - optimize electrolytes.     NELLIE  - CPAP therapy nightly.     Joe Waters MD   Advanced Heart Failure and Transplant Cardiology   [facility] ([facility])     C5       [1]   Allergies  Allergen Reactions    Ciprofloxacin OTHER (SEE COMMENTS)     Increases risk for arrhythmia

## 2025-03-12 NOTE — PLAN OF CARE
[employer] device interrogated per APN request. Pt tolerated. Report sent to nurse via tube station.

## 2025-03-12 NOTE — CONSULTS
[facility]  Report of Consultation    Don Cabral Patient Status:  Observation    1965 MRN [id number]   Location [facility] 3NW-A Attending Avery Cardenas MD   Hosp Day # 0 PCP Stephen Christianson MD     Requesting Physician:  Dr. Cardenas     Reason for Consultation:  Recurrent right lower quadrant pain    Chief Complaint:  Right lower quadrant     History of Present Illness:  Don Cabral is a 59 year old male who presented to [facility] on 3/12/2025 with concerns of worsening right lower quadrant abdominal pain. He is known to our service from recent admission on 2025-2025 for omental infarct. He was discharged home on a course of steroids. He followed up as an outpatient with Dr. Clemens on 3/5/2025 for concerns of ongoing pain. He was started on a course of Augmentin and referred to gastroenterologist for colonoscopy. He saw his gastroenterologist on day of presentation and was noted to have uncontrolled abdominal pain. He was recommend to present to the ER for further evaluation and management. He reports pain has been ongoing since 2025.     Upon presentation to the hospital, he was afebrile and hemodynamically stable. Laboratory workup revealed no leukocytosis, wbc 9.1, hemoglobin 14.4, platelets 442,000. CMP revealed no gross electrolyte abnormalities, no acute kidney injury, creatinine 0.89, no elevation in LFTs. CT of the abdomen and pelvis revealed slight interval increase in inflammatory changes centered on expected region of omentum within right lower quadrant, which is most compatible with omental infract. There is no evidence of an abscess or drainable fluid collection    He has a past medical history significant for atrial fibrillation, diverticulitis, HTN. He has a past surgical history significant for exploratory laparotomy and Zaid's procedures on 4/15/2024 with Dr. Novoa. He then underwent open colostomy reversal on 2024. He has also had a  laparoscopic cholecystectomy.     History:  Past Medical History:    Abdominal pain    Beginning of diverticulitis saga that led to removal part of my large colon    Allergic rhinitis    Anxiety    Arrhythmia    T1 AV block, LBBB    Back pain    Back problem    lower back    Decorative tattoo    Depression    Diverticulitis    ED (erectile dysfunction)    Heart palpitations    Under treatment with [facility] [place] for arrhythmia    Hemorrhoids    One internal, occasional external    High blood pressure    History of adverse reaction to anesthesia    Patient states he doesnt know exactly what happened but stated he required haldol post anesthesia    Indigestion    Periodic last 20 yrs    Pneumonia due to organism    Presence of other cardiac implants and grafts    Loop recorder for arrhythmia    Seasonal allergies    Shortness of breath    Sleep apnea    CPAP    Stress    Unspecified essential hypertension    Visual impairment    glasses    Wears glasses    Weight gain     Past Surgical History:   Procedure Laterality Date    Cholecystectomy  3/12/2023    Colonoscopy N/A 03/22/2016    [facility].  Hyperplastic polyp only.  Repeat 2026    Colonoscopy  05/10/2023    Dental surgery procedure      [place] teeth removed    Incision/drain abscess extra  1996    Knee arthroscopy Right     Laparoscopic cholecystectomy  02/12/2023    Myringoplasty      Other surgical history      R knee arthroscope    Other surgical history  07/17/2024    Exploratory laparotomy partial colectomy with colorectal anastamosis, extensive adhesiolysis    Part removal colon w end colostomy      Skin tissue procedure unlisted      Tonsillectomy       Family History   Problem Relation Age of Onset    Heart Disorder Father         Ablation required    Hypertension Father     Cancer Father 60        Prostate    Prostate Cancer Father     Heart Disorder Mother         murmur no treatment needed    Hypertension Mother     Stroke Maternal  Grandmother     Other (stroke) Maternal Grandmother     Dementia Paternal Grandmother     Heart Disorder Paternal Grandfather         two heart attacks and CHF    Diabetes Paternal Grandfather     Other (Diabetes, type 2) Paternal Grandfather       reports that he quit smoking about 24 years ago. His smoking use included cigarettes. He started smoking about 43 years ago. He has a 9.5 pack-year smoking history. He has never been exposed to tobacco smoke. He has never used smokeless tobacco. He reports that he does not currently use alcohol. He reports that he does not use drugs.    Allergies:  Allergies[1]    Medications:  Medications Prior to Admission   Medication Sig    sertraline 50 MG Oral Tab Take 1.5 tablets (75 mg total) by mouth daily.    Econazole Nitrate 1 % External Cream Apply 1 Application topically 2 (two) times daily.    ibuprofen 600 MG Oral Tab Take 1 tablet (600 mg total) by mouth every 6 (six) hours as needed for Pain.    amoxicillin clavulanate 875-125 MG Oral Tab Take 1 tablet by mouth 2 (two) times daily for 10 days.    clonazePAM 0.5 MG Oral Tab Take 1 tablet (0.5 mg total) by mouth in the morning, at noon, and at bedtime. (Patient taking differently: Take 1 tablet (0.5 mg total) by mouth in the morning and 1 tablet (0.5 mg total) before bedtime.)    HYDROcodone-acetaminophen 5-325 MG Oral Tab Take 1 tablet by mouth every 6 (six) hours as needed.    Tadalafil 20 MG Oral Tab Take 1 tablet (20 mg total) by mouth daily as needed for Erectile Dysfunction.    gabapentin 600 MG Oral Tab Take 1 tablet (600 mg total) by mouth 3 (three) times daily.    Verapamil HCl  MG Oral Capsule SR 24 Hr Take 1 capsule (180 mg total) by mouth once daily.    lisinopril 20 MG Oral Tab Take 1 tablet (20 mg total) by mouth daily.    Albuterol Sulfate  (90 Base) MCG/ACT Inhalation Aero Soln Inhale 2 puffs into the lungs every 4 (four) hours as needed for Wheezing or Shortness of Breath.         Current  Facility-Administered Medications:     clonazePAM (KlonoPIN) tab 0.5 mg, 0.5 mg, Oral, BID    HYDROmorphone (Dilaudid) 1 MG/ML injection 0.5 mg, 0.5 mg, Intravenous, Q30 Min PRN    sodium chloride 0.9% infusion, , Intravenous, Continuous    acetaminophen (Tylenol Extra Strength) tab 500 mg, 500 mg, Oral, Q4H PRN    melatonin tab 3 mg, 3 mg, Oral, Nightly PRN    polyethylene glycol (PEG 3350) (Miralax) 17 g oral packet 17 g, 17 g, Oral, Daily PRN    sennosides (Senokot) tab 17.2 mg, 17.2 mg, Oral, Nightly PRN    bisacodyl (Dulcolax) 10 MG rectal suppository 10 mg, 10 mg, Rectal, Daily PRN    ondansetron (Zofran) 4 MG/2ML injection 8 mg, 8 mg, Intravenous, Q6H PRN    benzonatate (Tessalon) cap 200 mg, 200 mg, Oral, TID PRN    glycerin-hypromellose- (Artificial Tears) 0.2-0.2-1 % ophthalmic solution 1 drop, 1 drop, Both Eyes, QID PRN    sodium chloride (Saline Mist) 0.65 % nasal solution 1 spray, 1 spray, Each Nare, Q3H PRN    enoxaparin (Lovenox) 40 MG/0.4ML SUBQ injection 40 mg, 40 mg, Subcutaneous, Nightly    morphINE PF 2 MG/ML injection 1 mg, 1 mg, Intravenous, Q2H PRN **OR** morphINE PF 2 MG/ML injection 2 mg, 2 mg, Intravenous, Q2H PRN **OR** morphINE PF 4 MG/ML injection 4 mg, 4 mg, Intravenous, Q2H PRN    acetaminophen (Tylenol) tab 650 mg, 650 mg, Oral, Q4H PRN **OR** HYDROcodone-acetaminophen (Norco) 5-325 MG per tab 1 tablet, 1 tablet, Oral, Q4H PRN **OR** HYDROcodone-acetaminophen (Norco) 5-325 MG per tab 2 tablet, 2 tablet, Oral, Q4H PRN    ketorolac (Toradol) 15 MG/ML injection 15 mg, 15 mg, Intravenous, Q6H PRN    albuterol (Ventolin HFA) 108 (90 Base) MCG/ACT inhaler 2 puff, 2 puff, Inhalation, Q4H PRN    amoxicillin clavulanate (Augmentin) 875-125 MG per tab 1 tablet, 1 tablet, Oral, BID    gabapentin (Neurontin) tab 600 mg, 600 mg, Oral, TID    lisinopril (Prinivil; Zestril) tab 20 mg, 20 mg, Oral, Daily    sertraline (Zoloft) tab 75 mg, 75 mg, Oral, Daily    verapamil ER (Calan-SR) tab 180  mg, 180 mg, Oral, Daily    Review of Systems:    Allergic/Immuno:  Review of patient's allergies performed.  Cardiovascular:  Negative for cool extremity and irregular heartbeat/palpitations  Constitutional:  Negative for decreased activity, fever, irritability and lethargy  Endocrine:  Negative for abnormal sleep patterns, increased activity, polydipsia and polyphagia  ENMT:  Negative for ear drainage, hearing loss and nasal drainage  Eyes:  Negative for eye discharge and vision loss  Gastrointestinal:  Positive for abdominal pain. Negative for constipation, decreased appetite, diarrhea and vomiting  Genitourinary:  Negative for dysuria and hematuria  Hema/Lymph:  Negative for easy bleeding and easy bruising  Integumentary:  Negative for pruritus and rash  Musculoskeletal:  Negative for bone/joint symptoms  Neurological:  Negative for gait disturbance  Psychiatric:  Negative for inappropriate interaction and psychiatric symptoms  Respiratory:  Negative for cough, dyspnea and wheezing     Physical Exam:  /61 (BP Location: Right arm)   Pulse 58   Temp 97.9 °F (36.6 °C) (Oral)   Resp 15   Ht 6' 3\" (1.905 m)   Wt 275 lb (124.7 kg)   SpO2 98%   BMI 34.37 kg/m²     General: Awake, Alert,   Cooperative.  No apparent distress.  HEENT: Exam is unremarkable.  Without scleral icterus.  Mucous membranes are moist. Oropharynx is clear.  Neck: Full range of motion to flexion and extension, lateral rotation and lateral flexion of cervical spine.  No JVD. Supple.   Lungs: No respiratory distress, effort normal  Abdomen:  Soft, non-distended, tender to palpation to right lower quadrant with no rebound or guarding.  No peritoneal signs. No ascites.    Extremities:  No lower extremity edema noted.  Without clubbing or cyanosis.    Skin: Normal texture and turgor.  Lymphatic:  No palpable cervical lymphadenopathy.  Neurologic: Cranial nerves are grossly intact.  Motor strength and sensory examination is grossly normal.   No focal neurologic deficit.    Laboratory Data:  Recent Labs   Lab 03/11/25  1450 03/12/25  0522   RBC 5.09 4.58   HGB 14.4 13.1   HCT 42.7 40.5   MCV 83.9 88.4   MCH 28.3 28.6   MCHC 33.7 32.3   RDW 13.8 14.1   NEPRELIM 6.17  --    WBC 9.1 8.2   .0 342.0       Recent Labs   Lab 03/11/25  1450 03/12/25  0522   * 88   BUN 9 11   CREATSERUM 0.89 0.81   CA 9.6 9.3   ALB 4.7  --     139   K 4.0 4.3    103   CO2 31.0 29.0   ALKPHO 77  --    AST 18  --    ALT 20  --    BILT 0.5  --    TP 7.0  --          No results for input(s): \"PTP\", \"INR\", \"PTT\" in the last 168 hours.      CT ABDOMEN+PELVIS(CONTRAST ONLY)(CPT=74177)    Result Date: 3/11/2025  CONCLUSION:  There has been slight interval increase in inflammatory changes centered on the expected region of the omentum within the right lower quadrant.  Again this has features most compatible with omental infarct.  There is no evidence of an abscess or drainable fluid collection.   LOCATION:  [facility]   Dictated by (CST): Radu Patel MD on 3/11/2025 at 5:11 PM     Finalized by (CST): Radu Patel MD on 3/11/2025 at 5:22 PM                Medical Decision Making         Impression:  Patient Active Problem List   Diagnosis    Anxiety    Essential hypertension    ED (erectile dysfunction)    NELLIE (obstructive sleep apnea)-mild, 8.4    Generalized anxiety disorder    Hypokalemia    Hyperglycemia    Tooth pain    Daytime hypersomnolence    Symptomatic bradycardia    Palpitations    Slow heart rate    PVC (premature ventricular contraction)    Bipolar 1 disorder (HCC)    Sigmoid diverticulitis    Acute diverticulitis    Intractable abdominal pain    Postoperative examination    Preop testing    Right lower quadrant abdominal pain    Abdominal pain, acute    Intra-abdominal infection    Epiploic appendagitis    Implantable loop recorder present    LBBB (left bundle branch block)    LVH (left ventricular hypertrophy)    MDD (major depressive disorder), recurrent  severe, without psychosis (HCC)    Dizziness    Elevated LFTs    Essential hypertension, benign    CLARIBEL (generalized anxiety disorder)    Panic disorder    PVCs (premature ventricular contractions)    Bradycardia    Abdominal pain, right lower quadrant    Omental infarction (HCC)     Abdominal pain  Omental Infarction    Plan:  The patient was seen and examined with Dr. Valladares who recommends no acute surgical intervention at this time.  GI on consult and planning for possible colonoscopy for further evaluation  Continue NPO until GI evaluation  Analgesics and antiemetics as needed  Encourage ambulation and up to chair  He does not need to continue Augmentin from a general surgery standpoint  It was discussed with the patient that he may benefit from diagnostic laparoscopy, possible omentectomy, possible exploratory laparotomy if pain persist and no etiology seen on colonoscopy.   DVT prophyalxis with lovenox    Is this a shared or split note between Advanced Practice Provider and Physician? Yes       The patient was independently interviewed and examined by myself.  More than 50% of clinical time and 100% MDM was performed by myself.   I personally performed the services described in this documentation and I agree with the assessment and plan as set forth above and discussed with the physician assistant.       59-year-old gentleman who is well-known to this service from prior consultation for abdominal pain.  The patient does have a past surgical history of exploratory laparotomy, Zaid procedure with low anterior resection and development colostomy in May 2024 performed by Dr. Novoa at [facility].  He subsequently underwent colostomy reversal in July 2024 also by Dr. Novoa.    The patient reports that since January of this year he has had intermittent abdominal pain.  The patient has had 4 CAT scans of the abdomen and pelvis since January 17 of 2025.  The initial scan was negative for inflammatory changes.  The  last 3 scans reveal progressively worsening inflammatory changes in the omental fat just lateral to the mid ascending colon which was thought to be epiploic appendagitis versus omental infarct.    The patient was previously admitted from February 9 to February 11, 2025 for management omental infarct.  The patient was discharged on a course of oral antibiotics.  He did follow-up with Dr. Clemens on March 5, 2025 at which time he was started on a course of Augmentin and referred to GI service.  The plan was for the patient to be seen by GI service for colonoscopy.  The patient was seen by Dr. Bacon in the office yesterday and was referred to the emergency department for worsening abdominal pain.    Past medical-surgical history-hypertension, NELLIE, BMI of 34.3, PVCs, bipolar disorder, sigmoid diverticulitis status post Zaid procedure and colostomy reversal in 2024, left bundle branch block, laparoscopic cholecystectomy 2023.  The patient is not anticoagulated    Workup in the ED revealed normal CBC with WBC 9.1, hemoglobin 14.4.  CMP was also within normal limits.  CT scan revealed interval increase in the inflammatory changes centered on the omentum in the right lower quadrant.  Findings consistent with omental infarct.    On physical examination, the patient's abdomen is soft, nondistended, focally tender to deep palpation in the right lower quadrant.  Less tenderness is noted in the right mid quadrant.  There is no evidence of guarding, rebound or percussion tenderness.    Treatment options were reviewed in detail with the patient.  We did discuss that GI evaluation is pending and they will likely recommend colonoscopy which would require bowel prep.  We also discussed the role of surgery including diagnostic laparoscopy, possible laparotomy with omentectomy.  We discussed that this may or may not result in resolution of the patient's symptoms.  At this time the patient does wish to proceed with colonoscopy.   GI is on consultation and will be seeing patient later today.  Mr. Cabral has no further questions at this time and will proceed as discussed.    DG Valladares MD, FACS    Please note that this report has been produced using speech recognition software and may contain errors related to that system including but not limited to errors in grammar, punctuation and spelling as well as words and phrases that possibly may have been recognized inappropriately.  If there are any questions or concerns please contact the dictating provider for clarification.  The 21st Century Cures Act makes medical notes like these available to patients in the interest of trans parency. Please be advised this is a medical document. Medical documents are intended to carry relevant information, facts as evident, and the clinical opinion of the practitioner. The medical note is intended as peer to peer communication and may appear blunt or direct. It is written in medical language and may contain abbreviations or verbiage that are unfamiliar.  If there are any questions or concerns please contact the dictating provider for clarification.         [1]   Allergies  Allergen Reactions    Ciprofloxacin OTHER (SEE COMMENTS)     Increases risk for arrhythmia

## 2025-03-12 NOTE — PROGRESS NOTES
NURSING ADMISSION NOTE      Patient admitted via Cart around 2000.    Skin check completed with PAOLA Lynne. No abnormalities noted aside from old \"biopsy site\" to right upper arm.     Patient is alert and oriented x 4.   Vital signs stable. Afebrile. On room air with . NELLIE-CPAP at night. SB/NSR on telemetry.  On clear liquid diet, tolerating well.   Denies nausea or vomiting.   Patient c/o abdominal pain RLQ, analgesics given as ordered, with good relief.   Ambulating independently.  Plan of care discussed with patient; no further questions at this time.    Oriented to room. Safety precautions initiated.  Bed in low position. Call light in reach.

## 2025-03-12 NOTE — PLAN OF CARE
Pt c/o abd pain at 3 better after Toradol. Pt stated he is trying to stay away from narcotics. Abd soft with active bowel sounds; pt denies nausea; reports passing gas; no BM since before admission yesterday afternoon. Pt tolerating sips of CL. Pt declined Augmentin because not able to take with food. Ricco SHEPHERD and Dr Alhaji morgan.

## 2025-03-12 NOTE — ANESTHESIA PREPROCEDURE EVALUATION
PRE-OP EVALUATION    Patient Name: Don Cabral    Admit Diagnosis: Abdominal pain, right lower quadrant [R10.31]  Omental infarction (HCC) [K55.069]    Pre-op Diagnosis: INPATIENT    COLONOSCOPY    Anesthesia Procedure: COLONOSCOPY    Surgeons and Role:     * Job Bacon,  - Khloe    Pre-op vitals reviewed.  Temp: 98.5 °F (36.9 °C)  Pulse: 66  Resp: 18  BP: 122/65  SpO2: 93 %  Body mass index is 34.37 kg/m².    Current medications reviewed.  Hospital Medications:   clonazePAM (KlonoPIN) tab 0.5 mg  0.5 mg Oral BID    polyethylene glycol-electrolyte (Golytely) 236 g oral solution 4,000 mL  4,000 mL Oral Once    [COMPLETED] ketorolac (Toradol) 15 MG/ML injection 15 mg  15 mg Intravenous Once    HYDROmorphone (Dilaudid) 1 MG/ML injection 0.5 mg  0.5 mg Intravenous Q30 Min PRN    [] sodium chloride 0.9% infusion   Intravenous Continuous    [] HYDROmorphone (Dilaudid) 1 MG/ML injection 0.5 mg  0.5 mg Intravenous Q30 Min PRN    [] ondansetron (Zofran) 4 MG/2ML injection 4 mg  4 mg Intravenous Q4H PRN    sodium chloride 0.9% infusion   Intravenous Continuous    acetaminophen (Tylenol Extra Strength) tab 500 mg  500 mg Oral Q4H PRN    melatonin tab 3 mg  3 mg Oral Nightly PRN    polyethylene glycol (PEG 3350) (Miralax) 17 g oral packet 17 g  17 g Oral Daily PRN    sennosides (Senokot) tab 17.2 mg  17.2 mg Oral Nightly PRN    bisacodyl (Dulcolax) 10 MG rectal suppository 10 mg  10 mg Rectal Daily PRN    ondansetron (Zofran) 4 MG/2ML injection 8 mg  8 mg Intravenous Q6H PRN    benzonatate (Tessalon) cap 200 mg  200 mg Oral TID PRN    glycerin-hypromellose- (Artificial Tears) 0.2-0.2-1 % ophthalmic solution 1 drop  1 drop Both Eyes QID PRN    sodium chloride (Saline Mist) 0.65 % nasal solution 1 spray  1 spray Each Nare Q3H PRN    enoxaparin (Lovenox) 40 MG/0.4ML SUBQ injection 40 mg  40 mg Subcutaneous Nightly    morphINE PF 2 MG/ML injection 1 mg  1 mg Intravenous Q2H PRN    Or     morphINE PF 2 MG/ML injection 2 mg  2 mg Intravenous Q2H PRN    Or    morphINE PF 4 MG/ML injection 4 mg  4 mg Intravenous Q2H PRN    acetaminophen (Tylenol) tab 650 mg  650 mg Oral Q4H PRN    Or    HYDROcodone-acetaminophen (Norco) 5-325 MG per tab 1 tablet  1 tablet Oral Q4H PRN    Or    HYDROcodone-acetaminophen (Norco) 5-325 MG per tab 2 tablet  2 tablet Oral Q4H PRN    ketorolac (Toradol) 15 MG/ML injection 15 mg  15 mg Intravenous Q6H PRN    [COMPLETED] iopamidol 76% (ISOVUE-370) injection for power injector  100 mL Intravenous ONCE PRN    [COMPLETED] aspirin chewable tab 324 mg  324 mg Oral Once    albuterol (Ventolin HFA) 108 (90 Base) MCG/ACT inhaler 2 puff  2 puff Inhalation Q4H PRN    amoxicillin clavulanate (Augmentin) 875-125 MG per tab 1 tablet  1 tablet Oral BID    gabapentin (Neurontin) tab 600 mg  600 mg Oral TID    lisinopril (Prinivil; Zestril) tab 20 mg  20 mg Oral Daily    sertraline (Zoloft) tab 75 mg  75 mg Oral Daily    verapamil ER (Calan-SR) tab 180 mg  180 mg Oral Daily       Outpatient Medications:   Prescriptions Prior to Admission[1]    Allergies: Ciprofloxacin      Anesthesia Evaluation    Patient summary reviewed.    Anesthetic Complications  (-) history of anesthetic complications         GI/Hepatic/Renal                                 Cardiovascular      ECG reviewed.      MET: >4    (+) obesity  (+) hypertension and well controlled  (+) hyperlipidemia               (+) dysrhythmias and PVC                  Endo/Other                                  Pulmonary               (+) shortness of breath     (+) sleep apnea       Neuro/Psych      (+) depression  (+) anxiety             (+) psychiatric history                 Past Surgical History:   Procedure Laterality Date    Cholecystectomy  3/12/2023    Colonoscopy N/A 03/22/2016    [facility].  Hyperplastic polyp only.  Repeat 2026    Colonoscopy  05/10/2023    Dental surgery procedure      [place] teeth removed    Incision/drain  abscess extra  1996    Knee arthroscopy Right     Laparoscopic cholecystectomy  2023    Myringoplasty      Other surgical history      R knee arthroscope    Other surgical history  2024    Exploratory laparotomy partial colectomy with colorectal anastamosis, extensive adhesiolysis    Part removal colon w end colostomy      Skin tissue procedure unlisted      Tonsillectomy       Social History     Socioeconomic History    Marital status:    Occupational History    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[employer]   Tobacco Use    Smoking status: Former     Current packs/day: 0.00     Average packs/day: 0.5 packs/day for 19.0 years (9.5 ttl pk-yrs)     Types: Cigarettes     Start date: 1981     Quit date: 2000     Years since quittin.7     Passive exposure: Never    Smokeless tobacco: Never   Vaping Use    Vaping status: Never Used   Substance and Sexual Activity    Alcohol use: Not Currently     Comment: Stopped alcohol 2000    Drug use: No    Sexual activity: Yes     Partners: Female   Other Topics Concern    Caffeine Concern Yes     Comment: coffee in AM/ soda     Exercise Yes     Comment: X1-2 times per week      History   Drug Use No     Available pre-op labs reviewed.  Lab Results   Component Value Date    WBC 8.2 2025    RBC 4.58 2025    HGB 13.1 2025    HCT 40.5 2025    MCV 88.4 2025    MCH 28.6 2025    MCHC 32.3 2025    RDW 14.1 2025    .0 2025     Lab Results   Component Value Date     2025    K 4.3 2025     2025    CO2 29.0 2025    BUN 11 2025    CREATSERUM 0.81 2025    GLU 88 2025    CA 9.3 2025            Airway      Mallampati: II  Mouth opening: 3 FB  TM distance: 4 - 6 cm  Neck ROM: full Cardiovascular      Rhythm: regular  Rate: normal     Dental  Comment: No loose teeth reported by patient           Pulmonary      Breath sounds clear to auscultation  bilaterally.               Other findings              ASA: 3   Plan: MAC  NPO status verified and patient meets guidelines.        Comment: Chart review  Plan/risks discussed with: patient                Present on Admission:  **None**             [1]   Medications Prior to Admission   Medication Sig Dispense Refill Last Dose/Taking    sertraline 50 MG Oral Tab Take 1.5 tablets (75 mg total) by mouth daily.   3/11/2025 at  1:00 PM    Econazole Nitrate 1 % External Cream Apply 1 Application topically 2 (two) times daily. 85 g 2 3/11/2025 at  1:00 PM    ibuprofen 600 MG Oral Tab Take 1 tablet (600 mg total) by mouth every 6 (six) hours as needed for Pain.   3/11/2025 at 11:00 AM    amoxicillin clavulanate 875-125 MG Oral Tab Take 1 tablet by mouth 2 (two) times daily for 10 days. 20 tablet 0 3/11/2025 at  7:00 AM    clonazePAM 0.5 MG Oral Tab Take 1 tablet (0.5 mg total) by mouth in the morning, at noon, and at bedtime. (Patient taking differently: Take 1 tablet (0.5 mg total) by mouth in the morning and 1 tablet (0.5 mg total) before bedtime.)   3/11/2025    HYDROcodone-acetaminophen 5-325 MG Oral Tab Take 1 tablet by mouth every 6 (six) hours as needed. 15 tablet 0 3/11/2025 at 11:00 AM    Tadalafil 20 MG Oral Tab Take 1 tablet (20 mg total) by mouth daily as needed for Erectile Dysfunction. 12 tablet 1 Past Month    gabapentin 600 MG Oral Tab Take 1 tablet (600 mg total) by mouth 3 (three) times daily. 270 tablet 0 3/11/2025 at  1:00 PM    Verapamil HCl  MG Oral Capsule SR 24 Hr Take 1 capsule (180 mg total) by mouth once daily.   3/11/2025 at  1:00 PM    lisinopril 20 MG Oral Tab Take 1 tablet (20 mg total) by mouth daily. 90 tablet 3 3/11/2025 at  7:00 AM    Albuterol Sulfate  (90 Base) MCG/ACT Inhalation Aero Soln Inhale 2 puffs into the lungs every 4 (four) hours as needed for Wheezing or Shortness of Breath. 1 each 3

## 2025-03-12 NOTE — PROGRESS NOTES
[facility]   part of [facility]     Hospitalist Progress Note     Don Cabral Patient Status:  Observation    1965 MRN [id number]   Location [facility] 3NW-A Attending Avery Cardenas MD   Hosp Day # 0 PCP Stephen Christianson MD     Chief Complaint: Right lower quadrant abdominal pain    Subjective:     Patient complains of persistent right lower quadrant abdominal pain 6-7 out of 10 pain.  More pain on palpation according patient.  No nausea vomiting.  No constipation or diarrhea.  Afebrile in hospital.      Objective:    Review of Systems:   A comprehensive review of systems was completed; pertinent positive and negatives stated in subjective.    Vital signs:  Temp:  [97 °F (36.1 °C)-98.2 °F (36.8 °C)] 97.9 °F (36.6 °C)  Pulse:  [51-76] 58  Resp:  [13-20] 15  BP: ()/(48-81) 118/61  SpO2:  [97 %-100 %] 98 %    Physical Exam:    /61 (BP Location: Right arm)   Pulse 58   Temp 97.9 °F (36.6 °C) (Oral)   Resp 15   Ht 6' 3\" (1.905 m)   Wt 275 lb (124.7 kg)   SpO2 98%   BMI 34.37 kg/m²     General: No acute distress  Respiratory: No wheezes, no rhonchi  Cardiovascular: S1, S2, regular rate and rhythm  Abdomen: Soft, right-sided tenderness more in the right lower quadrant, non-distended, positive bowel sounds  Neuro: No new focal deficits.   Extremities: No edema      Diagnostic Data:    Labs:  Recent Labs   Lab 25  1450 25  0522   WBC 9.1 8.2   HGB 14.4 13.1   MCV 83.9 88.4   .0 342.0       Recent Labs   Lab 25  1450 25  0522   * 88   BUN 9 11   CREATSERUM 0.89 0.81   CA 9.6 9.3   ALB 4.7  --     139   K 4.0 4.3    103   CO2 31.0 29.0   ALKPHO 77  --    AST 18  --    ALT 20  --    BILT 0.5  --    TP 7.0  --        Estimated Glomerular Filtration Rate: 102 mL/min/1.73m2 (result from lab).        Microbiology    No results found for this visit on 25.      Imaging: Reviewed in Epic.  CT abdomen and pelvis done on 3/11/2025 on  admission shows as below  FINDINGS:    LIVER:  Stable appearance of a hyperenhancing lesion along the posterior aspect of right hepatic lobe measuring up to 2.9 x 2.8 cm.  This has features compatible with a hemangioma.  BILIARY:  Postsurgical changes of cholecystectomy noted.  PANCREAS:  No lesion, fluid collection, ductal dilatation, or atrophy.    SPLEEN:  No enlargement or focal lesion.    KIDNEYS:  No mass, obstruction, or calcification.    ADRENALS:  No mass or enlargement.    AORTA/VASCULAR:  No aneurysm or dissection.    RETROPERITONEUM:  No mass or adenopathy.    BOWEL/MESENTERY:  No dilated loops of small bowel are seen.  Portions of the bowel are decompressed, limiting assessment.  There has been slight interval increase in prominence of inflammatory changes noted of the right lower quadrant centered on the  expected region of the omentum (series 4, image 73 and image 69).  This is somewhat difficult to measure given its irregular configuration but measures in the range of approximately 5.8 x 2.1 cm compared with the prior study where it was measured at 6.4  x 1.2 cm.  Lack of oral contrast somewhat limits assessment of the bowel.  There are postsurgical changes.  The appendix is not clearly identified on this exam.  ABDOMINAL WALL:  No mass or hernia.    URINARY BLADDER:  No visible focal wall thickening, lesion, or calculus.    PELVIC NODES:  No adenopathy.    PELVIC ORGANS:  No visible mass.  Pelvic organs appropriate for patient age.    BONES:  No bony lesion or fracture.    LUNG BASES:  No visible pulmonary or pleural disease.    OTHER:  Negative.           Impression   CONCLUSION:  There has been slight interval increase in inflammatory changes centered on the expected region of the omentum within the right lower quadrant.  Again this has features most compatible with omental infarct.  There is no evidence of an  abscess or drainable fluid collection.       Medications:    clonazePAM  0.5 mg Oral  BID    enoxaparin  40 mg Subcutaneous Nightly    amoxicillin clavulanate  1 tablet Oral BID    gabapentin  600 mg Oral TID    lisinopril  20 mg Oral Daily    sertraline  75 mg Oral Daily    verapamil ER  180 mg Oral Daily       Assessment & Plan:      # Intractable recurrent right lower quad abdominal pain with inflammatory changes in the omentum of the right lower quadrant, suspected omental infarct; possible epiploic appendagitis   -GI and surgery on consult  -Pain management  -IV fluids  -Currently n.p.o.  -Stool studies including stool C. difficile and GI stool PCR and fecal calprotectin pending at this time, patient has not had a bowel movement since admission.  -Patient recently admitted from 2/9 to 2/11/2025 for similar symptoms and status post course of antibiotics, anti-inflammatories including Medrol Dosepak at that time and improved transiently at that time according to patient and then symptoms recurred and has been constant according to patient.  White count normal  Patient states he had prior colonoscopy done about 2 years back and had polyps removed at that time.  With recurrent right lower quadrant abdominal pain with inflammation omentum, will also check lactic acid level and CEA.    # History of prior diverticulitis with history of partial colectomy and colorectal anastomosis and colostomy bag at that time and then reversal of colostomy according to patient.    # History of arrhythmia  # History of PVCs and intermittent left bundle branch block  -Currently in sinus rhythm  -On epic chart review, patient has seen cardiology Dr. Mars  as outpatient and patient on verapamil at home per cardiology  -Will check EKG  -Will also have cardiology on consult with current suspected mesenteric infarct seen in CT    # Hypertension  -Continue home medication including verapamil and lisinopril  -Follow blood pressure      #Depression, anxiety  -Continue home sertraline    Discussed with patient, JERSON Keen  MD Alhaji    Supplementary Documentation:     Quality:  DVT Mechanical Prophylaxis:   SCDs,    DVT Pharmacologic Prophylaxis   Medication    enoxaparin (Lovenox) 40 MG/0.4ML SUBQ injection 40 mg                Code Status: Full Code  Vance: No urinary catheter in place  Vance Duration (in days):   Central line:    NILS:     Discharge is dependent on: Clinical progress, surgery and GI recommendations  At this point Mr. Cabral is expected to be discharge to: Home    The 21st Century Cures Act makes medical notes like these available to patients in the interest of transparency. Please be advised this is a medical document. Medical documents are intended to carry relevant information, facts as evident, and the clinical opinion of the practitioner. The medical note is intended as peer to peer communication and may appear blunt or direct. It is written in medical language and may contain abbreviations or verbiage that are unfamiliar.

## 2025-03-13 ENCOUNTER — ANESTHESIA (OUTPATIENT)
Dept: ENDOSCOPY | Facility: HOSPITAL | Age: 60
End: 2025-03-13
Payer: COMMERCIAL

## 2025-03-13 LAB
ANION GAP SERPL CALC-SCNC: 10 MMOL/L (ref 0–18)
BASOPHILS # BLD AUTO: 0 X10(3) UL (ref 0–0.2)
BASOPHILS NFR BLD AUTO: 0 %
BUN BLD-MCNC: 14 MG/DL (ref 9–23)
CALCIUM BLD-MCNC: 9.3 MG/DL (ref 8.7–10.6)
CALPROTECTIN STL-MCNT: 12.7 ΜG/G (ref ?–50)
CHLORIDE SERPL-SCNC: 102 MMOL/L (ref 98–112)
CO2 SERPL-SCNC: 28 MMOL/L (ref 21–32)
CREAT BLD-MCNC: 0.84 MG/DL
EGFRCR SERPLBLD CKD-EPI 2021: 101 ML/MIN/1.73M2 (ref 60–?)
EOSINOPHIL # BLD AUTO: 0.13 X10(3) UL (ref 0–0.7)
EOSINOPHIL NFR BLD AUTO: 1.7 %
ERYTHROCYTE [DISTWIDTH] IN BLOOD BY AUTOMATED COUNT: 13.9 %
GLUCOSE BLD-MCNC: 80 MG/DL (ref 70–99)
HCT VFR BLD AUTO: 38.3 %
HGB BLD-MCNC: 12.9 G/DL
IMM GRANULOCYTES # BLD AUTO: 0.03 X10(3) UL (ref 0–1)
IMM GRANULOCYTES NFR BLD: 0.4 %
LYMPHOCYTES # BLD AUTO: 1.97 X10(3) UL (ref 1–4)
LYMPHOCYTES NFR BLD AUTO: 25.7 %
MAGNESIUM SERPL-MCNC: 1.9 MG/DL (ref 1.6–2.6)
MCH RBC QN AUTO: 28.4 PG (ref 26–34)
MCHC RBC AUTO-ENTMCNC: 33.7 G/DL (ref 31–37)
MCV RBC AUTO: 84.2 FL
MONOCYTES # BLD AUTO: 0.54 X10(3) UL (ref 0.1–1)
MONOCYTES NFR BLD AUTO: 7 %
NEUTROPHILS # BLD AUTO: 5 X10 (3) UL (ref 1.5–7.7)
NEUTROPHILS # BLD AUTO: 5 X10(3) UL (ref 1.5–7.7)
NEUTROPHILS NFR BLD AUTO: 65.2 %
OSMOLALITY SERPL CALC.SUM OF ELEC: 289 MOSM/KG (ref 275–295)
PLATELET # BLD AUTO: 379 10(3)UL (ref 150–450)
POTASSIUM SERPL-SCNC: 4 MMOL/L (ref 3.5–5.1)
RBC # BLD AUTO: 4.55 X10(6)UL
SODIUM SERPL-SCNC: 140 MMOL/L (ref 136–145)
WBC # BLD AUTO: 7.7 X10(3) UL (ref 4–11)

## 2025-03-13 PROCEDURE — 0DBG8ZX EXCISION OF LEFT LARGE INTESTINE, VIA NATURAL OR ARTIFICIAL OPENING ENDOSCOPIC, DIAGNOSTIC: ICD-10-PCS | Performed by: STUDENT IN AN ORGANIZED HEALTH CARE EDUCATION/TRAINING PROGRAM

## 2025-03-13 PROCEDURE — 0DBK8ZX EXCISION OF ASCENDING COLON, VIA NATURAL OR ARTIFICIAL OPENING ENDOSCOPIC, DIAGNOSTIC: ICD-10-PCS | Performed by: STUDENT IN AN ORGANIZED HEALTH CARE EDUCATION/TRAINING PROGRAM

## 2025-03-13 PROCEDURE — 0DBF8ZX EXCISION OF RIGHT LARGE INTESTINE, VIA NATURAL OR ARTIFICIAL OPENING ENDOSCOPIC, DIAGNOSTIC: ICD-10-PCS | Performed by: STUDENT IN AN ORGANIZED HEALTH CARE EDUCATION/TRAINING PROGRAM

## 2025-03-13 PROCEDURE — 0DBH8ZX EXCISION OF CECUM, VIA NATURAL OR ARTIFICIAL OPENING ENDOSCOPIC, DIAGNOSTIC: ICD-10-PCS | Performed by: STUDENT IN AN ORGANIZED HEALTH CARE EDUCATION/TRAINING PROGRAM

## 2025-03-13 PROCEDURE — 0DBP8ZX EXCISION OF RECTUM, VIA NATURAL OR ARTIFICIAL OPENING ENDOSCOPIC, DIAGNOSTIC: ICD-10-PCS | Performed by: STUDENT IN AN ORGANIZED HEALTH CARE EDUCATION/TRAINING PROGRAM

## 2025-03-13 PROCEDURE — 99232 SBSQ HOSP IP/OBS MODERATE 35: CPT | Performed by: INTERNAL MEDICINE

## 2025-03-13 PROCEDURE — 99232 SBSQ HOSP IP/OBS MODERATE 35: CPT | Performed by: SURGERY

## 2025-03-13 RX ORDER — SODIUM CHLORIDE, SODIUM LACTATE, POTASSIUM CHLORIDE, CALCIUM CHLORIDE 600; 310; 30; 20 MG/100ML; MG/100ML; MG/100ML; MG/100ML
INJECTION, SOLUTION INTRAVENOUS CONTINUOUS
Status: DISCONTINUED | OUTPATIENT
Start: 2025-03-13 | End: 2025-03-14

## 2025-03-13 RX ORDER — NALOXONE HYDROCHLORIDE 0.4 MG/ML
80 INJECTION, SOLUTION INTRAMUSCULAR; INTRAVENOUS; SUBCUTANEOUS AS NEEDED
Status: ACTIVE | OUTPATIENT
Start: 2025-03-13 | End: 2025-03-13

## 2025-03-13 RX ORDER — LIDOCAINE HYDROCHLORIDE 10 MG/ML
INJECTION, SOLUTION EPIDURAL; INFILTRATION; INTRACAUDAL; PERINEURAL AS NEEDED
Status: DISCONTINUED | OUTPATIENT
Start: 2025-03-13 | End: 2025-03-13 | Stop reason: SURG

## 2025-03-13 RX ADMIN — LIDOCAINE HYDROCHLORIDE 50 MG: 10 INJECTION, SOLUTION EPIDURAL; INFILTRATION; INTRACAUDAL; PERINEURAL at 11:23:00

## 2025-03-13 NOTE — PROGRESS NOTES
[facility]   part of [facility]     Hospitalist Progress Note     Don Cabral Patient Status:  Observation    1965 MRN [id number]   Location [facility] 3NW-A Attending Avery Cardenas MD   Hosp Day # 0 PCP Stephen Christianson MD     Chief Complaint: Right lower quadrant abdominal pain    Subjective:   Status post colonoscopy today  Patient complains of persistent right lower quadrant abdominal pain 6-7 out of 10 pain.  More pain on palpation according patient.  No nausea vomiting.  No constipation or diarrhea.  Afebrile in hospital.      Objective:    Review of Systems:   A comprehensive review of systems was completed; pertinent positive and negatives stated in subjective.    Vital signs:  Temp:  [97.9 °F (36.6 °C)-98.6 °F (37 °C)] 97.9 °F (36.6 °C)  Pulse:  [57-88] 80  Resp:  [16-20] 18  BP: (101-151)/(48-82) 101/56  SpO2:  [85 %-100 %] 94 %    Physical Exam:    /56 (BP Location: Right arm)   Pulse 80   Temp 97.9 °F (36.6 °C) (Oral)   Resp 18   Ht 6' 3\" (1.905 m)   Wt 275 lb (124.7 kg)   SpO2 94%   BMI 34.37 kg/m²     General: No acute distress  Respiratory: No wheezes, no rhonchi  Cardiovascular: S1, S2, regular rate and rhythm  Abdomen: Soft, right-sided tenderness more in the right lower quadrant, non-distended, positive bowel sounds  Neuro: No new focal deficits.   Extremities: No edema      Diagnostic Data:    Labs:  Recent Labs   Lab 25  1450 25  0522 25  0455   WBC 9.1 8.2 7.7   HGB 14.4 13.1 12.9*   MCV 83.9 88.4 84.2   .0 342.0 379.0       Recent Labs   Lab 25  1450 25  0522 25  0455   * 88 80   BUN 9 11 14   CREATSERUM 0.89 0.81 0.84   CA 9.6 9.3 9.3   ALB 4.7  --   --     139 140   K 4.0 4.3 4.0    103 102   CO2 31.0 29.0 28.0   ALKPHO 77  --   --    AST 18  --   --    ALT 20  --   --    BILT 0.5  --   --    TP 7.0  --   --        Estimated Glomerular Filtration Rate: 101 mL/min/1.73m2 (result from  lab).        Microbiology    No results found for this visit on 03/11/25.      Imaging: Reviewed in Epic.  CT abdomen and pelvis done on 3/11/2025 on admission shows as below  FINDINGS:    LIVER:  Stable appearance of a hyperenhancing lesion along the posterior aspect of right hepatic lobe measuring up to 2.9 x 2.8 cm.  This has features compatible with a hemangioma.  BILIARY:  Postsurgical changes of cholecystectomy noted.  PANCREAS:  No lesion, fluid collection, ductal dilatation, or atrophy.    SPLEEN:  No enlargement or focal lesion.    KIDNEYS:  No mass, obstruction, or calcification.    ADRENALS:  No mass or enlargement.    AORTA/VASCULAR:  No aneurysm or dissection.    RETROPERITONEUM:  No mass or adenopathy.    BOWEL/MESENTERY:  No dilated loops of small bowel are seen.  Portions of the bowel are decompressed, limiting assessment.  There has been slight interval increase in prominence of inflammatory changes noted of the right lower quadrant centered on the  expected region of the omentum (series 4, image 73 and image 69).  This is somewhat difficult to measure given its irregular configuration but measures in the range of approximately 5.8 x 2.1 cm compared with the prior study where it was measured at 6.4  x 1.2 cm.  Lack of oral contrast somewhat limits assessment of the bowel.  There are postsurgical changes.  The appendix is not clearly identified on this exam.  ABDOMINAL WALL:  No mass or hernia.    URINARY BLADDER:  No visible focal wall thickening, lesion, or calculus.    PELVIC NODES:  No adenopathy.    PELVIC ORGANS:  No visible mass.  Pelvic organs appropriate for patient age.    BONES:  No bony lesion or fracture.    LUNG BASES:  No visible pulmonary or pleural disease.    OTHER:  Negative.           Impression   CONCLUSION:  There has been slight interval increase in inflammatory changes centered on the expected region of the omentum within the right lower quadrant.  Again this has features most  compatible with omental infarct.  There is no evidence of an  abscess or drainable fluid collection.       Medications:    clonazePAM  0.5 mg Oral BID    enoxaparin  40 mg Subcutaneous Nightly    gabapentin  600 mg Oral TID    lisinopril  20 mg Oral Daily    sertraline  75 mg Oral Daily    verapamil ER  180 mg Oral Daily       Assessment & Plan:      # Intractable recurrent right lower quad abdominal pain with inflammatory changes in the omentum of the right lower quadrant, suspected omental infarct; possible epiploic appendagitis   -GI and surgery on consult  -Pain management  -IV fluids  -Currently n.p.o.  -Stool studies including stool C. difficile and GI stool PCR and fecal calprotectin pending at this time, patient has not had a bowel movement since admission.  -Patient recently admitted from 2/9 to 2/11/2025 for similar symptoms and status post course of antibiotics, anti-inflammatories including Medrol Dosepak at that time and improved transiently at that time according to patient and then symptoms recurred and has been constant according to patient.  White count normal  Patient states he had prior colonoscopy done about 2 years back and had polyps removed at that time.  With recurrent right lower quadrant abdominal pain with inflammation omentum, checked lactic acid level and CEA and both came back normal.  Status post colonoscopy by GI Dr. Bacon on 3/13/2025 which showed pandiverticulosis, mid colitis in the cecum, 2 mm ascending colon polyp which was removed, hemorrhoids-colonoscopy biopsy results pending    # History of prior diverticulitis with history of partial colectomy and colorectal anastomosis and colostomy bag at that time and then reversal of colostomy according to patient.    # History of arrhythmia  # History of PVCs and intermittent left bundle branch block  -Currently in sinus rhythm  -On epic chart review, patient has seen cardiology Dr. Mars  as outpatient and patient on verapamil at  home per cardiology  -Will check EKG  -Will also have cardiology on consult with current suspected mesenteric infarct seen in CT    # Hypertension  -Continue home medication including verapamil and lisinopril  -Follow blood pressure      #Depression, anxiety  -Continue home sertraline    Discussed with patient, RN    Leonila Mane MD    Supplementary Documentation:     Quality:  DVT Mechanical Prophylaxis:   SCDs,    DVT Pharmacologic Prophylaxis   Medication    enoxaparin (Lovenox) 40 MG/0.4ML SUBQ injection 40 mg                Code Status: Full Code  Vance: No urinary catheter in place  Vance Duration (in days):   Central line:    NILS:     Discharge is dependent on: Clinical progress, surgery and GI recommendations  At this point Mr. Cabral is expected to be discharge to: Home    The 21st Century Cures Act makes medical notes like these available to patients in the interest of transparency. Please be advised this is a medical document. Medical documents are intended to carry relevant information, facts as evident, and the clinical opinion of the practitioner. The medical note is intended as peer to peer communication and may appear blunt or direct. It is written in medical language and may contain abbreviations or verbiage that are unfamiliar.

## 2025-03-13 NOTE — PROGRESS NOTES
[facility]  Progress Note    Don Cabral Patient Status:  Observation    1965 MRN [id number]   Location [facility] 3NW-A Attending Avery Cardenas MD   Hosp Day # 0 PCP Stephen Christianson MD     Subjective:  He is seen and examined resting in bed. No acute events overnight. He reports his abdominal pain has improvement since admission. He completed bowel prep last evening for colonoscopy today.     Objective/Physical Exam:  /64 (BP Location: Right arm)   Pulse 72   Temp 97.9 °F (36.6 °C) (Oral)   Resp 18   Ht 6' 3\" (1.905 m)   Wt 275 lb (124.7 kg)   SpO2 96%   BMI 34.37 kg/m²     Intake/Output Summary (Last 24 hours) at 3/13/2025 1019  Last data filed at 3/12/2025 2200  Gross per 24 hour   Intake 1408 ml   Output 375 ml   Net 1033 ml         General: Awake, Alert,  Cooperative.  No apparent distress.  HEENT: Normocephalic, atraumatic. No scleral icterus  Pulm: no respiratory distress, no increased work of breathing  Abdomen:  Soft, mildly distended,tender to palpation in right lower quadrant, with no rebound or guarding.  No peritoneal signs. Well healed midline and previous ostomy incision  Skin: Warm, dry. No jaundice.     Labs:  Lab Results   Component Value Date    WBC 7.7 2025    HGB 12.9 2025    HCT 38.3 2025    .0 2025      Lab Results   Component Value Date    INR 1.01 2023    INR 1.23 (H) 2023     Lab Results   Component Value Date     2025    K 4.0 2025     2025    CO2 28.0 2025    BUN 14 2025    CREATSERUM 0.84 2025    GLU 80 2025    CA 9.3 2025            Assessment:  Patient Active Problem List   Diagnosis    Anxiety    Essential hypertension    ED (erectile dysfunction)    NELILE (obstructive sleep apnea)-mild, 8.4    Generalized anxiety disorder    Hypokalemia    Hyperglycemia    Tooth pain    Daytime hypersomnolence    Symptomatic bradycardia    Palpitations    Slow  heart rate    PVC (premature ventricular contraction)    Bipolar 1 disorder (HCC)    Sigmoid diverticulitis    Acute diverticulitis    Intractable abdominal pain    Postoperative examination    Preop testing    Right lower quadrant abdominal pain    Abdominal pain, acute    Intra-abdominal infection    Epiploic appendagitis    Implantable loop recorder present    LBBB (left bundle branch block)    LVH (left ventricular hypertrophy)    MDD (major depressive disorder), recurrent severe, without psychosis (HCC)    Dizziness    Elevated LFTs    Essential hypertension, benign    CLARIBEL (generalized anxiety disorder)    Panic disorder    PVCs (premature ventricular contractions)    Bradycardia    Abdominal pain, right lower quadrant    Omental infarction (HCC)    Depression with anxiety    History of cardiac arrhythmia    Primary hypertension     Right lower quadrant Abdominal pain  Omental infarct     Plan:  Plan for colonoscopy today as per GI; will follow findings  The patient reports if he were to require surgical intervention, he requests that Dr. Clemens complete the procedure if available as the patient has seen him as an outpatient. I will relay the request to Dr. Valladares.   Analgesics and antiemetics as needed  Encourage ambulation and up to chair  Medical management per primary   DVT prophylaxis with lovenox   Latanya Richardson PA-C  3/13/2025  10:19 AM    Patient with chronic right lower quadrant abdominal pain with CT scan positive for omental infarct.  Plan for colonoscopy later today per GI service.  If colonoscopy is negative, would consider omentectomy for symptomatic omental infarct.  We did discuss that surgery may not result in resolution of the patient's symptoms.  Patient does request that if surgery is indicated, Dr. Clemens is the operating surgeon as he has seen him in the office in the past.  Await endoscopy results  Patient has no further questions at this time.    I agree with the note above and  attest to its accuracy with the following changes below after my interview and examination of the patient    The patient was seen and examined.  Available labs and radiology is noted.    Monie Valladares MD FACS    Please note that this report has been produced using speech recognition software and may contain errors related to that system including but not limited to errors in grammar, punctuation and spelling as well as words and phrases that possibly may have been recognized inappropriately.  If there are any questions or concerns please contact the dictating provider for clarification.    The 21st Century Cures Act makes medical notes like these available to patients in the interest of trans parency. Please be advised this is a medical document. Medical documents are intended to carry relevant information, facts as evident, and the clinical opinion of the practitioner. The medical note is intended as peer to peer communication and may appear blunt or direct. It is written in medical language and may contain abbreviations or verbiage that are unfamiliar.   Again if there are any questions or concerns please contact the dictating provider for clarification.

## 2025-03-13 NOTE — ANESTHESIA POSTPROCEDURE EVALUATION
[facility]    Don Cabral Patient Status:  Observation   Age/Gender 59 year old male MRN [id number]   Location [facility] Attending Avery Cardenas MD   Hosp Day # 0 PCP Stephen Christianson MD       Anesthesia Post-op Note    COLONOSCOPY with biopsies and forcep polypectomy    Procedure Summary       Date: 03/13/25 Room / Location:  ENDOSCOPY 02 / [facility]    Anesthesia Start: 1115 Anesthesia Stop: 1157    Procedure: COLONOSCOPY with biopsies and forcep polypectomy Diagnosis: (cecal colitis, colon polyp, diverticulosis, anastomosis, hemorrhoids)    Surgeons: Job Bacon DO Anesthesiologist: Braxton Chaudhry MD    Anesthesia Type: MAC ASA Status: 3            Anesthesia Type: MAC    Vitals Value Taken Time   /48 03/13/25 1157   Temp  03/13/25 1157   Pulse 94 03/13/25 1157   Resp 16 03/13/25 1157   SpO2 92 03/13/25 1157           Patient Location: Endoscopy    Anesthesia Type: MAC    Airway Patency: patent    Postop Pain Control: adequate    Mental Status: mildly sedated but able to meaningfully participate in the post-anesthesia evaluation    Nausea/Vomiting: none    Cardiopulmonary/Hydration status: stable euvolemic    Complications: no apparent anesthesia related complications    Postop vital signs: stable    Dental Exam: Unchanged from Preop    Patient to be discharged from PACU when criteria met.           no

## 2025-03-13 NOTE — PROGRESS NOTES
Patient alert x 4, CPAP at night, RA during day. SB down to 40s while asleep. Toradol given for abdominal pain 5/10 with some relief, pt not wanting to take narcotics at this time. Abdomen soft with active bowel sounds, old scarring noted. PT NPO at midnight. 2nd dose of GoLytley started at 0100, pt tolerating. Stools liquid and lightening in color. Voiding without difficulty. Ambulating ad james. Plan of care discussed. Call light within reach.

## 2025-03-13 NOTE — PROGRESS NOTES
A&Ox4. VSS. RA. .  Telemetry: refusing  GI: Abdomen soft, nondistended. Stools clear after bowel prep.  : Voids.  Pain controlled with PRN pain medications  Up ad james.  Diet:NPO pending colonoscopy  IVF running per order.  All appropriate safety measures in place. All questions and concerns addressed. Will continue to monitor    1520: Patient had small bloody bowel movement, GI paged.

## 2025-03-13 NOTE — OPERATIVE REPORT
Colonoscopy Operative Report  Patient Name: Don Cabral  YOB: 1965  MRN: [id number]  Procedure: Colonoscopy with biopsy and biopsy polypectomy  Date of Service: 3/13/2025  Pre-operative Diagnosis & Indication: Abdominal pain, prior colitis on CT, omental infarct  Post-operative Diagnosis:   Mild colitis in the cecum  Remainder of the colon and terminal ileum appeared normal  Pandiverticulosis  2 mm ascending colon polyp  Normal-appearing colocolonic anastomosis  Hemorrhoids  Attending Endoscopist: Job Bacon D.O.  Informed Consent: The planned procedure(s), the explanation of the procedure, its expected benefits, the potential complications and risks and possible alternatives and their benefits and risks were discussed with the patient or the patient's surrogate. The discussion of risks, not limited to but including bleeding, infection, perforation, adverse effects from anesthesia, need for emergency surgery, medication effects, cardiac arrhythmias, missed polyps, and aspiration and death, were discussed with patient.  Pt and/or surrogate understood the proposed procedure(s), its risks, benefits and alternatives and wish to proceed with procedure(s). All questions answered in full.  After all questions were answered to their satisfaction, a signed, informed, and witnessed consent was obtained.  A TIME OUT WAS COMPLETED prior to the procedure to confirm the patient, procedure and complete endoscopy safety procedure.   Sedation: Monitored Anesthesia Care; ASA class per anesthesiology team   Monitoring: Pulsoximetry, pulse, respirations, and blood pressure, vitals were monitored throughout the entire procedure under monitored anesthesia care.   Preparation Quality:  Thomson Bowel Prep Score: 9  [Right 3/ Transverse 3/ Left 3]   Procedure: After achieving adequate sedation, and placing the patient in the left lateral decubitus position, a digital rectal examination was performed.  The  lubricated tip of the adult colonoscope was then introduced into the rectum and advanced to the cecum.  The appendiceal orifice and ileocecal valve were clearly and distinctly visualized, thus verifying the cecum.  The terminal ileum was intubated.  The endoscope was then carefully withdrawn from the patient with careful visualization of the colonic mucosa to the best of my ability, with bowel preparation quality as noted above.  Air was suctioned to the best of my ability, during withdrawal of the endoscope.  When the endoscope reached the rectum, it was placed in a retroflexed position, and the rectal bulb was thus visualized.  The endoscope was righted, and air was suctioned from the colon to the best of my ability, as it was during withdrawn from the colon.  The endoscope was then removed from the patient.  The patient tolerated the procedure without apparent procedural complications.  The patient left the procedure room in stable condition for recovery.  Toleration: Patient tolerated procedure well   Complications: No immediate complications   Technical Difficulty:  The procedure was not technically difficult   Estimated Blood Loss: Minimal, less than 5mL of estimated blood loss.   Findings and Therapeutics:  Terminal Ileum: The entire examined ileum was normal.   Colon: There is mild erythema noted in the cecum.  Biopsies obtained using cold biopsy forceps to assess for colitis.  There was a 2 mm ascending colon polyp which was removed using cold biopsy forceps.  The remainder of the colonic mucosa appeared normal and biopsies were obtained from the left colon, right colon, and rectum to assess for colitis.  Normal-appearing colocolonic anastomosis was noted in the sigmoid colon at 25 cm.  There were no signs of polyps. There were diverticula noted throughout the entire visualized colon.   Rectum: There were small sized, internal hemorrhoids seen on retroflexion.   Impression: Patient was noted to have mild  colitis in the cecum.  The etiology is currently unknown.  This could be related to his recent NSAID use, however doubt this is contributing significantly to his symptoms.  Recommendations:   Post Colonoscopy/polypectomy precautions, watch for bleeding, infection, perforation, adverse drug reactions.   GI soft diet if okay with surgery  Follow-up pathology results  If biopsies are consistent with Crohn's disease or ulcerative colitis, then consider another course of steroids and/or possible mesalamine or biologic  General Surgery is currently following and appreciate their recommendations as well.  Pain control per primary  Recall colonoscopy in 7-10 years pending pathology    Job Bacon DO  3/13/2025  12:12 PM

## 2025-03-14 ENCOUNTER — APPOINTMENT (OUTPATIENT)
Dept: PHYSICAL THERAPY | Age: 60
End: 2025-03-14
Attending: PHYSICIAN ASSISTANT
Payer: COMMERCIAL

## 2025-03-14 VITALS
HEIGHT: 75 IN | SYSTOLIC BLOOD PRESSURE: 133 MMHG | BODY MASS INDEX: 34.19 KG/M2 | WEIGHT: 275 LBS | HEART RATE: 68 BPM | TEMPERATURE: 98 F | OXYGEN SATURATION: 93 % | DIASTOLIC BLOOD PRESSURE: 74 MMHG | RESPIRATION RATE: 18 BRPM

## 2025-03-14 PROCEDURE — 99232 SBSQ HOSP IP/OBS MODERATE 35: CPT | Performed by: PHYSICIAN ASSISTANT

## 2025-03-14 PROCEDURE — 99239 HOSP IP/OBS DSCHRG MGMT >30: CPT | Performed by: INTERNAL MEDICINE

## 2025-03-14 NOTE — PROGRESS NOTES
[facility]  Progress Note    Don Cabral Patient Status:  Observation    1965 MRN [id number]   Location [facility] 3NW-A Attending Avery Cardenas MD   Hosp Day # 0 PCP Stephen Christianson MD     Subjective:  Pt underwent colonoscopy yesterday which showed colitis to his cecum.  He   He is drinking pain at a 6 out of 10 currently and has recently received Norco.  He is tolerating a soft diet without any nausea, vomiting, or worsening pain.   Continues to endorse significant right lower quadrant pain.  Denies any fevers or chills.    Objective/Physical Exam:  General: Alert, orientated x3.  Cooperative.  No apparent distress.  Vital Signs:  Blood pressure 132/62, pulse 59, temperature 97.9 °F (36.6 °C), temperature source Oral, resp. rate 18, height 75\", weight 275 lb (124.7 kg), SpO2 96%.  Lungs: No respiratory distress.  Cardiac: Regular rate and rhythm.   Abdomen:  Soft, non distended, focal tenderness to right lower quadrant, with no rebound or guarding.  No peritoneal signs.   Extremities:  No lower extremity edema noted.        Labs:        Lab Results   Component Value Date    INR 1.01 2023    INR 1.23 (H) 2023       I/O last 3 completed shifts:  In: 1080 [P.O.:1080]  Out: -   I/O this shift:  In: 240 [P.O.:240]  Out: 200 [Urine:200]    Assessment  Patient Active Problem List   Diagnosis    Anxiety    Essential hypertension    ED (erectile dysfunction)    NELLIE (obstructive sleep apnea)-mild, 8.4    Generalized anxiety disorder    Hypokalemia    Hyperglycemia    Tooth pain    Daytime hypersomnolence    Symptomatic bradycardia    Palpitations    Slow heart rate    PVC (premature ventricular contraction)    Bipolar 1 disorder (HCC)    Sigmoid diverticulitis    Acute diverticulitis    Intractable abdominal pain    Postoperative examination    Preop testing    Right lower quadrant abdominal pain    Abdominal pain, acute    Intra-abdominal infection    Epiploic appendagitis     Implantable loop recorder present    LBBB (left bundle branch block)    LVH (left ventricular hypertrophy)    MDD (major depressive disorder), recurrent severe, without psychosis (HCC)    Dizziness    Elevated LFTs    Essential hypertension, benign    CLARIBEL (generalized anxiety disorder)    Panic disorder    PVCs (premature ventricular contractions)    Bradycardia    Abdominal pain, right lower quadrant    Omental infarction (HCC)    Depression with anxiety    History of cardiac arrhythmia    Primary hypertension       Right lower quadrant abdominal pain  Omental infarct       Plan:  Colonoscopy operative report reviewed.  Patient does have mild colitis to his cecum, though would not expect findings to cause his level of pain.  Plan for diagnostic laparoscopy was discussed with Dr. Clemens.  Patient has just finished eating a soft diet, thus no plans for acute surgical intervention today.    From our standpoint, if his pain is well-controlled on oral analgesia and he is tolerating a diet, may discharge home with close outpatient follow-up and further surgical planning for elective diagnostic laparoscopy  Will offer patient to be placed on Dr. Valladares's schedule for this coming Monday versus with Dr. Clemens pending his availability  Patient will require ERAS prep preoperatively      Maria E Forte PA-C  3/14/2025  11:54 AM

## 2025-03-14 NOTE — PROGRESS NOTES
[facility]   part of [facility]     Hospitalist Progress Note     Don Cabral Patient Status:  Observation    1965 MRN [id number]   Location [facility] 3NW-A Attending Avery Cardenas MD   Hosp Day # 0 PCP Stephen Christianson MD     Chief Complaint: Right lower quadrant abdominal pain    Subjective:     Patient complains of persistent right lower quadrant abdominal pain 5 out of 10 pain.  More pain on palpation according patient.  No nausea vomiting.  No constipation or diarrhea.  Afebrile in hospital.      Objective:    Review of Systems:   A comprehensive review of systems was completed; pertinent positive and negatives stated in subjective.    Vital signs:  Temp:  [97.9 °F (36.6 °C)-98.4 °F (36.9 °C)] 98.1 °F (36.7 °C)  Pulse:  [59-72] 68  Resp:  [18] 18  BP: (109-133)/(50-74) 133/74  SpO2:  [93 %-97 %] 93 %    Physical Exam:    /74 (BP Location: Right arm)   Pulse 68   Temp 98.1 °F (36.7 °C) (Oral)   Resp 18   Ht 6' 3\" (1.905 m)   Wt 275 lb (124.7 kg)   SpO2 93%   BMI 34.37 kg/m²     General: No acute distress  Respiratory: No wheezes, no rhonchi  Cardiovascular: S1, S2, regular rate and rhythm  Abdomen: Soft, right-sided tenderness more in the right lower quadrant, non-distended, positive bowel sounds  Neuro: No new focal deficits.   Extremities: No edema      Diagnostic Data:    Labs:  Recent Labs   Lab 25  1450 25  0522 25  0455   WBC 9.1 8.2 7.7   HGB 14.4 13.1 12.9*   MCV 83.9 88.4 84.2   .0 342.0 379.0       Recent Labs   Lab 25  1450 25  0522 25  0455   * 88 80   BUN 9 11 14   CREATSERUM 0.89 0.81 0.84   CA 9.6 9.3 9.3   ALB 4.7  --   --     139 140   K 4.0 4.3 4.0    103 102   CO2 31.0 29.0 28.0   ALKPHO 77  --   --    AST 18  --   --    ALT 20  --   --    BILT 0.5  --   --    TP 7.0  --   --        Estimated Glomerular Filtration Rate: 101 mL/min/1.73m2 (result from lab).        Microbiology    No results  found for this visit on 03/11/25.      Imaging: Reviewed in Epic.  CT abdomen and pelvis done on 3/11/2025 on admission shows as below  FINDINGS:    LIVER:  Stable appearance of a hyperenhancing lesion along the posterior aspect of right hepatic lobe measuring up to 2.9 x 2.8 cm.  This has features compatible with a hemangioma.  BILIARY:  Postsurgical changes of cholecystectomy noted.  PANCREAS:  No lesion, fluid collection, ductal dilatation, or atrophy.    SPLEEN:  No enlargement or focal lesion.    KIDNEYS:  No mass, obstruction, or calcification.    ADRENALS:  No mass or enlargement.    AORTA/VASCULAR:  No aneurysm or dissection.    RETROPERITONEUM:  No mass or adenopathy.    BOWEL/MESENTERY:  No dilated loops of small bowel are seen.  Portions of the bowel are decompressed, limiting assessment.  There has been slight interval increase in prominence of inflammatory changes noted of the right lower quadrant centered on the  expected region of the omentum (series 4, image 73 and image 69).  This is somewhat difficult to measure given its irregular configuration but measures in the range of approximately 5.8 x 2.1 cm compared with the prior study where it was measured at 6.4  x 1.2 cm.  Lack of oral contrast somewhat limits assessment of the bowel.  There are postsurgical changes.  The appendix is not clearly identified on this exam.  ABDOMINAL WALL:  No mass or hernia.    URINARY BLADDER:  No visible focal wall thickening, lesion, or calculus.    PELVIC NODES:  No adenopathy.    PELVIC ORGANS:  No visible mass.  Pelvic organs appropriate for patient age.    BONES:  No bony lesion or fracture.    LUNG BASES:  No visible pulmonary or pleural disease.    OTHER:  Negative.           Impression   CONCLUSION:  There has been slight interval increase in inflammatory changes centered on the expected region of the omentum within the right lower quadrant.  Again this has features most compatible with omental infarct.  There  is no evidence of an  abscess or drainable fluid collection.       Medications:    clonazePAM  0.5 mg Oral BID    enoxaparin  40 mg Subcutaneous Nightly    gabapentin  600 mg Oral TID    lisinopril  20 mg Oral Daily    sertraline  75 mg Oral Daily    verapamil ER  180 mg Oral Daily       Assessment & Plan:      # Intractable recurrent right lower quad abdominal pain with inflammatory changes in the omentum of the right lower quadrant, suspected omental infarct; possible epiploic appendagitis   -GI and surgery on consult  -Pain management  -IV fluids  -Currently n.p.o.  -Stool studies including stool C. difficile and GI stool PCR and fecal calprotectin pending at this time, patient has not had a bowel movement since admission.  -Patient recently admitted from 2/9 to 2/11/2025 for similar symptoms and status post course of antibiotics, anti-inflammatories including Medrol Dosepak at that time and improved transiently at that time according to patient and then symptoms recurred and has been constant according to patient.  White count normal  Patient states he had prior colonoscopy done about 2 years back and had polyps removed at that time.  With recurrent right lower quadrant abdominal pain with inflammation omentum, checked lactic acid level and CEA and both came back normal.  Status post colonoscopy by GI Dr. Bacon on 3/13/2025 which showed pandiverticulosis, mid colitis in the cecum, 2 mm ascending colon polyp which was removed, hemorrhoids-colonoscopy biopsy results pending    # History of prior diverticulitis with history of partial colectomy and colorectal anastomosis and colostomy bag at that time and then reversal of colostomy according to patient.    # History of arrhythmia  # History of PVCs and intermittent left bundle branch block  -Currently in sinus rhythm  -On epic chart review, patient has seen cardiology Dr. Mars  as outpatient and patient on verapamil at home per cardiology  -Will check  EKG  -Will also have cardiology on consult with current suspected mesenteric infarct seen in CT    # Hypertension  -Continue home medication including verapamil and lisinopril  -Follow blood pressure      #Depression, anxiety  -Continue home sertraline    Discussed with patient, RN    Seen by surgeon and GI, surgeon plans elective laparoscopy as outpatient with surgery Dr. Clemens per patient's preference.  Patient tolerating soft diet.  No nausea vomiting.  May DC if okay with GI and surgeon, follow-up with them as outpatient started.  Follow colonoscopy biopsy results with GI and surgeon as outpatient.  Follow-up with surgeon as outpatient as directed.  Follow-up with regular outpatient primary care physician Stephen Christianson MD within 1 week in office    Leonila Mane MD    Supplementary Documentation:     Quality:  DVT Mechanical Prophylaxis:   SCDs,    DVT Pharmacologic Prophylaxis   Medication    enoxaparin (Lovenox) 40 MG/0.4ML SUBQ injection 40 mg                Code Status: Full Code  Vance: No urinary catheter in place  Vance Duration (in days):   Central line:    NILS: 3/14/2025    Discharge is dependent on: Clinical progress, surgery and GI recommendations  At this point Mr. Cabral is expected to be discharge to: Home    The 21st Century Cures Act makes medical notes like these available to patients in the interest of transparency. Please be advised this is a medical document. Medical documents are intended to carry relevant information, facts as evident, and the clinical opinion of the practitioner. The medical note is intended as peer to peer communication and may appear blunt or direct. It is written in medical language and may contain abbreviations or verbiage that are unfamiliar.

## 2025-03-14 NOTE — PROGRESS NOTES
A&Ox4. VSS. RA. .  GI: Abdomen soft, nondistended.   Denies nausea.  : Voids.  Pain controlled with PRN pain medications  Up ad james.  Diet:tolerating soft diet  IV saline locked  All appropriate safety measures in place. All questions and concerns addressed. Will continue to monitor    1400: Okay to DC per GI Dr SHAINA Bacon

## 2025-03-14 NOTE — PROGRESS NOTES
A&Ox4. RA. VSS.   Tolerating a soft diet- denies any nausea and SOB. Pain managed per MAR. Pt reports less bloody bowel movement. Walked before going to sleep.   Safety measures in place. All questions and concerns addressed. Call light within reach.

## 2025-03-14 NOTE — PROGRESS NOTES
Progress Note  Don Cabral Patient Status:  Observation    1965 MRN [id number]   Location [facility] 3NW-A Attending Avery Cardenas MD   Hosp Day # 0 PCP Stephen Christianson MD     Subjective:  Feeling well. No cardiac events. Declining further telemetry for now    Objective:  Physical Exam:   /62 (BP Location: Right arm)   Pulse 59   Temp 97.9 °F (36.6 °C) (Oral)   Resp 18   Ht 6' 3\" (1.905 m)   Wt 275 lb (124.7 kg)   SpO2 96%   BMI 34.37 kg/m²   Temp (24hrs), Av.1 °F (36.7 °C), Min:97.9 °F (36.6 °C), Max:98.4 °F (36.9 °C)       Intake/Output Summary (Last 24 hours) at 3/14/2025 1049  Last data filed at 3/14/2025 0856  Gross per 24 hour   Intake 1200 ml   Output 200 ml   Net 1000 ml     Wt Readings from Last 3 Encounters:   25 275 lb (124.7 kg)   25 276 lb 2.2 oz (125.3 kg)   25 277 lb (125.6 kg)     Telemetry: Off telemetry  General: Alert and oriented in no apparent distress.  HEENT: No focal deficits.  Cardiac: Regular rate and rhythm, S1, S2 normal, no murmur, rub or gallop.  Lungs: Clear without wheezes, rales, rhonchi or dullness.  Normal excursions and effort.  Abdomen: Soft, non-tender.   Extremities: Without clubbing, cyanosis or edema.  Neurologic: Alert and oriented, normal affect.  Skin: Warm and dry.        Intake/Output:    Intake/Output Summary (Last 24 hours) at 3/14/2025 1049  Last data filed at 3/14/2025 0856  Gross per 24 hour   Intake 1200 ml   Output 200 ml   Net 1000 ml       Last 3 Weights   25 1428 275 lb (124.7 kg)   25 1316 276 lb 2.2 oz (125.3 kg)   25 1204 277 lb (125.6 kg)       Labs:  Recent Labs   Lab 25  1450 25  0522 25  0455   * 88 80   BUN 9 11 14   CREATSERUM 0.89 0.81 0.84   EGFRCR 99 102 101   CA 9.6 9.3 9.3    139 140   K 4.0 4.3 4.0    103 102   CO2 31.0 29.0 28.0     Recent Labs   Lab 25  1450 25  0522 25  0455   RBC 5.09 4.58 4.55   HGB 14.4 13.1  12.9*   HCT 42.7 40.5 38.3*   MCV 83.9 88.4 84.2   MCH 28.3 28.6 28.4   MCHC 33.7 32.3 33.7   RDW 13.8 14.1 13.9   NEPRELIM 6.17  --  5.00   WBC 9.1 8.2 7.7   .0 342.0 379.0         No results for input(s): \"TROP\", \"TROPHS\", \"CK\" in the last 168 hours.    Diagnostics:   ECHOCARDIOGRAM 9/12/2023:  Left ventricle: The cavity size was normal. Wall thickness was normal. The   estimated ejection fraction was 55-60%, by visual assessment. No diagnostic   evidence for regional wall motion abnormalities.   Impressions:  This study is compared with previous dated 6/10/20: No   significant change since prior study.         Medications:   clonazePAM  0.5 mg Oral BID    enoxaparin  40 mg Subcutaneous Nightly    gabapentin  600 mg Oral TID    lisinopril  20 mg Oral Daily    sertraline  75 mg Oral Daily    verapamil ER  180 mg Oral Daily      lactated ringers      sodium chloride Stopped (03/13/25 0800)       Assessment/Plan:    RLQ pain with concern for omental infarction  Surgery/GI following  Colonoscopy 3/13 per GI  pAF brief episode in 2023 without recurrence on loop recorder since this time  Obesity  PVC's on verapamil  NELLIE  CPAP HS    PLAN  No indication of recurrent afib on loop recorder  Continue verapamil  GI/surgery following for omental infarct  Will discuss whether further cardiology testing is warranted with attending physician. Venous US and SERGIO to r/o PFO if omental infarct is confirmed following ex lap next week.      Stephen Santiago PA-C  3/14/2025  10:49 AM     Note reviewed and labs reviewed.  Agree with above assessment and plan.  No afib present on lopo recorder interrogation.  Less likely that he has a paradoxycal embolization via PFO.  I don't feel strongly about doing SERGIO currently.  Cardiac status otherwise stable.

## 2025-03-14 NOTE — DISCHARGE SUMMARY
[facility]  Discharge Summary    Don Cabral Patient Status:  Observation    1965 MRN [id number]   Location [facility] 3NW-A Attending No att. providers found   Hosp Day # 0 PCP Stephen Christianson MD     Date of Admission: 3/11/2025    Date of Discharge: 3/14/2025      Disposition: Home or Self Care    Discharge Diagnosis:   # Intractable recurrent right lower quad abdominal pain with inflammatory changes in the omentum of the right lower quadrant, suspected omental infarct; possible epiploic appendagitis   # History of prior diverticulitis with history of partial colectomy and colorectal anastomosis and colostomy bag at that time and then reversal of colostomy according to patient.  # History of arrhythmia  # History of PVCs and intermittent left bundle branch block  # Hypertension  #Depression, anxiety      Chief Complaint:   Chief Complaint   Patient presents with    Abdomen/Flank Pain       History of Present Illness:   Don Cabral is a 59 year old male with RLQ pain since mid to late February.  He was doing much better after his last hospitalization but then 3 days after discharge, his pain came back in RLQ and is rather severe and localized and sharp.  No vomiting or diarrhea.   It seems like steroids and nsaids and abx helped for a little while but then pain came back.  Saw GI in clinic and was advised to come to ER.   Please refer to recent physical done by Dr. Cardenas for details on admission    Brief Synopsis:   # Intractable recurrent right lower quad abdominal pain with inflammatory changes in the omentum of the right lower quadrant, suspected omental infarct; possible epiploic appendagitis   -Seen by GI and surgery on consult  -Pain management  -Given IV fluids  -Initially n.p.o.  -Stool studies including GI stool PCR negative and fecal calprotectin normal  -Patient recently admitted from  to 2025 for similar symptoms and status post course of antibiotics,  anti-inflammatories including Medrol Dosepak at that time and improved transiently at that time according to patient and then symptoms recurred and has been constant according to patient.  White count normal  Patient states he had prior colonoscopy done about 2 years back and had polyps removed at that time.  With recurrent right lower quadrant abdominal pain with inflammation omentum, checked lactic acid level and CEA and both came back normal.  Status post colonoscopy by GI Dr. Bacon on 3/13/2025 which showed pandiverticulosis, mid colitis in the cecum, 2 mm ascending colon polyp which was removed, hemorrhoids-colonoscopy biopsy results showed as below     Final Diagnosis:   A.  Cecum, biopsy:  -Mildly active colitis.  -Negative for granulomata, dysplasia, or malignancy.     B.  Colon, ascending, polyp:  -Polypoid colonic mucosa with prominent lymphoid aggregates.     C.  Colon, right, biopsy:  -Colonic mucosa with no significant pathologic change.  -No evidence of microscopic colitis.  -Negative for dysplasia or malignancy.     D.  Colon, left, biopsy  -Colonic mucosa with no significant pathologic change.  -No evidence of microscopic colitis.  -Negative for dysplasia or malignancy.     E.  Rectum, biopsy:  -Colonic mucosa with no significant pathologic change.  -No evidence of microscopic colitis.  -Negative for dysplasia or malignancy.     Diet advanced after colonoscopy and patient tolerated this well.    # History of prior diverticulitis with history of partial colectomy and colorectal anastomosis and colostomy bag at that time and then reversal of colostomy according to patient.     # History of arrhythmia  # History of PVCs and intermittent left bundle branch block  -Currently in sinus rhythm  -On epic chart review, patient has seen cardiology Dr. Mars  as outpatient and patient on verapamil at home per cardiology  - EKG done on 3/12/2025 shows sinus rhythm with first-degree AV block.  Nonspecific ST-T  wave changes in anterior leads.  -Seen by cardiology on consult with current suspected mesenteric infarct seen in CT     # Hypertension  -Continued home medication including verapamil and lisinopril    #Depression, anxiety  -Continued home sertraline    Seen by surgeon and GI, surgeon plans elective laparoscopy as outpatient with surgery Dr. Clemens per patient's preference.  Patient tolerating soft diet.  No nausea vomiting.  May DC if okay with GI and surgeon, follow-up with them as outpatient started.  Follow colonoscopy biopsy results with GI and surgeon as outpatient.  Follow-up with surgeon as outpatient as directed.  Follow-up with regular outpatient primary care physician Stephen Christianson MD within 1 week in office     TCM Diagnosis at discharge from Hospital: Other: See above; still recommend for TCM follow-up    Lace+ Score: 67  59-90 High Risk  29-58 Medium Risk  0-28   Low Risk.     TCM Follow-Up Recommendation:Don Cabral   LACE > 58: High Risk of readmission after discharge from the hospital.      PCP: Stephen Christianson MD    Consultations:   Consultants         Provider   Role Specialty     Joe Waters MD       Consulting Physician Cardiovascular Diseases     Monie Valladares MD       Consulting Physician SURGERY, GENERAL     Job Bacon DO      Consulting Physician GASTROENTEROLOGY            Procedures during hospitalization:    Colonoscopy with biopsy and biopsy polypectomy done by GI Dr. Job Bacon DO on 3/13/2025    Incidental or significant findings and recommendations (brief descriptions):  None    Lab/Test results pending at Discharge:   None      Discharge Medications:        Discharge Medications        CHANGE how you take these medications        Instructions Prescription details   sertraline 50 MG Tabs  Commonly known as: Zoloft  What changed:   how much to take  how to take this  when to take this  Another medication with the same name was removed. Continue taking this  medication, and follow the directions you see here.      1.5 tablets daily   Quantity: 135 tablet  Refills: 0            CONTINUE taking these medications        Instructions Prescription details   albuterol 108 (90 Base) MCG/ACT Aers  Commonly known as: Ventolin HFA      Inhale 2 puffs into the lungs every 4 (four) hours as needed for Wheezing or Shortness of Breath.   Quantity: 1 each  Refills: 3     clonazePAM 0.5 MG Tabs  Commonly known as: KlonoPIN      Take 1 tablet (0.5 mg total) by mouth in the morning, at noon, and at bedtime.   Refills: 0     Econazole Nitrate 1 % Crea  Commonly known as: SPECTAZOLE      Apply 1 Application topically 2 (two) times daily.   Quantity: 85 g  Refills: 2     gabapentin 600 MG Tabs  Commonly known as: Neurontin      Take 1 tablet (600 mg total) by mouth 3 (three) times daily.   Quantity: 270 tablet  Refills: 0     HYDROcodone-acetaminophen 5-325 MG Tabs  Commonly known as: Norco      Take 1 tablet by mouth every 6 (six) hours as needed.   Quantity: 15 tablet  Refills: 0     lisinopril 20 MG Tabs  Commonly known as: Prinivil; Zestril      Take 1 tablet (20 mg total) by mouth daily.   Quantity: 90 tablet  Refills: 3     Tadalafil 20 MG Tabs      Take 1 tablet (20 mg total) by mouth daily as needed for Erectile Dysfunction.   Quantity: 12 tablet  Refills: 1     Verapamil HCl  MG Cp24  Commonly known as: VERELAN      Take 1 capsule (180 mg total) by mouth once daily.   Refills: 0            STOP taking these medications      amoxicillin clavulanate 875-125 MG Tabs  Commonly known as: Augmentin        ibuprofen 600 MG Tabs  Commonly known as: Motrin                  Where to Get Your Medications        These medications were sent to [id number] - [place], [place] - [facility] [address] [place] [address] [phone], [phone]  [address] [place] [id number]      Hours: 24-hours Phone: [phone]   gabapentin 600 MG Tabs  sertraline 50 MG Tabs          [place] prescription  monitoring program data reviewed in epic before prescribing narcotics/controlled substances: Yes    Follow up Visits: Follow-up with Stephen Christianson MD in 1 week    Stevenson Mars MD  [address]  [facility] [address]  [phone]    Follow up in 1 week(s)      Job Bacon DO  [address]  [facility] [address]  [phone]    Follow up in 1 week(s)      Ryan Clemens MD  [address]  [facility] [id number] [phone]    Follow up in 1 week(s)      Stephen Christianson MD  [address] [place] [address]  [address]  [facility] [address]  [phone]    Follow up in 1 week(s)      Appointments for Next 30 Days 3/14/2025 - 4/13/2025        Date Arrival Time Visit Type Length Department Provider     3/21/2025  2:30 PM  [facility] PT ORTHO TX [[id number]] 45 min [facility] in [facility]    Patient Instructions:         Location Instructions:     Your appointment is located at [address].  Please arrive 10 minutes prior to your scheduled appointment time.  Masks are optional for all patients and visitors, unless otherwise indicated.               3/28/2025  2:30 PM  EE PT ORTHO TX [[id number]] 45 min [facility] in [facility]    Patient Instructions:         Location Instructions:     Your appointment is located at [address] [place] [address].  Please arrive 10 minutes prior to your scheduled appointment time.  Masks are optional for all patients and visitors, unless otherwise indicated.               3/31/2025  2:30 PM  MYCHART FOLLOW UP [[id number]] 30 min [facility], [address] [place] [address] Stephen Christianson MD    Patient Instructions:     Contact your primary care provider if your insurance requires a referral.    Please arrive 15 minutes prior to your scheduled appointment. Be sure to bring your current Insurance card, Photo ID, and medication bottles or a list of your current  medications.      A 24 hour notice is required to cancel any appointment or you may be charged a $40 No Show Fee.     Important: 24 hour notice is required to cancel any appointment or you may be charged a $40 No Show Fee. Please notify your physician office.         Location Instructions:     Masks are optional for all patients and visitors, unless otherwise indicated.               4/4/2025  2:30 PM  EE PT ORTHO TX [[id number]] 45 min [facility] in [facility]    Patient Instructions:         Location Instructions:     Your appointment is located at [address].  Please arrive 10 minutes prior to your scheduled appointment time.  Masks are optional for all patients and visitors, unless otherwise indicated.               4/11/2025  2:30 PM  EE PT ORTHO TX [[id number]] 45 min [facility] in [facility]    Patient Instructions:         Location Instructions:     Your appointment is located at [address] [facility], [address].  Please arrive 10 minutes prior to your scheduled appointment time.  Masks are optional for all patients and visitors, unless otherwise indicated.                      Physical Exam:  /74 (BP Location: Right arm)   Pulse 68   Temp 98.1 °F (36.7 °C) (Oral)   Resp 18   Ht 6' 3\" (1.905 m)   Wt 275 lb (124.7 kg)   SpO2 93%   BMI 34.37 kg/m²     General: No acute distress  Respiratory: No wheezes, no rhonchi  Cardiovascular: S1, S2, regular rate and rhythm  Abdomen: Soft, right-sided tenderness more in the right lower quadrant, non-distended, positive bowel sounds  Neuro: No new focal deficits.   Extremities: No edema      Discharge Condition: stable    Patient Discharge Instructions: See electronic chart      More than 30 minutes on discharge    Leonila Mane MD  3/14/2025

## 2025-03-16 ENCOUNTER — APPOINTMENT (OUTPATIENT)
Dept: CT IMAGING | Facility: HOSPITAL | Age: 60
End: 2025-03-16
Attending: EMERGENCY MEDICINE
Payer: COMMERCIAL

## 2025-03-16 ENCOUNTER — HOSPITAL ENCOUNTER (INPATIENT)
Facility: HOSPITAL | Age: 60
LOS: 7 days | Discharge: HOME | End: 2025-03-23
Attending: EMERGENCY MEDICINE | Admitting: INTERNAL MEDICINE
Payer: COMMERCIAL

## 2025-03-16 ENCOUNTER — APPOINTMENT (OUTPATIENT)
Dept: CT IMAGING | Facility: HOSPITAL | Age: 60
DRG: 330 | End: 2025-03-16
Attending: EMERGENCY MEDICINE
Payer: COMMERCIAL

## 2025-03-16 ENCOUNTER — HOSPITAL ENCOUNTER (INPATIENT)
Facility: HOSPITAL | Age: 60
LOS: 7 days | Discharge: HOME | DRG: 330 | End: 2025-03-23
Attending: EMERGENCY MEDICINE | Admitting: INTERNAL MEDICINE
Payer: COMMERCIAL

## 2025-03-16 DIAGNOSIS — K55.069 OMENTAL INFARCTION (HCC): Primary | ICD-10-CM

## 2025-03-16 DIAGNOSIS — F41.9 ANXIETY: ICD-10-CM

## 2025-03-16 LAB
ALBUMIN SERPL-MCNC: 4.8 G/DL (ref 3.2–4.8)
ALBUMIN/GLOB SERPL: 1.9 {RATIO} (ref 1–2)
ALP LIVER SERPL-CCNC: 64 U/L
ALT SERPL-CCNC: 15 U/L
ANION GAP SERPL CALC-SCNC: 8 MMOL/L (ref 0–18)
AST SERPL-CCNC: 14 U/L (ref ?–34)
BASOPHILS # BLD AUTO: 0.01 X10(3) UL (ref 0–0.2)
BASOPHILS NFR BLD AUTO: 0.1 %
BILIRUB SERPL-MCNC: 0.8 MG/DL (ref 0.3–1.2)
BILIRUB UR QL STRIP.AUTO: NEGATIVE
BUN BLD-MCNC: 11 MG/DL (ref 9–23)
CALCIUM BLD-MCNC: 9.7 MG/DL (ref 8.7–10.6)
CHLORIDE SERPL-SCNC: 103 MMOL/L (ref 98–112)
CLARITY UR REFRACT.AUTO: CLEAR
CO2 SERPL-SCNC: 29 MMOL/L (ref 21–32)
CREAT BLD-MCNC: 0.85 MG/DL
EGFRCR SERPLBLD CKD-EPI 2021: 101 ML/MIN/1.73M2 (ref 60–?)
EOSINOPHIL # BLD AUTO: 0.1 X10(3) UL (ref 0–0.7)
EOSINOPHIL NFR BLD AUTO: 1 %
ERYTHROCYTE [DISTWIDTH] IN BLOOD BY AUTOMATED COUNT: 13.9 %
GLOBULIN PLAS-MCNC: 2.5 G/DL (ref 2–3.5)
GLUCOSE BLD-MCNC: 92 MG/DL (ref 70–99)
GLUCOSE UR STRIP.AUTO-MCNC: NORMAL MG/DL
HCT VFR BLD AUTO: 40 %
HGB BLD-MCNC: 13.2 G/DL
IMM GRANULOCYTES # BLD AUTO: 0.06 X10(3) UL (ref 0–1)
IMM GRANULOCYTES NFR BLD: 0.6 %
KETONES UR STRIP.AUTO-MCNC: NEGATIVE MG/DL
LACTATE SERPL-SCNC: 1 MMOL/L (ref 0.5–2)
LEUKOCYTE ESTERASE UR QL STRIP.AUTO: NEGATIVE
LIPASE SERPL-CCNC: 36 U/L (ref 12–53)
LYMPHOCYTES # BLD AUTO: 1.69 X10(3) UL (ref 1–4)
LYMPHOCYTES NFR BLD AUTO: 16.1 %
MCH RBC QN AUTO: 28.3 PG (ref 26–34)
MCHC RBC AUTO-ENTMCNC: 33 G/DL (ref 31–37)
MCV RBC AUTO: 85.7 FL
MONOCYTES # BLD AUTO: 0.74 X10(3) UL (ref 0.1–1)
MONOCYTES NFR BLD AUTO: 7 %
NEUTROPHILS # BLD AUTO: 7.9 X10 (3) UL (ref 1.5–7.7)
NEUTROPHILS # BLD AUTO: 7.9 X10(3) UL (ref 1.5–7.7)
NEUTROPHILS NFR BLD AUTO: 75.2 %
NITRITE UR QL STRIP.AUTO: NEGATIVE
OSMOLALITY SERPL CALC.SUM OF ELEC: 289 MOSM/KG (ref 275–295)
PH UR STRIP.AUTO: 7 [PH] (ref 5–8)
PLATELET # BLD AUTO: 456 10(3)UL (ref 150–450)
POTASSIUM SERPL-SCNC: 3.7 MMOL/L (ref 3.5–5.1)
PROT SERPL-MCNC: 7.3 G/DL (ref 5.7–8.2)
PROT UR STRIP.AUTO-MCNC: NEGATIVE MG/DL
RBC # BLD AUTO: 4.67 X10(6)UL
RBC UR QL AUTO: NEGATIVE
SODIUM SERPL-SCNC: 140 MMOL/L (ref 136–145)
SP GR UR STRIP.AUTO: >1.03 (ref 1–1.03)
UROBILINOGEN UR STRIP.AUTO-MCNC: NORMAL MG/DL
WBC # BLD AUTO: 10.5 X10(3) UL (ref 4–11)

## 2025-03-16 PROCEDURE — 5A09357 ASSISTANCE WITH RESPIRATORY VENTILATION, LESS THAN 24 CONSECUTIVE HOURS, CONTINUOUS POSITIVE AIRWAY PRESSURE: ICD-10-PCS | Performed by: HOSPITALIST

## 2025-03-16 PROCEDURE — 74177 CT ABD & PELVIS W/CONTRAST: CPT | Performed by: EMERGENCY MEDICINE

## 2025-03-16 PROCEDURE — 99223 1ST HOSP IP/OBS HIGH 75: CPT | Performed by: STUDENT IN AN ORGANIZED HEALTH CARE EDUCATION/TRAINING PROGRAM

## 2025-03-16 PROCEDURE — 99223 1ST HOSP IP/OBS HIGH 75: CPT | Performed by: INTERNAL MEDICINE

## 2025-03-16 RX ORDER — ACETAMINOPHEN 325 MG/1
650 TABLET ORAL EVERY 4 HOURS PRN
Status: DISCONTINUED | OUTPATIENT
Start: 2025-03-16 | End: 2025-03-20

## 2025-03-16 RX ORDER — GABAPENTIN 600 MG/1
600 TABLET ORAL 3 TIMES DAILY
Status: DISCONTINUED | OUTPATIENT
Start: 2025-03-16 | End: 2025-03-23

## 2025-03-16 RX ORDER — MORPHINE SULFATE 2 MG/ML
1 INJECTION, SOLUTION INTRAMUSCULAR; INTRAVENOUS EVERY 2 HOUR PRN
Status: DISCONTINUED | OUTPATIENT
Start: 2025-03-16 | End: 2025-03-20

## 2025-03-16 RX ORDER — KETOROLAC TROMETHAMINE 15 MG/ML
15 INJECTION, SOLUTION INTRAMUSCULAR; INTRAVENOUS EVERY 6 HOURS PRN
Status: DISPENSED | OUTPATIENT
Start: 2025-03-16 | End: 2025-03-18

## 2025-03-16 RX ORDER — MORPHINE SULFATE 2 MG/ML
2 INJECTION, SOLUTION INTRAMUSCULAR; INTRAVENOUS EVERY 2 HOUR PRN
Status: DISCONTINUED | OUTPATIENT
Start: 2025-03-16 | End: 2025-03-20

## 2025-03-16 RX ORDER — ONDANSETRON 2 MG/ML
4 INJECTION INTRAMUSCULAR; INTRAVENOUS EVERY 6 HOURS PRN
Status: DISCONTINUED | OUTPATIENT
Start: 2025-03-16 | End: 2025-03-20

## 2025-03-16 RX ORDER — LISINOPRIL 20 MG/1
20 TABLET ORAL DAILY
Status: DISCONTINUED | OUTPATIENT
Start: 2025-03-16 | End: 2025-03-23

## 2025-03-16 RX ORDER — VERAPAMIL HYDROCHLORIDE 180 MG/1
180 TABLET, FILM COATED, EXTENDED RELEASE ORAL NIGHTLY
Status: DISCONTINUED | OUTPATIENT
Start: 2025-03-17 | End: 2025-03-23

## 2025-03-16 RX ORDER — HYDROCODONE BITARTRATE AND ACETAMINOPHEN 5; 325 MG/1; MG/1
2 TABLET ORAL EVERY 4 HOURS PRN
Status: DISCONTINUED | OUTPATIENT
Start: 2025-03-16 | End: 2025-03-20

## 2025-03-16 RX ORDER — CLONAZEPAM 0.5 MG/1
0.5 TABLET ORAL 2 TIMES DAILY
Status: DISCONTINUED | OUTPATIENT
Start: 2025-03-16 | End: 2025-03-23

## 2025-03-16 RX ORDER — SODIUM PHOSPHATE, DIBASIC AND SODIUM PHOSPHATE, MONOBASIC 7; 19 G/230ML; G/230ML
1 ENEMA RECTAL ONCE AS NEEDED
Status: DISCONTINUED | OUTPATIENT
Start: 2025-03-16 | End: 2025-03-20

## 2025-03-16 RX ORDER — SODIUM CHLORIDE, SODIUM LACTATE, POTASSIUM CHLORIDE, CALCIUM CHLORIDE 600; 310; 30; 20 MG/100ML; MG/100ML; MG/100ML; MG/100ML
INJECTION, SOLUTION INTRAVENOUS CONTINUOUS
Status: ACTIVE | OUTPATIENT
Start: 2025-03-16 | End: 2025-03-17

## 2025-03-16 RX ORDER — SENNOSIDES 8.6 MG
17.2 TABLET ORAL NIGHTLY PRN
Status: DISCONTINUED | OUTPATIENT
Start: 2025-03-16 | End: 2025-03-23

## 2025-03-16 RX ORDER — POLYETHYLENE GLYCOL 3350 17 G/17G
17 POWDER, FOR SOLUTION ORAL DAILY PRN
Status: DISCONTINUED | OUTPATIENT
Start: 2025-03-16 | End: 2025-03-23

## 2025-03-16 RX ORDER — HYDROMORPHONE HYDROCHLORIDE 1 MG/ML
0.5 INJECTION, SOLUTION INTRAMUSCULAR; INTRAVENOUS; SUBCUTANEOUS EVERY 30 MIN PRN
Status: DISCONTINUED | OUTPATIENT
Start: 2025-03-16 | End: 2025-03-20

## 2025-03-16 RX ORDER — MORPHINE SULFATE 4 MG/ML
4 INJECTION, SOLUTION INTRAMUSCULAR; INTRAVENOUS EVERY 2 HOUR PRN
Status: DISCONTINUED | OUTPATIENT
Start: 2025-03-16 | End: 2025-03-20

## 2025-03-16 RX ORDER — ACETAMINOPHEN 500 MG
500 TABLET ORAL EVERY 4 HOURS PRN
Status: DISCONTINUED | OUTPATIENT
Start: 2025-03-16 | End: 2025-03-20

## 2025-03-16 RX ORDER — ENOXAPARIN SODIUM 100 MG/ML
40 INJECTION SUBCUTANEOUS DAILY
Status: DISCONTINUED | OUTPATIENT
Start: 2025-03-16 | End: 2025-03-18

## 2025-03-16 RX ORDER — BISACODYL 10 MG
10 SUPPOSITORY, RECTAL RECTAL
Status: DISCONTINUED | OUTPATIENT
Start: 2025-03-16 | End: 2025-03-23

## 2025-03-16 RX ORDER — ALBUTEROL SULFATE 90 UG/1
2 INHALANT RESPIRATORY (INHALATION) EVERY 4 HOURS PRN
Status: DISCONTINUED | OUTPATIENT
Start: 2025-03-16 | End: 2025-03-23

## 2025-03-16 RX ORDER — HYDROCODONE BITARTRATE AND ACETAMINOPHEN 5; 325 MG/1; MG/1
1 TABLET ORAL EVERY 4 HOURS PRN
Status: DISCONTINUED | OUTPATIENT
Start: 2025-03-16 | End: 2025-03-20

## 2025-03-16 RX ORDER — PROCHLORPERAZINE EDISYLATE 5 MG/ML
5 INJECTION INTRAMUSCULAR; INTRAVENOUS EVERY 8 HOURS PRN
Status: DISCONTINUED | OUTPATIENT
Start: 2025-03-16 | End: 2025-03-20

## 2025-03-16 NOTE — CONSULTS
[facility]  Report of Consultation    Don Cabral Patient Status:  Inpatient    1965 MRN [id number]   Location [facility] 3NW-A Attending Katarzyna Brown, DO   Hosp Day # 0 PCP Stephen Christianson MD     Requesting Physician:  This patient was seen at the request of ED physician, Dr. Navarrete    Reason for Consultation:  Omental infarct    Chief Complaint:  Abdominal pain    History of Present Illness:  Don Cabral is a 59 year old male known to our service, who presents to [facility] on 3/16/2025 with complaints of abdominal pain. Pt was discharged on Friday after being admitted for further work up and management of his abdominal pain, which initially started in February and has resulted in multiple hospitalizations. He has a known omental infarct which has been managed non-operatively. He has completed trial of antibiotics and steroids with initial improvement in symptoms, though ultimately recurring. He was admitted 3/11-3/14 and underwent colonoscopy on 3/13 which showed mild colitis in the cecum, otherwise unrevealing for etiology of his symptoms. He was offered diagnostic laparoscopy with Dr. Valladares, though pt preferred to be discharged home and schedule elective laparoscopy with Dr. Clemens in the coming weeks. Since discharge, pt reports his RLQ pain worsened and unrelieved by Norco, ultimately prompting his return. He denies any n/v. Having normal bowel movements. Denies fevers.     Upon presentation to the hospital, pt hemodynamically stable and afebrile. Work up included CBC which was largely unremarkable, WBC 10.5, Hgb 13.2, Plt 456. CMP entirely within normal limits. Lipase 36. LA wnl at 1.0. Urinalysis unremarkable and without bacteria or blood. CT abd/pel was obtained and revealed progressive fluid and inflammatory changes in the RLQ omentum, suggestive of omental infarct. New wall thickening and stranding of cecum, consistent with colitis. Pt was admitted under the  hospitalist with general surgery on consult.     He has a past medical history significant for atrial fibrillation, diverticulitis, HTN. He has a past surgical history significant for exploratory laparotomy and Zaid's procedures on 4/15/2024 with Dr. Novoa. He then underwent open colostomy reversal on 7/17/2024. He has also had a laparoscopic cholecystectomy.       History:  Past Medical History:    Abdominal pain    Beginning of diverticulitis saga that led to removal part of my large colon    Allergic rhinitis    Anxiety    Arrhythmia    T1 AV block, LBBB    Back pain    Back problem    lower back    Decorative tattoo    Depression    Diverticulitis    ED (erectile dysfunction)    Heart palpitations    Under treatment with [facility] at [place] for arrhythmia    Hemorrhoids    One internal, occasional external    High blood pressure    History of adverse reaction to anesthesia    Patient states he doesnt know exactly what happened but stated he required haldol post anesthesia    Indigestion    Periodic last 20 yrs    Pneumonia due to organism    Presence of other cardiac implants and grafts    Loop recorder for arrhythmia    Seasonal allergies    Shortness of breath    Sleep apnea    CPAP    Stress    Unspecified essential hypertension    Visual impairment    glasses    Wears glasses    Weight gain     Past Surgical History:   Procedure Laterality Date    Cholecystectomy  3/12/2023    Colonoscopy N/A 03/22/2016    [facility].  Hyperplastic polyp only.  Repeat 2026    Colonoscopy  05/10/2023    Colonoscopy N/A 3/13/2025    Procedure: COLONOSCOPY with biopsies and forcep polypectomy;  Surgeon: Job Bacon DO;  Location:  ENDOSCOPY    Dental surgery procedure      [place] teeth removed    Incision/drain abscess extra  1996    Knee arthroscopy Right     Laparoscopic cholecystectomy  02/12/2023    Myringoplasty      Other surgical history      R knee arthroscope    Other surgical history  07/17/2024     Exploratory laparotomy partial colectomy with colorectal anastamosis, extensive adhesiolysis    Part removal colon w end colostomy      Skin tissue procedure unlisted      Tonsillectomy       Family History   Problem Relation Age of Onset    Heart Disorder Father         Ablation required    Hypertension Father     Cancer Father 60        Prostate    Prostate Cancer Father     Heart Disorder Mother         murmur no treatment needed    Hypertension Mother     Stroke Maternal Grandmother     Other (stroke) Maternal Grandmother     Dementia Paternal Grandmother     Heart Disorder Paternal Grandfather         two heart attacks and CHF    Diabetes Paternal Grandfather     Other (Diabetes, type 2) Paternal Grandfather       reports that he quit smoking about 24 years ago. His smoking use included cigarettes. He started smoking about 43 years ago. He has a 9.5 pack-year smoking history. He has never been exposed to tobacco smoke. He has never used smokeless tobacco. He reports that he does not currently use alcohol. He reports that he does not use drugs.    Allergies:  Allergies[1]    Medications:  Medications Prior to Admission   Medication Sig    gabapentin 600 MG Oral Tab Take 1 tablet (600 mg total) by mouth 3 (three) times daily.    sertraline (ZOLOFT) 50 MG Oral Tab 1.5 tablets daily    Econazole Nitrate 1 % External Cream Apply 1 Application topically 2 (two) times daily.    clonazePAM 0.5 MG Oral Tab Take 1 tablet (0.5 mg total) by mouth in the morning, at noon, and at bedtime. (Patient taking differently: Take 1 tablet (0.5 mg total) by mouth in the morning and 1 tablet (0.5 mg total) before bedtime.)    HYDROcodone-acetaminophen 5-325 MG Oral Tab Take 1 tablet by mouth every 6 (six) hours as needed.    Tadalafil 20 MG Oral Tab Take 1 tablet (20 mg total) by mouth daily as needed for Erectile Dysfunction.    Verapamil HCl  MG Oral Capsule SR 24 Hr Take 1 capsule (180 mg total) by mouth once daily.     lisinopril 20 MG Oral Tab Take 1 tablet (20 mg total) by mouth daily.    Albuterol Sulfate  (90 Base) MCG/ACT Inhalation Aero Soln Inhale 2 puffs into the lungs every 4 (four) hours as needed for Wheezing or Shortness of Breath.         Current Facility-Administered Medications:     HYDROmorphone (Dilaudid) 1 MG/ML injection 0.5 mg, 0.5 mg, Intravenous, Q30 Min PRN    albuterol (Ventolin HFA) 108 (90 Base) MCG/ACT inhaler 2 puff, 2 puff, Inhalation, Q4H PRN    lisinopril (Prinivil; Zestril) tab 20 mg, 20 mg, Oral, Daily    clonazePAM (KlonoPIN) tab 0.5 mg, 0.5 mg, Oral, BID    sertraline (Zoloft) tab 50 mg, 50 mg, Oral, Daily    verapamil ER (Calan-SR) tab 180 mg, 180 mg, Oral, Nightly    gabapentin (Neurontin) tab 600 mg, 600 mg, Oral, TID    enoxaparin (Lovenox) 40 MG/0.4ML SUBQ injection 40 mg, 40 mg, Subcutaneous, Daily    melatonin tab 3 mg, 3 mg, Oral, Nightly PRN    lactated ringers infusion, , Intravenous, Continuous    acetaminophen (Tylenol Extra Strength) tab 500 mg, 500 mg, Oral, Q4H PRN    acetaminophen (Tylenol) tab 650 mg, 650 mg, Oral, Q4H PRN **OR** HYDROcodone-acetaminophen (Norco) 5-325 MG per tab 1 tablet, 1 tablet, Oral, Q4H PRN **OR** HYDROcodone-acetaminophen (Norco) 5-325 MG per tab 2 tablet, 2 tablet, Oral, Q4H PRN    morphINE PF 2 MG/ML injection 1 mg, 1 mg, Intravenous, Q2H PRN **OR** morphINE PF 2 MG/ML injection 2 mg, 2 mg, Intravenous, Q2H PRN **OR** morphINE PF 4 MG/ML injection 4 mg, 4 mg, Intravenous, Q2H PRN    polyethylene glycol (PEG 3350) (Miralax) 17 g oral packet 17 g, 17 g, Oral, Daily PRN    sennosides (Senokot) tab 17.2 mg, 17.2 mg, Oral, Nightly PRN    bisacodyl (Dulcolax) 10 MG rectal suppository 10 mg, 10 mg, Rectal, Daily PRN    fleet enema (Fleet) rectal enema 133 mL, 1 enema, Rectal, Once PRN    ondansetron (Zofran) 4 MG/2ML injection 4 mg, 4 mg, Intravenous, Q6H PRN    prochlorperazine (Compazine) 10 MG/2ML injection 5 mg, 5 mg, Intravenous, Q8H PRN     ketorolac (Toradol) 15 MG/ML injection 15 mg, 15 mg, Intravenous, Q6H PRN    ampicillin-sulbactam (Unasyn) 3 g in sodium chloride 0.9% 100mL IVPB-ADD, 3 g, Intravenous, Q6H    Review of Systems:  Review of Systems   Constitutional:  Negative for chills, fever and unexpected weight change.   HENT:  Negative for trouble swallowing.    Respiratory:  Negative for chest tightness and shortness of breath.    Cardiovascular:  Negative for chest pain and leg swelling.   Gastrointestinal:  Positive for abdominal pain. Negative for heartburn, nausea, vomiting, diarrhea, constipation, blood in stool, abdominal distention, anal bleeding and rectal pain.   Genitourinary:  Negative for dysuria, flank pain, scrotal swelling and testicular pain.   Musculoskeletal:  Negative for back pain and neck stiffness.   Skin:  Negative for color change and rash.   Neurological:  Negative for dizziness, weakness and light-headedness.       Physical Exam:  /61 (BP Location: Right arm)   Pulse 67   Temp 98.5 °F (36.9 °C) (Oral)   Resp 18   Wt 270 lb (122.5 kg)   SpO2 96%   BMI 33.75 kg/m²   Physical Exam  Constitutional:       General: He is not in acute distress.     Appearance: Normal appearance. He is not ill-appearing or toxic-appearing.   HENT:      Head: Normocephalic and atraumatic.      Nose: Nose normal.      Mouth/Throat:      Mouth: Mucous membranes are moist.   Eyes:      Conjunctiva/sclera: Conjunctivae normal.   Cardiovascular:      Rate and Rhythm: Normal rate and regular rhythm.   Pulmonary:      Effort: Pulmonary effort is normal. No respiratory distress.   Abdominal:      General: There is no distension.      Palpations: Abdomen is soft.      Tenderness: There is abdominal tenderness (focal ttp RLQ). There is no guarding or rebound.   Musculoskeletal:         General: No swelling. Normal range of motion.      Cervical back: Neck supple.   Skin:     General: Skin is warm and dry.      Coloration: Skin is not  jaundiced.   Neurological:      General: No focal deficit present.      Mental Status: He is alert.   Psychiatric:         Mood and Affect: Mood normal.         Behavior: Behavior normal.         Laboratory Data:  Recent Labs   Lab 03/11/25  1450 03/12/25 0522 03/13/25 0455 03/16/25  1230   RBC 5.09 4.58 4.55 4.67   HGB 14.4 13.1 12.9* 13.2   HCT 42.7 40.5 38.3* 40.0   MCV 83.9 88.4 84.2 85.7   MCH 28.3 28.6 28.4 28.3   MCHC 33.7 32.3 33.7 33.0   RDW 13.8 14.1 13.9 13.9   NEPRELIM 6.17  --  5.00 7.90*   WBC 9.1 8.2 7.7 10.5   .0 342.0 379.0 456.0*       Recent Labs   Lab 03/11/25  1450 03/12/25 0522 03/13/25 0455 03/16/25  1230   * 88 80 92   BUN 9 11 14 11   CREATSERUM 0.89 0.81 0.84 0.85   CA 9.6 9.3 9.3 9.7   ALB 4.7  --   --  4.8    139 140 140   K 4.0 4.3 4.0 3.7    103 102 103   CO2 31.0 29.0 28.0 29.0   ALKPHO 77  --   --  64   AST 18  --   --  14   ALT 20  --   --  15   BILT 0.5  --   --  0.8   TP 7.0  --   --  7.3         No results for input(s): \"PTP\", \"INR\", \"PTT\" in the last 168 hours.      CT ABDOMEN+PELVIS(CONTRAST ONLY)(CPT=74177)    Result Date: 3/16/2025  CONCLUSION:   Progressive fluid and inflammatory change about the right lower quadrant omentum most suggestive of omental infarct.  There is new wall thickening and stranding of the cecum suggesting segmental colitis with differential including infectious, inflammatory, and ischemic etiologies.  Clinical and biochemical correlation advised.   LOCATION:  [id number]   Dictated by (CST): Toni Caraballo MD on 3/16/2025 at 1:26 PM     Finalized by (CST): Toni Caraballo MD on 3/16/2025 at 1:40 PM            Medical Decision Making         Impression:  Patient Active Problem List   Diagnosis    Anxiety    Essential hypertension    ED (erectile dysfunction)    NELLIE (obstructive sleep apnea)-mild, 8.4    Generalized anxiety disorder    Hypokalemia    Hyperglycemia    Tooth pain    Daytime hypersomnolence    Symptomatic bradycardia     Palpitations    Slow heart rate    PVC (premature ventricular contraction)    Bipolar 1 disorder (HCC)    Sigmoid diverticulitis    Acute diverticulitis    Intractable abdominal pain    Postoperative examination    Preop testing    Right lower quadrant abdominal pain    Abdominal pain, acute    Intra-abdominal infection    Epiploic appendagitis    Implantable loop recorder present    LBBB (left bundle branch block)    LVH (left ventricular hypertrophy)    MDD (major depressive disorder), recurrent severe, without psychosis (HCC)    Dizziness    Elevated LFTs    Essential hypertension, benign    CLARIBEL (generalized anxiety disorder)    Panic disorder    PVCs (premature ventricular contractions)    Bradycardia    Abdominal pain, right lower quadrant    Omental infarction (HCC)    Depression with anxiety    History of cardiac arrhythmia    Primary hypertension     Omental Infarct    Plan:  Plan discussed with Dr. Burgos  Pt with recurrent hospitalizations. Not improving with non-operative management.  Plan to proceed with diagnostic laparoscopy, possible open, omentectomy, possible bowel resection  Pt requesting Dr. Clemens as his surgeon. Definitive surgical plans and timing will be determined tomorrow per Dr. Clemens and OR availability   May have clear liquids today. NPO at midnight.   Analgesics and antiemetics as needed  All of his questions were addressed    Maria E Forte PA-C  3/16/2025  4:54 PM      The patient was independently examined.  I agree with the PAs assessment and plan.      This is a 59-year-old gentleman with omental infarct    CT images were reviewed personally by me  CT shows inflammatory changes near the omentum in the right lower quadrant with adjacent inflammatory changes to the cecum  Patient denies any nausea vomiting  He denies any change to his bowel habits  He continues to experience pain with localized peritonitis in the right lower quadrant  No acute surgical intervention  Patient  appears to have progression of the omental infarct  I discussed with the patient that this could be a twisting of the tissue in his abdomen now ischemic  Okay for clear liquids, n.p.o. at midnight  Pain control as needed  Will discuss with Dr. Clemens and rest of team for diagnostic laparoscopy timing  Surgery will continue to follow  Rest of care per primary    More than 50% of clinical time and 100% MDM was performed by me    Thank you for letting me participate in the care of this patient      Meli Burgos MD  [facility] General Surgery  3/16/2025  7:00 PM         [1]   Allergies  Allergen Reactions    Ciprofloxacin OTHER (SEE COMMENTS)     Increases risk for arrhythmia

## 2025-03-16 NOTE — PROGRESS NOTES
Upon assessment, A&Ox4. Glasses & CPAP at bedside. RA.  WDL. NSR/SB on tele. HR in the 60s. SCDs while in bed, Lovenox ordered for VTE proph. Tolerating clears. Abdomen soft and rounded. Active bowel sounds. Voids. Pain managed per MAR. Up ad james. IVF and IV ABX infusing. Denies SOB, CP, lightheadedness, and N/V at this time. POC discussed, all questions answered and concerns addressed. Safety precautions in place. Frequent rounds performed.

## 2025-03-16 NOTE — ED QUICK NOTES
Orders for admission, patient is aware of plan and ready to go upstairs. Any questions, please call ED RN Bj at extension [phone].     Patient Covid vaccination status: Fully vaccinated     COVID Test Ordered in ED: None    COVID Suspicion at Admission: N/A    Running Infusions:  None    Mental Status/LOC at time of transport: A/Ox3    Other pertinent information:   CIWA score: N/A   NIH score:  N/A

## 2025-03-16 NOTE — H&P
[facility]     Don Cabral Patient Status:  Inpatient    1965 MRN [id number]   Location [facility] 3NW-A Attending Katarzyna Brown, DO   Hosp Day # 0 PCP Stephen Christianson MD     Chief Complaint: abdominal pain    Subjective:    History of Present Illness:     Don Cabral is a 59 year old male with pmhx of diverticulitis, chronic omental infarct, anxiety/depression, HTN, NELLIE who presents with complaint of worsening abdominal pain. He was just recently discharged from the hospital 2 days ago after admission for abdominal pain and omental infarct that was being managed with supportive care. He reports his symptoms were initially well managed with course of abx and steroids. His symptoms returned a few days after completing abx/steroids and he was subsequently hospitalized. He underwent colonoscopy on 3/13 which showed mild cecal colitis. He was offered diagnostic laparoscopy during his admission, but opted to discharge home with plan to f/u closely with Dr. Clemens for consideration of elective ex-lap for further evaluation of his symptoms. His symptoms worsened yesterday and today and he didn't feel he could control his pain with PO norco. He denies any fevers/chills, melena, hematochezia, urinary complaints, n/v/d.     History/Other:    Past Medical History:  Past Medical History:    Abdominal pain    Beginning of diverticulitis saga that led to removal part of my large colon    Allergic rhinitis    Anxiety    Arrhythmia    T1 AV block, LBBB    Back pain    Back problem    lower back    Decorative tattoo    Depression    Diverticulitis    ED (erectile dysfunction)    Heart palpitations    Under treatment with [facility] at [place] for arrhythmia    Hemorrhoids    One internal, occasional external    High blood pressure    History of adverse reaction to anesthesia    Patient states he doesnt know exactly what happened but stated he required  haldol post anesthesia    Indigestion    Periodic last 20 yrs    Pneumonia due to organism    Presence of other cardiac implants and grafts    Loop recorder for arrhythmia    Seasonal allergies    Shortness of breath    Sleep apnea    CPAP    Stress    Unspecified essential hypertension    Visual impairment    glasses    Wears glasses    Weight gain     Past Surgical History:   Past Surgical History:   Procedure Laterality Date    Cholecystectomy  3/12/2023    Colonoscopy N/A 03/22/2016    [facility].  Hyperplastic polyp only.  Repeat 2026    Colonoscopy  05/10/2023    Colonoscopy N/A 3/13/2025    Procedure: COLONOSCOPY with biopsies and forcep polypectomy;  Surgeon: Job Bacon DO;  Location:  ENDOSCOPY    Dental surgery procedure      [place] teeth removed    Incision/drain abscess extra  1996    Knee arthroscopy Right     Laparoscopic cholecystectomy  02/12/2023    Myringoplasty      Other surgical history      R knee arthroscope    Other surgical history  07/17/2024    Exploratory laparotomy partial colectomy with colorectal anastamosis, extensive adhesiolysis    Part removal colon w end colostomy      Skin tissue procedure unlisted      Tonsillectomy        Family History:   Family History   Problem Relation Age of Onset    Heart Disorder Father         Ablation required    Hypertension Father     Cancer Father 60        Prostate    Prostate Cancer Father     Heart Disorder Mother         murmur no treatment needed    Hypertension Mother     Stroke Maternal Grandmother     Other (stroke) Maternal Grandmother     Dementia Paternal Grandmother     Heart Disorder Paternal Grandfather         two heart attacks and CHF    Diabetes Paternal Grandfather     Other (Diabetes, type 2) Paternal Grandfather      Social History:    reports that he quit smoking about 24 years ago. His smoking use included cigarettes. He started smoking about 43 years ago. He has a 9.5 pack-year smoking history. He has never been exposed  to tobacco smoke. He has never used smokeless tobacco. He reports that he does not currently use alcohol. He reports that he does not use drugs.     Allergies: Allergies[1]    Medications:  Medications Ordered Prior to Encounter[2]    Review of Systems:   A comprehensive review of systems was completed.    Pertinent positives and negatives noted in the HPI.    Objective:   Physical Exam:    /67   Pulse 73   Temp 99 °F (37.2 °C) (Oral)   Resp 18   Wt 270 lb (122.5 kg)   SpO2 96%   BMI 33.75 kg/m²   General: No acute distress, Alert  Respiratory: No rhonchi, no wheezes  Cardiovascular: S1, S2. Regular rate and rhythm  Abdomen: Soft, RLQ/central TTP, non-distended, positive bowel sounds  Neuro: No new focal deficits  Extremities: No edema    Results:    Labs:      Labs Last 24 Hours:    Recent Labs   Lab 03/11/25  1450 03/12/25  0522 03/13/25 0455 03/16/25  1230   RBC 5.09 4.58 4.55 4.67   HGB 14.4 13.1 12.9* 13.2   HCT 42.7 40.5 38.3* 40.0   MCV 83.9 88.4 84.2 85.7   MCH 28.3 28.6 28.4 28.3   MCHC 33.7 32.3 33.7 33.0   RDW 13.8 14.1 13.9 13.9   NEPRELIM 6.17  --  5.00 7.90*   WBC 9.1 8.2 7.7 10.5   .0 342.0 379.0 456.0*       Recent Labs   Lab 03/11/25  1450 03/12/25  0522 03/13/25  0455 03/16/25  1230   * 88 80 92   BUN 9 11 14 11   CREATSERUM 0.89 0.81 0.84 0.85   EGFRCR 99 102 101 101   CA 9.6 9.3 9.3 9.7   ALB 4.7  --   --  4.8    139 140 140   K 4.0 4.3 4.0 3.7    103 102 103   CO2 31.0 29.0 28.0 29.0   ALKPHO 77  --   --  64   AST 18  --   --  14   ALT 20  --   --  15   BILT 0.5  --   --  0.8   TP 7.0  --   --  7.3       Estimated Glomerular Filtration Rate: 101 mL/min/1.73m2 (result from lab).    Lab Results   Component Value Date    INR 1.01 09/28/2023    INR 1.23 (H) 06/23/2023       No results for input(s): \"TROP\", \"TROPHS\", \"CK\" in the last 168 hours.    No results for input(s): \"TROP\", \"PBNP\" in the last 168 hours.    No results for input(s): \"PCT\" in the last 168  hours.    Imaging: Imaging data reviewed in Epic.    Assessment & Plan:      #Omental infarction  #Acute colitis  #Intractable abdominal pain  -CTAP reviewed  -NPO, IVF, pain control, ant-emetics PRN  -empiric unasyn  -general surgery consulted from ED     #Hx of diverticulitis     #HTN  -lisinopril     #Chronic afib  #LBBB  -verapamil    #Anxiety  #Major depressive disorder, recurrent, unspecified severity  -sertraline, clonazepam       Plan of care discussed with pt, pt's wife, ED     Katarzyna Brown DO    Supplementary Documentation:     The  [employer] Cures Act makes medical notes like these available to patients in the interest of transparency. Please be advised this is a medical document. Medical documents are intended to carry relevant information, facts as evident, and the clinical opinion of the practitioner. The medical note is intended as peer to peer communication and may appear blunt or direct. It is written in medical language and may contain abbreviations or verbiage that are unfamiliar.                                       [1]   Allergies  Allergen Reactions    Ciprofloxacin OTHER (SEE COMMENTS)     Increases risk for arrhythmia   [2]   Current Facility-Administered Medications on File Prior to Encounter   Medication Dose Route Frequency Provider Last Rate Last Admin    [] naloxone (Narcan) 0.4 MG/ML injection 80 mcg  80 mcg Intravenous PRN Braxton Chaudhry MD        [COMPLETED] polyethylene glycol-electrolyte (Golytely) 236 g oral solution 4,000 mL  4,000 mL Oral Once Rosa Villatoro APRN   4,000 mL at 25 1702    [COMPLETED] ketorolac (Toradol) 15 MG/ML injection 15 mg  15 mg Intravenous Once Charan Prieto MD   15 mg at 25 1501    [] sodium chloride 0.9% infusion   Intravenous Continuous Charan Prieto MD        [] HYDROmorphone (Dilaudid) 1 MG/ML injection 0.5 mg  0.5 mg Intravenous Q30 Min PRN Charan Prieto MD        []  ondansetron (Zofran) 4 MG/2ML injection 4 mg  4 mg Intravenous Q4H PRN Charan Prieto MD        [] ketorolac (Toradol) 15 MG/ML injection 15 mg  15 mg Intravenous Q6H PRN Avery Cardenas MD   15 mg at 25 0059    [COMPLETED] iopamidol 76% (ISOVUE-370) injection for power injector  100 mL Intravenous ONCE PRN Charan Prieto MD   100 mL at 25 1659    [COMPLETED] aspirin chewable tab 324 mg  324 mg Oral Once Charan Prieto MD   324 mg at 25 1803    [COMPLETED] methylPREDNISolone (Medrol) tab 8 mg  8 mg Oral Once Latanya Richardson PA-C   8 mg at 02/10/25 1029    Followed by    [COMPLETED] methylPREDNISolone (Medrol) tab 4 mg  4 mg Oral Once Latanya Richardson PA-C   4 mg at 02/10/25 1159    Followed by    [COMPLETED] methylPREDNISolone (Medrol) tab 4 mg  4 mg Oral Once Latanya Richardson PA-C   4 mg at 02/10/25 1700    Followed by    [COMPLETED] methylPREDNISolone (Medrol) tab 8 mg  8 mg Oral nightly Latanya Richardson PA-C   8 mg at 02/10/25 2055    [COMPLETED] sodium chloride 0.9 % IV bolus 1,000 mL  1,000 mL Intravenous Once Talya Rubin MD   Stopped at 25 1110    [COMPLETED] ondansetron (Zofran) 4 MG/2ML injection 4 mg  4 mg Intravenous Once Talya Rubin MD   4 mg at 25 0830    [COMPLETED] iopamidol 76% (ISOVUE-370) injection for power injector  100 mL Intravenous ONCE PRN Talya Rubin MD   100 mL at 25 0901    [COMPLETED] cefTRIAXone (Rocephin) 2 g in sodium chloride 0.9% 100 mL IVPB-ADDV  2 g Intravenous Once Talya Rubin MD   Stopped at 25 1259    [COMPLETED] metroNIDAZOLE in sodium chloride 0.79% (Flagyl) 5 mg/mL IVPB premix 500 mg  500 mg Intravenous Once Talya Rubin MD   Stopped at 25 1300    [COMPLETED] ketorolac (Toradol) 15 MG/ML injection 15 mg  15 mg Intravenous Once Tulio Cordoba MD   15 mg at 25 0701    [COMPLETED] iopamidol 76% (ISOVUE-370) injection for power injector  90 mL Intravenous ONCE PRN Rosa Marquez MD    90 mL at 01/17/25 1538     Current Outpatient Medications on File Prior to Encounter   Medication Sig Dispense Refill    gabapentin 600 MG Oral Tab Take 1 tablet (600 mg total) by mouth 3 (three) times daily. 270 tablet 0    sertraline (ZOLOFT) 50 MG Oral Tab 1.5 tablets daily 135 tablet 0    Econazole Nitrate 1 % External Cream Apply 1 Application topically 2 (two) times daily. 85 g 2    clonazePAM 0.5 MG Oral Tab Take 1 tablet (0.5 mg total) by mouth in the morning, at noon, and at bedtime. (Patient taking differently: Take 1 tablet (0.5 mg total) by mouth in the morning and 1 tablet (0.5 mg total) before bedtime.)      HYDROcodone-acetaminophen 5-325 MG Oral Tab Take 1 tablet by mouth every 6 (six) hours as needed. 15 tablet 0    Tadalafil 20 MG Oral Tab Take 1 tablet (20 mg total) by mouth daily as needed for Erectile Dysfunction. 12 tablet 1    Verapamil HCl  MG Oral Capsule SR 24 Hr Take 1 capsule (180 mg total) by mouth once daily.      lisinopril 20 MG Oral Tab Take 1 tablet (20 mg total) by mouth daily. 90 tablet 3    Albuterol Sulfate  (90 Base) MCG/ACT Inhalation Aero Soln Inhale 2 puffs into the lungs every 4 (four) hours as needed for Wheezing or Shortness of Breath. 1 each 3

## 2025-03-16 NOTE — ED INITIAL ASSESSMENT (HPI)
Admitted last week for RLQ pain related to increased inflammation in the area without being able to find a source, recent colonoscopies negative. Planned to have outpatient exploratory laparoscopic procedure but pain has increased again over the weekend. Nausea, no vomiting or diarrhea. Pain to RLQ

## 2025-03-16 NOTE — ED PROVIDER NOTES
Patient Seen in: [facility] Emergency Department      History     Chief Complaint   Patient presents with    Abdomen/Flank Pain     Stated Complaint: rlq pain was admitted last week    Subjective:   HPI      Patient is a 59-year-old male presents to ED for evaluation of right lower quadrant pain that started mid-to-late February.  Has been hospitalized multiple times for this.  Most recently discharged last week for intractable pain suspected omental infarct versus epiploic appendagitis.  On his last hospitalization he had a colonoscopy March 13 showing pandiverticulosis and mild colitis in the cecum and ascending polyp which was removed, hemorrhoids.  Seen by surgery elective laparoscopy as outpatient was offered and patient was discharged.  Since he has been home, pain is increased now more on his right hemiabdomen superiorly and Norco was not helping.  No vomiting diarrhea or blood per orifice no fevers no other complaints.      Objective:     Past Medical History:    Abdominal pain    Beginning of diverticulitis saga that led to removal part of my large colon    Allergic rhinitis    Anxiety    Arrhythmia    T1 AV block, LBBB    Back pain    Back problem    lower back    Decorative tattoo    Depression    Diverticulitis    ED (erectile dysfunction)    Heart palpitations    Under treatment with [place] at [place] for arrhythmia    Hemorrhoids    One internal, occasional external    High blood pressure    History of adverse reaction to anesthesia    Patient states he doesnt know exactly what happened but stated he required haldol post anesthesia    Indigestion    Periodic last 20 yrs    Pneumonia due to organism    Presence of other cardiac implants and grafts    Loop recorder for arrhythmia    Seasonal allergies    Shortness of breath    Sleep apnea    CPAP    Stress    Unspecified essential hypertension    Visual impairment    glasses    Wears glasses    Weight gain              Past Surgical  History:   Procedure Laterality Date    Cholecystectomy  3/12/2023    Colonoscopy N/A 2016    [place].  Hyperplastic polyp only.  Repeat     Colonoscopy  05/10/2023    Colonoscopy N/A 3/13/2025    Procedure: COLONOSCOPY with biopsies and forcep polypectomy;  Surgeon: Job Bacon DO;  Location:  ENDOSCOPY    Dental surgery procedure      [place] teeth removed    Incision/drain abscess extra      Knee arthroscopy Right     Laparoscopic cholecystectomy  2023    Myringoplasty      Other surgical history      R knee arthroscope    Other surgical history  2024    Exploratory laparotomy partial colectomy with colorectal anastamosis, extensive adhesiolysis    Part removal colon w end colostomy      Skin tissue procedure unlisted      Tonsillectomy                  Social History     Socioeconomic History    Marital status:    Occupational History    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[employer]   Tobacco Use    Smoking status: Former     Current packs/day: 0.00     Average packs/day: 0.5 packs/day for 19.0 years (9.5 ttl pk-yrs)     Types: Cigarettes     Start date: 1981     Quit date: 2000     Years since quittin.7     Passive exposure: Never    Smokeless tobacco: Never   Vaping Use    Vaping status: Never Used   Substance and Sexual Activity    Alcohol use: Not Currently     Comment: Stopped alcohol 2000    Drug use: No    Sexual activity: Yes     Partners: Female   Other Topics Concern    Caffeine Concern Yes     Comment: coffee in AM/ soda     Exercise Yes     Comment: X1-2 times per week      Social Drivers of Health     Food Insecurity: No Food Insecurity (3/11/2025)    NCSS - Food Insecurity     Worried About Running Out of Food in the Last Year: No     Ran Out of Food in the Last Year: No   Transportation Needs: No Transportation Needs (3/11/2025)    NCSS - Transportation     Lack of Transportation: No   Housing Stability: Not At Risk (3/11/2025)    NCSS -  Housing/Utilities     Has Housing: Yes     Worried About Losing Housing: No     Unable to Get Utilities: No                  Physical Exam     ED Triage Vitals [03/16/25 1203]   /85   Pulse 93   Resp 18   Temp 99 °F (37.2 °C)   Temp src Oral   SpO2 100 %   O2 Device None (Room air)       Current Vitals:   Vital Signs  BP: 118/77  Pulse: 81  Resp: 18  Temp: 99 °F (37.2 °C)  Temp src: Oral  MAP (mmHg): 88    Oxygen Therapy  SpO2: 96 %  O2 Device: None (Room air)        Physical Exam  Vitals and nursing note reviewed.   Constitutional:       General: He is not in acute distress.     Appearance: He is well-developed. He is not toxic-appearing.   HENT:      Head: Normocephalic and atraumatic.   Eyes:      General: No scleral icterus.     Conjunctiva/sclera: Conjunctivae normal.   Cardiovascular:      Rate and Rhythm: Normal rate.   Pulmonary:      Effort: Pulmonary effort is normal. No respiratory distress.   Abdominal:      General: There is no distension.      Tenderness: There is abdominal tenderness in the right upper quadrant and right lower quadrant.   Musculoskeletal:         General: No tenderness. Normal range of motion.      Cervical back: Normal range of motion and neck supple.   Skin:     General: Skin is warm and dry.      Findings: No rash.   Neurological:      Mental Status: He is alert and oriented to person, place, and time.      Motor: No abnormal muscle tone.      Coordination: Coordination normal.   Psychiatric:         Behavior: Behavior normal.            ED Course     Labs Reviewed   CBC WITH DIFFERENTIAL WITH PLATELET - Abnormal; Notable for the following components:       Result Value    .0 (*)     Neutrophil Absolute Prelim 7.90 (*)     Neutrophil Absolute 7.90 (*)     All other components within normal limits   COMP METABOLIC PANEL (14) - Normal   LIPASE - Normal   LACTIC ACID, PLASMA - Normal   URINALYSIS WITH CULTURE REFLEX                    MDM             -History source other  than patient - wife        -Comorbidities did add complexity to the management are mentioned in the HPI above        -I personally reviewed the prior external notes and the medical record to obtain additional history reviewed last admission and colonoscopy report demonstrating pandiverticulosis among other findings.  This was done on March 13, 2025        -DDX: Includes but not limited to intractable abdominal pain, ischemic colitis, epiploic appendagitis, appendicitis, pancreatitis, which is a medical condition that can pose a threat to life/function             -I personally reviewed the CT findings and it shows omental infarct  Please refer to radiology report for official interpretation       CT ABDOMEN+PELVIS(CONTRAST ONLY)(CPT=74177)   Final Result   PROCEDURE:  CT ABDOMEN+PELVIS (CONTRAST ONLY) (CPT=74177)       COMPARISON:  [facility] , CT, CT ABDOMEN+PELVIS(CONTRAST ONLY)(CPT=74177),    3/11/2025, 4:51 PM.       INDICATIONS:  rlq pain was admitted last week       TECHNIQUE:  CT scanning was performed from the dome of the diaphragm to    the pubic symphysis with non-ionic intravenous contrast material. Post    contrast coronal MPR imaging was performed.  Dose reduction techniques    were used. Dose information is    transmitted to the ACR (American College of Radiology) NRDR (National    Radiology Data Registry) which includes the Dose Index Registry.       PATIENT STATED HISTORY:(As transcribed by Technologist)  Patient states    right lower abdominal pain.        CONTRAST USED:  100cc of Isovue 370       FINDINGS:     LIVER:  Normal morphology with stable hemangioma of the posterior right    lobe.   BILIARY:  Cholecystectomy.  Normal bile ducts.   PANCREAS:  No lesion, fluid collection, ductal dilatation, or atrophy.     SPLEEN:  No enlargement or focal lesion.     KIDNEYS:  No mass, obstruction, or calcification.     ADRENALS:  No mass or enlargement.     AORTA/VASCULAR:  No aneurysm or dissection.  Small  portosystemic    collaterals of the upper abdomen noted suggesting portal hypertension.   RETROPERITONEUM:  No mass or adenopathy.     BOWEL/MESENTERY:  There is new circumferential thickening and stranding of    the cecum.  This is in conjunction with persistent and progressive    inflammatory stranding and small volume of fluid within the overlying    omentum.  The omental the inflammatory    changes extend superiorly along the pericolic gutter to the intersegmental    fissure of the left hepatic lobe.  An omental infarct is suspected.     Partial sigmoid resection.   ABDOMINAL WALL:  Scarring of the anterior abdominal wall.  No hernia.   URINARY BLADDER:  No visible focal wall thickening, lesion, or calculus.     PELVIC NODES:  No adenopathy.     PELVIC ORGANS:  No visible mass.  Pelvic organs appropriate for patient    age.     BONES:  No bony lesion or fracture.  Disc and endplate degenerative change    of the lumbosacral junction.   LUNG BASES:  No visible pulmonary or pleural disease.     OTHER:  Negative.                           =====   CONCLUSION:         Progressive fluid and inflammatory change about the right lower quadrant    omentum most suggestive of omental infarct.  There is new wall thickening    and stranding of the cecum suggesting segmental colitis with differential    including infectious,    inflammatory, and ischemic etiologies.  Clinical and biochemical    correlation advised.           LOCATION:  [place] [id number]           Dictated by (CST): Toni Caraballo MD on 3/16/2025 at 1:26 PM        Finalized by (CST): Toni Caraballo MD on 3/16/2025 at 1:40 PM             Labs Reviewed   CBC WITH DIFFERENTIAL WITH PLATELET - Abnormal; Notable for the following components:       Result Value    .0 (*)     Neutrophil Absolute Prelim 7.90 (*)     Neutrophil Absolute 7.90 (*)     All other components within normal limits   COMP METABOLIC PANEL (14) - Normal   LIPASE - Normal   LACTIC ACID, PLASMA - Normal    URINALYSIS WITH CULTURE REFLEX     Fine discussed with general surgery and hospitalist patient will be admitted for further care.  He was given pain control and admitted to the floor in stable condition.      Admission disposition: 3/16/2025  1:59 PM           Medical Decision Making      Disposition and Plan     Clinical Impression:  1. Omental infarction (HCC)         Disposition:  Admit  3/16/2025  1:59 pm    Follow-up:  No follow-up provider specified.        Medications Prescribed:  Current Discharge Medication List              Supplementary Documentation:         Hospital Problems       Present on Admission  Date Reviewed: 3/13/2025            ICD-10-CM Noted POA    * (Principal) Omental infarction (HCC) K55.069 3/11/2025 Unknown

## 2025-03-16 NOTE — PLAN OF CARE
Pt admitted from the ER via wheelchair. Pt discharged Friday but increasing right lower abdominal pain had him return to the ER. Abd rounded with tenderness to RLQ. Pt denies nausea. Pt declined 2 person skin check stating he was just at his dermatologist and skin was all good; pt denied any open areas, redness. Awaiting Dr Brown for orders.

## 2025-03-17 ENCOUNTER — ANESTHESIA EVENT (OUTPATIENT)
Dept: SURGERY | Facility: HOSPITAL | Age: 60
End: 2025-03-17
Payer: COMMERCIAL

## 2025-03-17 ENCOUNTER — ANESTHESIA (OUTPATIENT)
Dept: SURGERY | Facility: HOSPITAL | Age: 60
End: 2025-03-17
Payer: COMMERCIAL

## 2025-03-17 LAB
ANION GAP SERPL CALC-SCNC: 4 MMOL/L (ref 0–18)
ANTIBODY SCREEN: NEGATIVE
BASOPHILS # BLD AUTO: 0.01 X10(3) UL (ref 0–0.2)
BASOPHILS NFR BLD AUTO: 0.1 %
BUN BLD-MCNC: 15 MG/DL (ref 9–23)
CALCIUM BLD-MCNC: 9.2 MG/DL (ref 8.7–10.6)
CHLORIDE SERPL-SCNC: 105 MMOL/L (ref 98–112)
CO2 SERPL-SCNC: 32 MMOL/L (ref 21–32)
CREAT BLD-MCNC: 0.88 MG/DL
EGFRCR SERPLBLD CKD-EPI 2021: 100 ML/MIN/1.73M2 (ref 60–?)
EOSINOPHIL # BLD AUTO: 0.15 X10(3) UL (ref 0–0.7)
EOSINOPHIL NFR BLD AUTO: 1.7 %
ERYTHROCYTE [DISTWIDTH] IN BLOOD BY AUTOMATED COUNT: 13.7 %
GLUCOSE BLD-MCNC: 90 MG/DL (ref 70–99)
HCT VFR BLD AUTO: 37.8 %
HGB BLD-MCNC: 12.2 G/DL
IMM GRANULOCYTES # BLD AUTO: 0.06 X10(3) UL (ref 0–1)
IMM GRANULOCYTES NFR BLD: 0.7 %
LYMPHOCYTES # BLD AUTO: 1.59 X10(3) UL (ref 1–4)
LYMPHOCYTES NFR BLD AUTO: 18.1 %
MAGNESIUM SERPL-MCNC: 1.9 MG/DL (ref 1.6–2.6)
MCH RBC QN AUTO: 28 PG (ref 26–34)
MCHC RBC AUTO-ENTMCNC: 32.3 G/DL (ref 31–37)
MCV RBC AUTO: 86.9 FL
MONOCYTES # BLD AUTO: 0.73 X10(3) UL (ref 0.1–1)
MONOCYTES NFR BLD AUTO: 8.3 %
NEUTROPHILS # BLD AUTO: 6.23 X10 (3) UL (ref 1.5–7.7)
NEUTROPHILS # BLD AUTO: 6.23 X10(3) UL (ref 1.5–7.7)
NEUTROPHILS NFR BLD AUTO: 71.1 %
OSMOLALITY SERPL CALC.SUM OF ELEC: 292 MOSM/KG (ref 275–295)
PLATELET # BLD AUTO: 395 10(3)UL (ref 150–450)
POTASSIUM SERPL-SCNC: 4.1 MMOL/L (ref 3.5–5.1)
RBC # BLD AUTO: 4.35 X10(6)UL
RH BLOOD TYPE: POSITIVE
SODIUM SERPL-SCNC: 141 MMOL/L (ref 136–145)
WBC # BLD AUTO: 8.8 X10(3) UL (ref 4–11)

## 2025-03-17 PROCEDURE — 44120 REMOVAL OF SMALL INTESTINE: CPT | Performed by: SURGERY

## 2025-03-17 PROCEDURE — 99232 SBSQ HOSP IP/OBS MODERATE 35: CPT | Performed by: SURGERY

## 2025-03-17 PROCEDURE — 76942 ECHO GUIDE FOR BIOPSY: CPT | Performed by: STUDENT IN AN ORGANIZED HEALTH CARE EDUCATION/TRAINING PROGRAM

## 2025-03-17 PROCEDURE — 99233 SBSQ HOSP IP/OBS HIGH 50: CPT | Performed by: INTERNAL MEDICINE

## 2025-03-17 PROCEDURE — 44180 LAP ENTEROLYSIS: CPT | Performed by: SURGERY

## 2025-03-17 PROCEDURE — 44160 REMOVAL OF COLON: CPT | Performed by: SURGERY

## 2025-03-17 PROCEDURE — 0DBU0ZZ EXCISION OF OMENTUM, OPEN APPROACH: ICD-10-PCS | Performed by: SURGERY

## 2025-03-17 PROCEDURE — 3E0T3BZ INTRODUCTION OF ANESTHETIC AGENT INTO PERIPHERAL NERVES AND PLEXI, PERCUTANEOUS APPROACH: ICD-10-PCS | Performed by: STUDENT IN AN ORGANIZED HEALTH CARE EDUCATION/TRAINING PROGRAM

## 2025-03-17 PROCEDURE — 0DTF0ZZ RESECTION OF RIGHT LARGE INTESTINE, OPEN APPROACH: ICD-10-PCS | Performed by: SURGERY

## 2025-03-17 PROCEDURE — 44005 FREEING OF BOWEL ADHESION: CPT | Performed by: SURGERY

## 2025-03-17 PROCEDURE — 0DNW0ZZ RELEASE PERITONEUM, OPEN APPROACH: ICD-10-PCS | Performed by: SURGERY

## 2025-03-17 RX ORDER — ONDANSETRON 2 MG/ML
INJECTION INTRAMUSCULAR; INTRAVENOUS
Status: COMPLETED
Start: 2025-03-17 | End: 2025-03-17

## 2025-03-17 RX ORDER — HYDROCODONE BITARTRATE AND ACETAMINOPHEN 5; 325 MG/1; MG/1
1 TABLET ORAL ONCE AS NEEDED
Status: ACTIVE | OUTPATIENT
Start: 2025-03-17 | End: 2025-03-17

## 2025-03-17 RX ORDER — PROCHLORPERAZINE EDISYLATE 5 MG/ML
5 INJECTION INTRAMUSCULAR; INTRAVENOUS EVERY 8 HOURS PRN
Status: DISCONTINUED | OUTPATIENT
Start: 2025-03-17 | End: 2025-03-18 | Stop reason: HOSPADM

## 2025-03-17 RX ORDER — HYDROMORPHONE HYDROCHLORIDE 1 MG/ML
0.2 INJECTION, SOLUTION INTRAMUSCULAR; INTRAVENOUS; SUBCUTANEOUS EVERY 5 MIN PRN
Status: DISCONTINUED | OUTPATIENT
Start: 2025-03-17 | End: 2025-03-18 | Stop reason: HOSPADM

## 2025-03-17 RX ORDER — CEFAZOLIN SODIUM 1 G/3ML
INJECTION, POWDER, FOR SOLUTION INTRAMUSCULAR; INTRAVENOUS AS NEEDED
Status: DISCONTINUED | OUTPATIENT
Start: 2025-03-17 | End: 2025-03-17 | Stop reason: SURG

## 2025-03-17 RX ORDER — NALOXONE HYDROCHLORIDE 0.4 MG/ML
0.08 INJECTION, SOLUTION INTRAMUSCULAR; INTRAVENOUS; SUBCUTANEOUS AS NEEDED
Status: DISCONTINUED | OUTPATIENT
Start: 2025-03-17 | End: 2025-03-18 | Stop reason: HOSPADM

## 2025-03-17 RX ORDER — DEXAMETHASONE SODIUM PHOSPHATE 10 MG/ML
INJECTION, SOLUTION INTRAMUSCULAR; INTRAVENOUS AS NEEDED
Status: DISCONTINUED | OUTPATIENT
Start: 2025-03-17 | End: 2025-03-17 | Stop reason: SURG

## 2025-03-17 RX ORDER — ONDANSETRON 2 MG/ML
INJECTION INTRAMUSCULAR; INTRAVENOUS AS NEEDED
Status: DISCONTINUED | OUTPATIENT
Start: 2025-03-17 | End: 2025-03-17 | Stop reason: SURG

## 2025-03-17 RX ORDER — LIDOCAINE HYDROCHLORIDE ANHYDROUS AND DEXTROSE MONOHYDRATE .8; 5 G/100ML; G/100ML
INJECTION, SOLUTION INTRAVENOUS CONTINUOUS PRN
Status: DISCONTINUED | OUTPATIENT
Start: 2025-03-17 | End: 2025-03-17 | Stop reason: SURG

## 2025-03-17 RX ORDER — ACETAMINOPHEN 500 MG
1000 TABLET ORAL ONCE AS NEEDED
Status: ACTIVE | OUTPATIENT
Start: 2025-03-17 | End: 2025-03-17

## 2025-03-17 RX ORDER — PROCHLORPERAZINE EDISYLATE 5 MG/ML
INJECTION INTRAMUSCULAR; INTRAVENOUS
Status: COMPLETED
Start: 2025-03-17 | End: 2025-03-17

## 2025-03-17 RX ORDER — HYDROMORPHONE HYDROCHLORIDE 1 MG/ML
0.6 INJECTION, SOLUTION INTRAMUSCULAR; INTRAVENOUS; SUBCUTANEOUS EVERY 5 MIN PRN
Status: DISCONTINUED | OUTPATIENT
Start: 2025-03-17 | End: 2025-03-18 | Stop reason: HOSPADM

## 2025-03-17 RX ORDER — SODIUM CHLORIDE, SODIUM LACTATE, POTASSIUM CHLORIDE, CALCIUM CHLORIDE 600; 310; 30; 20 MG/100ML; MG/100ML; MG/100ML; MG/100ML
INJECTION, SOLUTION INTRAVENOUS CONTINUOUS
Status: DISCONTINUED | OUTPATIENT
Start: 2025-03-17 | End: 2025-03-18 | Stop reason: HOSPADM

## 2025-03-17 RX ORDER — ONDANSETRON 2 MG/ML
4 INJECTION INTRAMUSCULAR; INTRAVENOUS EVERY 6 HOURS PRN
Status: DISCONTINUED | OUTPATIENT
Start: 2025-03-17 | End: 2025-03-18 | Stop reason: HOSPADM

## 2025-03-17 RX ORDER — HYDROMORPHONE HYDROCHLORIDE 1 MG/ML
INJECTION, SOLUTION INTRAMUSCULAR; INTRAVENOUS; SUBCUTANEOUS
Status: COMPLETED
Start: 2025-03-17 | End: 2025-03-17

## 2025-03-17 RX ORDER — KETAMINE HYDROCHLORIDE 50 MG/ML
INJECTION, SOLUTION INTRAMUSCULAR; INTRAVENOUS AS NEEDED
Status: DISCONTINUED | OUTPATIENT
Start: 2025-03-17 | End: 2025-03-17 | Stop reason: SURG

## 2025-03-17 RX ORDER — ROCURONIUM BROMIDE 10 MG/ML
INJECTION, SOLUTION INTRAVENOUS AS NEEDED
Status: DISCONTINUED | OUTPATIENT
Start: 2025-03-17 | End: 2025-03-17 | Stop reason: SURG

## 2025-03-17 RX ORDER — METOCLOPRAMIDE HYDROCHLORIDE 5 MG/ML
INJECTION INTRAMUSCULAR; INTRAVENOUS AS NEEDED
Status: DISCONTINUED | OUTPATIENT
Start: 2025-03-17 | End: 2025-03-17 | Stop reason: SURG

## 2025-03-17 RX ORDER — KETOROLAC TROMETHAMINE 30 MG/ML
INJECTION, SOLUTION INTRAMUSCULAR; INTRAVENOUS AS NEEDED
Status: DISCONTINUED | OUTPATIENT
Start: 2025-03-17 | End: 2025-03-17 | Stop reason: SURG

## 2025-03-17 RX ORDER — ACETAMINOPHEN 10 MG/ML
INJECTION, SOLUTION INTRAVENOUS AS NEEDED
Status: DISCONTINUED | OUTPATIENT
Start: 2025-03-17 | End: 2025-03-17 | Stop reason: SURG

## 2025-03-17 RX ORDER — HYDROMORPHONE HYDROCHLORIDE 1 MG/ML
0.4 INJECTION, SOLUTION INTRAMUSCULAR; INTRAVENOUS; SUBCUTANEOUS EVERY 5 MIN PRN
Status: DISCONTINUED | OUTPATIENT
Start: 2025-03-17 | End: 2025-03-18 | Stop reason: HOSPADM

## 2025-03-17 RX ORDER — LIDOCAINE HYDROCHLORIDE 10 MG/ML
INJECTION, SOLUTION EPIDURAL; INFILTRATION; INTRACAUDAL; PERINEURAL AS NEEDED
Status: DISCONTINUED | OUTPATIENT
Start: 2025-03-17 | End: 2025-03-17 | Stop reason: SURG

## 2025-03-17 RX ORDER — HYDROCODONE BITARTRATE AND ACETAMINOPHEN 5; 325 MG/1; MG/1
2 TABLET ORAL ONCE AS NEEDED
Status: ACTIVE | OUTPATIENT
Start: 2025-03-17 | End: 2025-03-17

## 2025-03-17 RX ORDER — DEXAMETHASONE SODIUM PHOSPHATE 4 MG/ML
VIAL (ML) INJECTION AS NEEDED
Status: DISCONTINUED | OUTPATIENT
Start: 2025-03-17 | End: 2025-03-17 | Stop reason: SURG

## 2025-03-17 RX ADMIN — KETOROLAC TROMETHAMINE 30 MG: 30 INJECTION, SOLUTION INTRAMUSCULAR; INTRAVENOUS at 22:19:00

## 2025-03-17 RX ADMIN — DEXAMETHASONE SODIUM PHOSPHATE 2 MG: 10 INJECTION, SOLUTION INTRAMUSCULAR; INTRAVENOUS at 19:27:00

## 2025-03-17 RX ADMIN — ROCURONIUM BROMIDE 20 MG: 10 INJECTION, SOLUTION INTRAVENOUS at 19:57:00

## 2025-03-17 RX ADMIN — METOCLOPRAMIDE HYDROCHLORIDE 10 MG: 5 INJECTION INTRAMUSCULAR; INTRAVENOUS at 22:02:00

## 2025-03-17 RX ADMIN — DEXAMETHASONE SODIUM PHOSPHATE 8 MG: 4 MG/ML VIAL (ML) INJECTION at 19:47:00

## 2025-03-17 RX ADMIN — ROCURONIUM BROMIDE 10 MG: 10 INJECTION, SOLUTION INTRAVENOUS at 20:55:00

## 2025-03-17 RX ADMIN — ROCURONIUM BROMIDE 10 MG: 10 INJECTION, SOLUTION INTRAVENOUS at 21:18:00

## 2025-03-17 RX ADMIN — KETAMINE HYDROCHLORIDE 50 MG: 50 INJECTION, SOLUTION INTRAMUSCULAR; INTRAVENOUS at 21:57:00

## 2025-03-17 RX ADMIN — LIDOCAINE HYDROCHLORIDE 50 MG: 10 INJECTION, SOLUTION EPIDURAL; INFILTRATION; INTRACAUDAL; PERINEURAL at 19:13:00

## 2025-03-17 RX ADMIN — LIDOCAINE HYDROCHLORIDE ANHYDROUS AND DEXTROSE MONOHYDRATE 1.5 MG/KG/HR: .8; 5 INJECTION, SOLUTION INTRAVENOUS at 21:50:00

## 2025-03-17 RX ADMIN — SODIUM CHLORIDE, SODIUM LACTATE, POTASSIUM CHLORIDE, CALCIUM CHLORIDE: 600; 310; 30; 20 INJECTION, SOLUTION INTRAVENOUS at 23:01:00

## 2025-03-17 RX ADMIN — ROCURONIUM BROMIDE 30 MG: 10 INJECTION, SOLUTION INTRAVENOUS at 21:48:00

## 2025-03-17 RX ADMIN — CEFAZOLIN SODIUM 3 G: 1 INJECTION, POWDER, FOR SOLUTION INTRAMUSCULAR; INTRAVENOUS at 19:41:00

## 2025-03-17 RX ADMIN — ACETAMINOPHEN 1000 MG: 10 INJECTION, SOLUTION INTRAVENOUS at 22:04:00

## 2025-03-17 RX ADMIN — ROCURONIUM BROMIDE 10 MG: 10 INJECTION, SOLUTION INTRAVENOUS at 20:28:00

## 2025-03-17 RX ADMIN — ONDANSETRON 4 MG: 2 INJECTION INTRAMUSCULAR; INTRAVENOUS at 22:17:00

## 2025-03-17 RX ADMIN — ROCURONIUM BROMIDE 50 MG: 10 INJECTION, SOLUTION INTRAVENOUS at 19:13:00

## 2025-03-17 NOTE — PROGRESS NOTES
Upon assessment, A&Ox4. RA.  WDL. NSR to SB on Tele. SCDs. Lovenox for VTE proph held d/t pt going to OR this afternoon. Educated pt to perform ankle pumps. NPO. Abdomen soft and tender. Active bowel sounds. BM+. Voids. Pain and nausea managed per MAR. Denies SOB, CP, and lightheadedness. POC discussed, all questions answered and concerns addressed. Safety precautions in place. Frequent rounds performed.     1655 - pt down to Pre-op.

## 2025-03-17 NOTE — PROGRESS NOTES
[facility]  Progress Note    Don Cabral Patient Status:  Inpatient    1965 MRN [id number]   Location [facility] 3NW-A Attending Katarzyna Brown, DO   Hosp Day # 1 PCP Stephen Christianson MD     Subjective:  The patient was seen and examined at bedside.  He reports that he continues to have severe right lower quadrant abdominal pain that is only manage with Toradol and IV Dilaudid.  He reports mild nausea.  He is passing flatus.  He does feel mildly distended.    Objective/Physical Exam:  /45 (BP Location: Right arm)   Pulse 63   Temp 98.2 °F (36.8 °C) (Oral)   Resp 18   Wt 270 lb (122.5 kg)   SpO2 94%   BMI 33.75 kg/m²     Intake/Output Summary (Last 24 hours) at 3/17/2025 0846  Last data filed at 3/17/2025 0800  Gross per 24 hour   Intake 2154 ml   Output 550 ml   Net 1604 ml         General: Alert, oriented x3. No acute distress.  HEENT: Normocephalic, atraumatic. No scleral icterus.  Pulmonary: No respiratory distress, effort normal.   Abdomen: Non-distended, without tympany to percussion. Soft, severe right lower quadrant tenderness to palpation with voluntary guarding. No peritoneal signs.   Incision: Well-healed midline incision and former ostomy site  Extremities: No lower extremity edema. No clubbing or cyanosis.   Skin: Warm, dry. No jaundice.       Labs:  Lab Results   Component Value Date    WBC 8.8 2025    HGB 12.2 2025    HCT 37.8 2025    .0 2025      Lab Results   Component Value Date    INR 1.01 2023    INR 1.23 (H) 2023     Lab Results   Component Value Date     2025    K 4.1 2025     2025    CO2 32.0 2025    BUN 15 2025    CREATSERUM 0.88 2025    GLU 90 2025    CA 9.2 2025    ALKPHO 64 2025    ALT 15 2025    AST 14 2025    BILT 0.8 2025    ALB 4.8 2025    TP 7.3 2025     Lab Results   Component Value Date    COLORUR  Home Light-Yellow 03/16/2025    CLARITY Clear 03/16/2025    SPECGRAVITY >1.030 03/16/2025    GLUUR Normal 03/16/2025    BILUR Negative 03/16/2025    KETUR Negative 03/16/2025    BLOODURINE Negative 03/16/2025    PHURINE 7.0 03/16/2025    PROUR Negative 03/16/2025    UROBILINOGEN Normal 03/16/2025    NITRITE Negative 03/16/2025    LEUUR Negative 03/16/2025       Assessment:  Patient Active Problem List   Diagnosis    Anxiety    Essential hypertension    ED (erectile dysfunction)    NELLIE (obstructive sleep apnea)-mild, 8.4    Generalized anxiety disorder    Hypokalemia    Hyperglycemia    Tooth pain    Daytime hypersomnolence    Symptomatic bradycardia    Palpitations    Slow heart rate    PVC (premature ventricular contraction)    Bipolar 1 disorder (HCC)    Sigmoid diverticulitis    Acute diverticulitis    Intractable abdominal pain    Postoperative examination    Preop testing    Right lower quadrant abdominal pain    Abdominal pain, acute    Intra-abdominal infection    Epiploic appendagitis    Implantable loop recorder present    LBBB (left bundle branch block)    LVH (left ventricular hypertrophy)    MDD (major depressive disorder), recurrent severe, without psychosis (HCC)    Dizziness    Elevated LFTs    Essential hypertension, benign    CLARIBEL (generalized anxiety disorder)    Panic disorder    PVCs (premature ventricular contractions)    Bradycardia    Abdominal pain, right lower quadrant    Omental infarction (HCC)    Depression with anxiety    History of cardiac arrhythmia    Primary hypertension     Omental infarct     Plan:  Will discuss surgical plan with Dr. Valladares and Dr. Clemens.  Patient is requesting Dr. Clemens his surgeon.  Tentatively planning for a diagnostic laparoscopy, possible open, possible omentectomy, possible bowel resection   Will discuss surgical timing with  Dr. Clemens  Continue n.p.o.  Continue IV antibiotics-Unasyn  Antiemetics and analgesics as needed  DVT prophylaxis SCDs-hold Lovenox  today for possible surgery  GI prophylaxis with Protonix      My total face time with this patient was 25 minutes. Greater than half of the visit was spent in counseling the patient on the above listed diagnoses and treatment options.     Tresa Bass PA-C  3/17/2025  8:46 AM    Addendum:    The patient was independently examined. I agree with the PA's assessment and plan.     Patient with unresolved pain.  Now worsening pain and tenderness on exam   Imaging with worsening inflammation in RLQ.     Plan for diagnostic laparoscopy with partial omentectomy or bowel resection tonight.     The risks, benefits, and alternatives of the procedure were explained to the patient.  The risks explained include, but are not limited to, bleeding, infection, recurrence, pain, wound complications, missed lesions, incorrect diagnosis, injury to adjacent structures, and the need for further therapeutic, diagnostic, or surgical intervention.  The patient voiced understanding, and after all questions were answered to the patient's satisfaction, the patient provided willing and informed consent to proceed.        More than 50% of clinical time and 100% MDM was performed by me.       I, Dr. Ryan Clemens, personally performed the services described in this documentation, as documented  by  Ms Shelia PA-C,  and they are both accurate and complete.

## 2025-03-17 NOTE — PROGRESS NOTES
[facility]   part of [facility]     Hospitalist Progress Note     Don Cabral Patient Status:  Inpatient    1965 MRN [id number]   Location [facility] 3NW-A Attending Katarzyna Brown, DO   Hosp Day # 1 PCP Stephen Christianson MD     Chief Complaint: abdominal pain    Subjective:     Patient resting in bed, RUQ/RLQ pain managed with pain meds, but still present. Waiting on definitive surgical plan.Abdominal cramping and worsening pain when he took his pills this morning    Objective:    Review of Systems:   A comprehensive review of systems was completed; pertinent positive and negatives stated in subjective.    Vital signs:  Temp:  [97.9 °F (36.6 °C)-99 °F (37.2 °C)] 98.2 °F (36.8 °C)  Pulse:  [63-93] 63  Resp:  [18] 18  BP: (106-152)/(45-85) 111/45  SpO2:  [94 %-100 %] 94 %    Physical Exam:    General: No acute distress  Respiratory: No wheezes, no rhonchi  Cardiovascular: S1, S2, regular rate and rhythm  Abdomen: Soft, +RUQ/RLQ TTP, non-tender, non-distended, positive bowel sounds  Neuro: No new focal deficits.   Extremities: No edema    Diagnostic Data:    Labs:  Recent Labs   Lab 25  1450 25  1230 25  0532   WBC 9.1 8.2 7.7 10.5 8.8   HGB 14.4 13.1 12.9* 13.2 12.2*   MCV 83.9 88.4 84.2 85.7 86.9   .0 342.0 379.0 456.0* 395.0       Recent Labs   Lab 25  1450 25  0522 25  0455 25  1230 25  0532   *   < > 80 92 90   BUN 9   < > 14 11 15   CREATSERUM 0.89   < > 0.84 0.85 0.88   CA 9.6   < > 9.3 9.7 9.2   ALB 4.7  --   --  4.8  --       < > 140 140 141   K 4.0   < > 4.0 3.7 4.1      < > 102 103 105   CO2 31.0   < > 28.0 29.0 32.0   ALKPHO 77  --   --  64  --    AST 18  --   --  14  --    ALT 20  --   --  15  --    BILT 0.5  --   --  0.8  --    TP 7.0  --   --  7.3  --     < > = values in this interval not displayed.       Estimated Glomerular Filtration Rate: 100 mL/min/1.73m2 (result  from lab).    No results for input(s): \"TROP\", \"TROPHS\", \"CK\" in the last 168 hours.    No results for input(s): \"PTP\", \"INR\" in the last 168 hours.     Microbiology    No results found for this visit on 03/16/25.      Imaging: Reviewed in Epic.    Medications:    lisinopril  20 mg Oral Daily    clonazePAM  0.5 mg Oral BID    sertraline  75 mg Oral Daily    verapamil ER  180 mg Oral Nightly    gabapentin  600 mg Oral TID    [Held by provider] enoxaparin  40 mg Subcutaneous Daily    ampicillin-sulbactam  3 g Intravenous Q6H       Assessment & Plan:      #Omental infarction  #Acute colitis  #Intractable abdominal pain  -CTAP reviewed  -NPO, IVF, pain control, ant-emetics PRN  -empiric unasyn  -general surgery following > surgical intervention planned for later today     #Hx of diverticulitis      #HTN  -lisinopril      #Chronic afib  #LBBB  -verapamil     #Anxiety  #Major depressive disorder, recurrent, unspecified severity  -sertraline, clonazepam       Katarzyna Brown, DO    Supplementary Documentation:     Quality:  DVT Mechanical Prophylaxis:   SCDs, Early ambuation  DVT Pharmacologic Prophylaxis   Medication    [Held by provider] enoxaparin (Lovenox) 40 MG/0.4ML SUBQ injection 40 mg                Code Status: Full Code  Vance: No urinary catheter in place  Vance Duration (in days):   Central line:    NILS:     Discharge is dependent on: clinical state, general surgery recs  At this point Mr. Cabral is expected to be discharge to: home    The 21st Century Cures Act makes medical notes like these available to patients in the interest of transparency. Please be advised this is a medical document. Medical documents are intended to carry relevant information, facts as evident, and the clinical opinion of the practitioner. The medical note is intended as peer to peer communication and may appear blunt or direct. It is written in medical language and may contain abbreviations or verbiage that are unfamiliar.

## 2025-03-17 NOTE — ANESTHESIA PREPROCEDURE EVALUATION
PRE-OP EVALUATION    Patient Name: Don Cabral    Admit Diagnosis: Omental infarction (HCC) [K55.069]    Pre-op Diagnosis: Omental infarction (HCC) [K55.069]    XI ROBOT-ASSISTED LAPAROSCOPIC CECECTOMY AND OMENTECTOMY    Anesthesia Procedure: XI ROBOT-ASSISTED LAPAROSCOPIC CECECTOMY AND OMENTECTOMY    Surgeons and Role:     * Ryan Clemens MD - Primary    Pre-op vitals reviewed.  Temp: 98 °F (36.7 °C)  Pulse: 67  Resp: 18  BP: 111/54  SpO2: 98 %  Body mass index is 33.75 kg/m².    Current medications reviewed.  Hospital Medications:   [Transfer Hold] pantoprazole (Protonix) 40 mg in sodium chloride 0.9% PF 10 mL IV push  40 mg Intravenous Q24H    [Transfer Hold] HYDROmorphone (Dilaudid) 1 MG/ML injection 0.5 mg  0.5 mg Intravenous Q30 Min PRN    [COMPLETED] iopamidol 76% (ISOVUE-370) injection for power injector  100 mL Intravenous ONCE PRN    [Transfer Hold] albuterol (Ventolin HFA) 108 (90 Base) MCG/ACT inhaler 2 puff  2 puff Inhalation Q4H PRN    [Transfer Hold] lisinopril (Prinivil; Zestril) tab 20 mg  20 mg Oral Daily    [Transfer Hold] clonazePAM (KlonoPIN) tab 0.5 mg  0.5 mg Oral BID    [Transfer Hold] sertraline (Zoloft) tab 75 mg  75 mg Oral Daily    [Transfer Hold] verapamil ER (Calan-SR) tab 180 mg  180 mg Oral Nightly    [Transfer Hold] gabapentin (Neurontin) tab 600 mg  600 mg Oral TID    [Held by provider] enoxaparin (Lovenox) 40 MG/0.4ML SUBQ injection 40 mg  40 mg Subcutaneous Daily    [Transfer Hold] melatonin tab 3 mg  3 mg Oral Nightly PRN    [] lactated ringers infusion   Intravenous Continuous    [Transfer Hold] acetaminophen (Tylenol Extra Strength) tab 500 mg  500 mg Oral Q4H PRN    [Transfer Hold] acetaminophen (Tylenol) tab 650 mg  650 mg Oral Q4H PRN    Or    [Transfer Hold] HYDROcodone-acetaminophen (Norco) 5-325 MG per tab 1 tablet  1 tablet Oral Q4H PRN    Or    [Transfer Hold] HYDROcodone-acetaminophen (Norco) 5-325 MG per tab 2 tablet  2 tablet Oral Q4H PRN     [Transfer Hold] morphINE PF 2 MG/ML injection 1 mg  1 mg Intravenous Q2H PRN    Or    [Transfer Hold] morphINE PF 2 MG/ML injection 2 mg  2 mg Intravenous Q2H PRN    Or    [Transfer Hold] morphINE PF 4 MG/ML injection 4 mg  4 mg Intravenous Q2H PRN    [Transfer Hold] polyethylene glycol (PEG 3350) (Miralax) 17 g oral packet 17 g  17 g Oral Daily PRN    [Transfer Hold] sennosides (Senokot) tab 17.2 mg  17.2 mg Oral Nightly PRN    [Transfer Hold] bisacodyl (Dulcolax) 10 MG rectal suppository 10 mg  10 mg Rectal Daily PRN    [Transfer Hold] fleet enema (Fleet) rectal enema 133 mL  1 enema Rectal Once PRN    [Transfer Hold] ondansetron (Zofran) 4 MG/2ML injection 4 mg  4 mg Intravenous Q6H PRN    [Transfer Hold] prochlorperazine (Compazine) 10 MG/2ML injection 5 mg  5 mg Intravenous Q8H PRN    [Transfer Hold] ketorolac (Toradol) 15 MG/ML injection 15 mg  15 mg Intravenous Q6H PRN    ampicillin-sulbactam (Unasyn) 3 g in sodium chloride 0.9% 100mL IVPB-ADD  3 g Intravenous Q6H       Outpatient Medications:   Prescriptions Prior to Admission[1]    Allergies: Ciprofloxacin      Anesthesia Evaluation        Anesthetic Complications           GI/Hepatic/Renal                                 Cardiovascular                (+) obesity  (+) hypertension   (+) hyperlipidemia               (+) dysrhythmias and PVC and atrial fibrillation                  Endo/Other                                  Pulmonary               (+) shortness of breath     (+) sleep apnea and CPAP      Neuro/Psych      (+) depression  (+) anxiety                              Past Surgical History:   Procedure Laterality Date    Cholecystectomy  3/12/2023    Colonoscopy N/A 03/22/2016    [facility].  Hyperplastic polyp only.  Repeat 2026    Colonoscopy  05/10/2023    Colonoscopy N/A 3/13/2025    Procedure: COLONOSCOPY with biopsies and forcep polypectomy;  Surgeon: Job Bacon DO;  Location:  ENDOSCOPY    Dental surgery procedure      [place] teeth  removed    Incision/drain abscess extra      Knee arthroscopy Right     Laparoscopic cholecystectomy  2023    Myringoplasty      Other surgical history      R knee arthroscope    Other surgical history  2024    Exploratory laparotomy partial colectomy with colorectal anastamosis, extensive adhesiolysis    Part removal colon w end colostomy      Skin tissue procedure unlisted      Tonsillectomy       Social History     Socioeconomic History    Marital status:    Occupational History    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[employer]   Tobacco Use    Smoking status: Former     Current packs/day: 0.00     Average packs/day: 0.5 packs/day for 19.0 years (9.5 ttl pk-yrs)     Types: Cigarettes     Start date: 1981     Quit date: 2000     Years since quittin.7     Passive exposure: Never    Smokeless tobacco: Never   Vaping Use    Vaping status: Never Used   Substance and Sexual Activity    Alcohol use: Not Currently     Comment: Stopped alcohol 2000    Drug use: No    Sexual activity: Yes     Partners: Female   Other Topics Concern    Caffeine Concern Yes     Comment: coffee in AM/ soda     Exercise Yes     Comment: X1-2 times per week      History   Drug Use No     Available pre-op labs reviewed.  Lab Results   Component Value Date    WBC 8.8 2025    RBC 4.35 2025    HGB 12.2 (L) 2025    HCT 37.8 (L) 2025    MCV 86.9 2025    MCH 28.0 2025    MCHC 32.3 2025    RDW 13.7 2025    .0 2025     Lab Results   Component Value Date     2025    K 4.1 2025     2025    CO2 32.0 2025    BUN 15 2025    CREATSERUM 0.88 2025    GLU 90 2025    CA 9.2 2025            Airway      Mallampati: III  Mouth opening: >3 FB  TM distance: 4 - 6 cm  Neck ROM: full Cardiovascular    Cardiovascular exam normal.  Rhythm: regular  Rate: normal  (-) murmur   Dental    Dentition appears grossly  intact         Pulmonary    Pulmonary exam normal.  Breath sounds clear to auscultation bilaterally.               Other findings              ASA: 3   Plan: general  NPO status verified and patient meets guidelines.  Patient has not taken beta blockers in last 24 hours.  Post-procedure pain management plan discussed with surgeon and patient.      Plan/risks discussed with: patient  Use of blood product(s) discussed with: patient    Consented to blood products.          Present on Admission:  **None**             [1]   Medications Prior to Admission   Medication Sig Dispense Refill Last Dose/Taking    gabapentin 600 MG Oral Tab Take 1 tablet (600 mg total) by mouth 3 (three) times daily. 270 tablet 0     sertraline (ZOLOFT) 50 MG Oral Tab 1.5 tablets daily (Patient taking differently: Take 1.5 tablets (75 mg total) by mouth daily. 1.5 tablets daily) 135 tablet 0     Econazole Nitrate 1 % External Cream Apply 1 Application topically 2 (two) times daily. 85 g 2     clonazePAM 0.5 MG Oral Tab Take 1 tablet (0.5 mg total) by mouth in the morning, at noon, and at bedtime. (Patient taking differently: Take 1 tablet (0.5 mg total) by mouth in the morning and 1 tablet (0.5 mg total) before bedtime.)       HYDROcodone-acetaminophen 5-325 MG Oral Tab Take 1 tablet by mouth every 6 (six) hours as needed. 15 tablet 0     Tadalafil 20 MG Oral Tab Take 1 tablet (20 mg total) by mouth daily as needed for Erectile Dysfunction. 12 tablet 1     Verapamil HCl  MG Oral Capsule SR 24 Hr Take 1 capsule (180 mg total) by mouth once daily.       lisinopril 20 MG Oral Tab Take 1 tablet (20 mg total) by mouth daily. 90 tablet 3     Albuterol Sulfate  (90 Base) MCG/ACT Inhalation Aero Soln Inhale 2 puffs into the lungs every 4 (four) hours as needed for Wheezing or Shortness of Breath. 1 each 3

## 2025-03-17 NOTE — PLAN OF CARE
Received pt 1930 A&Ox4. Telemetry: NSR RA/ Denies chest pain, shortness of breath GI: Abdomen soft, nondistended. Passing flatus : Voids. Pain controlled with PRN pain medications. Up with standby assist. Diet: Clear liquid diet  IVF per order. All approriate safety measures in place.

## 2025-03-18 LAB
ANION GAP SERPL CALC-SCNC: 9 MMOL/L (ref 0–18)
BASOPHILS # BLD AUTO: 0.01 X10(3) UL (ref 0–0.2)
BASOPHILS NFR BLD AUTO: 0.1 %
BUN BLD-MCNC: 18 MG/DL (ref 9–23)
CALCIUM BLD-MCNC: 8.6 MG/DL (ref 8.7–10.6)
CHLORIDE SERPL-SCNC: 107 MMOL/L (ref 98–112)
CO2 SERPL-SCNC: 25 MMOL/L (ref 21–32)
CREAT BLD-MCNC: 0.8 MG/DL
EGFRCR SERPLBLD CKD-EPI 2021: 103 ML/MIN/1.73M2 (ref 60–?)
EOSINOPHIL # BLD AUTO: 0 X10(3) UL (ref 0–0.7)
EOSINOPHIL NFR BLD AUTO: 0 %
ERYTHROCYTE [DISTWIDTH] IN BLOOD BY AUTOMATED COUNT: 13.6 %
GLUCOSE BLD-MCNC: 166 MG/DL (ref 70–99)
HCT VFR BLD AUTO: 40.2 %
HGB BLD-MCNC: 13 G/DL
IMM GRANULOCYTES # BLD AUTO: 0.05 X10(3) UL (ref 0–1)
IMM GRANULOCYTES NFR BLD: 0.3 %
LYMPHOCYTES # BLD AUTO: 0.46 X10(3) UL (ref 1–4)
LYMPHOCYTES NFR BLD AUTO: 3.1 %
MAGNESIUM SERPL-MCNC: 1.8 MG/DL (ref 1.6–2.6)
MCH RBC QN AUTO: 28.1 PG (ref 26–34)
MCHC RBC AUTO-ENTMCNC: 32.3 G/DL (ref 31–37)
MCV RBC AUTO: 87 FL
MONOCYTES # BLD AUTO: 0.52 X10(3) UL (ref 0.1–1)
MONOCYTES NFR BLD AUTO: 3.6 %
NEUTROPHILS # BLD AUTO: 13.57 X10 (3) UL (ref 1.5–7.7)
NEUTROPHILS # BLD AUTO: 13.57 X10(3) UL (ref 1.5–7.7)
NEUTROPHILS NFR BLD AUTO: 92.9 %
OSMOLALITY SERPL CALC.SUM OF ELEC: 298 MOSM/KG (ref 275–295)
PHOSPHATE SERPL-MCNC: 3.6 MG/DL (ref 2.4–5.1)
PLATELET # BLD AUTO: 463 10(3)UL (ref 150–450)
POTASSIUM SERPL-SCNC: 4.8 MMOL/L (ref 3.5–5.1)
RBC # BLD AUTO: 4.62 X10(6)UL
SODIUM SERPL-SCNC: 141 MMOL/L (ref 136–145)
WBC # BLD AUTO: 14.6 X10(3) UL (ref 4–11)

## 2025-03-18 PROCEDURE — 99232 SBSQ HOSP IP/OBS MODERATE 35: CPT | Performed by: INTERNAL MEDICINE

## 2025-03-18 PROCEDURE — 0D9670Z DRAINAGE OF STOMACH WITH DRAINAGE DEVICE, VIA NATURAL OR ARTIFICIAL OPENING: ICD-10-PCS | Performed by: HOSPITALIST

## 2025-03-18 RX ORDER — ACETAMINOPHEN 10 MG/ML
1000 INJECTION, SOLUTION INTRAVENOUS EVERY 6 HOURS
Status: COMPLETED | OUTPATIENT
Start: 2025-03-18 | End: 2025-03-19

## 2025-03-18 RX ORDER — ENOXAPARIN SODIUM 100 MG/ML
40 INJECTION SUBCUTANEOUS DAILY
Status: DISCONTINUED | OUTPATIENT
Start: 2025-03-18 | End: 2025-03-23

## 2025-03-18 RX ORDER — ONDANSETRON 2 MG/ML
4 INJECTION INTRAMUSCULAR; INTRAVENOUS EVERY 6 HOURS PRN
Status: DISCONTINUED | OUTPATIENT
Start: 2025-03-18 | End: 2025-03-23

## 2025-03-18 RX ORDER — OXYCODONE HYDROCHLORIDE 5 MG/1
5 TABLET ORAL EVERY 4 HOURS PRN
Status: DISCONTINUED | OUTPATIENT
Start: 2025-03-18 | End: 2025-03-23

## 2025-03-18 RX ORDER — MAGNESIUM OXIDE 400 MG/1
400 TABLET ORAL ONCE
Status: DISCONTINUED | OUTPATIENT
Start: 2025-03-18 | End: 2025-03-23

## 2025-03-18 RX ORDER — KETOROLAC TROMETHAMINE 30 MG/ML
30 INJECTION, SOLUTION INTRAMUSCULAR; INTRAVENOUS EVERY 6 HOURS
Status: COMPLETED | OUTPATIENT
Start: 2025-03-19 | End: 2025-03-19

## 2025-03-18 RX ORDER — ACETAMINOPHEN 500 MG
1000 TABLET ORAL EVERY 8 HOURS SCHEDULED
Status: DISCONTINUED | OUTPATIENT
Start: 2025-03-18 | End: 2025-03-18

## 2025-03-18 RX ORDER — SODIUM CHLORIDE 9 MG/ML
INJECTION, SOLUTION INTRAVENOUS CONTINUOUS
Status: DISCONTINUED | OUTPATIENT
Start: 2025-03-18 | End: 2025-03-23

## 2025-03-18 RX ORDER — HYDROMORPHONE HYDROCHLORIDE 1 MG/ML
0.8 INJECTION, SOLUTION INTRAMUSCULAR; INTRAVENOUS; SUBCUTANEOUS EVERY 2 HOUR PRN
Status: DISCONTINUED | OUTPATIENT
Start: 2025-03-18 | End: 2025-03-23

## 2025-03-18 RX ORDER — HYDROMORPHONE HYDROCHLORIDE 1 MG/ML
0.4 INJECTION, SOLUTION INTRAMUSCULAR; INTRAVENOUS; SUBCUTANEOUS EVERY 2 HOUR PRN
Status: DISCONTINUED | OUTPATIENT
Start: 2025-03-18 | End: 2025-03-23

## 2025-03-18 RX ORDER — KETOROLAC TROMETHAMINE 30 MG/ML
30 INJECTION, SOLUTION INTRAMUSCULAR; INTRAVENOUS EVERY 6 HOURS
Status: COMPLETED | OUTPATIENT
Start: 2025-03-18 | End: 2025-03-18

## 2025-03-18 RX ORDER — PROCHLORPERAZINE EDISYLATE 5 MG/ML
5 INJECTION INTRAMUSCULAR; INTRAVENOUS EVERY 8 HOURS PRN
Status: DISCONTINUED | OUTPATIENT
Start: 2025-03-18 | End: 2025-03-23

## 2025-03-18 RX ORDER — IBUPROFEN 600 MG/1
600 TABLET, FILM COATED ORAL EVERY 6 HOURS SCHEDULED
Status: DISCONTINUED | OUTPATIENT
Start: 2025-03-19 | End: 2025-03-18

## 2025-03-18 RX ORDER — OXYCODONE HYDROCHLORIDE 10 MG/1
10 TABLET ORAL EVERY 4 HOURS PRN
Status: DISCONTINUED | OUTPATIENT
Start: 2025-03-18 | End: 2025-03-23

## 2025-03-18 NOTE — PROGRESS NOTES
[facility]  Progress Note    Don Cabral Patient Status:  Inpatient    1965 MRN [id number]   Location [facility] 3NW-A Attending Katarzyna Brown, DO   Hosp Day # 2 PCP Stephen Christianson MD     Subjective:  He is seen and examined sitting up in chair. He just returned from a walk. He reports abdominal soreness. He denies nausea or vomiting. Ng tube in place with small amount of bilious drainage. He denies passing flatus or a bowel movement.     Objective/Physical Exam:  /80 (BP Location: Right arm)   Pulse 81   Temp 98.3 °F (36.8 °C) (Oral)   Resp 18   Wt 270 lb (122.5 kg)   SpO2 95%   BMI 33.75 kg/m²     Intake/Output Summary (Last 24 hours) at 3/18/2025 0954  Last data filed at 3/18/2025 0535  Gross per 24 hour   Intake 3650 ml   Output 500 ml   Net 3150 ml         General: Awake, Alert,Cooperative.  No apparent distress.  HEENT: Normocephalic, atraumatic. No scleral icterus  Pulm: no respiratory distress, no increased work of breathing  Abdomen:  Soft, distended,tender to palpation, with no rebound or guarding.  No peritoneal signs.  Incision:  Clean, dry, intact without erythema.  Aquacel dressing in place x 4.       Labs:  Lab Results   Component Value Date    WBC 14.6 2025    HGB 13.0 2025    HCT 40.2 2025    .0 2025      Lab Results   Component Value Date    INR 1.01 2023    INR 1.23 (H) 2023     Lab Results   Component Value Date     2025    K 4.8 2025     2025    CO2 25.0 2025    BUN 18 2025    CREATSERUM 0.80 2025     2025    CA 8.6 2025            Assessment:  Patient Active Problem List   Diagnosis    Anxiety    Essential hypertension    ED (erectile dysfunction)    NELLIE (obstructive sleep apnea)-mild, 8.4    Generalized anxiety disorder    Hypokalemia    Hyperglycemia    Tooth pain    Daytime hypersomnolence    Symptomatic bradycardia    Palpitations    Slow  heart rate    PVC (premature ventricular contraction)    Bipolar 1 disorder (HCC)    Sigmoid diverticulitis    Acute diverticulitis    Intractable abdominal pain    Postoperative examination    Preop testing    Right lower quadrant abdominal pain    Abdominal pain, acute    Intra-abdominal infection    Epiploic appendagitis    Implantable loop recorder present    LBBB (left bundle branch block)    LVH (left ventricular hypertrophy)    MDD (major depressive disorder), recurrent severe, without psychosis (HCC)    Dizziness    Elevated LFTs    Essential hypertension, benign    CLARIBEL (generalized anxiety disorder)    Panic disorder    PVCs (premature ventricular contractions)    Bradycardia    Abdominal pain, right lower quadrant    Omental infarction (HCC)    Depression with anxiety    History of cardiac arrhythmia    Primary hypertension       POD 1 diagnostic laparoscopy, exploratory laparotomy, right hemicolectomy with anastomosis, partial omentectomy, small bowel resection with anastomosis, lysis of adhesions.     Plan:  Continue NG tube to low intermittent suction  Continue NPO; okay for hard candy and chewing gum, small amount of ice chips.   Analgesics and antiemetics as needed; add abdominal binder as needed.   Encourage ambulation and up to chair; PT following   Encourage incentive spirometry   DVT prophylaxis with lovenox  GI prophylaxis with protonix     Latanya Richardson PA-C  3/18/2025  9:54 AM    Addendum:    The patient was independently examined. I agree with the PA's assessment and plan.     Operative findings and care plan discussed with family.     More than 50% of clinical time and 100% MDM was performed by me.       I, Dr. Ryan Clemens, personally performed the services described in this documentation, as documented  by  Ms Dylan PA-C,  and they are both accurate and complete.

## 2025-03-18 NOTE — PHYSICAL THERAPY NOTE
Order received for PT eval and chart reviewed. Attempted to see Pt this am, however, Pt declining OOB mobility due to pain. PT will continue to follow.     Followed up this afternoon and patient declines therapy due to nausea. Pt reports he has been up and walking with staff without mobility concerns. Requests PT sign off this admission. Please re-order therapy as needed.

## 2025-03-18 NOTE — ANESTHESIA PROCEDURE NOTES
Airway  Date/Time: 3/17/2025 7:15 PM  Urgency: elective    Airway not difficult    General Information and Staff    Patient location during procedure: OR  Anesthesiologist: Cele Horn DO  Performed: anesthesiologist   Performed by: Cele Horn DO  Authorized by: Cele Horn DO      Indications and Patient Condition  Indications for airway management: anesthesia  Spontaneous ventilation: present  Sedation level: deep  Preoxygenated: yes  Patient position: sniffing  Mask difficulty assessment: 1 - vent by mask    Final Airway Details  Final airway type: endotracheal airway      Successful airway: ETT  Cuffed: yes   Successful intubation technique: direct laryngoscopy  Endotracheal tube insertion site: oral  Blade: Nel  Blade size: #4  ETT size (mm): 8.0    Cormack-Lehane Classification: grade IIB - view of arytenoids or posterior of glottis only  Placement verified by: capnometry   Measured from: lips  ETT to lips (cm): 24  Number of attempts at approach: 1    Additional Comments  Dentition, lips, gums, tongue unchanged on exam from preop

## 2025-03-18 NOTE — PLAN OF CARE
A&Ox4. Telemetry: NSR RA/ Denies chest pain, shortness of breath GI: Abdomen soft, nondistended. : Voiding vazquez in place. Pain controlled with PRN pain medications. Up with standby assist. NG secured @71 cm Diet: NPO sips with clears, meds, & ice chips. IVF per order. All approriate safety measures in place.

## 2025-03-18 NOTE — PAYOR COMM NOTE
--------------  ADMISSION REVIEW     Payor: CAILIN MORRISON  Subscriber #:  [id number]  Authorization Number: [id number]    Admit date: 3/16/25  Admit time:  2:28 PM       REVIEW DOCUMENTATION:     ED Provider Notes        ED Provider Notes signed by Sid Navarrete MD at 3/16/2025  1:59 PM       Author: Sid Navarrete MD Service: Emergency Medicine Author Type: Physician    Filed: 3/16/2025  1:59 PM Date of Service: 3/16/2025 12:19 PM Status: Signed    : Sid Navarrete MD (Physician)           Patient Seen in: [facility] Emergency Department      History     Chief Complaint   Patient presents with    Abdomen/Flank Pain     Stated Complaint: rlq pain was admitted last week    Subjective:   HPI      Patient is a 59-year-old male presents to ED for evaluation of right lower quadrant pain that started mid-to-late February.  Has been hospitalized multiple times for this.  Most recently discharged last week for intractable pain suspected omental infarct versus epiploic appendagitis.  On his last hospitalization he had a colonoscopy March 13 showing pandiverticulosis and mild colitis in the cecum and ascending polyp which was removed, hemorrhoids.  Seen by surgery elective laparoscopy as outpatient was offered and patient was discharged.  Since he has been home, pain is increased now more on his right hemiabdomen superiorly and Norco was not helping.  No vomiting diarrhea or blood per orifice no fevers no other complaints.      Objective:     Past Medical History:    Abdominal pain    Beginning of diverticulitis saga that led to removal part of my large colon    Allergic rhinitis    Anxiety    Arrhythmia    T1 AV block, LBBB    Back pain    Back problem    lower back    Decorative tattoo    Depression    Diverticulitis    ED (erectile dysfunction)    Heart palpitations    Under treatment with [facility] [place] for arrhythmia    Hemorrhoids    One internal, occasional external    High blood pressure     History of adverse reaction to anesthesia    Patient states he doesnt know exactly what happened but stated he required haldol post anesthesia    Indigestion    Periodic last 20 yrs    Pneumonia due to organism    Presence of other cardiac implants and grafts    Loop recorder for arrhythmia    Seasonal allergies    Shortness of breath    Sleep apnea    CPAP    Stress    Unspecified essential hypertension    Visual impairment    glasses    Wears glasses    Weight gain              Past Surgical History:   Procedure Laterality Date    Cholecystectomy  3/12/2023    Colonoscopy N/A 2016    [facility].  Hyperplastic polyp only.  Repeat     Colonoscopy  05/10/2023    Colonoscopy N/A 3/13/2025    Procedure: COLONOSCOPY with biopsies and forcep polypectomy;  Surgeon: Job Bacon DO;  Location:  ENDOSCOPY    Dental surgery procedure      [place] teeth removed    Incision/drain abscess extra      Knee arthroscopy Right     Laparoscopic cholecystectomy  2023    Myringoplasty      Other surgical history      R knee arthroscope    Other surgical history  2024    Exploratory laparotomy partial colectomy with colorectal anastamosis, extensive adhesiolysis    Part removal colon w end colostomy      Skin tissue procedure unlisted      Tonsillectomy                  Social History     Socioeconomic History    Marital status:    Occupational History    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[employer]   Tobacco Use    Smoking status: Former     Current packs/day: 0.00     Average packs/day: 0.5 packs/day for 19.0 years (9.5 ttl pk-yrs)     Types: Cigarettes     Start date: 1981     Quit date: 2000     Years since quittin.7     Passive exposure: Never    Smokeless tobacco: Never   Vaping Use    Vaping status: Never Used   Substance and Sexual Activity    Alcohol use: Not Currently     Comment: Stopped alcohol 2000    Drug use: No    Sexual activity: Yes     Partners: Female   Other  Topics Concern    Caffeine Concern Yes     Comment: coffee in AM/ soda     Exercise Yes     Comment: X1-2 times per week      Social Drivers of Health     Food Insecurity: No Food Insecurity (3/11/2025)    NCSS - Food Insecurity     Worried About Running Out of Food in the Last Year: No     Ran Out of Food in the Last Year: No   Transportation Needs: No Transportation Needs (3/11/2025)    NCSS - Transportation     Lack of Transportation: No   Housing Stability: Not At Risk (3/11/2025)    NCSS - Housing/Utilities     Has Housing: Yes     Worried About Losing Housing: No     Unable to Get Utilities: No                  Physical Exam     ED Triage Vitals [03/16/25 1203]   /85   Pulse 93   Resp 18   Temp 99 °F (37.2 °C)   Temp src Oral   SpO2 100 %   O2 Device None (Room air)       Current Vitals:   Vital Signs  BP: 118/77  Pulse: 81  Resp: 18  Temp: 99 °F (37.2 °C)  Temp src: Oral  MAP (mmHg): 88    Oxygen Therapy  SpO2: 96 %  O2 Device: None (Room air)        Physical Exam  Vitals and nursing note reviewed.   Constitutional:       General: He is not in acute distress.     Appearance: He is well-developed. He is not toxic-appearing.   HENT:      Head: Normocephalic and atraumatic.   Eyes:      General: No scleral icterus.     Conjunctiva/sclera: Conjunctivae normal.   Cardiovascular:      Rate and Rhythm: Normal rate.   Pulmonary:      Effort: Pulmonary effort is normal. No respiratory distress.   Abdominal:      General: There is no distension.      Tenderness: There is abdominal tenderness in the right upper quadrant and right lower quadrant.   Musculoskeletal:         General: No tenderness. Normal range of motion.      Cervical back: Normal range of motion and neck supple.   Skin:     General: Skin is warm and dry.      Findings: No rash.   Neurological:      Mental Status: He is alert and oriented to person, place, and time.      Motor: No abnormal muscle tone.      Coordination: Coordination normal.    Psychiatric:         Behavior: Behavior normal.            ED Course     Labs Reviewed   CBC WITH DIFFERENTIAL WITH PLATELET - Abnormal; Notable for the following components:       Result Value    .0 (*)     Neutrophil Absolute Prelim 7.90 (*)     Neutrophil Absolute 7.90 (*)     All other components within normal limits   COMP METABOLIC PANEL (14) - Normal   LIPASE - Normal   LACTIC ACID, PLASMA - Normal   URINALYSIS WITH CULTURE REFLEX                   MDM             -History source other than patient - wife        -Comorbidities did add complexity to the management are mentioned in the HPI above        -I personally reviewed the prior external notes and the medical record to obtain additional history reviewed last admission and colonoscopy report demonstrating pandiverticulosis among other findings.  This was done on March 13, 2025        -DDX: Includes but not limited to intractable abdominal pain, ischemic colitis, epiploic appendagitis, appendicitis, pancreatitis, which is a medical condition that can pose a threat to life/function             -I personally reviewed the CT findings and it shows omental infarct  Please refer to radiology report for official interpretation       CT ABDOMEN+PELVIS(CONTRAST ONLY)(CPT=74177)   Final Result   PROCEDURE:  CT ABDOMEN+PELVIS (CONTRAST ONLY) (CPT=74177)       COMPARISON:  [facility] , CT, CT ABDOMEN+PELVIS(CONTRAST ONLY)(CPT=74177),    3/11/2025, 4:51 PM.       INDICATIONS:  rlq pain was admitted last week       TECHNIQUE:  CT scanning was performed from the dome of the diaphragm to    the pubic symphysis with non-ionic intravenous contrast material. Post    contrast coronal MPR imaging was performed.  Dose reduction techniques    were used. Dose information is    transmitted to the ACR (American College of Radiology) NRDR (National    Radiology Data Registry) which includes the Dose Index Registry.       PATIENT STATED HISTORY:(As transcribed by Technologist)   Patient states    right lower abdominal pain.        CONTRAST USED:  100cc of Isovue 370       FINDINGS:     LIVER:  Normal morphology with stable hemangioma of the posterior right    lobe.   BILIARY:  Cholecystectomy.  Normal bile ducts.   PANCREAS:  No lesion, fluid collection, ductal dilatation, or atrophy.     SPLEEN:  No enlargement or focal lesion.     KIDNEYS:  No mass, obstruction, or calcification.     ADRENALS:  No mass or enlargement.     AORTA/VASCULAR:  No aneurysm or dissection.  Small portosystemic    collaterals of the upper abdomen noted suggesting portal hypertension.   RETROPERITONEUM:  No mass or adenopathy.     BOWEL/MESENTERY:  There is new circumferential thickening and stranding of    the cecum.  This is in conjunction with persistent and progressive    inflammatory stranding and small volume of fluid within the overlying    omentum.  The omental the inflammatory    changes extend superiorly along the pericolic gutter to the intersegmental    fissure of the left hepatic lobe.  An omental infarct is suspected.     Partial sigmoid resection.   ABDOMINAL WALL:  Scarring of the anterior abdominal wall.  No hernia.   URINARY BLADDER:  No visible focal wall thickening, lesion, or calculus.     PELVIC NODES:  No adenopathy.     PELVIC ORGANS:  No visible mass.  Pelvic organs appropriate for patient    age.     BONES:  No bony lesion or fracture.  Disc and endplate degenerative change    of the lumbosacral junction.   LUNG BASES:  No visible pulmonary or pleural disease.     OTHER:  Negative.                           =====   CONCLUSION:         Progressive fluid and inflammatory change about the right lower quadrant    omentum most suggestive of omental infarct.  There is new wall thickening    and stranding of the cecum suggesting segmental colitis with differential    including infectious,    inflammatory, and ischemic etiologies.  Clinical and biochemical    correlation advised.            LOCATION:  [place] [id number]           Dictated by (CST): Toni Caraballo MD on 3/16/2025 at 1:26 PM        Finalized by (CST): Toni Caraballo MD on 3/16/2025 at 1:40 PM             Labs Reviewed   CBC WITH DIFFERENTIAL WITH PLATELET - Abnormal; Notable for the following components:       Result Value    .0 (*)     Neutrophil Absolute Prelim 7.90 (*)     Neutrophil Absolute 7.90 (*)     All other components within normal limits   COMP METABOLIC PANEL (14) - Normal   LIPASE - Normal   LACTIC ACID, PLASMA - Normal   URINALYSIS WITH CULTURE REFLEX     Fine discussed with general surgery and hospitalist patient will be admitted for further care.  He was given pain control and admitted to the floor in stable condition.      Admission disposition: 3/16/2025  1:59 PM           Medical Decision Making      Disposition and Plan     Clinical Impression:  1. Omental infarction (HCC)         Disposition:  Admit  3/16/2025  1:59 pm    Hospital Problems       Present on Admission  Date Reviewed: 3/13/2025            ICD-10-CM Noted POA    * (Principal) Omental infarction (HCC) K55.069 3/11/2025 Unknown         Signed by Sid Navarrete MD on 3/16/2025  1:59 PM         MEDICATIONS ADMINISTERED IN LAST 1 DAY:  acetaminophen (Ofirmev) 10 mg/mL infusion premix       Date Action Dose Route User    3/17/2025 2204 Given 1,000 mg Intravenous Hina Mccray MD          acetaminophen (Ofirmev) 10 mg/mL infusion premix 1,000 mg       Date Action Dose Route User    3/18/2025 1010 New Bag 1,000 mg Intravenous Roro Hennessy RN          ampicillin-sulbactam (Unasyn) 3 g in sodium chloride 0.9% 100mL IVPB-ADD       Date Action Dose Route User    3/18/2025 1204 New Bag 3 g Intravenous Roro Hennessy RN    3/18/2025 0609 New Bag 3 g Intravenous Dianne Lechuga RN    3/18/2025 0047 New Bag 3 g Intravenous Dianne Lechuga RN          ceFAZolin (Ancef) injection       Date Action Dose Route User    3/17/2025 1941 Given 3 g Intravenous Kory, Cele, DO           dexAMETHasone PF (Decadron) 10 MG/ML injection       Date Action Dose Route User    3/17/2025 1927 Given 2 mg Injection Kory, Ismail, DO          dexamethasone (Decadron) 4 MG/ML injection       Date Action Dose Route User    3/17/2025 1947 Given 8 mg Intravenous Kory, Ismail, DO          fentaNYL (Sublimaze) 50 mcg/mL injection       Date Action Dose Route User    3/17/2025 2249 Given 100 mcg Intravenous Hina Mccray MD    3/17/2025 2217 Given 50 mcg Intravenous Hina Mccray MD    3/17/2025 2145 Given 25 mcg Intravenous Kory, Ismail, DO    3/17/2025 1904 Given 50 mcg Intravenous Kory, Ismail, DO          fentaNYL (Sublimaze) 50 mcg/mL injection 50 mcg       Date Action Dose Route User    3/17/2025 2314 Given 50 mcg Intravenous Joni Tang RN    3/17/2025 2306 Given 50 mcg Intravenous Joni Tang RN          fentaNYL (Sublimaze) 50 mcg/mL injection       Date Action Dose Route User    3/17/2025 2314 Given 50 mcg Intravenous Joni Tang RN          HYDROmorphone (Dilaudid) 1 MG/ML injection 0.6 mg       Date Action Dose Route User    3/17/2025 2336 Given 0.6 mg Intravenous Joni Tang RN    3/17/2025 2327 Given 0.6 mg Intravenous Joni Tang RN    3/17/2025 2319 Given 0.6 mg Intravenous Joni Tang RN          HYDROmorphone (Dilaudid) 1 MG/ML injection 0.4 mg       Date Action Dose Route User    3/18/2025 1010 Given 0.4 mg Intravenous Roro Hennessy RN          HYDROmorphone (Dilaudid) 1 MG/ML injection 0.8 mg       Date Action Dose Route User    3/18/2025 0607 Given 0.8 mg Intravenous Dianne Lechuga RN          HYDROmorphone (Dilaudid) 1 MG/ML injection       Date Action Dose Route User    3/17/2025 2327 Given 0.6 mg Intravenous Joni Tang RN          HYDROmorphone (Dilaudid) 1 MG/ML injection       Date Action Dose Route User    3/17/2025 2336 Given 0.6 mg Intravenous Clyde, Joni, RN          ketamine (Ketalar) 50 MG/ML injection       Date Action Dose Route User     3/17/2025 2157 Given 50 mg Intravenous Hina Mccray MD          ketorolac (Toradol) 30 MG/ML injection       Date Action Dose Route User    3/17/2025 2219 Given 30 mg Intravenous Hina Mccray MD          ketorolac (Toradol) 30 MG/ML injection 30 mg       Date Action Dose Route User    3/18/2025 0734 Given 30 mg Intravenous Dianne Lechuga RN    3/18/2025 0104 Given 30 mg Intravenous Dianne Lechuga RN          lactated ringers infusion       Date Action Dose Route User    3/17/2025 1859 Continued by Anesthesia (none) Intravenous Ruel Hornil, DO          lactated ringers infusion       Date Action Dose Route User    3/17/2025 2256 Rate/Dose Change (none) Intravenous Joni Tang RN          lidocaine PF (Xylocaine-MPF) 1% injection       Date Action Dose Route User    3/17/2025 1913 Given 50 mg Injection Cele Horn,           lidocaine in dextrose 5% 8-5 MG/ML-% infusion premix (for cardiac)       Date Action Dose Route User    3/17/2025 2150 New Bag 1.5 mg/kg/hr × 122.5 kg Intravenous Cele Horn, DO          metoclopramide (Reglan) 5 mg/mL injection       Date Action Dose Route User    3/17/2025 2202 Given 10 mg Intravenous Hina Mccray MD          ondansetron (Zofran) 4 MG/2ML injection 4 mg       Date Action Dose Route User    3/17/2025 2312 Given 4 mg Intravenous Joni Tang RN          ondansetron (Zofran) 4 MG/2ML injection       Date Action Dose Route User    3/17/2025 2217 Given 4 mg Intravenous Hina Mccray MD          ondansetron (Zofran) 4 MG/2ML injection       Date Action Dose Route User    3/17/2025 2312 Given 4 mg Intravenous Joni Tang RN          ropivacaine (Naropin) 5 MG/ML 10 mL in sodium chloride 0.9% PF 20 mL local anesthetic mixture       Date Action Dose Route User    3/17/2025 1927 New Bag 60 mL Injection Cele Horn DO          pantoprazole (Protonix) 40 mg in sodium chloride 0.9% PF 10 mL IV push       Date Action Dose Route User    3/18/2025 0852 Given  40 mg Intravenous Roro Hennessy RN          prochlorperazine (Compazine) 10 MG/2ML injection 5 mg       Date Action Dose Route User    3/17/2025 2325 Given 5 mg Intravenous Joni Tang RN          prochlorperazine (Compazine) 10 MG/2ML injection       Date Action Dose Route User    3/17/2025 2325 Given 5 mg Intravenous Joni Tang RN          propofol (Diprivan) 10 MG/ML injection       Date Action Dose Route User    3/17/2025 1913 Given 150 mg Intravenous Kory, Ismail, DO          rocuronium (Zemuron) 50 mg/5mL injection       Date Action Dose Route User    3/17/2025 2148 Given 30 mg Intravenous Hina Mccray MD    3/17/2025 2118 Given 10 mg Intravenous Kory, Ismail, DO    3/17/2025 2055 Given 10 mg Intravenous Kory, Ismail, DO    3/17/2025 2028 Given 10 mg Intravenous Kory, Ismail, DO    3/17/2025 1957 Given 20 mg Intravenous Kory, Ismail, DO    3/17/2025 1913 Given 50 mg Intravenous Kory, Ismail, DO          sodium chloride 0.9% infusion       Date Action Dose Route User    3/18/2025 0046 New Bag (none) Intravenous Dianne Lechuga RN          sugammadex (Bridion) 200 MG/2ML injection       Date Action Dose Route User    3/17/2025 2245 Given 250 mg Intravenous Hina Mccray MD            Vitals (last day)       Date/Time Temp Pulse Resp BP SpO2 Weight O2 Device O2 Flow Rate (L/min) Who    03/18/25 1145 98.2 °F (36.8 °C) 75 18 143/74 94 % -- None (Room air) -- LE    03/18/25 0905 97.6 °F (36.4 °C) 79 18 118/75 94 % -- None (Room air) -- LE    03/18/25 0900 -- -- -- -- 96 % -- Nasal cannula 2 L/min LJ    03/18/25 0535 98.3 °F (36.8 °C) 81 18 143/80 95 % -- Nasal cannula 4 L/min NJ    03/18/25 0030 98.4 °F (36.9 °C) 95 18 129/70 95 % -- Nasal cannula 4 L/min NJ    03/17/25 2345 98.5 °F (36.9 °C) 104 18 126/71 96 % -- Nasal cannula 5 L/min     03/17/25 2330 -- 96 20 113/65 95 % -- Nasal cannula 5 L/min     03/17/25 2310 -- 88 21 127/73 94 % -- Nasal cannula 5 L/min     03/17/25 2305 -- 86 23  125/63 95 % -- Nasal cannula 5 L/min TC    03/17/25 2300 -- 100 25 128/68 94 % -- Nasal cannula 4 L/min TC    03/17/25 2256 97.6 °F (36.4 °C) 86 16 144/79 95 % -- Simple mask 4 L/min TC    03/17/25 1307 98 °F (36.7 °C) 67 18 111/54 98 % -- None (Room air) -- IP    03/17/25 0853 98 °F (36.7 °C) 60 20 133/68 97 % -- CPAP -- IP    03/17/25 0410 98.2 °F (36.8 °C) 63 18 111/45 94 % -- None (Room air) -- AE         H&P  Chief Complaint: abdominal pain        Subjective:  History of Present Illness:      Don Cabral is a 59 year old male with pmhx of diverticulitis, chronic omental infarct, anxiety/depression, HTN, NELLIE who presents with complaint of worsening abdominal pain. He was just recently discharged from the hospital 2 days ago after admission for abdominal pain and omental infarct that was being managed with supportive care. He reports his symptoms were initially well managed with course of abx and steroids. His symptoms returned a few days after completing abx/steroids and he was subsequently hospitalized. He underwent colonoscopy on 3/13 which showed mild cecal colitis. He was offered diagnostic laparoscopy during his admission, but opted to discharge home with plan to f/u closely with Dr. Clemens for consideration of elective ex-lap for further evaluation of his symptoms. His symptoms worsened yesterday and today and he didn't feel he could control his pain with PO norco. He denies any fevers/chills, melena, hematochezia, urinary complaints, n/v/d.           Assessment & Plan:  #Omental infarction  #Acute colitis  #Intractable abdominal pain  -CTAP reviewed  -NPO, IVF, pain control, ant-emetics PRN  -empiric unasyn  -general surgery consulted from ED      #Hx of diverticulitis      #HTN  -lisinopril      #Chronic afib  #LBBB  -verapamil     #Anxiety  #Major depressive disorder, recurrent, unspecified severity  -sertraline, clonazepam      3/16 GEN SURGERY CONSULT NOTE    Reason for  Consultation:  Omental infarct     Chief Complaint:  Abdominal pain     History of Present Illness:  Dno Cabral is a 59 year old male known to our service, who presents to [facility] on 3/16/2025 with complaints of abdominal pain. Pt was discharged on Friday after being admitted for further work up and management of his abdominal pain, which initially started in February and has resulted in multiple hospitalizations. He has a known omental infarct which has been managed non-operatively. He has completed trial of antibiotics and steroids with initial improvement in symptoms, though ultimately recurring. He was admitted 3/11-3/14 and underwent colonoscopy on 3/13 which showed mild colitis in the cecum, otherwise unrevealing for etiology of his symptoms. He was offered diagnostic laparoscopy with Dr. Valladares, though pt preferred to be discharged home and schedule elective laparoscopy with Dr. Clemens in the coming weeks. Since discharge, pt reports his RLQ pain worsened and unrelieved by Norco, ultimately prompting his return. He denies any n/v. Having normal bowel movements. Denies fevers.      Upon presentation to the hospital, pt hemodynamically stable and afebrile. Work up included CBC which was largely unremarkable, WBC 10.5, Hgb 13.2, Plt 456. CMP entirely within normal limits. Lipase 36. LA wnl at 1.0. Urinalysis unremarkable and without bacteria or blood. CT abd/pel was obtained and revealed progressive fluid and inflammatory changes in the RLQ omentum, suggestive of omental infarct. New wall thickening and stranding of cecum, consistent with colitis. Pt was admitted under the hospitalist with general surgery on consult.      He has a past medical history significant for atrial fibrillation, diverticulitis, HTN. He has a past surgical history significant for exploratory laparotomy and Zaid's procedures on 4/15/2024 with Dr. Novoa. He then underwent open colostomy reversal on 7/17/2024. He has  also had a laparoscopic cholecystectomy.      Abdominal:      General: There is no distension.      Palpations: Abdomen is soft.      Tenderness: There is abdominal tenderness (focal ttp RLQ). There is no guarding or rebound.          CT ABDOMEN+PELVIS(CONTRAST ONLY)(CPT=74177)     Result Date: 3/16/2025  CONCLUSION:   Progressive fluid and inflammatory change about the right lower quadrant omentum most suggestive of omental infarct.  There is new wall thickening and stranding of the cecum suggesting segmental colitis with differential including infectious, inflammatory, and ischemic etiologies.  Clinical and biochemical correlation advised.   LOCATION:  [id number]   Dictated by (CST): Toni Caraballo MD on 3/16/2025 at 1:26 PM     Finalized by (CST): Toni Caraballo MD on 3/16/2025 at 1:40 PM            Impression:    Omental Infarct     Plan:  Plan discussed with Dr. Burgos  Pt with recurrent hospitalizations. Not improving with non-operative management.  Plan to proceed with diagnostic laparoscopy, possible open, omentectomy, possible bowel resection  Pt requesting Dr. Clemens as his surgeon. Definitive surgical plans and timing will be determined tomorrow per Dr. Clemens and OR availability   May have clear liquids today. NPO at midnight.   Analgesics and antiemetics as needed  All of his questions were addressed       The patient was independently examined.  I agree with the PAs assessment and plan.       This is a 59-year-old gentleman with omental infarct     CT images were reviewed personally by me  CT shows inflammatory changes near the omentum in the right lower quadrant with adjacent inflammatory changes to the cecum  Patient denies any nausea vomiting  He denies any change to his bowel habits  He continues to experience pain with localized peritonitis in the right lower quadrant  No acute surgical intervention  Patient appears to have progression of the omental infarct  I discussed with the patient that this  could be a twisting of the tissue in his abdomen now ischemic  Okay for clear liquids, n.p.o. at midnight  Pain control as needed  Will discuss with Dr. Clemens and rest of team for diagnostic laparoscopy timing  Surgery will continue to follow  Rest of care per primary     3/17 HOSPITALIST NOTE    Chief Complaint: abdominal pain        Subjective:  Patient resting in bed, RUQ/RLQ pain managed with pain meds, but still present. Waiting on definitive surgical plan.Abdominal cramping and worsening pain when he took his pills this morning        Objective:  Review of Systems:   A comprehensive review of systems was completed; pertinent positive and negatives stated in subjective.     Vital signs:  Temp:  [97.9 °F (36.6 °C)-99 °F (37.2 °C)] 98.2 °F (36.8 °C)  Pulse:  [63-93] 63  Resp:  [18] 18  BP: (106-152)/(45-85) 111/45  SpO2:  [94 %-100 %] 94 %      Physical Exam:    General: No acute distress  Respiratory: No wheezes, no rhonchi  Cardiovascular: S1, S2, regular rate and rhythm  Abdomen: Soft, +RUQ/RLQ TTP, non-tender, non-distended, positive bowel sounds  Neuro: No new focal deficits.   Extremities: No edema     Diagnostic Data:    Labs:          Recent Labs   Lab 03/11/25  1450 03/12/25 0522 03/13/25 0455 03/16/25  1230 03/17/25  0532   WBC 9.1 8.2 7.7 10.5 8.8   HGB 14.4 13.1 12.9* 13.2 12.2*   MCV 83.9 88.4 84.2 85.7 86.9   .0 342.0 379.0 456.0* 395.0                 Recent Labs   Lab 03/11/25  1450 03/12/25  0522 03/13/25  0455 03/16/25  1230 03/17/25  0532   *   < > 80 92 90   BUN 9   < > 14 11 15   CREATSERUM 0.89   < > 0.84 0.85 0.88   CA 9.6   < > 9.3 9.7 9.2   ALB 4.7  --   --  4.8  --       < > 140 140 141   K 4.0   < > 4.0 3.7 4.1      < > 102 103 105   CO2 31.0   < > 28.0 29.0 32.0   ALKPHO 77  --   --  64  --    AST 18  --   --  14  --    ALT 20  --   --  15  --    BILT 0.5  --   --  0.8  --    TP 7.0  --   --  7.3  --     < > = values in this interval not displayed.           Assessment & Plan:  #Omental infarction  #Acute colitis  #Intractable abdominal pain  -CTAP reviewed  -NPO, IVF, pain control, ant-emetics PRN  -empiric unasyn  -general surgery following > surgical intervention planned for later today     #Hx of diverticulitis      #HTN  -lisinopril      #Chronic afib  #LBBB  -verapamil     #Anxiety  #Major depressive disorder, recurrent, unspecified severity  -sertraline, clonazepam       3/17 GEN SURGERY NOTE  Subjective:  The patient was seen and examined at bedside.  He reports that he continues to have severe right lower quadrant abdominal pain that is only manage with Toradol and IV Dilaudid.  He reports mild nausea.  He is passing flatus.  He does feel mildly distended.     Objective/Physical Exam:  /45 (BP Location: Right arm)   Pulse 63   Temp 98.2 °F (36.8 °C) (Oral)   Resp 18   Wt 270 lb (122.5 kg)   SpO2 94%   BMI 33.75 kg/m²      Intake/Output Summary (Last 24 hours) at 3/17/2025 0846  Last data filed at 3/17/2025 0800      Gross per 24 hour   Intake 2154 ml   Output 550 ml   Net 1604 ml            General: Alert, oriented x3. No acute distress.  HEENT: Normocephalic, atraumatic. No scleral icterus.  Pulmonary: No respiratory distress, effort normal.   Abdomen: Non-distended, without tympany to percussion. Soft, severe right lower quadrant tenderness to palpation with voluntary guarding. No peritoneal signs.   Incision: Well-healed midline incision and former ostomy site  Extremities: No lower extremity edema. No clubbing or cyanosis.   Skin: Warm, dry. No jaundice.        Assessment:    Omental infarct      Plan:  Will discuss surgical plan with Dr. Valladares and Dr. Clemens.  Patient is requesting Dr. Clemens his surgeon.  Tentatively planning for a diagnostic laparoscopy, possible open, possible omentectomy, possible bowel resection   Will discuss surgical timing with  Dr. Clemens  Continue n.p.o.  Continue IV antibiotics-Unasyn  Antiemetics and  analgesics as needed  DVT prophylaxis SCDs-hold Lovenox today for possible surgery  GI prophylaxis with Protonix    3/17 BRIEF OP NOTE  Pre-Operative Diagnosis: Omental infarction (HCC) [K55.069]     Post-Operative Diagnosis: Omental infarction; severe abdominal adhesions;      Procedure Performed:   DIAGNOSTIC LAPAROSCOPY, EXPLORATORY LAPAROTOMY, RIGHT COLON RESECTION, ANASTOMOSIS, PARTIAL OMENTECTOMY, SMALL BOWEL RESECTION, LYSIS OF ADHESION     Surgeons and Role:     * Ryan Clemens MD - Primary     Assistant(s):  Surgical Assistant.: Basilio Cannon CSA  PA: Renita Grey PA     Surgical Findings: as above     Specimen: right colon with attached omentum; portion of small bowel; peritoneal fluid for culture.      Estimated Blood Loss: Blood Output: 100 mL (3/17/2025 10:44 PM)

## 2025-03-18 NOTE — PROGRESS NOTES
A&Ox4. VSS. RA. .  Telemetry: NSR  GI: Abdomen soft, rounded, hypoactive bowel sounds, denies passing gas. NG to LIS, drainage small amount of brown drainage  Denies nausea.  : vazquez catheter draining clear yellow urine  Pain controlled with PRN pain medications  Up with standby assist.  Incisions:midline aquacel, right side aquacel dressings x 2, CDI. Old drainage present on the midline aquacel.  Diet:NPO except sips and ice chips.   IVF running per order.  All appropriate safety measures in place. All questions and concerns addressed. Will continue to monitor

## 2025-03-18 NOTE — BRIEF OP NOTE
Pre-Operative Diagnosis: Omental infarction (HCC) [K55.069]     Post-Operative Diagnosis: Omental infarction; severe abdominal adhesions;      Procedure Performed:   DIAGNOSTIC LAPAROSCOPY, EXPLORATORY LAPAROTOMY, RIGHT COLON RESECTION, ANASTOMOSIS, PARTIAL OMENTECTOMY, SMALL BOWEL RESECTION, LYSIS OF ADHESION    Surgeons and Role:     * Ryan Clemens MD - Primary    Assistant(s):  Surgical Assistant.: Basilio Cannon CSA  PA: Renita Grey PA     Surgical Findings: as above     Specimen: right colon with attached omentum; portion of small bowel; peritoneal fluid for culture.      Estimated Blood Loss: Blood Output: 100 mL (3/17/2025 10:44 PM)      Dictation Number:  [id number]      Ryan Clemens MD  3/17/2025  10:53 PM

## 2025-03-18 NOTE — ANESTHESIA POSTPROCEDURE EVALUATION
[facility]    Don Cabral Patient Status:  Inpatient   Age/Gender 59 year old male MRN [id number]   Location [facility] POST ANESTHESIA CARE UNIT Attending Katarzyna Brown DO   Hosp Day # 1 PCP Stephen Christianson MD       Anesthesia Post-op Note    DIAGNOSTIC LAPAROSCOPY, EXPLORATORY LAPAROTOMY, RIGHT COLON RESECTION, ANASTOMOSIS, PARTIAL OMENTECTOMY, SMALL BOWEL RESECTION, LYSIS OF ADHESION    Procedure Summary       Date: 03/17/25 Room / Location:  MAIN OR 09 / [facility] MAIN OR    Anesthesia Start: 1859 Anesthesia Stop: 2301    Procedure: DIAGNOSTIC LAPAROSCOPY, EXPLORATORY LAPAROTOMY, RIGHT COLON RESECTION, ANASTOMOSIS, PARTIAL OMENTECTOMY, SMALL BOWEL RESECTION, LYSIS OF ADHESION (Abdomen) Diagnosis:       Omental infarction (HCC)      (Omental infarction (HCC) [K55.069])    Surgeons: yRan Clemens MD Anesthesiologist: Hina Mccray MD    Anesthesia Type: general ASA Status: 3            Anesthesia Type: No value filed.    Vitals Value Taken Time   /79 03/17/25 2256   Temp 97.6 03/17/25 2301   Pulse 85 03/17/25 2301   Resp 27 03/17/25 2301   SpO2 94 % 03/17/25 2301   Vitals shown include unfiled device data.        Patient Location: PACU    Anesthesia Type: general    Airway Patency: patent and extubated    Postop Pain Control: adequate    Mental Status: mildly sedated but able to meaningfully participate in the post-anesthesia evaluation    Nausea/Vomiting: none    Cardiopulmonary/Hydration status: stable euvolemic    Complications: no apparent anesthesia related complications    Postop vital signs: stable    Dental Exam: Unchanged from Preop    Patient to be discharged from PACU when criteria met.

## 2025-03-18 NOTE — ANESTHESIA PROCEDURE NOTES
Regional Block    Performed by: Cele Horn DO  Authorized by: Cele Horn DO      General Information and Staff    Start Time:   Anesthesiologist:  Cele Horn DO  Performed by:  Anesthesiologist  Patient Location:  OR      Site Identification: real time ultrasound guided and image stored and retrievable    Block site/laterality marked before start: site marked  Reason for Block: at surgeon's request and post-op pain management    Preanesthetic Checklist: 2 patient identifers, IV checked, risks and benefits discussed, monitors and equipment checked, pre-op evaluation, timeout performed, anesthesia consent, sterile technique used, no prohibitive neurological deficits and no local skin infection at insertion site      Procedure Details    Patient Position:   Prep: ChloraPrep    Monitoring:  Cardiac monitor, continuous pulse ox, blood pressure cuff and heart rate  Block Type:  TAP  Laterality:  Bilateral  Injection Technique:  Single-shot    Needle    Needle Type:  Short-bevel and echogenic  Needle Localization:  Ultrasound guidance  Reason for Ultrasound Use: appropriate spread of the medication was noted in real time and no ultrasound evidence of intravascular and/or intraneural injection            Assessment    Injection Assessment:  Good spread noted, negative resistance, negative aspiration for heme, incremental injection and low pressure  Heart Rate Change: No    - Patient tolerated block procedure well without evidence of immediate block related complications.     Medications      Additional Comments    Medication:  Ropivacaine 0.25% 30mL with 2mg PF dexamethasone on each side

## 2025-03-18 NOTE — OPERATIVE REPORT
[facility]    PATIENT'S NAME: CHAYITO MEDEIROS   ATTENDING PHYSICIAN: Katarzyna Brown DO   OPERATING PHYSICIAN: Ryan Clemens M.D.   PATIENT ACCOUNT#:   [id number]    LOCATION:  PACU  PACU 1 [facility]  MEDICAL RECORD #:   [id number]       YOB: 1965  ADMISSION DATE:       03/16/2025      OPERATION DATE:  03/17/2025    OPERATIVE REPORT      PREOPERATIVE DIAGNOSIS:  Omental infarction.  POSTOPERATIVE DIAGNOSIS:  Omental infarction and severe intra-abdominal adhesions.  PROCEDURE:    1.   Diagnostic laparoscopy.  2.   Exploratory laparotomy.  3.   Right hemicolectomy with anastomosis.  4.   Partial omentectomy.  5.   Small bowel resection with anastomosis.  6.   Lysis of adhesions (90 minutes).    ASSISTANT:   Basilio Cannon CSA.  Second assistant, Renita Grey PA-C.    ANESTHESIA:  General endotracheal anesthesia.    ANESTHESIOLOGIST:  Hina Mccray MD.    BLOOD LOSS:  Approximately 100 mL.    COMPLICATIONS:  None apparent.    SPECIMENS:    1.   Right colon with attached omentum.  2.   Portion of small bowel.  3.   Peritoneal fluid for culture and sensitivity.    DISPOSITION:  The patient was awakened from anesthesia and brought to the recovery room in stable condition.  He tolerated the procedure without apparent complication.  Needle, sponge, and instrument counts were correct at the end of the procedure.  Preprocedure antibiotic and DVT prophylaxis was administered per protocol.  Time-out was performed prior to initiating the procedure identifying the correct patient, procedure, and surgeon.    FINDINGS:  The patient has severe and extensive intra-abdominal adhesions precluding completion of the operation by minimally invasive technique.  There are adhesions of omentum, large and small bowel to the anterior abdominal wall.  There are innumerable adhesions of small bowel loops to adjacent bowel loops to omentum to abdominal wall to colon.  The patient has extensive omental infarction.  The  infarcted omentum is densely adherent to the right colon and is inseparable without likely disruption of the integrity of the bowel wall.  Therefore, right hemicolectomy with attached omentum is completed.  Extensive adhesiolysis was required to liberate the abdominal contents and to allow working space in the abdomen.  The resultant ileocolic side-to-side anastomosis is air and watertight, well perfused, widely patent, and hemostatic.  There was an internal hernia at the level of the splenic flexure, likely for mobilization of the splenic flexure from the patient's prior surgical intervention.  The prior colorectal anastomosis is intact.  There were extensive adhesions within the pelvis and further exploration of this area was discontinued.    OPERATIVE TECHNIQUE:  The patient brought to the operating room.  After induction of general endotracheal anesthesia, the abdomen was prepped and draped in usual sterile fashion.  Initial attempt at diagnostic laparoscopy was initiated in the right upper quadrant with an 8 mm incision created in the midclavicular line 2 fingerbreadths below the costal margin.  Pneumoperitoneum was established with a Veress needle, after which optical entry trocar was used to gain entry into the abdomen.  Immediately identified were extensive adhesions as noted above.  With careful inspection, there was an area in the right lower quadrant that was free of adhesions where a second 8 mm trocars placed through an incision under direct vision.  Attempts at adhesiolysis are initiated initially by mobilizing as much omentum as possible from the midline laparotomy incision using blunt and sharp dissection and judicious use of electrocautery.  After significant amount of time was spent trying to accomplish this, efforts were discontinued due to visualization of severe extensive adhesions and the realization of the likelihood of the operation being completed laparoscopically or robotically would not be  possible.  At this point, a midline laparotomy incision was created through the patient's prior incision.  The hypertrophic scar was excised and discarded with a scalpel.  Hemostasis achieved.  The subcutaneous tissue was then divided down to the level of the fascia which was grasped, elevated, and divided.  The linea alba was opened to the full cephalad and caudal extent of the prior incision and extended somewhat cephalad and somewhat caudally to provide adequate exposure.  With significant adhesiolysis lasting approximately 90 minutes, blunt and sharp dissection was used to liberate all adhesions such that a self-retaining retractor could be placed.  At this point, inspection of the abdomen revealed the above-noted findings.  Decision was made to mobilize the right colon as the adherent omentum was inseparable from the right colon in its entirety.  The lateral attachments of the colon were divided using blunt and sharp dissection.  The colon was progressively reflected to the midline after the attachments of the hepatic flexure were divided and the lesser sac was entered.  Duodenum was visualized and protected and reflected dorsally and protected at all times.  Proximal and distal resection margins were identified, but attention is now turned initially to liberating the adhesions of the small bowel.  Numerous interloop adhesions were bluntly and sharply dissected with Metzenbaum scissors and blunt dissection until the full extent of the small bowel was visualized.  In the left upper quadrant, there was internal herniation of the small bowel through mesenteric defect at the splenic flexure, likely representing mobilization from the patient's prior low anterior resection and colostomy reversal.  The small bowel was reduced.  It is viable in this area.  Adhesions were further divided, and the small bowel was now run multiple times from the ligament of Treitz to the terminal ileum.  One area in the mid small bowel  shows 2 areas of minor deserosalization.  These were repaired with interrupted 3-0 Vicryl in Lembert fashion.  One other area shows more full thickness disruption of the serosa, and there was concern for thermal injury in this area.  This area was resected approximately 4 cm segment proximally and distally divided by purple cartridge linear stapler.  The mesentery was divided using a LigaSure device.  A side-to-side anastomosis was created with a purple cartridge linear stapler and a blue cartridge TA stapler.  The resultant anastomosis was air watertight, hemostatic, well perfused.  Mesenteric defect was closed using interrupted 3-0 Vicryl suture.  Now, proximal and distal margins of the right colon ware identified in the transverse colon proximal to the middle colic vessels.  The transverse colon was divided with a purple cartridge linear stapler.  An area of the terminal ileum was also identified near the vascular pedicle, which was preserved and resection margin was also achieved in this area with a purple cartridge linear stapler.  The mesentery was then divided sequentially using the LigaSure device.  The ileocolic pedicle was secured with a 0 silk tie and then also divided with the LigaSure device with excellent hemostasis.  The specimen was now removed and contained incontinuity the terminal ileum, appendix, right colon, portion of the transverse colon and adherent omentum.  The specimen was removed from the operative field and submitted to Pathology for further evaluation.  Next, attention was turned to irrigating the abdomen with multiple aliquots of sterile saline solution until the effluent fluid was clear.  Once this was accomplished, hemostasis was confirmed in the retroperitoneum and the surgical margins where there had been initial diffuse oozing throughout the course of the operation.  All hemostasis was complete at this point.  A side-to-side ileocolic anastomosis was now created using purple  cartridge linear stapler for the common channel and a TA blue cartridge 90 mm stapler to close the common opening.  The anastomosis was air and watertight, widely patent, hemostatic, and well perfused.  Mesenteric defect was closed using running 2-0 Vicryl suture.  The abdomen was further inspected.  No other acute abnormalities were identified.  The operative team now changes gowns and gloves in anticipation of wound closure.  The infraumbilical peritoneum was closed with running #1 Vicryl suture.  The fascia was then closed using running looped #1 PDS suture.  The subcutaneous tissue was cleansed and irrigated.  The skin was reapproximated using skin clips.  The 2 trocar sites were also closed using skin clips.  At the completion of the operation, the Anesthesiology performed bilateral TAP blocks.  After sterile occlusive Aquacel dressing had been placed on the incision.  At this point, the patient was awakened from anesthesia.  He was brought to the recovery room in stable condition having tolerated procedure without apparent complication.  Needle, sponge, and instrument counts were correct at the end the procedure.  Operative findings were discussed with the patient's family immediately upon conclusion of surgery.    Dictated By Ryan Clemens M.D.  d: 03/17/2025 23:06:28  t: 03/17/2025 23:32:08  Job [id number]  PP/

## 2025-03-18 NOTE — PROGRESS NOTES
[facility]   part of [facility]     Hospitalist Progress Note     Don Cabral Patient Status:  Inpatient    1965 MRN [id number]   Location [facility] 3NW-A Attending Katarzyna Brown, DO   Hosp Day # 2 PCP Stephen Christianson MD     Chief Complaint: abdominal pain    Subjective:     Resting in bed, +NGT in place with bilious output. Using higher dilaudid doses frequently right now, but pain is currently manageable. He is open to ketamine drip if recommended by surgical service     Objective:    Review of Systems:   A comprehensive review of systems was completed; pertinent positive and negatives stated in subjective.    Vital signs:  Temp:  [97.6 °F (36.4 °C)-98.5 °F (36.9 °C)] 98.3 °F (36.8 °C)  Pulse:  [] 81  Resp:  [16-25] 18  BP: (111-144)/(54-80) 143/80  SpO2:  [94 %-98 %] 95 %    Physical Exam:    General: No acute distress  Respiratory: No wheezes, no rhonchi  Cardiovascular: S1, S2, regular rate and rhythm  Abdomen: Soft, generalized post-op TTP; non-distended, positive bowel sounds; surgical incisions with bandage c/d/I   Neuro: No new focal deficits.   Extremities: No edema    Diagnostic Data:    Labs:  Recent Labs   Lab 25  0522 25  0455 25  1230 25  0532 25  0520   WBC 8.2 7.7 10.5 8.8 14.6*   HGB 13.1 12.9* 13.2 12.2* 13.0   MCV 88.4 84.2 85.7 86.9 87.0   .0 379.0 456.0* 395.0 463.0*       Recent Labs   Lab 25  1450 25  0522 25  1230 25  0532 25  0520   *   < > 92 90 166*   BUN 9   < > 11 15 18   CREATSERUM 0.89   < > 0.85 0.88 0.80   CA 9.6   < > 9.7 9.2 8.6*   ALB 4.7  --  4.8  --   --       < > 140 141 141   K 4.0   < > 3.7 4.1 4.8      < > 103 105 107   CO2 31.0   < > 29.0 32.0 25.0   ALKPHO 77  --  64  --   --    AST 18  --  14  --   --    ALT 20  --  15  --   --    BILT 0.5  --  0.8  --   --    TP 7.0  --  7.3  --   --     < > = values in this interval not displayed.        Estimated Glomerular Filtration Rate: 103 mL/min/1.73m2 (result from lab).    No results for input(s): \"TROP\", \"TROPHS\", \"CK\" in the last 168 hours.    No results for input(s): \"PTP\", \"INR\" in the last 168 hours.     Microbiology    No results found for this visit on 03/16/25.      Imaging: Reviewed in Epic.    Medications:    enoxaparin  40 mg Subcutaneous Daily    acetaminophen  1,000 mg Oral Q8H GABRIELLA    ketorolac  30 mg Intravenous Q6H    Followed by    [START ON 3/19/2025] ibuprofen  600 mg Oral 4 times per day    magnesium oxide  400 mg Oral Once    pantoprazole  40 mg Intravenous Q24H    lisinopril  20 mg Oral Daily    clonazePAM  0.5 mg Oral BID    sertraline  75 mg Oral Daily    verapamil ER  180 mg Oral Nightly    gabapentin  600 mg Oral TID    ampicillin-sulbactam  3 g Intravenous Q6H       Assessment & Plan:      #Omental infarction  #Acute colitis  #Severe abdominal adhesions  #Intractable abdominal pain  -s/p diagnostic lap, ex-lap, R colon resection, partial omentectomy, small bowel resection, extensive adhesion lysis on 3/17  -NPO, IVF, pain control, ant-emetics PRN  -empiric unasyn  -general surgery following    #Leukocytosis, thrombocytosis; suspect reactive  -follow CBC      #Hx of diverticulitis      #HTN  -lisinopril      #Chronic afib  #LBBB  -verapamil     #Anxiety  #Major depressive disorder, recurrent, unspecified severity  -sertraline, clonazepam       Katarzyna Brown, DO    Supplementary Documentation:     Quality:  DVT Mechanical Prophylaxis:   SCDs, Early ambuation  DVT Pharmacologic Prophylaxis   Medication    enoxaparin (Lovenox) 40 MG/0.4ML SUBQ injection 40 mg                Code Status: Full Code  Vance: Vance catheter in place  Vance Duration (in days):   Central line:    NILS:     Discharge is dependent on: clinical state, general surgery recs  At this point Mr. Cabral is expected to be discharge to: home    The 21st Century Cures Act makes medical notes like these available  to patients in the interest of transparency. Please be advised this is a medical document. Medical documents are intended to carry relevant information, facts as evident, and the clinical opinion of the practitioner. The medical note is intended as peer to peer communication and may appear blunt or direct. It is written in medical language and may contain abbreviations or verbiage that are unfamiliar.

## 2025-03-19 LAB
ANION GAP SERPL CALC-SCNC: 5 MMOL/L (ref 0–18)
BASOPHILS # BLD AUTO: 0.01 X10(3) UL (ref 0–0.2)
BASOPHILS NFR BLD AUTO: 0.1 %
BUN BLD-MCNC: 21 MG/DL (ref 9–23)
CALCIUM BLD-MCNC: 8.3 MG/DL (ref 8.7–10.6)
CHLORIDE SERPL-SCNC: 109 MMOL/L (ref 98–112)
CO2 SERPL-SCNC: 30 MMOL/L (ref 21–32)
CREAT BLD-MCNC: 0.74 MG/DL
EGFRCR SERPLBLD CKD-EPI 2021: 105 ML/MIN/1.73M2 (ref 60–?)
EOSINOPHIL # BLD AUTO: 0.02 X10(3) UL (ref 0–0.7)
EOSINOPHIL NFR BLD AUTO: 0.2 %
ERYTHROCYTE [DISTWIDTH] IN BLOOD BY AUTOMATED COUNT: 13.7 %
GLUCOSE BLD-MCNC: 107 MG/DL (ref 70–99)
HCT VFR BLD AUTO: 32.2 %
HGB BLD-MCNC: 10.5 G/DL
IMM GRANULOCYTES # BLD AUTO: 0.05 X10(3) UL (ref 0–1)
IMM GRANULOCYTES NFR BLD: 0.4 %
LYMPHOCYTES # BLD AUTO: 1.47 X10(3) UL (ref 1–4)
LYMPHOCYTES NFR BLD AUTO: 11.5 %
MAGNESIUM SERPL-MCNC: 1.9 MG/DL (ref 1.6–2.6)
MCH RBC QN AUTO: 28.3 PG (ref 26–34)
MCHC RBC AUTO-ENTMCNC: 32.6 G/DL (ref 31–37)
MCV RBC AUTO: 86.8 FL
MONOCYTES # BLD AUTO: 0.88 X10(3) UL (ref 0.1–1)
MONOCYTES NFR BLD AUTO: 6.9 %
NEUTROPHILS # BLD AUTO: 10.31 X10 (3) UL (ref 1.5–7.7)
NEUTROPHILS # BLD AUTO: 10.31 X10(3) UL (ref 1.5–7.7)
NEUTROPHILS NFR BLD AUTO: 80.9 %
OSMOLALITY SERPL CALC.SUM OF ELEC: 301 MOSM/KG (ref 275–295)
PLATELET # BLD AUTO: 400 10(3)UL (ref 150–450)
POTASSIUM SERPL-SCNC: 4 MMOL/L (ref 3.5–5.1)
RBC # BLD AUTO: 3.71 X10(6)UL
SODIUM SERPL-SCNC: 144 MMOL/L (ref 136–145)
WBC # BLD AUTO: 12.7 X10(3) UL (ref 4–11)

## 2025-03-19 PROCEDURE — 99232 SBSQ HOSP IP/OBS MODERATE 35: CPT | Performed by: HOSPITALIST

## 2025-03-19 RX ORDER — ACETAMINOPHEN 10 MG/ML
1000 INJECTION, SOLUTION INTRAVENOUS EVERY 6 HOURS PRN
Status: DISCONTINUED | OUTPATIENT
Start: 2025-03-19 | End: 2025-03-20

## 2025-03-19 RX ORDER — MAGNESIUM SULFATE 1 G/100ML
1 INJECTION INTRAVENOUS ONCE
Status: COMPLETED | OUTPATIENT
Start: 2025-03-19 | End: 2025-03-19

## 2025-03-19 NOTE — PAYOR COMM NOTE
--------------  CONTINUED STAY REVIEW    Payor: CAILIN MORRISON  Subscriber #:  [id number]  Authorization Number: [id number]    Admit date: 3/16/25  Admit time:  2:28 PM    3/18  NG IN PLACE WITH LIS             POD 1 diagnostic laparoscopy, exploratory laparotomy, right hemicolectomy with anastomosis, partial omentectomy, small bowel resection with anastomosis, lysis of adhesions.      PREOPERATIVE DIAGNOSIS:  Omental infarction.  POSTOPERATIVE DIAGNOSIS:  Omental infarction and severe intra-abdominal adhesions.  PROCEDURE:    1.       Diagnostic laparoscopy.  2.       Exploratory laparotomy.  3.       Right hemicolectomy with anastomosis.  4.       Partial omentectomy.  5.       Small bowel resection with anastomosis.  6.       Lysis of adhesions (90 minutes).      Plan:  Continue NG tube to low intermittent suction  Continue NPO; okay for hard candy and chewing gum, small amount of ice chips.   Analgesics and antiemetics as needed; add abdominal binder as needed.   Encourage ambulation and up to chair; PT following   Encourage incentive spirometry   DVT prophylaxis with lovenox  GI prophylaxis with protonix         MEDICATIONS ADMINISTERED IN LAST 1 DAY:  acetaminophen (Ofirmev) 10 mg/mL infusion premix 1,000 mg       Date Action Dose Route User    3/19/2025 0347 New Bag 1,000 mg Intravenous Nancie Boyd RN    3/18/2025 2235 New Bag 1,000 mg Intravenous Nancie Boyd RN    3/18/2025 1630 New Bag 1,000 mg Intravenous Roro Hennessy RN          ampicillin-sulbactam (Unasyn) 3 g in sodium chloride 0.9% 100mL IVPB-ADD       Date Action Dose Route User    3/19/2025 0609 New Bag 3 g Intravenous Nancie Boyd RN    3/19/2025 0051 New Bag 3 g Intravenous Nancie Boyd RN    3/18/2025 1817 New Bag 3 g Intravenous Roro Hennessy RN    3/18/2025 1204 New Bag 3 g Intravenous Roro Hennessy RN          clonazePAM (KlonoPIN) tab 0.5 mg       Date Action Dose Route User    3/19/2025 0609 Given 0.5 mg Oral Nancie Boyd RN     3/18/2025 1440 Given 0.5 mg Oral Roro Hennessy RN          enoxaparin (Lovenox) 40 MG/0.4ML SUBQ injection 40 mg       Date Action Dose Route User    3/19/2025 0843 Given 40 mg Subcutaneous (Left Lower Abdomen) Diogo Merrill RN          ketorolac (Toradol) 30 MG/ML injection 30 mg       Date Action Dose Route User    3/18/2025 1906 Given 30 mg Intravenous Roro Hennessy RN    3/18/2025 1300 Given 30 mg Intravenous Roro Hennessy RN          ketorolac (Toradol) 30 MG/ML injection 30 mg       Date Action Dose Route User    3/19/2025 0609 Given 30 mg Intravenous Nancie Boyd RN    3/19/2025 0051 Given 30 mg Intravenous Nancie Boyd RN          lisinopril (Prinivil; Zestril) tab 20 mg       Date Action Dose Route User    3/19/2025 0609 Given 20 mg Oral Nancie Boyd RN          magnesium sulfate in dextrose 5% 1 g/100mL infusion premix 1 g       Date Action Dose Route User    3/19/2025 0506 New Bag 1 g Intravenous Nancie Boyd RN          ondansetron (Zofran) 4 MG/2ML injection 4 mg       Date Action Dose Route User    3/19/2025 1025 Given 4 mg Intravenous Diogo Merrill RN    3/18/2025 1444 Given 4 mg Intravenous Roro Hennessy RN          pantoprazole (Protonix) 40 mg in sodium chloride 0.9% PF 10 mL IV push       Date Action Dose Route User    3/19/2025 0843 Given 40 mg Intravenous Diogo Merrill RN          sodium chloride 0.9% infusion       Date Action Dose Route User    3/19/2025 0347 New Bag (none) Intravenous Nancie Boyd RN    3/18/2025 1906 New Bag (none) Intravenous Roro Hennessy RN          verapamil ER (Calan-SR) tab 180 mg       Date Action Dose Route User    3/18/2025 1440 Given 180 mg Oral Roro Hennessy RN          sertraline (Zoloft) tab 75 mg       Date Action Dose Route User    3/19/2025 0610 Given 75 mg Oral Nancie Boyd RN            Vitals (last day)       Date/Time Temp Pulse Resp BP SpO2 Weight O2 Device O2 Flow Rate (L/min) [facility]    03/19/25 0946 -- 62 -- -- 94 % -- --  -- IP    03/19/25 0930 -- 65 -- -- 94 % -- -- -- IP    03/19/25 0817 97.8 °F (36.6 °C) 61 20 123/64 99 % -- None (Room air) -- IP    03/19/25 0420 97.6 °F (36.4 °C) 60 20 130/57 99 % -- Nasal cannula 3 L/min     03/18/25 2315 98.3 °F (36.8 °C) 59 26 127/55 98 % -- Nasal cannula 3 L/min     03/18/25 2059 -- 53 -- -- -- -- Nasal cannula 3 L/min     03/18/25 1956 98 °F (36.7 °C) 63 18 132/59 92 % -- None (Room air) --     03/18/25 1735 98.1 °F (36.7 °C) 59 18 133/60 95 % -- None (Room air) --     03/18/25 1145 98.2 °F (36.8 °C) 75 18 143/74 94 % -- None (Room air) --     03/18/25 0905 97.6 °F (36.4 °C) 79 18 118/75 94 % -- None (Room air) --     03/18/25 0900 -- -- -- -- 96 % -- Nasal cannula 2 L/min     03/18/25 0535 98.3 °F (36.8 °C) 81 18 143/80 95 % -- Nasal cannula 4 L/min [place]    03/18/25 0030 98.4 °F (36.9 °C) 95 18 129/70 95 % -- Nasal cannula 4 L/min NJ          CIWA Scores (since admission)       None

## 2025-03-19 NOTE — PROGRESS NOTES
[facility]   part of formerly [facility]     Hospitalist Progress Note     Don Cabral Patient Status:  Inpatient    1965 MRN [id number]   [facility] 3NW-A Attending Katarzyna Brown, DO   Hosp Day # 3 PCP Stephen Christianson MD     Chief Complaint: abdominal pain    Subjective:     Patient feeling OK all things considered. Pain tolerable. No nausea    Objective:    Review of Systems:   A 3 point review of systems was completed; pertinent positive and negatives stated in subjective.    Vital signs:  Temp:  [97.6 °F (36.4 °C)-98.3 °F (36.8 °C)] 97.6 °F (36.4 °C)  Pulse:  [53-78] 78  Resp:  [18-26] 18  BP: (123-133)/(55-64) 131/60  SpO2:  [92 %-99 %] 94 %    Physical Exam:    General: No acute distress  Respiratory: No wheezes, no rhonchi  Cardiovascular: S1, S2, regular rate and rhythm  Abdomen: incision c/di/; gauze dry  Neuro: No new focal deficits.   Extremities: No edema    Diagnostic Data:    Labs:  Recent Labs   Lab 25  0455 25  1230 25  0532 25  0520 25  0510   WBC 7.7 10.5 8.8 14.6* 12.7*   HGB 12.9* 13.2 12.2* 13.0 10.5*   MCV 84.2 85.7 86.9 87.0 86.8   .0 456.0* 395.0 463.0* 400.0       Recent Labs   Lab 25  1230 25  0532 25  0520 25  0510   GLU 92 90 166* 107*   BUN 11 15 18 21   CREATSERUM 0.85 0.88 0.80 0.74   CA 9.7 9.2 8.6* 8.3*   ALB 4.8  --   --   --     141 141 144   K 3.7 4.1 4.8 4.0    105 107 109   CO2 29.0 32.0 25.0 30.0   ALKPHO 64  --   --   --    AST 14  --   --   --    ALT 15  --   --   --    BILT 0.8  --   --   --    TP 7.3  --   --   --        Estimated Glomerular Filtration Rate: 105 mL/min/1.73m2 (result from lab).    No results for input(s): \"TROP\", \"TROPHS\", \"CK\" in the last 168 hours.    No results for input(s): \"PTP\", \"INR\" in the last 168 hours.     Microbiology    Hospital Encounter on 25   1. Anaerobic Culture     Status: None (Preliminary result)    Collection Time: 25   8:48 PM    Specimen: Abdominal fluid; Body fluid, unspecified   Result Value Ref Range    Anaerobic Culture Pending N/A   2. Aerobic Bacterial Culture     Status: None (Preliminary result)    Collection Time: 03/17/25  8:48 PM    Specimen: Abdominal fluid; Body fluid, unspecified   Result Value Ref Range    Aerobic Culture Result No Growth at 18-24 hrs. N/A    Aerobic Smear 2+ WBCs seen N/A    Aerobic Smear No organisms seen N/A         Imaging: Reviewed in Epic.    Medications:    enoxaparin  40 mg Subcutaneous Daily    magnesium oxide  400 mg Oral Once    ketorolac  30 mg Intravenous Q6H    pantoprazole  40 mg Intravenous Q24H    lisinopril  20 mg Oral Daily    clonazePAM  0.5 mg Oral BID    sertraline  75 mg Oral Daily    verapamil ER  180 mg Oral Nightly    gabapentin  600 mg Oral TID    ampicillin-sulbactam  3 g Intravenous Q6H       Assessment & Plan:      #Omental infarction  #Acute colitis  #Severe abdominal adhesions  #Intractable abdominal pain  -s/p diagnostic lap, ex-lap, R colon resection, partial omentectomy, small bowel resection, extensive adhesion lysis on 3/17  -diet per surgery    #Hx of diverticulitis      #HTN  -lisinopril      #Chronic afib  #LBBB  -verapamil     #Anxiety    #Major depressive disorder, recurrent, unspecified severity  -sertraline, clonazepam     Dvt prophy: lovenox    Avery Cardenas MD  [facility]  Internal Medicine Hospitalist      Supplementary Documentation:     Quality:  DVT Mechanical Prophylaxis:   SCDs, Early ambuation  DVT Pharmacologic Prophylaxis   Medication    enoxaparin (Lovenox) 40 MG/0.4ML SUBQ injection 40 mg                Code Status: Full Code  Vance: No urinary catheter in place  Vance Duration (in days):   Central line:    NILS:     Discharge is dependent on: clinical state, general surgery recs  At this point Mr. Cabral is expected to be discharge to: home    The 21st Century Cures Act makes medical notes like these available to patients in the  interest of transparency. Please be advised this is a medical document. Medical documents are intended to carry relevant information, facts as evident, and the clinical opinion of the practitioner. The medical note is intended as peer to peer communication and may appear blunt or direct. It is written in medical language and may contain abbreviations or verbiage that are unfamiliar.

## 2025-03-19 NOTE — PLAN OF CARE
A&Ox4. VSS. RA. Denies chest pain and SOB.   Telemetry: NSR.   GI: Abdomen mildly distended, no gas present, pt belched a couple of times yesterday.   Denies nausea.   : Voids utilizing urinal   Pain controlled with PRN pain medications.   Up with standby assist.   Drains: NGT marked at 71cm LIS   Incisions: ML abdominal incision with staples. Lower portion has an ABD pad with tape. CDI, x2 laps with staple to the R portion of abd   Diet: NPO (ok for ice-chips, gum, sips of clears)   IVF running per order. All appropriate safety measures in place. All questions and concerns addressed.

## 2025-03-19 NOTE — PROGRESS NOTES
[facility]  Progress Note    Don Cabral Patient Status:  Inpatient    1965 MRN [id number]   Location [facility] 3NW-A Attending Varun Carmona MD   Hosp Day # 3 PCP Stephen Christianson MD     Subjective:  Patient seen resting at bedside.  He endorses mild improvement of abdominal pain compared to yesterday.  He endorses nausea that he associates with increased pain.  He denies vomiting.  Patient is currently NPO with NG tube in place.  He denies passing flatus or having bowel movement, but endorses feeling gas moving in his abdomen.  Patient has ambulated the hallways several times since yesterday.    Objective/Physical Exam:  General: Awake, alert.  Cooperative.  No apparent distress.  Vital Signs:  Blood pressure 123/64, pulse 61, temperature 97.8 °F (36.6 °C), temperature source Oral, resp. rate 20, weight 270 lb (122.5 kg), SpO2 99%.  Wt Readings from Last 3 Encounters:   25 270 lb (122.5 kg)   25 275 lb (124.7 kg)   25 276 lb 2.2 oz (125.3 kg)     HEENT: Normocephalic, atraumatic. No scleral icterus.  Lungs: Unlabored and equal. No respiratory distress.  Abdomen:  Soft, distended with tympany to percussion, RUQ tender to deep palpation, with no rebound or guarding.  Incision: Midline incision with staples are clean, dry, intact, no erythema. + Minimal blood present on inferior aspect of midline incision. No active bleeding or drainage present. ABD applied to affected area.  Skin: Warm, dry. No erythema, ecchymosis, rashes. No jaundice.    Intake/Output:    Intake/Output Summary (Last 24 hours) at 3/19/2025 0830  Last data filed at 3/19/2025 0609  Gross per 24 hour   Intake 170 ml   Output 1275 ml   Net -1105 ml     I/O last 3 completed shifts:  In: 2770 [P.O.:170; I.V.:2500; IV PIGGYBACK:100]  Out: 1625 [Urine:1075; Emesis/NG output:450; Blood:100]  No intake/output data recorded.    Medications:    enoxaparin  40 mg Subcutaneous Daily    magnesium oxide  400 mg Oral Once     ketorolac  30 mg Intravenous Q6H    pantoprazole  40 mg Intravenous Q24H    lisinopril  20 mg Oral Daily    clonazePAM  0.5 mg Oral BID    sertraline  75 mg Oral Daily    verapamil ER  180 mg Oral Nightly    gabapentin  600 mg Oral TID    ampicillin-sulbactam  3 g Intravenous Q6H       Labs:  Lab Results   Component Value Date    WBC 12.7 03/19/2025    HGB 10.5 03/19/2025    HCT 32.2 03/19/2025    .0 03/19/2025     Lab Results   Component Value Date     03/19/2025    K 4.0 03/19/2025     03/19/2025    CO2 30.0 03/19/2025    BUN 21 03/19/2025    CREATSERUM 0.74 03/19/2025     03/19/2025    CA 8.3 03/19/2025     Lab Results   Component Value Date    INR 1.01 09/28/2023    INR 1.23 (H) 06/23/2023         Assessment  Patient Active Problem List   Diagnosis    Anxiety    Essential hypertension    ED (erectile dysfunction)    NELLIE (obstructive sleep apnea)-mild, 8.4    Generalized anxiety disorder    Hypokalemia    Hyperglycemia    Tooth pain    Daytime hypersomnolence    Symptomatic bradycardia    Palpitations    Slow heart rate    PVC (premature ventricular contraction)    Bipolar 1 disorder (HCC)    Sigmoid diverticulitis    Acute diverticulitis    Intractable abdominal pain    Postoperative examination    Preop testing    Right lower quadrant abdominal pain    Abdominal pain, acute    Intra-abdominal infection    Epiploic appendagitis    Implantable loop recorder present    LBBB (left bundle branch block)    LVH (left ventricular hypertrophy)    MDD (major depressive disorder), recurrent severe, without psychosis (HCC)    Dizziness    Elevated LFTs    Essential hypertension, benign    CLARIBEL (generalized anxiety disorder)    Panic disorder    PVCs (premature ventricular contractions)    Bradycardia    Abdominal pain, right lower quadrant    Omental infarction (HCC)    Depression with anxiety    History of cardiac arrhythmia    Primary hypertension       POD 2 diagnostic laparoscopy,  exploratory laparotomy, right hemicolectomy with anastomosis, partial omentectomy, small bowel resection with anastomosis, lysis of adhesions.     Plan:  Continue NG tube to low intermittent suction.  Await further bowel function.  Continue NPO.  Okay for hard candy and chewing gum.  Continue analgesics and antiemetics as needed.  Ambulate and up to chair.  Medical management per primary.  DVT prophylaxis with Lovenox.  GI prophylaxis with Protonix.      Quality:  DVT Mechanical Prophylaxis:   SCDs, Early ambuation  DVT Pharmacologic Prophylaxis   Medication    enoxaparin (Lovenox) 40 MG/0.4ML SUBQ injection 40 mg                Code Status: Full Code  Vance: No urinary catheter in place  Vance Duration (in days):   Central line:    NILS: 3/22/2025        JOAO Hobbs  3/19/2025  8:30 AM

## 2025-03-19 NOTE — PLAN OF CARE
Alert x 4. VSS on RA. Medicated for pain. NPO. NGT in place. Abdominal incisions with dressings clean and intact. Tele in place, SR. Voiding freely. Ambulates with assist. POC discussed. All current needs met. Call light in reach.

## 2025-03-20 LAB
BASOPHILS # BLD AUTO: 0.01 X10(3) UL (ref 0–0.2)
BASOPHILS NFR BLD AUTO: 0.1 %
BLOOD TYPE BARCODE: 5100
EOSINOPHIL # BLD AUTO: 0.24 X10(3) UL (ref 0–0.7)
EOSINOPHIL NFR BLD AUTO: 2.4 %
ERYTHROCYTE [DISTWIDTH] IN BLOOD BY AUTOMATED COUNT: 13.9 %
HCT VFR BLD AUTO: 30.7 %
HGB BLD-MCNC: 9.8 G/DL
IMM GRANULOCYTES # BLD AUTO: 0.04 X10(3) UL (ref 0–1)
IMM GRANULOCYTES NFR BLD: 0.4 %
LYMPHOCYTES # BLD AUTO: 1.65 X10(3) UL (ref 1–4)
LYMPHOCYTES NFR BLD AUTO: 16.2 %
MAGNESIUM SERPL-MCNC: 2 MG/DL (ref 1.6–2.6)
MCH RBC QN AUTO: 27.9 PG (ref 26–34)
MCHC RBC AUTO-ENTMCNC: 31.9 G/DL (ref 31–37)
MCV RBC AUTO: 87.5 FL
MONOCYTES # BLD AUTO: 0.7 X10(3) UL (ref 0.1–1)
MONOCYTES NFR BLD AUTO: 6.9 %
NEUTROPHILS # BLD AUTO: 7.57 X10 (3) UL (ref 1.5–7.7)
NEUTROPHILS # BLD AUTO: 7.57 X10(3) UL (ref 1.5–7.7)
NEUTROPHILS NFR BLD AUTO: 74 %
PLATELET # BLD AUTO: 379 10(3)UL (ref 150–450)
RBC # BLD AUTO: 3.51 X10(6)UL
UNIT VOLUME: 350 ML
WBC # BLD AUTO: 10.2 X10(3) UL (ref 4–11)

## 2025-03-20 PROCEDURE — 99232 SBSQ HOSP IP/OBS MODERATE 35: CPT | Performed by: HOSPITALIST

## 2025-03-20 RX ORDER — BISACODYL 10 MG
10 SUPPOSITORY, RECTAL RECTAL ONCE
Status: COMPLETED | OUTPATIENT
Start: 2025-03-20 | End: 2025-03-20

## 2025-03-20 RX ORDER — KETOROLAC TROMETHAMINE 30 MG/ML
30 INJECTION, SOLUTION INTRAMUSCULAR; INTRAVENOUS EVERY 6 HOURS
Status: COMPLETED | OUTPATIENT
Start: 2025-03-20 | End: 2025-03-21

## 2025-03-20 RX ORDER — ACETAMINOPHEN 500 MG
1000 TABLET ORAL EVERY 8 HOURS
Status: DISCONTINUED | OUTPATIENT
Start: 2025-03-20 | End: 2025-03-23

## 2025-03-20 NOTE — PAYOR COMM NOTE
--------------  3/17 CONTINUED STAY REVIEW    Payor: [employer]RISON  Subscriber #:  [id number]  Authorization Number: [id number]    Admit date: 3/16/25  Admit time:  2:28 PM             [facility]     PATIENT'S NAME: CHAYITO MEDEIROS   ATTENDING PHYSICIAN: Katarzyna Brown DO   OPERATING PHYSICIAN: Ryan Clemens M.D.   PATIENT ACCOUNT#:   [id number]    LOCATION:  PACU  PAC 1 [facility]  MEDICAL RECORD #:   [id number]       YOB: 1965  ADMISSION DATE:       03/16/2025      OPERATION DATE:  03/17/2025     OPERATIVE REPORT        PREOPERATIVE DIAGNOSIS:  Omental infarction.  POSTOPERATIVE DIAGNOSIS:  Omental infarction and severe intra-abdominal adhesions.  PROCEDURE:    1.       Diagnostic laparoscopy.  2.       Exploratory laparotomy.  3.       Right hemicolectomy with anastomosis.  4.       Partial omentectomy.  5.       Small bowel resection with anastomosis.  6.       Lysis of adhesions (90 minutes).     ASSISTANT:   Basilio Cannon CSA.  Second assistant, Renita Grey PA-C.     ANESTHESIA:  General endotracheal anesthesia.     ANESTHESIOLOGIST:  Hina Mccray MD.     BLOOD LOSS:  Approximately 100 mL.     COMPLICATIONS:  None apparent.     SPECIMENS:    1.       Right colon with attached omentum.  2.       Portion of small bowel.  3.       Peritoneal fluid for culture and sensitivity.     DISPOSITION:  The patient was awakened from anesthesia and brought to the recovery room in stable condition.  He tolerated the procedure without apparent complication.  Needle, sponge, and instrument counts were correct at the end of the procedure.  Preprocedure antibiotic and DVT prophylaxis was administered per protocol.  Time-out was performed prior to initiating the procedure identifying the correct patient, procedure, and surgeon.     FINDINGS:  The patient has severe and extensive intra-abdominal adhesions precluding completion of the operation by minimally invasive technique.  There are adhesions of  omentum, large and small bowel to the anterior abdominal wall.  There are innumerable adhesions of small bowel loops to adjacent bowel loops to omentum to abdominal wall to colon.  The patient has extensive omental infarction.  The infarcted omentum is densely adherent to the right colon and is inseparable without likely disruption of the integrity of the bowel wall.  Therefore, right hemicolectomy with attached omentum is completed.  Extensive adhesiolysis was required to liberate the abdominal contents and to allow working space in the abdomen.  The resultant ileocolic side-to-side anastomosis is air and watertight, well perfused, widely patent, and hemostatic.  There was an internal hernia at the level of the splenic flexure, likely for mobilization of the splenic flexure from the patient's prior surgical intervention.  The prior colorectal anastomosis is intact.  There were extensive adhesions within the pelvis and further exploration of this area was discontinued.     OPERATIVE TECHNIQUE:  The patient brought to the operating room.  After induction of general endotracheal anesthesia, the abdomen was prepped and draped in usual sterile fashion.  Initial attempt at diagnostic laparoscopy was initiated in the right upper quadrant with an 8 mm incision created in the midclavicular line 2 fingerbreadths below the costal margin.  Pneumoperitoneum was established with a Veress needle, after which optical entry trocar was used to gain entry into the abdomen.  Immediately identified were extensive adhesions as noted above.  With careful inspection, there was an area in the right lower quadrant that was free of adhesions where a second 8 mm trocars placed through an incision under direct vision.  Attempts at adhesiolysis are initiated initially by mobilizing as much omentum as possible from the midline laparotomy incision using blunt and sharp dissection and judicious use of electrocautery.  After significant amount of  time was spent trying to accomplish this, efforts were discontinued due to visualization of severe extensive adhesions and the realization of the likelihood of the operation being completed laparoscopically or robotically would not be possible.  At this point, a midline laparotomy incision was created through the patient's prior incision.  The hypertrophic scar was excised and discarded with a scalpel.  Hemostasis achieved.  The subcutaneous tissue was then divided down to the level of the fascia which was grasped, elevated, and divided.  The linea alba was opened to the full cephalad and caudal extent of the prior incision and extended somewhat cephalad and somewhat caudally to provide adequate exposure.  With significant adhesiolysis lasting approximately 90 minutes, blunt and sharp dissection was used to liberate all adhesions such that a self-retaining retractor could be placed.  At this point, inspection of the abdomen revealed the above-noted findings.  Decision was made to mobilize the right colon as the adherent omentum was inseparable from the right colon in its entirety.  The lateral attachments of the colon were divided using blunt and sharp dissection.  The colon was progressively reflected to the midline after the attachments of the hepatic flexure were divided and the lesser sac was entered.  Duodenum was visualized and protected and reflected dorsally and protected at all times.  Proximal and distal resection margins were identified, but attention is now turned initially to liberating the adhesions of the small bowel.  Numerous interloop adhesions were bluntly and sharply dissected with Metzenbaum scissors and blunt dissection until the full extent of the small bowel was visualized.  In the left upper quadrant, there was internal herniation of the small bowel through mesenteric defect at the splenic flexure, likely representing mobilization from the patient's prior low anterior resection and colostomy  reversal.  The small bowel was reduced.  It is viable in this area.  Adhesions were further divided, and the small bowel was now run multiple times from the ligament of Treitz to the terminal ileum.  One area in the mid small bowel shows 2 areas of minor deserosalization.  These were repaired with interrupted 3-0 Vicryl in Lembert fashion.  One other area shows more full thickness disruption of the serosa, and there was concern for thermal injury in this area.  This area was resected approximately 4 cm segment proximally and distally divided by purple cartridge linear stapler.  The mesentery was divided using a LigaSure device.  A side-to-side anastomosis was created with a purple cartridge linear stapler and a blue cartridge TA stapler.  The resultant anastomosis was air watertight, hemostatic, well perfused.  Mesenteric defect was closed using interrupted 3-0 Vicryl suture.  Now, proximal and distal margins of the right colon ware identified in the transverse colon proximal to the middle colic vessels.  The transverse colon was divided with a purple cartridge linear stapler.  An area of the terminal ileum was also identified near the vascular pedicle, which was preserved and resection margin was also achieved in this area with a purple cartridge linear stapler.  The mesentery was then divided sequentially using the LigaSure device.  The ileocolic pedicle was secured with a 0 silk tie and then also divided with the LigaSure device with excellent hemostasis.  The specimen was now removed and contained incontinuity the terminal ileum, appendix, right colon, portion of the transverse colon and adherent omentum.  The specimen was removed from the operative field and submitted to Pathology for further evaluation.  Next, attention was turned to irrigating the abdomen with multiple aliquots of sterile saline solution until the effluent fluid was clear.  Once this was accomplished, hemostasis was confirmed in the  retroperitoneum and the surgical margins where there had been initial diffuse oozing throughout the course of the operation.  All hemostasis was complete at this point.  A side-to-side ileocolic anastomosis was now created using purple cartridge linear stapler for the common channel and a TA blue cartridge 90 mm stapler to close the common opening.  The anastomosis was air and watertight, widely patent, hemostatic, and well perfused.  Mesenteric defect was closed using running 2-0 Vicryl suture.  The abdomen was further inspected.  No other acute abnormalities were identified.  The operative team now changes gowns and gloves in anticipation of wound closure.  The infraumbilical peritoneum was closed with running #1 Vicryl suture.  The fascia was then closed using running looped #1 PDS suture.  The subcutaneous tissue was cleansed and irrigated.  The skin was reapproximated using skin clips.  The 2 trocar sites were also closed using skin clips.  At the completion of the operation, the Anesthesiology performed bilateral TAP blocks.  After sterile occlusive Aquacel dressing had been placed on the incision.  At this point, the patient was awakened from anesthesia.  He was brought to the recovery room in stable condition having tolerated procedure without apparent complication.  Needle, sponge, and instrument counts were correct at the end the procedure.  Operative findings were discussed with the patient's family immediately upon conclusion of surgery.     Dictated By Ryan Clemens M.D.  d:03/17/2025 23:06:28  t:03/17/2025 23:32:08  [id number]  PP/            Last signed by: Ryan Clemens MD at 3/18/2025 10:36 AM             MEDICATIONS ADMINISTERED IN LAST 1 DAY:  acetaminophen (Ofirmev) 10 mg/mL infusion premix 1,000 mg       Date Action Dose Route User    3/20/2025 0630 New Bag 1,000 mg Intravenous Nancie Boyd RN    3/20/2025 0002 New Bag 1,000 mg Intravenous Nancie Boyd RN    3/19/2025  1531 New Bag 1,000 mg Intravenous Diogo Merrill RN          acetaminophen (Tylenol Extra Strength) tab 1,000 mg       Date Action Dose Route User    3/20/2025 1308 Given 1,000 mg Oral Nguyen Arrieta RN          ampicillin-sulbactam (Unasyn) 3 g in sodium chloride 0.9% 100mL IVPB-ADD       Date Action Dose Route User    3/20/2025 1123 New Bag 3 g Intravenous Nguyen Arrieta RN    3/20/2025 0550 New Bag 3 g Intravenous Nancie Boyd RN    3/20/2025 0002 New Bag 3 g Intravenous Nancie Boyd RN    3/19/2025 1828 New Bag 3 g Intravenous Diogo Merrill RN    3/19/2025 1331 New Bag 3 g Intravenous Diogo Merrill RN          bisacodyl (Dulcolax) 10 MG rectal suppository 10 mg       Date Action Dose Route User    3/20/2025 0845 Given 10 mg Rectal Nguyen Arrieta RN          clonazePAM (KlonoPIN) tab 0.5 mg       Date Action Dose Route User    3/20/2025 1308 Given 0.5 mg Oral Nguyen Arrieta RN    3/20/2025 0550 Given 0.5 mg Oral Nancie Boyd RN    3/19/2025 1331 Given 0.5 mg Oral Diogo Merrill RN          enoxaparin (Lovenox) 40 MG/0.4ML SUBQ injection 40 mg       Date Action Dose Route User    3/20/2025 0846 Given 40 mg Subcutaneous (Left Lower Abdomen) Nguyen Arrieta RN          gabapentin (Neurontin) tab 600 mg       Date Action Dose Route User    3/19/2025 1331 Given 600 mg Oral Diogo Merrill RN          ketorolac (Toradol) 30 MG/ML injection 30 mg       Date Action Dose Route User    3/19/2025 1828 Given 30 mg Intravenous Diogo Merrill RN    3/19/2025 1331 Given 30 mg Intravenous Diogo Merrill RN          ketorolac (Toradol) 30 MG/ML injection 30 mg       Date Action Dose Route User    3/20/2025 0846 Given 30 mg Intravenous Nguyen Arrieta RN          lisinopril (Prinivil; Zestril) tab 20 mg       Date Action Dose Route User    3/20/2025 0550 Given 20 mg Oral Nancie Boyd RN          pantoprazole (Protonix) 40 mg in sodium chloride 0.9% PF 10 mL IV push       Date  Action Dose Route User    3/20/2025 0846 Given 40 mg Intravenous Nguyen Arrieta RN          verapamil ER (Calan-SR) tab 180 mg       Date Action Dose Route User    3/19/2025 1331 Given 180 mg Oral Diogo Merrill RN          sertraline (Zoloft) tab 75 mg       Date Action Dose Route User    3/20/2025 0550 Given 75 mg Oral Nancie Boyd RN            Vitals (last day)       Date/Time Temp Pulse Resp BP SpO2 Weight O2 Device O2 Flow Rate (L/min) Who    03/20/25 0822 97.9 °F (36.6 °C) 58 18 128/55 95 % -- None (Room air) -- LE    03/20/25 0419 97.8 °F (36.6 °C) 63 18 133/57 99 % -- Nasal cannula 3 L/min IP    03/20/25 0000 97.8 °F (36.6 °C) 63 20 128/58 99 % -- Nasal cannula 3 L/min IP    03/19/25 2017 97.9 °F (36.6 °C) 68 20 116/59 97 % -- None (Room air) -- LQ    03/19/25 1754 97.8 °F (36.6 °C) 63 18 125/55 94 % -- None (Room air) -- IPA    03/19/25 1146 97.6 °F (36.4 °C) 78 18 131/60 94 % -- None (Room air) -- IPA    03/19/25 1126 -- 64 -- -- 96 % -- -- -- IPA    03/19/25 0946 -- 62 -- -- 94 % -- -- -- IPA    03/19/25 0930 -- 65 -- -- 94 % -- -- -- IPA    03/19/25 0817 97.8 °F (36.6 °C) 61 20 123/64 99 % -- None (Room air) -- IPA    03/19/25 0420 97.6 °F (36.4 °C) 60 20 130/57 99 % -- Nasal cannula 3 L/min MQ

## 2025-03-20 NOTE — DIETARY NOTE
[facility]   part of formerly [facility]   CLINICAL NUTRITION    Don Cabral     Admitting diagnosis:  Omental infarction (HCC) [K55.069]    Ht:  6'3\"  Wt: 122.5 kg (270 lb).   Body mass index is 33.75 kg/m².  IBW: 89.1kg    Wt Readings from Last 20 Encounters:   03/16/25 122.5 kg (270 lb)   03/11/25 124.7 kg (275 lb)   03/11/25 125.3 kg (276 lb 2.2 oz)   03/07/25 125.6 kg (277 lb)   02/18/25 123.8 kg (273 lb)   02/14/25 123.8 kg (273 lb)   02/09/25 122.5 kg (270 lb)   02/01/25 122.5 kg (270 lb)   01/17/25 121.1 kg (267 lb)   01/11/25 123.4 kg (272 lb)   12/17/24 122.9 kg (271 lb)   12/17/24 122.9 kg (271 lb)   11/29/24 119.3 kg (263 lb)   11/06/24 117.9 kg (260 lb)   10/08/24 117.9 kg (260 lb)   09/23/24 121.6 kg (268 lb)   09/04/24 118.4 kg (261 lb)   09/03/24 111.1 kg (245 lb)   08/30/24 111.1 kg (245 lb)   08/06/24 112 kg (247 lb)         Labs/Meds reviewed    Diet:       Procedures    NPO       Pt has been NPO x 5 days. POD 3 diagnostic laparoscopy, exploratory laparotomy, right hemicolectomy with anastomosis, partial omentectomy, small bowel resection with anastomosis, lysis of adhesions. NG tube clamp trial today. Remains NPO + ice chips and sips of clears.Diet to advance as medically feasible. If diet is unable to advance in next 2-3 days, recommend nutrition support if aggressive care is pursued. RD available to make recommendations. Will continue to monitor and follow pt nutritional status.     Patient is at low nutrition risk at this time.    Please consult if patient status changes or nutrition issues arise.    Breann Enrique RD, LDN  Clinical Nutrition  [phone]

## 2025-03-20 NOTE — PLAN OF CARE
Pt here from: Home   Neuro: A&Ox4, VSS, RA, , IS. Denies cough, chest pain, and SOB.   Telemetry: Normal sinus rhythm/Sinus bradycarida at night   GI: Abdomen soft, passing gas and belching. Denies nausea. Last BM was this morning.   : Voids freely in urinal.   Pain controlled with meds per MAR.   Up ad james.   Drains: NGT at 71 cm. Clamp trial to end at 1515. If less than 200, pull NGT.   Incisions: Laps x 4 with staples and midline with staples are CDI.   Diet: NPO, ice chips, sips, hard candy, and gum-tolerating well.   IVF running per order through PIV L UA.   All appropriate safety measures in place. All questions and concerns addressed. Bed locked and in lowest position. Call light in reach.

## 2025-03-20 NOTE — PROGRESS NOTES
[facility]   part of [facility]     Hospitalist Progress Note     Don Cabral Patient Status:  Inpatient    1965 MRN [id number]   Location [facility] 3NW-A Attending Katarzyna Brown, DO   Hosp Day # 4 PCP Stephen Christianson MD     Chief Complaint: abdominal pain    Subjective:     Patient feeling OK all things considered. Pain tolerable. No nausea. Passing gas    Objective:    Review of Systems:   A 3 point review of systems was completed; pertinent positive and negatives stated in subjective.    Vital signs:  Temp:  [97.6 °F (36.4 °C)-97.9 °F (36.6 °C)] 97.9 °F (36.6 °C)  Pulse:  [58-78] 58  Resp:  [18-20] 18  BP: (116-133)/(55-60) 128/55  SpO2:  [94 %-99 %] 95 %    Physical Exam:    General: No acute distress  Respiratory: No wheezes, no rhonchi  Cardiovascular: S1, S2, regular rate and rhythm  Abdomen: incision c/di/; gauze dry  Neuro: No new focal deficits.   Extremities: No edema    Diagnostic Data:    Labs:  Recent Labs   Lab 25  1230 25  0532 25  0520 25  0510 25  0456   WBC 10.5 8.8 14.6* 12.7* 10.2   HGB 13.2 12.2* 13.0 10.5* 9.8*   MCV 85.7 86.9 87.0 86.8 87.5   .0* 395.0 463.0* 400.0 379.0       Recent Labs   Lab 25  1230 25  0532 25  0520 25  0510   GLU 92 90 166* 107*   BUN 11 15 18 21   CREATSERUM 0.85 0.88 0.80 0.74   CA 9.7 9.2 8.6* 8.3*   ALB 4.8  --   --   --     141 141 144   K 3.7 4.1 4.8 4.0    105 107 109   CO2 29.0 32.0 25.0 30.0   ALKPHO 64  --   --   --    AST 14  --   --   --    ALT 15  --   --   --    BILT 0.8  --   --   --    TP 7.3  --   --   --        Estimated Glomerular Filtration Rate: 105 mL/min/1.73m2 (result from lab).    No results for input(s): \"TROP\", \"TROPHS\", \"CK\" in the last 168 hours.    No results for input(s): \"PTP\", \"INR\" in the last 168 hours.     Microbiology    Hospital Encounter on 25   1. Anaerobic Culture     Status: None (Preliminary result)    Collection  Time: 03/17/25  8:48 PM    Specimen: Abdominal fluid; Body fluid, unspecified   Result Value Ref Range    Anaerobic Culture Pending N/A   2. Aerobic Bacterial Culture     Status: None (Preliminary result)    Collection Time: 03/17/25  8:48 PM    Specimen: Abdominal fluid; Body fluid, unspecified   Result Value Ref Range    Aerobic Culture Result No Growth 2 Days N/A    Aerobic Smear 2+ WBCs seen N/A    Aerobic Smear No organisms seen N/A         Imaging: Reviewed in Epic.    Medications:    acetaminophen  1,000 mg Oral Q8H    ketorolac  30 mg Intravenous Q6H    enoxaparin  40 mg Subcutaneous Daily    magnesium oxide  400 mg Oral Once    pantoprazole  40 mg Intravenous Q24H    lisinopril  20 mg Oral Daily    clonazePAM  0.5 mg Oral BID    sertraline  75 mg Oral Daily    verapamil ER  180 mg Oral Nightly    gabapentin  600 mg Oral TID    ampicillin-sulbactam  3 g Intravenous Q6H       Assessment & Plan:      #Omental infarction  #Acute colitis  #Severe abdominal adhesions  #Intractable abdominal pain  -s/p diagnostic lap, ex-lap, R colon resection, partial omentectomy, small bowel resection, extensive adhesion lysis on 3/17  -diet per surgery when appropriate  -NG clamp trial today    #Hx of diverticulitis      #HTN  -lisinopril      #Chronic afib  #LBBB  -verapamil     #Anxiety    #Major depressive disorder, recurrent, unspecified severity  -sertraline, clonazepam     #expected acute blood loss anemia-monitor    Dvt prophy: lovenox    Avery Cardenas MD  [facility]  Internal Medicine Hospitalist      Supplementary Documentation:     Quality:  DVT Mechanical Prophylaxis:   SCDs, Early ambuation  DVT Pharmacologic Prophylaxis   Medication    enoxaparin (Lovenox) 40 MG/0.4ML SUBQ injection 40 mg                Code Status: Full Code  Vance: No urinary catheter in place  Vance Duration (in days):   Central line:    NILS:     Discharge is dependent on: clinical state, general surgery recs  At this point Mr. Cabral is  expected to be discharge to: home    The 21st Century Cures Act makes medical notes like these available to patients in the interest of transparency. Please be advised this is a medical document. Medical documents are intended to carry relevant information, facts as evident, and the clinical opinion of the practitioner. The medical note is intended as peer to peer communication and may appear blunt or direct. It is written in medical language and may contain abbreviations or verbiage that are unfamiliar.

## 2025-03-20 NOTE — PROGRESS NOTES
[facility]  Progress Note    Don Cabral Patient Status:  Inpatient    1965 MRN [id number]   [facility] 3NW-A Attending Avery Cardenas MD   Hosp Day # 4 PCP Stephen Christianson MD     Subjective:  Patient is resting comfortably in the bed today.  He reports flatus.  He denies nausea or vomiting.  NG tube is in place.  He denies a bowel movement.  He denies fever or chills.    Objective/Physical Exam:  General: Alert, orientated x3.  Cooperative.  No apparent distress.  Vital Signs:  Blood pressure 128/55, pulse 58, temperature 97.9 °F (36.6 °C), temperature source Oral, resp. rate 18, weight 270 lb (122.5 kg), SpO2 95%.  Wt Readings from Last 3 Encounters:   25 270 lb (122.5 kg)   25 275 lb (124.7 kg)   25 276 lb 2.2 oz (125.3 kg)     Lungs: No respiratory distress.  Cardiac: Regular rate and rhythm.   Abdomen:  Soft, mildly distended, non tender, with no rebound or guarding.  No peritoneal signs.   Extremities:  No lower extremity edema noted.    Incision: Clean, dry, intact, no erythema      Intake/Output:    Intake/Output Summary (Last 24 hours) at 3/20/2025 0829  Last data filed at 3/20/2025 0822  Gross per 24 hour   Intake 10 ml   Output 1850 ml   Net -1840 ml     I/O last 3 completed shifts:  In: 100 [P.O.:100]  Out: 2425 [Urine:1325; Emesis/NG output:1100]  I/O this shift:  In: 10 [P.O.:10]  Out: 150 [Emesis/NG output:150]    Medications:    acetaminophen  1,000 mg Oral Q8H    ketorolac  30 mg Intravenous Q6H    bisacodyl  10 mg Rectal Once    enoxaparin  40 mg Subcutaneous Daily    magnesium oxide  400 mg Oral Once    pantoprazole  40 mg Intravenous Q24H    lisinopril  20 mg Oral Daily    clonazePAM  0.5 mg Oral BID    sertraline  75 mg Oral Daily    verapamil ER  180 mg Oral Nightly    gabapentin  600 mg Oral TID    ampicillin-sulbactam  3 g Intravenous Q6H       Labs:  Lab Results   Component Value Date    WBC 10.2 2025    HGB 9.8 2025    HCT 30.7  03/20/2025    .0 03/20/2025        Lab Results   Component Value Date    INR 1.01 09/28/2023    INR 1.23 (H) 06/23/2023         Assessment  Patient Active Problem List   Diagnosis    Anxiety    Essential hypertension    ED (erectile dysfunction)    NELLIE (obstructive sleep apnea)-mild, 8.4    Generalized anxiety disorder    Hypokalemia    Hyperglycemia    Tooth pain    Daytime hypersomnolence    Symptomatic bradycardia    Palpitations    Slow heart rate    PVC (premature ventricular contraction)    Bipolar 1 disorder (HCC)    Sigmoid diverticulitis    Acute diverticulitis    Intractable abdominal pain    Postoperative examination    Preop testing    Right lower quadrant abdominal pain    Abdominal pain, acute    Intra-abdominal infection    Epiploic appendagitis    Implantable loop recorder present    LBBB (left bundle branch block)    LVH (left ventricular hypertrophy)    MDD (major depressive disorder), recurrent severe, without psychosis (HCC)    Dizziness    Elevated LFTs    Essential hypertension, benign    CLARIBEL (generalized anxiety disorder)    Panic disorder    PVCs (premature ventricular contractions)    Bradycardia    Abdominal pain, right lower quadrant    Omental infarction (HCC)    Depression with anxiety    History of cardiac arrhythmia    Primary hypertension     POD 3 diagnostic laparoscopy, exploratory laparotomy, right hemicolectomy with anastomosis, partial omentectomy, small bowel resection with anastomosis, lysis of adhesions.       Plan:  NG tube clamp trial today.  NPO.  He may have ice chips and sips of clears.  Continue multimodal pain control.  Ambulate and up to chair  DVT prophylaxis with Lovenox  GI prophylaxis with Protonix    Quality:  DVT Mechanical Prophylaxis:   SCDs, Early ambuation  DVT Pharmacologic Prophylaxis   Medication    enoxaparin (Lovenox) 40 MG/0.4ML SUBQ injection 40 mg                Code Status: Full Code  Vance: No urinary catheter in place  Vance Duration (in  days):   Central line:    NILS:         Kelly Malik PA-C  3/20/2025  8:29 AM      Addendum:    The patient was independently examined. I agree with the PA's assessment and plan.     Care plan discussed in detail.  All questions answered.  Dietary, activity, exercise recommendations discussed.  Clamp trial today.  If successful NG tube will be removed and clear liquids initiated.    More than 50% of clinical time and 100% MDM was performed by me.     I, Dr. Ryan Clemens, personally performed the services described in this documentation, as documented  by Ms. King PA-C,  and they are both accurate and complete.      Ryan Clemens MD FACS  Trauma Medical Director, [facility]    The 21st Century Cures Act makes medical notes like these available to patients in the interest of transparency. Please be advised this is a medical document. Medical documents are intended to carry relevant information, facts as evident, and the clinical opinion of the practitioner. The medical note is intended as peer to peer communication and may appear blunt or direct. It is written in medical language and may contain abbreviations or verbiage that are unfamiliar.    This note was prepared using Dragon Medical voice recognition dictation software. As a result, errors may occur. When identified, these errors have been corrected. While every attempt is made to correct errors during dictation, discrepancies may still exist.

## 2025-03-20 NOTE — PAYOR COMM NOTE
--------------  3/18 3/19 3/20:  CONTINUED STAY REVIEW    Payor: CAILIN MORRISON  Subscriber #:  [id number]  Authorization Number: [id number]    Admit date: 3/16/25  Admit time:  2:28 PM      3/18 SURGERY:    Subjective:  He is seen and examined sitting up in chair. He just returned from a walk. He reports abdominal soreness. He denies nausea or vomiting. Ng tube in place with small amount of bilious drainage. He denies passing flatus or a bowel movement.      Objective/Physical Exam:  /80 (BP Location: Right arm)   Pulse 81   Temp 98.3 °F (36.8 °C) (Oral)   Resp 18   Wt 270 lb (122.5 kg)   SpO2 95%   BMI 33.75 kg/m²      Intake/Output Summary (Last 24 hours) at 3/18/2025 0954  Last data filed at 3/18/2025 0535      Gross per 24 hour   Intake 3650 ml   Output 500 ml   Net 3150 ml          POD 1 diagnostic laparoscopy, exploratory laparotomy, right hemicolectomy with anastomosis, partial omentectomy, small bowel resection with anastomosis, lysis of adhesions.      Plan:  Continue NG tube to low intermittent suction  Continue NPO; okay for hard candy and chewing gum, small amount of ice chips.   Analgesics and antiemetics as needed; add abdominal binder as needed.   Encourage ambulation and up to chair; PT following   Encourage incentive spirometry   DVT prophylaxis with lovenox  GI prophylaxis with protonix      Latanya Richardson PA-C  3/18/2025  9:54 AM     Addendum:     The patient was independently examined. I agree with the PA's assessment and plan.      Operative findings and care plan discussed with family.      More than 50% of clinical time and 100% MDM was performed by me.         I, Dr. Ryan Clemesn, personally performed the services described in this documentation, as documented  by  Ms Dylan PA-C,  and they are both accurate and complete.        3/19 SURGERY:    Subjective:  Patient seen resting at bedside.  He endorses mild improvement of abdominal pain compared to yesterday.  He endorses  nausea that he associates with increased pain.  He denies vomiting.  Patient is currently NPO with NG tube in place.  He denies passing flatus or having bowel movement, but endorses feeling gas moving in his abdomen.  Patient has ambulated the hallways several times since yesterday.     Objective/Physical Exam:  General: Awake, alert.  Cooperative.  No apparent distress.  Vital Signs:  Blood pressure 123/64, pulse 61, temperature 97.8 °F (36.6 °C), temperature source Oral, resp. rate 20, weight 270 lb (122.5 kg), SpO2 99%.      Wt Readings from Last 3 Encounters:   03/16/25 270 lb (122.5 kg)   03/11/25 275 lb (124.7 kg)   03/11/25 276 lb 2.2 oz (125.3 kg)      HEENT: Normocephalic, atraumatic. No scleral icterus.  Lungs: Unlabored and equal. No respiratory distress.  Abdomen:  Soft, distended with tympany to percussion, RUQ tender to deep palpation, with no rebound or guarding.  Incision: Midline incision with staples are clean, dry, intact, no erythema. + Minimal blood present on inferior aspect of midline incision. No active bleeding or drainage present. ABD applied to affected area.  Skin: Warm, dry. No erythema, ecchymosis, rashes. No jaundice.         POD 2 diagnostic laparoscopy, exploratory laparotomy, right hemicolectomy with anastomosis, partial omentectomy, small bowel resection with anastomosis, lysis of adhesions.      Plan:  Continue NG tube to low intermittent suction.  Await further bowel function.  Continue NPO.  Okay for hard candy and chewing gum.  Continue analgesics and antiemetics as needed.  Ambulate and up to chair.  Medical management per primary.  DVT prophylaxis with Lovenox.  GI prophylaxis with Protonix.           Code Status: Full Code  Vance: No urinary catheter in place  Vance Duration (in days):   Central line:    NILS: 3/22/2025           JOAO Hobbs  3/19/2025  8:30 AM       3/20 SURGERY:    Subjective:  Patient is resting comfortably in the bed today.  He reports flatus.   He denies nausea or vomiting.  NG tube is in place.  He denies a bowel movement.  He denies fever or chills.     Objective/Physical Exam:  General: Alert, orientated x3.  Cooperative.  No apparent distress.  Vital Signs:  Blood pressure 128/55, pulse 58, temperature 97.9 °F (36.6 °C), temperature source Oral, resp. rate 18, weight 270 lb (122.5 kg), SpO2 95%.      Wt Readings from Last 3 Encounters:   03/16/25 270 lb (122.5 kg)   03/11/25 275 lb (124.7 kg)   03/11/25 276 lb 2.2 oz (125.3 kg)      Lungs: No respiratory distress.  Cardiac: Regular rate and rhythm.   Abdomen:  Soft, mildly distended, non tender, with no rebound or guarding.  No peritoneal signs.   Extremities:  No lower extremity edema noted.    Incision: Clean, dry, intact, no erythema        Intake/Output:     Intake/Output Summary (Last 24 hours) at 3/20/2025 0829  Last data filed at 3/20/2025 0822      Gross per 24 hour   Intake 10 ml   Output 1850 ml   Net -1840 ml       POD 3 diagnostic laparoscopy, exploratory laparotomy, right hemicolectomy with anastomosis, partial omentectomy, small bowel resection with anastomosis, lysis of adhesions.         Plan:  NG tube clamp trial today.  NPO.  He may have ice chips and sips of clears.  Continue multimodal pain control.  Ambulate and up to chair  DVT prophylaxis with Lovenox  GI prophylaxis with Protonix       MEDICATIONS ADMINISTERED IN LAST 1 DAY:  acetaminophen (Ofirmev) 10 mg/mL infusion premix 1,000 mg       Date Action Dose Route User    3/20/2025 0630 New Bag 1,000 mg Intravenous Nancie Boyd RN    3/20/2025 0002 New Bag 1,000 mg Intravenous Nancie Boyd RN    3/19/2025 1531 New Bag 1,000 mg Intravenous Diogo Merrill RN          acetaminophen (Tylenol Extra Strength) tab 1,000 mg       Date Action Dose Route User    3/20/2025 1308 Given 1,000 mg Oral Nguyen Arrieta RN          ampicillin-sulbactam (Unasyn) 3 g in sodium chloride 0.9% 100mL IVPB-ADD       Date Action Dose Route User     3/20/2025 1123 New Bag 3 g Intravenous Nguyen Arrieta RN    3/20/2025 0550 New Bag 3 g Intravenous Nancie Boyd RN    3/20/2025 0002 New Bag 3 g Intravenous Nancie Boyd RN    3/19/2025 1828 New Bag 3 g Intravenous Diogo Merrill RN    3/19/2025 1331 New Bag 3 g Intravenous Diogo Merrill RN          bisacodyl (Dulcolax) 10 MG rectal suppository 10 mg       Date Action Dose Route User    3/20/2025 0845 Given 10 mg Rectal gNuyen Arrieta RN          clonazePAM (KlonoPIN) tab 0.5 mg       Date Action Dose Route User    3/20/2025 1308 Given 0.5 mg Oral Nguyen Arrieta RN    3/20/2025 0550 Given 0.5 mg Oral Nancie Boyd RN    3/19/2025 1331 Given 0.5 mg Oral Diogo Merrill RN          enoxaparin (Lovenox) 40 MG/0.4ML SUBQ injection 40 mg       Date Action Dose Route User    3/20/2025 0846 Given 40 mg Subcutaneous (Left Lower Abdomen) Nguyen Arrieta RN          gabapentin (Neurontin) tab 600 mg       Date Action Dose Route User    3/19/2025 1331 Given 600 mg Oral Diogo Merrill RN          ketorolac (Toradol) 30 MG/ML injection 30 mg       Date Action Dose Route User    3/19/2025 1828 Given 30 mg Intravenous Diogo Merrill RN    3/19/2025 1331 Given 30 mg Intravenous Diogo Merrill RN          ketorolac (Toradol) 30 MG/ML injection 30 mg       Date Action Dose Route User    3/20/2025 0846 Given 30 mg Intravenous Nguyen Arrieta RN          lisinopril (Prinivil; Zestril) tab 20 mg       Date Action Dose Route User    3/20/2025 0550 Given 20 mg Oral Nancie Boyd RN          pantoprazole (Protonix) 40 mg in sodium chloride 0.9% PF 10 mL IV push       Date Action Dose Route User    3/20/2025 0846 Given 40 mg Intravenous Nguyen Arrieta RN          verapamil ER (Calan-SR) tab 180 mg       Date Action Dose Route User    3/19/2025 1331 Given 180 mg Oral Diogo Merrill, RN          sertraline (Zoloft) tab 75 mg       Date Action Dose Route User    3/20/2025 0550 Given 75 mg  Oral DebNancie jaramillo RN            Vitals (last day)       Date/Time Temp Pulse Resp BP SpO2 Weight O2 Device O2 Flow Rate (L/min) Who    03/20/25 0822 97.9 °F (36.6 °C) 58 18 128/55 95 % -- None (Room air) -- LE    03/20/25 0419 97.8 °F (36.6 °C) 63 18 133/57 99 % -- Nasal cannula 3 L/min IP    03/20/25 0000 97.8 °F (36.6 °C) 63 20 128/58 99 % -- Nasal cannula 3 L/min IP    03/19/25 2017 97.9 °F (36.6 °C) 68 20 116/59 97 % -- None (Room air) -- LQ    03/19/25 1754 97.8 °F (36.6 °C) 63 18 125/55 94 % -- None (Room air) -- IPA    03/19/25 1146 97.6 °F (36.4 °C) 78 18 131/60 94 % -- None (Room air) -- IPA    03/19/25 1126 -- 64 -- -- 96 % -- -- -- IPA    03/19/25 0946 -- 62 -- -- 94 % -- -- -- IPA    03/19/25 0930 -- 65 -- -- 94 % -- -- -- IPA    03/19/25 0817 97.8 °F (36.6 °C) 61 20 123/64 99 % -- None (Room air) -- IPA    03/19/25 0420 97.6 °F (36.4 °C) 60 20 130/57 99 % -- Nasal cannula 3 L/min MQ

## 2025-03-21 ENCOUNTER — APPOINTMENT (OUTPATIENT)
Dept: GENERAL RADIOLOGY | Facility: HOSPITAL | Age: 60
DRG: 330 | End: 2025-03-21
Attending: SURGERY
Payer: COMMERCIAL

## 2025-03-21 ENCOUNTER — APPOINTMENT (OUTPATIENT)
Dept: PHYSICAL THERAPY | Age: 60
End: 2025-03-21
Attending: PHYSICIAN ASSISTANT
Payer: COMMERCIAL

## 2025-03-21 ENCOUNTER — APPOINTMENT (OUTPATIENT)
Dept: GENERAL RADIOLOGY | Facility: HOSPITAL | Age: 60
End: 2025-03-21
Attending: SURGERY
Payer: COMMERCIAL

## 2025-03-21 PROBLEM — I10 HYPERTENSION: Status: ACTIVE | Noted: 2025-03-12

## 2025-03-21 PROBLEM — K56.7 ILEUS (HCC): Status: ACTIVE | Noted: 2025-03-21

## 2025-03-21 LAB
ERYTHROCYTE [DISTWIDTH] IN BLOOD BY AUTOMATED COUNT: 13.9 %
HCT VFR BLD AUTO: 33.7 %
HGB BLD-MCNC: 10.8 G/DL
MCH RBC QN AUTO: 27.3 PG (ref 26–34)
MCHC RBC AUTO-ENTMCNC: 32 G/DL (ref 31–37)
MCV RBC AUTO: 85.1 FL
PLATELET # BLD AUTO: 464 10(3)UL (ref 150–450)
RBC # BLD AUTO: 3.96 X10(6)UL
WBC # BLD AUTO: 10.6 X10(3) UL (ref 4–11)

## 2025-03-21 PROCEDURE — 71045 X-RAY EXAM CHEST 1 VIEW: CPT | Performed by: SURGERY

## 2025-03-21 PROCEDURE — 99232 SBSQ HOSP IP/OBS MODERATE 35: CPT | Performed by: HOSPITALIST

## 2025-03-21 RX ORDER — LORAZEPAM 2 MG/ML
0.5 INJECTION INTRAMUSCULAR EVERY 4 HOURS PRN
Status: DISCONTINUED | OUTPATIENT
Start: 2025-03-21 | End: 2025-03-23

## 2025-03-21 RX ORDER — SIMETHICONE 80 MG
80 TABLET,CHEWABLE ORAL 4 TIMES DAILY PRN
Status: DISCONTINUED | OUTPATIENT
Start: 2025-03-21 | End: 2025-03-23

## 2025-03-21 RX ORDER — SIMETHICONE 80 MG
160 TABLET,CHEWABLE ORAL 4 TIMES DAILY PRN
Status: DISCONTINUED | OUTPATIENT
Start: 2025-03-21 | End: 2025-03-23

## 2025-03-21 NOTE — PROGRESS NOTES
[facility]  Progress Note    Don Cabral Patient Status:  Inpatient    1965 MRN [id number]   Location [facility] 3NW-A Attending Avery Cardenas MD   Hosp Day # 5 PCP Stephen Christianson MD     Subjective:  Patient seen resting in chair with NG tube disconnected.  Patient report increased lower abdominal discomfort that began last night.  Patient endorsed associated nausea with a significant amount of vomiting last night.  Patient states he is still slightly nauseous this morning.  Patient reports 2-3 episodes of diarrhea this morning.  He states he still feels very distended.    Objective/Physical Exam:  General: Awake, alert.  Cooperative.  No apparent distress.  Vital Signs:  Blood pressure 144/57, pulse 79, temperature 97.7 °F (36.5 °C), temperature source Oral, resp. rate 18, weight 270 lb (122.5 kg), SpO2 94%.  Wt Readings from Last 3 Encounters:   25 270 lb (122.5 kg)   25 275 lb (124.7 kg)   25 276 lb 2.2 oz (125.3 kg)     HEENT: Normocephalic, atraumatic. No scleral icterus.  Lungs: Unlabored and equal. No respiratory distress.  Abdomen:  Soft, moderately distended with tympany to percussion, non tender to deep palpation, with no rebound or guarding.  Incision: Midline incision and laparoscopic incisions are clean, dry, intact with staples present. No surrounding erythema or induration. No drainage from incision sites.  Skin: Warm, dry. No erythema, ecchymosis, rashes. No jaundice.    Intake/Output:    Intake/Output Summary (Last 24 hours) at 3/21/2025 1050  Last data filed at 3/21/2025 0805  Gross per 24 hour   Intake 8533 ml   Output 2660 ml   Net 5873 ml     I/O last 3 completed shifts:  In: 8543 [P.O.:640; I.V.:7903]  Out: 3600 [Urine:600; Emesis/NG output:3000]  I/O this shift:  In: 0   Out: 160 [Emesis/NG output:160]    Medications:    acetaminophen  1,000 mg Oral Q8H    enoxaparin  40 mg Subcutaneous Daily    magnesium oxide  400 mg Oral Once    pantoprazole  40  mg Intravenous Q24H    lisinopril  20 mg Oral Daily    clonazePAM  0.5 mg Oral BID    sertraline  75 mg Oral Daily    verapamil ER  180 mg Oral Nightly    gabapentin  600 mg Oral TID    ampicillin-sulbactam  3 g Intravenous Q6H       Labs:  Lab Results   Component Value Date    WBC 10.6 03/21/2025    HGB 10.8 03/21/2025    HCT 33.7 03/21/2025    .0 03/21/2025        Lab Results   Component Value Date    INR 1.01 09/28/2023    INR 1.23 (H) 06/23/2023         Assessment  Patient Active Problem List   Diagnosis    Anxiety    Essential hypertension    ED (erectile dysfunction)    NELLIE (obstructive sleep apnea)-mild, 8.4    Generalized anxiety disorder    Hypokalemia    Hyperglycemia    Tooth pain    Daytime hypersomnolence    Symptomatic bradycardia    Palpitations    Slow heart rate    PVC (premature ventricular contraction)    Bipolar 1 disorder (HCC)    Sigmoid diverticulitis    Acute diverticulitis    Intractable abdominal pain    Postoperative examination    Preop testing    Right lower quadrant abdominal pain    Abdominal pain, acute    Intra-abdominal infection    Epiploic appendagitis    Implantable loop recorder present    LBBB (left bundle branch block)    LVH (left ventricular hypertrophy)    MDD (major depressive disorder), recurrent severe, without psychosis (HCC)    Dizziness    Elevated LFTs    Essential hypertension, benign    CLARIBEL (generalized anxiety disorder)    Panic disorder    PVCs (premature ventricular contractions)    Bradycardia    Abdominal pain, right lower quadrant    Omental infarction (HCC)    Depression with anxiety    History of cardiac arrhythmia    Hypertension    Ileus (HCC)       POD 4 diagnostic laparoscopy, exploratory laparotomy, right hemicolectomy with anastomosis, partial omentectomy, small bowel resection with anastomosis, lysis of adhesions.     Plan:  Repeat NG tube clamp trial today x 8 hours.  If residuals are less than 150 cc, patient may be disconnected from suction  and started on clear liquid diet.  Once removed from suction, patient would like NG tube to remain in place, should it need to be reconnected.  Patient may have ice chips and sips of clears at this time.  Discussed with patient that he should limit use of narcotic medication if he does not need it.  Continue analgesics and antiemetics as needed.  Ambulate and up to chair.  Medical management per primary.  DVT prophylaxis with Lovenox.  GI prophylaxis with Protonix.      Quality:  DVT Mechanical Prophylaxis:   SCDs, Early ambuation  DVT Pharmacologic Prophylaxis   Medication    enoxaparin (Lovenox) 40 MG/0.4ML SUBQ injection 40 mg                Code Status: Full Code  Vance: No urinary catheter in place  Vance Duration (in days):   Central line:    NILS:         JOAO Hobbs  3/21/2025  10:50 AM      The patient was discussed with Kelly Malik PA-C.    Addendum:    The patient was independently examined. I agree with the PA's assessment and plan.     Events noted.  NGT inserted and clamped now.     If passes clamp trial, offer clears only- continue until reliable bowel function returns.     Abdomen less distended. Incision CDI    Care plan discussed and all questions answered.     More than 50% of clinical time and 100% MDM was performed by me.       I, Dr. Ryan Clemens, personally performed the services described in this documentation, as documented  by  Ms Que PA-C,  and they are both accurate and complete.

## 2025-03-21 NOTE — PLAN OF CARE
Pt vital stable, A&O x4. Denied chest pain and shortness of breath. Stated having pain, scheduled meds given. Tolerating diet without nausea. Stated not passing gas, but had a BM earlier today. Continuous pulse ox and telemetry in place. Bed locked in low position, call light in reach, rounding provided.

## 2025-03-21 NOTE — PAYOR COMM NOTE
--------------  CONTINUED STAY REVIEW    Payor: CAILIN MORRISON  Subscriber #:  [id number]  Authorization Number: [id number]    Admit date: 3/16/25  Admit time:  2:28 PM    3/21    Patient seen resting in chair with NG tube disconnected.  Patient report increased lower abdominal discomfort that began last night.  Patient endorsed associated nausea with a significant amount of vomiting last night.  Patient states he is still slightly nauseous this morning.  Patient reports 2-3 episodes of diarrhea this morning.  He states he still feels very distended.          POD 4 diagnostic laparoscopy, exploratory laparotomy, right hemicolectomy with anastomosis, partial omentectomy, small bowel resection with anastomosis, lysis of adhesions.      Plan:  Repeat NG tube clamp trial today x 8 hours.  If residuals are less than 150 cc, patient may be disconnected from suction and started on clear liquid diet.  Once removed from suction, patient would like NG tube to remain in place, should it need to be reconnected.  Patient may have ice chips and sips of clears at this time.  Discussed with patient that he should limit use of narcotic medication if he does not need it.  Continue analgesics and antiemetics as needed.  Ambulate and up to chair.  Medical management per primary.  DVT prophylaxis with Lovenox.  GI prophylaxis with Protonix.     ATTENDING  #Omental infarction with severe intra-abdominal adhesions  -s/p diagnostic lap, ex-lap, R colon resection, partial omentectomy, small bowel resection, extensive adhesion lysis on 3/17  -IV unasyn; will defer duration to surgery  -NG re-inserted;  await surgery recs regarding possible CLD today; but would probably leave NG in and clamped as he trials diet        MEDICATIONS ADMINISTERED IN LAST 1 DAY:  acetaminophen (Tylenol Extra Strength) tab 1,000 mg       Date Action Dose Route User    3/20/2025 2133 Given 1,000 mg Oral Shereen Bro, JERSON          ampicillin-sulbactam (Unasyn) 3 g in  sodium chloride 0.9% 100mL IVPB-ADD       Date Action Dose Route User    3/21/2025 1137 New Bag 3 g Intravenous Nguyen Arrieta RN    3/21/2025 0516 New Bag 3 g Intravenous Shereen Bro RN    3/20/2025 2303 New Bag 3 g Intravenous Shereen Bro RN    3/20/2025 1710 New Bag 3 g Intravenous Nguyen Arrieta RN          enoxaparin (Lovenox) 40 MG/0.4ML SUBQ injection 40 mg       Date Action Dose Route User    3/21/2025 0823 Given 40 mg Subcutaneous (Right Lower Abdomen) Nguyen Arrieta RN          gabapentin (Neurontin) tab 600 mg       Date Action Dose Route User    3/20/2025 2133 Given 600 mg Oral Shereen Bro RN    3/20/2025 1529 Given 600 mg Oral Nguyen Arrieta RN          ketorolac (Toradol) 30 MG/ML injection 30 mg       Date Action Dose Route User    3/21/2025 0249 Given 30 mg Intravenous Shereen Bro RN    3/20/2025 2029 Given 30 mg Intravenous Shereen Bro RN    3/20/2025 1529 Given 30 mg Intravenous Nguyen Arrieta RN          LORazepam (Ativan) 2 mg/mL injection 0.5 mg       Date Action Dose Route User    3/21/2025 1047 Given 0.5 mg Intravenous Nguyen Arrieta RN          ondansetron (Zofran) 4 MG/2ML injection 4 mg       Date Action Dose Route User    3/20/2025 2303 Given 4 mg Intravenous Shereen Bro RN          pantoprazole (Protonix) 40 mg in sodium chloride 0.9% PF 10 mL IV push       Date Action Dose Route User    3/21/2025 0823 Given 40 mg Intravenous Nguyen Arrieta RN          simethicone (Mylicon) chewable tab 160 mg       Date Action Dose Route User    3/21/2025 0056 Given 160 mg Oral Shereen Bro RN          verapamil ER (Calan-SR) tab 180 mg       Date Action Dose Route User    3/20/2025 1529 Given 180 mg Oral Nguyen Arrieta RN            Vitals (last day)       Date/Time Temp Pulse Resp BP SpO2 Weight O2 Device O2 Flow Rate (L/min) [facility]    03/21/25 1238 98.5 °F (36.9 °C) 75 18 151/61 96 % -- None (Room air) 0 L/min     03/21/25 0805 97.7 °F  (36.5 °C) 79 18 144/57 94 % -- None (Room air) 0 L/min     03/21/25 0442 98.3 °F (36.8 °C) 78 18 147/66 96 % -- Nasal cannula 2 L/min     03/20/25 2310 -- 68 22 133/59 97 % -- Nasal cannula 2 L/min MQ    03/20/25 2019 98.5 °F (36.9 °C) 69 18 125/60 96 % -- None (Room air) --     03/20/25 1812 98.5 °F (36.9 °C) 65 18 135/66 96 % -- None (Room air) -- LE    03/20/25 1352 98 °F (36.7 °C) 62 18 130/61 95 % -- None (Room air) -- LE    03/20/25 0822 97.9 °F (36.6 °C) 58 18 128/55 95 % -- None (Room air) -- LE    03/20/25 0419 97.8 °F (36.6 °C) 63 18 133/57 99 % -- Nasal cannula 3 L/min IP    03/20/25 0000 97.8 °F (36.6 °C) 63 20 128/58 99 % -- Nasal cannula 3 L/min IP          CIWA Scores (since admission)       None

## 2025-03-21 NOTE — PROGRESS NOTES
[facility]   part of [facility]     Hospitalist Progress Note     Don Cabral Patient Status:  Inpatient    1965 MRN [id number]   Location [facility] 3NW-A Attending Katarzyna Brown, DO   Hosp Day # 5 PCP Stephen Christianson MD     Chief Complaint: abdominal pain    Subjective:     Had vomiting overnight after NG was removed.   Having BMs this morning however and is now hungry.    Objective:    Review of Systems:   A 5 point review of systems was completed; pertinent positive and negatives stated in subjective.    Vital signs:  Temp:  [97.7 °F (36.5 °C)-98.5 °F (36.9 °C)] 97.7 °F (36.5 °C)  Pulse:  [62-79] 79  Resp:  [18-22] 18  BP: (125-147)/(57-66) 144/57  SpO2:  [94 %-97 %] 94 %    Physical Exam:    General: No acute distress: NG in place  Respiratory: No wheezes, no rhonchi  Cardiovascular: S1, S2, regular rate and rhythm  Abdomen: incision c/di/; less distention today  Neuro: No new focal deficits.   Extremities: No edema    Diagnostic Data:    Labs:  Recent Labs   Lab 25  0532 25  0520 25  0510 25  0456 25  0815   WBC 8.8 14.6* 12.7* 10.2 10.6   HGB 12.2* 13.0 10.5* 9.8* 10.8*   MCV 86.9 87.0 86.8 87.5 85.1   .0 463.0* 400.0 379.0 464.0*       Recent Labs   Lab 25  1230 25  0532 25  0520 25  0510   GLU 92 90 166* 107*   BUN 11 15 18 21   CREATSERUM 0.85 0.88 0.80 0.74   CA 9.7 9.2 8.6* 8.3*   ALB 4.8  --   --   --     141 141 144   K 3.7 4.1 4.8 4.0    105 107 109   CO2 29.0 32.0 25.0 30.0   ALKPHO 64  --   --   --    AST 14  --   --   --    ALT 15  --   --   --    BILT 0.8  --   --   --    TP 7.3  --   --   --        Estimated Glomerular Filtration Rate: 105 mL/min/1.73m2 (result from lab).    No results for input(s): \"TROP\", \"TROPHS\", \"CK\" in the last 168 hours.    No results for input(s): \"PTP\", \"INR\" in the last 168 hours.     Microbiology    Hospital Encounter on 25   1. Anaerobic Culture      Status: None (Preliminary result)    Collection Time: 03/17/25  8:48 PM    Specimen: Abdominal fluid; Body fluid, unspecified   Result Value Ref Range    Anaerobic Culture No Anaerobes to date N/A   2. Aerobic Bacterial Culture     Status: None    Collection Time: 03/17/25  8:48 PM    Specimen: Abdominal fluid; Body fluid, unspecified   Result Value Ref Range    Aerobic Culture Result No Growth 3 Days N/A    Aerobic Smear 2+ WBCs seen N/A    Aerobic Smear No organisms seen N/A         Imaging: Reviewed in Epic.    Medications:    acetaminophen  1,000 mg Oral Q8H    enoxaparin  40 mg Subcutaneous Daily    magnesium oxide  400 mg Oral Once    pantoprazole  40 mg Intravenous Q24H    lisinopril  20 mg Oral Daily    clonazePAM  0.5 mg Oral BID    sertraline  75 mg Oral Daily    verapamil ER  180 mg Oral Nightly    gabapentin  600 mg Oral TID    ampicillin-sulbactam  3 g Intravenous Q6H       Assessment & Plan:      #Omental infarction with severe intra-abdominal adhesions  -s/p diagnostic lap, ex-lap, R colon resection, partial omentectomy, small bowel resection, extensive adhesion lysis on 3/17  -IV unasyn; will defer duration to surgery  -NG re-inserted;  await surgery recs regarding possible CLD today; but would probably leave NG in and clamped as he trials diet    #ileus-hopefully resolving this morning;  NG per surgery    #Hx of diverticulitis      #HTN-lisinopril      #Chronic afib-verapamil    #LBBB    #Anxiety    #Major depressive disorder, recurrent, unspecified severity-sertraline, clonazepam     #expected acute blood loss anemia-monitor    Labs tomorrow    Dvt prophy: lovenox    Avery Cardenas MD  [facility]  Internal Medicine Hospitalist      Supplementary Documentation:     Quality:  DVT Mechanical Prophylaxis:   SCDs, Early ambuation  DVT Pharmacologic Prophylaxis   Medication    enoxaparin (Lovenox) 40 MG/0.4ML SUBQ injection 40 mg                Code Status: Full Code  Vance: No urinary catheter in  place  Vance Duration (in days):   Central line:    NILS:     Discharge is dependent on: clinical state, general surgery recs  At this point Mr. Cabral is expected to be discharge to: home    The 21st Century Cures Act makes medical notes like these available to patients in the interest of transparency. Please be advised this is a medical document. Medical documents are intended to carry relevant information, facts as evident, and the clinical opinion of the practitioner. The medical note is intended as peer to peer communication and may appear blunt or direct. It is written in medical language and may contain abbreviations or verbiage that are unfamiliar.

## 2025-03-21 NOTE — PLAN OF CARE
Pt here from: Home with wife   Neuro: A&Ox4, VSS, RA, , IS. Denies cough, chest pain, and SOB.   Telemetry: Normal sinus rhythm   GI: Abdomen soft, passing gas and belching. Denies nausea. Last BM was this morning, very loose x 3.   : Voids freely in urinal.   Pain controlled with meds per MAR.   Up ad james.   Drains: NGT at 58cm draining brown fluid. Clamp trial started at ~0930 this morning. Check residual at 1730.   Incisions: Midline with staples and laps with staples are all CDI and CHRISTA.   Diet: NPO with ice chips and sips until clamp trial ends.   IVF running per order through P[facility].   All appropriate safety measures in place. All questions and concerns addressed. Bed locked and in lowest position. Call light in reach.

## 2025-03-22 LAB
ANION GAP SERPL CALC-SCNC: 14 MMOL/L (ref 0–18)
BASOPHILS # BLD AUTO: 0.01 X10(3) UL (ref 0–0.2)
BASOPHILS NFR BLD AUTO: 0.1 %
BUN BLD-MCNC: 15 MG/DL (ref 9–23)
CALCIUM BLD-MCNC: 8.8 MG/DL (ref 8.7–10.6)
CHLORIDE SERPL-SCNC: 109 MMOL/L (ref 98–112)
CO2 SERPL-SCNC: 23 MMOL/L (ref 21–32)
CREAT BLD-MCNC: 0.65 MG/DL
EGFRCR SERPLBLD CKD-EPI 2021: 109 ML/MIN/1.73M2 (ref 60–?)
EOSINOPHIL # BLD AUTO: 0.2 X10(3) UL (ref 0–0.7)
EOSINOPHIL NFR BLD AUTO: 1.9 %
ERYTHROCYTE [DISTWIDTH] IN BLOOD BY AUTOMATED COUNT: 13.9 %
GLUCOSE BLD-MCNC: 88 MG/DL (ref 70–99)
HCT VFR BLD AUTO: 31.1 %
HGB BLD-MCNC: 10 G/DL
IMM GRANULOCYTES # BLD AUTO: 0.05 X10(3) UL (ref 0–1)
IMM GRANULOCYTES NFR BLD: 0.5 %
LYMPHOCYTES # BLD AUTO: 1.49 X10(3) UL (ref 1–4)
LYMPHOCYTES NFR BLD AUTO: 13.9 %
MAGNESIUM SERPL-MCNC: 1.8 MG/DL (ref 1.6–2.6)
MCH RBC QN AUTO: 27.9 PG (ref 26–34)
MCHC RBC AUTO-ENTMCNC: 32.2 G/DL (ref 31–37)
MCV RBC AUTO: 86.6 FL
MONOCYTES # BLD AUTO: 0.71 X10(3) UL (ref 0.1–1)
MONOCYTES NFR BLD AUTO: 6.6 %
NEUTROPHILS # BLD AUTO: 8.25 X10 (3) UL (ref 1.5–7.7)
NEUTROPHILS # BLD AUTO: 8.25 X10(3) UL (ref 1.5–7.7)
NEUTROPHILS NFR BLD AUTO: 77 %
OSMOLALITY SERPL CALC.SUM OF ELEC: 302 MOSM/KG (ref 275–295)
PLATELET # BLD AUTO: 447 10(3)UL (ref 150–450)
POTASSIUM SERPL-SCNC: 3.5 MMOL/L (ref 3.5–5.1)
RBC # BLD AUTO: 3.59 X10(6)UL
SODIUM SERPL-SCNC: 146 MMOL/L (ref 136–145)
WBC # BLD AUTO: 10.7 X10(3) UL (ref 4–11)

## 2025-03-22 PROCEDURE — 99232 SBSQ HOSP IP/OBS MODERATE 35: CPT | Performed by: HOSPITALIST

## 2025-03-22 RX ORDER — ACETAMINOPHEN 10 MG/ML
1000 INJECTION, SOLUTION INTRAVENOUS EVERY 6 HOURS
Status: DISCONTINUED | OUTPATIENT
Start: 2025-03-22 | End: 2025-03-23

## 2025-03-22 NOTE — PROGRESS NOTES
A&Ox4. VSS. RA. .  Telemetry: NSR  GI: Abdomen soft, nondistended. Passing gas. Had multiple loose BM's overnight.   Denies nausea at this time   : Voids.  Pain controlled with PRN pain medications  Up independently   Drains: NGT clamped- will stay in until pt is tolerating a solid diet   Incisions: Midline w/ staples and 4 lap sites   Diet: Clear liquids   IVF running per order.  All appropriate safety measures in place. All questions and concerns addressed.

## 2025-03-22 NOTE — PROGRESS NOTES
[facility]   part of [facility]     Hospitalist Progress Note     Don Cabral Patient Status:  Inpatient    1965 MRN [id number]   Location [facility] 3NW-A Attending Katarzyna Brown, DO   Hosp Day # 6 PCP Stephen Christianson MD     Chief Complaint: abdominal pain    Subjective:     Had BMs.  Now has appetite.    Objective:    Review of Systems:   A 5 point review of systems was completed; pertinent positive and negatives stated in subjective.    Vital signs:  Temp:  [97.6 °F (36.4 °C)-98.5 °F (36.9 °C)] 97.9 °F (36.6 °C)  Pulse:  [72-77] 72  Resp:  [18-19] 18  BP: (128-158)/(50-68) 128/51  SpO2:  [94 %-97 %] 94 %    Physical Exam:    General: No acute distress: NG in place  Respiratory: No wheezes, no rhonchi  Cardiovascular: S1, S2, regular rate and rhythm  Abdomen: incision c/di/; less distention today  Neuro: No new focal deficits.   Extremities: No edema    Diagnostic Data:    Labs:  Recent Labs   Lab 25  0520 25  0510 25  0456 25  0815 25  0530   WBC 14.6* 12.7* 10.2 10.6 10.7   HGB 13.0 10.5* 9.8* 10.8* 10.0*   MCV 87.0 86.8 87.5 85.1 86.6   .0* 400.0 379.0 464.0* 447.0       Recent Labs   Lab 25  1230 25  0532 25  0520 25  0510 25  0530   GLU 92   < > 166* 107* 88   BUN 11   < > 18 21 15   CREATSERUM 0.85   < > 0.80 0.74 0.65*   CA 9.7   < > 8.6* 8.3* 8.8   ALB 4.8  --   --   --   --       < > 141 144 146*   K 3.7   < > 4.8 4.0 3.5      < > 107 109 109   CO2 29.0   < > 25.0 30.0 23.0   ALKPHO 64  --   --   --   --    AST 14  --   --   --   --    ALT 15  --   --   --   --    BILT 0.8  --   --   --   --    TP 7.3  --   --   --   --     < > = values in this interval not displayed.       Estimated Glomerular Filtration Rate: 109 mL/min/1.73m2 (result from lab).    No results for input(s): \"TROP\", \"TROPHS\", \"CK\" in the last 168 hours.    No results for input(s): \"PTP\", \"INR\" in the last 168 hours.      Microbiology    Hospital Encounter on 03/16/25   1. Anaerobic Culture     Status: None (Preliminary result)    Collection Time: 03/17/25  8:48 PM    Specimen: Abdominal fluid; Body fluid, unspecified   Result Value Ref Range    Anaerobic Culture No Anaerobes to date N/A   2. Aerobic Bacterial Culture     Status: None    Collection Time: 03/17/25  8:48 PM    Specimen: Abdominal fluid; Body fluid, unspecified   Result Value Ref Range    Aerobic Culture Result No Growth 3 Days N/A    Aerobic Smear 2+ WBCs seen N/A    Aerobic Smear No organisms seen N/A         Imaging: Reviewed in Epic.    Medications:    acetaminophen  1,000 mg Oral Q8H    enoxaparin  40 mg Subcutaneous Daily    magnesium oxide  400 mg Oral Once    pantoprazole  40 mg Intravenous Q24H    lisinopril  20 mg Oral Daily    clonazePAM  0.5 mg Oral BID    sertraline  75 mg Oral Daily    verapamil ER  180 mg Oral Nightly    gabapentin  600 mg Oral TID    ampicillin-sulbactam  3 g Intravenous Q6H       Assessment & Plan:      #Omental infarction with severe intra-abdominal adhesions  -s/p diagnostic lap, ex-lap, R colon resection, partial omentectomy, small bowel resection, extensive adhesion lysis on 3/17  -IV unasyn; will defer duration to surgery  -NG re-inserted;  diet per surgery    #ileus-hopefully improving this morning;  NG per surgery    #Hx of diverticulitis      #HTN-lisinopril      #Chronic afib-verapamil    #LBBB    #Anxiety    #Major depressive disorder, recurrent, unspecified severity-sertraline, clonazepam     #expected acute blood loss anemia-monitor    Hopefully can tolerate pills.  If not, will convert to IV    Dvt prophy: lovenox    Avery Cardenas MD  [facility]  Internal Medicine Hospitalist      Supplementary Documentation:     Quality:  DVT Mechanical Prophylaxis:   SCDs, Early ambuation  DVT Pharmacologic Prophylaxis   Medication    enoxaparin (Lovenox) 40 MG/0.4ML SUBQ injection 40 mg                Code Status: Full Code  Vance:  No urinary catheter in place  Vance Duration (in days):   Central line:    NILS:     Discharge is dependent on: clinical state, general surgery recs  At this point Mr. Cabral is expected to be discharge to: home    The 21st Century Cures Act makes medical notes like these available to patients in the interest of transparency. Please be advised this is a medical document. Medical documents are intended to carry relevant information, facts as evident, and the clinical opinion of the practitioner. The medical note is intended as peer to peer communication and may appear blunt or direct. It is written in medical language and may contain abbreviations or verbiage that are unfamiliar.

## 2025-03-22 NOTE — PROGRESS NOTES
[facility]  Progress Note    Don Cabral Patient Status:  Inpatient    1965 MRN [id number]   [facility] 3NW-A Attending Avery Cardenas MD   Hosp Day # 6 PCP Stephen Christianson MD     Subjective:  The patient is resting in bed. He passed clamp trial yesterday and started a clear liquid diet. NG tube remains in place.    The patient states he has intermittently been reconnecting himself to suction. He performed his own clamp trial with taking in about 400 ml of liquids and connecting himself to suction and getting 400 ml of residuals.    He reports multiple bowel movements overnight. He denies nausea or vomiting. He denies abdominal pain and reports improvement in his abdominal bloating.     Objective/Physical Exam:  General: Alert, orientated x3.  Cooperative.  No apparent distress.  Vital Signs:  Blood pressure 134/50, pulse 75, temperature 97.9 °F (36.6 °C), temperature source Oral, resp. rate 19, weight 270 lb (122.5 kg), SpO2 96%.  HEENT: Normocephalic, atraumatic. No scleral icterus. NG tube in place and clamped.   Abdomen:  Soft, mildly distended, appropriately tender, with no rebound or guarding.  Tympanic to percussion throughout abdomen. No peritoneal signs.  Incision: Laparoscopic and midline incision are dry, clean, and intact with staples in place. No surrounding erythema or drainage. No signs of infection.    Labs:  CBC:    Lab Results   Component Value Date    WBC 10.7 2025    WBC 10.6 2025    WBC 10.2 2025     Lab Results   Component Value Date    HEMOGLOBIN 13.8 2025    HEMOGLOBIN 13.1 2024    HGB 10.0 (L) 2025    HGB 10.8 (L) 2025    HGB 9.8 (L) 2025      Lab Results   Component Value Date    .0 2025    .0 (H) 2025    .0 2025     Lab Results   Component Value Date    WBC 10.7 2025    HGB 10.0 2025    HCT 31.1 2025    .0 2025    CREATSERUM 0.65 2025     BUN 15 03/22/2025     03/22/2025    K 3.5 03/22/2025     03/22/2025    CO2 23.0 03/22/2025    GLU 88 03/22/2025    CA 8.8 03/22/2025    MG 1.8 03/22/2025         Assessment/Plan:  Patient Active Problem List   Diagnosis    Anxiety    Essential hypertension    ED (erectile dysfunction)    NELLIE (obstructive sleep apnea)-mild, 8.4    Generalized anxiety disorder    Hypokalemia    Hyperglycemia    Tooth pain    Daytime hypersomnolence    Symptomatic bradycardia    Palpitations    Slow heart rate    PVC (premature ventricular contraction)    Bipolar 1 disorder (HCC)    Sigmoid diverticulitis    Acute diverticulitis    Intractable abdominal pain    Postoperative examination    Preop testing    Right lower quadrant abdominal pain    Abdominal pain, acute    Intra-abdominal infection    Epiploic appendagitis    Implantable loop recorder present    LBBB (left bundle branch block)    LVH (left ventricular hypertrophy)    MDD (major depressive disorder), recurrent severe, without psychosis (HCC)    Dizziness    Elevated LFTs    Essential hypertension, benign    CLARIBEL (generalized anxiety disorder)    Panic disorder    PVCs (premature ventricular contractions)    Bradycardia    Abdominal pain, right lower quadrant    Omental infarction (HCC)    Depression with anxiety    History of cardiac arrhythmia    Hypertension    Ileus (HCC)     POD 5 diagnostic laparoscopy, exploratory laparotomy, right hemicolectomy with anastomosis, partial omentectomy, small bowel resection with anastomosis, lysis of adhesions    Keep NG tube in place per patient request. NG tube to remain clamped. Residuals do not need to be checked.   Okay to have full liquid diet to be able to tolerate oral medications. Otherwise, continue npo with ice chips and sips with meds.  Okay to discontinue IV antibiotics.  Continue pain control and antiemetics as needed.  Encourage ambulation and up to chair.  DVT prophylaxis with Lovenox  GI prophylaxis with  JOAO Barriga  3/22/2025  8:10 AM         Patient insisting that he needs \"white bread\" or the \"ice cream, which is the only thing that I eat from the full liquid menu\" in order to be able to take his pills orally which are not available in alternative formulation.  Patient states that with pills, he feels like his stomach is wrenching.  I informed the patient that given his intermittent obstructive symptoms and that he is tympanitic on physical examination, I ordinarily would advise against anything besides sips for comfort at best, versus continued n.p.o. status.  Given that he is adamant that something more substantial would allow his oral medications to cause him less discomfort, I will allow him to have sips of clears for comfort and ice cream with his medication.    We discussed removing the NG tube and reinserting if necessary, the patient was adamant that he does not want it reinserted and would rather leave it in place.

## 2025-03-23 VITALS
BODY MASS INDEX: 34 KG/M2 | DIASTOLIC BLOOD PRESSURE: 53 MMHG | WEIGHT: 270 LBS | HEART RATE: 65 BPM | OXYGEN SATURATION: 61 % | TEMPERATURE: 98 F | RESPIRATION RATE: 20 BRPM | SYSTOLIC BLOOD PRESSURE: 135 MMHG

## 2025-03-23 LAB
ANION GAP SERPL CALC-SCNC: 13 MMOL/L (ref 0–18)
BASOPHILS # BLD AUTO: 0.01 X10(3) UL (ref 0–0.2)
BASOPHILS NFR BLD AUTO: 0.1 %
BUN BLD-MCNC: 16 MG/DL (ref 9–23)
CALCIUM BLD-MCNC: 9.2 MG/DL (ref 8.7–10.6)
CHLORIDE SERPL-SCNC: 108 MMOL/L (ref 98–112)
CO2 SERPL-SCNC: 25 MMOL/L (ref 21–32)
CREAT BLD-MCNC: 0.61 MG/DL
EGFRCR SERPLBLD CKD-EPI 2021: 111 ML/MIN/1.73M2 (ref 60–?)
EOSINOPHIL # BLD AUTO: 0.25 X10(3) UL (ref 0–0.7)
EOSINOPHIL NFR BLD AUTO: 2.3 %
ERYTHROCYTE [DISTWIDTH] IN BLOOD BY AUTOMATED COUNT: 14 %
GLUCOSE BLD-MCNC: 97 MG/DL (ref 70–99)
HCT VFR BLD AUTO: 32.9 %
HGB BLD-MCNC: 10.8 G/DL
IMM GRANULOCYTES # BLD AUTO: 0.07 X10(3) UL (ref 0–1)
IMM GRANULOCYTES NFR BLD: 0.7 %
LYMPHOCYTES # BLD AUTO: 1.49 X10(3) UL (ref 1–4)
LYMPHOCYTES NFR BLD AUTO: 14 %
MCH RBC QN AUTO: 28.1 PG (ref 26–34)
MCHC RBC AUTO-ENTMCNC: 32.8 G/DL (ref 31–37)
MCV RBC AUTO: 85.5 FL
MONOCYTES # BLD AUTO: 0.65 X10(3) UL (ref 0.1–1)
MONOCYTES NFR BLD AUTO: 6.1 %
NEUTROPHILS # BLD AUTO: 8.2 X10 (3) UL (ref 1.5–7.7)
NEUTROPHILS # BLD AUTO: 8.2 X10(3) UL (ref 1.5–7.7)
NEUTROPHILS NFR BLD AUTO: 76.8 %
OSMOLALITY SERPL CALC.SUM OF ELEC: 303 MOSM/KG (ref 275–295)
PLATELET # BLD AUTO: 479 10(3)UL (ref 150–450)
POTASSIUM SERPL-SCNC: 3.3 MMOL/L (ref 3.5–5.1)
RBC # BLD AUTO: 3.85 X10(6)UL
SODIUM SERPL-SCNC: 146 MMOL/L (ref 136–145)
WBC # BLD AUTO: 10.7 X10(3) UL (ref 4–11)

## 2025-03-23 PROCEDURE — 99239 HOSP IP/OBS DSCHRG MGMT >30: CPT | Performed by: HOSPITALIST

## 2025-03-23 RX ORDER — SIMETHICONE 80 MG
80 TABLET,CHEWABLE ORAL 4 TIMES DAILY PRN
Qty: 30 TABLET | Refills: 0 | Status: SHIPPED | OUTPATIENT
Start: 2025-03-23

## 2025-03-23 RX ORDER — PANTOPRAZOLE SODIUM 40 MG/1
40 TABLET, DELAYED RELEASE ORAL DAILY
Qty: 14 TABLET | Refills: 0 | Status: SHIPPED | OUTPATIENT
Start: 2025-03-23 | End: 2025-04-01 | Stop reason: ALTCHOICE

## 2025-03-23 RX ORDER — CLONAZEPAM 0.5 MG/1
TABLET ORAL 2 TIMES DAILY
Status: SHIPPED | COMMUNITY
Start: 2025-03-23 | End: 2025-04-01 | Stop reason: DRUGHIGH

## 2025-03-23 RX ORDER — ONDANSETRON 8 MG/1
8 TABLET, ORALLY DISINTEGRATING ORAL EVERY 8 HOURS PRN
Qty: 20 TABLET | Refills: 0 | Status: SHIPPED | OUTPATIENT
Start: 2025-03-23

## 2025-03-23 RX ORDER — PANTOPRAZOLE SODIUM 40 MG/1
40 TABLET, DELAYED RELEASE ORAL
Status: DISCONTINUED | OUTPATIENT
Start: 2025-03-24 | End: 2025-03-23

## 2025-03-23 NOTE — PROGRESS NOTES
[facility]   part of [facility]     Hospitalist Progress Note     Don Cabral Patient Status:  Inpatient    1965 MRN [id number]   Location [facility] 3NW-A Attending Katarzyna Brown, DO   Hosp Day # 7 PCP Stephen Christianson MD     Chief Complaint: abdominal pain    Subjective:     Had BMs.  Now has appetite. Tolerating liquids.  Wants to go home.    Objective:    Review of Systems:   A 5 point review of systems was completed; pertinent positive and negatives stated in subjective.    Vital signs:  Temp:  [97.8 °F (36.6 °C)-98.4 °F (36.9 °C)] 98.3 °F (36.8 °C)  Pulse:  [59-70] 67  Resp:  [16-20] 20  BP: (124-155)/(57-66) 130/57  SpO2:  [94 %-98 %] 95 %    Physical Exam:    General: No acute distress: NG in place  Respiratory: No wheezes, no rhonchi  Cardiovascular: S1, S2, regular rate and rhythm  Abdomen: incision c/di/; less distention today  Neuro: No new focal deficits.   Extremities: No edema    Diagnostic Data:    Labs:  Recent Labs   Lab 25  0510 25  0456 25  0815 25  0530 25  0750   WBC 12.7* 10.2 10.6 10.7 10.7   HGB 10.5* 9.8* 10.8* 10.0* 10.8*   MCV 86.8 87.5 85.1 86.6 85.5   .0 379.0 464.0* 447.0 479.0*       Recent Labs   Lab 25  1230 25  0532 25  0510 25  0530 25  0750   GLU 92   < > 107* 88 97   BUN 11   < > 21 15 16   CREATSERUM 0.85   < > 0.74 0.65* 0.61*   CA 9.7   < > 8.3* 8.8 9.2   ALB 4.8  --   --   --   --       < > 144 146* 146*   K 3.7   < > 4.0 3.5 3.3*      < > 109 109 108   CO2 29.0   < > 30.0 23.0 25.0   ALKPHO 64  --   --   --   --    AST 14  --   --   --   --    ALT 15  --   --   --   --    BILT 0.8  --   --   --   --    TP 7.3  --   --   --   --     < > = values in this interval not displayed.       Estimated Glomerular Filtration Rate: 111 mL/min/1.73m2 (result from lab).    No results for input(s): \"TROP\", \"TROPHS\", \"CK\" in the last 168 hours.    No results for input(s): \"PTP\",  \"INR\" in the last 168 hours.     Microbiology    Hospital Encounter on 03/16/25   1. Anaerobic Culture     Status: None    Collection Time: 03/17/25  8:48 PM    Specimen: Abdominal fluid; Body fluid, unspecified   Result Value Ref Range    Anaerobic Culture No Anaerobes isolated N/A   2. Aerobic Bacterial Culture     Status: None    Collection Time: 03/17/25  8:48 PM    Specimen: Abdominal fluid; Body fluid, unspecified   Result Value Ref Range    Aerobic Culture Result No Growth 3 Days N/A    Aerobic Smear 2+ WBCs seen N/A    Aerobic Smear No organisms seen N/A         Imaging: Reviewed in Epic.    Medications:    acetaminophen  1,000 mg Intravenous Q6H    acetaminophen  1,000 mg Oral Q8H    enoxaparin  40 mg Subcutaneous Daily    magnesium oxide  400 mg Oral Once    pantoprazole  40 mg Intravenous Q24H    lisinopril  20 mg Oral Daily    clonazePAM  0.5 mg Oral BID    sertraline  75 mg Oral Daily    verapamil ER  180 mg Oral Nightly    gabapentin  600 mg Oral TID       Assessment & Plan:      #Omental infarction with severe intra-abdominal adhesions  -s/p diagnostic lap, ex-lap, R colon resection, partial omentectomy, small bowel resection, extensive adhesion lysis on 3/17  -IV unasyn stopped  -NG re-inserted;  diet per surgery    #ileus-hopefully improving;  NG per surgery    #Hx of diverticulitis      #HTN-lisinopril      #Chronic afib-verapamil    #LBBB    #Anxiety    #Major depressive disorder, recurrent, unspecified severity-sertraline, clonazepam     #expected acute blood loss anemia-monitor    Hopefully can discharge today; await surgery recs    Dvt prophy: kayleen Cardenas MD  [facility]  Internal Medicine Hospitalist      Supplementary Documentation:     Quality:  DVT Mechanical Prophylaxis:   SCDs, Early ambuation  DVT Pharmacologic Prophylaxis   Medication    enoxaparin (Lovenox) 40 MG/0.4ML SUBQ injection 40 mg                Code Status: Full Code  Vance: No urinary catheter in  place  Vance Duration (in days):   Central line:    NILS:     Discharge is dependent on: clinical state, general surgery recs  At this point Mr. Cabral is expected to be discharge to: home    The 21st Century Cures Act makes medical notes like these available to patients in the interest of transparency. Please be advised this is a medical document. Medical documents are intended to carry relevant information, facts as evident, and the clinical opinion of the practitioner. The medical note is intended as peer to peer communication and may appear blunt or direct. It is written in medical language and may contain abbreviations or verbiage that are unfamiliar.

## 2025-03-23 NOTE — DISCHARGE SUMMARY
[facility]     Don Cabral Patient Status:  Inpatient    1965 MRN [id number]   Location [facility] 3NW-A Attending Avery Cardenas MD   Hosp Day # 7 PCP Stephen Christianson MD     Date of Admission: 3/16/2025  Date of Discharge: 3/23/2025  Discharge Disposition: Home or Self Care  Chief complaint:   Chief Complaint   Patient presents with    Abdomen/Flank Pain         Discharge Diagnoses and Brief Synopsis:  #Omental infarction with severe intra-abdominal adhesions  -s/p diagnostic lap, ex-lap, R colon resection, partial omentectomy, small bowel resection, extensive adhesion lysis on 3/17     #ileus-resolved; did require re-insertion of NG after removal but eventually tolerated diet and was having bowel movements.  Discharged in stable condition.     #Hx of diverticulitis      #HTN     #Chronic afib     #LBBB     #Anxiety     #Major depressive disorder, recurrent     #expected acute blood loss anemia        Lab/Test results pending at Discharge:   none        Admission History of Present Illness (author of HPI not necessarily myself; see H&P author): Don Cabral is a 59 year old male with pmhx of diverticulitis, chronic omental infarct, anxiety/depression, HTN, NELLIE who presents with complaint of worsening abdominal pain. He was just recently discharged from the hospital 2 days ago after admission for abdominal pain and omental infarct that was being managed with supportive care. He reports his symptoms were initially well managed with course of abx and steroids. His symptoms returned a few days after completing abx/steroids and he was subsequently hospitalized. He underwent colonoscopy on 3/13 which showed mild cecal colitis. He was offered diagnostic laparoscopy during his admission, but opted to discharge home with plan to f/u closely with Dr. Clemens for consideration of elective ex-lap for further evaluation of his symptoms. His symptoms worsened yesterday and  today and he didn't feel he could control his pain with PO norco. He denies any fevers/chills, melena, hematochezia, urinary complaints, n/v/d.       Lace+ Score: 70  59-90 High Risk  29-58 Medium Risk  0-28   Low Risk  Patient was referred to the [facility].    TCM Follow-Up Recommendation:  LACE > 58: High Risk of readmission after discharge from the hospital.      Discharge Medication List:     Discharge Medications        START taking these medications        Instructions Prescription details   ondansetron 8 MG Tbdp  Commonly known as: Zofran-ODT      Take 1 tablet (8 mg total) by mouth every 8 (eight) hours as needed for Nausea.   Quantity: 20 tablet  Refills: 0     pantoprazole 40 MG Tbec  Commonly known as: Protonix      Take 1 tablet (40 mg total) by mouth daily.   Quantity: 14 tablet  Refills: 0     simethicone 80 MG Chew  Commonly known as: Mylicon      Chew 1 tablet (80 mg total) by mouth 4 (four) times daily as needed for FLATULENCE.   Quantity: 30 tablet  Refills: 0            CONTINUE taking these medications        Instructions Prescription details   albuterol 108 (90 Base) MCG/ACT Aers  Commonly known as: Ventolin HFA      Inhale 2 puffs into the lungs every 4 (four) hours as needed for Wheezing or Shortness of Breath.   Quantity: 1 each  Refills: 3     clonazePAM 0.5 MG Tabs  Commonly known as: KlonoPIN      Take 1 tablet (0.5 mg total) by mouth in the morning and 1 tablet (0.5 mg total) before bedtime.   Refills: 0     Econazole Nitrate 1 % Crea  Commonly known as: SPECTAZOLE      Apply 1 Application topically 2 (two) times daily.   Quantity: 85 g  Refills: 2     gabapentin 600 MG Tabs  Commonly known as: Neurontin      Take 1 tablet (600 mg total) by mouth 3 (three) times daily.   Quantity: 270 tablet  Refills: 0     HYDROcodone-acetaminophen 5-325 MG Tabs  Commonly known as: Norco      Take 1 tablet by mouth every 6 (six) hours as needed.   Quantity: 15 tablet  Refills: 0      lisinopril 20 MG Tabs  Commonly known as: Prinivil; Zestril      Take 1 tablet (20 mg total) by mouth daily.   Quantity: 90 tablet  Refills: 3     sertraline 50 MG Tabs  Commonly known as: Zoloft      1.5 tablets daily   Quantity: 135 tablet  Refills: 0     Tadalafil 20 MG Tabs      Take 1 tablet (20 mg total) by mouth daily as needed for Erectile Dysfunction.   Quantity: 12 tablet  Refills: 1     Verapamil HCl  MG Cp24  Commonly known as: VERELAN      Take 1 capsule (180 mg total) by mouth once daily.   Refills: 0               Where to Get Your Medications        Please  your prescriptions at the location directed by your doctor or nurse    Bring a paper prescription for each of these medications  ondansetron 8 MG Tbdp  pantoprazole 40 MG Tbec  simethicone 80 MG Chew         [place]    Follow-up appointment:   Stephen Christianson MD  [address] [place] [id number]  [phone]    Follow up in 1 week(s)      Ryan Clemens MD  [address] [facility] [id number] [phone]    Follow up  call for appointment    Appointments for Next 30 Days 3/23/2025 - 4/22/2025        Date Arrival Time Visit Type Length Department Provider     3/28/2025  2:30 PM  [facility] PT ORTHO [place] [[id number]] 45 min [facility] in [facility], [facility], PT    Patient Instructions:         Location Instructions:     Your appointment is located at [facility], [address].  Please arrive 10 minutes prior to your scheduled appointment time.  Masks are optional for all patients and visitors, unless otherwise indicated.               3/31/2025  2:30 PM  MYCHART FOLLOW UP [[id number]] 30 min [facility], [address] [place] [address] Stephen Christianson MD    Patient Instructions:     Contact your primary care provider if your insurance requires a referral.    Please arrive 15 minutes prior to your scheduled appointment. Be sure to bring your current Insurance card,  Photo ID, and medication bottles or a list of your current medications.      A 24 hour notice is required to cancel any appointment or you may be charged a $40 No Show Fee.     Important: 24 hour notice is required to cancel any appointment or you may be charged a $40 No Show Fee. Please notify your physician office.         Location Instructions:     Masks are optional for all patients and visitors, unless otherwise indicated. Note: A $50 fee will be charged for missed appointments or same-day cancellations. Please provide 24 hours' notice if you need to cancel or reschedule your appointment.               4/4/2025  2:30 PM  [facility] PT ORTHO [place] [[id number]] 45 min [facility] in [facility]    Patient Instructions:         Location Instructions:     Your appointment is located at [address] [place] [address].  Please arrive 10 minutes prior to your scheduled appointment time.  Masks are optional for all patients and visitors, unless otherwise indicated.               4/11/2025  2:30 PM  [facility] PT ORTHO TX [[id number]] 45 min [facility] in [facility]    Patient Instructions:         Location Instructions:     Your appointment is located at [address] [place] [address].  Please arrive 10 minutes prior to your scheduled appointment time.  Masks are optional for all patients and visitors, unless otherwise indicated.               4/18/2025  2:30 PM  [facility] PT ORTHO [place] [[id number]] 45 min [facility] in [facility]    Patient Instructions:         Location Instructions:     Your appointment is located at [address] [place] [address].  Please arrive 10 minutes prior to your scheduled appointment time.  Masks are optional for all patients and visitors, unless otherwise indicated.                      Vital signs:  Temp:  [97.7 °F (36.5 °C)-98.3 °F (36.8 °C)] 97.7 °F (36.5 °C)  Pulse:  [59-70]  65  Resp:  [16-20] 20  BP: (130-155)/(53-64) 135/53  SpO2:  [61 %-98 %] 61 %    Physical Exam:    General: No acute distress: NG in place  Respiratory: No wheezes, no rhonchi  Cardiovascular: S1, S2, regular rate and rhythm  Abdomen: incision c/di/; less distention today  Neuro: No new focal deficits.   Extremities: No edema  -----------------------------------------------------------------------------------------------  PATIENT DISCHARGE INSTRUCTIONS: See electronic chart    Avery Ordoñez MD    Time spent:   32 minutes

## 2025-03-23 NOTE — PLAN OF CARE
Alert x 4. VSS on RA. Pain controlled with scheduled pain meds. Ambulates independently. NGT in place, clamped. Abdominal incisions with staples in place. Tolerating full liquids. POC discussed. All current needs met. Jaison light in reach

## 2025-03-23 NOTE — PROGRESS NOTES
A&Ox4. VSS. RA. .  Pt declined tele at this time- hospitalist aware, okay w/ no tele   GI: Abdomen soft, nondistended. Passing gas.  Denies nausea.  : Voids.  Pain controlled with PRN pain medications  Up independently   Drains: NGT to L nare- clamped   Incisions: Midline and lap sites w/ staples   Diet: Tolerating full liquid diet w/ medications   Saline locked   All appropriate safety measures in place. All questions and concerns addressed.

## 2025-03-23 NOTE — PROGRESS NOTES
NURSING DISCHARGE NOTE    Discharged Home via Wheelchair.  Accompanied by Family member  Belongings Taken by patient/family.    VSS, tolerating diet, voiding adequately, pain controlled, tolerating ambulating. Discharge education provided. Reviewed medications and follow up appts. All questions answered and concerns addressed, pt verbalized understanding. Pt dc in stable condition.

## 2025-03-25 ENCOUNTER — TELEPHONE (OUTPATIENT)
Dept: FAMILY MEDICINE CLINIC | Facility: CLINIC | Age: 60
End: 2025-03-25

## 2025-03-25 ENCOUNTER — TELEPHONE (OUTPATIENT)
Dept: PHYSICAL THERAPY | Facility: HOSPITAL | Age: 60
End: 2025-03-25

## 2025-03-25 NOTE — TELEPHONE ENCOUNTER
Patients discharge summary from hospital says follow up in 1 week. Please advise. He would like an appointment but [facility] schedule is full.

## 2025-03-25 NOTE — TELEPHONE ENCOUNTER
Per chart pt had a self made appt 3/31 for abd pain--looks like he was in hospital for that.    I changed appt that day for hosp follow up, is that suffice?

## 2025-03-26 NOTE — PAYOR COMM NOTE
--------------  DISCHARGE REVIEW    Payor: [employer]ORRISON  Subscriber #:  [id number]  Authorization Number: [id number]    Admit date: 3/16/25  Admit time:   2:28 PM  Discharge Date: 3/23/2025  6:06 PM     Admitting Physician: Katarzyna Brown DO  Primary Care Physician: Stephen Christianson MD    Discharge Summary signed by Avery Cardenas MD at 3/23/2025  7:31 PM       Author: Avery Cardenas MD Specialty: HOSPITALIST, Internal Medicine Author Type: Physician    Filed: 3/23/2025  7:31 PM Date of Service: 3/23/2025 11:20 AM Status: Signed     [facility]     Don Cabral Patient Status:  Inpatient    1965 MRN [id number]   Location [facility] 3NW-A Attending Avery Cardenas MD   Hosp Day # 7 PCP Stephen Christianson MD     Date of Admission: 3/16/2025  Date of Discharge: 3/23/2025  Discharge Disposition: Home or Self Care  Chief complaint:   Chief Complaint   Patient presents with    Abdomen/Flank Pain     Discharge Diagnoses and Brief Synopsis:  #Omental infarction with severe intra-abdominal adhesions  -s/p diagnostic lap, ex-lap, R colon resection, partial omentectomy, small bowel resection, extensive adhesion lysis on 3/17     #ileus-resolved; did require re-insertion of NG after removal but eventually tolerated diet and was having bowel movements.  Discharged in stable condition.     #Hx of diverticulitis      #HTN     #Chronic afib     #LBBB     #expected acute blood loss anemia    Admission History of Present Illness (author of HPI not necessarily myself; see H&P author):  59 year old male with pmhx of diverticulitis, chronic omental infarct, HTN, NELLIE who presents with complaint of worsening abdominal pain. He was just recently discharged from the hospital 2 days ago after admission for abdominal pain and omental infarct that was being managed with supportive care. He reports his symptoms were initially well managed with course of abx and steroids. His symptoms returned a few  days after completing abx/steroids and he was subsequently hospitalized. He underwent colonoscopy on 3/13 which showed mild cecal colitis. He was offered diagnostic laparoscopy during his admission, but opted to discharge home with plan to f/u closely with Dr. Clemens for consideration of elective ex-lap for further evaluation of his symptoms. His symptoms worsened yesterday and today and he didn't feel he could control his pain with PO norco. He denies any fevers/chills, melena, hematochezia, urinary complaints, n/v/d.     Lace+ Score: 70  59-90 High Risk  29-58 Medium Risk  0-28   Low Risk  Patient was referred to the [facility].    TCM Follow-Up Recommendation:  LACE > 58: High Risk of readmission after discharge from the hospital.    Discharge Medication List:    START taking these medications        Instructions Prescription details   ondansetron 8 MG Tbdp  Commonly known as: Zofran-ODT      Take 1 tablet (8 mg total) by mouth every 8 (eight) hours as needed for Nausea.   Quantity: 20 tablet  Refills: 0     pantoprazole 40 MG Tbec  Commonly known as: Protonix      Take 1 tablet (40 mg total) by mouth daily.   Quantity: 14 tablet  Refills: 0     simethicone 80 MG Chew  Commonly known as: Mylicon      Chew 1 tablet (80 mg total) by mouth 4 (four) times daily as needed for FLATULENCE.   Quantity: 30 tablet  Refills: 0            CONTINUE taking these medications        Instructions Prescription details   albuterol 108 (90 Base) MCG/ACT Aers  Commonly known as: Ventolin HFA      Inhale 2 puffs into the lungs every 4 (four) hours as needed for Wheezing or Shortness of Breath.   Quantity: 1 each  Refills: 3     clonazePAM 0.5 MG Tabs  Commonly known as: KlonoPIN      Take 1 tablet (0.5 mg total) by mouth in the morning and 1 tablet (0.5 mg total) before bedtime.   Refills: 0     Econazole Nitrate 1 % Crea  Commonly known as: SPECTAZOLE      Apply 1 Application topically 2 (two) times daily.    Quantity: 85 g  Refills: 2     gabapentin 600 MG Tabs  Commonly known as: Neurontin      Take 1 tablet (600 mg total) by mouth 3 (three) times daily.   Quantity: 270 tablet  Refills: 0     HYDROcodone-acetaminophen 5-325 MG Tabs  Commonly known as: Norco      Take 1 tablet by mouth every 6 (six) hours as needed.   Quantity: 15 tablet  Refills: 0     lisinopril 20 MG Tabs  Commonly known as: Prinivil; Zestril      Take 1 tablet (20 mg total) by mouth daily.   Quantity: 90 tablet  Refills: 3     sertraline 50 MG Tabs  Commonly known as: Zoloft      1.5 tablets daily   Quantity: 135 tablet  Refills: 0     Tadalafil 20 MG Tabs      Take 1 tablet (20 mg total) by mouth daily as needed for Erectile Dysfunction.   Quantity: 12 tablet  Refills: 1     Verapamil HCl  MG Cp24  Commonly known as: VERELAN      Take 1 capsule (180 mg total) by mouth once daily.   Refills: 0       [place]    Follow-up appointment:   Stephen Christianson MD  [address] [place] [address]  [phone]    Follow up in 1 week(s)    Ryan Clemens MD  [address] [facility] [id number] [phone]    Follow up  call for appointment    Appointments for Next 30 Days 3/23/2025 - 4/22/2025        Date Arrival Time Visit Type Length Department Provider     3/28/2025  2:30 PM  [facility] PT ORTHO [place] [[id number]] 45 min [facility] James Boland PT    Patient Instructions:         Location Instructions:     Your appointment is located at [facility] [address].  Please arrive 10 minutes prior to your scheduled appointment time.  Masks are optional for all patients and visitors, unless otherwise indicated.               3/31/2025  2:30 PM  MYCHART FOLLOW UP [[id number]] 30 min [facility], [address] [place] [address] Stephen Christianson MD    Patient Instructions:     Contact your primary care provider if your insurance requires a referral.    Please arrive 15 minutes prior  to your scheduled appointment. Be sure to bring your current Insurance card, Photo ID, and medication bottles or a list of your current medications.      A 24 hour notice is required to cancel any appointment or you may be charged a $40 No Show Fee.     Important: 24 hour notice is required to cancel any appointment or you may be charged a $40 No Show Fee. Please notify your physician office.         Location Instructions:     Masks are optional for all patients and visitors, unless otherwise indicated. Note: A $50 fee will be charged for missed appointments or same-day cancellations. Please provide 24 hours' notice if you need to cancel or reschedule your appointment.               4/4/2025  2:30 PM  [facility] PT ORTHO [place] [[id number]] 45 min [facility] in [facility]    Patient Instructions:         Location Instructions:     Your appointment is located at [address] [place] [address].  Please arrive 10 minutes prior to your scheduled appointment time.  Masks are optional for all patients and visitors, unless otherwise indicated.               4/11/2025  2:30 PM  [facility] PT ORTHO [place] [[id number]] 45 min [facility] in [facility]    Patient Instructions:         Location Instructions:     Your appointment is located at [address] [place] [address].  Please arrive 10 minutes prior to your scheduled appointment time.  Masks are optional for all patients and visitors, unless otherwise indicated.               4/18/2025  2:30 PM  [facility] PT ORTHO [place] [[id number]] 45 min [facility] in [facility]    Patient Instructions:         Location Instructions:     Your appointment is located at [address] [place] [address].  Please arrive 10 minutes prior to your scheduled appointment time.  Masks are optional for all patients and visitors, unless otherwise indicated.               Vital signs:  Temp:  [97.7  °F (36.5 °C)-98.3 °F (36.8 °C)] 97.7 °F (36.5 °C)  Pulse:  [59-70] 65  Resp:  [16-20] 20  BP: (130-155)/(53-64) 135/53  SpO2:  [61 %-98 %] 61 %    Physical Exam:    General: No acute distress: NG in place  Respiratory: No wheezes, no rhonchi  Cardiovascular: S1, S2, regular rate and rhythm  Abdomen: incision c/di/; less distention today  Neuro: No new focal deficits.   Extremities: No edema  -----------------------------------------------------------------------------------------------  PATIENT DISCHARGE INSTRUCTIONS: See electronic chart    Avery Ordoñez MD  3/23/2025  7:31 PM         REVIEWER COMMENTS    FOR FINAL REVIEW/APPROVAL OF ALL INPT DAYS

## 2025-03-28 ENCOUNTER — APPOINTMENT (OUTPATIENT)
Dept: PHYSICAL THERAPY | Age: 60
End: 2025-03-28
Attending: PHYSICIAN ASSISTANT
Payer: COMMERCIAL

## 2025-03-31 ENCOUNTER — OFFICE VISIT (OUTPATIENT)
Dept: FAMILY MEDICINE CLINIC | Facility: CLINIC | Age: 60
End: 2025-03-31
Payer: COMMERCIAL

## 2025-03-31 ENCOUNTER — OFFICE VISIT (OUTPATIENT)
Facility: LOCATION | Age: 60
End: 2025-03-31
Payer: COMMERCIAL

## 2025-03-31 VITALS
HEIGHT: 75 IN | BODY MASS INDEX: 31.46 KG/M2 | SYSTOLIC BLOOD PRESSURE: 110 MMHG | DIASTOLIC BLOOD PRESSURE: 82 MMHG | HEART RATE: 76 BPM | WEIGHT: 253 LBS | RESPIRATION RATE: 16 BRPM | OXYGEN SATURATION: 97 %

## 2025-03-31 VITALS
HEART RATE: 90 BPM | OXYGEN SATURATION: 96 % | RESPIRATION RATE: 18 BRPM | DIASTOLIC BLOOD PRESSURE: 83 MMHG | TEMPERATURE: 97 F | SYSTOLIC BLOOD PRESSURE: 131 MMHG

## 2025-03-31 DIAGNOSIS — K55.069 OMENTAL INFARCTION (HCC): ICD-10-CM

## 2025-03-31 DIAGNOSIS — Z98.890: ICD-10-CM

## 2025-03-31 DIAGNOSIS — Z90.49 STATUS POST COLON RESECTION: ICD-10-CM

## 2025-03-31 DIAGNOSIS — Z09 HOSPITAL DISCHARGE FOLLOW-UP: Primary | ICD-10-CM

## 2025-03-31 DIAGNOSIS — Z98.890 POST-OPERATIVE STATE: Primary | ICD-10-CM

## 2025-03-31 DIAGNOSIS — K66.0 ABDOMINAL ADHESIONS: ICD-10-CM

## 2025-03-31 DIAGNOSIS — Z48.02 ENCOUNTER FOR STAPLE REMOVAL: ICD-10-CM

## 2025-03-31 PROCEDURE — 99024 POSTOP FOLLOW-UP VISIT: CPT

## 2025-03-31 PROCEDURE — 99213 OFFICE O/P EST LOW 20 MIN: CPT | Performed by: FAMILY MEDICINE

## 2025-03-31 NOTE — PROGRESS NOTES
[facility] Progress Note    SUBJECTIVE: Don Cabral 59 year old male is here today for   Chief Complaint   Patient presents with    Hospital F/U       Following up from recent hospital visit. Had severe adhesions, omental infarct, and ended up requiring hemicolectomy and reanastomosis.    Saw. Dr. Clemens, and recommend GI consult and got recommended to go to the hospital. Colonoscopy was fairly benign. No explanation for pain. Was discharge, and couldn't manage pain ended back in the hospital, and evaluated and did ex-lap, Ended up as noted above    I reviewed operative notes and discharge summaries.  Has been trying to avoid opioids to get his guts working. The significant pain is bringing up his traumatic experiences.    Impacting his mood, planning to get into program at [facility]    Stools were looser into this past Friday, and seems to be changing more to normal stool.    Lots of nausea, no vomiting.    Has GI follow up in coming weeks, staples removed this morning, has steri strips in place.    Dr. Clemens follow up in 2 weeks    Seeing psych tomorrow. Daily 830-12    Can add food back in now, but is not very hungry, has been taking ensure shakes due to low appetite.          PMH  Past Medical History:    Abdominal pain    Beginning of diverticulitis saga that led to removal part of my large colon    Allergic rhinitis    Anxiety    Arrhythmia    T1 AV block, LBBB    Back pain    Back problem    lower back    Decorative tattoo    Depression    Diverticulitis    ED (erectile dysfunction)    Heart palpitations    Under treatment with [facility] at [place] for arrhythmia    Hemorrhoids    One internal, occasional external    High blood pressure    History of adverse reaction to anesthesia    Patient states he doesnt know exactly what happened but stated he required haldol post anesthesia    Indigestion    Periodic last 20 yrs    Pneumonia due to organism    Presence of other  cardiac implants and grafts    Loop recorder for arrhythmia    Seasonal allergies    Shortness of breath    Sleep apnea    CPAP    Stress    Unspecified essential hypertension    Visual impairment    glasses    Wears glasses    Weight gain        PSH  Past Surgical History:   Procedure Laterality Date    Bowel resection  4/24 and 3/25    First related to sigmoid removal, second omental infarct had dused to right colon    Cholecystectomy  3/12/2023    Colonoscopy N/A 03/22/2016    [facility].  Hyperplastic polyp only.  Repeat 2026    Colonoscopy  05/10/2023    Colonoscopy N/A 03/13/2025    Procedure: COLONOSCOPY with biopsies and forcep polypectomy;  Surgeon: Job Bacon DO;  Location:  ENDOSCOPY    Create eardrum opening,gen anesth  Childhood    Dental surgery procedure      [place] teeth removed    Incision/drain abscess extra  1996    Knee arthroscopy Right     Laparoscopic cholecystectomy  02/12/2023    Myringoplasty      Other surgical history      R knee arthroscope    Other surgical history  07/17/2024    Exploratory laparotomy partial colectomy with colorectal anastamosis, extensive adhesiolysis    Part removal colon w end colostomy      Skin tissue procedure unlisted      Tonsillectomy          Social Hx:  See HPI    ROS  See HPI    OBJECTIVE:  /82   Pulse 76   Resp 16   Ht 6' 3\" (1.905 m)   Wt 253 lb (114.8 kg)   SpO2 97%   BMI 31.62 kg/m²     Exam  Skin: incision site.      Labs:          Meds:   Current Outpatient Medications   Medication Sig Dispense Refill    clonazePAM (KLONOPIN) 0.5 MG Oral Tab Take 1 tablet (0.5 mg total) by mouth 3 (three) times daily as needed. 90 tablet 0    ondansetron 8 MG Oral Tablet Dispersible Take 1 tablet (8 mg total) by mouth every 8 (eight) hours as needed for Nausea. 20 tablet 0    simethicone 80 MG Oral Chew Tab Chew 1 tablet (80 mg total) by mouth 4 (four) times daily as needed for FLATULENCE. 30 tablet 0    sertraline (ZOLOFT) 50 MG Oral Tab 1.5  tablets daily (Patient taking differently: 1.5 tablets (75 mg total). 1.5 tablets daily) 135 tablet 0    Econazole Nitrate 1 % External Cream Apply 1 Application topically 2 (two) times daily. 85 g 2    Tadalafil 20 MG Oral Tab Take 1 tablet (20 mg total) by mouth daily as needed for Erectile Dysfunction. 12 tablet 1    Verapamil HCl  MG Oral Capsule SR 24 Hr Take 1 capsule (180 mg total) by mouth once daily.      lisinopril 20 MG Oral Tab Take 1 tablet (20 mg total) by mouth daily. 90 tablet 3    Albuterol Sulfate  (90 Base) MCG/ACT Inhalation Aero Soln Inhale 2 puffs into the lungs every 4 (four) hours as needed for Wheezing or Shortness of Breath. 1 each 3    pantoprazole 40 MG Oral Tab EC Take 1 tablet (40 mg total) by mouth daily. (Patient not taking: Reported on 3/31/2025) 14 tablet 0    clonazePAM 0.5 MG Oral Tab Take 1 tablet (0.5 mg total) by mouth in the morning and 1 tablet (0.5 mg total) before bedtime.      gabapentin 600 MG Oral Tab Take 1 tablet (600 mg total) by mouth 3 (three) times daily. (Patient not taking: Reported on 3/31/2025) 270 tablet 0         Assessment/Plan  Don was seen today for hospital f/u.    Diagnoses and all orders for this visit:    Hospital discharge follow-up    Omental infarction (HCC)    Abdominal adhesions    S/P digestive system surgery         Is recovering, but had a very rough time, avoided pain medications and likely that increased pain has lead to worsening mood.    Support his treatment for mood, has appropriate follow up with specialists.         Total Time spent with patient and coordinating care:  25 minutes.    Follow up: as needed for well care      Stephen Christianson MD

## 2025-03-31 NOTE — PROGRESS NOTES
Post Operative Visit Note       Active Problems  1. Post-operative state    2. Status post colon resection    3. Encounter for staple removal         Chief Complaint   Chief Complaint   Patient presents with    Post-Op     PO - DIAGNOSTIC LAPAROSCOPY, EXPLORATORY LAPAROTOMY, RIGHT COLON RESECTION, ANASTOMOSIS, PARTIAL OMENTECTOMY, SMALL BOWEL RESECTION, LYSIS OF ADHESION W/ PBP 3/17. Pt is doing well physically, reports some mental challenges. Pt denies any pain. Pt reports some nausea that has ceased as of 2 days ago, denies any vomiting. Pt reports diarrhea that has also stopped as of 2 days ago. Pt reports he has reduced appetite since his procedure.           History of Present Illness   The patient presents for continued care and evaluation following a diagnostic laparoscopy, exploratory laparotomy, right hemicolectomy, partial omentectomy, small bowel resection and lysis of adhesions with Dr. Clemens on 3/17/2025.     Overall, the patient is doing well postoperatively. He states his incisional site pain is fairly mild. He does report some gas pain and cramping.  He is not requiring narcotic pain medication.    He states he had an \"acute trauma response\" following his operation. He states he has a team at [facility] working on his medication regimen. He is seeing a therapist tomorrow.     The patient states he does not have much of an appetite. He has mild nausea with food.  He is supplementing his diet with ensures.  He is managing this with Zofran. He has lost 23 lbs since the time of his operation.     He states initially following his operation he had loose stools. He states over the last few days, his stools are becoming more formed.       Allergies  oDn is allergic to ciprofloxacin.    Past Medical / Surgical / Social / Family History    The past medical and past surgical history have been reviewed by me today.     Past Medical History:    Abdominal pain    Beginning of diverticulitis saga that led  to removal part of my large colon    Allergic rhinitis    Anxiety    Arrhythmia    T1 AV block, LBBB    Back pain    Back problem    lower back    Decorative tattoo    Depression    Diverticulitis    ED (erectile dysfunction)    Heart palpitations    Under treatment with [facility] at [place] for arrhythmia    Hemorrhoids    One internal, occasional external    High blood pressure    History of adverse reaction to anesthesia    Patient states he doesnt know exactly what happened but stated he required haldol post anesthesia    Indigestion    Periodic last 20 yrs    Pneumonia due to organism    Presence of other cardiac implants and grafts    Loop recorder for arrhythmia    Seasonal allergies    Shortness of breath    Sleep apnea    CPAP    Stress    Unspecified essential hypertension    Visual impairment    glasses    Wears glasses    Weight gain     Past Surgical History:   Procedure Laterality Date    Bowel resection  4/24 and 3/25    First related to sigmoid removal, second omental infarct had dused to right colon    Cholecystectomy  3/12/2023    Colonoscopy N/A 03/22/2016    [facility].  Hyperplastic polyp only.  Repeat 2026    Colonoscopy  05/10/2023    Colonoscopy N/A 03/13/2025    Procedure: COLONOSCOPY with biopsies and forcep polypectomy;  Surgeon: Job Bacon DO;  Location:  ENDOSCOPY    Create eardrum opening,gen anesth  Childhood    Dental surgery procedure      [place] teeth removed    Incision/drain abscess extra  1996    Knee arthroscopy Right     Laparoscopic cholecystectomy  02/12/2023    Myringoplasty      Other surgical history      R knee arthroscope    Other surgical history  07/17/2024    Exploratory laparotomy partial colectomy with colorectal anastamosis, extensive adhesiolysis    Part removal colon w end colostomy      Skin tissue procedure unlisted      Tonsillectomy         The family history and social history have been reviewed by me today.    Family History   Problem  Relation Age of Onset    Heart Disorder Father         Ablation required    Hypertension Father     Cancer Father 60        Prostate    Prostate Cancer Father     Heart Disorder Mother         murmur no treatment needed    Hypertension Mother     Stroke Maternal Grandmother     Other (stroke) Maternal Grandmother     Dementia Paternal Grandmother     Heart Disorder Paternal Grandfather         two heart attacks and CHF    Diabetes Paternal Grandfather     Other (Diabetes, type 2) Paternal Grandfather      Social History     Socioeconomic History    Marital status:    Occupational History    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[employer]   Tobacco Use    Smoking status: Former     Current packs/day: 0.00     Average packs/day: 0.5 packs/day for 19.0 years (9.5 ttl pk-yrs)     Types: Cigarettes     Start date: 1981     Quit date: 2000     Years since quittin.7     Passive exposure: Never    Smokeless tobacco: Never   Vaping Use    Vaping status: Never Used   Substance and Sexual Activity    Alcohol use: Not Currently     Comment: Stopped alcohol 2000    Drug use: No    Sexual activity: Yes     Partners: Female   Other Topics Concern    Caffeine Concern No    Stress Concern Yes     Comment: CLARIBEL    Weight Concern Yes     Comment: would like to discuss Ozempic or similar    Special Diet No    Exercise Yes    Seat Belt Yes        Current Outpatient Medications:     clonazePAM (KLONOPIN) 0.5 MG Oral Tab, Take 1 tablet (0.5 mg total) by mouth 3 (three) times daily as needed., Disp: 90 tablet, Rfl: 0    ondansetron 8 MG Oral Tablet Dispersible, Take 1 tablet (8 mg total) by mouth every 8 (eight) hours as needed for Nausea., Disp: 20 tablet, Rfl: 0    pantoprazole 40 MG Oral Tab EC, Take 1 tablet (40 mg total) by mouth daily., Disp: 14 tablet, Rfl: 0    clonazePAM 0.5 MG Oral Tab, Take 1 tablet (0.5 mg total) by mouth in the morning and 1 tablet (0.5 mg total) before bedtime., Disp: , Rfl:     simethicone  80 MG Oral Chew Tab, Chew 1 tablet (80 mg total) by mouth 4 (four) times daily as needed for FLATULENCE., Disp: 30 tablet, Rfl: 0    gabapentin 600 MG Oral Tab, Take 1 tablet (600 mg total) by mouth 3 (three) times daily., Disp: 270 tablet, Rfl: 0    sertraline (ZOLOFT) 50 MG Oral Tab, 1.5 tablets daily (Patient taking differently: Take 1.5 tablets (75 mg total) by mouth daily. 1.5 tablets daily), Disp: 135 tablet, Rfl: 0    Econazole Nitrate 1 % External Cream, Apply 1 Application topically 2 (two) times daily., Disp: 85 g, Rfl: 2    Tadalafil 20 MG Oral Tab, Take 1 tablet (20 mg total) by mouth daily as needed for Erectile Dysfunction., Disp: 12 tablet, Rfl: 1    Verapamil HCl  MG Oral Capsule SR 24 Hr, Take 1 capsule (180 mg total) by mouth once daily., Disp: , Rfl:     lisinopril 20 MG Oral Tab, Take 1 tablet (20 mg total) by mouth daily., Disp: 90 tablet, Rfl: 3    Albuterol Sulfate  (90 Base) MCG/ACT Inhalation Aero Soln, Inhale 2 puffs into the lungs every 4 (four) hours as needed for Wheezing or Shortness of Breath., Disp: 1 each, Rfl: 3    HYDROcodone-acetaminophen 5-325 MG Oral Tab, Take 1 tablet by mouth every 6 (six) hours as needed., Disp: 15 tablet, Rfl: 0      Review of Systems  The Review of Systems has been reviewed by me during today.  Review of Systems    Physical Findings   /83 (BP Location: Left arm, Patient Position: Sitting, Cuff Size: adult)   Pulse 90   Temp 97 °F (36.1 °C) (Temporal)   Resp 18   SpO2 96%   Physical Exam  Vitals and nursing note reviewed.   Constitutional:       General: He is not in acute distress.     Appearance: Normal appearance.   HENT:      Head: Normocephalic and atraumatic.      Right Ear: External ear normal.      Left Ear: External ear normal.      Nose: Nose normal.   Eyes:      General: No scleral icterus.     Conjunctiva/sclera: Conjunctivae normal.   Abdominal:      General: Abdomen is flat. A surgical scar is present. There is no  distension.      Palpations: Abdomen is soft. There is no mass.      Tenderness: There is no abdominal tenderness.      Hernia: No hernia is present.      Comments: Clinical exam of the soft, nondistended, nontender to palpation. Midline incision is clean, dry, intact without surrounding erythema or cellulitis. I took the opportunity at today's visit to remove his staples and place Steri-Strips over his midline wound.      Musculoskeletal:      Cervical back: Normal range of motion and neck supple.   Neurological:      Mental Status: He is alert.   Psychiatric:         Mood and Affect: Mood normal.         Behavior: Behavior normal.         Thought Content: Thought content normal.             Assessment   1. Post-operative state    2. Status post colon resection    3. Encounter for staple removal          Plan   The patient is doing well status post exploratory laparotomy, right hemicolectomy, partial omentectomy, small bowel resection and lysis of adhesions.    Pathology still pending.  We will review the patient's pathology at his next postoperative visit.    I discussed with the patient that they should refrain from any bending, pushing, pulling, twisting, or lifting of a force greater than 15-20 pounds for 6 weeks post-op. Activity restrictions were reviewed. Driving restrictions were reviewed.     The patient may shower. They should avoid scrubbing their incisions, but may allow soap and water to wash over the incisions.  I instructed the patient to continue avoiding submerging his incisions in a pool, bath or hot tub until his Steri-Strips have fallen off.    The patient may begin to reintroduce higher fiber foods into his diet.  I encouraged him to continue supplementing his p.o. intake with calorie replacement shakes until his appetite is improved.  Encouraged the patient to eat smaller more frequent meals.    Continue Zofran as needed for nausea.     Encouraged the patient to continue following up with his  psychiatrist and therapist for further management of his trauma response following his operation.    The patient may take ibuprofen and Tylenol as needed for pain management.  They may also utilize heating pads or ice packs for comfort measures.    All of the patient's questions were answered.  The patient verbalized understanding and agreement with the plan of care.    Follow-up with Dr. Clemens as scheduled on 4/22/2025.  This patient should return urgently for any problems or complications related to the surgical intervention.         No orders of the defined types were placed in this encounter.      Imaging & Referrals   None    Follow Up  Return in about 22 days (around 4/22/2025).    Tresa Bass PA-C

## 2025-04-04 ENCOUNTER — APPOINTMENT (OUTPATIENT)
Dept: PHYSICAL THERAPY | Age: 60
End: 2025-04-04
Attending: PHYSICIAN ASSISTANT
Payer: COMMERCIAL

## 2025-04-05 DIAGNOSIS — I10 ESSENTIAL HYPERTENSION: ICD-10-CM

## 2025-04-07 RX ORDER — LISINOPRIL 20 MG/1
20 TABLET ORAL DAILY
Qty: 90 TABLET | Refills: 0 | Status: SHIPPED | OUTPATIENT
Start: 2025-04-07

## 2025-04-11 ENCOUNTER — APPOINTMENT (OUTPATIENT)
Dept: PHYSICAL THERAPY | Age: 60
End: 2025-04-11
Attending: PHYSICIAN ASSISTANT
Payer: COMMERCIAL

## 2025-04-14 ENCOUNTER — TELEPHONE (OUTPATIENT)
Facility: LOCATION | Age: 60
End: 2025-04-14

## 2025-04-14 DIAGNOSIS — R89.6 ABNORMAL CYTOLOGY: Primary | ICD-10-CM

## 2025-04-14 NOTE — PROGRESS NOTES
Abnormal results; call pt to make appointment with medical and surgical oncology to review findings and further surveillance options.       PBP

## 2025-04-14 NOTE — TELEPHONE ENCOUNTER
The patient recently because he had some questions and concerns about his pathology reports. He would like to have a earlier appointment or if  could call to go over the results as well.    Call back #[phone]

## 2025-04-18 ENCOUNTER — APPOINTMENT (OUTPATIENT)
Dept: CT IMAGING | Facility: HOSPITAL | Age: 60
End: 2025-04-18
Attending: EMERGENCY MEDICINE
Payer: COMMERCIAL

## 2025-04-18 ENCOUNTER — APPOINTMENT (OUTPATIENT)
Dept: PHYSICAL THERAPY | Age: 60
End: 2025-04-18
Attending: PHYSICIAN ASSISTANT
Payer: COMMERCIAL

## 2025-04-18 ENCOUNTER — HOSPITAL ENCOUNTER (OUTPATIENT)
Age: 60
Discharge: ANOTHER HOSPITAL | End: 2025-04-18
Attending: EMERGENCY MEDICINE
Payer: COMMERCIAL

## 2025-04-18 ENCOUNTER — HOSPITAL ENCOUNTER (EMERGENCY)
Facility: HOSPITAL | Age: 60
Discharge: HOME | End: 2025-04-18
Attending: EMERGENCY MEDICINE
Payer: COMMERCIAL

## 2025-04-18 ENCOUNTER — APPOINTMENT (OUTPATIENT)
Dept: GENERAL RADIOLOGY | Facility: HOSPITAL | Age: 60
End: 2025-04-18
Payer: COMMERCIAL

## 2025-04-18 VITALS
SYSTOLIC BLOOD PRESSURE: 153 MMHG | TEMPERATURE: 99 F | DIASTOLIC BLOOD PRESSURE: 75 MMHG | BODY MASS INDEX: 31.83 KG/M2 | OXYGEN SATURATION: 97 % | RESPIRATION RATE: 18 BRPM | HEIGHT: 74 IN | HEART RATE: 89 BPM | WEIGHT: 248 LBS

## 2025-04-18 VITALS
RESPIRATION RATE: 22 BRPM | WEIGHT: 249 LBS | HEIGHT: 74 IN | HEART RATE: 64 BPM | DIASTOLIC BLOOD PRESSURE: 79 MMHG | OXYGEN SATURATION: 98 % | SYSTOLIC BLOOD PRESSURE: 149 MMHG | TEMPERATURE: 97 F | BODY MASS INDEX: 31.95 KG/M2

## 2025-04-18 DIAGNOSIS — R07.89 CHEST PAIN, ATYPICAL: Primary | ICD-10-CM

## 2025-04-18 DIAGNOSIS — I44.7 LBBB (LEFT BUNDLE BRANCH BLOCK): ICD-10-CM

## 2025-04-18 DIAGNOSIS — R07.9 CHEST PAIN OF UNCERTAIN ETIOLOGY: Primary | ICD-10-CM

## 2025-04-18 LAB
ALBUMIN SERPL-MCNC: 5 G/DL (ref 3.2–4.8)
ALBUMIN/GLOB SERPL: 1.9 {RATIO} (ref 1–2)
ALP LIVER SERPL-CCNC: 74 U/L (ref 45–117)
ALT SERPL-CCNC: 15 U/L (ref 10–49)
ANION GAP SERPL CALC-SCNC: 10 MMOL/L (ref 0–18)
AST SERPL-CCNC: 16 U/L (ref ?–34)
BASOPHILS # BLD AUTO: 0.01 X10(3) UL (ref 0–0.2)
BASOPHILS NFR BLD AUTO: 0.1 %
BILIRUB SERPL-MCNC: 0.8 MG/DL (ref 0.3–1.2)
BUN BLD-MCNC: 14 MG/DL (ref 9–23)
CALCIUM BLD-MCNC: 9.8 MG/DL (ref 8.7–10.6)
CHLORIDE SERPL-SCNC: 102 MMOL/L (ref 98–112)
CO2 SERPL-SCNC: 28 MMOL/L (ref 21–32)
CREAT BLD-MCNC: 0.98 MG/DL (ref 0.7–1.3)
D DIMER PPP FEU-MCNC: 0.99 UG/ML FEU (ref ?–0.58)
EGFRCR SERPLBLD CKD-EPI 2021: 89 ML/MIN/1.73M2 (ref 60–?)
EOSINOPHIL # BLD AUTO: 0.22 X10(3) UL (ref 0–0.7)
EOSINOPHIL NFR BLD AUTO: 2.2 %
ERYTHROCYTE [DISTWIDTH] IN BLOOD BY AUTOMATED COUNT: 14.1 %
GLOBULIN PLAS-MCNC: 2.7 G/DL (ref 2–3.5)
GLUCOSE BLD-MCNC: 95 MG/DL (ref 70–99)
HCT VFR BLD AUTO: 40.1 % (ref 39–53)
HGB BLD-MCNC: 12.9 G/DL (ref 13–17.5)
IMM GRANULOCYTES # BLD AUTO: 0.05 X10(3) UL (ref 0–1)
IMM GRANULOCYTES NFR BLD: 0.5 %
LYMPHOCYTES # BLD AUTO: 2.55 X10(3) UL (ref 1–4)
LYMPHOCYTES NFR BLD AUTO: 25.3 %
MCH RBC QN AUTO: 27.2 PG (ref 26–34)
MCHC RBC AUTO-ENTMCNC: 32.2 G/DL (ref 31–37)
MCV RBC AUTO: 84.6 FL (ref 80–100)
MONOCYTES # BLD AUTO: 0.82 X10(3) UL (ref 0.1–1)
MONOCYTES NFR BLD AUTO: 8.1 %
NEUTROPHILS # BLD AUTO: 6.44 X10 (3) UL (ref 1.5–7.7)
NEUTROPHILS # BLD AUTO: 6.44 X10(3) UL (ref 1.5–7.7)
NEUTROPHILS NFR BLD AUTO: 63.8 %
OSMOLALITY SERPL CALC.SUM OF ELEC: 290 MOSM/KG (ref 275–295)
PLATELET # BLD AUTO: 403 10(3)UL (ref 150–450)
POTASSIUM SERPL-SCNC: 3.7 MMOL/L (ref 3.5–5.1)
PROT SERPL-MCNC: 7.7 G/DL (ref 5.7–8.2)
RBC # BLD AUTO: 4.74 X10(6)UL (ref 4.3–5.7)
SODIUM SERPL-SCNC: 140 MMOL/L (ref 136–145)
TROPONIN I SERPL HS-MCNC: 3 NG/L (ref ?–53)
TROPONIN I SERPL HS-MCNC: 4 NG/L (ref ?–53)
WBC # BLD AUTO: 10.1 X10(3) UL (ref 4–11)

## 2025-04-18 PROCEDURE — 84484 ASSAY OF TROPONIN QUANT: CPT | Performed by: EMERGENCY MEDICINE

## 2025-04-18 PROCEDURE — 93010 ELECTROCARDIOGRAM REPORT: CPT

## 2025-04-18 PROCEDURE — 71260 CT THORAX DX C+: CPT | Performed by: EMERGENCY MEDICINE

## 2025-04-18 PROCEDURE — 85025 COMPLETE CBC W/AUTO DIFF WBC: CPT | Performed by: EMERGENCY MEDICINE

## 2025-04-18 PROCEDURE — 99285 EMERGENCY DEPT VISIT HI MDM: CPT

## 2025-04-18 PROCEDURE — 99214 OFFICE O/P EST MOD 30 MIN: CPT

## 2025-04-18 PROCEDURE — 84484 ASSAY OF TROPONIN QUANT: CPT

## 2025-04-18 PROCEDURE — 93005 ELECTROCARDIOGRAM TRACING: CPT

## 2025-04-18 PROCEDURE — 85025 COMPLETE CBC W/AUTO DIFF WBC: CPT

## 2025-04-18 PROCEDURE — 96374 THER/PROPH/DIAG INJ IV PUSH: CPT

## 2025-04-18 PROCEDURE — 85379 FIBRIN DEGRADATION QUANT: CPT | Performed by: EMERGENCY MEDICINE

## 2025-04-18 PROCEDURE — 71045 X-RAY EXAM CHEST 1 VIEW: CPT

## 2025-04-18 PROCEDURE — 80053 COMPREHEN METABOLIC PANEL: CPT | Performed by: EMERGENCY MEDICINE

## 2025-04-18 PROCEDURE — 80053 COMPREHEN METABOLIC PANEL: CPT

## 2025-04-18 RX ORDER — KETOROLAC TROMETHAMINE 15 MG/ML
15 INJECTION, SOLUTION INTRAMUSCULAR; INTRAVENOUS ONCE
Status: COMPLETED | OUTPATIENT
Start: 2025-04-18 | End: 2025-04-18

## 2025-04-18 NOTE — ED PROVIDER NOTES
Patient Seen in: [facility]      History   No chief complaint on file.    Stated Complaint: Chest Pain    Subjective:   HPI    Patient is a 59-year-old male with medical history including hypertension, cardiac arrhythmia, left bundle branch block presents emergency room with a history of some chest discomfort in the anterior aspect of his chest which started yesterday.  The patient's pain feels well localized the anterior aspect of the chest.  The patient denies history of any associated shortness of breath, nausea, or diaphoresis.  Patient denies any back pain or abdominal pain.  The patient did have a recent colon resection last month as he had developed a omental infarct.  The patient states has never had a history of blood clots in the past.  Patient's pain is worse when he breathes deeply and is more to the anterior aspect of the chest at this time.  Patient denies history of any productive cough or cold symptoms.  The patient denies history of any other somatic complaints or discomfort at this time.  History of Present Illness               Objective:     No pertinent past medical history.            No pertinent past surgical history.              No pertinent social history.            Review of Systems    Positive for stated complaint: Chest Pain  Other systems are as noted in HPI.  Constitutional and vital signs reviewed.      All other systems reviewed and negative except as noted above.                  Physical Exam     ED Triage Vitals [04/18/25 1602]   /75   Pulse 89   Resp 18   Temp 98.5 °F (36.9 °C)   Temp src Oral   SpO2 97 %   O2 Device None (Room air)       Current Vitals:   Vital Signs  BP: 153/75  Pulse: 89  Resp: 18  Temp: 98.5 °F (36.9 °C)  Temp src: Oral    Oxygen Therapy  SpO2: 97 %  O2 Device: None (Room air)        Physical Exam     Physical Exam         GENERAL: Well-developed, well-nourished male sitting up breathing easily in no apparent distress.  Patient is  nontoxic in appearance.  HEENT: Head is normocephalic, atraumatic. Pupils are 4 mm equally round and reactive to light. Oropharynx is clear. Mucous membranes are moist.  NECK:No stridor.  LUNGS: Clear to auscultation bilaterally with no wheeze. There is good equal air entry bilaterally.  HEART: Regular rate and rhythm. Normal S1, S2 no S3, or S4. No murmur.  ABDOMEN: There is no focal tenderness to palpation appreciated anywhere throughout the abdomen. There is no guarding, no rebound, no mass, and no organomegaly appreciated. There is normoactive bowel sounds. There is no hernia.  EXTREMITIES: There is no cyanosis, clubbing, or edema appreciated. Pulses are 2+ and equal in all 4 extremities.  NEURO: Patient is awake, alert and oriented to time place and person. Motor strength is 5 over 5 in all 4 extremities. There are no gross motor or sensory deficits appreciated.  Patient is up and ambulatory in the immediate care with a steady gait and no ataxia.          ED Course   Labs Reviewed - No data to display  EKG    Rate, intervals and axes as noted on EKG Report.  Rate: 81  Rhythm: Sinus Rhythm  Reading: Evidence of left bundle branch block left axis deviation.  No significant change from previous EKG from March 2025.              Results                                 MDM     Patient with chest pain and abnormal EKG albeit chronic.  The patient with risk factors for heart disease including hypertension and family history.  Treatment options were discussed with the patient and the limitations of the immediate care were discussed with the patient initially the patient was in agreement with starting the workup here in the immediate care and then declined and stated that he wanted to have workup done in the emergency room instead.  The patient was offered ambulance transport to the hospital which she is refusing at this time.  AMA was signed by patient prior to discharge.  The patient is aware of the risks and  limitations of not going by ambulance at this time and knows he will go directly to the ER for further evaluation at this time.  The patient was sent to the ER as per discussion with patient at this time.        Medical Decision Making      Disposition and Plan     Clinical Impression:  1. Chest pain, atypical    2. LBBB (left bundle branch block)         Disposition:  Ic to ed  4/18/2025  4:21 pm    Follow-up:  No follow-up provider specified.        Medications Prescribed:  Current Discharge Medication List          Supplementary Documentation:

## 2025-04-18 NOTE — ED INITIAL ASSESSMENT (HPI)
Pt reports chest pain that started yesterday, pain with inspiration, pain is consistent with pressure, sent here from [facility]  Hx of abdominal surgery 1 month ago.

## 2025-04-19 LAB
ATRIAL RATE: 66 BPM
ATRIAL RATE: 79 BPM
ATRIAL RATE: 81 BPM
P AXIS: 90 DEGREES
P AXIS: 91 DEGREES
P AXIS: 91 DEGREES
P-R INTERVAL: 254 MS
P-R INTERVAL: 256 MS
P-R INTERVAL: 274 MS
Q-T INTERVAL: 368 MS
Q-T INTERVAL: 382 MS
Q-T INTERVAL: 402 MS
QRS DURATION: 122 MS
QRS DURATION: 124 MS
QRS DURATION: 126 MS
QTC CALCULATION (BEZET): 421 MS
QTC CALCULATION (BEZET): 421 MS
QTC CALCULATION (BEZET): 443 MS
R AXIS: -46 DEGREES
R AXIS: -49 DEGREES
R AXIS: -49 DEGREES
T AXIS: 80 DEGREES
T AXIS: 84 DEGREES
T AXIS: 96 DEGREES
VENTRICULAR RATE: 66 BPM
VENTRICULAR RATE: 79 BPM
VENTRICULAR RATE: 81 BPM

## 2025-04-19 NOTE — ED PROVIDER NOTES
Patient Seen in: [facility] Emergency Department      History     Chief Complaint   Patient presents with    Chest Pain     Stated Complaint: chest pain- sent from immediate care    Subjective:   HPI    59-year-old man past medical history as below presents with chest pain.  Since yesterday.  Feels like a stretching or tightness throughout his whole rib cage when he takes a deep breath.  No shortness of breath.  No cough.  No nausea or vomiting or diaphoresis.  Did have recent abdominal surgery last month but has been doing well from that standpoint.    History of Present Illness               Objective:     Past Medical History:    Abdominal pain    Beginning of diverticulitis saga that led to removal part of my large colon    Allergic rhinitis    Anxiety    Arrhythmia    T1 AV block, LBBB    Back pain    Back problem    lower back    Decorative tattoo    Depression    Diverticulitis    ED (erectile dysfunction)    Heart palpitations    Under treatment with [facility] [place] for arrhythmia    Hemorrhoids    One internal, occasional external    High blood pressure    History of adverse reaction to anesthesia    Patient states he doesnt know exactly what happened but stated he required haldol post anesthesia    Indigestion    Periodic last 20 yrs    Panic attack    See above - anxiety    Panic disorder    See above - anxiety    Pneumonia due to organism    Presence of other cardiac implants and grafts    Loop recorder for arrhythmia    Seasonal allergies    Shortness of breath    Sleep apnea    CPAP    Stress    Tremor    mild radial tremor prob. dur to SSRI levels    Unspecified essential hypertension    Visual impairment    glasses    Wears glasses    Weight gain              Past Surgical History:   Procedure Laterality Date    Bowel resection  4/24 and 3/25    First related to sigmoid removal, second omental infarct had dused to right colon    Cholecystectomy  3/12/2023    Colonoscopy N/A  2016    [facility].  Hyperplastic polyp only.  Repeat     Colonoscopy  05/10/2023    Colonoscopy N/A 2025    Procedure: COLONOSCOPY with biopsies and forcep polypectomy;  Surgeon: Job Bacon DO;  Location:  ENDOSCOPY    Create eardrum opening,gen anesth  Childhood    Dental surgery procedure      [place] teeth removed    Incision/drain abscess extra      Knee arthroscopy Right     Laparoscopic cholecystectomy  2023    Myringoplasty      Other surgical history      R knee arthroscope    Other surgical history  2024    Exploratory laparotomy partial colectomy with colorectal anastamosis, extensive adhesiolysis    Part removal colon w end colostomy      Skin tissue procedure unlisted      Tonsillectomy                  Social History     Socioeconomic History    Marital status:    Occupational History    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[employer]   Tobacco Use    Smoking status: Former     Current packs/day: 0.00     Average packs/day: 0.5 packs/day for 19.0 years (9.5 ttl pk-yrs)     Types: Cigarettes     Start date: 1981     Quit date: 2000     Years since quittin.8     Passive exposure: Never    Smokeless tobacco: Never   Vaping Use    Vaping status: Never Used   Substance and Sexual Activity    Alcohol use: Not Currently     Comment: Stopped alcohol 2000    Drug use: No    Sexual activity: Yes     Partners: Female   Other Topics Concern    Caffeine Concern No    Stress Concern Yes     Comment: CLARIBEL    Weight Concern Yes     Comment: would like to discuss Ozempic or similar    Special Diet No    Exercise Yes    Seat Belt Yes     Social Drivers of Health     Food Insecurity: No Food Insecurity (2025)    NCSS - Food Insecurity     Worried About Running Out of Food in the Last Year: No     Ran Out of Food in the Last Year: No   Transportation Needs: No Transportation Needs (2025)    NCSS - Transportation     Lack of Transportation: No   Housing  Stability: At Risk (4/1/2025)    NCSS - Housing/Utilities     Has Housing: No     Worried About Losing Housing: No     Unable to Get Utilities: No                                Physical Exam     ED Triage Vitals [04/18/25 1741]   /70   Pulse 83   Resp 18   Temp 97.3 °F (36.3 °C)   Temp src Temporal   SpO2 96 %   O2 Device None (Room air)       Current Vitals:   Vital Signs  BP: 119/67  Pulse: 101  Resp: 19  Temp: 97.3 °F (36.3 °C)  Temp src: Temporal  MAP (mmHg): 81    Oxygen Therapy  SpO2: 98 %  O2 Device: None (Room air)        Physical Exam  Constitutional:       General: Is not in acute distress.     Appearance: Is not ill-appearing.   HENT:      Head: Normocephalic and atraumatic.      Mouth/Throat:      Mouth: Mucous membranes are moist.   Eyes:      Conjunctiva/sclera: Conjunctivae normal.      Pupils: Pupils are equal, round, and reactive to light.   Cardiovascular:      Rate and Rhythm: Normal rate and regular rhythm.   Pulmonary:      Effort: Pulmonary effort is normal. No respiratory distress.      Breath sounds: Normal breath sounds.   Abdominal: Midline well-healed abdominal incision     General: Abdomen is flat. There is no distension.      Tenderness: There is no abdominal tenderness.   Musculoskeletal:      Right lower leg: No edema.      Left lower leg: No edema.   Skin:     General: Skin is warm and dry.   Neurological:      General: No focal deficit present.      Mental Status: Is alert.     Physical Exam                ED Course     Labs Reviewed   COMP METABOLIC PANEL (14) - Abnormal; Notable for the following components:       Result Value    Albumin 5.0 (*)     All other components within normal limits   CBC WITH DIFFERENTIAL WITH PLATELET - Abnormal; Notable for the following components:    HGB 12.9 (*)     All other components within normal limits   TROPONIN I HIGH SENSITIVITY - Normal   D-DIMER   RAINBOW DRAW LAVENDER   RAINBOW DRAW LIGHT GREEN   RAINBOW DRAW BLUE   RAINBOW DRAW GOLD      EKG    Rate, intervals and axes as noted on EKG Report.  Rate: 79  Rhythm: Normal sinus rhythm  Reading: First-degree AV block,  stable from previous, no arrhythmia              Results                                 MDM      Considered all emergent etiologies, differential includes but is not limited to: ACS, PE, pneumonia, pleurisy, musculoskeletal     I have independently visualized the radiology images, my focus/limited interpretation: Chest x-ray negative for pneumonia or pneumothorax, CT negative for large pulmonary     Defer to radiologist for other/incidental findings    EKG nonischemic.  Serial troponins negative.  D-dimer positive but subsequent CT PE negative.  Seems more musculoskeletal in nature and did not improve with Toradol.  Discussed differential as well as workup at length, discussed red flags, return precautions.  Advise follow-up with PCP        Medical Decision Making      Disposition and Plan     Clinical Impression:  Chest pain of uncertain etiology    Disposition:  Discharge    Follow-up:  No follow-up provider specified.        Medications Prescribed:  Current Discharge Medication List          Supplementary Documentation:

## 2025-04-22 ENCOUNTER — OFFICE VISIT (OUTPATIENT)
Facility: LOCATION | Age: 60
End: 2025-04-22
Payer: COMMERCIAL

## 2025-04-22 VITALS
TEMPERATURE: 98 F | DIASTOLIC BLOOD PRESSURE: 91 MMHG | HEART RATE: 76 BPM | OXYGEN SATURATION: 98 % | SYSTOLIC BLOOD PRESSURE: 145 MMHG

## 2025-04-22 DIAGNOSIS — E46 MALNUTRITION, UNSPECIFIED TYPE (HCC): Primary | ICD-10-CM

## 2025-04-22 DIAGNOSIS — Z90.49 STATUS POST COLON RESECTION: ICD-10-CM

## 2025-04-22 RX ORDER — SIMETHICONE 80 MG
TABLET,CHEWABLE ORAL
COMMUNITY
Start: 2025-03-23

## 2025-04-22 NOTE — PROGRESS NOTES
Post Operative Visit Note       Active Problems  1. Malnutrition, unspecified type (HCC)    2. Status post colon resection         Chief Complaint   Chief Complaint   Patient presents with    Post-Op     PO - DIAGNOSTIC LAPAROSCOPY, EXPLORATORY LAPAROTOMY, RIGHT COLON RESECTION, ANASTOMOSIS, PARTIAL OMENTECTOMY, SMALL BOWEL RESECTION, LYSIS OF ADHESION W/ PBP 3/17. Did see oncology at [facility].  No pain with the surgical. Was in the ER           History of Present Illness     Patient presents for ongoing care postoperatively.  The patient states he is recovering well from surgery with some minor fatigue and lack of exercise endurance.  Patient's p.o. intake may be somewhat suboptimal and I recommended evaluation by clinical nutrition.  Patient's pathology reveals atypical mesothelial proliferation.  The significance of this may represent an oncologic process requiring further evaluation by medical and surgical oncology for further appropriate care.  I recommend the patient seek secondary opinion with medical and surgical oncology; contact information was provided    Allergies  Don is allergic to ciprofloxacin.    Past Medical / Surgical / Social / Family History    The past medical and past surgical history have been reviewed by me today.     Past Medical History[1]  Past Surgical History[2]    The family history and social history have been reviewed by me today.    Family History[3]  Social Hx on file[4]   Medications - Current[5]      Review of Systems  The Review of Systems has been reviewed by me during today.  Review of Systems   Constitutional: Negative.    HENT: Negative.     Eyes: Negative.    Respiratory: Negative.     Cardiovascular: Negative.    Gastrointestinal: Negative.    Genitourinary: Negative.    Musculoskeletal: Negative.    Skin: Negative.    Neurological: Negative.    Psychiatric/Behavioral: Negative.         Physical Findings   BP (!) 145/91 (BP Location: Left arm, Patient Position:  Sitting, Cuff Size: adult)   Pulse 76   Temp 97.7 °F (36.5 °C) (Temporal)   SpO2 98%   Physical Exam  Vitals and nursing note reviewed.   Constitutional:       General: He is not in acute distress.     Appearance: He is well-developed.   HENT:      Head: Normocephalic and atraumatic.   Eyes:      General: No scleral icterus.     Pupils: Pupils are equal, round, and reactive to light.   Neck:      Vascular: No JVD.      Trachea: No tracheal deviation.   Cardiovascular:      Rate and Rhythm: Normal rate and regular rhythm.   Pulmonary:      Effort: Pulmonary effort is normal. No respiratory distress.      Breath sounds: No stridor.   Abdominal:      General: Bowel sounds are normal. There is no distension.      Palpations: Abdomen is soft. Abdomen is not rigid. There is no mass.      Tenderness: There is no abdominal tenderness. There is no guarding or rebound. Negative signs include Kenney's sign and McBurney's sign.      Hernia: No hernia is present.       Musculoskeletal:         General: Normal range of motion.      Cervical back: Normal range of motion and neck supple.   Skin:     General: Skin is warm and dry.   Neurological:      Mental Status: He is alert and oriented to person, place, and time.   Psychiatric:         Behavior: Behavior normal.             Assessment   1. Malnutrition, unspecified type (HCC)    2. Status post colon resection          Plan     The patient is recovering nicely following exploratory laparotomy, right colectomy, partial omentectomy and small bowel resection.    Surgical pathology is worrisome for more advanced oncologic process.  I recommend the patient have follow-up with medical and surgical oncology; contact information was provided.  The patient also states he will seek opinion at [facility] as well and I encouraged this.    The anticipated postoperative recovery was discussed with the patient in detail.    Dietary, activity, and exercise recommendations along  with restrictions were discussed with the patient during today's visit.    Wound care instructions were discussed during today's visit.    The patient will return to my attention on an as needed basis.    The patient is encouraged to continue seeing the primary care physician for ongoing medical needs.    The patient was given ample opportunity to ask questions.  The patient's questions were answered in detail and to the patient's satisfaction.  The patient voiced understanding of the postoperative care plan.           No orders of the defined types were placed in this encounter.      Imaging & Referrals   OP REFERRAL TO NUTRITIONIST/DIETICIAN  CT CHEST+ABDOMEN+PELVIS(ALL [facility] ONLY)(CPT=71260/94323)    Follow Up  No follow-ups on file.    Ryan Clemens MD         [1]   Past Medical History:   Abdominal pain    Beginning of diverticulitis saga that led to removal part of my large colon    Allergic rhinitis    Anxiety    Arrhythmia    T1 AV block, LBBB    Back pain    Back problem    lower back    Decorative tattoo    Depression    Diverticulitis    ED (erectile dysfunction)    Heart palpitations    Under treatment with [facility] at [place] for arrhythmia    Hemorrhoids    One internal, occasional external    High blood pressure    History of adverse reaction to anesthesia    Patient states he doesnt know exactly what happened but stated he required haldol post anesthesia    Indigestion    Periodic last 20 yrs    Panic attack    See above - anxiety    Panic disorder    See above - anxiety    Pneumonia due to organism    Presence of other cardiac implants and grafts    Loop recorder for arrhythmia    Seasonal allergies    Shortness of breath    Sleep apnea    CPAP    Stress    Tremor    mild radial tremor prob. dur to SSRI levels    Unspecified essential hypertension    Visual impairment    glasses    Wears glasses    Weight gain   [2]   Past Surgical History:  Procedure Laterality Date    Bowel  resection  4/24 and 3/25    First related to sigmoid removal, second omental infarct had dused to right colon    Cholecystectomy  3/12/2023    Colectomy      Colonoscopy N/A 03/22/2016    [facility].  Hyperplastic polyp only.  Repeat 2026    Colonoscopy  05/10/2023    Colonoscopy N/A 03/13/2025    Procedure: COLONOSCOPY with biopsies and forcep polypectomy;  Surgeon: Job Bacon DO;  Location:  ENDOSCOPY    Create eardrum opening,gen anesth  Childhood    Dental surgery procedure      [place] teeth removed    Incision/drain abscess extra  1996    Knee arthroscopy Right     Laparoscopic cholecystectomy  02/12/2023    Myringoplasty      Other surgical history      R knee arthroscope    Other surgical history  07/17/2024    Exploratory laparotomy partial colectomy with colorectal anastamosis, extensive adhesiolysis    Part removal colon w end colostomy      Skin tissue procedure unlisted      Tonsillectomy     [3]   Family History  Problem Relation Age of Onset    Heart Disorder Father         Arrythmia, ablation required    Hypertension Father     Cancer Father 60        Prostate    Prostate Cancer Father     Arrhythmia Father         Ablation required    Heart Disorder Mother         murmur no treatment needed    Hypertension Mother     Stroke Maternal Grandmother     Other (stroke) Maternal Grandmother     Obesity Maternal Grandmother     Dementia Paternal Grandmother     Pulmonary Disease Paternal Grandmother         frequently contracted pulmonary diseases; I seem to have inherited this (pleurisy, pneumonia)    Heart Disorder Paternal Grandfather         two heart attacks and CHF    Diabetes Paternal Grandfather     Other (Diabetes, type 2) Paternal Grandfather     Anxiety Son         w/ ADD    Anxiety Daughter    [4]   Social History  Socioeconomic History    Marital status:    Occupational History    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[employer]   Tobacco Use    Smoking status: Former     Current  packs/day: 0.00     Average packs/day: 0.5 packs/day for 19.0 years (9.5 ttl pk-yrs)     Types: Cigarettes     Start date: 1981     Quit date: 2000     Years since quittin.8     Passive exposure: Never    Smokeless tobacco: Never   Vaping Use    Vaping status: Never Used   Substance and Sexual Activity    Alcohol use: Not Currently     Comment: Stopped alcohol 2000    Drug use: No    Sexual activity: Yes     Partners: Female   Other Topics Concern    Caffeine Concern No    Stress Concern Yes     Comment: CLARIBEL    Weight Concern Yes     Comment: would like to discuss Ozempic or similar    Special Diet No    Exercise Yes    Seat Belt Yes   [5]   Current Outpatient Medications:     simethicone 80 MG Oral Chew Tab, CHEW AND SWALLOW ONE TABLET BY MOUTH FOUR TIMES DAILY AS NEEDED FOR FLATULENCE, Disp: , Rfl:     LISINOPRIL 20 MG Oral Tab, TAKE ONE TABLET BY MOUTH ONCE DAILY, Disp: 90 tablet, Rfl: 0    famotidine 20 MG Oral Tab, Take 1 tablet (20 mg total) by mouth in the morning and 1 tablet (20 mg total) before bedtime., Disp: , Rfl:     clonazePAM (KLONOPIN) 0.5 MG Oral Tab, Take 1 tablet (0.5 mg total) by mouth 3 (three) times daily as needed., Disp: 90 tablet, Rfl: 0    gabapentin 600 MG Oral Tab, Take 1 tablet (600 mg total) by mouth 3 (three) times daily., Disp: 270 tablet, Rfl: 0    sertraline (ZOLOFT) 50 MG Oral Tab, 1.5 tablets daily, Disp: 135 tablet, Rfl: 0    Econazole Nitrate 1 % External Cream, Apply 1 Application topically 2 (two) times daily., Disp: 85 g, Rfl: 2    Tadalafil 20 MG Oral Tab, Take 1 tablet (20 mg total) by mouth daily as needed for Erectile Dysfunction., Disp: 12 tablet, Rfl: 1    Verapamil HCl  MG Oral Capsule SR 24 Hr, Take 1 capsule (180 mg total) by mouth in the morning., Disp: , Rfl:     Albuterol Sulfate  (90 Base) MCG/ACT Inhalation Aero Soln, Inhale 2 puffs into the lungs every 4 (four) hours as needed for Wheezing or Shortness of Breath., Disp: 1 each,  Rfl: 3

## 2025-04-23 ENCOUNTER — TELEPHONE (OUTPATIENT)
Facility: LOCATION | Age: 60
End: 2025-04-23

## 2025-04-23 NOTE — TELEPHONE ENCOUNTER
Patient called because CT that was ordered has not be released and he needs it to be released so that he can schedule. Please advise   CB# [phone]

## 2025-04-24 ENCOUNTER — TELEPHONE (OUTPATIENT)
Facility: LOCATION | Age: 60
End: 2025-04-24

## 2025-04-27 ENCOUNTER — HOSPITAL ENCOUNTER (OUTPATIENT)
Age: 60
Discharge: HOME | End: 2025-04-27
Attending: EMERGENCY MEDICINE
Payer: COMMERCIAL

## 2025-04-27 VITALS
RESPIRATION RATE: 18 BRPM | TEMPERATURE: 98 F | BODY MASS INDEX: 32.6 KG/M2 | HEART RATE: 75 BPM | SYSTOLIC BLOOD PRESSURE: 147 MMHG | HEIGHT: 74 IN | WEIGHT: 254 LBS | DIASTOLIC BLOOD PRESSURE: 71 MMHG | OXYGEN SATURATION: 99 %

## 2025-04-27 DIAGNOSIS — M54.50 BACK PAIN, LUMBOSACRAL: ICD-10-CM

## 2025-04-27 DIAGNOSIS — R20.2 PARESTHESIAS: Primary | ICD-10-CM

## 2025-04-27 PROCEDURE — 99213 OFFICE O/P EST LOW 20 MIN: CPT

## 2025-04-27 PROCEDURE — 99203 OFFICE O/P NEW LOW 30 MIN: CPT

## 2025-04-27 NOTE — DISCHARGE INSTRUCTIONS
Acetaminophen 1gm every six hours   Ibuprofen 400mg once daily  Return if worse... swelling, weakness, numbness or any worsening symptoms  Follow up with primary care next week

## 2025-04-27 NOTE — ED INITIAL ASSESSMENT (HPI)
Left foot tingling since yesterday. Driving yesterday to [place].  6 weeks post surgery. Known disc issues in lower back with some sciatica pain every now and then.

## 2025-04-27 NOTE — ED PROVIDER NOTES
Patient Seen in: [facility]      History     Chief Complaint   Patient presents with    Foot Pain     Stated Complaint: Tingling/Numbness in Foot    Subjective:   HPI       Don Cabral is a 59 year old male who presents with tingling in the left foot.    He experiences tingling in his left foot, primarily on the top and outside, which began yesterday. The tingling is mild and not associated with pain. He also experienced brief tingling in both legs during the day, which dissipated quickly. No pain in the left foot, calf swelling, or loss of sensation. No significant changes in leg size or function.    He has a history of L5 S1 disc issues causing sciatica. He describes the current sensation as originating from the foot upwards, rather than the typical downward pattern of sciatica. He suspects the tingling may be related to prolonged sitting during the drive to [place].    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 This required surgical resection of the affected bowel. Since the surgery, he has been adjusting to changes in bowel habits, including loose stools, but reports no other complications.    Two weeks ago, he experienced chest pain on inspiration and was evaluated at the ER. A full workup, including a CT scan and blood work, ruled out a blood clot, and he was diagnosed with costochondritis. He is currently managing this with 400 mg of ibuprofen daily and takes acetaminophen as needed.    No recent foot trauma.        Objective:     Past Medical History:    Abdominal pain    Beginning of diverticulitis saga that led to removal part of my large colon    Allergic rhinitis    Anxiety    Arrhythmia    T1 AV block, LBBB    Back pain    Back problem    lower back    Decorative tattoo    Depression    Diverticulitis    ED (erectile dysfunction)    Heart palpitations    Under treatment with [facility] [place] for arrhythmia    Hemorrhoids    One internal, occasional external    High blood pressure    History of adverse reaction to anesthesia    Patient states he doesnt know exactly what happened but stated he required haldol post anesthesia    Indigestion    Periodic last 20 yrs    Panic attack    See above - anxiety    Panic disorder    See above - anxiety    Pneumonia due to organism    Presence of other cardiac implants and grafts    Loop recorder for arrhythmia    Seasonal allergies    Shortness of breath    Sleep apnea    CPAP    Stress    Tremor    mild radial tremor prob. dur to SSRI levels    Unspecified essential hypertension    Visual impairment    glasses    Wears glasses    Weight gain              Past Surgical History:   Procedure Laterality Date    Bowel resection  4/24 and 3/25    First related to sigmoid removal, second omental infarct had dused to right colon    Cholecystectomy  3/12/2023    Colectomy      Colonoscopy N/A 03/22/2016    [facility].  Hyperplastic polyp only.  Repeat 2026    Colonoscopy  05/10/2023    Colonoscopy N/A 03/13/2025    Procedure: COLONOSCOPY with biopsies and forcep polypectomy;  Surgeon: Job Bacon DO;  Location:  ENDOSCOPY    Create eardrum opening,gen anesth  Childhood    Dental surgery procedure      [place] teeth removed    Incision/drain abscess extra  1996    Knee arthroscopy Right     Laparoscopic cholecystectomy  02/12/2023    Myringoplasty      Other surgical history      R knee arthroscope    Other surgical history  07/17/2024    Exploratory laparotomy partial colectomy with colorectal anastamosis, extensive adhesiolysis    Part removal colon w end colostomy      Skin tissue procedure unlisted      Tonsillectomy                  Social History     Socioeconomic History    Marital status:    Occupational History    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[employer]   Tobacco Use    Smoking status: Former     Current packs/day: 0.00      Average packs/day: 0.5 packs/day for 19.0 years (9.5 ttl pk-yrs)     Types: Cigarettes     Start date: 1981     Quit date: 2000     Years since quittin.8     Passive exposure: Never    Smokeless tobacco: Never   Vaping Use    Vaping status: Never Used   Substance and Sexual Activity    Alcohol use: Not Currently     Comment: Stopped alcohol 2000    Drug use: No    Sexual activity: Yes     Partners: Female   Other Topics Concern    Caffeine Concern No    Stress Concern Yes     Comment: CLARIBEL    Weight Concern Yes     Comment: would like to discuss Ozempic or similar    Special Diet No    Exercise Yes    Seat Belt Yes     Social Drivers of Health     Food Insecurity: No Food Insecurity (2025)    NCSS - Food Insecurity     Worried About Running Out of Food in the Last Year: No     Ran Out of Food in the Last Year: No   Transportation Needs: No Transportation Needs (2025)    NCSS - Transportation     Lack of Transportation: No   Housing Stability: At Risk (2025)    NCSS - Housing/Utilities     Has Housing: No     Worried About Losing Housing: No     Unable to Get Utilities: No              Review of Systems    Positive for stated complaint: Tingling/Numbness in Foot  Other systems are as noted in HPI.  Constitutional and vital signs reviewed.      All other systems reviewed and negative except as noted above.                  Physical Exam     ED Triage Vitals [25 0901]   /71   Pulse 75   Resp 18   Temp 97.9 °F (36.6 °C)   Temp src Oral   SpO2 99 %   O2 Device None (Room air)       Current Vitals:   Vital Signs  BP: 147/71  Pulse: 75  Resp: 18  Temp: 97.9 °F (36.6 °C)  Temp src: Oral    Oxygen Therapy  SpO2: 99 %  O2 Device: None (Room air)        Physical Exam        GENERAL: Alert, cooperative, well developed, no acute distress  HEENT: Normocephalic, normal oropharynx, moist mucous membranes  CHEST: Clear to auscultation bilaterally, no wheezes, rhonchi, or  crackles    EXTREMITIES: No cyanosis or edema, left foot warm with strong pulse, no calf tenderness on palpation, no swelling in foot, foot normal appearance. No motor or sensory deficit.   NEUROLOGICAL: Cranial nerves grossly intact, moves all extremities without gross motor or sensory deficit      ED Course   Labs Reviewed - No data to display       Results                         MDM             MDM  Radiculopathy, peripheral neuropathy, vascular pathology all in differential.   Exam is essentially normal.   Paresthesias likely coming from known disc pathology. Lidocaine patch applied in IC. Discharge on acetaminophen 1gm every six hours and ibuprofen once daily as he has been taking.   Follow up with primary care.     Disposition and Plan     Clinical Impression:  1. Paresthesias    2. Back pain, lumbosacral         Disposition:  Discharge  4/27/2025  9:31 am    Follow-up:  Stephen Christianson MD  [address] [place] [id number] [phone]    In 1 week            Medications Prescribed:  Discharge Medication List as of 4/27/2025  9:35 AM          Supplementary Documentation:

## 2025-05-02 ENCOUNTER — OFFICE VISIT (OUTPATIENT)
Dept: FAMILY MEDICINE CLINIC | Facility: CLINIC | Age: 60
End: 2025-05-02
Payer: COMMERCIAL

## 2025-05-02 VITALS
BODY MASS INDEX: 32.47 KG/M2 | OXYGEN SATURATION: 97 % | DIASTOLIC BLOOD PRESSURE: 76 MMHG | RESPIRATION RATE: 16 BRPM | WEIGHT: 253 LBS | HEART RATE: 75 BPM | SYSTOLIC BLOOD PRESSURE: 132 MMHG | HEIGHT: 74 IN

## 2025-05-02 DIAGNOSIS — C45.1 MESOTHELIOMA OF PERITONEUM (HCC): Primary | ICD-10-CM

## 2025-05-02 PROBLEM — Z90.49 STATUS POST COLON RESECTION: Status: ACTIVE | Noted: 2025-05-02

## 2025-05-02 PROBLEM — E46 MALNUTRITION (HCC): Status: ACTIVE | Noted: 2025-05-02

## 2025-05-02 PROCEDURE — 99213 OFFICE O/P EST LOW 20 MIN: CPT | Performed by: FAMILY MEDICINE

## 2025-05-02 NOTE — PROGRESS NOTES
[facility] Progress Note    SUBJECTIVE: Don Cabral 59 year old male is here today for   Chief Complaint   Patient presents with    Cancer     Mesothelioma of peritoneum, recent diagnosis    Follow - Up     Medication f/u       He had surgery due to severe adhesions. As we discussed at last visit her pursued IOP and was in thre to help with stress and trauma from all of the health issue she has had.    Then pathology came back from the surgery and showed mesothelioma.    He is establishing with [facility] as they have specialists in this.    Saw Dr. Rodriguez this week. Is in there program, and case being sent to there tumor board. Has imaging planned to see if they can do ex lap.    Was told low grade.     Developed costochondritis, that's improving    Is feeling pretty good, having bowel movements multiple times daily, looser due to low of bowel but no issus with being stuck.        PMH  Past Medical History[1]     PSH  Past Surgical History[2]     Social Hx:  See HPI    ROS  See HPI    OBJECTIVE:  /76   Pulse 75   Resp 16   Ht 6' 2\" (1.88 m)   Wt 253 lb (114.8 kg)   SpO2 97%   BMI 32.48 kg/m²           Labs:          Meds:   Current Medications[3]      Assessment/Plan  Don was seen today for cancer and follow - up.    Diagnoses and all orders for this visit:    Mesothelioma of peritoneum (HCC)         Currently doing well overall, but significant new diagnosis, and will follow along , plan wellness visit in 3 months         Total Time spent with patient and coordinating care:  15 minutes.    Follow up: as noted      Stephen Christianson MD         [1]   Past Medical History:   Abdominal pain    Beginning of diverticulitis saga that led to removal part of my large colon    Allergic rhinitis    Anxiety    Arrhythmia    T1 AV block, LBBB    Back pain    Back problem    lower back    Decorative tattoo    Depression    Diverticulitis    ED (erectile dysfunction)    Heart palpitations    Under treatment  with [facility] at [place] for arrhythmia    Hemorrhoids    One internal, occasional external    High blood pressure    History of adverse reaction to anesthesia    Patient states he doesnt know exactly what happened but stated he required haldol post anesthesia    Indigestion    Periodic last 20 yrs    Panic attack    See above - anxiety    Panic disorder    See above - anxiety    Pneumonia due to organism    Presence of other cardiac implants and grafts    Loop recorder for arrhythmia    Seasonal allergies    Shortness of breath    Sleep apnea    CPAP    Stress    Tremor    mild radial tremor prob. dur to SSRI levels    Unspecified essential hypertension    Visual impairment    glasses    Wears glasses    Weight gain   [2]   Past Surgical History:  Procedure Laterality Date    Bowel resection  4/24 and 3/25    First related to sigmoid removal, second omental infarct had dused to right colon    Cholecystectomy  3/12/2023    Colectomy      Colonoscopy N/A 03/22/2016    [facility].  Hyperplastic polyp only.  Repeat 2026    Colonoscopy  05/10/2023    Colonoscopy N/A 03/13/2025    Procedure: COLONOSCOPY with biopsies and forcep polypectomy;  Surgeon: Job Bacon DO;  Location:  ENDOSCOPY    Create eardrum opening,gen anesth  Childhood    Dental surgery procedure      [place] teeth removed    Incision/drain abscess extra  1996    Knee arthroscopy Right     Laparoscopic cholecystectomy  02/12/2023    Myringoplasty      Other surgical history      R knee arthroscope    Other surgical history  07/17/2024    Exploratory laparotomy partial colectomy with colorectal anastamosis, extensive adhesiolysis    Part removal colon w end colostomy      Skin tissue procedure unlisted      Tonsillectomy     [3]   Current Outpatient Medications   Medication Sig Dispense Refill    simethicone 80 MG Oral Chew Tab CHEW AND SWALLOW ONE TABLET BY MOUTH FOUR TIMES DAILY AS NEEDED FOR FLATULENCE      LISINOPRIL 20 MG Oral Tab  TAKE ONE TABLET BY MOUTH ONCE DAILY 90 tablet 0    famotidine 20 MG Oral Tab Take 1 tablet (20 mg total) by mouth in the morning and 1 tablet (20 mg total) before bedtime.      clonazePAM (KLONOPIN) 0.5 MG Oral Tab Take 1 tablet (0.5 mg total) by mouth 3 (three) times daily as needed. 90 tablet 0    gabapentin 600 MG Oral Tab Take 1 tablet (600 mg total) by mouth 3 (three) times daily. 270 tablet 0    sertraline (ZOLOFT) 50 MG Oral Tab 1.5 tablets daily 135 tablet 0    Econazole Nitrate 1 % External Cream Apply 1 Application topically 2 (two) times daily. 85 g 2    Tadalafil 20 MG Oral Tab Take 1 tablet (20 mg total) by mouth daily as needed for Erectile Dysfunction. 12 tablet 1    Verapamil HCl  MG Oral Capsule SR 24 Hr Take 1 capsule (180 mg total) by mouth in the morning.      Albuterol Sulfate  (90 Base) MCG/ACT Inhalation Aero Soln Inhale 2 puffs into the lungs every 4 (four) hours as needed for Wheezing or Shortness of Breath. 1 each 3

## 2025-05-05 NOTE — PATIENT INSTRUCTIONS
History of diverticulitis   - fiber diet/target 20- 25 g fiber per day and ok to use fiber supplement such as Metamucil  - call with symptoms  - discussed role of surgery   
Render Post-Care Instructions In Note?: no
Consent: The patient's consent was obtained including but not limited to risks of crusting, scabbing, blistering, scarring, darker or lighter pigmentary change, recurrence, incomplete removal and infection.
Show Aperture Variable?: Yes
Detail Level: Detailed
Duration Of Freeze Thaw-Cycle (Seconds): 0
Post-Care Instructions: I reviewed with the patient in detail post-care instructions. Patient is to wear sunprotection, and avoid picking at any of the treated lesions. Pt may apply Vaseline to crusted or scabbing areas.
Medical Necessity Clause: This procedure was medically necessary because the lesions that were treated were:
Medical Necessity Information: It is in your best interest to select a reason for this procedure from the list below. All of these items fulfill various CMS LCD requirements except the new and changing color options.
Spray Paint Text: The liquid nitrogen was applied to the skin utilizing a spray paint frosting technique.

## 2025-05-21 ENCOUNTER — OFFICE VISIT (OUTPATIENT)
Facility: LOCATION | Age: 60
End: 2025-05-21
Payer: COMMERCIAL

## 2025-05-21 VITALS
HEART RATE: 80 BPM | OXYGEN SATURATION: 98 % | TEMPERATURE: 98 F | SYSTOLIC BLOOD PRESSURE: 139 MMHG | DIASTOLIC BLOOD PRESSURE: 81 MMHG

## 2025-05-21 DIAGNOSIS — C45.7 MESOTHELIOMA OF ABDOMEN (HCC): Primary | ICD-10-CM

## 2025-05-21 NOTE — PROGRESS NOTES
Follow Up Visit Note       Active Problems      1. Mesothelioma of abdomen (HCC)          Chief Complaint   Chief Complaint   Patient presents with    [facility]     EP- 2nd opinion on colon resection surgery, seen at [facility]. Pt looking for Dr. Clemens' opinion. Pt also has a few questions about his recovery from his previous surgery.            History of Present Illness    Patient presents for continued postoperative follow-up and for discussion regarding second opinion for surgical intervention recommended after consultation with [facility].  The patient has cytology concerning for abdominal mesothelioma.  The patient receives an opinion from surgical oncology and medical oncology at the [facility].  The patient is tenably scheduled for cytoreductive surgery and HIPEC.  I explained to the patient that I fully support these recommendations and that he should pursue these as recommended.    Additionally the patient has no further complaints following my surgical intervention.  Patient is tolerating diet having near normal bowel and bladder function with good appetite and ADL capacity.    Allergies  Don is allergic to ciprofloxacin.    Past Medical / Surgical / Social / Family History    The past medical and past surgical history have been reviewed by me today.    Past Medical History[1]  Past Surgical History[2]    The family history and social history have been reviewed by me today.    Family History[3]  Social Hx on file[4]   Medications - Current[5]     Review of Systems  The Review of Systems has been reviewed by me during today.  Review of Systems   Constitutional: Negative.    Respiratory: Negative.     Cardiovascular: Negative.    Gastrointestinal: Negative.    Skin: Negative.    Neurological: Negative.         Physical Findings   /81 (BP Location: Right arm, Patient Position: Sitting, Cuff Size: large)   Pulse 80   Temp 97.5 °F (36.4 °C) (Temporal)   SpO2 98%    Physical Exam  Vitals and nursing note reviewed.   Constitutional:       General: He is not in acute distress.     Appearance: He is well-developed.   HENT:      Head: Normocephalic and atraumatic.   Eyes:      General: No scleral icterus.     Pupils: Pupils are equal, round, and reactive to light.   Neck:      Vascular: No JVD.      Trachea: No tracheal deviation.   Cardiovascular:      Rate and Rhythm: Normal rate and regular rhythm.   Pulmonary:      Effort: Pulmonary effort is normal. No respiratory distress.      Breath sounds: No stridor.   Abdominal:      General: Bowel sounds are normal. There is no distension.      Palpations: Abdomen is soft. Abdomen is not rigid. There is no mass.      Tenderness: There is no abdominal tenderness. There is no guarding or rebound. Negative signs include Kenney's sign and McBurney's sign.      Hernia: No hernia is present.       Musculoskeletal:         General: Normal range of motion.      Cervical back: Normal range of motion and neck supple.   Skin:     General: Skin is warm and dry.   Neurological:      Mental Status: He is alert and oriented to person, place, and time.   Psychiatric:         Behavior: Behavior normal.          Assessment   1. Mesothelioma of abdomen (HCC)        Plan     Patient is recovering well from my exploratory laparotomy, right colon resection with anastomosis partial omentectomy, small bowel resection and lysis of adhesions.    Pathology is concerning for abdominal mesothelioma.    The patient has received second opinion from surgical and medical oncology at the [facility].    Cytoreductive surgery and HIPEC is recommended and I fully support this recommendation.    I encouraged the patient to pursue the above-noted interventions as recommended at the [facility]; the patient voiced understanding.    With respect to my postoperative care no further follow-up is required.  I discussed dietary, activity, exercise and  lifestyle recommendations with the patient.    The patient was provided ample opportunity to ask questions.  All of the patient's questions were answered in detail.  The patient voiced understanding of the care plan..     No orders of the defined types were placed in this encounter.      Imaging & Referrals   None    Follow Up  No follow-ups on file.    Ryan Clemens MD           [1]   Past Medical History:   Abdominal pain    Beginning of diverticulitis saga that led to removal part of my large colon    Allergic rhinitis    Anxiety    Arrhythmia    T1 AV block, LBBB    Back pain    Back problem    lower back    Decorative tattoo    Depression    Diverticulitis    ED (erectile dysfunction)    Heart palpitations    Under treatment with [facility] [place] for arrhythmia    Hemorrhoids    One internal, occasional external    High blood pressure    History of adverse reaction to anesthesia    Patient states he doesnt know exactly what happened but stated he required haldol post anesthesia    Indigestion    Periodic last 20 yrs    Panic attack    See above - anxiety    Panic disorder    See above - anxiety    Pneumonia due to organism    Presence of other cardiac implants and grafts    Loop recorder for arrhythmia    Seasonal allergies    Shortness of breath    Sleep apnea    CPAP    Stress    Tremor    mild radial tremor prob. dur to SSRI levels    Unspecified essential hypertension    Visual impairment    glasses    Wears glasses    Weight gain   [2]   Past Surgical History:  Procedure Laterality Date    Bowel resection  4/24 and 3/25    First related to sigmoid removal, second omental infarct had dused to right colon    Cholecystectomy  3/12/2023    Colectomy      Colonoscopy N/A 03/22/2016    [facility].  Hyperplastic polyp only.  Repeat 2026    Colonoscopy  05/10/2023    Colonoscopy N/A 03/13/2025    Procedure: COLONOSCOPY with biopsies and forcep polypectomy;  Surgeon: Job Bacon DO;   Location:  ENDOSCOPY    Create eardrum opening,gen anesth  Childhood    Dental surgery procedure      [place] teeth removed    Incision/drain abscess extra      Knee arthroscopy Right     Laparoscopic cholecystectomy  2023    Myringoplasty      Other surgical history      R knee arthroscope    Other surgical history  2024    Exploratory laparotomy partial colectomy with colorectal anastamosis, extensive adhesiolysis    Part removal colon w end colostomy      Skin tissue procedure unlisted      Tonsillectomy     [3]   Family History  Problem Relation Age of Onset    Heart Disorder Father         Arrythmia, ablation required    Hypertension Father     Cancer Father 60        Prostate    Prostate Cancer Father     Arrhythmia Father         Ablation required    Heart Disorder Mother         murmur no treatment needed    Hypertension Mother     Stroke Maternal Grandmother     Other (stroke) Maternal Grandmother     Obesity Maternal Grandmother     Dementia Paternal Grandmother     Pulmonary Disease Paternal Grandmother         frequently contracted pulmonary diseases; I seem to have inherited this (pleurisy, pneumonia)    Heart Disorder Paternal Grandfather         two heart attacks and CHF    Diabetes Paternal Grandfather     Other (Diabetes, type 2) Paternal Grandfather     Anxiety Son         w/ ADD    Anxiety Daughter    [4]   Social History  Socioeconomic History    Marital status:    Occupational History    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[employer]   Tobacco Use    Smoking status: Former     Current packs/day: 0.00     Average packs/day: 0.5 packs/day for 19.0 years (9.5 ttl pk-yrs)     Types: Cigarettes     Start date: 1981     Quit date: 2000     Years since quittin.8     Passive exposure: Never    Smokeless tobacco: Never   Vaping Use    Vaping status: Never Used   Substance and Sexual Activity    Alcohol use: Not Currently     Comment: Stopped alcohol 2000    Drug use:  No    Sexual activity: Yes     Partners: Female   Other Topics Concern    Caffeine Concern No    Stress Concern Yes     Comment: CLARIBEL    Weight Concern Yes     Comment: would like to discuss Ozempic or similar    Special Diet No    Exercise Yes    Seat Belt Yes   [5]   Current Outpatient Medications:     simethicone 80 MG Oral Chew Tab, CHEW AND SWALLOW ONE TABLET BY MOUTH FOUR TIMES DAILY AS NEEDED FOR FLATULENCE, Disp: , Rfl:     LISINOPRIL 20 MG Oral Tab, TAKE ONE TABLET BY MOUTH ONCE DAILY, Disp: 90 tablet, Rfl: 0    clonazePAM (KLONOPIN) 0.5 MG Oral Tab, Take 1 tablet (0.5 mg total) by mouth 3 (three) times daily as needed., Disp: 90 tablet, Rfl: 0    gabapentin 600 MG Oral Tab, Take 1 tablet (600 mg total) by mouth 3 (three) times daily., Disp: 270 tablet, Rfl: 0    sertraline (ZOLOFT) 50 MG Oral Tab, 1.5 tablets daily, Disp: 135 tablet, Rfl: 0    Econazole Nitrate 1 % External Cream, Apply 1 Application topically 2 (two) times daily., Disp: 85 g, Rfl: 2    Tadalafil 20 MG Oral Tab, Take 1 tablet (20 mg total) by mouth daily as needed for Erectile Dysfunction., Disp: 12 tablet, Rfl: 1    Verapamil HCl  MG Oral Capsule SR 24 Hr, Take 1 capsule (180 mg total) by mouth in the morning., Disp: , Rfl:     Albuterol Sulfate  (90 Base) MCG/ACT Inhalation Aero Soln, Inhale 2 puffs into the lungs every 4 (four) hours as needed for Wheezing or Shortness of Breath., Disp: 1 each, Rfl: 3    famotidine 20 MG Oral Tab, Take 1 tablet (20 mg total) by mouth in the morning and 1 tablet (20 mg total) before bedtime., Disp: , Rfl:

## 2025-05-22 PROBLEM — C45.7: Status: ACTIVE | Noted: 2025-05-22

## 2025-06-19 NOTE — PROGRESS NOTES
CHAYITO MEDEIROS  2025 - 2025  Patient ID: [id number]  : 1965  Age: 59 years  [facility]  [address]  [place], [address]  Phone: [phone]  Compliance Report  Usage 2025 - 2025  Usage days 87/90 days (97%)  >= 4 hours 62 days (69%)  < 4 hours 25 days (28%)  Usage hours 431 hours 33 minutes  Average usage (total days) 4 hours 48 minutes  Average usage (days used) 4 hours 58 minutes  Median usage (days used) 5 hours 7 minutes  Total used hours (value since last reset - 2025) 1,241 hours  [employer] 11 [employer]  Serial number [id number]  Mode VAuto  Max IPAP 20 cmH2O  Min EPAP 12 cmH2O  Pressure Support 4 cmH2O  Therapy  Leaks - L/min Median: 75.3 95th percentile: 114.7 Maximum: 117.1  Events per hour AI: 9.5 HI: 0.3 AHI: 9.8  Apnea Index Central: 0.2 Obstructive: 0.0 Unknown: 9.3

## 2025-06-20 ENCOUNTER — OFFICE VISIT (OUTPATIENT)
Facility: CLINIC | Age: 60
End: 2025-06-20
Payer: COMMERCIAL

## 2025-06-20 VITALS
WEIGHT: 271 LBS | OXYGEN SATURATION: 98 % | DIASTOLIC BLOOD PRESSURE: 66 MMHG | HEART RATE: 70 BPM | SYSTOLIC BLOOD PRESSURE: 100 MMHG | BODY MASS INDEX: 34.78 KG/M2 | RESPIRATION RATE: 18 BRPM | HEIGHT: 74 IN

## 2025-06-20 DIAGNOSIS — G47.33 OSA (OBSTRUCTIVE SLEEP APNEA): Primary | ICD-10-CM

## 2025-06-20 DIAGNOSIS — C45.7 MESOTHELIOMA OF ABDOMEN (HCC): ICD-10-CM

## 2025-06-20 PROCEDURE — 99213 OFFICE O/P EST LOW 20 MIN: CPT | Performed by: PHYSICIAN ASSISTANT

## 2025-06-20 NOTE — PROGRESS NOTES
[facility] PULMONARY  SLEEP PROGRESS NOTE        DON MEDEIROS is a 59 year old male who presents today for 3 month follow up      Chief Complaint   Patient presents with    Obstructive Sleep Apnea (NELLIE)     Pt agreed to AI listening, pt states having on and off issues with mask          The following individual(s) verbally consented to be recorded using ambient AI listening technology and understand that they can each withdraw their consent to this listening technology at any point by asking the clinician to turn off or pause the recording:    Patient name: Don Medeiros  Additional names:  NA       History of Present Illness  Don Medeiros is a 59 year old male with sleep apnea who presents for follow up - issues related to CPAP mask fitting and sleep quality.    He experiences difficulties with his CPAP mask, which is effective about a third of the time. Using Airtouch F20. He often removes it during the night, especially after using the bathroom, and sometimes forgets to put it back on. Air blows into his eyes, for which he uses gel drops.    Sleep quality is poor, partly due to anxiety. On some nights, he experiences a significant number of events, such as seventeen events the previous night.     He is currently using a CPAP setting of twenty over twelve, but previously used twenty over sixteen, which was uncomfortable and felt like choking. He has not yet received new masks from the company as he wanted to discuss his options first.     Had refit last appointment for AirTouch F20 - better compared to prior however still has high leaks    Diagnosed with peritoneal mesothelioma in the interim  Hospitalized 3/11-3/14, then 3/16-3/23  Just had surgery on 6/12/25 and will be having surgery again on 6/26/25      In bed 10p  Out of bed 530-6a  SL quick  Nighttime awakenings 2-3x to use RR, no trouble falling back asleep  Naps none  Caffeine 1 soda in AM, 1-2 cups coffee     Denies teeth grinding, leg cramps, dry mouth,  tinnitus, chest pain, thoracic back pain, bloating, drowsy driving, sleep walking, sleep talking  Restless leg during the day related to psych meds  AM headache - worsened since he ran into issues with CPAP     DME company: [employer]  Mask type: FFCHAYITO ARREAGA  2025 - 2025  Patient ID: [id number]  : 1965  Age: 59 years  [facility]  [address]  [place], [address]  Phone: [phone]  Compliance Report  Usage 2025 - 2025  Usage days 87/90 days (97%)  >= 4 hours 62 days (69%)  < 4 hours 25 days (28%)  Usage hours 431 hours 33 minutes  Average usage (total days) 4 hours 48 minutes  Average usage (days used) 4 hours 58 minutes  Median usage (days used) 5 hours 7 minutes  Total used hours (value since last reset - 2025) 1,241 hours  AirCurve 11 VAuto  Serial number [id number]  Mode VAuto  Max IPAP 20 cmH2O  Min EPAP 12 cmH2O  Pressure Support 4 cmH2O  Therapy  Leaks - L/min Median: 75.3 95th percentile: 114.7 Maximum: 117.1  Events per hour AI: 9.5 HI: 0.3 AHI: 9.8  Apnea Index Central: 0.2 Obstructive: 0.0 Unknown: 9.3    Patient: Sleep review of systems today: see form.    2024 Titration - 268#?  RECOMMENDATIONS:   1. The patient should be prescribed Bilevel at 26/22 cm H2O, with humidity at 4 and EPR off.   2. The patient was fitted with a OncoTree DTS and PayPopcorn5 Vitera mask, size Large. If with persistently elevated AHI on BiPAP consider auto BiPAP         2023 PSG   Respiratory Analysis: The Apnea-Hypopnea Index (AHI) was 8.4 events per hour. REM related AHI was 10.3 events per hour. Supine related AHI was 45.0. Lateral related AHI was 7.5. The Central Apnea Index was 0. The oxygen saturation gwen was 80.0% and patient spent 47.7% with saturations less than 88%.       Pt  PCP:  Stephen Christianson MD  No referring provider defined for this encounter.           No data to display                  Past Medical History[1]  Past Surgical  History[2]  Social History:  Social History     Social History Narrative    Not on file     Short Social Hx on File[3]  Family History:  Family History[4]  Allergies:  Allergies[5]  Current Meds:  Current Medications[6]   Counseling given: Not Answered         Problem List:  Problem List[7]    REVIEW OF SYSTEMS:   Review of Systems  See HPI     EXAM:   /66 (BP Location: Right arm, Patient Position: Sitting, Cuff Size: adult)   Pulse 70   Resp 18   Ht 6' 2\" (1.88 m)   Wt 271 lb (122.9 kg)   SpO2 98%   BMI 34.79 kg/m²  Estimated body mass index is 34.79 kg/m² as calculated from the following:    Height as of this encounter: 6' 2\" (1.88 m).    Weight as of this encounter: 271 lb (122.9 kg).   Neck in inches:      Wt Readings from Last 6 Encounters:   06/20/25 271 lb (122.9 kg)   05/02/25 253 lb (114.8 kg)   04/27/25 254 lb (115.2 kg)   04/18/25 249 lb (112.9 kg)   04/18/25 248 lb (112.5 kg)   04/18/25 248 lb 3.2 oz (112.6 kg)     BP Readings from Last 3 Encounters:   06/20/25 100/66   05/21/25 139/81   05/02/25 132/76     Pulse Readings from Last 3 Encounters:   06/20/25 70   05/21/25 80   05/02/25 75     SpO2 Readings from Last 3 Encounters:   06/20/25 98%   05/21/25 98%   05/02/25 97%      Ideal body weight: 82.2 kg (181 lb 3.5 oz)  Adjusted ideal body weight: 98.5 kg (217 lb 2.1 oz)    Vital signs reviewed.  Physical Exam  Vitals and nursing note reviewed.   Constitutional:       Appearance: Normal appearance. He is obese.   HENT:      Head: Normocephalic and atraumatic.      Right Ear: External ear normal.      Left Ear: External ear normal.   Pulmonary:      Effort: Pulmonary effort is normal. No respiratory distress.   Musculoskeletal:      Cervical back: Normal range of motion and neck supple.   Neurological:      General: No focal deficit present.      Mental Status: He is alert and oriented to person, place, and time.   Psychiatric:         Attention and Perception: Attention and perception normal.          Mood and Affect: Mood and affect normal.         Speech: Speech normal.         Behavior: Behavior normal. Behavior is cooperative.         Thought Content: Thought content normal.         Cognition and Memory: Cognition and memory normal.         Judgment: Judgment normal.       Assessment & Plan  Obstructive Sleep Apnea  Difficulties with CPAP mask fit causing suboptimal use and increased residual events. Discomfort at higher pressures. Considering oral appliance post-surgery.  - Readjusted fit of current mask - tuck cushion in at his nose first before fitting over his mouth  - Adjust CPAP settings to 20 over 14, repeat DL in 2 weeks  - Instruct to bring current and older CPAP masks to hospital for surgery.  - Order new foam-type CPAP masks.  - Refer to dental sleep specialist for oral appliance evaluation post-surgery.  - RTO in 6 months    Peritoneal Mesothelioma  - Scheduled for surgery next week, anticipates hospitalization afterwards for recovery.       Patient Instructions   Dental Appliance:  - Dental sleep center  - Brighter smiles       CPAP/BiPAP Instructions:    Please be advised:   Do not drive while sleepy   Take CPAP/BiPAP machine to any procedure that requires sedation     When should I clean my machine & supplies?   Clean mask cushions or nasal pillow, headgear, tubing, and humidifier chamber with mild soap (Shirley) and water   If water chamber has hard water buildup (white crust), soak in warm water & vinegar mix 50/50.   Rinse and hang dry     DAILY   Wipe mask cushions or nasal pillow   Empty & rinse water chamber- refill with distilled water     WEEKLY   Clean mask cushions or nasal pillow, headgear, tubing, and humidifier chamber with mild soap (Shirley) and water   If water chamber has hard water buildup (white crust), soak in warm water & vinegar mix 50/50.   Rinse and hang dry     When should supplies be replaced?   Contact your home care company for replacement supplies, or if your  machine is malfunctioning   *Below is a general guideline of what we recommend. Replacement of supplies differs depending on your insurance company*   MONTHLY: Replace filter and mask cushion   6 MONTHS: Replace headgear and tubing     Travel Tips   Keep CPAP/BiPAP in original bag when traveling, and place luggage tag on bag   Most airlines consider CPAP/BiPAP to be a medical device, therefore it is a free carry-on item   If unable to get distilled water, bottled water is safe while traveling. DO NOT use tap water   When traveling outside the [place] only a power adapter is necessary (CPAP can operate without a converter), bring an extension cord   Consider purchasing or renting a travel CPAP (not covered by insurance)     Dry Mouth/Nose   Turn up the humidity on your machine (select \"Options\" from the home screen)   Place a cool mist humidifier at your bedside   Over-the-counter remedies: Biotene, XyliMelts, NasoGel     Air Leak   Try adjusting your mask/headgear while laying in sleeping position vs. sitting up   Wash and dry your face prior to putting your mask on   If applicable: shave facial hair at night (or before wearing CPAP)   Purchase \"RemZzzs\" (through home care co., [employer], or [email or web address]!)   100% cotton knit barrier that goes between your mask cushion and your skin   Replace your mask cushion (at least once per month) and/or headgear (every 3-6 months)     Nasal Congestion   CPAP therapy can cause nasal passages to dry out, & mucous membranes try to protect the nasal passages by producing excess mucous, so congestion results.   Over-the counter remedies: Flonase, Nasacort, Sinus Rinses (Neti-Pot), DO NOT USE Afrin   Try a mask that goes over the nose and mouth     Skin Irritation   Clean supplies regularly (Citrus II Mask Wipes, Control III disinfectant solution)   Try over the counter creams such as hydrocortisone 1% (apply in the morning after showering)   Your headgear may be too tight, replace  supplies so you don't need to adjust so tightly   Try RemZzzs (100% cotton knit barrier that goes between your mask cushion and your skin)     Gas/Bloating   Try a different sleeping position to keep air out of the stomach. Lay on the left side or rotate to the right side. Incline with pillows or lay flat.   Over-the-counter remedies: Simethicone            Independent interpretation of Sleep Download as defined above.  Continue with Rx management of Sleep apnea with PAP therapy.    COMPLIANCE is required by insurance for 4 hours a night most nights of the week.    Advised if still with sleep apnea and not using CPAP has a 7 fold increase in risk of heart attack, stroke, abnormal heart rhythm  and death,  increased risk of driving accidents.     Advised to refrain from driving when sleepy.      Recommend weight loss, and maintain and optimal BMI with Exercise 30 minutes most days to target heart rate .     Advised patient to change filters,masks,hoses  and tubes and equiptment on a  regular schedule.    Filters and seals shall be changed every 1 month,  Hoses every 3 months,   CPAP mask and humidifier chamber changed every 6 month  with the durable medical equipment provider.         Meds & Refills for this Visit:  Requested Prescriptions      No prescriptions requested or ordered in this encounter       Outcome: Parent verbalizes understanding. Parent is notified to call with any questions, complications, allergies, or worsening or changing symptoms.  Parent is to call with any side effects or complications from the treatments as a result of today.     \" This note was created utilizing Dragon speech recognition software.  Please excuse any grammatical errors. Call my office if you have any questions regarding this note. \"     Barbara Bailey PA-C           [1]   Past Medical History:   Abdominal pain    Beginning of diverticulitis saga that led to removal part of my large colon    Allergic rhinitis    Anxiety     Arrhythmia    T1 AV block, LBBB    Back pain    Back problem    lower back    Decorative tattoo    Depression    Diverticulitis    ED (erectile dysfunction)    Heart palpitations    Under treatment with [facility] [place] for arrhythmia    Hemorrhoids    One internal, occasional external    High blood pressure    History of adverse reaction to anesthesia    Patient states he doesnt know exactly what happened but stated he required haldol post anesthesia    Indigestion    Periodic last 20 yrs    Panic attack    See above - anxiety    Panic disorder    See above - anxiety    Pneumonia due to organism    Presence of other cardiac implants and grafts    Loop recorder for arrhythmia    Seasonal allergies    Shortness of breath    Sleep apnea    CPAP    Stress    Tremor    mild radial tremor prob. dur to SSRI levels    Unspecified essential hypertension    Visual impairment    glasses    Wears glasses    Weight gain   [2]   Past Surgical History:  Procedure Laterality Date    Bowel resection  4/24 and 3/25    First related to sigmoid removal, second omental infarct had dused to right colon    Cholecystectomy  3/12/2023    Colectomy      Colonoscopy N/A 03/22/2016    [facility]  Hyperplastic polyp only.  Repeat 2026    Colonoscopy  05/10/2023    Colonoscopy N/A 03/13/2025    Procedure: COLONOSCOPY with biopsies and forcep polypectomy;  Surgeon: Job Bacon DO;  Location:  ENDOSCOPY    Create eardrum opening,gen anesth  Childhood    Dental surgery procedure      [place] teeth removed    Incision/drain abscess extra  1996    Knee arthroscopy Right     Laparoscopic cholecystectomy  02/12/2023    Myringoplasty      Other surgical history      R knee arthroscope    Other surgical history  07/17/2024    Exploratory laparotomy partial colectomy with colorectal anastamosis, extensive adhesiolysis    Part removal colon w end colostomy      Skin tissue procedure unlisted      Tonsillectomy     [3]   Social  History  Socioeconomic History    Marital status:    Occupational History    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[employer]   Tobacco Use    Smoking status: Former     Current packs/day: 0.00     Average packs/day: 0.5 packs/day for 19.0 years (9.5 ttl pk-yrs)     Types: Cigarettes     Start date: 1981     Quit date: 2000     Years since quittin.9     Passive exposure: Never    Smokeless tobacco: Never   Vaping Use    Vaping status: Never Used   Substance and Sexual Activity    Alcohol use: Not Currently     Comment: Stopped alcohol 2000    Drug use: No    Sexual activity: Yes     Partners: Female   Other Topics Concern    Caffeine Concern No    Stress Concern Yes     Comment: CLARIBEL    Weight Concern Yes     Comment: would like to discuss Ozempic or similar    Special Diet No    Exercise Yes    Seat Belt Yes     Social Drivers of Health     Food Insecurity: No Food Insecurity (2025)    NCSS - Food Insecurity     Worried About Running Out of Food in the Last Year: No     Ran Out of Food in the Last Year: No   Transportation Needs: No Transportation Needs (2025)    NCSS - Transportation     Lack of Transportation: No   Housing Stability: At Risk (2025)    NCSS - Housing/Utilities     Has Housing: No     Worried About Losing Housing: No     Unable to Get Utilities: No   [4]   Family History  Problem Relation Age of Onset    Heart Disorder Father         Arrythmia, ablation required    Hypertension Father     Cancer Father 60        Prostate    Prostate Cancer Father     Arrhythmia Father         Ablation required    Heart Disorder Mother         murmur no treatment needed    Hypertension Mother     Stroke Maternal Grandmother     Other (stroke) Maternal Grandmother     Obesity Maternal Grandmother     Dementia Paternal Grandmother     Pulmonary Disease Paternal Grandmother         frequently contracted pulmonary diseases; I seem to have inherited this (pleurisy, pneumonia)    Heart  Disorder Paternal Grandfather         two heart attacks and CHF    Diabetes Paternal Grandfather     Other (Diabetes, type 2) Paternal Grandfather     Anxiety Son         w/ ADD    Anxiety Daughter    [5]   Allergies  Allergen Reactions    Ciprofloxacin OTHER (SEE COMMENTS)     Increases risk for arrhythmia   [6]   Current Outpatient Medications   Medication Sig Dispense Refill    simethicone 80 MG Oral Chew Tab CHEW AND SWALLOW ONE TABLET BY MOUTH FOUR TIMES DAILY AS NEEDED FOR FLATULENCE      LISINOPRIL 20 MG Oral Tab TAKE ONE TABLET BY MOUTH ONCE DAILY 90 tablet 0    clonazePAM (KLONOPIN) 0.5 MG Oral Tab Take 1 tablet (0.5 mg total) by mouth 3 (three) times daily as needed. 90 tablet 0    gabapentin 600 MG Oral Tab Take 1 tablet (600 mg total) by mouth 3 (three) times daily. 270 tablet 0    sertraline (ZOLOFT) 50 MG Oral Tab 1.5 tablets daily 135 tablet 0    Econazole Nitrate 1 % External Cream Apply 1 Application topically 2 (two) times daily. 85 g 2    Tadalafil 20 MG Oral Tab Take 1 tablet (20 mg total) by mouth daily as needed for Erectile Dysfunction. 12 tablet 1    Verapamil HCl  MG Oral Capsule SR 24 Hr Take 1 capsule (180 mg total) by mouth in the morning.      Albuterol Sulfate  (90 Base) MCG/ACT Inhalation Aero Soln Inhale 2 puffs into the lungs every 4 (four) hours as needed for Wheezing or Shortness of Breath. 1 each 3    famotidine 20 MG Oral Tab Take 1 tablet (20 mg total) by mouth in the morning and 1 tablet (20 mg total) before bedtime.     [7]   Patient Active Problem List  Diagnosis    Anxiety    Essential hypertension    ED (erectile dysfunction)    NELLIE (obstructive sleep apnea)-mild, 8.4    Generalized anxiety disorder    Hypokalemia    Hyperglycemia    Tooth pain    Daytime hypersomnolence    Symptomatic bradycardia    Palpitations    Slow heart rate    PVC (premature ventricular contraction)    Bipolar 1 disorder (HCC)    Sigmoid diverticulitis    Acute diverticulitis     Intractable abdominal pain    Postoperative examination    Preop testing    Right lower quadrant abdominal pain    Abdominal pain, acute    Intra-abdominal infection    Epiploic appendagitis    Implantable loop recorder present    LBBB (left bundle branch block)    LVH (left ventricular hypertrophy)    MDD (major depressive disorder), recurrent severe, without psychosis (HCC)    Dizziness    Elevated LFTs    Essential hypertension, benign    CLARIBEL (generalized anxiety disorder)    Panic disorder    PVCs (premature ventricular contractions)    Bradycardia    Abdominal pain, right lower quadrant    Omental infarction (HCC)    Depression with anxiety    History of cardiac arrhythmia    Hypertension    Ileus (HCC)    Status post colon resection    Malnutrition (HCC)    Mesothelioma of abdomen (HCC)

## 2025-06-20 NOTE — PATIENT INSTRUCTIONS
Dental Appliance:  - Dental sleep center  - Brighter smiles       CPAP/BiPAP Instructions:    Please be advised:   Do not drive while sleepy   Take CPAP/BiPAP machine to any procedure that requires sedation     When should I clean my machine & supplies?   Clean mask cushions or nasal pillow, headgear, tubing, and humidifier chamber with mild soap (Shirley) and water   If water chamber has hard water buildup (white crust), soak in warm water & vinegar mix 50/50.   Rinse and hang dry     DAILY   Wipe mask cushions or nasal pillow   Empty & rinse water chamber- refill with distilled water     WEEKLY   Clean mask cushions or nasal pillow, headgear, tubing, and humidifier chamber with mild soap (Shirley) and water   If water chamber has hard water buildup (white crust), soak in warm water & vinegar mix 50/50.   Rinse and hang dry     When should supplies be replaced?   Contact your home care company for replacement supplies, or if your machine is malfunctioning   *Below is a general guideline of what we recommend. Replacement of supplies differs depending on your insurance company*   MONTHLY: Replace filter and mask cushion   6 MONTHS: Replace headgear and tubing     Travel Tips   Keep CPAP/BiPAP in original bag when traveling, and place luggage tag on bag   Most airlines consider CPAP/BiPAP to be a medical device, therefore it is a free carry-on item   If unable to get distilled water, bottled water is safe while traveling. DO NOT use tap water   When traveling outside the [place] only a power adapter is necessary (CPAP can operate without a converter), bring an extension cord   Consider purchasing or renting a travel CPAP (not covered by insurance)     Dry Mouth/Nose   Turn up the humidity on your machine (select \"Options\" from the home screen)   Place a cool mist humidifier at your bedside   Over-the-counter remedies: Biotene, XyliMelts, NasoGel     Air Leak   Try adjusting your mask/headgear while laying in sleeping position  vs. sitting up   Wash and dry your face prior to putting your mask on   If applicable: shave facial hair at night (or before wearing CPAP)   Purchase \"RemZzzs\" (through home care co., [email or web address], or [email or web address])   100% cotton knit barrier that goes between your mask cushion and your skin   Replace your mask cushion (at least once per month) and/or headgear (every 3-6 months)     Nasal Congestion   CPAP therapy can cause nasal passages to dry out, & mucous membranes try to protect the nasal passages by producing excess mucous, so congestion results.   Over-the counter remedies: Flonase, Nasacort, Sinus Rinses (Neti-Pot), DO NOT USE Afrin   Try a mask that goes over the nose and mouth     Skin Irritation   Clean supplies regularly (Citrus II Mask Wipes, Control III disinfectant solution)   Try over the counter creams such as hydrocortisone 1% (apply in the morning after showering)   Your headgear may be too tight, replace supplies so you don't need to adjust so tightly   Try RemZzzs (100% cotton knit barrier that goes between your mask cushion and your skin)     Gas/Bloating   Try a different sleeping position to keep air out of the stomach. Lay on the left side or rotate to the right side. Incline with pillows or lay flat.   Over-the-counter remedies: Simethicone

## 2025-06-23 ENCOUNTER — TELEPHONE (OUTPATIENT)
Facility: CLINIC | Age: 60
End: 2025-06-23

## 2025-06-23 NOTE — TELEPHONE ENCOUNTER
Barbara Bailey PA-C  P [facility] Pulmonary Sleep Staff, Please increase pressure from 20/12 to 20/14cwp  Repeat DL in 2 weeks      06/23/2025, 10:29 AM    by MARYANN COCHRAN LPN    Settings sent to device    Request [id number]    Set Mode to VAuto  Set Min EPAP to 14.0 cmH2O  Set Max IPAP to 20.0 cmH2O  Set Pressure support to 4.0 cmH2O  Set Ramp enable to On  Set Ramp time to 30 min  Set Start EPAP to 4.0 cmH2O  Set Ti Min to 0.3 sec  Set Ti Max to 2.0 sec  Set Trigger to Medium  Set Cycle Sensitivity to Medium  Set Climate Control to Auto  Set Humidifier level to Auto  Set Tube temperature to Auto  06/21/2025, 03:05 AM    Settings confirmed on device    Set Mode to VAuto  Set Min EPAP to 12.0 cmH2O  Set Max IPAP to 20.0 cmH2O  Set Pressure support to 4.0 cmH2O  Set Ramp enable to On  Set Ramp time to 30 min  Set Start EPAP to 4.0 cmH2O  Set Ti Min to 0.3 sec  Set Ti Max to 2.0 sec  Set Trigger to Medium  Set Cycle Sensitivity to Medium  Set Climate Control to Auto  Set Humidifier level to Auto  Set Tube temperature to Auto    [phone] ([place])

## 2025-07-11 NOTE — TELEPHONE ENCOUNTER
Repeat dL from 2025    CHAYITO MEDEIROS  2025 - 07/10/2025  Patient ID: [id number]  : 1965  Age: 59 years  [facility]  [address]  [place], [address]  Phone: [phone]  Compliance Report  Usage 2025 - 07/10/2025  Usage days 9/17 days (53%)  >= 4 hours 3 days (18%)  < 4 hours 6 days (35%)  Usage hours 25 hours 36 minutes  Average usage (total days) 1 hours 30 minutes  Average usage (days used) 2 hours 51 minutes  Median usage (days used) 3 hours 9 minutes  Total used hours (value since last reset - 07/10/2025) 1,293 hours  AirCurve 11 VAuto  Serial number [id number]  Mode VAuto  Max IPAP 20 cmH2O  Min EPAP 14 cmH2O  Pressure Support 4 cmH2O  Therapy  Leaks - L/min Median: 81.2 95th percentile: 105.5 Maximum: 111.3  Events per hour AI: 6.5 HI: 0.1 AHI: 6.6  Apnea Index Central: 0.0 Obstructive: 0.0 Unknown: 6.5

## 2025-07-14 ENCOUNTER — ORDER TRANSCRIPTION (OUTPATIENT)
Dept: PHYSICAL THERAPY | Facility: HOSPITAL | Age: 60
End: 2025-07-14

## 2025-07-14 DIAGNOSIS — C45.1 PERITONEAL MESOTHELIOMA (HCC): Primary | ICD-10-CM

## 2025-07-14 DIAGNOSIS — M24.549 CONTRACTURE OF HAND: ICD-10-CM

## 2025-07-19 DIAGNOSIS — I10 ESSENTIAL HYPERTENSION: ICD-10-CM

## 2025-07-19 RX ORDER — LISINOPRIL 20 MG/1
20 TABLET ORAL DAILY
Qty: 90 TABLET | Refills: 0 | Status: SHIPPED | OUTPATIENT
Start: 2025-07-19

## 2025-07-23 ENCOUNTER — ORDER TRANSCRIPTION (OUTPATIENT)
Dept: PHYSICAL THERAPY | Facility: HOSPITAL | Age: 60
End: 2025-07-23

## 2025-07-23 DIAGNOSIS — C45.1 PERITONEAL MESOTHELIOMA (HCC): Primary | ICD-10-CM

## 2025-07-23 DIAGNOSIS — M24.549 CONTRACTURE OF HAND: ICD-10-CM

## 2025-07-24 ENCOUNTER — OFFICE VISIT (OUTPATIENT)
Dept: OCCUPATIONAL MEDICINE | Facility: HOSPITAL | Age: 60
End: 2025-07-24
Attending: STUDENT IN AN ORGANIZED HEALTH CARE EDUCATION/TRAINING PROGRAM
Payer: COMMERCIAL

## 2025-07-24 ENCOUNTER — APPOINTMENT (OUTPATIENT)
Dept: PHYSICAL THERAPY | Age: 60
End: 2025-07-24
Attending: STUDENT IN AN ORGANIZED HEALTH CARE EDUCATION/TRAINING PROGRAM

## 2025-07-24 DIAGNOSIS — C45.1 PERITONEAL MESOTHELIOMA (HCC): Primary | ICD-10-CM

## 2025-07-24 DIAGNOSIS — M24.549 CONTRACTURE OF HAND: ICD-10-CM

## 2025-07-24 PROCEDURE — 97110 THERAPEUTIC EXERCISES: CPT

## 2025-07-24 PROCEDURE — 97166 OT EVAL MOD COMPLEX 45 MIN: CPT

## 2025-07-24 NOTE — PROGRESS NOTES
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 Patient notes that his arms were raised outward for the 10 hour surgery and that he awoke unable to move R arm and with decreased function in L arm. Concerns for radial nerve palsy and other nerve complications that have notably improved in the past 3-4 weeks time. OT services received physician's orders for evaluation and treatment as indicated to maximize rehab potential.   Pain level: current 4 /10, at best 2 /10, at worst 5 /10   Occupation: Full Time at [employer]    Prior level of function: (I) w/ all ADL/IADLs  Current limitations: functional gripping, lifting, carrying, typing on computer, and overall functional use of BUE  Pt goals: improve functional symptoms and use of BUE  Hand Dominance: right  Living Situation: family    Imaging/Tests: n/a   Don  has a past medical history of Abdominal pain (7/1/23), Allergic rhinitis, Anxiety, Arrhythmia, Back pain, Back problem, Decorative tattoo, Depression, Diverticulitis, ED (erectile dysfunction), Heart palpitations, Hemorrhoids (1/1/20), High blood pressure, History of adverse reaction to anesthesia, Indigestion, Panic attack (1991), Panic disorder (1991), Pneumonia due to organism, Presence of other cardiac implants and grafts, Seasonal allergies, Shortness of breath, Sleep apnea (2012), Stress, Tremor (2023), Unspecified essential  hypertension, Visual impairment, Wears glasses, and Weight gain.  He  has a past surgical history that includes myringoplasty; knee arthroscopy (Right); skin tissue procedure unlisted; incision/drain abscess extra (1996); dental surgery procedure; colonoscopy (N/A, 03/22/2016); other surgical history; tonsillectomy; laparoscopic cholecystectomy (02/12/2023); cholecystectomy (3/12/2023); colonoscopy (05/10/2023); part removal colon w end colostomy; other surgical history (07/17/2024); colonoscopy (N/A, 03/13/2025); bowel resection (4/24 and 3/25); create eardrum opening,gen anesth (Childhood); and Colectomy.    ASSESSMENT  Don presents to occupational therapy evaluation with primary c/o need for occupational therapy to address BUE nerve complications s/p multiple abdominal surgeries d/t diverticulitis, wren, R hemicolectomy d/t appendiceal inflammatory process with surgery completed on 6/26/2025. Patient notes that his arms were raised outward for the 10 hour surgery and that he awoke unable to move R arm and with decreased function in L arm. Concerns for radial nerve palsy and other nerve complications that have notably improved in the past 3-4 weeks time. OT services received physician's orders for evaluation and treatment as indicated to maximize rehab potential.. The results of the objective tests and measures show decreaesd ROM, noted nerve injury involvement, and decreased strength overall.. Functional deficits include but are not limited to functional gripping, lifting, carrying, typing on computer, and overall functional use of BUE. 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 Pt and OT discussed evaluation findings, pathology, POC and HEP.  Pt voiced understanding and performs HEP correctly without reported pain. Skilled Occupational Therapy is medically necessary to address the above impairments and reach functional  goals.    OBJECTIVE:      OBSERVATIONS: Patient noted with gradually improving radial nerve palsy about 3-4 weeks past incident. 30 degrees active R wrist extension and PIP/DIP joint extension. Lagging MCP extension at 40 degrees today.    SENSATION: Patient notes constant numbness/tingling down RUE and in L hand.    ORTHOTICS: Patient encouraged to use wrist brace for protection as needed but at this time encouraged to wean out of brace for regular activities to encourage R wrist/hand use.    AROM (degrees): Patient noted with BUE AROM WFL except noted below     Wrist   ROM MMT (-/5)    R L R L     Flex (C7) 60 65 5/5 5/5     Ext (C6) 30 70 2+/5 5/5        R Hand ROM   IF MF RF SF     MP 40-85 40-85 40-85 40-85     PIP 10-90 15-90 15-90 15-90     DIP 0-70 0-70 0-70 0-70     AMEZCUA 195 190 190 190        Hand Strength (lbs) R L      21.6 61.4     2 pt Pinch 4 9     3 pt Pinch 7 15     Lateral pinch 12 21        Today's Treatment and Response:   Pt education was provided on exam findings, treatment diagnosis, treatment plan, expectations, and prognosis.    Today's Treatment       7/24/2025   OT Treatment   Therapeutic Exercise HEP   Therapeutic Exercise Min 15   Eval Min 30   Total Timed Procedures 15   Total Service Procedures 45   Total Time 45         Patient was instructed in and issued a HEP for:   -Flexion Tendon Gldiing Exercises  -Wrist Extension/Digit Extension ROM Exercises  -1 lb Weight Wrist Exercises  -Yellow Theraputty Strengthening Exercises  -Radial Nerve Glides    Charges:  OT EVAL Moderate Complexity, TEx1   Based on analysis of data from a detailed assessment from an expanded chart review, clinical presentation of physical, cognitive and psychosocial skills, as well as review of patient rated outcome measures, this evaluation involved Moderate complexity decision making, with 3-5 occupational performance component deficits, possible comorbidities, and minimal to moderate need for modification of  tasks or assistance.                                                                                    PLAN OF CARE:    Goals: (to be met in 10 visits)   1. Patient will demonstrate at least 60 degrees or greater R wrist extension to facilitate recovery.  2. Patient will demonstrate at least 40.0 lbs or greater R  strength to facilitate ADL/IADL management.  3. Patient will demonstrate at least 215 degrees or greater R D2-D5 AMEZCUA to facilitate recovery.  4. Patient will demonstrate at least 10.0 lbs R 3-Point Pinch strength to facilitate recovery.  5. Patient will demonstrate independence with HEP to facilitate recovery.     Frequency / Duration: Patient will be seen 2x/week or a total of 10 visits over a 90 day period. Treatment will include: Manual Therapy; Neuromuscular Re-education; Self-Care Home Management; Therapeutic Activities; Therapeutic Exercise; Home Exercise Program instruction; Ultrasound; Electrical stimulation (attended); Other (use comment) (Orthotics, Modalities PRN)    Education or treatment limitation: None   Rehab Potential: good     QuickDASH Outcome Score  Score: (Patient-Rptd) 56.82 % (7/24/2025  8:18 AM)      Patient/Family/Caregiver was advised of these findings, precautions, and treatment options and has agreed to actively participate in planning and for this course of care.    Thank you for your referral. Please co-sign or sign and return this letter via fax as soon as possible to [phone]. If you have any questions, please contact me at Dept: [phone]    Sincerely,  Electronically signed by therapist: Reji Interiano, OT  Physician's certification required: Yes  I certify the need for these services furnished under this plan of treatment and while under my care.    X___________________________________________________ Date____________________    Certification From: 7/24/2025  To: 10/22/2025

## 2025-07-26 ENCOUNTER — OFFICE VISIT (OUTPATIENT)
Dept: FAMILY MEDICINE CLINIC | Facility: CLINIC | Age: 60
End: 2025-07-26
Payer: COMMERCIAL

## 2025-07-26 VITALS
RESPIRATION RATE: 16 BRPM | BODY MASS INDEX: 31.44 KG/M2 | OXYGEN SATURATION: 98 % | HEIGHT: 74 IN | WEIGHT: 245 LBS | DIASTOLIC BLOOD PRESSURE: 78 MMHG | SYSTOLIC BLOOD PRESSURE: 116 MMHG | HEART RATE: 64 BPM

## 2025-07-26 DIAGNOSIS — Z12.5 SCREENING FOR PROSTATE CANCER: ICD-10-CM

## 2025-07-26 DIAGNOSIS — D62 ANEMIA ASSOCIATED WITH ACUTE BLOOD LOSS: ICD-10-CM

## 2025-07-26 DIAGNOSIS — C45.1 MESOTHELIOMA OF PERITONEUM (HCC): Primary | ICD-10-CM

## 2025-07-26 PROCEDURE — 99213 OFFICE O/P EST LOW 20 MIN: CPT | Performed by: FAMILY MEDICINE

## 2025-07-26 RX ORDER — PANTOPRAZOLE SODIUM 40 MG/10ML
INJECTION, POWDER, LYOPHILIZED, FOR SOLUTION INTRAVENOUS
COMMUNITY
Start: 2025-06-27 | End: 2025-07-26

## 2025-07-26 RX ORDER — APIXABAN 5 MG/1
TABLET, FILM COATED ORAL
COMMUNITY
Start: 2025-07-04

## 2025-07-26 RX ORDER — PANTOPRAZOLE SODIUM 40 MG/1
40 TABLET, DELAYED RELEASE ORAL DAILY
COMMUNITY
Start: 2025-07-04

## 2025-07-26 RX ORDER — TRAMADOL HYDROCHLORIDE 50 MG/1
50 TABLET ORAL EVERY 6 HOURS PRN
COMMUNITY
Start: 2025-06-12 | End: 2025-07-26 | Stop reason: ALTCHOICE

## 2025-07-26 RX ORDER — ACETAMINOPHEN 325 MG/1
650 TABLET ORAL
COMMUNITY
Start: 2025-07-04 | End: 2025-07-26

## 2025-07-26 RX ORDER — OXYCODONE HYDROCHLORIDE 5 MG/1
TABLET ORAL
COMMUNITY
Start: 2025-07-04 | End: 2025-07-26

## 2025-07-26 RX ORDER — DIAZEPAM 5 MG/1
5 TABLET ORAL EVERY 6 HOURS PRN
COMMUNITY
Start: 2025-06-20 | End: 2025-07-26

## 2025-07-26 NOTE — PROGRESS NOTES
[facility] Progress Note    SUBJECTIVE: Don Cabral 59 year old male is here today for   Chief Complaint   Patient presents with    Cancer     Mesothelioma of peritoneum        Following up since his surgery.    Seing med ocn and surg onc, mesothelioma, 9 hour surgery. Peritoneal mesothelioma. Adhesion removal, and tumor removal. Many inflammatory lesions, and then small tumors. Pathology mixed per patient, diagnosis confirmed. Took out gross lesions. On small bowel left it untouched for QOL.    Essentially palliative treatment, terminal illness.    Had sevre nausea in week/s after discharge.    Pain not so bad. Right arm went numb from shoulder down, likely due to in position for 9 hours. Doing OT and working on healing that. Still impacting him.  Improving, but strugglign with , other movements.    Done with surg/onc.    Now following with med/onc, plan on waiting, and then plant imaging in August 19th, then follow up. Will plan on chemo, pending specifics on work up. Then observation.    They assume at least 2 years from cancer perspective, possibly longer.    Would prefer to avoid surgery going forward. As he has a complex surgical history and scar tissue.    Weight down significantly, but slowed loss now. Ating better.    Oncology team is supportive, palliative, nutrition, psych.    Is following with his psychiatrist. He is feeling maye changes in mood, feeling of medication impact.    Feels confident in ongoing plan with his mental health team.    Likely to get chemo in local community, Dr. Schreiber    Biggest acute issue is arm,  feels like arm 'waking up' and somewhat miserable.    Hasn't used pain medications.    Worries about arrhythmia for nausea control due to history and side effects of medications.          PMH  Past Medical History[1]     PSH  Past Surgical History[2]     Social Hx:  See HPI has had complex health issues leading to long-term life planning    ROS  See  HPI    OBJECTIVE:  /78   Pulse 64   Resp 16   Ht 6' 2\" (1.88 m)   Wt 245 lb (111.1 kg)   SpO2 98%   BMI 31.46 kg/m²     Exam      Labs:          Meds:   Current Medications[3]      Assessment/Plan  Don was seen today for cancer.    Diagnoses and all orders for this visit:    Mesothelioma of peritoneum (HCC)    Screening for prostate cancer  -     PSA, Total (Screening) [E]; Future    Anemia associated with acute blood loss  -     CBC W Differential W Platelet [E]; Future         I reviewed his notes from specialist at [facility] prior to our visit and he summarized the overall recent health course.    Though it is unpredictable what his terminal illness will lead to in terms of years of life after discussion we decided not to focus on incredibly long-term things like cholesterol and but we will check PSA.         Total Time spent with patient and coordinating care:  25 minutes.    Follow up: 2-3 months      Stephen Christianson MD         [1]   Past Medical History:   Abdominal pain    Beginning of diverticulitis saga that led to removal part of my large colon    Allergic rhinitis    Anxiety    Arrhythmia    T1 AV block, LBBB    Back pain    Back problem    lower back    Decorative tattoo    Depression    Diverticulitis    ED (erectile dysfunction)    Heart palpitations    Under treatment with [facility] for arrhythmia    Hemorrhoids    One internal, occasional external    High blood pressure    History of adverse reaction to anesthesia    Patient states he doesnt know exactly what happened but stated he required haldol post anesthesia    Indigestion    Periodic last 20 yrs    Panic attack    See above - anxiety    Panic disorder    See above - anxiety    Pneumonia due to organism    Presence of other cardiac implants and grafts    Loop recorder for arrhythmia    Seasonal allergies    Shortness of breath    Sleep apnea    CPAP    Stress    Tremor    mild radial tremor prob.  dur to SSRI levels    Unspecified essential hypertension    Visual impairment    glasses    Wears glasses    Weight gain   [2]   Past Surgical History:  Procedure Laterality Date    Bowel resection  4/24 and 3/25    First related to sigmoid removal, second omental infarct had dused to right colon    Cholecystectomy  3/12/2023    Colectomy      Colonoscopy N/A 03/22/2016    [facility].  Hyperplastic polyp only.  Repeat 2026    Colonoscopy  05/10/2023    Colonoscopy N/A 03/13/2025    Procedure: COLONOSCOPY with biopsies and forcep polypectomy;  Surgeon: Job Bacon DO;  Location:  ENDOSCOPY    Create eardrum opening,gen anesth  Childhood    Dental surgery procedure      Chaplin teeth removed    Incision/drain abscess extra  1996    Knee arthroscopy Right     Laparoscopic cholecystectomy  02/12/2023    Myringoplasty      Other surgical history      R knee arthroscope    Other surgical history  07/17/2024    Exploratory laparotomy partial colectomy with colorectal anastamosis, extensive adhesiolysis    Part removal colon w end colostomy      Skin tissue procedure unlisted      Tonsillectomy     [3]   Current Outpatient Medications   Medication Sig Dispense Refill    pantoprazole 40 MG Oral Tab EC Take 1 tablet (40 mg total) by mouth daily.      ELIQUIS 5 MG Oral Tab       LISINOPRIL 20 MG Oral Tab TAKE ONE TABLET BY MOUTH ONCE DAILY 90 tablet 0    simethicone 80 MG Oral Chew Tab CHEW AND SWALLOW ONE TABLET BY MOUTH FOUR TIMES DAILY AS NEEDED FOR FLATULENCE      clonazePAM (KLONOPIN) 0.5 MG Oral Tab Take 1 tablet (0.5 mg total) by mouth 3 (three) times daily as needed. 90 tablet 0    gabapentin 600 MG Oral Tab Take 1 tablet (600 mg total) by mouth 3 (three) times daily. 270 tablet 0    sertraline (ZOLOFT) 50 MG Oral Tab 1.5 tablets daily 135 tablet 0    Tadalafil 20 MG Oral Tab Take 1 tablet (20 mg total) by mouth daily as needed for Erectile Dysfunction. 12 tablet 1    Verapamil HCl  MG Oral Capsule SR 24 Hr  Take 1 capsule (180 mg total) by mouth in the morning.      Albuterol Sulfate  (90 Base) MCG/ACT Inhalation Aero Soln Inhale 2 puffs into the lungs every 4 (four) hours as needed for Wheezing or Shortness of Breath. 1 each 3

## 2025-07-28 NOTE — TELEPHONE ENCOUNTER
Apnea well controlled on current pressures. No need for further changes.     CHAYITO MEDEIROS  2025 - 2025  Patient ID: [id number]  : 1965  Age: 59 years  [place]  [address]  [place], [address]  Phone: [phone]  Compliance Report  Usage 2025 - 2025  Usage days 10/10 days (100%)  >= 4 hours 8 days (80%)  < 4 hours 2 days (20%)  Usage hours 57 hours 46 minutes  Average usage (total days) 5 hours 47 minutes  Average usage (days used) 5 hours 47 minutes  Median usage (days used) 5 hours 54 minutes  Total used hours (value since last reset - 2025) 1,373 hours  AirCurve 11 VAuto  Serial number [id number]  Mode VAuto  Max IPAP 20 cmH2O  Min EPAP 14 cmH2O  Pressure Support 4 cmH2O  Therapy  Leaks - L/min Median: 94.7 95th percentile: 120.0 Maximum: 120.0  Events per hour AI: 1.3 HI: 0.3 AHI: 1.6  Apnea Index Central: 0.0 Obstructive: 0.0 Unknown: 1.3

## 2025-07-29 ENCOUNTER — LAB ENCOUNTER (OUTPATIENT)
Dept: LAB | Age: 60
End: 2025-07-29
Attending: FAMILY MEDICINE
Payer: COMMERCIAL

## 2025-07-29 DIAGNOSIS — Z12.5 SCREENING FOR PROSTATE CANCER: ICD-10-CM

## 2025-07-29 DIAGNOSIS — D62 ANEMIA ASSOCIATED WITH ACUTE BLOOD LOSS: ICD-10-CM

## 2025-07-29 LAB
BASOPHILS # BLD AUTO: 0.01 X10(3) UL (ref 0–0.2)
BASOPHILS NFR BLD AUTO: 0.1 %
COMPLEXED PSA SERPL-MCNC: 3.9 NG/ML (ref ?–4)
EOSINOPHIL # BLD AUTO: 0.19 X10(3) UL (ref 0–0.7)
EOSINOPHIL NFR BLD AUTO: 2.2 %
ERYTHROCYTE [DISTWIDTH] IN BLOOD BY AUTOMATED COUNT: 16.3 %
HCT VFR BLD AUTO: 35.9 % (ref 39–53)
HGB BLD-MCNC: 10.8 G/DL (ref 13–17.5)
IMM GRANULOCYTES # BLD AUTO: 0.01 X10(3) UL (ref 0–1)
IMM GRANULOCYTES NFR BLD: 0.1 %
LYMPHOCYTES # BLD AUTO: 2.14 X10(3) UL (ref 1–4)
LYMPHOCYTES NFR BLD AUTO: 24.6 %
MCH RBC QN AUTO: 24.3 PG (ref 26–34)
MCHC RBC AUTO-ENTMCNC: 30.1 G/DL (ref 31–37)
MCV RBC AUTO: 80.7 FL (ref 80–100)
MONOCYTES # BLD AUTO: 0.58 X10(3) UL (ref 0.1–1)
MONOCYTES NFR BLD AUTO: 6.7 %
NEUTROPHILS # BLD AUTO: 5.77 X10 (3) UL (ref 1.5–7.7)
NEUTROPHILS # BLD AUTO: 5.77 X10(3) UL (ref 1.5–7.7)
NEUTROPHILS NFR BLD AUTO: 66.3 %
PLATELET # BLD AUTO: 440 10(3)UL (ref 150–450)
RBC # BLD AUTO: 4.45 X10(6)UL (ref 4.3–5.7)
WBC # BLD AUTO: 8.7 X10(3) UL (ref 4–11)

## 2025-07-29 PROCEDURE — 85025 COMPLETE CBC W/AUTO DIFF WBC: CPT

## 2025-07-29 PROCEDURE — 36415 COLL VENOUS BLD VENIPUNCTURE: CPT

## 2025-07-30 ENCOUNTER — TELEPHONE (OUTPATIENT)
Dept: PHYSICAL THERAPY | Facility: HOSPITAL | Age: 60
End: 2025-07-30

## 2025-07-30 ENCOUNTER — OFFICE VISIT (OUTPATIENT)
Dept: OCCUPATIONAL MEDICINE | Facility: HOSPITAL | Age: 60
End: 2025-07-30
Attending: STUDENT IN AN ORGANIZED HEALTH CARE EDUCATION/TRAINING PROGRAM
Payer: COMMERCIAL

## 2025-07-30 PROCEDURE — 97110 THERAPEUTIC EXERCISES: CPT

## 2025-07-31 ENCOUNTER — APPOINTMENT (OUTPATIENT)
Dept: PHYSICAL THERAPY | Age: 60
End: 2025-07-31
Attending: STUDENT IN AN ORGANIZED HEALTH CARE EDUCATION/TRAINING PROGRAM

## 2025-08-01 ENCOUNTER — APPOINTMENT (OUTPATIENT)
Dept: OCCUPATIONAL MEDICINE | Facility: HOSPITAL | Age: 60
End: 2025-08-01
Attending: STUDENT IN AN ORGANIZED HEALTH CARE EDUCATION/TRAINING PROGRAM

## 2025-08-05 ENCOUNTER — APPOINTMENT (OUTPATIENT)
Dept: PHYSICAL THERAPY | Age: 60
End: 2025-08-05
Attending: STUDENT IN AN ORGANIZED HEALTH CARE EDUCATION/TRAINING PROGRAM

## 2025-08-06 PROBLEM — C78.6 PERITONEAL CARCINOMATOSIS (HCC): Status: ACTIVE | Noted: 2025-06-20

## 2025-08-06 PROBLEM — C45.1 PERITONEAL MESOTHELIOMA (HCC): Status: ACTIVE | Noted: 2025-08-06

## 2025-08-07 ENCOUNTER — OFFICE VISIT (OUTPATIENT)
Dept: OCCUPATIONAL MEDICINE | Facility: HOSPITAL | Age: 60
End: 2025-08-07
Attending: STUDENT IN AN ORGANIZED HEALTH CARE EDUCATION/TRAINING PROGRAM

## 2025-08-07 PROCEDURE — 97110 THERAPEUTIC EXERCISES: CPT

## 2025-08-08 ENCOUNTER — APPOINTMENT (OUTPATIENT)
Dept: PHYSICAL THERAPY | Age: 60
End: 2025-08-08
Attending: STUDENT IN AN ORGANIZED HEALTH CARE EDUCATION/TRAINING PROGRAM

## 2025-08-12 ENCOUNTER — APPOINTMENT (OUTPATIENT)
Dept: PHYSICAL THERAPY | Age: 60
End: 2025-08-12
Attending: STUDENT IN AN ORGANIZED HEALTH CARE EDUCATION/TRAINING PROGRAM

## 2025-08-12 ENCOUNTER — OFFICE VISIT (OUTPATIENT)
Dept: OCCUPATIONAL MEDICINE | Facility: HOSPITAL | Age: 60
End: 2025-08-12
Attending: STUDENT IN AN ORGANIZED HEALTH CARE EDUCATION/TRAINING PROGRAM

## 2025-08-12 PROCEDURE — 97110 THERAPEUTIC EXERCISES: CPT

## 2025-08-15 ENCOUNTER — APPOINTMENT (OUTPATIENT)
Dept: OCCUPATIONAL MEDICINE | Facility: HOSPITAL | Age: 60
End: 2025-08-15
Attending: STUDENT IN AN ORGANIZED HEALTH CARE EDUCATION/TRAINING PROGRAM

## 2025-08-15 ENCOUNTER — APPOINTMENT (OUTPATIENT)
Dept: PHYSICAL THERAPY | Age: 60
End: 2025-08-15
Attending: STUDENT IN AN ORGANIZED HEALTH CARE EDUCATION/TRAINING PROGRAM

## 2025-08-18 ENCOUNTER — APPOINTMENT (OUTPATIENT)
Facility: LOCATION | Age: 60
End: 2025-08-18
Attending: INTERNAL MEDICINE

## 2025-08-19 ENCOUNTER — APPOINTMENT (OUTPATIENT)
Dept: PHYSICAL THERAPY | Age: 60
End: 2025-08-19
Attending: STUDENT IN AN ORGANIZED HEALTH CARE EDUCATION/TRAINING PROGRAM

## 2025-08-22 ENCOUNTER — APPOINTMENT (OUTPATIENT)
Dept: PHYSICAL THERAPY | Age: 60
End: 2025-08-22
Attending: STUDENT IN AN ORGANIZED HEALTH CARE EDUCATION/TRAINING PROGRAM

## 2025-08-22 RX ORDER — CYANOCOBALAMIN 1000 UG/ML
1000 INJECTION, SOLUTION INTRAMUSCULAR; SUBCUTANEOUS ONCE
OUTPATIENT
Start: 2025-08-22 | End: 2025-08-22

## 2025-08-25 ENCOUNTER — OFFICE VISIT (OUTPATIENT)
Facility: LOCATION | Age: 60
End: 2025-08-25
Attending: INTERNAL MEDICINE

## 2025-08-25 VITALS
TEMPERATURE: 99 F | HEART RATE: 78 BPM | DIASTOLIC BLOOD PRESSURE: 70 MMHG | OXYGEN SATURATION: 97 % | WEIGHT: 253.63 LBS | RESPIRATION RATE: 18 BRPM | SYSTOLIC BLOOD PRESSURE: 140 MMHG | BODY MASS INDEX: 32 KG/M2

## 2025-08-25 DIAGNOSIS — G56.31 ACUTE RADIAL NERVE PALSY OF RIGHT UPPER EXTREMITY: ICD-10-CM

## 2025-08-25 DIAGNOSIS — C45.1 PERITONEAL MESOTHELIOMA (HCC): Primary | ICD-10-CM

## 2025-08-28 ENCOUNTER — OFFICE VISIT (OUTPATIENT)
Facility: LOCATION | Age: 60
End: 2025-08-28
Attending: INTERNAL MEDICINE

## 2025-08-28 ENCOUNTER — OFFICE VISIT (OUTPATIENT)
Dept: OCCUPATIONAL MEDICINE | Facility: HOSPITAL | Age: 60
End: 2025-08-28
Attending: STUDENT IN AN ORGANIZED HEALTH CARE EDUCATION/TRAINING PROGRAM

## 2025-08-28 ENCOUNTER — TELEPHONE (OUTPATIENT)
Facility: LOCATION | Age: 60
End: 2025-08-28

## 2025-08-28 DIAGNOSIS — C45.1 PERITONEAL MESOTHELIOMA (HCC): Primary | ICD-10-CM

## 2025-08-28 DIAGNOSIS — Z71.9 ENCOUNTER FOR HEALTH EDUCATION: ICD-10-CM

## 2025-08-28 PROCEDURE — 97110 THERAPEUTIC EXERCISES: CPT

## 2025-08-28 RX ORDER — FOLIC ACID 1 MG/1
1 TABLET ORAL DAILY
Qty: 30 TABLET | Refills: 5 | Status: SHIPPED | OUTPATIENT
Start: 2025-08-28

## 2025-08-28 RX ORDER — CYANOCOBALAMIN 1000 UG/ML
1000 INJECTION, SOLUTION INTRAMUSCULAR; SUBCUTANEOUS ONCE
Status: COMPLETED | OUTPATIENT
Start: 2025-08-28 | End: 2025-08-28

## 2025-08-28 RX ORDER — PROCHLORPERAZINE MALEATE 10 MG
10 TABLET ORAL EVERY 6 HOURS PRN
Qty: 30 TABLET | Refills: 3 | Status: SHIPPED | OUTPATIENT
Start: 2025-08-28

## 2025-08-28 RX ORDER — DEXAMETHASONE 4 MG/1
TABLET ORAL
Qty: 24 TABLET | Refills: 1 | Status: SHIPPED | OUTPATIENT
Start: 2025-08-28

## 2025-08-28 RX ADMIN — CYANOCOBALAMIN 1000 MCG: 1000 INJECTION, SOLUTION INTRAMUSCULAR; SUBCUTANEOUS at 14:31:00

## (undated) DEVICE — 10FT COMBINED O2 DELIVERY/CO2 MONITORING. FILTER WITH MICROSTREAM TYPE LUER: Brand: DUAL ADULT NASAL CANNULA

## (undated) DEVICE — RETAINER ORGAN M W5.875XL9.125IN ABD RADPQ

## (undated) DEVICE — KIT OSTOMY POST OP MOLDABLE TECHNOLOGY 70MM INVISI NATURA

## (undated) DEVICE — PISTOL GRIP SKIN STAPLER: Brand: MULTIFIRE PREMIUM

## (undated) DEVICE — LAPCLINCH GRASPER TIP, DISPOSABLE: Brand: RENEW

## (undated) DEVICE — AIRSEAL 8 MM ACCESS PORT AND LOW PROFILE OBTURATOR WITH BLADELESS OPTICAL TIP, 120 MM LENGTH: Brand: AIRSEAL

## (undated) DEVICE — KIT HEMSTAT MTRX 8ML PORCINE GEL HUM THROM

## (undated) DEVICE — TRAY CATH 16FR F INCL BARDX IC COMPLT CARE

## (undated) DEVICE — 3M™ RED DOT™ MONITORING ELECTRODE WITH FOAM TAPE AND STICKY GEL, 50/BAG, 20/CASE, 72/PLT 2570: Brand: RED DOT™

## (undated) DEVICE — INTENDED FOR TISSUE SEPARATION, AND OTHER PROCEDURES THAT REQUIRE A SHARP SURGICAL BLADE TO PUNCTURE OR CUT.: Brand: BARD-PARKER ® STAINLESS STEEL BLADES

## (undated) DEVICE — 40580 - THE PINK PAD - ADVANCED TRENDELENBURG POSITIONING KIT: Brand: 40580 - THE PINK PAD - ADVANCED TRENDELENBURG POSITIONING KIT

## (undated) DEVICE — PACK DRP UNIV W/ BK TBL MAYO STD BTM TOP SIDE

## (undated) DEVICE — SUT ETHLN 2-0 18IN FS NABSRB BLK 26MM 3/8 CIR

## (undated) DEVICE — SOLUTION PREP 10% POVIDONE IOD 32OZ BTL

## (undated) DEVICE — MEGA SUTURECUT ND: Brand: ENDOWRIST

## (undated) DEVICE — DRAPE,ABDOMINAL,MAJOR,STERILE: Brand: MEDLINE

## (undated) DEVICE — FENESTRATED GRASPER TIP, DISPOSABLE: Brand: RENEW

## (undated) DEVICE — BINDER ABD L/XL H12XL62IN 4 PNL UNIV PREM

## (undated) DEVICE — Device: Brand: SUTURE PASSOR PRO

## (undated) DEVICE — LIGHT HANDLE

## (undated) DEVICE — PROVIDES A STERILE INTERFACE BETWEEN THE OPERATING ROOM SURGICAL LAMPS (NON-STERILE) AND THE SURGEON OR NURSE (STERILE).: Brand: STERION®CLAMP COVER FABRIC

## (undated) DEVICE — EVACUATOR SUR 100CC SIL BLB WND

## (undated) DEVICE — PURSE STRING DEVICE: Brand: PURSTRING

## (undated) DEVICE — KIT VLV 5 PC AIR H2O SUCT BX ENDOGATOR CONN

## (undated) DEVICE — PROXIMATE RH ROTATING HEAD SKIN STAPLERS (35 WIDE) CONTAINS 35 STAINLESS STEEL STAPLES: Brand: PROXIMATE

## (undated) DEVICE — SIGMOIDOSCOPE LIGHTED BIOSEAL

## (undated) DEVICE — DRAPE,UNDRBUT,WHT GRAD PCH,CAPPORT,20/CS: Brand: MEDLINE

## (undated) DEVICE — C-ARM: Brand: UNBRANDED

## (undated) DEVICE — LEGGINGS, PAIR, 31X48, STERILE: Brand: MEDLINE

## (undated) DEVICE — ARTICULATING RELOAD WITH TRI-STAPLE TECHNOLOGY: Brand: ENDO GIA

## (undated) DEVICE — STERILE POLYISOPRENE POWDER-FREE SURGICAL GLOVES: Brand: PROTEXIS

## (undated) DEVICE — SOL NACL IRRIG 0.9% 1000ML BTL

## (undated) DEVICE — ABSORBABLE WOUND CLOSURE DEVICE: Brand: V-LOC 180

## (undated) DEVICE — SYRINGE BULB 50/CS 48/PLT: Brand: MEDEGEN MEDICAL PRODUCTS, LLC

## (undated) DEVICE — ELECTRODE ES 2.75IN PTFE BLDE MOD E-Z CLN

## (undated) DEVICE — GOWN,SIRUS,FAB REINF,RAGLAN,XL,STERILE: Brand: MEDLINE

## (undated) DEVICE — GAMMEX® PI HYBRID SIZE 7.5, STERILE POWDER-FREE SURGICAL GLOVE, POLYISOPRENE AND NEOPRENE BLEND: Brand: GAMMEX

## (undated) DEVICE — CURVED, LARGE JAW, OPEN SEALER/DIVIDER NANO-COATED: Brand: LIGASURE IMPACT

## (undated) DEVICE — VIOLET BRAIDED (POLYGLACTIN 910), SYNTHETIC ABSORBABLE SUTURE: Brand: COATED VICRYL

## (undated) DEVICE — GIJAW SINGLE-USE BIOPSY FORCEPS WITH NEEDLE: Brand: GIJAW

## (undated) DEVICE — A P RESECTION: Brand: MEDLINE INDUSTRIES, INC.

## (undated) DEVICE — ADHESIVE SKIN TOP FOR WND CLSR DERMBND ADV

## (undated) DEVICE — KIT CUSTOM ENDOPROCEDURE STERIS

## (undated) DEVICE — GLOVE SUR 7.5 SENSICARE PI PIP GRN PWD F

## (undated) DEVICE — SYRINGE MED 10ML LL TIP W/O SFTY DISP

## (undated) DEVICE — SUT PDS II 4-0 27IN SH ABSRB VLT L26MM 1/2

## (undated) DEVICE — SUT PERMA- 2-0 18IN NABSRB BLK TIE SILK

## (undated) DEVICE — ARM DRAPE

## (undated) DEVICE — DRAPE,TOP,102X53,STERILE: Brand: MEDLINE

## (undated) DEVICE — SUT PERMA- 2-0 30IN NABSRB BLK TIE SILK

## (undated) DEVICE — GLOVE SUR 8 SENSICARE PI PIP CRM PWD F

## (undated) DEVICE — SUT CHRM GUT 4-0 27IN SH ABSRB UD 26MM 1/2

## (undated) DEVICE — #10 STERILE BLADE: Brand: POLYMER COATED BLADES

## (undated) DEVICE — PAD,ABDOMINAL,8"X7.5",STERILE,LF,1/PK: Brand: MEDLINE

## (undated) DEVICE — DISPOSABLE LAPAROSCOPIC CLIP APPLIER WITH 20 CLIPS.: Brand: EPIX® UNIVERSAL CLIP APPLIER

## (undated) DEVICE — MARYLAND JAW LAPAROSCOPIC SEALER/DIVIDER COATED: Brand: LIGASURE

## (undated) DEVICE — ENDOPATH ULTRA VERESS INSUFFLATION NEEDLES WITH LUER LOCK CONNECTORS: Brand: ENDOPATH

## (undated) DEVICE — SUT PDS II 1 36IN ABSRB VLT L36MM CT-1

## (undated) DEVICE — POWER SHELL: Brand: SIGNIA

## (undated) DEVICE — JELLY,LUBE,STERILE,FLIP TOP,TUBE,2-OZ: Brand: MEDLINE

## (undated) DEVICE — CONTAINER,SPECIMEN,OR STERILE,4OZ: Brand: MEDLINE

## (undated) DEVICE — 3M™ IOBAN™ 2 ANTIMICROBIAL INCISE DRAPE 6650EZ: Brand: IOBAN™ 2

## (undated) DEVICE — SEAL

## (undated) DEVICE — SUT PDS II 2-0 27IN SH ABSRB VLT L26MM 1/2

## (undated) DEVICE — 3M™ TEGADERM™ TRANSPARENT FILM DRESSING FRAME STYLE, 1626W, 4 IN X 4-3/4 IN (10 CM X 12 CM), 50/CT 4CT/CASE: Brand: 3M™ TEGADERM™

## (undated) DEVICE — ECHELON CONTOUR GST RELOAD GREEN: Brand: ECHELON

## (undated) DEVICE — COLUMN DRAPE

## (undated) DEVICE — SUT PERMA- 3-0 18IN SH NABSRB BLK CR 26MM

## (undated) DEVICE — UNDYED BRAIDED (POLYGLACTIN 910), SYNTHETIC ABSORBABLE SUTURE: Brand: COATED VICRYL

## (undated) DEVICE — SUT MCRYL 4-0 18IN PS-2 ABSRB UD 19MM 3/8 CIR

## (undated) DEVICE — CIRCULAR MECH XL SEAL 29MM

## (undated) DEVICE — STAPLER WITH DST SERIES TECHNOLOGY: Brand: TA

## (undated) DEVICE — TIGERTAIL 5F FLXTIP 70CM

## (undated) DEVICE — HUNTER GASPER TIP, DISPOSABLE: Brand: RENEW

## (undated) DEVICE — TROCAR: Brand: KII SHIELDED BLADED ACCESS SYSTEM

## (undated) DEVICE — #11 STERILE BLADE: Brand: POLYMER COATED BLADES

## (undated) DEVICE — ROBOTIC GENERAL: Brand: MEDLINE INDUSTRIES, INC.

## (undated) DEVICE — SUT PDS II 0 L60IN ABSRB VLT L48MM CTX 1/2

## (undated) DEVICE — SUT ETHLN 3-0 18IN PS-2 NABSRB BLK 19MM 3/8 C

## (undated) DEVICE — SLEEVE KENDALL SCD EXPRESS MED

## (undated) DEVICE — V2 SPECIMEN COLLECTION MANIFOLD KIT: Brand: NEPTUNE

## (undated) DEVICE — SUT PDS II 1 96IN TP-1 ABSRB VLT L65MM 1/2

## (undated) DEVICE — DRAIN INCS 10MM 20CMX10MM RLVC

## (undated) DEVICE — TROCAR: Brand: KII® SLEEVE

## (undated) DEVICE — APPLICATOR CHLORAPREP 26ML

## (undated) DEVICE — 1200CC GUARDIAN II: Brand: GUARDIAN

## (undated) DEVICE — LAPAROVUE VISIBILITY SYSTEM LAPAROSCOPIC SOLUTIONS: Brand: LAPAROVUE

## (undated) DEVICE — BLADELESS OBTURATOR: Brand: WECK VISTA

## (undated) DEVICE — SOLUTION IRRIG 1000ML 0.9% NACL USP BTL

## (undated) DEVICE — MONOPOLAR CURVED SCISSORS: Brand: ENDOWRIST

## (undated) DEVICE — ZIPWIRE GUIDE .038X150 STR/STF

## (undated) DEVICE — INSUFFLATION NEEDLE TO ESTABLISH PNEUMOPERITONEUM.: Brand: INSUFFLATION NEEDLE

## (undated) DEVICE — COVER,LIGHT,CAMERA,HARD,1/PK,STRL: Brand: MEDLINE

## (undated) DEVICE — DRAIN SUR W10MMXL20CM SIL FULL PERF HUBLESS

## (undated) DEVICE — TROCAR: Brand: KII FIOS FIRST ENTRY

## (undated) DEVICE — MONOFILAMENT ABSORBABLE SUTURE: Brand: MAXON

## (undated) DEVICE — SUT PDS II 3-0 27IN SH ABSRB VLT L26MM 1/2

## (undated) DEVICE — TIP COVER ACCESSORY

## (undated) DEVICE — PAD N ADH 3X4IN COT POLY SFT PERF FLM

## (undated) DEVICE — SUT PERMA- 3-0 18IN NABSRB BLK TIE

## (undated) DEVICE — SLEEVE COMPR MD KNEE LEN SGL USE KENDALL SCD

## (undated) DEVICE — ABSORBABLE WOUND CLOSURE DEVICE: Brand: V-LOC 90

## (undated) DEVICE — ENDOPATH 5MM CURVED SCISSORS WITH MONOPOLAR CAUTERY: Brand: ENDOPATH

## (undated) DEVICE — ENDOCUT SCISSOR TIP, DISPOSABLE: Brand: RENEW

## (undated) DEVICE — SPONGE LAP 18X18IN WHT COT 4 PLY FLD STRUNG

## (undated) DEVICE — Device

## (undated) DEVICE — SUT PDS II 0 27IN CT-1 ABSRB VLT L36MM 1/2

## (undated) DEVICE — GLOVE SUR 7.5 SENSICARE PI PIP CRM PWD F

## (undated) DEVICE — LAP CHOLE/APPY CDS-LF: Brand: MEDLINE INDUSTRIES, INC.

## (undated) DEVICE — SUT PERMA- 0 30IN NABSRB BLK TIE SILK

## (undated) DEVICE — ECHELON CONTOUR W/ GREEN RELOAD: Brand: ECHELON

## (undated) DEVICE — AIRSEAL TRI-LUMEN LILTERED TUBE SET: Brand: AIRSEAL

## (undated) DEVICE — GLOVE SUR 6.5 SENSICARE PI PIP CRM PWD F

## (undated) DEVICE — 2, DISPOSABLE SUCTION/IRRIGATOR WITHOUT DISPOSABLE TIP: Brand: STRYKEFLOW

## (undated) DEVICE — TIP-UP FENESTRATED GRASPER: Brand: ENDOWRIST

## (undated) NOTE — Clinical Note
· Continue current auto CPAP 9-20 as patient is using and benefiting from CPAP use · Advise DME company to perform a Mask Fit

## (undated) NOTE — LETTER
25    Patient: Don Cabral  : 1965 Visit date: 2025    Dear  Stephen Christianson MD    Thank you for referring Don Cabral to my practice.  Please find my assessment and plan below.    Assessment   1. Malnutrition, unspecified type (HCC)    2. Status post colon resection          Plan     The patient is recovering nicely following exploratory laparotomy, right colectomy, partial omentectomy and small bowel resection.    Surgical pathology is worrisome for more advanced oncologic process.  I recommend the patient have follow-up with medical and surgical oncology; contact information was provided.  The patient also states he will seek opinion at [facility] as well and I encouraged this.    The anticipated postoperative recovery was discussed with the patient in detail.    Dietary, activity, and exercise recommendations along with restrictions were discussed with the patient during today's visit.    Wound care instructions were discussed during today's visit.    The patient will return to my attention on an as needed basis.    The patient is encouraged to continue seeing the primary care physician for ongoing medical needs.    The patient was given ample opportunity to ask questions.  The patient's questions were answered in detail and to the patient's satisfaction.  The patient voiced understanding of the postoperative care plan.             Sincerely,       Ryan Clemens MD   CC: No Recipients

## (undated) NOTE — LETTER
[facility]  part of [facility]     PICC INSERTION CONSENT     I agree to have a Peripherally Inserted Central Catheter (PICC) placed in my arm.   1. The PICC insertion procedure, care, maintenance, risks, benefits, and complications have been explained to me by my physician, ________________________, and I understand them.   2. I understand that this may not be the only way I can receive my medication. I understand that my physician has determined that the PICC would be the safest and most effective means of administering my medication at this time. If there are other options of giving medication into my veins those options have been explained to me by my physician and I have chosen this one.   3. I realize a nurse who has been specially trained and certified by the hospital and ’s representative to insert a PICC will perform this procedure. My catheter will be inserted by _____________________________.   4. I have been informed by my doctor of the nature and purpose of this procedure and the risks involved and the possibility of complications. I realize that this is an invasive procedure and has certain risks such as air embolism (air entering the catheter or my vein), arterial puncture (a tearing of one of my arteries), infection, irregular heartbeat and venous thrombosis (a blood clot in a vein) nerve injury and fracture of the catheter with or without migration.   5. In order to numb the area where the line will be placed, a small amount of anesthetic medication will be injected as ordered by my physician.   6. I understand that while the catheter will be placed in my upper arm the end of the catheter will come to rest in an area near my heart.     7. The person performing this procedure has discussed the potential benefits, risks, and side effects of the PICC; the likelihood of achieving goals; and potential problems that might occur during recuperation. They also discussed  reasonable alternatives to the PICC, including risks, benefits, and side effects related to the alternatives and risks related to not receiving this procedure.    8.  I have expressed any questions about this procedure to my physician or the PICC Proceduralist and he/she has answered them.  I certify that I have read and understand this consent to the insertion of a PICC.      _________________________________________________________   Date     Time     Patient/Guardian Signature       ____________________________________   Printed name of Patient/Guardian          ________________________________________________________________    Date        Time                   Witnessing RN Signature      Patient Name: Don Cabral     : 1965                 Printed: 2024     Medical Record #: [id number]

## (undated) NOTE — LETTER
25    Patient: Don Cabral  : 1965 Visit date: 3/5/2025    Dear  Stephen Christianson MD    Thank you for referring Don Cabral to my practice.  Please find my assessment and plan below.     Assessment   1. Epiploic appendagitis          Plan     Patient's initial CT scan was interpreted as epiploic appendagitis versus omental infarct, I would have expected patient's pain to have improved with management to date.    Repeat imaging raises concern for inflammation of the colon itself and I wonder if there is underlying colitis or which may need further investigation and workup.    I will empirically start the patient on a new course of antibiotics and asked him to continue using NSAIDs and oxycodone to optimize pain control.    I have also recommended patient follow-up with [facility] whom he is previously seen to discuss further workup strategies potentially colonoscopy with biopsy to evaluate for other etiologies of his pain perhaps even colitis.    The patient will return to my attention in 2 to 4 weeks for continued follow-up or sooner if he fails to improve.    The patient was provided ample opportunity to ask questions.  All of the patient's questions were answered in detail.  The patient voiced understanding of the care plan.      Sincerely,       Ryan Clemens MD   CC: No Recipients

## (undated) NOTE — LETTER
[address]  [address]  Authorization for Surgical Operation and Procedure     Date:___________                                                                                                         Time:__________  I hereby authorize Surgeon(s):  Job Bacon DO, my physician and his/her assistants (if applicable), which may include medical students, residents, and/or fellows, to perform the following surgical operation/ procedure and administer such anesthesia as may be determined necessary by my physician:  Operation/Procedure name (s) Procedure(s):  COLONOSCOPY with Possible Biopsy, Possible Control of Bleeding, under Monitored Anesthesia Care on Don Cabral   2.   I recognize that during the surgical operation/procedure, unforeseen conditions may necessitate additional or different procedures than those listed above.  I, therefore, further authorize and request that the above-named surgeon, assistants, or designees perform such procedures as are, in their judgment, necessary and desirable.    3.   My surgeon/physician has discussed prior to my surgery the potential benefits, risks and side effects of this procedure; the likelihood of achieving goals; and potential problems that might occur during recuperation.  They also discussed reasonable alternatives to the procedure, including risks, benefits, and side effects related to the alternatives and risks related to not receiving this procedure.  I have had all my questions answered and I acknowledge that no guarantee has been made as to the result that may be obtained.    4.   Should the need arise during my operation/procedure, which includes change of level of care prior to discharge, I also consent to the administration of blood and/or blood products.  Further, I understand that despite careful testing and screening of blood or blood products by collecting agencies, I may still be subject to ill effects as  a result of receiving a blood transfusion and/or blood products.  The following are some, but not all, of the potential risks that can occur: fever and allergic reactions, hemolytic reactions, transmission of diseases such as Hepatitis, AIDS and Cytomegalovirus (CMV) and fluid overload.  In the event that I wish to have an autologous transfusion of my own blood, or a directed donor transfusion, I will discuss this with my physician.  Check only if Refusing Blood or Blood Products  I understand refusal of blood or blood products as deemed necessary by my physician may have serious consequences to my condition to include possible death. I hereby assume responsibility for my refusal and release the hospital, its personnel, and my physicians from any responsibility for the consequences of my refusal.          o  Refuse      5.   I authorize the use of any specimen, organs, tissues, body parts or foreign objects that may be removed from my body during the operation/procedure for diagnosis, research or teaching purposes and their subsequent disposal by hospital authorities.  I also authorize the release of specimen test results and/or written reports to my treating physician on the hospital medical staff or other referring or consulting physicians involved in my care, at the discretion of the Pathologist or my treating physician.    6.   I consent to the photographing or videotaping of the operations or procedures to be performed, including appropriate portions of my body for medical, scientific, or educational purposes, provided my identity is not revealed by the pictures or by descriptive texts accompanying them.  If the procedure has been photographed/videotaped, the surgeon will obtain the original picture, image, videotape or CD.  The hospital will not be responsible for storage, release or maintenance of the picture, image, tape or CD.    7.   I consent to the presence of a  or observers in the  operating room as deemed necessary by my physician or their designees.    8.   I recognize that in the event my procedure results in extended X-Ray/fluoroscopy time, I may develop a skin reaction.    9. If I have a Do Not Attempt Resuscitation (DNAR) order in place, that status will be suspended while in the operating room, procedural suite, and during the recovery period unless otherwise explicitly stated by me (or a person authorized to consent on my behalf). The surgeon or my attending physician will determine when the applicable recovery period ends for purposes of reinstating the DNAR order.  10. Patients having a sterilization procedure: I understand that if the procedure is successful the results will be permanent and it will therefore be impossible for me to inseminate, conceive, or bear children.  I also understand that the procedure is intended to result in sterility, although the result has not been guaranteed.   11. I acknowledge that my physician has explained sedation/analgesia administration to me including the risk and benefits I consent to the administration of sedation/analgesia as may be necessary or desirable in the judgment of my physician.    I CERTIFY THAT I HAVE READ AND FULLY UNDERSTAND THE ABOVE CONSENT TO OPERATION and/or OTHER PROCEDURE.    _________________________________________  __________________________________  Signature of Patient     Signature of Responsible Person         ___________________________________         Printed Name of Responsible Person           _________________________________                 Relationship to Patient  _________________________________________  ______________________________  Signature of Witness          Date  Time      Patient Name: Don Cabral     : 1965                 Printed: 2025     Medical Record #: [id number]                     Page 1 of 2                                    [address]  [place]  [address]    Consent for Anesthesia    I, Don Cabral agree to be cared for by an anesthesiologist, who is specially trained to monitor me and give me medicine to put me to sleep or keep me comfortable during my procedure    I understand that my anesthesiologist is not an employee or agent of [facility] or [facility]. He or she works for [employer].    As the patient asking for anesthesia services, I agree to:  Allow the anesthesiologist (anesthesia doctor) to give me medicine and do additional procedures as necessary. Some examples are: Starting or using an “IV” to give me medicine, fluids or blood during my procedure, and having a breathing tube placed to help me breathe when I’m asleep (intubation). In the event that my heart stops working properly, I understand that my anesthesiologist will make every effort to sustain my life, unless otherwise directed by [facility] Do Not Resuscitate documents.  Tell my anesthesia doctor before my procedure:  If I am pregnant.  The last time that I ate or drank.  All of the medicines I take (including prescriptions, herbal supplements, and pills I can buy without a prescription (including street drugs/illegal medications). Failure to inform my anesthesiologist about these medicines may increase my risk of anesthetic complications.  If I am allergic to anything or have had a reaction to anesthesia before.  I understand how the anesthesia medicine will help me (benefits).  I understand that with any type of anesthesia medicine there are risks:  The most common risks are: nausea, vomiting, sore throat, muscle soreness, damage to my eyes, mouth, or teeth (from breathing tube placement).  Rare risks include: remembering what happened during my procedure, allergic reactions to medications, injury to my airway, heart, lungs, vision, nerves, or muscles and in extremely rare instances death.  My doctor has explained to me  other choices available to me for my care (alternatives).  Pregnant Patients (“epidural”):  I understand that the risks of having an epidural (medicine given into my back to help control pain during labor), include itching, low blood pressure, difficulty urinating, headache or slowing of the baby’s heart. Very rare risks include infection, bleeding, seizure, irregular heart rhythms and nerve injury.  Regional Anesthesia (“spinal”, “epidural”, & “nerve blocks”):  I understand that rare but potential complications include headache, bleeding, infection, seizure, irregular heart rhythms, and nerve injury.    I can change my mind about having anesthesia services at any time before I get the medicine.    _____________________________________________________________________________  Patient (or Representative) Signature/Relationship to Patient  Date   Time    _____________________________________________________________________________   Name (if used)    Language/Organization   Time    _____________________________________________________________________________  Anesthesiologist Signature     Date   Time  I have discussed the procedure and information above with the patient (or patient’s representative) and answered their questions. The patient or their representative has agreed to have anesthesia services.    _____________________________________________________________________________  Witness        Date   Time  I have verified that the signature is that of the patient or patient’s representative, and that it was signed before the procedure  Patient Name: Don Cabral     : 1965                 Printed: 2025     Medical Record #: [id number]                     Page 2 of 2

## (undated) NOTE — LETTER
[facility]  [address], [place], [place]  Authorization for Surgical Operation and Procedure                                                                                           I hereby authorize Rick Novoa MD, my physician and his/her assistants (if applicable), which may include medical students, residents, and/or fellows, to perform the following surgical operation/ procedure and administer such anesthesia as may be determined necessary by my physician: Operation/Procedure name (s) EXPLORATORY LAPAROTOMY, PARTIAL COLECTOMY WITH END COLOSTOMY on Don Cabral   2.   I recognize that during the surgical operation/procedure, unforeseen conditions may necessitate additional or different procedures than those listed above.  I, therefore, further authorize and request that the above-named surgeon, assistants, or designees perform such procedures as are, in their judgment, necessary and desirable.    3.   My surgeon/physician has discussed prior to my surgery the potential benefits, risks and side effects of this procedure; the likelihood of achieving goals; and potential problems that might occur during recuperation.  They also discussed reasonable alternatives to the procedure, including risks, benefits, and side effects related to the alternatives and risks related to not receiving this procedure.  I have had all my questions answered and I acknowledge that no guarantee has been made as to the result that may be obtained.    4.   Should the need arise during my operation/procedure, which includes change of level of care prior to discharge, I also consent to the administration of blood and/or blood products.  Further, I understand that despite careful testing and screening of blood or blood products by collecting agencies, I may still be subject to ill effects as a result of receiving a blood transfusion and/or blood products.  The following are some, but not all, of the potential  risks that can occur: fever and allergic reactions, hemolytic reactions, transmission of diseases such as Hepatitis, AIDS and Cytomegalovirus (CMV) and fluid overload.  In the event that I wish to have an autologous transfusion of my own blood, or a directed donor transfusion, I will discuss this with my physician.  Check only if Refusing Blood or Blood Products  I understand refusal of blood or blood products as deemed necessary by my physician may have serious consequences to my condition to include possible death. I hereby assume responsibility for my refusal and release the hospital, its personnel, and my physicians from any responsibility for the consequences of my refusal.    o  Refuse   5.   I authorize the use of any specimen, organs, tissues, body parts or foreign objects that may be removed from my body during the operation/procedure for diagnosis, research or teaching purposes and their subsequent disposal by hospital authorities.  I also authorize the release of specimen test results and/or written reports to my treating physician on the hospital medical staff or other referring or consulting physicians involved in my care, at the discretion of the Pathologist or my treating physician.    6.   I consent to the photographing or videotaping of the operations or procedures to be performed, including appropriate portions of my body for medical, scientific, or educational purposes, provided my identity is not revealed by the pictures or by descriptive texts accompanying them.  If the procedure has been photographed/videotaped, the surgeon will obtain the original picture, image, videotape or CD.  The hospital will not be responsible for storage, release or maintenance of the picture, image, tape or CD.    7.   I consent to the presence of a  or observers in the operating room as deemed necessary by my physician or their designees.    8.   I recognize that in the event my procedure results in  extended X-Ray/fluoroscopy time, I may develop a skin reaction.    9. If I have a Do Not Attempt Resuscitation (DNAR) order in place, that status will be suspended while in the operating room, procedural suite, and during the recovery period unless otherwise explicitly stated by me (or a person authorized to consent on my behalf). The surgeon or my attending physician will determine when the applicable recovery period ends for purposes of reinstating the DNAR order.  10. Patients having a sterilization procedure: I understand that if the procedure is successful the results will be permanent and it will therefore be impossible for me to inseminate, conceive, or bear children.  I also understand that the procedure is intended to result in sterility, although the result has not been guaranteed.   11. I acknowledge that my physician has explained sedation/analgesia administration to me including the risk and benefits I consent to the administration of sedation/analgesia as may be necessary or desirable in the judgment of my physician.    I CERTIFY THAT I HAVE READ AND FULLY UNDERSTAND THE ABOVE CONSENT TO OPERATION and/or OTHER PROCEDURE.     _________________________________________ _________________________________     ___________________________________  Signature of Patient     Signature of Responsible Person                   Printed Name of Responsible Person                              _________________________________________ ______________________________        ___________________________________  Signature of Witness         Date  Time         Relationship to Patient    STATEMENT OF PHYSICIAN My signature below affirms that prior to the time of the procedure; I have explained to the patient and/or his/her legal representative, the risks and benefits involved in the proposed treatment and any reasonable alternative to the proposed treatment. I have also explained the risks and benefits involved in refusal of  the proposed treatment and alternatives to the proposed treatment and have answered the patient's questions. If I have a significant financial interest in a co-management agreement or a significant financial interest in any product or implant, or other significant relationship used in this procedure/surgery, I have disclosed this and had a discussion with my patient.     _______________________________________________________________ _____________________________  (Signature of Physician)                                                                                         (Date)                                   (Time)  Patient Name: Don Cabral    : 1965   Printed: 4/15/2024      Medical Record #: [id number]                                              Page 1 of 1

## (undated) NOTE — LETTER
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 Patient notes that his arms were raised outward for the 10 hour surgery and that he awoke unable to move R arm and with decreased function in L arm. Concerns for radial nerve palsy and other nerve complications that have notably improved in the past 3-4 weeks time. OT services received physician's orders for evaluation and treatment as indicated to maximize rehab potential.   Pain level: current 4 /10, at best 2 /10, at worst 5 /10   Occupation: Full Time at [employer]    Prior level of function: (I) w/ all ADL/IADLs  Current limitations: functional gripping, lifting, carrying, typing on computer, and overall functional use of BUE  Pt goals: improve functional symptoms and use of BUE  Hand Dominance: right  Living Situation: family    Imaging/Tests: n/a   Don  has a past medical history of Abdominal pain (7/1/23), Allergic rhinitis, Anxiety, Arrhythmia, Back pain, Back problem, Decorative tattoo, Depression, Diverticulitis, ED (erectile dysfunction), Heart palpitations, Hemorrhoids (1/1/20), High blood pressure, History of adverse reaction to anesthesia, Indigestion, Panic attack (1991), Panic disorder (1991), Pneumonia due to  organism, Presence of other cardiac implants and grafts, Seasonal allergies, Shortness of breath, Sleep apnea (2012), Stress, Tremor (2023), Unspecified essential hypertension, Visual impairment, Wears glasses, and Weight gain.  He  has a past surgical history that includes myringoplasty; knee arthroscopy (Right); skin tissue procedure unlisted; incision/drain abscess extra (1996); dental surgery procedure; colonoscopy (N/A, 03/22/2016); other surgical history; tonsillectomy; laparoscopic cholecystectomy (02/12/2023); cholecystectomy (3/12/2023); colonoscopy (05/10/2023); part removal colon w end colostomy; other surgical history (07/17/2024); colonoscopy (N/A, 03/13/2025); bowel resection (4/24 and 3/25); create eardrum opening,gen anesth (Childhood); and Colectomy.    ASSESSMENT  Don presents to occupational therapy evaluation with primary c/o need for occupational therapy to address BUE nerve complications s/p multiple abdominal surgeries d/t diverticulitis, wren, R hemicolectomy d/t appendiceal inflammatory process with surgery completed on 6/26/2025. Patient notes that his arms were raised outward for the 10 hour surgery and that he awoke unable to move R arm and with decreased function in L arm. Concerns for radial nerve palsy and other nerve complications that have notably improved in the past 3-4 weeks time. OT services received physician's orders for evaluation and treatment as indicated to maximize rehab potential.. The results of the objective tests and measures show decreaesd ROM, noted nerve injury involvement, and decreased strength overall.. Functional deficits include but are not limited to functional gripping, lifting, carrying, typing on computer, and overall functional use of BUE. 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 Pt and OT discussed evaluation findings, pathology, POC and HEP.  Pt voiced understanding  and performs HEP correctly without reported pain. Skilled Occupational Therapy is medically necessary to address the above impairments and reach functional goals.    OBJECTIVE:      OBSERVATIONS: Patient noted with gradually improving radial nerve palsy about 3-4 weeks past incident. 30 degrees active R wrist extension and PIP/DIP joint extension. Lagging MCP extension at 40 degrees today.    SENSATION: Patient notes constant numbness/tingling down RUE and in L hand.    ORTHOTICS: Patient encouraged to use wrist brace for protection as needed but at this time encouraged to wean out of brace for regular activities to encourage R wrist/hand use.    AROM (degrees): Patient noted with BUE AROM WFL except noted below     Wrist   ROM MMT (-/5)    R L R L     Flex (C7) 60 65 5/5 5/5     Ext (C6) 30 70 2+/5 5/5        R Hand ROM   IF MF RF SF     MP 40-85 40-85 40-85 40-85     PIP 10-90 15-90 15-90 15-90     DIP 0-70 0-70 0-70 0-70     AMEZCUA 195 190 190 190        Hand Strength (lbs) R L      21.6 61.4     2 pt Pinch 4 9     3 pt Pinch 7 15     Lateral pinch 12 21        Today's Treatment and Response:   Pt education was provided on exam findings, treatment diagnosis, treatment plan, expectations, and prognosis.    Today's Treatment       7/24/2025   OT Treatment   Therapeutic Exercise HEP   Therapeutic Exercise Min 15   Eval Min 30   Total Timed Procedures 15   Total Service Procedures 45   Total Time 45         Patient was instructed in and issued a HEP for:   -Flexion Tendon Gldiing Exercises  -Wrist Extension/Digit Extension ROM Exercises  -1 lb Weight Wrist Exercises  -Yellow Theraputty Strengthening Exercises  -Radial Nerve Glides    Charges:  OT EVAL Moderate Complexity, TEx1   Based on analysis of data from a detailed assessment from an expanded chart review, clinical presentation of physical, cognitive and psychosocial skills, as well as review of patient rated outcome measures, this evaluation involved Moderate  complexity decision making, with 3-5 occupational performance component deficits, possible comorbidities, and minimal to moderate need for modification of tasks or assistance.                                                                                    PLAN OF CARE:    Goals: (to be met in 10 visits)   1. Patient will demonstrate at least 60 degrees or greater R wrist extension to facilitate recovery.  2. Patient will demonstrate at least 40.0 lbs or greater R  strength to facilitate ADL/IADL management.  3. Patient will demonstrate at least 215 degrees or greater R D2-D5 AMEZCUA to facilitate recovery.  4. Patient will demonstrate at least 10.0 lbs R 3-Point Pinch strength to facilitate recovery.  5. Patient will demonstrate independence with HEP to facilitate recovery.     Frequency / Duration: Patient will be seen 2x/week or a total of 10 visits over a 90 day period. Treatment will include: Manual Therapy; Neuromuscular Re-education; Self-Care Home Management; Therapeutic Activities; Therapeutic Exercise; Home Exercise Program instruction; Ultrasound; Electrical stimulation (attended); Other (use comment) (Orthotics, Modalities PRN)    Education or treatment limitation: None   Rehab Potential: good     QuickDASH Outcome Score  Score: (Patient-Rptd) 56.82 % (7/24/2025  8:18 AM)      Patient/Family/Caregiver was advised of these findings, precautions, and treatment options and has agreed to actively participate in planning and for this course of care.    Thank you for your referral. Please co-sign or sign and return this letter via fax as soon as possible to [phone]. If you have any questions, please contact me at Dept: [phone]    Sincerely,  Electronically signed by therapist: Reji Interiano, OT  Physician's certification required: Yes  I certify the need for these services furnished under this plan of treatment and while under my care.    X___________________________________________________  Date____________________    Certification From: 7/24/2025  To: 10/22/2025          21st Century Cures Act Notice to Patient: Medical documents like this are made available to patients in the interest of transparency. However, be advised this is a medical document and it is intended as dhik-dz-glai communication between your medical providers. This medical document may contain abbreviations, assessments, medical data, and results or other terms that are unfamiliar. Medical documents are intended to carry relevant information, facts as evident, and the clinical opinion of the practitioner. As such, this medical document may be written in language that appears blunt or direct. You are encouraged to contact your medical provider and/or [facility] if you have any questions about this medical document.

## (undated) NOTE — LETTER
[facility]  part of [facility]     PICC INSERTION CONSENT     I agree to have a Peripherally Inserted Central Catheter (PICC) placed in my arm.   1. The PICC insertion procedure, care, maintenance, risks, benefits, and complications have been explained to me by my physician, ________________________, and I understand them.   2. I understand that this may not be the only way I can receive my medication. I understand that my physician has determined that the PICC would be the safest and most effective means of administering my medication at this time. If there are other options of giving medication into my veins those options have been explained to me by my physician and I have chosen this one.   3. I realize a nurse who has been specially trained and certified by the hospital and ’s representative to insert a PICC will perform this procedure. My catheter will be inserted by _____________________________.   4. I have been informed by my doctor of the nature and purpose of this procedure and the risks involved and the possibility of complications. I realize that this is an invasive procedure and has certain risks such as air embolism (air entering the catheter or my vein), arterial puncture (a tearing of one of my arteries), infection, irregular heartbeat and venous thrombosis (a blood clot in a vein) nerve injury and fracture of the catheter with or without migration.   5. In order to numb the area where the line will be placed, a small amount of anesthetic medication will be injected as ordered by my physician.   6. I understand that while the catheter will be placed in my upper arm the end of the catheter will come to rest in an area near my heart.     7. The person performing this procedure has discussed the potential benefits, risks, and side effects of the PICC; the likelihood of achieving goals; and potential problems that might occur during recuperation. They also discussed  reasonable alternatives to the PICC, including risks, benefits, and side effects related to the alternatives and risks related to not receiving this procedure.    8.  I have expressed any questions about this procedure to my physician or the PICC Proceduralist and he/she has answered them.  I certify that I have read and understand this consent to the insertion of a PICC.      _________________________________________________________   Date     Time     Patient/Guardian Signature       ____________________________________   Printed name of Patient/Guardian          ________________________________________________________________    Date        Time                   Witnessing RN Signature      Patient Name: Don Cabral     : 1965                 Printed: 2024     Medical Record #: [id number]

## (undated) NOTE — LETTER
Date & Time: 4/18/2025, 4:21 PM  Patient: Don Cabral  Encounter Provider(s):    Cortes Robledo MD         This certifies that I, Don Cabral, a patient at an [facility], am leaving the facility voluntarily and against the advice of my physician.    I acknowledge that I have been:    1. informed that my physician believes that I need to receive care here;  2. informed that if I leave, I could become sicker or even die; and  3. provided discharge instructions consistent with my current diagnosis.    I hereby release my physician, the facility, and its employees from all responsibility for any ill effects which may result from this action.        __________________________________  Patient or authorized caregiver signature    __________________________________  RN signature    If no patient or patient representative signature was obtained, sign below to acknowledge that the form was reviewed with the patient and that the patient refused to sign.    __________________________________  RN signature

## (undated) NOTE — LETTER
[facility] [address], [address] [place] [address]  Consent for Procedure/Sedation  Date: ***         Time: ***    {[facility] ivs consent:8660}

## (undated) NOTE — Clinical Note
FYI, TCM call made, see notes.  NCM confirmed patient had an appointment on 11/7/23 patient recommended to have a TCM within 7 days of discharge, NCM rescheduled on 11/3/2023 at 11:30 am.

## (undated) NOTE — LETTER
[address]  [address]  Authorization for Surgical Operation and Procedure     Date:___________                                                                                                         Time:__________  I hereby authorize Surgeon(s):  Ryan Clemens MD, my physician and his/her assistants (if applicable), which may include medical students, residents, and/or fellows, to perform the following surgical operation/ procedure and administer such anesthesia as may be determined necessary by my physician:  Operation/Procedure name (s) Procedure(s):  XI ROBOT-ASSISTED LAPAROSCOPIC CECECTOMY AND OMENTECTOMY on Don Cabral   2.   I recognize that during the surgical operation/procedure, unforeseen conditions may necessitate additional or different procedures than those listed above.  I, therefore, further authorize and request that the above-named surgeon, assistants, or designees perform such procedures as are, in their judgment, necessary and desirable.    3.   My surgeon/physician has discussed prior to my surgery the potential benefits, risks and side effects of this procedure; the likelihood of achieving goals; and potential problems that might occur during recuperation.  They also discussed reasonable alternatives to the procedure, including risks, benefits, and side effects related to the alternatives and risks related to not receiving this procedure.  I have had all my questions answered and I acknowledge that no guarantee has been made as to the result that may be obtained.    4.   Should the need arise during my operation/procedure, which includes change of level of care prior to discharge, I also consent to the administration of blood and/or blood products.  Further, I understand that despite careful testing and screening of blood or blood products by collecting agencies, I may still be subject to ill effects as a result of receiving a blood  transfusion and/or blood products.  The following are some, but not all, of the potential risks that can occur: fever and allergic reactions, hemolytic reactions, transmission of diseases such as Hepatitis, AIDS and Cytomegalovirus (CMV) and fluid overload.  In the event that I wish to have an autologous transfusion of my own blood, or a directed donor transfusion, I will discuss this with my physician.  Check only if Refusing Blood or Blood Products  I understand refusal of blood or blood products as deemed necessary by my physician may have serious consequences to my condition to include possible death. I hereby assume responsibility for my refusal and release the hospital, its personnel, and my physicians from any responsibility for the consequences of my refusal.          o  Refuse      5.   I authorize the use of any specimen, organs, tissues, body parts or foreign objects that may be removed from my body during the operation/procedure for diagnosis, research or teaching purposes and their subsequent disposal by hospital authorities.  I also authorize the release of specimen test results and/or written reports to my treating physician on the hospital medical staff or other referring or consulting physicians involved in my care, at the discretion of the Pathologist or my treating physician.    6.   I consent to the photographing or videotaping of the operations or procedures to be performed, including appropriate portions of my body for medical, scientific, or educational purposes, provided my identity is not revealed by the pictures or by descriptive texts accompanying them.  If the procedure has been photographed/videotaped, the surgeon will obtain the original picture, image, videotape or CD.  The hospital will not be responsible for storage, release or maintenance of the picture, image, tape or CD.    7.   I consent to the presence of a  or observers in the operating room as deemed  necessary by my physician or their designees.    8.   I recognize that in the event my procedure results in extended X-Ray/fluoroscopy time, I may develop a skin reaction.    9. If I have a Do Not Attempt Resuscitation (DNAR) order in place, that status will be suspended while in the operating room, procedural suite, and during the recovery period unless otherwise explicitly stated by me (or a person authorized to consent on my behalf). The surgeon or my attending physician will determine when the applicable recovery period ends for purposes of reinstating the DNAR order.  10. Patients having a sterilization procedure: I understand that if the procedure is successful the results will be permanent and it will therefore be impossible for me to inseminate, conceive, or bear children.  I also understand that the procedure is intended to result in sterility, although the result has not been guaranteed.   11. I acknowledge that my physician has explained sedation/analgesia administration to me including the risk and benefits I consent to the administration of sedation/analgesia as may be necessary or desirable in the judgment of my physician.    I CERTIFY THAT I HAVE READ AND FULLY UNDERSTAND THE ABOVE CONSENT TO OPERATION and/or OTHER PROCEDURE.    _________________________________________  __________________________________  Signature of Patient     Signature of Responsible Person         ___________________________________         Printed Name of Responsible Person           _________________________________                 Relationship to Patient  _________________________________________  ______________________________  Signature of Witness          Date  Time      Patient Name: Don Carranza Misha     : 1965                 Printed: 2025     Medical Record #: [id number]                     Page 1 of 2                                    [address]   [address]    Consent for Anesthesia    I, Don Cabral agree to be cared for by an anesthesiologist, who is specially trained to monitor me and give me medicine to put me to sleep or keep me comfortable during my procedure    I understand that my anesthesiologist is not an employee or agent of [facility] or [facility] [employer]. He or she works for [employer].    As the patient asking for anesthesia services, I agree to:  Allow the anesthesiologist (anesthesia doctor) to give me medicine and do additional procedures as necessary. Some examples are: Starting or using an “IV” to give me medicine, fluids or blood during my procedure, and having a breathing tube placed to help me breathe when I’m asleep (intubation). In the event that my heart stops working properly, I understand that my anesthesiologist will make every effort to sustain my life, unless otherwise directed by [facility] Do Not Resuscitate documents.  Tell my anesthesia doctor before my procedure:  If I am pregnant.  The last time that I ate or drank.  All of the medicines I take (including prescriptions, herbal supplements, and pills I can buy without a prescription (including street drugs/illegal medications). Failure to inform my anesthesiologist about these medicines may increase my risk of anesthetic complications.  If I am allergic to anything or have had a reaction to anesthesia before.  I understand how the anesthesia medicine will help me (benefits).  I understand that with any type of anesthesia medicine there are risks:  The most common risks are: nausea, vomiting, sore throat, muscle soreness, damage to my eyes, mouth, or teeth (from breathing tube placement).  Rare risks include: remembering what happened during my procedure, allergic reactions to medications, injury to my airway, heart, lungs, vision, nerves, or muscles and in extremely rare instances death.  My doctor has explained to me other choices  available to me for my care (alternatives).  Pregnant Patients (“epidural”):  I understand that the risks of having an epidural (medicine given into my back to help control pain during labor), include itching, low blood pressure, difficulty urinating, headache or slowing of the baby’s heart. Very rare risks include infection, bleeding, seizure, irregular heart rhythms and nerve injury.  Regional Anesthesia (“spinal”, “epidural”, & “nerve blocks”):  I understand that rare but potential complications include headache, bleeding, infection, seizure, irregular heart rhythms, and nerve injury.    I can change my mind about having anesthesia services at any time before I get the medicine.    _____________________________________________________________________________  Patient (or Representative) Signature/Relationship to Patient  Date   Time    _____________________________________________________________________________   Name (if used)    Language/Organization   Time    _____________________________________________________________________________  Anesthesiologist Signature     Date   Time  I have discussed the procedure and information above with the patient (or patient’s representative) and answered their questions. The patient or their representative has agreed to have anesthesia services.    _____________________________________________________________________________  Witness        Date   Time  I have verified that the signature is that of the patient or patient’s representative, and that it was signed before the procedure  Patient Name: Don Cabral     : 1965                 Printed: 2025     Medical Record #: [id number]                     Page 2 of 2

## (undated) NOTE — LETTER
25    Patient: Don Cabral  : 1965 Visit date: 2025    Dear  Stephen Christianson MD    Thank you for referring Don Cabral to my practice.  Please find my assessment and plan below.    Assessment   1. Mesothelioma of abdomen (HCC)        Plan     Patient is recovering well from my exploratory laparotomy, right colon resection with anastomosis partial omentectomy, small bowel resection and lysis of adhesions.    Pathology is concerning for abdominal mesothelioma.    The patient has received second opinion from surgical and medical oncology at the [facility].    Cytoreductive surgery and HIPEC is recommended and I fully support this recommendation.    I encouraged the patient to pursue the above-noted interventions as recommended at the [facility]; the patient voiced understanding.    With respect to my postoperative care no further follow-up is required.  I discussed dietary, activity, exercise and lifestyle recommendations with the patient.    The patient was provided ample opportunity to ask questions.  All of the patient's questions were answered in detail.  The patient voiced understanding of the care plan..       Sincerely,       Ryan Clemens MD   CC: No Recipients

## (undated) NOTE — LETTER
24    Patient: Don Cabral  : 1965 Visit date: 2024    Dear  Stephen Christianson MD    Thank you for referring Don Cabral to my practice.  Please find my assessment and plan below.        I saw Don in the office, on physical examination today he shows no evidence of abdominal wall hernia.  The mass previously identified by him a week and a half ago has now resolved.  His recent imaging has been reviewed.  I have advised him to return to me immediately should he develop a recurrent mass at this location otherwise no immediate plans for surgery.  Thank you Radu  Sincerely,       Jose Juan Dawson DO   CC:   No Recipients

## (undated) NOTE — Clinical Note
Transitional Care Management call completed. A Transitional Care Management appointment is scheduled for 8/6/2024. Nurse care manager contacted general surgery due to MARGARITA drain blockage. The patient was instructed to go to the emergency department for MARGARITA drain care. Thank you.

## (undated) NOTE — ED AVS SNAPSHOT
Tressa Addison   MRN: [id number]    Department:  [facility] Emergency Department in [place]   Date of Visit:  2/5/2020           Disclosure     Insurance plans vary and the physician(s) referred by the ER may not be covered by your plan.  Please contact tell this physician (or your personal doctor if your instructions are to return to your personal doctor) about any new or lasting problems. The primary care or specialist physician will see patients referred from the [facility] Emergency Department.  Braeden Rosen

## (undated) NOTE — LETTER
[address]  [id number]  Authorization for Surgical Operation and Procedure     Date:___________                                                                                                         Time:__________  I hereby authorize Surgeon(s): Ryan Clemens MD, my physician and his/her assistants (if applicable), which may include medical students, residents, and/or fellows, to perform the following surgical operation/ procedure and administer such anesthesia as may be determined necessary by my physician:  Operation/Procedure name (s) Procedure(s): XI ROBOT-ASSISTED LAPAROSCOPIC CECECTOMY AND OMENTECTOMY on Don Cabral   2.   I recognize that during the surgical operation/procedure, unforeseen conditions may necessitate additional or different procedures than those listed above.  I, therefore, further authorize and request that the above-named surgeon, assistants, or designees perform such procedures as are, in their judgment, necessary and desirable.    3.   My surgeon/physician has discussed prior to my surgery the potential benefits, risks and side effects of this procedure; the likelihood of achieving goals; and potential problems that might occur during recuperation.  They also discussed reasonable alternatives to the procedure, including risks, benefits, and side effects related to the alternatives and risks related to not receiving this procedure.  I have had all my questions answered and I acknowledge that no guarantee has been made as to the result that may be obtained.    4.   Should the need arise during my operation/procedure, which includes change of level of care prior to discharge, I also consent to the administration of blood and/or blood products.  Further, I understand that despite careful testing and screening of blood or blood products by collecting agencies, I may still be subject to ill effects as a result of receiving a blood transfusion  and/or blood products.  The following are some, but not all, of the potential risks that can occur: fever and allergic reactions, hemolytic reactions, transmission of diseases such as Hepatitis, AIDS and Cytomegalovirus (CMV) and fluid overload.  In the event that I wish to have an autologous transfusion of my own blood, or a directed donor transfusion, I will discuss this with my physician.  Check only if Refusing Blood or Blood Products  I understand refusal of blood or blood products as deemed necessary by my physician may have serious consequences to my condition to include possible death. I hereby assume responsibility for my refusal and release the hospital, its personnel, and my physicians from any responsibility for the consequences of my refusal.          o  Refuse      5.   I authorize the use of any specimen, organs, tissues, body parts or foreign objects that may be removed from my body during the operation/procedure for diagnosis, research or teaching purposes and their subsequent disposal by hospital authorities.  I also authorize the release of specimen test results and/or written reports to my treating physician on the hospital medical staff or other referring or consulting physicians involved in my care, at the discretion of the Pathologist or my treating physician.    6.   I consent to the photographing or videotaping of the operations or procedures to be performed, including appropriate portions of my body for medical, scientific, or educational purposes, provided my identity is not revealed by the pictures or by descriptive texts accompanying them.  If the procedure has been photographed/videotaped, the surgeon will obtain the original picture, image, videotape or CD.  The hospital will not be responsible for storage, release or maintenance of the picture, image, tape or CD.    7.   I consent to the presence of a  or observers in the operating room as deemed necessary by my  physician or their designees.    8.   I recognize that in the event my procedure results in extended X-Ray/fluoroscopy time, I may develop a skin reaction.    9. If I have a Do Not Attempt Resuscitation (DNAR) order in place, that status will be suspended while in the operating room, procedural suite, and during the recovery period unless otherwise explicitly stated by me (or a person authorized to consent on my behalf). The surgeon or my attending physician will determine when the applicable recovery period ends for purposes of reinstating the DNAR order.  10. Patients having a sterilization procedure: I understand that if the procedure is successful the results will be permanent and it will therefore be impossible for me to inseminate, conceive, or bear children.  I also understand that the procedure is intended to result in sterility, although the result has not been guaranteed.   11. I acknowledge that my physician has explained sedation/analgesia administration to me including the risk and benefits I consent to the administration of sedation/analgesia as may be necessary or desirable in the judgment of my physician.    I CERTIFY THAT I HAVE READ AND FULLY UNDERSTAND THE ABOVE CONSENT TO OPERATION and/or OTHER PROCEDURE.    _________________________________________  __________________________________  Signature of Patient     Signature of Responsible Person         ___________________________________         Printed Name of Responsible Person           _________________________________                 Relationship to Patient  _________________________________________  ______________________________  Signature of Witness          Date  Time      Patient Name: Don Carranza Misha     : 1965                 Printed: 2025     Medical Record #: [id number]                     Page [address] of [address] [place] [address]  [id number]    Consent for  Anesthesia    I, Don Cabral agree to be cared for by an anesthesiologist, who is specially trained to monitor me and give me medicine to put me to sleep or keep me comfortable during my procedure    I understand that my anesthesiologist is not an employee or agent of [facility] [employer]. He or she works for [employer].    As the patient asking for anesthesia services, I agree to:  Allow the anesthesiologist (anesthesia doctor) to give me medicine and do additional procedures as necessary. Some examples are: Starting or using an “IV” to give me medicine, fluids or blood during my procedure, and having a breathing tube placed to help me breathe when I’m asleep (intubation). In the event that my heart stops working properly, I understand that my anesthesiologist will make every effort to sustain my life, unless otherwise directed by [facility] Do Not Resuscitate documents.  Tell my anesthesia doctor before my procedure:  If I am pregnant.  The last time that I ate or drank.  All of the medicines I take (including prescriptions, herbal supplements, and pills I can buy without a prescription (including street drugs/illegal medications). Failure to inform my anesthesiologist about these medicines may increase my risk of anesthetic complications.  If I am allergic to anything or have had a reaction to anesthesia before.  I understand how the anesthesia medicine will help me (benefits).  I understand that with any type of anesthesia medicine there are risks:  The most common risks are: nausea, vomiting, sore throat, muscle soreness, damage to my eyes, mouth, or teeth (from breathing tube placement).  Rare risks include: remembering what happened during my procedure, allergic reactions to medications, injury to my airway, heart, lungs, vision, nerves, or muscles and in extremely rare instances death.  My doctor has explained to me other choices available to me for my  care (alternatives).  Pregnant Patients (“epidural”):  I understand that the risks of having an epidural (medicine given into my back to help control pain during labor), include itching, low blood pressure, difficulty urinating, headache or slowing of the baby’s heart. Very rare risks include infection, bleeding, seizure, irregular heart rhythms and nerve injury.  Regional Anesthesia (“spinal”, “epidural”, & “nerve blocks”):  I understand that rare but potential complications include headache, bleeding, infection, seizure, irregular heart rhythms, and nerve injury.    I can change my mind about having anesthesia services at any time before I get the medicine.    _____________________________________________________________________________  Patient (or Representative) Signature/Relationship to Patient  Date   Time    _____________________________________________________________________________   Name (if used)    Language/Organization   Time    _____________________________________________________________________________  Anesthesiologist Signature     Date   Time  I have discussed the procedure and information above with the patient (or patient’s representative) and answered their questions. The patient or their representative has agreed to have anesthesia services.    _____________________________________________________________________________  Witness        Date   Time  I have verified that the signature is that of the patient or patient’s representative, and that it was signed before the procedure  Patient Name: Don Cabral     : 1965                 Printed: 2025     Medical Record #: [id number]                     Page 2 of 2